# Patient Record
Sex: FEMALE | Race: WHITE | NOT HISPANIC OR LATINO
[De-identification: names, ages, dates, MRNs, and addresses within clinical notes are randomized per-mention and may not be internally consistent; named-entity substitution may affect disease eponyms.]

---

## 2018-12-05 ENCOUNTER — APPOINTMENT (OUTPATIENT)
Dept: ORTHOPEDIC SURGERY | Facility: CLINIC | Age: 73
End: 2018-12-05
Payer: MEDICARE

## 2018-12-05 VITALS
HEIGHT: 64 IN | WEIGHT: 140 LBS | BODY MASS INDEX: 23.9 KG/M2 | HEART RATE: 61 BPM | SYSTOLIC BLOOD PRESSURE: 162 MMHG | DIASTOLIC BLOOD PRESSURE: 79 MMHG

## 2018-12-05 PROCEDURE — 99204 OFFICE O/P NEW MOD 45 MIN: CPT

## 2018-12-05 PROCEDURE — 73502 X-RAY EXAM HIP UNI 2-3 VIEWS: CPT | Mod: RT

## 2018-12-19 ENCOUNTER — APPOINTMENT (OUTPATIENT)
Dept: ORTHOPEDIC SURGERY | Facility: CLINIC | Age: 73
End: 2018-12-19
Payer: MEDICARE

## 2018-12-19 PROCEDURE — 99213 OFFICE O/P EST LOW 20 MIN: CPT

## 2018-12-19 PROCEDURE — 73502 X-RAY EXAM HIP UNI 2-3 VIEWS: CPT | Mod: RT

## 2019-01-09 ENCOUNTER — APPOINTMENT (OUTPATIENT)
Dept: ORTHOPEDIC SURGERY | Facility: CLINIC | Age: 74
End: 2019-01-09
Payer: MEDICARE

## 2019-01-09 PROCEDURE — 99213 OFFICE O/P EST LOW 20 MIN: CPT

## 2019-01-09 PROCEDURE — 73502 X-RAY EXAM HIP UNI 2-3 VIEWS: CPT | Mod: RT

## 2019-01-11 NOTE — HISTORY OF PRESENT ILLNESS
[All Other ROS Normal] : All other review of systems are negative except as noted [Joint Pain] : joint pain [Joint Stiffness] : joint stiffness [Muscle Aches] : muscle aches [All Hx] : past medical, family, and social [All] : medication and allergy [FreeTextEntry1] : Right MRI positive femoral neck fracture  [FreeTextEntry2] : 74 yo F presents today after undergoing MRI RT hip for progressive hip pain. MRI came back positive for fracture. She has elected for conservative management and observation of her fracture despite recommendation for surgery. She states that she has been compliant with TTWB.

## 2019-01-11 NOTE — DISCUSSION/SUMMARY
[de-identified] : Radiographic findings are discussed with the patient. She will continue TTWB and will continue to observe this fracture. we will se eher back in 4 weeks with Xrays at that time

## 2019-01-11 NOTE — PHYSICAL EXAM
[FreeTextEntry2] : Physical exam of the right hip \par no swelling no ecchymosis \par TTP right hip/ groin \par + Pain with log roll \par + percussion \par NIVD Distally  [de-identified] : 2 views right hip taken today and compared to prior films and MRI show  impaction of femoral neck fracture compared with prior radiographs however overall alignment is acceptable

## 2019-02-06 ENCOUNTER — APPOINTMENT (OUTPATIENT)
Dept: ORTHOPEDIC SURGERY | Facility: CLINIC | Age: 74
End: 2019-02-06
Payer: MEDICARE

## 2019-02-06 VITALS
BODY MASS INDEX: 24.24 KG/M2 | SYSTOLIC BLOOD PRESSURE: 114 MMHG | HEART RATE: 81 BPM | WEIGHT: 142 LBS | DIASTOLIC BLOOD PRESSURE: 66 MMHG | HEIGHT: 64 IN

## 2019-02-06 PROCEDURE — 72190 X-RAY EXAM OF PELVIS: CPT

## 2019-02-06 PROCEDURE — 99213 OFFICE O/P EST LOW 20 MIN: CPT

## 2019-03-12 ENCOUNTER — APPOINTMENT (OUTPATIENT)
Dept: ORTHOPEDIC SURGERY | Facility: CLINIC | Age: 74
End: 2019-03-12
Payer: MEDICARE

## 2019-03-12 PROCEDURE — 99213 OFFICE O/P EST LOW 20 MIN: CPT

## 2019-03-12 PROCEDURE — 73502 X-RAY EXAM HIP UNI 2-3 VIEWS: CPT | Mod: RT

## 2019-03-12 NOTE — DISCUSSION/SUMMARY
[de-identified] : Radiographic findings are discussed with the patient. continue WB as tolerated. She was given a script for therapy and will wean from assistive devices. She will followup in 8 weeks with x-ray at that time

## 2019-03-12 NOTE — HISTORY OF PRESENT ILLNESS
[All Other ROS Normal] : All other review of systems are negative except as noted [Joint Pain] : joint pain [Joint Stiffness] : joint stiffness [Muscle Aches] : muscle aches [All Hx] : past medical, family, and social [All] : medication and allergy [FreeTextEntry1] : Right MRI positive femoral neck fracture - F/U approximately 15 weeks post injury  [FreeTextEntry2] : 74 yo F presents today for followup. Patient refused surgery so she has been WBAT for a right MRI positive femoral neck fracture for the past 4 weeks. She in now approximately 15 weeks post injury

## 2019-03-12 NOTE — PHYSICAL EXAM
[Poor Appearance] : well-appearing [Acute Distress] : not in acute distress [UE/LE] : Sensory: Intact in bilateral upper & lower extremities [ALL] : dorsalis pedis, posterior tibial, femoral, popliteal, and radial 2+ and symmetric bilaterally [FreeTextEntry2] : Physical exam of the right hip \par no swelling no ecchymosis \par NTTP right hip/ groin \par no Pain with log roll \par negative percussion \par NIVD Distally \par She is able to walk comfortably with a walker.\par 4/5 strength on straight leg raise. 3/5 strength on abduction and hip flexion [de-identified] : 2 views right hip taken today and compared to prior films and MRI show  impaction of femoral neck fracture. This fracture appears fully healed. There is been no change from serial radiographs. There is some continued shortening of the femoral neck compared to the contralateral side

## 2019-03-28 ENCOUNTER — CLINICAL ADVICE (OUTPATIENT)
Age: 74
End: 2019-03-28

## 2019-03-28 DIAGNOSIS — M25.552 PAIN IN LEFT HIP: ICD-10-CM

## 2019-04-02 DIAGNOSIS — S32.9XXA FRACTURE OF UNSPECIFIED PARTS OF LUMBOSACRAL SPINE AND PELVIS, INITIAL ENCOUNTER FOR CLOSED FRACTURE: ICD-10-CM

## 2019-04-02 DIAGNOSIS — S32.592D OTHER SPECIFIED FRACTURE OF LEFT PUBIS, SUBSEQUENT ENCOUNTER FOR FRACTURE WITH ROUTINE HEALING: ICD-10-CM

## 2019-04-02 DIAGNOSIS — S32.592S OTHER SPECIFIED FRACTURE OF LEFT PUBIS, SEQUELA: ICD-10-CM

## 2019-04-03 ENCOUNTER — APPOINTMENT (OUTPATIENT)
Dept: ORTHOPEDIC SURGERY | Facility: CLINIC | Age: 74
End: 2019-04-03

## 2019-06-12 ENCOUNTER — APPOINTMENT (OUTPATIENT)
Dept: ORTHOPEDIC SURGERY | Facility: CLINIC | Age: 74
End: 2019-06-12
Payer: MEDICARE

## 2019-06-12 PROCEDURE — 99213 OFFICE O/P EST LOW 20 MIN: CPT

## 2019-06-12 PROCEDURE — 73502 X-RAY EXAM HIP UNI 2-3 VIEWS: CPT | Mod: RT

## 2019-06-17 NOTE — HISTORY OF PRESENT ILLNESS
[All Other ROS Normal] : All other review of systems are negative except as noted [Joint Pain] : joint pain [Muscle Aches] : muscle aches [Joint Stiffness] : joint stiffness [All] : medication and allergy [All Hx] : past medical, family, and social [FreeTextEntry1] : Right MRI positive femoral neck fracture - F/U approximately 15 weeks post injury  [FreeTextEntry2] : 72 yo F presents today for followup. Patient refused surgery so she has been WBAT for a right MRI positive femoral neck fracture 12/18. She has recently been dx w MRI+ pelvus fx 10 weeks ago.  She is overall feeling better.  Continues to ambulate w a walker

## 2019-06-17 NOTE — DISCUSSION/SUMMARY
[de-identified] : Radiographic findings are discussed with the patient. continue WB as tolerated. She was given a script for therapy and will wean from assistive devices. She will followup in 8 weeks with x-ray at that time

## 2019-06-17 NOTE — PHYSICAL EXAM
[Poor Appearance] : well-appearing [Acute Distress] : not in acute distress [UE/LE] : Sensory: Intact in bilateral upper & lower extremities [ALL] : dorsalis pedis, posterior tibial, femoral, popliteal, and radial 2+ and symmetric bilaterally [FreeTextEntry2] : Physical exam of the b/l hips and pelvis\par no swelling no ecchymosis \par NTTP right hip/ groin \par no Pain with log roll \par negative percussion \par NIVD Distally \par She is able to walk comfortably with a walker.\par 4/5 strength on straight leg raise. 4/5 strength on abduction and hip flexion [de-identified] : 2 views right hip taken today and compared to prior films and MRI show  impaction of femoral neck fracture. This fracture appears fully healed. There is been no change from serial radiographs. There is some continued shortening of the femoral neck compared to the contralateral side. Pelvis fracture appears to be healing appropriately w callous

## 2019-06-29 ENCOUNTER — APPOINTMENT (OUTPATIENT)
Dept: INTERNAL MEDICINE | Facility: CLINIC | Age: 74
End: 2019-06-29
Payer: MEDICARE

## 2019-06-29 VITALS
WEIGHT: 143 LBS | SYSTOLIC BLOOD PRESSURE: 120 MMHG | DIASTOLIC BLOOD PRESSURE: 80 MMHG | BODY MASS INDEX: 24.41 KG/M2 | HEIGHT: 64 IN

## 2019-06-29 DIAGNOSIS — M62.838 OTHER MUSCLE SPASM: ICD-10-CM

## 2019-06-29 DIAGNOSIS — S72.001A FRACTURE OF UNSPECIFIED PART OF NECK OF RIGHT FEMUR, INITIAL ENCOUNTER FOR CLOSED FRACTURE: ICD-10-CM

## 2019-06-29 PROCEDURE — 99213 OFFICE O/P EST LOW 20 MIN: CPT

## 2019-06-29 NOTE — PHYSICAL EXAM
[No Acute Distress] : no acute distress [No Respiratory Distress] : no respiratory distress  [Regular Rhythm] : with a regular rhythm [Soft] : abdomen soft [Clear to Auscultation] : lungs were clear to auscultation bilaterally [Non Tender] : non-tender [de-identified] : local parathoracic back pain--muscualr--no spine pain

## 2019-06-29 NOTE — HISTORY OF PRESENT ILLNESS
[FreeTextEntry8] : developed  bilateral para- thoracic back pain 1 week ago--no injury--improving--no radiation of pain--movement related--stopped smoking 2 wks ago--prior on biphosphonate but self d/c--will consider prolia--reviewed ortho notes

## 2019-10-25 ENCOUNTER — RX CHANGE (OUTPATIENT)
Age: 74
End: 2019-10-25

## 2019-11-04 ENCOUNTER — MEDICATION RENEWAL (OUTPATIENT)
Age: 74
End: 2019-11-04

## 2019-11-25 ENCOUNTER — MEDICATION RENEWAL (OUTPATIENT)
Age: 74
End: 2019-11-25

## 2019-12-17 ENCOUNTER — APPOINTMENT (OUTPATIENT)
Dept: INTERNAL MEDICINE | Facility: CLINIC | Age: 74
End: 2019-12-17
Payer: MEDICARE

## 2019-12-17 ENCOUNTER — NON-APPOINTMENT (OUTPATIENT)
Age: 74
End: 2019-12-17

## 2019-12-17 VITALS
BODY MASS INDEX: 22.36 KG/M2 | HEIGHT: 64 IN | TEMPERATURE: 97.8 F | SYSTOLIC BLOOD PRESSURE: 128 MMHG | WEIGHT: 131 LBS | DIASTOLIC BLOOD PRESSURE: 60 MMHG

## 2019-12-17 PROCEDURE — G0439: CPT

## 2019-12-17 PROCEDURE — 90732 PPSV23 VACC 2 YRS+ SUBQ/IM: CPT

## 2019-12-17 PROCEDURE — 36415 COLL VENOUS BLD VENIPUNCTURE: CPT

## 2019-12-17 PROCEDURE — 93000 ELECTROCARDIOGRAM COMPLETE: CPT

## 2019-12-17 PROCEDURE — G0009: CPT

## 2019-12-17 PROCEDURE — G0444 DEPRESSION SCREEN ANNUAL: CPT | Mod: 59

## 2019-12-17 NOTE — HEALTH RISK ASSESSMENT
[30 or more] : 30 or more [No falls in past year] : Patient reported no falls in the past year [No] : No [Patient reported colonoscopy was normal] : Patient reported colonoscopy was normal [Alone] : lives alone [Fully functional (bathing, dressing, toileting, transferring, walking, feeding)] : Fully functional (bathing, dressing, toileting, transferring, walking, feeding) [Fully functional (using the telephone, shopping, preparing meals, housekeeping, doing laundry, using] : Fully functional and needs no help or supervision to perform IADLs (using the telephone, shopping, preparing meals, housekeeping, doing laundry, using transportation, managing medications and managing finances) [Reports normal functional visual acuity (ie: able to read med bottle)] : Reports normal functional visual acuity [Carbon Monoxide Detector] : carbon monoxide detector [Smoke Detector] : smoke detector [Seat Belt] :  uses seat belt [Sunscreen] : uses sunscreen [With Patient/Caregiver] : With Patient/Caregiver [YearQuit] : 2019 [LowDoseCTScan] : 08/19 [EyeExamDate] : 10/19 [Change in mental status noted] : No change in mental status noted [Reports changes in hearing] : Reports no changes in hearing [Reports changes in vision] : Reports no changes in vision [Reports changes in dental health] : Reports no changes in dental health [Guns at Home] : no guns at home [Travel to Developing Areas] : does not  travel to developing areas [TB Exposure] : is not being exposed to tuberculosis [Caregiver Concerns] : does not have caregiver concerns [ColonoscopyDate] : 8/18 [AdvancecareDate] : 12/19 [ColonoscopyComments] : dilma pena

## 2019-12-17 NOTE — ASSESSMENT
[FreeTextEntry1] : thyroid u/s fall 2020\par renew chantrix starter\par restart boniva--pt agrees if cleared by her dentist\par labs\par ecg\par 6 months\par pneumovax

## 2019-12-17 NOTE — PHYSICAL EXAM
[Well Nourished] : well nourished [No Acute Distress] : no acute distress [Well Developed] : well developed [Well-Appearing] : well-appearing [Normal Sclera/Conjunctiva] : normal sclera/conjunctiva [PERRL] : pupils equal round and reactive to light [EOMI] : extraocular movements intact [Normal Outer Ear/Nose] : the outer ears and nose were normal in appearance [Normal Oropharynx] : the oropharynx was normal [No JVD] : no jugular venous distention [No Lymphadenopathy] : no lymphadenopathy [Thyroid Normal, No Nodules] : the thyroid was normal and there were no nodules present [Supple] : supple [No Respiratory Distress] : no respiratory distress  [Clear to Auscultation] : lungs were clear to auscultation bilaterally [No Accessory Muscle Use] : no accessory muscle use [Normal Rate] : normal rate  [Normal S1, S2] : normal S1 and S2 [Regular Rhythm] : with a regular rhythm [No Murmur] : no murmur heard [No Carotid Bruits] : no carotid bruits [No Abdominal Bruit] : a ~M bruit was not heard ~T in the abdomen [No Varicosities] : no varicosities [No Edema] : there was no peripheral edema [Pedal Pulses Present] : the pedal pulses are present [No Palpable Aorta] : no palpable aorta [Soft] : abdomen soft [No Extremity Clubbing/Cyanosis] : no extremity clubbing/cyanosis [Non Tender] : non-tender [Non-distended] : non-distended [No HSM] : no HSM [No Masses] : no abdominal mass palpated [Normal Bowel Sounds] : normal bowel sounds [Normal Posterior Cervical Nodes] : no posterior cervical lymphadenopathy [No CVA Tenderness] : no CVA  tenderness [Normal Anterior Cervical Nodes] : no anterior cervical lymphadenopathy [No Spinal Tenderness] : no spinal tenderness [No Joint Swelling] : no joint swelling [Grossly Normal Strength/Tone] : grossly normal strength/tone [No Rash] : no rash [Coordination Grossly Intact] : coordination grossly intact [No Focal Deficits] : no focal deficits [Normal Gait] : normal gait [Deep Tendon Reflexes (DTR)] : deep tendon reflexes were 2+ and symmetric [Normal Affect] : the affect was normal [Normal Insight/Judgement] : insight and judgment were intact

## 2019-12-17 NOTE — HISTORY OF PRESENT ILLNESS
[FreeTextEntry1] : f/u --cpx [de-identified] : not smoking for 7 weeks--on chantrix continuation--saw cardio (shreyas) with neg prior cardiac w/u--was on biphosphonate for less than 2 yrs and self d/sarita about 3 yrs ago--agrees to restart--reviewed DEXA and high risk of fracture

## 2019-12-19 ENCOUNTER — NON-APPOINTMENT (OUTPATIENT)
Age: 74
End: 2019-12-19

## 2019-12-19 LAB
ALBUMIN SERPL ELPH-MCNC: 4.5 G/DL
ALP BLD-CCNC: 78 U/L
ALT SERPL-CCNC: 8 U/L
ANION GAP SERPL CALC-SCNC: 12 MMOL/L
APPEARANCE: ABNORMAL
AST SERPL-CCNC: 15 U/L
BACTERIA: NEGATIVE
BASOPHILS # BLD AUTO: 0.05 K/UL
BASOPHILS NFR BLD AUTO: 0.5 %
BILIRUB SERPL-MCNC: 0.7 MG/DL
BILIRUBIN URINE: NEGATIVE
BLOOD URINE: ABNORMAL
BUN SERPL-MCNC: 24 MG/DL
CALCIUM SERPL-MCNC: 9.6 MG/DL
CHLORIDE SERPL-SCNC: 103 MMOL/L
CHOLEST SERPL-MCNC: 218 MG/DL
CHOLEST/HDLC SERPL: 4.1 RATIO
CO2 SERPL-SCNC: 23 MMOL/L
COLOR: YELLOW
CREAT SERPL-MCNC: 0.89 MG/DL
EOSINOPHIL # BLD AUTO: 0.26 K/UL
EOSINOPHIL NFR BLD AUTO: 2.7 %
GLUCOSE QUALITATIVE U: NEGATIVE
GLUCOSE SERPL-MCNC: 93 MG/DL
HCT VFR BLD CALC: 38.8 %
HDLC SERPL-MCNC: 53 MG/DL
HGB BLD-MCNC: 12.4 G/DL
HYALINE CASTS: 0 /LPF
IMM GRANULOCYTES NFR BLD AUTO: 0.1 %
KETONES URINE: NEGATIVE
LDLC SERPL CALC-MCNC: 142 MG/DL
LEUKOCYTE ESTERASE URINE: ABNORMAL
LYMPHOCYTES # BLD AUTO: 3.05 K/UL
LYMPHOCYTES NFR BLD AUTO: 31.2 %
MAN DIFF?: NORMAL
MCHC RBC-ENTMCNC: 30.2 PG
MCHC RBC-ENTMCNC: 32 GM/DL
MCV RBC AUTO: 94.4 FL
MICROSCOPIC-UA: NORMAL
MONOCYTES # BLD AUTO: 0.68 K/UL
MONOCYTES NFR BLD AUTO: 7 %
NEUTROPHILS # BLD AUTO: 5.73 K/UL
NEUTROPHILS NFR BLD AUTO: 58.5 %
NITRITE URINE: NEGATIVE
PH URINE: 5
PLATELET # BLD AUTO: 225 K/UL
POTASSIUM SERPL-SCNC: 4.9 MMOL/L
PROT SERPL-MCNC: 7.9 G/DL
PROTEIN URINE: NORMAL
RBC # BLD: 4.11 M/UL
RBC # FLD: 13.6 %
RED BLOOD CELLS URINE: 2 /HPF
SODIUM SERPL-SCNC: 138 MMOL/L
SPECIFIC GRAVITY URINE: 1.02
SQUAMOUS EPITHELIAL CELLS: 18 /HPF
T4 FREE SERPL-MCNC: 1.3 NG/DL
TRIGL SERPL-MCNC: 115 MG/DL
TSH SERPL-ACNC: 5.21 UIU/ML
UROBILINOGEN URINE: NORMAL
WBC # FLD AUTO: 9.78 K/UL
WHITE BLOOD CELLS URINE: 6 /HPF

## 2019-12-20 LAB
THYROGLOB AB SERPL-ACNC: ABNORMAL IU/ML
THYROPEROXIDASE AB SERPL IA-ACNC: 9696 IU/ML

## 2020-01-08 ENCOUNTER — MEDICATION RENEWAL (OUTPATIENT)
Age: 75
End: 2020-01-08

## 2020-06-13 DIAGNOSIS — Z92.89 PERSONAL HISTORY OF OTHER MEDICAL TREATMENT: ICD-10-CM

## 2020-06-13 DIAGNOSIS — Z82.49 FAMILY HISTORY OF ISCHEMIC HEART DISEASE AND OTHER DISEASES OF THE CIRCULATORY SYSTEM: ICD-10-CM

## 2020-06-29 ENCOUNTER — APPOINTMENT (OUTPATIENT)
Dept: INTERNAL MEDICINE | Facility: CLINIC | Age: 75
End: 2020-06-29
Payer: MEDICARE

## 2020-06-29 ENCOUNTER — APPOINTMENT (OUTPATIENT)
Dept: CARDIOLOGY | Facility: CLINIC | Age: 75
End: 2020-06-29
Payer: MEDICARE

## 2020-06-29 ENCOUNTER — NON-APPOINTMENT (OUTPATIENT)
Age: 75
End: 2020-06-29

## 2020-06-29 VITALS
SYSTOLIC BLOOD PRESSURE: 145 MMHG | RESPIRATION RATE: 18 BRPM | OXYGEN SATURATION: 96 % | DIASTOLIC BLOOD PRESSURE: 75 MMHG | TEMPERATURE: 98.9 F | BODY MASS INDEX: 24.41 KG/M2 | HEIGHT: 64 IN | HEART RATE: 90 BPM | WEIGHT: 143 LBS

## 2020-06-29 PROCEDURE — 99214 OFFICE O/P EST MOD 30 MIN: CPT | Mod: 25

## 2020-06-29 PROCEDURE — 36415 COLL VENOUS BLD VENIPUNCTURE: CPT

## 2020-06-29 PROCEDURE — 93971 EXTREMITY STUDY: CPT

## 2020-06-29 PROCEDURE — 93000 ELECTROCARDIOGRAM COMPLETE: CPT

## 2020-06-29 PROCEDURE — 93306 TTE W/DOPPLER COMPLETE: CPT

## 2020-06-29 PROCEDURE — 99406 BEHAV CHNG SMOKING 3-10 MIN: CPT

## 2020-06-29 RX ORDER — VARENICLINE TARTRATE 1 MG/1
1 TABLET, FILM COATED ORAL
Qty: 1 | Refills: 1 | Status: DISCONTINUED | COMMUNITY
Start: 2019-11-25 | End: 2020-06-29

## 2020-06-29 RX ORDER — ENALAPRIL MALEATE 10 MG/1
10 TABLET ORAL
Refills: 0 | Status: DISCONTINUED | COMMUNITY
End: 2020-06-29

## 2020-06-29 RX ORDER — TRAMADOL HYDROCHLORIDE 50 MG/1
50 TABLET, COATED ORAL 4 TIMES DAILY
Qty: 60 | Refills: 0 | Status: DISCONTINUED | COMMUNITY
Start: 2019-04-02 | End: 2020-06-29

## 2020-06-29 RX ORDER — METOPROLOL SUCCINATE 50 MG/1
50 TABLET, EXTENDED RELEASE ORAL
Refills: 0 | Status: DISCONTINUED | COMMUNITY
End: 2020-06-29

## 2020-06-29 NOTE — REVIEW OF SYSTEMS
[Lower Ext Edema] : lower extremity edema [Dyspnea on Exertion] : dyspnea on exertion [Negative] : Constitutional [Chest Pain] : no chest pain [Palpitations] : no palpitations [Leg Claudication] : no leg claudication [Orthopnea] : no orthopnea [Paroxysmal Nocturnal Dyspnea] : no paroxysmal nocturnal dyspnea [Shortness Of Breath] : no shortness of breath [Wheezing] : no wheezing [Cough] : no cough

## 2020-06-29 NOTE — HISTORY OF PRESENT ILLNESS
[FreeTextEntry8] : CC: LE edema\par \par Complaining of LE edema, L >R over the past few weeks. Improves w/ elevation of legs and worsening throughout the day. Denies calf pain, recent immobilization, or falls. 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

## 2020-06-29 NOTE — PHYSICAL EXAM
[No JVD] : no jugular venous distention [Normal] : normal rate, regular rhythm, normal S1 and S2 and no murmur heard [de-identified] : + systolic murmur [de-identified] : + bilateral edema, L > R

## 2020-06-29 NOTE — COUNSELING
[Risk of tobacco use and health benefits of smoking cessation discussed] : Risk of tobacco use and health benefits of smoking cessation discussed [Willing to Quit Smoking] : Willing to quit smoking [Tobacco Use Cessation Intermediate Greater Than 3 Minutes Up to 10 Minutes] : Tobacco Use Cessation Intermediate Greater Than 3 Minutes Up to 10 Minutes [FreeTextEntry1] : 3 Complex Repair And V-Y Plasty Text: The defect edges were debeveled with a #15 scalpel blade.  The primary defect was closed partially with a complex linear closure.  Given the location of the remaining defect, shape of the defect and the proximity to free margins a V-Y plasty was deemed most appropriate for complete closure of the defect.  Using a sterile surgical marker, an appropriate advancement flap was drawn incorporating the defect and placing the expected incisions within the relaxed skin tension lines where possible.    The area thus outlined was incised deep to adipose tissue with a #15 scalpel blade.  The skin margins were undermined to an appropriate distance in all directions utilizing iris scissors.

## 2020-06-30 LAB
ALBUMIN SERPL ELPH-MCNC: 4.3 G/DL
ALP BLD-CCNC: 80 U/L
ALT SERPL-CCNC: 12 U/L
ANION GAP SERPL CALC-SCNC: 13 MMOL/L
AST SERPL-CCNC: 19 U/L
BASOPHILS # BLD AUTO: 0.05 K/UL
BASOPHILS NFR BLD AUTO: 0.5 %
BILIRUB SERPL-MCNC: 0.4 MG/DL
BUN SERPL-MCNC: 20 MG/DL
CALCIUM SERPL-MCNC: 9.2 MG/DL
CHLORIDE SERPL-SCNC: 104 MMOL/L
CO2 SERPL-SCNC: 21 MMOL/L
CREAT SERPL-MCNC: 0.97 MG/DL
EOSINOPHIL # BLD AUTO: 0.17 K/UL
EOSINOPHIL NFR BLD AUTO: 1.6 %
GLUCOSE SERPL-MCNC: 87 MG/DL
HCT VFR BLD CALC: 38 %
HGB BLD-MCNC: 11.7 G/DL
IMM GRANULOCYTES NFR BLD AUTO: 0.5 %
LYMPHOCYTES # BLD AUTO: 2.28 K/UL
LYMPHOCYTES NFR BLD AUTO: 21.9 %
MAN DIFF?: NORMAL
MCHC RBC-ENTMCNC: 30.8 GM/DL
MCHC RBC-ENTMCNC: 31 PG
MCV RBC AUTO: 100.5 FL
MONOCYTES # BLD AUTO: 0.72 K/UL
MONOCYTES NFR BLD AUTO: 6.9 %
NEUTROPHILS # BLD AUTO: 7.15 K/UL
NEUTROPHILS NFR BLD AUTO: 68.6 %
PLATELET # BLD AUTO: 230 K/UL
POTASSIUM SERPL-SCNC: 4.8 MMOL/L
PROT SERPL-MCNC: 7.8 G/DL
RBC # BLD: 3.78 M/UL
RBC # FLD: 14.4 %
SODIUM SERPL-SCNC: 138 MMOL/L
T4 FREE SERPL-MCNC: 1.1 NG/DL
TSH SERPL-ACNC: 6.66 UIU/ML
WBC # FLD AUTO: 10.42 K/UL

## 2020-07-01 ENCOUNTER — APPOINTMENT (OUTPATIENT)
Dept: CARDIOLOGY | Facility: CLINIC | Age: 75
End: 2020-07-01
Payer: MEDICARE

## 2020-07-01 VITALS
HEART RATE: 103 BPM | SYSTOLIC BLOOD PRESSURE: 126 MMHG | TEMPERATURE: 98.8 F | RESPIRATION RATE: 17 BRPM | HEIGHT: 64 IN | DIASTOLIC BLOOD PRESSURE: 72 MMHG | OXYGEN SATURATION: 97 % | WEIGHT: 143 LBS | BODY MASS INDEX: 24.41 KG/M2

## 2020-07-01 DIAGNOSIS — R76.8 OTHER SPECIFIED ABNORMAL IMMUNOLOGICAL FINDINGS IN SERUM: ICD-10-CM

## 2020-07-01 LAB
THYROGLOB AB SERPL-ACNC: ABNORMAL IU/ML
THYROPEROXIDASE AB SERPL IA-ACNC: ABNORMAL IU/ML

## 2020-07-01 PROCEDURE — 99204 OFFICE O/P NEW MOD 45 MIN: CPT

## 2020-07-01 NOTE — DISCUSSION/SUMMARY
[FreeTextEntry1] : Mitral stenosis - mod-to-severe with mean gradient 11mmHg at HR 77, PASP 30. calcified valve. 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 discussed possible side effects of all medications.\par -check COSME to better characterize valve and assess gradients as pt may require surgical intervention\par -of note, inducible LV intracavitary gradient however pt without dizziness, LOC. suspect may be 2/2 MS\par -continued smoking cessation\par \par LE edema - component of venous insufficienct given hx and JVP wnl, though can be a component of diastolic dysfunction. L>R may be 2/2 hx of trauma. lower suspicion of thromboembolism\par -low salt diet, leg elevation, leg exercises\par -compression socks recommended if pt amenable\par -would not start diuretics at this time\par \par [phone]\par Nicol. dtr. primary contact

## 2020-07-01 NOTE — PHYSICAL EXAM
[Normal Appearance] : normal appearance [General Appearance - In No Acute Distress] : no acute distress [Eyelids - No Xanthelasma] : the eyelids demonstrated no xanthelasmas [Normal Jugular Venous A Waves Present] : normal jugular venous A waves present [No Jugular Venous Zuniga A Waves] : no jugular venous zuniga A waves [Normal Jugular Venous V Waves Present] : normal jugular venous V waves present [Heart Rate And Rhythm] : heart rate and rhythm were normal [Heart Sounds] : normal S1 and S2 [Respiration, Rhythm And Depth] : normal respiratory rhythm and effort [Nail Clubbing] : no clubbing of the fingernails [Abnormal Walk] : normal gait [Cyanosis, Localized] : no localized cyanosis [Skin Turgor] : normal skin turgor [No Skin Ulcers] : no skin ulcer [] : no rash [Oriented To Time, Place, And Person] : oriented to person, place, and time [Impaired Insight] : insight and judgment were intact [Affect] : the affect was normal [Mood] : the mood was normal [FreeTextEntry1] : m

## 2020-07-01 NOTE — REVIEW OF SYSTEMS
[Shortness Of Breath] : shortness of breath [Dyspnea on exertion] : dyspnea during exertion [Lower Ext Edema] : lower extremity edema [Negative] : Heme/Lymph [Chest  Pressure] : no chest pressure [Chest Pain] : no chest pain [Muscle Cramps] : no muscle cramps [Joint Swelling] : no joint swelling [Palpitations] : no palpitations [Limb Weakness (Paresis)] : no limb weakness [Dizziness] : no dizziness [Tremor] : no tremor was seen [Convulsions] : no convulsions [Tingling (Paresthesia)] : no tingling
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
## 2020-07-18 DIAGNOSIS — Z01.818 ENCOUNTER FOR OTHER PREPROCEDURAL EXAMINATION: ICD-10-CM

## 2020-07-19 ENCOUNTER — APPOINTMENT (OUTPATIENT)
Dept: DISASTER EMERGENCY | Facility: CLINIC | Age: 75
End: 2020-07-19

## 2020-07-20 LAB — SARS-COV-2 N GENE NPH QL NAA+PROBE: NOT DETECTED

## 2020-07-22 ENCOUNTER — APPOINTMENT (OUTPATIENT)
Dept: CV DIAGNOSITCS | Facility: HOSPITAL | Age: 75
End: 2020-07-22

## 2020-07-22 ENCOUNTER — OUTPATIENT (OUTPATIENT)
Dept: OUTPATIENT SERVICES | Facility: HOSPITAL | Age: 75
LOS: 1 days | End: 2020-07-22
Payer: MEDICARE

## 2020-07-22 DIAGNOSIS — I34.2 NONRHEUMATIC MITRAL (VALVE) STENOSIS: ICD-10-CM

## 2020-07-22 PROCEDURE — 93306 TTE W/DOPPLER COMPLETE: CPT | Mod: 26

## 2020-07-22 PROCEDURE — 93306 TTE W/DOPPLER COMPLETE: CPT

## 2020-07-22 PROCEDURE — 76377 3D RENDER W/INTRP POSTPROCES: CPT

## 2020-07-28 ENCOUNTER — APPOINTMENT (OUTPATIENT)
Dept: CT IMAGING | Facility: IMAGING CENTER | Age: 75
End: 2020-07-28

## 2020-07-28 ENCOUNTER — OUTPATIENT (OUTPATIENT)
Dept: OUTPATIENT SERVICES | Facility: HOSPITAL | Age: 75
LOS: 1 days | End: 2020-07-28
Payer: MEDICARE

## 2020-07-28 DIAGNOSIS — I70.0 ATHEROSCLEROSIS OF AORTA: ICD-10-CM

## 2020-07-28 PROCEDURE — 71275 CT ANGIOGRAPHY CHEST: CPT | Mod: 26

## 2020-07-28 PROCEDURE — 71275 CT ANGIOGRAPHY CHEST: CPT

## 2020-07-28 PROCEDURE — 74174 CTA ABD&PLVS W/CONTRAST: CPT

## 2020-07-28 PROCEDURE — 74174 CTA ABD&PLVS W/CONTRAST: CPT | Mod: 26

## 2020-08-05 ENCOUNTER — APPOINTMENT (OUTPATIENT)
Dept: ENDOCRINOLOGY | Facility: CLINIC | Age: 75
End: 2020-08-05

## 2020-08-12 ENCOUNTER — APPOINTMENT (OUTPATIENT)
Dept: CARDIOTHORACIC SURGERY | Facility: CLINIC | Age: 75
End: 2020-08-12
Payer: MEDICARE

## 2020-08-12 VITALS
WEIGHT: 143 LBS | HEIGHT: 64 IN | TEMPERATURE: 97.5 F | DIASTOLIC BLOOD PRESSURE: 71 MMHG | OXYGEN SATURATION: 99 % | BODY MASS INDEX: 24.41 KG/M2 | SYSTOLIC BLOOD PRESSURE: 137 MMHG | RESPIRATION RATE: 16 BRPM | HEART RATE: 67 BPM

## 2020-08-12 PROCEDURE — 99205 OFFICE O/P NEW HI 60 MIN: CPT
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
## 2020-08-14 NOTE — CONSULT LETTER
[Dear  ___] : Dear  [unfilled], [Please see my note below.] : Please see my note below. [Sincerely,] : Sincerely, [FreeTextEntry1] : I had the pleasure of seeing your patient, LAUREN TORRES, in my office today. We take a multidisciplinary team approach to patient care and consider you, the referring physician, an extension of our team. We will maintain an open line of communication with you throughout your patient's treatment course.  \par \par She is being evaluated for an aortic atheroma. I have reviewed all of the medical records and diagnostic images at the time of her office visit. I have enclosed a copy for your records. \par \par I have reviewed the indications for surgery,and used our webpage [email or web address] <[email or web address]> to illustrate the aorta and anatomy of the heart. The patient does not meet size criteria for surgical intervention at the time. Therefore, I have recommended that the patient will require a follow up appointment in 1 year with CTA C/A/P to monitor aortic pathology. My office will assist the patient with her upcoming appointment and I will update you on her progress at that time.\par \par I have discussed with the patient that we will continue to monitor her aortic pathology closely at the [facility] that encompasses the entire health care system and is one of the largest in the nation at this point.\par \par It is our commitment to provide your patient with the highest quality of advanced therapeutic options. On behalf of the Cardiothoracic Surgery Team, thank you for sending your patient to the [facility] based at [facility].  If there are any questions or concerns, please call me directly at [phone].  \par \par   [FreeTextEntry2] : Dr. Neel Cruz [FreeTextEntry3] : \par Tirso Garcia M.D.\par Professor of Cardiovascular and Thoracic Surgery\par Minimally Invasive Valve Surgeon\par Director of Aortic Surgery, [facility]\par Cell: [phone]\par Email: [email or web address] \par \par [facility]:\par [address], [address] Floor, [place] [address]\par Office: [phone]\par Fax: [phone]\par \par [facility]:\par Department of Cardiovascular and Thoracic Surgery\par [address] [place] [address], [address]\par Office: [phone]\par Fax: [phone]\par \par Practice Manager: Ms. Ayana Mejia\par Email: [email or web address]\par Phone: [phone]\par \par

## 2020-08-14 NOTE — REVIEW OF SYSTEMS
[Feeling Tired] : feeling tired [Lower Ext Edema] : lower extremity edema [SOB on Exertion] : shortness of breath during exertion [Shortness Of Breath] : shortness of breath [Negative] : Heme/Lymph [Nasal Discharge] : no nasal discharge [Chest Pain] : no chest pain [Dizziness] : no dizziness [Palpitations] : no palpitations [Fainting] : no fainting [Easy Bleeding] : no tendency for easy bleeding [Easy Bruising] : no tendency for easy bruising
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
## 2020-08-14 NOTE — PROCEDURE
[FreeTextEntry1] : \par Dr. Garcia discussed activity restrictions with the patient, and would advise exercise at a moderate amount with no heavy lifting over one third of body weight, and avoiding heart rates that exceed 140 beats per minute. Every patient must abstain from tobacco and illicit drug use, especially stimulants such as cocaine or methamphetamine. The patient was also counseled on maintaining a healthy heart diet, and losing any excessive weight as this also put undue stress on both the aorta and entire cardiovascular system. Patient was counseled on the importance of medication adherence for blood pressure control, as hypertension is a significant risk factor associated with aortic dissections. First degree family members should be screened for bicuspid valve disease, and ascending aortic aneurysms.\par \par Patient also was advised to view our educational video prior to this visit regarding aortic pathology, lifestyle modifications, risk factors and procedures, retrieved at [email or web address].\par \par Dr. Garcia reviewed the indications for surgery, and used our webpage [email or web address] to illustrate the aorta and anatomy of the heart. Those indications are the following: size greater than 5.0 cm, symptomatic aneurysms, family history of aortic dissection or aneurysm death with a size greater than 4.5 cm, other necessary cardiac procedures such as coronary artery bypass grafting or severe valvular disorders with an aneurysm greater than 4.5 cm, or connective tissue disorders with an aneurysm size greater than 4.5 cm. The patient does not meet size criteria for surgical intervention at the time.

## 2020-08-14 NOTE — END OF VISIT
[FreeTextEntry3] : Scribe Attestation:\par I personally scribed for JULIO CORDOBA on Aug 12 2020  8:00AM . \par \par I personally performed the services described in the documentation, reviewed the documentation recorded by the scribe in my presence and it accurately and completely records my words and actions.\par \par \par \par \par Physician Attestation:\par Documented by RYLEY YAP acting as a scribe for JULIO CORDOBA 08/12/2020 . \par                 All medical record entries made by the Scribe were at my, JULIO CORDOBA , direction and personally dictated by me on 08/12/2020 . I have reviewed the chart and agree that the record accurately reflects my personal performance of the history, physical exam, assessment and plan\par
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
## 2020-12-18 ENCOUNTER — APPOINTMENT (OUTPATIENT)
Dept: INTERNAL MEDICINE | Facility: CLINIC | Age: 75
End: 2020-12-18

## 2021-02-23 NOTE — PHYSICAL EXAM
[General Appearance - In No Acute Distress] : in no acute distress [General Appearance - Alert] : alert [General Appearance - Well Nourished] : well nourished [Sclera] : the sclera and conjunctiva were normal [General Appearance - Well Developed] : well developed [Outer Ear] : the ears and nose were normal in appearance [Hearing Threshold Finger Rub Not [place]] : hearing was normal [PERRL With Normal Accommodation] : pupils were equal in size, round, and reactive to light Patient with expressive aphasia, decreased RUE ROM/M/S, LUE/BLE M/S, core strength, activity tolerance, coordination, motor planning impacting independence with functional activities. At baseline patient unable to walk. [Neck Appearance] : the appearance of the neck was normal [Both Tympanic Membranes Were Examined] : both tympanic membranes were normal [] : no respiratory distress [Respiration, Rhythm And Depth] : normal respiratory rhythm and effort [Apical Impulse] : the apical impulse was normal [Heart Rate And Rhythm] : heart rate was normal and rhythm regular [Auscultation Breath Sounds / Voice Sounds] : lungs were clear to auscultation bilaterally [Murmurs] : no murmurs [Heart Sounds] : normal S1 and S2 [Examination Of The Chest] : the chest was normal in appearance [2+] : right 2+ [Breast Appearance] : normal in appearance [Bowel Sounds] : normal bowel sounds [Abdomen Soft] : soft [No CVA Tenderness] : no ~M costovertebral angle tenderness [Abnormal Walk] : normal gait [Involuntary Movements] : no involuntary movements were seen [No Focal Deficits] : no focal deficits [Skin Color & Pigmentation] : normal skin color and pigmentation [Skin Turgor] : normal skin turgor [Oriented To Time, Place, And Person] : oriented to person, place, and time [Impaired Insight] : insight and judgment were intact [Mood] : the mood was normal [Affect] : the affect was normal [Memory Recent] : recent memory was not impaired [Memory Remote] : remote memory was not impaired [FreeTextEntry1] : Trace pedal edema

## 2021-03-29 ENCOUNTER — APPOINTMENT (OUTPATIENT)
Dept: CARDIOLOGY | Facility: CLINIC | Age: 76
End: 2021-03-29
Payer: MEDICARE

## 2021-03-29 ENCOUNTER — NON-APPOINTMENT (OUTPATIENT)
Age: 76
End: 2021-03-29

## 2021-03-29 VITALS
WEIGHT: 139 LBS | TEMPERATURE: 97.5 F | HEART RATE: 62 BPM | BODY MASS INDEX: 23.73 KG/M2 | OXYGEN SATURATION: 95 % | SYSTOLIC BLOOD PRESSURE: 181 MMHG | DIASTOLIC BLOOD PRESSURE: 77 MMHG | HEIGHT: 64 IN

## 2021-03-29 VITALS — DIASTOLIC BLOOD PRESSURE: 76 MMHG | SYSTOLIC BLOOD PRESSURE: 170 MMHG

## 2021-03-29 PROCEDURE — 93971 EXTREMITY STUDY: CPT

## 2021-03-29 PROCEDURE — 93926 LOWER EXTREMITY STUDY: CPT

## 2021-03-29 PROCEDURE — 93000 ELECTROCARDIOGRAM COMPLETE: CPT

## 2021-03-29 PROCEDURE — 99215 OFFICE O/P EST HI 40 MIN: CPT

## 2021-03-29 RX ORDER — METOPROLOL SUCCINATE 25 MG/1
25 TABLET, EXTENDED RELEASE ORAL
Qty: 90 | Refills: 1 | Status: DISCONTINUED | COMMUNITY
Start: 2020-07-01 | End: 2021-03-29

## 2021-03-29 NOTE — PHYSICAL EXAM
[Normal Appearance] : normal appearance [General Appearance - In No Acute Distress] : no acute distress [Eyelids - No Xanthelasma] : the eyelids demonstrated no xanthelasmas [Normal Jugular Venous A Waves Present] : normal jugular venous A waves present [Normal Jugular Venous V Waves Present] : normal jugular venous V waves present [No Jugular Venous Zuniga A Waves] : no jugular venous zuniga A waves [Respiration, Rhythm And Depth] : normal respiratory rhythm and effort [Heart Rate And Rhythm] : heart rate and rhythm were normal [Heart Sounds] : normal S1 and S2 [Abnormal Walk] : normal gait [Nail Clubbing] : no clubbing of the fingernails [Cyanosis, Localized] : no localized cyanosis [Skin Turgor] : normal skin turgor [] : no rash [No Skin Ulcers] : no skin ulcer [Oriented To Time, Place, And Person] : oriented to person, place, and time [Impaired Insight] : insight and judgment were intact [Affect] : the affect was normal [Mood] : the mood was normal [FreeTextEntry1] : systolic murmur RUSB. varoicose veins of the LE

## 2021-03-29 NOTE — DISCUSSION/SUMMARY
[FreeTextEntry1] : mod mitral stenosis\par aortic atheroma\par unilateral LE edema/pain, worse from before\par \par 3/29/2021\par LLE duplex no DVT\par LLE arterial duplex with 50-74% occlusion of the L distal superficial femoral artery with diffuse atherosclerotic plaque. No occlusion.\par \par Given palpable LE pulse, 50-74% occlusion by duplex, leg pain nonexertional, and elevated BP, will attempt medical optimization as below. if symptoms do not improve there is a low threshold to refer to vascular\par \par -cont asa 81mg daily\par -cont atorvastatin 40mg daily\par -f/u lipid panel today\par -check BNP \par -given likely reactive airway disease with acute symptom worsening with metoprolol and elevated BP, will change metoprolol to carvedilol 6.25mg BID and uptitrate as needed\par -pulm eval as she has SOB at rest with emphysema on cross sectional imaging\par -continued smoking cessation\par -will eventually need ischemic eval for her symptoms\par \par [phone]\par Nicol. dtr. primary contact
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
## 2021-03-29 NOTE — REVIEW OF SYSTEMS
[Shortness Of Breath] : shortness of breath [Dyspnea on exertion] : dyspnea during exertion [Lower Ext Edema] : lower extremity edema [Negative] : Heme/Lymph [Chest  Pressure] : no chest pressure [Chest Pain] : no chest pain [Palpitations] : no palpitations [Joint Swelling] : no joint swelling [Muscle Cramps] : no muscle cramps [Limb Weakness (Paresis)] : no limb weakness [Dizziness] : no dizziness [Tremor] : no tremor was seen [Convulsions] : no convulsions [Tingling (Paresthesia)] : no tingling

## 2021-04-06 ENCOUNTER — TRANSCRIPTION ENCOUNTER (OUTPATIENT)
Age: 76
End: 2021-04-06

## 2021-04-07 ENCOUNTER — MESSAGE (OUTPATIENT)
Age: 76
End: 2021-04-07

## 2021-04-07 LAB
CHOLEST SERPL-MCNC: 155 MG/DL
HDLC SERPL-MCNC: 50 MG/DL
LDLC SERPL CALC-MCNC: 81 MG/DL
NONHDLC SERPL-MCNC: 105 MG/DL
NT-PROBNP SERPL-MCNC: 767 PG/ML
TRIGL SERPL-MCNC: 120 MG/DL

## 2021-04-24 ENCOUNTER — TRANSCRIPTION ENCOUNTER (OUTPATIENT)
Age: 76
End: 2021-04-24

## 2021-05-25 ENCOUNTER — NON-APPOINTMENT (OUTPATIENT)
Age: 76
End: 2021-05-25

## 2021-05-25 ENCOUNTER — APPOINTMENT (OUTPATIENT)
Dept: CARDIOLOGY | Facility: CLINIC | Age: 76
End: 2021-05-25
Payer: MEDICARE

## 2021-05-25 VITALS
OXYGEN SATURATION: 99 % | BODY MASS INDEX: 23.56 KG/M2 | HEIGHT: 64 IN | SYSTOLIC BLOOD PRESSURE: 160 MMHG | HEART RATE: 52 BPM | RESPIRATION RATE: 16 BRPM | TEMPERATURE: 97.5 F | DIASTOLIC BLOOD PRESSURE: 82 MMHG | WEIGHT: 138 LBS

## 2021-05-25 VITALS — DIASTOLIC BLOOD PRESSURE: 78 MMHG | SYSTOLIC BLOOD PRESSURE: 150 MMHG

## 2021-05-25 PROCEDURE — 99214 OFFICE O/P EST MOD 30 MIN: CPT

## 2021-05-25 PROCEDURE — 93000 ELECTROCARDIOGRAM COMPLETE: CPT

## 2021-05-25 RX ORDER — CARVEDILOL 6.25 MG/1
6.25 TABLET, FILM COATED ORAL TWICE DAILY
Qty: 60 | Refills: 1 | Status: DISCONTINUED | COMMUNITY
Start: 2021-03-29 | End: 2021-05-25

## 2021-05-25 NOTE — DISCUSSION/SUMMARY
[FreeTextEntry1] : mod mitral stenosis\par aortic atheroma\par unilateral LE edema/pain, worse from before\par fatigue\par moderate LLE PAD\par \par -cont asa 81mg daily\par -cont atorvastatin 40mg daily\par -she initially had possible worsening of her respiratory symptoms with metoprolol, since then switched to carvedilol but appears to be intolerant to this more than with the metoprolol. will trial metoprolol 25mg daily again for rate control, add losartan 25mg once daily for HTN.\par -if continues to be intolerant will transition to diltiazem \par -pulm eval as she has SOB at rest with emphysema on cross sectional imaging\par -continued smoking cessation\par -will eventually need ischemic eval for her symptoms. based upon the chronology of her symptoms I suspect she is experiencing side effects to carvedilol as opposed to new ischemia. if a therapeutic trial of medication changes does not help will then expedite ischemia eval.\par \par [phone]\par Nicol. dtr. primary contact.

## 2021-05-25 NOTE — PHYSICAL EXAM
[Normal S1, S2] : normal S1, S2 [No Rub] : no rub [No Gallop] : no gallop [Murmur] : murmur [Edema ___] : edema [unfilled] [Normal] : alert and oriented, normal memory [de-identified] : 2/6 systolic RUSB
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
## 2021-05-25 NOTE — CARDIOLOGY SUMMARY
[de-identified] : TTE 6/29/2020\par with at least moderate mitral stenosis with calcified mitral valve (mean gradient 11mmHg, PHT 182ms, PASP 33mmHg), severely dilated LA, nl pulmonary pressures. HR 77\par \par COSME 7/22/2020:\par moderate MS (mean gradient 6mmHg, MVA by PHT 1.5cm^2, 1.7cm^2 by planimetry\par small PFE vs. Lambl's on NCC if aortic valve\par high grade diffuse complex mobile aortic atheroma in aortic arch/descending aorta

## 2021-05-25 NOTE — REVIEW OF SYSTEMS
[Dyspnea on exertion] : dyspnea during exertion [Lower Ext Edema] : lower extremity edema [Palpitations] : no palpitations [Orthopnea] : no orthopnea [Syncope] : no syncope [Abdominal Pain] : abdominal pain [Negative] : Heme/Lymph

## 2021-08-02 ENCOUNTER — RESULT REVIEW (OUTPATIENT)
Age: 76
End: 2021-08-02

## 2021-08-05 ENCOUNTER — APPOINTMENT (OUTPATIENT)
Dept: ENDOCRINOLOGY | Facility: CLINIC | Age: 76
End: 2021-08-05
Payer: MEDICARE

## 2021-08-05 VITALS
BODY MASS INDEX: 23.05 KG/M2 | OXYGEN SATURATION: 97 % | HEART RATE: 66 BPM | SYSTOLIC BLOOD PRESSURE: 175 MMHG | WEIGHT: 135 LBS | DIASTOLIC BLOOD PRESSURE: 77 MMHG | HEIGHT: 64 IN | TEMPERATURE: 97.7 F

## 2021-08-05 DIAGNOSIS — Z63.4 DISAPPEARANCE AND DEATH OF FAMILY MEMBER: ICD-10-CM

## 2021-08-05 DIAGNOSIS — Z82.5 FAMILY HISTORY OF ASTHMA AND OTHER CHRONIC LOWER RESPIRATORY DISEASES: ICD-10-CM

## 2021-08-05 DIAGNOSIS — Z71.6 TOBACCO ABUSE COUNSELING: ICD-10-CM

## 2021-08-05 PROCEDURE — 99203 OFFICE O/P NEW LOW 30 MIN: CPT

## 2021-08-05 SDOH — SOCIAL STABILITY - SOCIAL INSECURITY: DISSAPEARANCE AND DEATH OF FAMILY MEMBER: Z63.4

## 2021-08-06 ENCOUNTER — OUTPATIENT (OUTPATIENT)
Dept: OUTPATIENT SERVICES | Facility: HOSPITAL | Age: 76
LOS: 1 days | End: 2021-08-06
Payer: MEDICARE

## 2021-08-06 ENCOUNTER — APPOINTMENT (OUTPATIENT)
Dept: CT IMAGING | Facility: CLINIC | Age: 76
End: 2021-08-06
Payer: MEDICARE

## 2021-08-06 DIAGNOSIS — I70.0 ATHEROSCLEROSIS OF AORTA: ICD-10-CM

## 2021-08-06 PROBLEM — F41.9 ANXIETY: Status: ACTIVE | Noted: 2020-06-13

## 2021-08-06 PROCEDURE — 82565 ASSAY OF CREATININE: CPT

## 2021-08-06 PROCEDURE — 71275 CT ANGIOGRAPHY CHEST: CPT

## 2021-08-06 PROCEDURE — 74174 CTA ABD&PLVS W/CONTRAST: CPT | Mod: 26,MH

## 2021-08-06 PROCEDURE — 71275 CT ANGIOGRAPHY CHEST: CPT | Mod: 26,MH

## 2021-08-06 PROCEDURE — 74174 CTA ABD&PLVS W/CONTRAST: CPT

## 2021-08-10 PROBLEM — Z63.4 WIDOW: Status: ACTIVE | Noted: 2020-06-13

## 2021-08-10 PROBLEM — Z71.6 ENCOUNTER FOR SMOKING CESSATION COUNSELING: Status: ACTIVE | Noted: 2021-08-10

## 2021-08-10 PROBLEM — Z82.5 FAMILY HISTORY OF CHRONIC OBSTRUCTIVE PULMONARY DISEASE: Status: ACTIVE | Noted: 2020-06-13

## 2021-08-10 NOTE — ASSESSMENT
[FreeTextEntry1] : 76 yr old F with multiple thyroid nodules\par 1) Multinodular goiter:\par Will repeat Thyroid US\par Most nodules are subcm and stable in size\par May refer for FNA for L lower pole 1 cm nodule if noted to have further increase in size\par \par 2) Subclinical hypothyroidism:\par Patient is asymptomatic so treatment is required\par \par 3) Smoking cessation\par Patient was encouraged to quit smoking to decrease malignancy risk

## 2021-08-10 NOTE — REVIEW OF SYSTEMS
[All other systems negative] : All other systems negative [Fatigue] : no fatigue [Decreased Appetite] : appetite not decreased [Visual Field Defect] : no visual field defect [Dysphagia] : no dysphagia [Dysphonia] : no dysphonia [Chest Pain] : no chest pain [Palpitations] : no palpitations [Polyuria] : no polyuria [Joint Pain] : no joint pain [Acanthosis] : no acanthosis  [Headaches] : no headaches [Tremors] : no tremors [Depression] : no depression [Cold Intolerance] : no cold intolerance [Heat Intolerance] : no heat intolerance [Easy Bleeding] : no ~M tendency for easy bleeding [Easy Bruising] : no tendency for easy bruising

## 2021-08-10 NOTE — HISTORY OF PRESENT ILLNESS
[FreeTextEntry1] : 76 yr old F here for initial visit for multiple thyroid nodules\par No FH of thyroid cancer\par She is a current smoker\par Has history of subclinical hypothyroidism\par No cold intolerance, constipation or fatigue\par Has never been on thyroid medication\par No difficulty speaking or swallowing\par No radiation or surgery to neck area\par Aug 2019 Thyroid US showed:\par R side: 8mm hypoechoic and 8mm hyperechoic nodule\par L side: 1.0 cm, 5mm, 7mm nodules and 1.0 cm hypoechoic lower pole nodule (only nodule that was slightly increased in size, others were unchanged)\par No Hx of FNA in the past\par \par

## 2021-08-10 NOTE — PHYSICAL EXAM
[Alert] : alert [No Acute Distress] : no acute distress [Normal Sclera/Conjunctiva] : normal sclera/conjunctiva [No Proptosis] : no proptosis [Normal Outer Ear/Nose] : the ears and nose were normal in appearance [No Neck Mass] : no neck mass was observed [No Respiratory Distress] : no respiratory distress [Clear to Auscultation] : lungs were clear to auscultation bilaterally [Normal S1, S2] : normal S1 and S2 [Normal Rate] : heart rate was normal [Not Tender] : non-tender [Soft] : abdomen soft [Normal Gait] : normal gait [No Rash] : no rash [No Tremors] : no tremors [Oriented x3] : oriented to person, place, and time [Normal Affect] : the affect was normal [Normal Mood] : the mood was normal [de-identified] : bilateral thyroid nodules

## 2021-08-11 ENCOUNTER — APPOINTMENT (OUTPATIENT)
Dept: CARDIOTHORACIC SURGERY | Facility: CLINIC | Age: 76
End: 2021-08-11
Payer: MEDICARE

## 2021-08-11 VITALS
SYSTOLIC BLOOD PRESSURE: 157 MMHG | OXYGEN SATURATION: 94 % | HEART RATE: 74 BPM | TEMPERATURE: 98.6 F | RESPIRATION RATE: 14 BRPM | DIASTOLIC BLOOD PRESSURE: 76 MMHG | WEIGHT: 137 LBS | HEIGHT: 64 IN | BODY MASS INDEX: 23.39 KG/M2

## 2021-08-11 DIAGNOSIS — F41.9 ANXIETY DISORDER, UNSPECIFIED: ICD-10-CM

## 2021-08-11 DIAGNOSIS — Z78.9 OTHER SPECIFIED HEALTH STATUS: ICD-10-CM

## 2021-08-11 PROCEDURE — 99214 OFFICE O/P EST MOD 30 MIN: CPT

## 2021-08-11 RX ORDER — VARENICLINE TARTRATE 0.5 (11)-1
0.5 MG X 11 & KIT ORAL
Qty: 1 | Refills: 0 | Status: COMPLETED | COMMUNITY
Start: 2019-10-25 | End: 2021-08-11

## 2021-08-13 NOTE — DATA REVIEWED
[FreeTextEntry1] : 8/6/21 CTA C/A/P revealed Diffuse calcified plaque seen involving the descending aorta with mild irregularity without penetrating ulcer or aneurysm. The ascending aorta is within normal limits measuring 3 cm in size. \par \par COSME 7/22/2020\par -MAC with mean mitral gradient 6mmHg at HR 65, MVA by PHT is 1.5 cm^2, MVA by planimetry is 1.7 cm^2, c/w moderate MS\par -small PFE vs. Lambl's on non coronary cusp of aortic valve\par -High grade diffuse complex mobile atheroma with plaque protruding >=4mm into the lumen in the aortic arch/descending aorta.\par \par CTA CAP 7/28/20: calcified aortic plaque without aneurysm, dissection, or penetrating ulcer; patent celiac axis and branches, superior mesenteric artery , bilateral renal arteries, inferior mesenteric artery, and bilateral common, external, and internal iliac arteries. \par

## 2021-08-13 NOTE — CONSULT LETTER
[Dear  ___] : Dear  [unfilled], [Please see my note below.] : Please see my note below. [Sincerely,] : Sincerely, [FreeTextEntry2] : Dr. Neel Cruz\[facility] [address]\[place] [id number] [FreeTextEntry1] : I had the pleasure of seeing your patient, LAUREN TORRES, in my office today. We take a multidisciplinary team approach to patient care and consider you, the referring physician, an extension of our team. We will maintain an open line of communication with you throughout your patient's treatment course.  \par \par She is being evaluated for an aortic atheroma. I have reviewed all of the medical records and diagnostic images at the time of her office visit. I have enclosed a copy for your records. \par \par I have reviewed the indications for surgery,and used our webpage [email or web address] <[email or web address]> to illustrate the aorta and anatomy of the heart. The patient does not meet size criteria for surgical intervention at the time. Therefore, I have recommended that the patient will require a follow up appointment in 1 year with CTA C/A/P to monitor aortic pathology. My office will assist the patient with her upcoming appointment and I will update you on her progress at that time.\par \par I have discussed with the patient that we will continue to monitor her aortic pathology closely at the [facility] that encompasses the entire health care system and is one of the largest in the nation at this point.\par \par It is our commitment to provide your patient with the highest quality of advanced therapeutic options. On behalf of the Cardiothoracic Surgery Team, thank you for sending your patient to the [facility] based at [facility].  If there are any questions or concerns, please call me directly at [phone].  \par \par   [FreeTextEntry3] : \par Tirso Garcia M.D.\par Professor of Cardiovascular and Thoracic Surgery\par Minimally Invasive Valve Surgeon\par Director of Aortic Surgery, [facility]\par Cell: [phone]\par Email: [email or web address] \par \par [facility]:\par [address], [address] Floor, [place] [address]\par Office: [phone]\par Fax: [phone]\par \par [facility]:\par Department of Cardiovascular and Thoracic Surgery\par [address] [place] [address], [address]\par Office: [phone]\par Fax: [phone]\par \par Practice Manager: Ms. Ayana Mejia\par Email: [email or web address]\par Phone: [phone]\par \par

## 2021-08-13 NOTE — PROCEDURE
[FreeTextEntry1] : \par Dr. Garcia discussed activity restrictions with the patient, and would advise exercise at a moderate amount with no heavy lifting over one third of body weight, and avoiding heart rates that exceed 140 beats per minute. Every patient must abstain from tobacco and illicit drug use, especially stimulants such as cocaine or methamphetamine. The patient was also counseled on maintaining a healthy heart diet, and losing any excessive weight as this also put undue stress on both the aorta and entire cardiovascular system. Patient was counseled on the importance of medication adherence for blood pressure control, as hypertension is a significant risk factor associated with aortic dissections. First degree family members should be screened for bicuspid valve disease, and ascending aortic aneurysms.\par \par Patient also was advised to view our educational video prior to this visit regarding aortic pathology, lifestyle modifications, risk factors and procedures, retrieved at [email or web address].\par \par Dr. Garcia reviewed the indications for surgery, and used our webpage [email or web address] to illustrate the aorta and anatomy of the heart. Those indications are the following: size greater than 5.0 cm, symptomatic aneurysms, family history of aortic dissection or aneurysm death with a size greater than 4.5 cm, other necessary cardiac procedures such as coronary artery bypass grafting or severe valvular disorders with an aneurysm greater than 4.5 cm, or connective tissue disorders with an aneurysm size greater than 4.5 cm. The patient does not meet size criteria for surgical intervention at the time.

## 2021-08-13 NOTE — PHYSICAL EXAM
[Sclera] : the sclera and conjunctiva were normal [PERRL With Normal Accommodation] : pupils were equal in size, round, and reactive to light [Neck Appearance] : the appearance of the neck was normal [] : no respiratory distress [Respiration, Rhythm And Depth] : normal respiratory rhythm and effort [Auscultation Breath Sounds / Voice Sounds] : lungs were clear to auscultation bilaterally [Apical Impulse] : the apical impulse was normal [Heart Rate And Rhythm] : heart rate was normal and rhythm regular [Heart Sounds] : normal S1 and S2 [Systolic grade ___/6] : A grade [unfilled]/6 systolic murmur was heard. [Examination Of The Chest] : the chest was normal in appearance [2+] : left 2+ [Breast Appearance] : normal in appearance [Bowel Sounds] : normal bowel sounds [Abdomen Soft] : soft [No CVA Tenderness] : no ~M costovertebral angle tenderness [Abnormal Walk] : normal gait [Involuntary Movements] : no involuntary movements were seen [Skin Color & Pigmentation] : normal skin color and pigmentation [Skin Turgor] : normal skin turgor [No Focal Deficits] : no focal deficits [Oriented To Time, Place, And Person] : oriented to person, place, and time [Impaired Insight] : insight and judgment were intact [Affect] : the affect was normal [Mood] : the mood was normal [Memory Recent] : recent memory was not impaired [Memory Remote] : remote memory was not impaired [FreeTextEntry1] : Deferred
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
## 2021-08-13 NOTE — END OF VISIT
[FreeTextEntry3] : \par I personally scribed for JULIO CORDOBA on Aug 11 2021  8:00AM . \par \par I personally performed the services described in the documentation, reviewed the documentation recorded by the scribe in my presence and it accurately and completely records my words and actions.\par \par \par \par \par \par Physician Attestation:\par Documented by RYLEY YAP acting as a scribe for JULIO CORDOBA 08/11/2021 . \par                 All medical record entries made by the Scribe were at my, JULIO CORDOBA , direction and personally dictated by me on 08/11/2021 . I have reviewed the chart and agree that the record accurately reflects my personal performance of the history, physical exam, assessment and plan\par \par

## 2021-08-19 LAB
T4 FREE SERPL-MCNC: 1.4 NG/DL
TSH SERPL-ACNC: 4.46 UIU/ML

## 2021-11-11 ENCOUNTER — APPOINTMENT (OUTPATIENT)
Dept: ENDOCRINOLOGY | Facility: CLINIC | Age: 76
End: 2021-11-11
Payer: MEDICARE

## 2021-11-11 VITALS
RESPIRATION RATE: 18 BRPM | OXYGEN SATURATION: 94 % | HEART RATE: 88 BPM | WEIGHT: 138 LBS | TEMPERATURE: 98.5 F | BODY MASS INDEX: 23.56 KG/M2 | HEIGHT: 64 IN

## 2021-11-11 DIAGNOSIS — E03.8 OTHER SPECIFIED HYPOTHYROIDISM: ICD-10-CM

## 2021-11-11 PROCEDURE — 99214 OFFICE O/P EST MOD 30 MIN: CPT

## 2021-11-16 PROBLEM — E03.8 SUBCLINICAL HYPOTHYROIDISM: Status: ACTIVE | Noted: 2021-08-10

## 2021-11-16 NOTE — REVIEW OF SYSTEMS
[All other systems negative] : All other systems negative [Fatigue] : no fatigue [Decreased Appetite] : appetite not decreased [Visual Field Defect] : no visual field defect [Dysphagia] : no dysphagia [Dysphonia] : no dysphonia [Chest Pain] : no chest pain [Palpitations] : no palpitations [Shortness Of Breath] : no shortness of breath [Nausea] : no nausea [Vomiting] : no vomiting [Polyuria] : no polyuria [Joint Pain] : no joint pain [Acanthosis] : no acanthosis  [Headaches] : no headaches [Tremors] : no tremors [Depression] : no depression [Cold Intolerance] : no cold intolerance [Heat Intolerance] : no heat intolerance [Easy Bleeding] : no ~M tendency for easy bleeding [Easy Bruising] : no tendency for easy bruising

## 2021-11-16 NOTE — PHYSICAL EXAM
[Alert] : alert [No Acute Distress] : no acute distress [Normal Sclera/Conjunctiva] : normal sclera/conjunctiva [No Proptosis] : no proptosis [Normal Outer Ear/Nose] : the ears and nose were normal in appearance [No Neck Mass] : no neck mass was observed [No Respiratory Distress] : no respiratory distress [Clear to Auscultation] : lungs were clear to auscultation bilaterally [Normal S1, S2] : normal S1 and S2 [Normal Rate] : heart rate was normal [Not Tender] : non-tender [Soft] : abdomen soft [Normal Gait] : normal gait [No Rash] : no rash [No Tremors] : no tremors [Oriented x3] : oriented to person, place, and time [Normal Affect] : the affect was normal [Normal Mood] : the mood was normal [de-identified] : bilateral thyroid nodules

## 2021-11-16 NOTE — ASSESSMENT
[FreeTextEntry1] : 76 yr old F with multiple thyroid nodules\par 1) Multinodular goiter:\par Will repeat Thyroid US now to reassess L sided 1.3 cm nodule\par Most nodules are subcm and stable in size\par May refer for FNA for L lower pole 1 cm nodule if noted to have further increase in size\par \par 2) Subclinical hypothyroidism:\par Patient is asymptomatic so no treatment is required\par \par 3) Osteoporosis\par Will repeat BMD now

## 2021-11-16 NOTE — HISTORY OF PRESENT ILLNESS
[FreeTextEntry1] : 76 yr old F here for f/u visit for multiple thyroid nodules\par No FH of thyroid cancer\par She is a current smoker\par Has history of subclinical hypothyroidism\par No cold intolerance, constipation or fatigue\par Has never been on thyroid medication\par No difficulty speaking or swallowing\par No radiation or surgery to neck area\par Aug 2019 Thyroid US showed:\par R side: 8mm hypoechoic and 8mm hyperechoic nodule\par L side: 1.0 cm, 5mm, 7mm nodules and 1.0 cm hypoechoic lower pole nodule (only nodule that was slightly increased in size, others were unchanged)\par Thyroid US Aug 2021: R lobe: subcm nodules L lobe: 1.3 cm nodule (upper pole) 1.1cm cyst and sub cm nodules\par No Hx of FNA in the past\par \par Patient had a BMD in Aug 2018:\par L spine -1.0\par L fem neck -3.0\par R fem neck -2.9\par Total Left -3.0\par Total R -2.9\par Has sister with osteoporosis\par She was on Boniva many years ago but stopped for unclear reason\par Had hip fracture 2 years ago from standing height\par \par

## 2021-12-08 ENCOUNTER — RX RENEWAL (OUTPATIENT)
Age: 76
End: 2021-12-08

## 2022-02-22 ENCOUNTER — RX RENEWAL (OUTPATIENT)
Age: 77
End: 2022-02-22

## 2022-03-17 ENCOUNTER — APPOINTMENT (OUTPATIENT)
Dept: ENDOCRINOLOGY | Facility: CLINIC | Age: 77
End: 2022-03-17
Payer: MEDICARE

## 2022-03-17 VITALS
HEART RATE: 77 BPM | SYSTOLIC BLOOD PRESSURE: 157 MMHG | BODY MASS INDEX: 22.88 KG/M2 | HEIGHT: 64 IN | WEIGHT: 134 LBS | DIASTOLIC BLOOD PRESSURE: 85 MMHG | TEMPERATURE: 97.3 F | OXYGEN SATURATION: 94 %

## 2022-03-17 PROCEDURE — 99214 OFFICE O/P EST MOD 30 MIN: CPT

## 2022-03-24 NOTE — PHYSICAL EXAM
[Alert] : alert [No Acute Distress] : no acute distress [Normal Sclera/Conjunctiva] : normal sclera/conjunctiva [No Proptosis] : no proptosis [Normal Outer Ear/Nose] : the ears and nose were normal in appearance [No Neck Mass] : no neck mass was observed [No Respiratory Distress] : no respiratory distress [Clear to Auscultation] : lungs were clear to auscultation bilaterally [Normal S1, S2] : normal S1 and S2 [Normal Rate] : heart rate was normal [Not Tender] : non-tender [Soft] : abdomen soft [Normal Gait] : normal gait [No Rash] : no rash [No Tremors] : no tremors [Oriented x3] : oriented to person, place, and time [Normal Affect] : the affect was normal [Normal Mood] : the mood was normal [de-identified] : bilateral thyroid nodules

## 2022-03-24 NOTE — HISTORY OF PRESENT ILLNESS
[FreeTextEntry1] : 77 yr old F here for f/u visit for multiple thyroid nodules\par No FH of thyroid cancer\par She is a current smoker\par Has history of subclinical hypothyroidism\par No cold intolerance, constipation or fatigue\par Has never been on thyroid medication\par No difficulty speaking or swallowing\par No radiation or surgery to neck area\par Aug 2019 Thyroid US showed:\par R side: 8mm hypoechoic and 8mm hyperechoic nodule\par L side: 1.0 cm, 5mm, 7mm nodules and 1.0 cm hypoechoic lower pole nodule (only nodule that was slightly increased in size, others were unchanged)\par Thyroid US Aug 2021: R lobe: subcm nodules L lobe: 1.3 cm nodule (upper pole) 1.1cm cyst and sub cm nodules\par No Hx of FNA in the past\par Patient was referred for FNA but elected for repeat Thyroid US\par March 2022 R lobe subcm nodules L lobe: 1.4 cm upper pole nodule, 1.4 cm lower pole nodule, 1.0 cm lower pole nodule\par \par Patient had a BMD in Aug 2018:\par L spine -1.0\par L fem neck -3.0\par R fem neck -2.9\par Total Left -3.0\par Total R -2.9\par Has sister with osteoporosis\par She was on Boniva many years ago but stopped for unclear reason\par Had hip fracture 2 years ago from standing height\par Takes Vit D 2000IU daily\par \par Nov 2021\par L spine -2.1\par L fem neck -3.4\par Radius -3.6\par \par \par \par \par \par

## 2022-03-24 NOTE — ASSESSMENT
[FreeTextEntry1] : 77 yr old F with multiple thyroid nodules\par 1) Multinodular goiter:\par Will refer to Dr Hairston for FNA of L sided nodules (1.4 cm upper pole nodule, 1.4 cm lower pole nodule, 1.0 cm lower pole nodule)\par \par 2) Subclinical hypothyroidism:\par Patient is asymptomatic so no treatment is required\par \par 3) Osteoporosis\par Discussed option of prolia and reclast\par Patient would prefer to initiate reclast\par Will set up infusion at [address]\par Discussed side effects of ONJ and atypical femur fracture

## 2022-04-26 ENCOUNTER — APPOINTMENT (OUTPATIENT)
Dept: INTERNAL MEDICINE | Facility: CLINIC | Age: 77
End: 2022-04-26
Payer: MEDICARE

## 2022-04-26 VITALS
HEIGHT: 64 IN | WEIGHT: 137 LBS | BODY MASS INDEX: 23.39 KG/M2 | HEART RATE: 65 BPM | OXYGEN SATURATION: 93 % | TEMPERATURE: 97.4 F

## 2022-04-26 VITALS — SYSTOLIC BLOOD PRESSURE: 128 MMHG | DIASTOLIC BLOOD PRESSURE: 76 MMHG

## 2022-04-26 PROCEDURE — 99214 OFFICE O/P EST MOD 30 MIN: CPT

## 2022-04-26 NOTE — PHYSICAL EXAM
[Normal] : soft, non-tender, non-distended, no masses palpated, no HSM and normal bowel sounds [de-identified] : decreased bs but good airmovement [de-identified] : 1+ edema lower legs

## 2022-04-26 NOTE — HISTORY OF PRESENT ILLNESS
[FreeTextEntry1] : f/u issues [de-identified] : reviewed interim cardio and ctsx and endo notes\par anticipating fna thyroid nodules\par recent recurrent swelling in legs\par on atorvastin, hctz, metoprolol\par still intermittently smoking\par has some worsening exertional tolerance

## 2022-04-26 NOTE — ASSESSMENT
[FreeTextEntry1] : pulm eval\par change hctz to lasix 20mg tiw\par no interest in tobacco cessation\par spec f/u\par labs and f/u in 6-8wks

## 2022-05-12 ENCOUNTER — APPOINTMENT (OUTPATIENT)
Dept: RHEUMATOLOGY | Facility: CLINIC | Age: 77
End: 2022-05-12

## 2022-05-17 ENCOUNTER — APPOINTMENT (OUTPATIENT)
Dept: PULMONOLOGY | Facility: CLINIC | Age: 77
End: 2022-05-17
Payer: MEDICARE

## 2022-05-17 VITALS
BODY MASS INDEX: 22.53 KG/M2 | DIASTOLIC BLOOD PRESSURE: 74 MMHG | OXYGEN SATURATION: 92 % | TEMPERATURE: 98 F | WEIGHT: 132 LBS | SYSTOLIC BLOOD PRESSURE: 159 MMHG | RESPIRATION RATE: 16 BRPM | HEART RATE: 73 BPM | HEIGHT: 64 IN

## 2022-05-17 PROCEDURE — 99205 OFFICE O/P NEW HI 60 MIN: CPT | Mod: 25

## 2022-05-17 PROCEDURE — 94618 PULMONARY STRESS TESTING: CPT

## 2022-05-17 PROCEDURE — G0296 VISIT TO DETERM LDCT ELIG: CPT

## 2022-05-17 PROCEDURE — 99407 BEHAV CHNG SMOKING > 10 MIN: CPT

## 2022-05-17 RX ORDER — HYDROCHLOROTHIAZIDE 25 MG/1
25 TABLET ORAL
Qty: 90 | Refills: 3 | Status: DISCONTINUED | COMMUNITY
Start: 2021-05-25 | End: 2022-05-17

## 2022-05-17 NOTE — COUNSELING
[Cessation strategies including cessation program discussed] : Cessation strategies including cessation program discussed [Use of nicotine replacement therapies and other medications discussed] : Use of nicotine replacement therapies and other medications discussed [Yes] : Willing to quit smoking [ - Annual Lung Cancer Screening/Share Decision Making Discussion] : Annual Lung Cancer Screening/Share Decision Making Discussion. (I have advised this patient to have a Low Dose CT (LDCT) scan of the lungs and have discussed the following with the patient in a shared decision making discussion:   Benefits of Detection and Early Treatment: There is adequate evidence that annual screening for lung cancer with LDCT in a population of high-risk persons can prevent a substantial number of lung cancer–related deaths. The magnitude of benefit depends on the individual patient's risk for lung cancer, as those who are at highest risk are most likely to benefit. Screening cannot prevent most lung cancer–related deaths, and does not replace smoking cessation. Harms of Detection and Early Intervention and Treatment: The harms associated with LDCT screening include false-negative and false-positive results, incidental findings, over diagnosis, and radiation exposure. False-positive LDCT results occur in a substantial proportion of screened persons; 95% of all positive results do not lead to a diagnosis of cancer. In a high-quality screening program, further imaging can resolve most false-positive results; however, some patients may require invasive procedures. Radiation harms, including cancer resulting from cumulative exposure to radiation, vary depending on the age at the start of screening; the number of scans received; and the person's exposure to other sources of radiation, particularly other medical imaging.) [FreeTextEntry3] : 12

## 2022-05-17 NOTE — ASSESSMENT
[FreeTextEntry1] : Ms. LAUREN TORRES is a 77 year old woman long term smoker (quit 2 weeks ago) with HTN, HLD, aortic atheroma here for evaluation of BARBOSA. \par \par #BARBOSA - likely 2/2 underlying copd/emphysema\par -- obtain PFTs\par -- trial of Incruse + Albuterol prn (proper use discussed)\par -- exercise oximetry on RA: at rest 97%; P 79; with exertion 97% P 99\par \par #Smoker - >50 pack year smoking hx, patient quit 2 weeks ago. Had success with Chantix in the past - will provide generic. I believe that Wellbutrin may be a good choice given depression but patient prefers Varenicline\par -- last CT Aug 2021, repeat Aug 2022 (will f/u with CT surgery, need annual CTA for atheroma)\par \par #HTN - elevated BP, usually up to 160s systolic when patient checks at [facility]. \par -- f/u with Dr Cruz\par \par All questions answered. Patient in agreement with plan. \par Follow up in 4w or sooner if needed.\par

## 2022-05-17 NOTE — CONSULT LETTER
[Dear  ___] : Dear  [unfilled], [Consult Letter:] : I had the pleasure of evaluating your patient, [unfilled]. [Please see my note below.] : Please see my note below. [Consult Closing:] : Thank you very much for allowing me to participate in the care of this patient.  If you have any questions, please do not hesitate to contact me. [FreeTextEntry3] : Sincerely,\par \par Fay Muir MD\par [facility] Physician [facility]\par Pulmonary Medicine\par tel: [phone]\par fax: [phone]\par

## 2022-05-17 NOTE — HISTORY OF PRESENT ILLNESS
[Former] : former [>= 20 pack years] : >= 20 pack years [Never] : never [TextBox_4] : Ms. LAUREN TORRES is a 77 year old woman long term smoker (quit 2 weeks ago) with HTN, HLD, aortic atheroma here for evaluation of BARBOSA. \par \par Patient reports that notices she is breathing heavy/"hyperventilating". ET is limited, 1 flight of stairs, about 1/2 block prior to stopping. No cough, occ wheezing at night, no chest pain, no palpitations. She has never been on any inhalers in the past, no pulmonary related hospitalizations, no steroid use. \par \par She follows with Dr Cruz and Dr Garcia for aortic atheroma. \par \par CTA Chest 2021 - no concerning nodules, emphysema\par \par PMH: HLD, HTN, aortic atheroma\par Meds: per chart\par All: NKDA\par SH: smoking on and off, now off for the past 2 weeks. No known exposures. Worked as .\par FH: father  at 93, had lung issues; mother  at 51 of heart disease\par PMD: BENSON LOBO\par Immunizations: Pneumovax 2019; COVID vaccinated, due for second booster.\par  [TextBox_11] : 1 [TextBox_13] : 50 [YearQuit] : 2022

## 2022-06-02 ENCOUNTER — APPOINTMENT (OUTPATIENT)
Dept: SURGERY | Facility: CLINIC | Age: 77
End: 2022-06-02
Payer: MEDICARE

## 2022-06-02 VITALS
WEIGHT: 137 LBS | HEART RATE: 67 BPM | BODY MASS INDEX: 23.39 KG/M2 | SYSTOLIC BLOOD PRESSURE: 159 MMHG | DIASTOLIC BLOOD PRESSURE: 67 MMHG | HEIGHT: 64 IN

## 2022-06-02 DIAGNOSIS — D49.7 NEOPLASM OF UNSPECIFIED BEHAVIOR OF ENDOCRINE GLANDS AND OTHER PARTS OF NERVOUS SYSTEM: ICD-10-CM

## 2022-06-02 PROCEDURE — 99204 OFFICE O/P NEW MOD 45 MIN: CPT | Mod: 25

## 2022-06-02 PROCEDURE — 36415 COLL VENOUS BLD VENIPUNCTURE: CPT

## 2022-06-03 NOTE — REASON FOR VISIT
[Initial Consultation] : an initial consultation for [FreeTextEntry2] : thyroid nodule [Family Member] : family member

## 2022-06-03 NOTE — PHYSICAL EXAM
[de-identified] : subcentimeter right and 1.2 cm left thyroid nodules, well circumscribed and mobile [Laryngoscopy Performed] : laryngoscopy was performed, see procedure section for findings [Midline] : located in midline position [Normal] : orientation to person, place, and time: normal [de-identified] : breathing through pursed lips [de-identified] : indirect  laryngoscopy shows normal vocal cord mobility bilaterally with no lesions noted

## 2022-06-03 NOTE — HISTORY OF PRESENT ILLNESS
[de-identified] : 2 year history of thyroid nodule.  denies dysphagia, hoarseness, SOB or RT exposure.  recent sonogram shows enlargement of one nodule. FNA left upper nodule (acupath) follicular neoplasm, KRAS positive.  sonogram shows subcentimeter right and 1.4 cm left upper and lower thyroid nodules.  TSH 4.46, T4  1.4, antibody positive.  bone density shows osteoporosis.  I have reviewed all old and new data and available images.  Additional information was obtained from others present at the time of visit to ensure the completeness of the history\par

## 2022-06-03 NOTE — ASSESSMENT
[FreeTextEntry1] : lengthy discussion regarding options for management including active surveillance  vs thyroid lobectomy with possible paratracheal node dissection, with or without frozen section vs total thyroidectomy with possible paratracheal node dissection. risks, benefits and alternatives discussed at length.  I have discussed with the patient the anatomy of the area, the pathophysiology of the disease process and the rationale for surgery.  The attendant risks, possible complications and expected postoperative course have been discussed in detail.  I have given the patient the opportunity to ask questions, and all questions have been answered to the patient's satisfaction.  bloods drawn. to call next week for results. would perform lobectomy, possible total thyroidectomy, inpatient at [facility].  needs cardiac and pulmonary clearance to assess if surgical candidate.  discussed option for active surveillance pending medical evaluation.

## 2022-06-03 NOTE — CONSULT LETTER
[Dear  ___] : Dear  [unfilled], [Consult Letter:] : I had the pleasure of evaluating your patient, [unfilled]. [Please see my note below.] : Please see my note below. [Consult Closing:] : Thank you very much for allowing me to participate in the care of this patient.  If you have any questions, please do not hesitate to contact me. [Sincerely,] : Sincerely, [FreeTextEntry2] : Dr. Nancy Pang, Dr. David Glasgow, Dr. Neel Cruz, Dr. Fay Muir [FreeTextEntry3] : Higinio Betts MD, FACS\par System Director, Endocrine Surgery\par [facility]\par Associate  Professor of Surgery\par [employer] [facility] [employer] [facility]  [DrLibia  ___] : Dr. SAWYER [DrLibia ___] : Dr. SAWYER

## 2022-06-06 LAB
24R-OH-CALCIDIOL SERPL-MCNC: 45.5 PG/ML
CALCIUM SERPL-MCNC: 9.7 MG/DL
CALCIUM SERPL-MCNC: 9.7 MG/DL
PARATHYROID HORMONE INTACT: 73 PG/ML
T3 SERPL-MCNC: 75 NG/DL
T4 FREE SERPL-MCNC: 1.4 NG/DL
THYROGLOB AB SERPL-ACNC: ABNORMAL IU/ML
THYROPEROXIDASE AB SERPL IA-ACNC: 7612 IU/ML
TSH SERPL-ACNC: 6.49 UIU/ML

## 2022-06-07 ENCOUNTER — APPOINTMENT (OUTPATIENT)
Dept: PULMONOLOGY | Facility: CLINIC | Age: 77
End: 2022-06-07
Payer: MEDICARE

## 2022-06-07 PROCEDURE — 94060 EVALUATION OF WHEEZING: CPT

## 2022-06-07 PROCEDURE — 94727 GAS DIL/WSHOT DETER LNG VOL: CPT

## 2022-06-07 PROCEDURE — 94729 DIFFUSING CAPACITY: CPT

## 2022-06-10 RX ORDER — ALBUTEROL SULFATE 90 UG/1
108 (90 BASE) INHALANT RESPIRATORY (INHALATION)
Qty: 1 | Refills: 1 | Status: DISCONTINUED | COMMUNITY
Start: 2022-05-17 | End: 2022-06-10

## 2022-06-13 ENCOUNTER — NON-APPOINTMENT (OUTPATIENT)
Age: 77
End: 2022-06-13

## 2022-06-14 ENCOUNTER — APPOINTMENT (OUTPATIENT)
Dept: PULMONOLOGY | Facility: CLINIC | Age: 77
End: 2022-06-14
Payer: MEDICARE

## 2022-06-14 VITALS
HEART RATE: 69 BPM | DIASTOLIC BLOOD PRESSURE: 85 MMHG | HEIGHT: 64 IN | BODY MASS INDEX: 23.22 KG/M2 | TEMPERATURE: 97.5 F | WEIGHT: 136 LBS | OXYGEN SATURATION: 96 % | SYSTOLIC BLOOD PRESSURE: 165 MMHG

## 2022-06-14 PROCEDURE — 99215 OFFICE O/P EST HI 40 MIN: CPT

## 2022-06-14 RX ORDER — UMECLIDINIUM 62.5 UG/1
62.5 AEROSOL, POWDER ORAL DAILY
Qty: 1 | Refills: 3 | Status: DISCONTINUED | COMMUNITY
Start: 2022-05-17 | End: 2022-06-14

## 2022-06-14 NOTE — HISTORY OF PRESENT ILLNESS
[Former] : former [>= 20 pack years] : >= 20 pack years [Never] : never [TextBox_4] : Ms. 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 Planned for surgery. \par \par In terms of her respiratory status, she did not start Incruse. Had some difficulty using Albuterol. \par Still feels out of breath when going up the stairs. Denies wheezing/coughing. \par She has not been smoking\par \par She follows with Dr Cruz and Dr Garcia for aortic atheroma. \par \par PFTs - mod obstruction, mod restriction, reduced DLCO, + BD response\par CTA Chest 2021 - no concerning nodules, emphysema\par \par PMH: HLD, HTN, aortic atheroma\par Meds: per chart\par All: NKDA\par SH: quit smoking 2022. No known exposures. Worked as .\par FH: father  at 93, had lung issues; mother  at 51 of heart disease\par PMD: BENSON LOBO\par Immunizations: Pneumovax 2019; COVID vaccinated, due for second booster.\par  [TextBox_11] : 1 [TextBox_13] : 50 [YearQuit] : 2022

## 2022-06-14 NOTE — CONSULT LETTER
[Dear  ___] : Dear  [unfilled], [Consult Letter:] : I had the pleasure of evaluating your patient, [unfilled]. [Please see my note below.] : Please see my note below. [Consult Closing:] : Thank you very much for allowing me to participate in the care of this patient.  If you have any questions, please do not hesitate to contact me. [FreeTextEntry3] : Sincerely,\par \par Fay Muir MD\par [facility] Physician [facility]\par Pulmonary Medicine\par tel: [phone]\par fax: [phone]\par

## 2022-06-14 NOTE — ASSESSMENT
[FreeTextEntry1] : Ms. LAUREN TORRES is a 77 year old woman long term smoker (quit 5/2022) with HTN, HLD, aortic atheroma and asthma here for f/u\par \par #Asthma/COPD/emphysema - mod obstruction, mod restriction, low DLCO on PFTs\par -- start Advair, proper use discussed\par -- can use Albuterol prn, spacer script provided to patient \par \par #Smoker - >50 pack year smoking hx, patient quit 5/2022. Continued abstinence encouraged\par -- last CT Aug 2021, repeat Aug 2022 (will f/u with CT surgery, need annual CTA for atheroma)\par \par #Pre-op evaluation - patient planned for thyroid surgery for thyroid ca. \par She does have e/o moderate obstruction on PFTs today. However, there is no e/o acute asthma exacerbation. She is moving air well, saturating 96% on RA at rest and 93% with exertion. Her risk of perioperative pulmonary complications based on ARISCAT calculator is estimated to be at 1.6%. She should start her maintenance inhaler as soon as possible and use Albuterol on as needed basis in perioperative period. \par \par \par All questions answered. Patient in agreement with plan. \par Follow up in 4w or sooner if needed.\par

## 2022-06-14 NOTE — PHYSICAL EXAM
[No Acute Distress] : no acute distress [Normal Oropharynx] : normal oropharynx [Normal Appearance] : normal appearance [No Neck Mass] : no neck mass [Normal Rate/Rhythm] : normal rate/rhythm [Normal S1, S2] : normal s1, s2 [No Murmurs] : no murmurs [No Resp Distress] : no resp distress [No Abnormalities] : no abnormalities [Benign] : benign [Normal Gait] : normal gait [No Clubbing] : no clubbing [No Cyanosis] : no cyanosis [No Edema] : no edema [FROM] : FROM [Normal Color/ Pigmentation] : normal color/ pigmentation [No Focal Deficits] : no focal deficits [Oriented x3] : oriented x3 [Normal Affect] : normal affect [TextBox_68] : good air movement, occ rhonchi

## 2022-06-16 ENCOUNTER — APPOINTMENT (OUTPATIENT)
Dept: CARDIOLOGY | Facility: CLINIC | Age: 77
End: 2022-06-16
Payer: MEDICARE

## 2022-06-16 ENCOUNTER — NON-APPOINTMENT (OUTPATIENT)
Age: 77
End: 2022-06-16

## 2022-06-16 VITALS
TEMPERATURE: 98.3 F | BODY MASS INDEX: 22.36 KG/M2 | HEIGHT: 64 IN | OXYGEN SATURATION: 93 % | DIASTOLIC BLOOD PRESSURE: 81 MMHG | SYSTOLIC BLOOD PRESSURE: 180 MMHG | WEIGHT: 131 LBS | HEART RATE: 75 BPM

## 2022-06-16 VITALS — DIASTOLIC BLOOD PRESSURE: 89 MMHG | SYSTOLIC BLOOD PRESSURE: 148 MMHG

## 2022-06-16 PROCEDURE — 99214 OFFICE O/P EST MOD 30 MIN: CPT

## 2022-06-16 PROCEDURE — 93000 ELECTROCARDIOGRAM COMPLETE: CPT

## 2022-06-17 ENCOUNTER — MESSAGE (OUTPATIENT)
Age: 77
End: 2022-06-17

## 2022-06-17 LAB
ANION GAP SERPL CALC-SCNC: 12 MMOL/L
BUN SERPL-MCNC: 20 MG/DL
CALCIUM SERPL-MCNC: 9.5 MG/DL
CHLORIDE SERPL-SCNC: 104 MMOL/L
CO2 SERPL-SCNC: 22 MMOL/L
CREAT SERPL-MCNC: 0.94 MG/DL
EGFR: 62 ML/MIN/1.73M2
GLUCOSE SERPL-MCNC: 90 MG/DL
POTASSIUM SERPL-SCNC: 5 MMOL/L
SODIUM SERPL-SCNC: 138 MMOL/L

## 2022-06-21 ENCOUNTER — APPOINTMENT (OUTPATIENT)
Dept: CARDIOLOGY | Facility: CLINIC | Age: 77
End: 2022-06-21
Payer: MEDICARE

## 2022-06-21 PROCEDURE — 93306 TTE W/DOPPLER COMPLETE: CPT

## 2022-07-12 ENCOUNTER — APPOINTMENT (OUTPATIENT)
Dept: CT IMAGING | Facility: CLINIC | Age: 77
End: 2022-07-12

## 2022-07-12 ENCOUNTER — MESSAGE (OUTPATIENT)
Age: 77
End: 2022-07-12

## 2022-07-12 PROCEDURE — 75574 CT ANGIO HRT W/3D IMAGE: CPT

## 2022-07-12 NOTE — REVIEW OF SYSTEMS
[Dyspnea on exertion] : dyspnea during exertion [Lower Ext Edema] : lower extremity edema [Negative] : Gastrointestinal [Palpitations] : no palpitations [Orthopnea] : no orthopnea [Syncope] : no syncope

## 2022-07-12 NOTE — ADDENDUM
[FreeTextEntry1] : TTE 6/21/22:\par EF 65%\par Grade I diastolic dysfunction\par Severe left atrial enlargement\par Moderate mitral stenosis at a heart rate of 67 bpm\par mild aortic sclerosis with lambl's vs. PFE on NCC\par Normal pulmonary pressure\par \par No significant change from 6/29/2020.\par \par CTA Heart 7/12/2022:\par no significant coronary artery disease\par small bilateral pleural effusions.\par \par LE is improved.\par increase furosemide to 20mg daily.\par \par surgery at [facility]\par \par No cardiac contraindication to the planned procedure.\par continue metoprolol and aspirin in the perioperative period.\par

## 2022-07-12 NOTE — DISCUSSION/SUMMARY
[FreeTextEntry1] : mod mitral stenosis\par aortic atheroma\par fatigue\par moderate LLE PAD\par LE edema\par \par -cont asa 81mg daily\par -cont atorvastatin 40mg daily\par -cont metoprolol succinate 25mg once daily for rate control. add losartan 25mg once daily for HTN.\par -check TTE given MS and no recent TTE\par -given sx she will need additional ischemic evaluation - we discussed options and she is an appropriate candidate for coronary CTA\par \par perioperative recommendations will be addended to the note after the above diagnostic imaging is performed.\par \par [phone]\par Nicol. dtr. primary contact.

## 2022-07-12 NOTE — PHYSICAL EXAM
[Normal S1, S2] : normal S1, S2 [No Rub] : no rub [No Gallop] : no gallop [Murmur] : murmur [Edema ___] : edema [unfilled] [Normal] : alert and oriented, normal memory [de-identified] : 2/6 systolic RUSB

## 2022-07-12 NOTE — CARDIOLOGY SUMMARY
[de-identified] : 6/16/22: SR, LAE, low voltage with rightward p axis [de-identified] : TTE 6/29/2020\par with at least moderate mitral stenosis with calcified mitral valve (mean gradient 11mmHg, PHT 182ms, PASP 33mmHg), severely dilated LA, nl pulmonary pressures. HR 77\par \par COSME 7/22/2020:\par moderate MS (mean gradient 6mmHg, MVA by PHT 1.5cm^2, 1.7cm^2 by planimetry\par small PFE vs. Lambl's on NCC if aortic valve\par high grade diffuse complex mobile aortic atheroma in aortic arch/descending aorta
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
## 2022-07-27 ENCOUNTER — NON-APPOINTMENT (OUTPATIENT)
Age: 77
End: 2022-07-27

## 2022-08-02 ENCOUNTER — NON-APPOINTMENT (OUTPATIENT)
Age: 77
End: 2022-08-02

## 2022-08-05 ENCOUNTER — NON-APPOINTMENT (OUTPATIENT)
Age: 77
End: 2022-08-05

## 2022-08-10 ENCOUNTER — APPOINTMENT (OUTPATIENT)
Dept: CARDIOTHORACIC SURGERY | Facility: CLINIC | Age: 77
End: 2022-08-10

## 2022-08-16 ENCOUNTER — APPOINTMENT (OUTPATIENT)
Dept: PULMONOLOGY | Facility: CLINIC | Age: 77
End: 2022-08-16

## 2022-08-16 VITALS
WEIGHT: 132 LBS | BODY MASS INDEX: 22.66 KG/M2 | OXYGEN SATURATION: 97 % | SYSTOLIC BLOOD PRESSURE: 102 MMHG | HEART RATE: 82 BPM | TEMPERATURE: 99.6 F | DIASTOLIC BLOOD PRESSURE: 72 MMHG

## 2022-08-16 VITALS — SYSTOLIC BLOOD PRESSURE: 160 MMHG | DIASTOLIC BLOOD PRESSURE: 70 MMHG

## 2022-08-16 VITALS
BODY MASS INDEX: 22.66 KG/M2 | WEIGHT: 132 LBS | OXYGEN SATURATION: 96 % | HEART RATE: 75 BPM | TEMPERATURE: 98 F | SYSTOLIC BLOOD PRESSURE: 178 MMHG | DIASTOLIC BLOOD PRESSURE: 71 MMHG

## 2022-08-16 VITALS — DIASTOLIC BLOOD PRESSURE: 70 MMHG | SYSTOLIC BLOOD PRESSURE: 160 MMHG

## 2022-08-16 PROCEDURE — G0296 VISIT TO DETERM LDCT ELIG: CPT

## 2022-08-16 PROCEDURE — 99214 OFFICE O/P EST MOD 30 MIN: CPT | Mod: 25

## 2022-08-16 NOTE — CONSULT LETTER
[Dear  ___] : Dear  [unfilled], [Consult Letter:] : I had the pleasure of evaluating your patient, [unfilled]. [Please see my note below.] : Please see my note below. [Consult Closing:] : Thank you very much for allowing me to participate in the care of this patient.  If you have any questions, please do not hesitate to contact me. [FreeTextEntry3] : Sincerely,\par \par Fay Muir MD\par [facility] Physician [facility]\par Pulmonary Medicine\par tel: [phone]\par fax: [phone]\par

## 2022-08-16 NOTE — ASSESSMENT
[FreeTextEntry1] : Ms. LAUREN TORRES is a 77 year old woman long term smoker (quit 5/2022) with asthma/COPD/emphysema,  HTN, HLD, aortic atheroma and asthma here for f/u\par \par #Asthma/COPD/emphysema - mod obstruction, mod restriction, low DLCO on PFTs\par -- advised to c/w Incruse and Advair\par -- can use Albuterol prn, spacer script provided to patient \par \par #Smoker - >50 pack year smoking hx, patient quit 5/2022. Continued abstinence encouraged\par -- last CT Aug 2021, repeat Aug 2022 (ordered)\par \par #Pre-op evaluation - patient planned for thyroid surgery for thyroid ca. \par She does have e/o moderate obstruction on PFTs today. However, there is no e/o of exacerbation. She is moving air well, saturating 96% on RA. Her risk of perioperative pulmonary complications based on ARISCAT calculator is estimated to be at 1.6%. She should continue her maintenance inhalers as soon as possible and use Albuterol on as needed basis in perioperative period. \par \par \par All questions answered. Patient in agreement with plan. \par Follow up in 3mo or sooner if needed.\par

## 2022-08-16 NOTE — PHYSICAL EXAM
[No Acute Distress] : no acute distress [Normal Oropharynx] : normal oropharynx [Normal Appearance] : normal appearance [No Neck Mass] : no neck mass [Normal Rate/Rhythm] : normal rate/rhythm [Normal S1, S2] : normal s1, s2 [No Murmurs] : no murmurs [No Resp Distress] : no resp distress [No Abnormalities] : no abnormalities [Benign] : benign [Normal Gait] : normal gait [No Clubbing] : no clubbing [No Cyanosis] : no cyanosis [No Edema] : no edema [FROM] : FROM [Normal Color/ Pigmentation] : normal color/ pigmentation [No Focal Deficits] : no focal deficits [Oriented x3] : oriented x3 [Normal Affect] : normal affect [Clear to Auscultation Bilaterally] : clear to auscultation bilaterally [TextBox_68] : good air movement,

## 2022-08-16 NOTE — HISTORY OF PRESENT ILLNESS
[Former] : former [>= 20 pack years] : >= 20 pack years [Never] : never [TextBox_4] : Ms. 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 Planned for surgery in Sept.\par \par In terms of her respiratory status, she just started Advair a few days ago. Persistent BARBOSA. No wheezing, no cough.\par \par She follows with Dr Cruz and Dr Garcia for aortic atheroma. \par \par PFTs - mod obstruction, mod restriction, reduced DLCO, + BD response\par CTA Chest 2021 - no concerning nodules, emphysema\par \par PMH: HLD, HTN, aortic atheroma\par Meds: per chart\par All: NKDA\par SH: quit smoking 2022. No known exposures. Worked as .\par FH: father  at 93, had lung issues; mother  at 51 of heart disease\par PMD: BENSON LOBO\par Immunizations: Pneumovax 2019; COVID vaccinated, due for second booster.\par  [TextBox_11] : 1 [TextBox_13] : 50 [YearQuit] : 2022

## 2022-08-16 NOTE — COUNSELING
[Yes] : Willing to quit smoking [ - Annual Lung Cancer Screening/Share Decision Making Discussion] : Annual Lung Cancer Screening/Share Decision Making Discussion. (I have advised this patient to have a Low Dose CT (LDCT) scan of the lungs and have discussed the following with the patient in a shared decision making discussion:   Benefits of Detection and Early Treatment: There is adequate evidence that annual screening for lung cancer with LDCT in a population of high-risk persons can prevent a substantial number of lung cancer–related deaths. The magnitude of benefit depends on the individual patient's risk for lung cancer, as those who are at highest risk are most likely to benefit. Screening cannot prevent most lung cancer–related deaths, and does not replace smoking cessation. Harms of Detection and Early Intervention and Treatment: The harms associated with LDCT screening include false-negative and false-positive results, incidental findings, over diagnosis, and radiation exposure. False-positive LDCT results occur in a substantial proportion of screened persons; 95% of all positive results do not lead to a diagnosis of cancer. In a high-quality screening program, further imaging can resolve most false-positive results; however, some patients may require invasive procedures. Radiation harms, including cancer resulting from cumulative exposure to radiation, vary depending on the age at the start of screening; the number of scans received; and the person's exposure to other sources of radiation, particularly other medical imaging.)

## 2022-08-24 ENCOUNTER — RESULT REVIEW (OUTPATIENT)
Age: 77
End: 2022-08-24

## 2022-08-29 ENCOUNTER — OUTPATIENT (OUTPATIENT)
Dept: OUTPATIENT SERVICES | Facility: HOSPITAL | Age: 77
LOS: 1 days | End: 2022-08-29

## 2022-08-29 VITALS
WEIGHT: 130.95 LBS | OXYGEN SATURATION: 99 % | SYSTOLIC BLOOD PRESSURE: 147 MMHG | HEIGHT: 61 IN | RESPIRATION RATE: 15 BRPM | TEMPERATURE: 97 F | HEART RATE: 79 BPM | DIASTOLIC BLOOD PRESSURE: 70 MMHG

## 2022-08-29 DIAGNOSIS — E04.1 NONTOXIC SINGLE THYROID NODULE: ICD-10-CM

## 2022-08-29 DIAGNOSIS — Z87.09 PERSONAL HISTORY OF OTHER DISEASES OF THE RESPIRATORY SYSTEM: ICD-10-CM

## 2022-08-29 DIAGNOSIS — I10 ESSENTIAL (PRIMARY) HYPERTENSION: ICD-10-CM

## 2022-08-29 DIAGNOSIS — Z98.890 OTHER SPECIFIED POSTPROCEDURAL STATES: Chronic | ICD-10-CM

## 2022-08-29 DIAGNOSIS — Z90.49 ACQUIRED ABSENCE OF OTHER SPECIFIED PARTS OF DIGESTIVE TRACT: Chronic | ICD-10-CM

## 2022-08-29 LAB
ALBUMIN SERPL ELPH-MCNC: 4.6 G/DL — SIGNIFICANT CHANGE UP (ref 3.3–5)
ALP SERPL-CCNC: 95 U/L — SIGNIFICANT CHANGE UP (ref 40–120)
ALT FLD-CCNC: 12 U/L — SIGNIFICANT CHANGE UP (ref 4–33)
ANION GAP SERPL CALC-SCNC: 13 MMOL/L — SIGNIFICANT CHANGE UP (ref 7–14)
AST SERPL-CCNC: 21 U/L — SIGNIFICANT CHANGE UP (ref 4–32)
BILIRUB SERPL-MCNC: 0.6 MG/DL — SIGNIFICANT CHANGE UP (ref 0.2–1.2)
BUN SERPL-MCNC: 22 MG/DL — SIGNIFICANT CHANGE UP (ref 7–23)
CALCIUM SERPL-MCNC: 9.8 MG/DL — SIGNIFICANT CHANGE UP (ref 8.4–10.5)
CHLORIDE SERPL-SCNC: 94 MMOL/L — LOW (ref 98–107)
CO2 SERPL-SCNC: 26 MMOL/L — SIGNIFICANT CHANGE UP (ref 22–31)
CREAT SERPL-MCNC: 0.97 MG/DL — SIGNIFICANT CHANGE UP (ref 0.5–1.3)
EGFR: 60 ML/MIN/1.73M2 — SIGNIFICANT CHANGE UP
GLUCOSE SERPL-MCNC: 108 MG/DL — HIGH (ref 70–99)
HCT VFR BLD CALC: 36.2 % — SIGNIFICANT CHANGE UP (ref 34.5–45)
HGB BLD-MCNC: 11.9 G/DL — SIGNIFICANT CHANGE UP (ref 11.5–15.5)
MCHC RBC-ENTMCNC: 31 PG — SIGNIFICANT CHANGE UP (ref 27–34)
MCHC RBC-ENTMCNC: 32.9 GM/DL — SIGNIFICANT CHANGE UP (ref 32–36)
MCV RBC AUTO: 94.3 FL — SIGNIFICANT CHANGE UP (ref 80–100)
NRBC # BLD: 0 /100 WBCS — SIGNIFICANT CHANGE UP (ref 0–0)
NRBC # FLD: 0 K/UL — SIGNIFICANT CHANGE UP (ref 0–0)
PLATELET # BLD AUTO: 286 K/UL — SIGNIFICANT CHANGE UP (ref 150–400)
POTASSIUM SERPL-MCNC: 3.9 MMOL/L — SIGNIFICANT CHANGE UP (ref 3.5–5.3)
POTASSIUM SERPL-SCNC: 3.9 MMOL/L — SIGNIFICANT CHANGE UP (ref 3.5–5.3)
PROT SERPL-MCNC: 9.1 G/DL — HIGH (ref 6–8.3)
RBC # BLD: 3.84 M/UL — SIGNIFICANT CHANGE UP (ref 3.8–5.2)
RBC # FLD: 13.3 % — SIGNIFICANT CHANGE UP (ref 10.3–14.5)
SODIUM SERPL-SCNC: 133 MMOL/L — LOW (ref 135–145)
WBC # BLD: 7.94 K/UL — SIGNIFICANT CHANGE UP (ref 3.8–10.5)
WBC # FLD AUTO: 7.94 K/UL — SIGNIFICANT CHANGE UP (ref 3.8–10.5)

## 2022-08-29 PROCEDURE — 93010 ELECTROCARDIOGRAM REPORT: CPT

## 2022-08-29 RX ORDER — SODIUM CHLORIDE 9 MG/ML
1000 INJECTION, SOLUTION INTRAVENOUS
Refills: 0 | Status: DISCONTINUED | OUTPATIENT
Start: 2022-09-14 | End: 2022-09-15

## 2022-08-29 NOTE — H&P PST ADULT - NSICDXPASTMEDICALHX_GEN_ALL_CORE_FT
PAST MEDICAL HISTORY:  Asthma     History of COPD No home O2 use     PAST MEDICAL HISTORY:  Asthma     History of COPD No home O2 use    Hyperlipidemia     Hypertension     Hypothyroidism     Thyroid nodule

## 2022-08-29 NOTE — H&P PST ADULT - PULMONARY EMBOLUS
Pt transferred via bed to room 322 with attendant; vital signs stable; report off to [employer].     Primary Nurse Jon Antoine and Fabian Moralez RN performed a dual skin assessment on this patient No impairment noted no

## 2022-08-29 NOTE — H&P PST ADULT - ASSESSMENT
77 y.o female with hx of thyroid nodules that have been followed x several years. Pt reports nodule had enlarged, malignant on  Biopsy. Scheduled for Left thyroid lobectomy, possible paratracheal node dissection

## 2022-08-29 NOTE — H&P PST ADULT - NSICDXPASTSURGICALHX_GEN_ALL_CORE_FT
PAST SURGICAL HISTORY:  History of cholecystectomy 1989    History of repair of hip fracture ORIF 1982.  Hardware was eventually removed

## 2022-08-29 NOTE — H&P PST ADULT - PROBLEM SELECTOR PLAN 3
Pt advised to take her  Losartan, Metoprolol morning of surgery with sip of water.  Cardiology cleacance, echo on chart Pt advised to take her  Losartan, Metoprolol morning of surgery with sip of water.  Cardiology clearance, echo on chart.  CT angiogram dated 6/18/2020 on chart

## 2022-08-29 NOTE — H&P PST ADULT - PROBLEM SELECTOR PLAN 1
Left thyroid lobectomy, possible paratracheal node dissection    CBC BMP EKG    Preop instructions and antibacterial soap given and explained (verbal and written), with teach back.     Pre-op Covid testing protocol, web site info given and explained

## 2022-08-29 NOTE — H&P PST ADULT - FALL HARM RISK - PT AGE POPULATION HIDDEN
Adult ems states, " pt with low poc in field , 20g TO left hand , d10 infusion started , poc went up to 114 , hx of etoh abuse, psych and hyperthyroidism " in traige poc 23, md echevarria  made aware , pt talking, d50 Iv  given as ordered '

## 2022-09-06 ENCOUNTER — NON-APPOINTMENT (OUTPATIENT)
Age: 77
End: 2022-09-06

## 2022-09-08 ENCOUNTER — APPOINTMENT (OUTPATIENT)
Dept: CARDIOLOGY | Facility: CLINIC | Age: 77
End: 2022-09-08

## 2022-09-08 ENCOUNTER — NON-APPOINTMENT (OUTPATIENT)
Age: 77
End: 2022-09-08

## 2022-09-08 VITALS
HEART RATE: 75 BPM | DIASTOLIC BLOOD PRESSURE: 73 MMHG | SYSTOLIC BLOOD PRESSURE: 158 MMHG | OXYGEN SATURATION: 94 % | BODY MASS INDEX: 24.35 KG/M2 | HEIGHT: 61 IN | TEMPERATURE: 97.7 F | WEIGHT: 129 LBS

## 2022-09-08 DIAGNOSIS — Z01.810 ENCOUNTER FOR PREPROCEDURAL CARDIOVASCULAR EXAMINATION: ICD-10-CM

## 2022-09-08 PROCEDURE — 99214 OFFICE O/P EST MOD 30 MIN: CPT

## 2022-09-08 PROCEDURE — 93000 ELECTROCARDIOGRAM COMPLETE: CPT

## 2022-09-08 NOTE — DISCUSSION/SUMMARY
[FreeTextEntry1] : mod mitral stenosis\par aortic atheroma\par fatigue\par moderate LLE PAD\par \par -cont asa 81mg daily\par -cont atorvastatin 40mg daily\par -cont metoprolol succinate 25mg once daily for rate control. cont losartan 25mg once daily for HTN.\par \par [phone]\par Nicol. dtr. primary contact. \par \par \par \par Patient is without active cardiac ischemia, uncontrolled arrhythmia, or decompensated heart failure. \par She has no evidence of obstructive CAD. Her mitral stenosis remains moderate.\par \par No cardiac contraindication to proceeding with the planned procedure. Pt to continue metoprolol in the perioperative period. [EKG obtained to assist in diagnosis and management of assessed problem(s)] : EKG obtained to assist in diagnosis and management of assessed problem(s)

## 2022-09-08 NOTE — ADDENDUM
[FreeTextEntry1] : TTE 6/21/22:\par EF 65%\par Grade I diastolic dysfunction\par Severe left atrial enlargement\par Moderate mitral stenosis at a heart rate of 67 bpm\par mild aortic sclerosis with lambl's vs. PFE on NCC\par Normal pulmonary pressure\par \par No significant change from 6/29/2020.\par \par CTA Heart 7/12/2022:\par no significant coronary artery disease\par small bilateral pleural effusions.\par \par LE is improved.\par increase furosemide to 20mg daily.\par \par surgery at [facility]\par \par No cardiac contraindication to the planned procedure.\par continue metoprolol and aspirin in the perioperative period.\par
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
## 2022-09-08 NOTE — REVIEW OF SYSTEMS
[Dyspnea on exertion] : dyspnea during exertion [Negative] : Heme/Lymph [Lower Ext Edema] : no extremity edema [Palpitations] : no palpitations [Orthopnea] : no orthopnea [Syncope] : no syncope

## 2022-09-08 NOTE — CARDIOLOGY SUMMARY
[de-identified] : 9/8/22: SR, LAE, low voltage with rightward p axis [de-identified] : TTE 6/29/2020\par with at least moderate mitral stenosis with calcified mitral valve (mean gradient 11mmHg, PHT 182ms, PASP 33mmHg), severely dilated LA, nl pulmonary pressures. HR 77\par \par COSME 7/22/2020:\par moderate MS (mean gradient 6mmHg, MVA by PHT 1.5cm^2, 1.7cm^2 by planimetry\par small PFE vs. Lambl's on NCC if aortic valve\par high grade diffuse complex mobile aortic atheroma in aortic arch/descending aorta [de-identified] : 7/12/22: \par CT coronaries - no significant CAD

## 2022-09-08 NOTE — PHYSICAL EXAM
[Normal S1, S2] : normal S1, S2 [No Rub] : no rub [No Gallop] : no gallop [Murmur] : murmur [Normal] : alert and oriented, normal memory [No Edema] : no edema [de-identified] : 2/6 systolic RUSB

## 2022-09-09 ENCOUNTER — RX RENEWAL (OUTPATIENT)
Age: 77
End: 2022-09-09

## 2022-09-12 LAB — SARS-COV-2 RNA SPEC QL NAA+PROBE: SIGNIFICANT CHANGE UP

## 2022-09-13 ENCOUNTER — TRANSCRIPTION ENCOUNTER (OUTPATIENT)
Age: 77
End: 2022-09-13

## 2022-09-13 PROBLEM — E04.1 NONTOXIC SINGLE THYROID NODULE: Chronic | Status: ACTIVE | Noted: 2022-08-29

## 2022-09-13 PROBLEM — J45.909 UNSPECIFIED ASTHMA, UNCOMPLICATED: Chronic | Status: ACTIVE | Noted: 2022-08-29

## 2022-09-13 PROBLEM — Z87.09 PERSONAL HISTORY OF OTHER DISEASES OF THE RESPIRATORY SYSTEM: Chronic | Status: ACTIVE | Noted: 2022-08-29

## 2022-09-13 PROBLEM — E78.5 HYPERLIPIDEMIA, UNSPECIFIED: Chronic | Status: ACTIVE | Noted: 2022-08-29

## 2022-09-13 PROBLEM — E03.9 HYPOTHYROIDISM, UNSPECIFIED: Chronic | Status: ACTIVE | Noted: 2022-08-29

## 2022-09-13 PROBLEM — I10 ESSENTIAL (PRIMARY) HYPERTENSION: Chronic | Status: ACTIVE | Noted: 2022-08-29

## 2022-09-13 NOTE — ASU PATIENT PROFILE, ADULT - NSICDXPASTMEDICALHX_GEN_ALL_CORE_FT
PAST MEDICAL HISTORY:  Asthma     History of COPD No home O2 use    Hyperlipidemia     Hypertension     Hypothyroidism     Thyroid nodule

## 2022-09-14 ENCOUNTER — RESULT REVIEW (OUTPATIENT)
Age: 77
End: 2022-09-14

## 2022-09-14 ENCOUNTER — APPOINTMENT (OUTPATIENT)
Dept: SURGERY | Facility: HOSPITAL | Age: 77
End: 2022-09-14

## 2022-09-14 ENCOUNTER — TRANSCRIPTION ENCOUNTER (OUTPATIENT)
Age: 77
End: 2022-09-14

## 2022-09-14 ENCOUNTER — INPATIENT (INPATIENT)
Facility: HOSPITAL | Age: 77
LOS: 0 days | Discharge: HOME | End: 2022-09-15
Attending: SPECIALIST | Admitting: SPECIALIST

## 2022-09-14 VITALS
HEART RATE: 66 BPM | OXYGEN SATURATION: 96 % | SYSTOLIC BLOOD PRESSURE: 127 MMHG | TEMPERATURE: 98 F | HEIGHT: 61 IN | WEIGHT: 130.95 LBS | RESPIRATION RATE: 16 BRPM | DIASTOLIC BLOOD PRESSURE: 48 MMHG

## 2022-09-14 DIAGNOSIS — Z90.49 ACQUIRED ABSENCE OF OTHER SPECIFIED PARTS OF DIGESTIVE TRACT: Chronic | ICD-10-CM

## 2022-09-14 DIAGNOSIS — Z98.890 OTHER SPECIFIED POSTPROCEDURAL STATES: Chronic | ICD-10-CM

## 2022-09-14 DIAGNOSIS — E04.1 NONTOXIC SINGLE THYROID NODULE: ICD-10-CM

## 2022-09-14 PROCEDURE — 88307 TISSUE EXAM BY PATHOLOGIST: CPT | Mod: 26

## 2022-09-14 PROCEDURE — 88305 TISSUE EXAM BY PATHOLOGIST: CPT | Mod: 26

## 2022-09-14 DEVICE — LIGATING CLIPS WECK HORIZON SMALL-WIDE (RED) 24: Type: IMPLANTABLE DEVICE | Status: FUNCTIONAL

## 2022-09-14 DEVICE — LIGATING CLIPS WECK HORIZON MEDIUM (BLUE) 24: Type: IMPLANTABLE DEVICE | Status: FUNCTIONAL

## 2022-09-14 RX ORDER — LOSARTAN POTASSIUM 100 MG/1
25 TABLET, FILM COATED ORAL DAILY
Refills: 0 | Status: DISCONTINUED | OUTPATIENT
Start: 2022-09-14 | End: 2022-09-15

## 2022-09-14 RX ORDER — ACETAMINOPHEN 500 MG
650 TABLET ORAL EVERY 6 HOURS
Refills: 0 | Status: DISCONTINUED | OUTPATIENT
Start: 2022-09-14 | End: 2022-09-15

## 2022-09-14 RX ORDER — BENZOCAINE AND MENTHOL 5; 1 G/100ML; G/100ML
1 LIQUID ORAL
Refills: 0 | Status: DISCONTINUED | OUTPATIENT
Start: 2022-09-14 | End: 2022-09-15

## 2022-09-14 RX ORDER — METOPROLOL TARTRATE 50 MG
25 TABLET ORAL DAILY
Refills: 0 | Status: DISCONTINUED | OUTPATIENT
Start: 2022-09-14 | End: 2022-09-15

## 2022-09-14 RX ORDER — ATORVASTATIN CALCIUM 80 MG/1
40 TABLET, FILM COATED ORAL AT BEDTIME
Refills: 0 | Status: DISCONTINUED | OUTPATIENT
Start: 2022-09-14 | End: 2022-09-15

## 2022-09-14 RX ORDER — ACETAMINOPHEN 500 MG
2 TABLET ORAL
Qty: 0 | Refills: 0 | DISCHARGE
Start: 2022-09-14

## 2022-09-14 RX ORDER — TIOTROPIUM BROMIDE 18 UG/1
1 CAPSULE ORAL; RESPIRATORY (INHALATION) DAILY
Refills: 0 | Status: DISCONTINUED | OUTPATIENT
Start: 2022-09-14 | End: 2022-09-15

## 2022-09-14 RX ORDER — FUROSEMIDE 40 MG
20 TABLET ORAL DAILY
Refills: 0 | Status: DISCONTINUED | OUTPATIENT
Start: 2022-09-14 | End: 2022-09-15

## 2022-09-14 RX ORDER — OXYCODONE AND ACETAMINOPHEN 5; 325 MG/1; MG/1
1 TABLET ORAL EVERY 6 HOURS
Refills: 0 | Status: DISCONTINUED | OUTPATIENT
Start: 2022-09-14 | End: 2022-09-15

## 2022-09-14 RX ORDER — INFLUENZA VIRUS VACCINE 15; 15; 15; 15 UG/.5ML; UG/.5ML; UG/.5ML; UG/.5ML
0.7 SUSPENSION INTRAMUSCULAR ONCE
Refills: 0 | Status: DISCONTINUED | OUTPATIENT
Start: 2022-09-14 | End: 2022-09-15

## 2022-09-14 RX ORDER — BUDESONIDE AND FORMOTEROL FUMARATE DIHYDRATE 160; 4.5 UG/1; UG/1
2 AEROSOL RESPIRATORY (INHALATION)
Refills: 0 | Status: DISCONTINUED | OUTPATIENT
Start: 2022-09-14 | End: 2022-09-15

## 2022-09-14 RX ORDER — LEVOTHYROXINE SODIUM 125 MCG
25 TABLET ORAL DAILY
Refills: 0 | Status: DISCONTINUED | OUTPATIENT
Start: 2022-09-14 | End: 2022-09-15
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
## 2022-09-14 NOTE — DISCHARGE NOTE PROVIDER - CARE PROVIDER_API CALL
Higinio Betts)  Plastic Surgery  [address] [place] [address] [address]  [place] [address]  Phone: [phone]  Fax: [phone]  Follow Up Time:

## 2022-09-14 NOTE — DISCHARGE NOTE PROVIDER - NSDCFUSCHEDAPPT_GEN_ALL_CORE_FT
Nancy Pang  [facility] Physician [facility]  ENDOCRIN [address]  Scheduled Appointment: 09/22/2022    [facility]  GENSURG [address]  Scheduled Appointment: 09/27/2022    Tirso Garcia  [facility]  CT Surg [address]  Scheduled Appointment: 10/12/2022

## 2022-09-14 NOTE — DISCHARGE NOTE PROVIDER - NSDCMRMEDTOKEN_GEN_ALL_CORE_FT
acetaminophen 325 mg oral tablet: 2 tab(s) orally every 6 hours, As needed, Temp greater or equal to 38.5C (101.3F), Moderate Pain (4 - 6)  Advair Diskus 250 mcg-50 mcg inhalation powder: 1 puff(s) inhaled 2 times a day  atorvastatin 40 mg oral tablet: 1 tab(s) orally once a day AM  furosemide 20 mg oral tablet: 1 tab(s) orally once a day AM  Incruse Ellipta 62.5 mcg/inh inhalation powder: 1 puff(s) inhaled every 24 hours noon  levothyroxine 25 mcg (0.025 mg) oral tablet: 1 tab(s) orally once a day AM  losartan 25 mg oral tablet: 1 tab(s) orally once a day AM  Metoprolol Succinate ER 25 mg oral tablet, extended release: 1 tab(s) orally once a day AM

## 2022-09-14 NOTE — PATIENT PROFILE ADULT - FALL HARM RISK - HARM RISK INTERVENTIONS

## 2022-09-14 NOTE — ASU PREOP CHECKLIST - TEMPERATURE IN CELSIUS (DEGREES C)
Oculoplastic Surgeon Procedure Text (A): After obtaining clear surgical margins the patient was sent to oculoplastics for surgical repair.  The patient understands they will receive post-surgical care and follow-up from the referring physician's office. 36.4

## 2022-09-15 ENCOUNTER — TRANSCRIPTION ENCOUNTER (OUTPATIENT)
Age: 77
End: 2022-09-15

## 2022-09-15 VITALS
RESPIRATION RATE: 16 BRPM | DIASTOLIC BLOOD PRESSURE: 64 MMHG | HEART RATE: 79 BPM | TEMPERATURE: 98 F | OXYGEN SATURATION: 99 % | SYSTOLIC BLOOD PRESSURE: 131 MMHG

## 2022-09-15 RX ADMIN — Medication 25 MICROGRAM(S): at 05:15

## 2022-09-15 RX ADMIN — LOSARTAN POTASSIUM 25 MILLIGRAM(S): 100 TABLET, FILM COATED ORAL at 05:16

## 2022-09-15 RX ADMIN — Medication 20 MILLIGRAM(S): at 05:16

## 2022-09-15 RX ADMIN — Medication 650 MILLIGRAM(S): at 09:02

## 2022-09-15 RX ADMIN — Medication 25 MILLIGRAM(S): at 05:16

## 2022-09-15 RX ADMIN — Medication 650 MILLIGRAM(S): at 09:32

## 2022-09-15 NOTE — PROGRESS NOTE ADULT - SUBJECTIVE AND OBJECTIVE BOX
C Team Surgery Progress Note     S: Patient resting comfortably on morning rounds. Post-operative pain well-controlled.  Denies headache, fever / chills, paresthesias, nausea or vomiting.       MEDICATIONS  (STANDING):  atorvastatin 40 milliGRAM(s) Oral at bedtime  budesonide 160 MICROgram(s)/formoterol 4.5 MICROgram(s) Inhaler 2 Puff(s) Inhalation two times a day  furosemide    Tablet 20 milliGRAM(s) Oral daily  influenza  Vaccine (HIGH DOSE) 0.7 milliLiter(s) IntraMuscular once  levothyroxine 25 MICROGram(s) Oral daily  losartan 25 milliGRAM(s) Oral daily  metoprolol succinate ER 25 milliGRAM(s) Oral daily  tiotropium 18 MICROgram(s) Capsule 1 Capsule(s) Inhalation daily    MEDICATIONS  (PRN):  acetaminophen     Tablet .. 650 milliGRAM(s) Oral every 6 hours PRN Temp greater or equal to 38.5C (101.3F), Moderate Pain (4 - 6)  benzocaine 15 mG/menthol 3.6 mG Lozenge 1 Lozenge Oral every 2 hours PRN Sore Throat  oxycodone    5 mG/acetaminophen 325 mG 1 Tablet(s) Oral every 6 hours PRN Severe Pain (7 - 10)      Physical Exam:    T(C): 36.8 (09-15-22 @ 05:15), Max: 36.8 (09-14-22 @ 18:53)  HR: 79 (09-15-22 @ 05:15) (66 - 89)  BP: 131/64 (09-15-22 @ 05:15) (111/50 - 136/52)  RR: 16 (09-15-22 @ 05:15) (15 - 22)  SpO2: 99% (09-15-22 @ 05:15) (92% - 99%)      09-14-22 @ 07:01  -  09-15-22 @ 07:00  --------------------------------------------------------  IN: 1430 mL / OUT: 670 mL / NET: 760 mL      General: NAD  Neuro: AO x3, No focal deficits  Neck: Trachea midline, Incision C/D/I with steris, No hematoma  MAHIN in place, serosanguinous        LABS:

## 2022-09-15 NOTE — DISCHARGE NOTE NURSING/CASE MANAGEMENT/SOCIAL WORK - NSDCPNINST_GEN_ALL_CORE
Call your doctor for a follow-up appointment.  Call your doctor for fever/chills; persistent nausea, vomiting, diarrhea; uncontrolled bleeding; pus discharge; severe pain.

## 2022-09-15 NOTE — PROGRESS NOTE ADULT - ASSESSMENT
Assessment: 77F s/p left thyroid lobectomy and isthmusectomy, paratracheal node dissection - doing well.    Plan:   - pain control PRN with Tylenol   - MAHIN as per surgical attending  - discharge planning for today.           C Team Surgery   [id number]

## 2022-09-15 NOTE — DISCHARGE NOTE NURSING/CASE MANAGEMENT/SOCIAL WORK - PATIENT PORTAL LINK FT
You can access the FollowMyHealth Patient Portal offered by [facility] by registering at the following website: [email or web address]. By joining [facility] [employer]’s FollowMyHealth portal, you will also be able to view your health information using other applications (apps) compatible with our system.

## 2022-09-16 ENCOUNTER — NON-APPOINTMENT (OUTPATIENT)
Age: 77
End: 2022-09-16

## 2022-09-16 LAB — SURGICAL PATHOLOGY STUDY: SIGNIFICANT CHANGE UP

## 2022-09-22 ENCOUNTER — APPOINTMENT (OUTPATIENT)
Dept: ENDOCRINOLOGY | Facility: CLINIC | Age: 77
End: 2022-09-22

## 2022-09-22 VITALS
TEMPERATURE: 97.5 F | WEIGHT: 126 LBS | RESPIRATION RATE: 16 BRPM | DIASTOLIC BLOOD PRESSURE: 70 MMHG | BODY MASS INDEX: 24.74 KG/M2 | OXYGEN SATURATION: 92 % | HEIGHT: 60 IN | HEART RATE: 71 BPM | SYSTOLIC BLOOD PRESSURE: 150 MMHG

## 2022-09-22 DIAGNOSIS — M81.0 AGE-RELATED OSTEOPOROSIS W/OUT CURRENT PATHOLOGICAL FRACTURE: ICD-10-CM

## 2022-09-22 DIAGNOSIS — E04.2 NONTOXIC MULTINODULAR GOITER: ICD-10-CM

## 2022-09-22 PROCEDURE — 99215 OFFICE O/P EST HI 40 MIN: CPT

## 2022-09-27 ENCOUNTER — APPOINTMENT (OUTPATIENT)
Dept: SURGERY | Facility: CLINIC | Age: 77
End: 2022-09-27

## 2022-09-27 PROCEDURE — 99024 POSTOP FOLLOW-UP VISIT: CPT

## 2022-09-27 NOTE — ASSESSMENT
[FreeTextEntry1] : s/p Left thyroid lobectomy\par pathology discussed\par Advised daughter to call Dr Pang's office to adjust dose of medication\par daily care\par F/U 6 weeks

## 2022-09-27 NOTE — PHYSICAL EXAM
[de-identified] : well healed scar [Midline] : located in midline position [Normal] : orientation to person, place, and time: normal

## 2022-09-27 NOTE — HISTORY OF PRESENT ILLNESS
[de-identified] : Pt 2 weeks s/p left thyroid lobectomy doing well c/o swelling Pt saw Dr Pang and had blood work reports reviewed

## 2022-09-28 ENCOUNTER — NON-APPOINTMENT (OUTPATIENT)
Age: 77
End: 2022-09-28

## 2022-09-29 ENCOUNTER — NON-APPOINTMENT (OUTPATIENT)
Age: 77
End: 2022-09-29

## 2022-09-29 LAB
T4 FREE SERPL-MCNC: 1.7 NG/DL
TSH SERPL-ACNC: 5.55 UIU/ML

## 2022-10-05 PROBLEM — E04.2 MULTINODULAR GOITER: Status: ACTIVE | Noted: 2021-08-10

## 2022-10-05 PROBLEM — M81.0 OSTEOPOROSIS, POST-MENOPAUSAL: Status: ACTIVE | Noted: 2019-06-29

## 2022-10-05 NOTE — HISTORY OF PRESENT ILLNESS
[FreeTextEntry1] : 77 yr old F here for f/u visit for multiple thyroid nodules\par Now s/p L thyroid lobectomy\par No FH of thyroid cancer\par She is a current smoker\par Has history of subclinical hypothyroidism\par No cold intolerance, constipation or fatigue\par Has never been on thyroid medication\par No difficulty speaking or swallowing\par No radiation or surgery to neck area\par Aug 2019 Thyroid US showed:\par R side: 8mm hypoechoic and 8mm hyperechoic nodule\par L side: 1.0 cm, 5mm, 7mm nodules and 1.0 cm hypoechoic lower pole nodule (only nodule that was slightly increased in size, others were unchanged)\par Thyroid US Aug 2021: R lobe: subcm nodules L lobe: 1.3 cm nodule (upper pole) 1.1cm cyst and sub cm nodules\par March 2022 R lobe subcm nodules L lobe: 1.4 cm upper pole nodule, 1.4 cm lower pole nodule, 1.0 cm lower pole nodule\par FNA of L upper and lower pole nodules was done in May 2022\par L upper nodule was found to be [place] IV and +KRAS mutation\par L lower pole nodule indeterminate, but no mutation detected\par Patient was started on LT4 25mcg daily for subclinical hypothyroidism\par She had L lobectomy and isthmusectomy on 9/14/22 \par \par Patient had a BMD in Aug 2018:\par L spine -1.0\par L fem neck -3.0\par R fem neck -2.9\par Total Left -3.0\par Total R -2.9\par Has sister with osteoporosis\par She was on Boniva many years ago but stopped for unclear reason\par Had hip fracture 2 years ago from standing height\par Takes Vit D 2000IU daily\par \par Nov 2021\par BMD:\par L spine -2.1\par L fem neck -3.4\par Radius -3.6\par \par \par \par \par \par

## 2022-10-05 NOTE — ASSESSMENT
[FreeTextEntry1] : 77 yr old F with multiple thyroid nodules with finding of [place] IV (L upper lobe nodule) now s/p L lobectomy and isthmusectomy. 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 She wishes to defer this for now\par

## 2022-10-05 NOTE — PHYSICAL EXAM
[Alert] : alert [No Acute Distress] : no acute distress [Normal Sclera/Conjunctiva] : normal sclera/conjunctiva [No Proptosis] : no proptosis [Normal Outer Ear/Nose] : the ears and nose were normal in appearance [No Respiratory Distress] : no respiratory distress [Clear to Auscultation] : lungs were clear to auscultation bilaterally [Normal S1, S2] : normal S1 and S2 [Normal Rate] : heart rate was normal [Not Tender] : non-tender [Soft] : abdomen soft [Normal Gait] : normal gait [No Rash] : no rash [No Tremors] : no tremors [Oriented x3] : oriented to person, place, and time [Normal Affect] : the affect was normal [Normal Mood] : the mood was normal [Well Healed Scar] : well healed scar

## 2022-10-06 ENCOUNTER — NON-APPOINTMENT (OUTPATIENT)
Age: 77
End: 2022-10-06

## 2022-11-10 ENCOUNTER — APPOINTMENT (OUTPATIENT)
Dept: SURGERY | Facility: CLINIC | Age: 77
End: 2022-11-10

## 2022-11-10 DIAGNOSIS — E04.1 NONTOXIC SINGLE THYROID NODULE: ICD-10-CM

## 2022-11-10 PROCEDURE — 99024 POSTOP FOLLOW-UP VISIT: CPT

## 2022-11-10 NOTE — ASSESSMENT
[FreeTextEntry1] : s/p left thyroid lobectomy\par daily care\par labs and medication adjustment per Dr Pang\par f/u 4 months

## 2022-11-10 NOTE — HISTORY OF PRESENT ILLNESS
[de-identified] : Pt 2 months s/p left thyroid lobectomy doing well on Synthroid 50 mcg daily aside from hair loss

## 2022-11-10 NOTE — PHYSICAL EXAM
[de-identified] : well healed scar [Midline] : located in midline position [Normal] : orientation to person, place, and time: normal

## 2022-11-11 ENCOUNTER — APPOINTMENT (OUTPATIENT)
Dept: PULMONOLOGY | Facility: CLINIC | Age: 77
End: 2022-11-11

## 2022-11-11 VITALS
DIASTOLIC BLOOD PRESSURE: 84 MMHG | HEART RATE: 90 BPM | HEIGHT: 60 IN | OXYGEN SATURATION: 97 % | WEIGHT: 126 LBS | BODY MASS INDEX: 24.74 KG/M2 | TEMPERATURE: 98 F | SYSTOLIC BLOOD PRESSURE: 144 MMHG | RESPIRATION RATE: 16 BRPM

## 2022-11-11 PROCEDURE — 99215 OFFICE O/P EST HI 40 MIN: CPT | Mod: 25

## 2022-11-11 PROCEDURE — 36415 COLL VENOUS BLD VENIPUNCTURE: CPT

## 2022-11-11 RX ORDER — VARENICLINE TARTRATE 25 MG
0.5 MG X 11 & KIT ORAL
Qty: 1 | Refills: 0 | Status: DISCONTINUED | COMMUNITY
Start: 2022-05-17 | End: 2022-11-11

## 2022-11-11 NOTE — CONSULT LETTER
[Dear  ___] : Dear  [unfilled], [Consult Letter:] : I had the pleasure of evaluating your patient, [unfilled]. [Please see my note below.] : Please see my note below. [Consult Closing:] : Thank you very much for allowing me to participate in the care of this patient.  If you have any questions, please do not hesitate to contact me. [FreeTextEntry3] : Sincerely,\par \par Fay Muir MD\par [facility] Physician [facility]\par Pulmonary Medicine\par tel: [phone]\par fax: [phone]\par

## 2022-11-11 NOTE — ASSESSMENT
[FreeTextEntry1] : Ms. LAUREN TORRES is a 77 year old woman long term smoker (quit 5/2022) with asthma/COPD/emphysema,  HTN, HLD, aortic atheroma and asthma here for f/u\par \par #Asthma/COPD/emphysema - mod obstruction, mod restriction, low DLCO on PFTs\par -- increase advair dose, c/w incruse\par -- repeat PFTs prior to next visit\par -- can use Albuterol prn, spacer script provided to patient \par \par #BARBOSA -likely multifactorial related to underlying airway disease, pleural effusions and cardiac disease.  Echocardiogram from June 2022 with normal left ventricular systolic function, grade 1 diastolic dysfunction normal RV size and function; severely enlarged LA, moderate mitral stenosis.  Pulmonary pressures were not mentioned.  Her most recent CT chest is notable for large pulmonary artery which may indicate underlying pulmonary hypertension.\par -- Patient is already on Lasix.  She appears euvolemic at this time.  I will repeat blood work and consider increasing dose of Lasix\par -- Advised patient to follow-up with her cardiothoracic surgeon and cardiologist\par \par \par #Smoker - >50 pack year smoking hx, smoking again a few cigs\par -- Chantix\par -- CT chest Nov 2022 -  no concerning nodules\par \par Blood work repeat pulmonary function testing follow-up with cardiology\par \par Her prior TTE was reviewed and discussed with patient.  Notable for moderate mitral stenosis, mild aortic sclerosis.\par \par CT chest from November 2022 discussed with patient.  PA appears large there is evidence of emphysema.  There are bilateral pleural effusions, right greater than left.  No concerning pulmonary nodules.\par \par \par \par All questions answered. Patient in agreement with plan. \par Follow up in 3mo or sooner if needed.\par

## 2022-11-11 NOTE — PHYSICAL EXAM
[No Acute Distress] : no acute distress [Normal Oropharynx] : normal oropharynx [Normal Appearance] : normal appearance [No Neck Mass] : no neck mass [Normal Rate/Rhythm] : normal rate/rhythm [Normal S1, S2] : normal s1, s2 [No Murmurs] : no murmurs [No Resp Distress] : no resp distress [Clear to Auscultation Bilaterally] : clear to auscultation bilaterally [No Abnormalities] : no abnormalities [Benign] : benign [Normal Gait] : normal gait [No Clubbing] : no clubbing [No Cyanosis] : no cyanosis [No Edema] : no edema [FROM] : FROM [Normal Color/ Pigmentation] : normal color/ pigmentation [No Focal Deficits] : no focal deficits [Oriented x3] : oriented x3 [Normal Affect] : normal affect [TextBox_68] : good air movement, few rhonchi

## 2022-11-11 NOTE — HISTORY OF PRESENT ILLNESS
[>= 20 pack years] : >= 20 pack years [Never] : never [Current] : current [TextBox_4] : Ms. LAUREN TORRES is a 77 year old woman long term smoker (quit 2022) with asthma/COPD, HTN, HLD, aortic atheroma  her for f/u.\par \par Had left thyroid lobectomy.  Reports was negative for malignancy.\par \par In terms of her respiratory status,  she reports compliance with her Advair and Incruse.  She can walk about 15 feet.  She denies wheezing or coughing.  Has not been using her albuterol.  She still has pursed lip breathing.\par She is unfortunately still smoking occasionally\par She is concerned that she is losing a lot of weight.  Has not seen her primary in almost a year.\par \par She follows with Dr Cruz and Dr Garcia for aortic atheroma. -Repeat CTA was done 2022.\par ____________________\par \par PFTs - mod obstruction, mod restriction, reduced DLCO, + BD response\par CTA Chest 2021 - no concerning nodules, emphysema\par \par PMH: HLD, HTN, aortic atheroma\par Meds: per chart\par All: NKDA\par SH: quit smoking 2022. No known exposures. Worked as .\par FH: father  at 93, had lung issues; mother  at 51 of heart disease\par PMD: BENSON LOBO\par Immunizations: Pneumovax 2019; COVID vaccinated, due for second booster.\par  [TextBox_11] : 1 [TextBox_13] : 50 [YearQuit] : 2022

## 2022-11-15 ENCOUNTER — NON-APPOINTMENT (OUTPATIENT)
Age: 77
End: 2022-11-15

## 2022-11-15 LAB
ALBUMIN SERPL ELPH-MCNC: 4.2 G/DL
ALP BLD-CCNC: 106 U/L
ALT SERPL-CCNC: 14 U/L
ANION GAP SERPL CALC-SCNC: 14 MMOL/L
AST SERPL-CCNC: 20 U/L
BILIRUB SERPL-MCNC: 0.5 MG/DL
BUN SERPL-MCNC: 21 MG/DL
CALCIUM SERPL-MCNC: 9.5 MG/DL
CHLORIDE SERPL-SCNC: 101 MMOL/L
CO2 SERPL-SCNC: 24 MMOL/L
CREAT SERPL-MCNC: 1.02 MG/DL
EGFR: 56 ML/MIN/1.73M2
GLUCOSE SERPL-MCNC: 104 MG/DL
MAGNESIUM SERPL-MCNC: 1.9 MG/DL
NT-PROBNP SERPL-MCNC: 1707 PG/ML
POTASSIUM SERPL-SCNC: 4.9 MMOL/L
PROT SERPL-MCNC: 7.6 G/DL
SODIUM SERPL-SCNC: 138 MMOL/L

## 2022-12-14 ENCOUNTER — APPOINTMENT (OUTPATIENT)
Dept: CARDIOTHORACIC SURGERY | Facility: HOSPITAL | Age: 77
End: 2022-12-14

## 2022-12-15 ENCOUNTER — APPOINTMENT (OUTPATIENT)
Dept: CARDIOTHORACIC SURGERY | Facility: CLINIC | Age: 77
End: 2022-12-15

## 2022-12-16 ENCOUNTER — APPOINTMENT (OUTPATIENT)
Dept: ENDOCRINOLOGY | Facility: CLINIC | Age: 77
End: 2022-12-16

## 2022-12-21 ENCOUNTER — APPOINTMENT (OUTPATIENT)
Dept: CARDIOTHORACIC SURGERY | Facility: CLINIC | Age: 77
End: 2022-12-21

## 2022-12-21 PROCEDURE — 99442: CPT | Mod: 95

## 2023-01-09 ENCOUNTER — APPOINTMENT (OUTPATIENT)
Dept: CARDIOLOGY | Facility: CLINIC | Age: 78
End: 2023-01-09
Payer: MEDICARE

## 2023-01-09 ENCOUNTER — RX RENEWAL (OUTPATIENT)
Age: 78
End: 2023-01-09

## 2023-01-09 VITALS
SYSTOLIC BLOOD PRESSURE: 171 MMHG | TEMPERATURE: 97.7 F | DIASTOLIC BLOOD PRESSURE: 74 MMHG | HEIGHT: 60 IN | WEIGHT: 125 LBS | OXYGEN SATURATION: 94 % | BODY MASS INDEX: 24.54 KG/M2 | HEART RATE: 73 BPM

## 2023-01-09 VITALS — SYSTOLIC BLOOD PRESSURE: 136 MMHG | DIASTOLIC BLOOD PRESSURE: 66 MMHG

## 2023-01-09 PROCEDURE — 99214 OFFICE O/P EST MOD 30 MIN: CPT

## 2023-01-09 NOTE — CARDIOLOGY SUMMARY
[de-identified] : 9/8/22: SR, LAE, low voltage with rightward p axis [de-identified] : TTE 6/29/2020\par with at least moderate mitral stenosis with calcified mitral valve (mean gradient 11mmHg, PHT 182ms, PASP 33mmHg), severely dilated LA, nl pulmonary pressures. HR 77\par \par COSME 7/22/2020:\par moderate MS (mean gradient 6mmHg, MVA by PHT 1.5cm^2, 1.7cm^2 by planimetry\par small PFE vs. Lambl's on NCC if aortic valve\par high grade diffuse complex mobile aortic atheroma in aortic arch/descending aorta\par \par TTE 6/21/22:\par EF 65%\par Grade I diastolic dysfunction\par Severe left atrial enlargement\par Moderate mitral stenosis at a heart rate of 67 bpm\par mild aortic sclerosis with lambl's vs. PFE on NCC\par Normal pulmonary pressure\par \par No significant change from 6/29/2020. [de-identified] : CTA Heart 7/12/2022:\par no significant coronary artery disease\par small bilateral pleural effusions.
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
## 2023-01-09 NOTE — DISCUSSION/SUMMARY
[FreeTextEntry1] : mod mitral stenosis\par aortic atheroma\par moderate LLE PAD\par hx of HFpEF - now euvolemic\par \par -cont asa 81mg daily\par -cont atorvastatin 40mg daily\par -cont metoprolol succinate 25mg once daily for rate control. cont losartan 25mg once daily for HTN.\par -cont furosemide 20mg daily\par \par [phone]\par Nicol. dtr. primary contact.

## 2023-01-09 NOTE — REVIEW OF SYSTEMS
[Dyspnea on exertion] : dyspnea during exertion [Negative] : Heme/Lymph [Chest Discomfort] : no chest discomfort [Lower Ext Edema] : no extremity edema [Palpitations] : no palpitations [Orthopnea] : no orthopnea [Syncope] : no syncope

## 2023-01-09 NOTE — PHYSICAL EXAM
[Normal S1, S2] : normal S1, S2 [No Rub] : no rub [No Gallop] : no gallop [Murmur] : murmur [No Edema] : no edema [Normal] : alert and oriented, normal memory [Soft] : abdomen soft [Non Tender] : non-tender [de-identified] : 2/6 systolic RUSB, LLSB

## 2023-01-19 ENCOUNTER — RX RENEWAL (OUTPATIENT)
Age: 78
End: 2023-01-19

## 2023-01-26 ENCOUNTER — RX RENEWAL (OUTPATIENT)
Age: 78
End: 2023-01-26

## 2023-02-07 ENCOUNTER — APPOINTMENT (OUTPATIENT)
Dept: PULMONOLOGY | Facility: CLINIC | Age: 78
End: 2023-02-07
Payer: MEDICARE

## 2023-02-07 VITALS
RESPIRATION RATE: 14 BRPM | OXYGEN SATURATION: 92 % | TEMPERATURE: 98 F | HEART RATE: 65 BPM | WEIGHT: 125 LBS | DIASTOLIC BLOOD PRESSURE: 69 MMHG | SYSTOLIC BLOOD PRESSURE: 163 MMHG | HEIGHT: 60 IN | BODY MASS INDEX: 24.54 KG/M2

## 2023-02-07 PROCEDURE — 99407 BEHAV CHNG SMOKING > 10 MIN: CPT

## 2023-02-07 PROCEDURE — 94618 PULMONARY STRESS TESTING: CPT

## 2023-02-07 PROCEDURE — 99215 OFFICE O/P EST HI 40 MIN: CPT | Mod: 25

## 2023-02-07 NOTE — PHYSICAL EXAM
[No Acute Distress] : no acute distress [Normal Oropharynx] : normal oropharynx [Normal Appearance] : normal appearance [No Neck Mass] : no neck mass [Normal Rate/Rhythm] : normal rate/rhythm [Normal S1, S2] : normal s1, s2 [No Murmurs] : no murmurs [No Resp Distress] : no resp distress [Clear to Auscultation Bilaterally] : clear to auscultation bilaterally [No Abnormalities] : no abnormalities [Benign] : benign [Normal Gait] : normal gait [No Clubbing] : no clubbing [No Cyanosis] : no cyanosis [No Edema] : no edema [FROM] : FROM [Normal Color/ Pigmentation] : normal color/ pigmentation [No Focal Deficits] : no focal deficits [Oriented x3] : oriented x3 [Normal Affect] : normal affect [TextBox_68] : good air movement,

## 2023-02-07 NOTE — CONSULT LETTER
[Dear  ___] : Dear  [unfilled], [Consult Letter:] : I had the pleasure of evaluating your patient, [unfilled]. [Please see my note below.] : Please see my note below. [Consult Closing:] : Thank you very much for allowing me to participate in the care of this patient.  If you have any questions, please do not hesitate to contact me. [FreeTextEntry3] : Sincerely,\par \par Fay Muir MD\par [facility] Physician [facility]\par Pulmonary Medicine\par tel: [phone]\par fax: [phone]\par

## 2023-02-07 NOTE — HISTORY OF PRESENT ILLNESS
[Current] : current [>= 20 pack years] : >= 20 pack years [Never] : never [TextBox_4] : Ms. LAUREN TORRES is a 77 year old woman long term smoker (quit 2022) with asthma/COPD, HTN, HLD, aortic atheroma  her for f/u.\par \par Had left thyroid lobectomy.  Reports was negative for malignancy.\par She follows with Dr Cruz and Dr Garcia for aortic atheroma. \par ____________________\par \par Still smoking, about 10 cigs/day. Got Chantix but has not started yet. \par Still using Advair. Not using Incruse as it didn't do anuthing. \par BARBOSA improved. She is functioning ok. Some SOB when going up the stairs. \par Can walk "awhile" if walking on flat survace\par \par \par PFTs - mod obstruction, mod restriction, reduced DLCO, + BD response\par CTA Chest 2021 - no concerning nodules, emphysema\par \par PMH: HLD, HTN, aortic atheroma\par Meds: per chart\par All: NKDA\par SH: smoking No known exposures. Worked as .\par FH: father  at 93, had lung issues; mother  at 51 of heart disease\par PMD: BENSON LOBO\par Immunizations: Pneumovax 2019; COVID vaccinated, due for second booster.\par  [TextBox_11] : 1 [TextBox_13] : 50

## 2023-02-17 ENCOUNTER — APPOINTMENT (OUTPATIENT)
Dept: INTERNAL MEDICINE | Facility: CLINIC | Age: 78
End: 2023-02-17
Payer: MEDICARE

## 2023-02-17 VITALS — SYSTOLIC BLOOD PRESSURE: 128 MMHG | DIASTOLIC BLOOD PRESSURE: 62 MMHG

## 2023-02-17 VITALS
TEMPERATURE: 97.6 F | HEART RATE: 79 BPM | HEIGHT: 60 IN | OXYGEN SATURATION: 94 % | WEIGHT: 13 LBS | BODY MASS INDEX: 2.55 KG/M2

## 2023-02-17 PROCEDURE — 36415 COLL VENOUS BLD VENIPUNCTURE: CPT

## 2023-02-17 PROCEDURE — 99213 OFFICE O/P EST LOW 20 MIN: CPT | Mod: 25

## 2023-02-17 NOTE — HISTORY OF PRESENT ILLNESS
[FreeTextEntry1] : f/u thyroid [de-identified] : reviewed cardio and pulm notes\par had synthroid increased in sept 22\par other meds reviewed--has endo f/u 5/23\par planning to start chantrix

## 2023-02-20 LAB
ANION GAP SERPL CALC-SCNC: 13 MMOL/L
BASOPHILS # BLD AUTO: 0.07 K/UL
BASOPHILS NFR BLD AUTO: 0.9 %
BUN SERPL-MCNC: 24 MG/DL
CALCIUM SERPL-MCNC: 9.6 MG/DL
CHLORIDE SERPL-SCNC: 99 MMOL/L
CHOLEST SERPL-MCNC: 141 MG/DL
CO2 SERPL-SCNC: 23 MMOL/L
CREAT SERPL-MCNC: 1.11 MG/DL
EGFR: 51 ML/MIN/1.73M2
EOSINOPHIL # BLD AUTO: 0.16 K/UL
EOSINOPHIL NFR BLD AUTO: 2 %
GLUCOSE SERPL-MCNC: 88 MG/DL
HCT VFR BLD CALC: 35.9 %
HDLC SERPL-MCNC: 51 MG/DL
HGB BLD-MCNC: 12.1 G/DL
IMM GRANULOCYTES NFR BLD AUTO: 0.3 %
LDLC SERPL CALC-MCNC: 71 MG/DL
LYMPHOCYTES # BLD AUTO: 2.47 K/UL
LYMPHOCYTES NFR BLD AUTO: 31.3 %
MAN DIFF?: NORMAL
MCHC RBC-ENTMCNC: 31.9 PG
MCHC RBC-ENTMCNC: 33.7 GM/DL
MCV RBC AUTO: 94.7 FL
MONOCYTES # BLD AUTO: 0.58 K/UL
MONOCYTES NFR BLD AUTO: 7.4 %
NEUTROPHILS # BLD AUTO: 4.58 K/UL
NEUTROPHILS NFR BLD AUTO: 58.1 %
NONHDLC SERPL-MCNC: 89 MG/DL
PLATELET # BLD AUTO: 240 K/UL
POTASSIUM SERPL-SCNC: 5.6 MMOL/L
RBC # BLD: 3.79 M/UL
RBC # FLD: 14.3 %
SODIUM SERPL-SCNC: 135 MMOL/L
T4 FREE SERPL-MCNC: 1.7 NG/DL
TRIGL SERPL-MCNC: 92 MG/DL
TSH SERPL-ACNC: 5.73 UIU/ML
WBC # FLD AUTO: 7.88 K/UL

## 2023-03-16 ENCOUNTER — APPOINTMENT (OUTPATIENT)
Dept: SURGERY | Facility: CLINIC | Age: 78
End: 2023-03-16

## 2023-03-27 NOTE — DISCHARGE NOTE NURSING/CASE MANAGEMENT/SOCIAL WORK - NSFLUVACAGEDISCH_IMM_ALL_CORE
Received shift report and assumed care of patient.  Patient awake, calm and stable, currently positioned in bed for comfort and safety; call light within reach.  Denies pain or discomfort at this time.  Will continue to monitor.    Adult

## 2023-04-10 ENCOUNTER — RX RENEWAL (OUTPATIENT)
Age: 78
End: 2023-04-10

## 2023-04-19 ENCOUNTER — APPOINTMENT (OUTPATIENT)
Dept: PULMONOLOGY | Facility: CLINIC | Age: 78
End: 2023-04-19
Payer: MEDICARE

## 2023-04-19 VITALS
WEIGHT: 122 LBS | BODY MASS INDEX: 22.74 KG/M2 | DIASTOLIC BLOOD PRESSURE: 54 MMHG | RESPIRATION RATE: 16 BRPM | OXYGEN SATURATION: 96 % | HEIGHT: 61.5 IN | TEMPERATURE: 98 F | SYSTOLIC BLOOD PRESSURE: 144 MMHG | HEART RATE: 58 BPM

## 2023-04-19 VITALS — OXYGEN SATURATION: 92 % | HEIGHT: 61.5 IN | WEIGHT: 125 LBS | HEART RATE: 71 BPM | BODY MASS INDEX: 23.3 KG/M2

## 2023-04-19 PROCEDURE — 94727 GAS DIL/WSHOT DETER LNG VOL: CPT

## 2023-04-19 PROCEDURE — 94010 BREATHING CAPACITY TEST: CPT

## 2023-04-19 PROCEDURE — 99215 OFFICE O/P EST HI 40 MIN: CPT | Mod: 25

## 2023-04-19 PROCEDURE — 94618 PULMONARY STRESS TESTING: CPT

## 2023-04-19 PROCEDURE — 94729 DIFFUSING CAPACITY: CPT

## 2023-04-19 RX ORDER — UMECLIDINIUM 62.5 UG/1
62.5 AEROSOL, POWDER ORAL DAILY
Qty: 3 | Refills: 0 | Status: DISCONTINUED | COMMUNITY
Start: 2022-08-16 | End: 2023-04-19

## 2023-04-19 RX ORDER — FLUTICASONE PROPIONATE AND SALMETEROL 500; 50 UG/1; UG/1
500-50 POWDER RESPIRATORY (INHALATION) TWICE DAILY
Qty: 1 | Refills: 3 | Status: DISCONTINUED | COMMUNITY
Start: 2022-06-14 | End: 2023-04-19

## 2023-04-19 NOTE — HISTORY OF PRESENT ILLNESS
[>= 20 pack years] : >= 20 pack years [Never] : never [Former] : former [TextBox_4] : Ms. LAUREN TORRES is a 78 year old woman long term smoker (quit 3/2023) with asthma/COPD, HTN, HLD, aortic atheroma and valvular heart diease here for f/u.\par \par Using Chantix, stopped smoking. Still has some cravings.\par Has been using Breztri. Compliant with Lasix 20 mg. \par Has been feeling better. Some BARBOSA walking up the stairs. \par Has not been using any albuterol.\par \par PMH: HLD, HTN, aortic atheroma\par Meds: per chart\par All: NKDA\par SH: smoking No known exposures. Worked as .\par FH: father  at 93, had lung issues; mother  at 51 of heart disease\par PMD: BENSON LOBO\par Immunizations: Pneumovax 2019; COVID vaccinated, due for second booster.\par  [TextBox_11] : 1 [TextBox_13] : 50 [YearQuit] : 3/2023

## 2023-04-19 NOTE — ASSESSMENT
[FreeTextEntry1] : Ms. LAUREN TORRES is a 77 year old woman long term smoker (still smoking) with asthma/COPD/emphysema,  HTN, HLD, aortic atheroma and asthma here for f/u\par \par #Asthma/COPD/emphysema - PFTs today with mild obstruction (FEV 1 = 1.42L), mild restriction, reduced DLCO (may be effort related). 6MWD with O2 jose 91%\par -- c/w Trelegy\par -- check overnight oxymetry\par \par #HFpEF, mod mitral stenosis - Echocardiogram from June 2022 with normal left ventricular systolic function, grade 1 diastolic dysfunction normal RV size and function; severely enlarged LA, moderate mitral stenosis.  Pulmonary pressures were not mentioned.  \par --c/w Lasix, appears euvolemic on exam\par -- Continue to follow-up with her cardiothoracic surgeon and cardiologist\par \par #Smoker - >50 pack year smoking hx, quit 3/2023, continued abstinence encouraged\par -- c/w Chantix\par -- CT chest Nov 2022 -  no concerning nodules, emphysema. Repeat CT chest Nov 2023\par \par \par All questions answered. Patient in agreement with plan. \par Follow up in 4mo or sooner if needed.\par

## 2023-04-19 NOTE — CONSULT LETTER
[Dear  ___] : Dear  [unfilled], [Consult Letter:] : I had the pleasure of evaluating your patient, [unfilled]. [Please see my note below.] : Please see my note below. [Consult Closing:] : Thank you very much for allowing me to participate in the care of this patient.  If you have any questions, please do not hesitate to contact me. [FreeTextEntry3] : Sincerely,\par \par Fay Muir MD\par [facility] Physician [facility]\par Pulmonary Medicine\par tel: [phone]\par fax: [phone]\par

## 2023-05-08 ENCOUNTER — APPOINTMENT (OUTPATIENT)
Dept: PULMONOLOGY | Facility: CLINIC | Age: 78
End: 2023-05-08
Payer: MEDICARE

## 2023-05-08 PROCEDURE — 99442: CPT | Mod: 95

## 2023-05-18 ENCOUNTER — EMERGENCY (EMERGENCY)
Facility: HOSPITAL | Age: 78
LOS: 1 days | Discharge: HOME | End: 2023-05-18
Attending: EMERGENCY MEDICINE | Admitting: EMERGENCY MEDICINE
Payer: MEDICARE

## 2023-05-18 ENCOUNTER — APPOINTMENT (OUTPATIENT)
Dept: ENDOCRINOLOGY | Facility: CLINIC | Age: 78
End: 2023-05-18

## 2023-05-18 VITALS
HEART RATE: 62 BPM | WEIGHT: 126.99 LBS | RESPIRATION RATE: 18 BRPM | OXYGEN SATURATION: 96 % | DIASTOLIC BLOOD PRESSURE: 77 MMHG | TEMPERATURE: 99 F | SYSTOLIC BLOOD PRESSURE: 168 MMHG

## 2023-05-18 VITALS
SYSTOLIC BLOOD PRESSURE: 158 MMHG | DIASTOLIC BLOOD PRESSURE: 78 MMHG | RESPIRATION RATE: 16 BRPM | HEART RATE: 66 BPM | TEMPERATURE: 98 F | OXYGEN SATURATION: 97 %

## 2023-05-18 DIAGNOSIS — Z98.890 OTHER SPECIFIED POSTPROCEDURAL STATES: Chronic | ICD-10-CM

## 2023-05-18 DIAGNOSIS — Z90.49 ACQUIRED ABSENCE OF OTHER SPECIFIED PARTS OF DIGESTIVE TRACT: Chronic | ICD-10-CM

## 2023-05-18 PROCEDURE — 73630 X-RAY EXAM OF FOOT: CPT

## 2023-05-18 PROCEDURE — 99283 EMERGENCY DEPT VISIT LOW MDM: CPT | Mod: 25

## 2023-05-18 PROCEDURE — 99284 EMERGENCY DEPT VISIT MOD MDM: CPT | Mod: FS

## 2023-05-18 PROCEDURE — 73630 X-RAY EXAM OF FOOT: CPT | Mod: 26,RT

## 2023-05-18 NOTE — ED PROVIDER NOTE - PATIENT PORTAL LINK FT
You can access the FollowMyHealth Patient Portal offered by [facility] by registering at the following website: [email or web address]. By joining [employer]’s FollowMyHealth portal, you will also be able to view your health information using other applications (apps) compatible with our system.

## 2023-05-18 NOTE — ED PROVIDER NOTE - OBJECTIVE STATEMENT
77 y/o female with past medical history of hypertension, hyperlipidemia, hypothyroidism, COPD presents today due to a right foot bruise x4 days.  Patient reports she has no pain but there is some swelling.  Patient reports that she has been wearing some tight shoes which could be exacerbating the symptoms.  Patient denies trauma, numbness, weakness, lower extremity edema, chest pain, shortness of breath, or any other complaints.

## 2023-05-18 NOTE — ED PROVIDER NOTE - PHYSICAL EXAMINATION
Constitutional: Awake, Alert, non-toxic. NAD. Well appearing, well nourished.   HEAD: Normocephalic, atraumatic.   EYES: EOM intact, conjunctiva and sclera are clear bilaterally.   ENT: No rhinorrhea, patent, mucous membranes pink/moist, no drooling or stridor.   NECK: Supple, non-tender  RESPIRATORY: Normal respiratory effort  EXTREMITIES: Full passive and active ROM in all extremities; (+) right foot 2/3/4 distal MTP ecchymosis and swelling, foot and ankle non-tender to palpation; Negative Pretty sign, no LE edema.  distal pulses palpable and symmetric  SKIN: Warm, dry; good skin turgor, no apparent lesions or rashes, no ecchymosis, brisk capillary refill.  NEURO: A&O x3. Sensory and motor functions are grossly intact. Speech is normal. Appearance and judgement seem appropriate for gender and age.

## 2023-05-18 NOTE — ED ADULT NURSE NOTE - NSFALLUNIVINTERV_ED_ALL_ED
Bed/Stretcher in lowest position, wheels locked, appropriate side rails in place/Call bell, personal items and telephone in reach/Instruct patient to call for assistance before getting out of bed/chair/stretcher/Non-slip footwear applied when patient is off stretcher/[place] to call system/Physically safe environment - no spills, clutter or unnecessary equipment/Purposeful proactive rounding/Room/bathroom lighting operational, light cord in reach

## 2023-05-18 NOTE — ED PROVIDER NOTE - CARE PROVIDER_API CALL
Hill Thapa (DPM)  Foot Surgery; Podiatric Medicine; Recon RearfootAnkle Surgery  [address] [place] [address]  Phone: [phone]  Fax: [phone]  Follow Up Time: 1-3 Days

## 2023-05-18 NOTE — ED PROVIDER NOTE - PROGRESS NOTE DETAILS
New Patient Visit   6/4/2020     Subjective:     Chief Complaint   Patient presents with   • Hand Pain     RT first Metacarpal fx DOI 5/27/2020       Andi Leavitt is a 67 year old male here for evaluation of right thumb fracture. He fell biking last week and sustained abrasions of the hand and both knees. Fracture of the right 1st metacarpal was discovered on x-rays at the walk-in clinic.  He was given a splint but has been removing it outside of work because it was more painful with the splint on.  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  He has some tingling in the thumb and index finger.  He is right-hand dominant.    He asks for a water proof option because he would like to go in the pool.  He normally swims for exercise twice a week.    Review of Systems:   · No new onset fever or chills. No new onset sore throat, cough, or shortness of breath. No new onset nausea, vomiting, diarrhea. No new onset loss of taste or smell.        Past Medical and Surgical History:  Reviewed in electronic medical record, updated as necessary.  Pertinent findings included below.   · None  Past Medical History:   Diagnosis Date   • Allergic rhinitis    • Colon polyps 6/25/2018   • Diverticulitis    • Diverticulosis of colon 6/25/2018   • GERD (gastroesophageal reflux disease) 9/14/2015       Past Surgical History:   Procedure Laterality Date   • Back surgery  1966    lumber fusion   • Colonoscopy  06/25/2018    repeat in 5 years, shan   • Finger surgery Left 04/24/2014    LEFT RING FINGER DIP JOINT WASH OUT-Angely   • Flexible sigmoidoscopy bx  09/28/2012   • Nasal sinus surgery  1980's       Medications:  Section reviewed in electronic medical record.   Current Outpatient Medications   Medication Sig Dispense Refill   • TURMERIC PO Take by mouth daily.     • Ascorbic Acid (VITAMIN C PO) Patient unsure of dosage     • Unable to Find Medication 1 time. Beet root once or twice a day     • mometasone  (NASONEX) 50 MCG/ACT nasal spray Spray 2 sprays in each nostril daily. 17 g 12   • Fexofenadine-Pseudoephedrine (ALLEGRA-D 24 HOUR PO) Take one tablet daily     • CIMETIDINE 200 PO Take 200 mg by mouth daily.     • St Johns Wort 300 MG CAPS Take 1 tablet by mouth daily.     • aspirin 325 MG tablet Take 325 mg by mouth daily.     • EPINEPHrine 0.3 MG/0.3ML auto-injector Inject 0.3 mLs into the muscle 1 time as needed for Anaphylaxis. 1 each 0     No current facility-administered medications for this visit.        Social History:  · Handedness:  right handed  · Marital status:    · Tobacco:  No  · Occupation:      ALLERGIES:   Allergen Reactions   • Amoxicillin Angioedema   • Sulfa Antibiotics           Objective:     General:  Appears alert, oriented x3, and stated age.  Speech and behavior are appropriate.     HEENT:  Normocephalic.  Eyes show normal tracking.     Respiratory:  No labored breathing.  Symmetric chest expansion.     Skin:  Edema left hand, especially thenar.   No rashes.   Large >4 inch length abrasions bilateral knees. 0.5cm max width abrasion dorsal right thumb. Linear abrasion over distal phalanx.     Neurovascular:  Capillary refill <2 seconds. Light touch intact to fingertips. Subjective thumb and IF tingling, right.   +Tinels right volar wrist.     Musculoskeletal:  Left CMC joint swelling. Right thumb with full thumb IP hyperextension with intact but very limited thumb flexion and opposition consistent with diminished 1st MC length.  AROM of the wrist are WNL on the right.  He is tender to palpation at the 1st metacarpal and CMC joint.   Full ROM adjacent fingers.     REDUCTION PROCEDURE  Antibiotic ointment and Band-Aid applied to the 0.5 cm abrasion dorsal right thumb.    Patient was agreeable to closed reduction without anesthesia. I applied stockinet and cast padding prior to fiberglass. Then, using fluoroscopic guidance, reduced the apex dorsally angulated 1st  Au   [facility] [place] [facility]   [facility] [facility] [address] [place] [address]  [phone]  [phone]  6/4/2020 4:05 PM   All questions were answered. Discussed the importance of prompt, close medical follow-up. Patient will return with any changes, concerns or persistent/worsening symptoms.  Patient verbalized understanding.

## 2023-05-18 NOTE — ED PROVIDER NOTE - NSFOLLOWUPINSTRUCTIONS_ED_ALL_ED_FT
1) Follow-up with Orthopedics, See referred doctor. Call today/next business day for close, prompt follow-up.  2) Return to Emergency room for any worsening or persistent pain, weakness, numbness, fever, color change to extremity, or any other concerning symptoms.  3) You can consider discussing with your doctor if physical therapy or further imaging as an MRI may be beneficial. 1) Follow-up with Orthopedics/DPM, See referred doctor. Call today/next business day for close, prompt follow-up.  2) Return to Emergency room for any worsening or persistent pain, weakness, numbness, fever, color change to extremity, or any other concerning symptoms.  3) You can consider discussing with your doctor if physical therapy or further imaging as an MRI may be beneficial.      Contusion  A contusion is a deep bruise. This is a result of an injury that causes bleeding under the skin. Symptoms of bruising include pain, swelling, and discolored skin. The skin may turn blue, purple, or yellow.    Follow these instructions at home:  Managing pain, stiffness, and swelling    Bag of ice on a towel on the skin.  You may use RICE. This stands for:  Resting.  Icing.  Compression, or putting pressure.  Elevating, or raising the injured area.  To follow this method, do these actions:  Rest the injured area.  If told, put ice on the injured area.  Put ice in a plastic bag.  Place a towel between your skin and the bag.  Leave the ice on for 20 minutes, 2–3 times per day.  If told, put light pressure (compression) on the injured area using an elastic bandage. Make sure the bandage is not too tight. If the area tingles or becomes numb, remove it and put it back on as told by your doctor.  If possible, raise (elevate) the injured area above the level of your heart while you are sitting or lying down.  General instructions    Take over-the-counter and prescription medicines only as told by your doctor.  Keep all follow-up visits as told by your doctor. This is important.  Contact a doctor if:  Your symptoms do not get better after several days of treatment.  Your symptoms get worse.  You have trouble moving the injured area.  Get help right away if:  You have very bad pain.  You have a loss of feeling (numbness) in a hand or foot.  Your hand or foot turns pale or cold.  Summary  A contusion is a deep bruise. This is a result of an injury that causes bleeding under the skin.  Symptoms of bruising include pain, swelling, and discolored skin. The skin may turn blue, purple, or yellow.  This condition is treated with rest, ice, compression, and elevation. This is also called RICE. You may be given over-the-counter medicines for pain.  Contact a doctor if you do not feel better, or you feel worse. Get help right away if you have very bad pain, have lost feeling in a hand or foot, or the area turns pale or cold.  This information is not intended to replace advice given to you by your health care provider. Make sure you discuss any questions you have with your health care provider.

## 2023-05-18 NOTE — ED PROVIDER NOTE - NPI NUMBER (FOR SYSADMIN USE ONLY) :
Per Dr La patient needs to take cipro 500 mg 1 bid 1 day prior to procedure,  RX called to [facility] in [place],pt has been notified.irena storm LPN     [[id number]]

## 2023-05-18 NOTE — ED ADULT NURSE NOTE - OBJECTIVE STATEMENT
Comes to ED for right foot bruising.  Pt did not fall and denies and injury.  +ROM, good pedal pulses.

## 2023-05-26 ENCOUNTER — RX RENEWAL (OUTPATIENT)
Age: 78
End: 2023-05-26

## 2023-05-30 ENCOUNTER — RX RENEWAL (OUTPATIENT)
Age: 78
End: 2023-05-30

## 2023-06-06 ENCOUNTER — APPOINTMENT (OUTPATIENT)
Dept: INTERNAL MEDICINE | Facility: CLINIC | Age: 78
End: 2023-06-06
Payer: MEDICARE

## 2023-06-06 VITALS
WEIGHT: 127 LBS | BODY MASS INDEX: 23.98 KG/M2 | HEIGHT: 61 IN | TEMPERATURE: 97.4 F | OXYGEN SATURATION: 95 % | HEART RATE: 83 BPM

## 2023-06-06 VITALS — DIASTOLIC BLOOD PRESSURE: 72 MMHG | SYSTOLIC BLOOD PRESSURE: 124 MMHG

## 2023-06-06 DIAGNOSIS — T14.8XXA OTHER INJURY OF UNSPECIFIED BODY REGION, INITIAL ENCOUNTER: ICD-10-CM

## 2023-06-06 PROCEDURE — 99213 OFFICE O/P EST LOW 20 MIN: CPT

## 2023-06-06 NOTE — ASSESSMENT
[FreeTextEntry1] : check tsh\par endo f/u\par continue to follow foot bruising improvement and advise provider if not resolving

## 2023-06-06 NOTE — PHYSICAL EXAM
[Normal] : normal rate, regular rhythm, normal S1 and S2 and no murmur heard [de-identified] : residual, resolving bruising right foot--intact pulses,no pain or edema

## 2023-06-07 LAB — TSH SERPL-ACNC: 14.4 UIU/ML

## 2023-06-14 ENCOUNTER — NON-APPOINTMENT (OUTPATIENT)
Age: 78
End: 2023-06-14

## 2023-07-17 ENCOUNTER — APPOINTMENT (OUTPATIENT)
Dept: CARDIOLOGY | Facility: CLINIC | Age: 78
End: 2023-07-17
Payer: MEDICARE

## 2023-07-17 VITALS
BODY MASS INDEX: 22.66 KG/M2 | WEIGHT: 120 LBS | OXYGEN SATURATION: 95 % | HEART RATE: 74 BPM | HEIGHT: 61 IN | SYSTOLIC BLOOD PRESSURE: 150 MMHG | DIASTOLIC BLOOD PRESSURE: 71 MMHG | TEMPERATURE: 98.3 F

## 2023-07-17 VITALS — SYSTOLIC BLOOD PRESSURE: 130 MMHG | DIASTOLIC BLOOD PRESSURE: 72 MMHG

## 2023-07-17 DIAGNOSIS — I87.2 VENOUS INSUFFICIENCY (CHRONIC) (PERIPHERAL): ICD-10-CM

## 2023-07-17 DIAGNOSIS — R94.31 ABNORMAL ELECTROCARDIOGRAM [ECG] [EKG]: ICD-10-CM

## 2023-07-17 PROCEDURE — 93000 ELECTROCARDIOGRAM COMPLETE: CPT

## 2023-07-17 PROCEDURE — 99214 OFFICE O/P EST MOD 30 MIN: CPT

## 2023-07-17 NOTE — DISCUSSION/SUMMARY
[FreeTextEntry1] : mod mitral stenosis\par aortic atheroma\par moderate LLE PAD\par hx of HFpEF - now euvolemic\par \par no new changes since last visit and has chronic BARBOSA\par cont heart healthy lifestyle changes\par \par -cont asa 81mg daily\par -cont atorvastatin 40mg daily\par -cont metoprolol succinate 25mg once daily for rate control. cont losartan 25mg once daily for HTN.\par -cont furosemide 20mg daily - which has been changed to intermittent as needed\par \par [phone]\par Nicol. dtr. primary contact.  [EKG obtained to assist in diagnosis and management of assessed problem(s)] : EKG obtained to assist in diagnosis and management of assessed problem(s)

## 2023-07-17 NOTE — REVIEW OF SYSTEMS
[Dyspnea on exertion] : dyspnea during exertion [Negative] : Heme/Lymph [Chest Discomfort] : no chest discomfort [Lower Ext Edema] : no extremity edema [Palpitations] : no palpitations [Orthopnea] : no orthopnea [Syncope] : no syncope [Rash] : rash

## 2023-07-17 NOTE — PHYSICAL EXAM
[Normal S1, S2] : normal S1, S2 [No Rub] : no rub [No Gallop] : no gallop [Murmur] : murmur [Soft] : abdomen soft [Non Tender] : non-tender [No Edema] : no edema [Normal] : alert and oriented, normal memory [de-identified] : 2/6 systolic RUSB, LLSB
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
## 2023-07-18 ENCOUNTER — RX RENEWAL (OUTPATIENT)
Age: 78
End: 2023-07-18

## 2023-08-16 ENCOUNTER — APPOINTMENT (OUTPATIENT)
Dept: PULMONOLOGY | Facility: CLINIC | Age: 78
End: 2023-08-16
Payer: MEDICARE

## 2023-08-16 VITALS
BODY MASS INDEX: 22.3 KG/M2 | WEIGHT: 118 LBS | OXYGEN SATURATION: 96 % | SYSTOLIC BLOOD PRESSURE: 165 MMHG | DIASTOLIC BLOOD PRESSURE: 68 MMHG | HEART RATE: 59 BPM | TEMPERATURE: 98 F

## 2023-08-16 PROCEDURE — 99215 OFFICE O/P EST HI 40 MIN: CPT | Mod: 25

## 2023-08-16 PROCEDURE — 99406 BEHAV CHNG SMOKING 3-10 MIN: CPT

## 2023-08-16 NOTE — HISTORY OF PRESENT ILLNESS
[Former] : former [>= 20 pack years] : >= 20 pack years [Never] : never [TextBox_4] : Ms. LAUREN TORRES is a 78 year old woman long term smoker (quit 3/2023) with asthma/COPD, HTN, HLD, aortic atheroma and valvular heart diease here for f/u.  Still smoking a few cigarettes.  Has been using Breztri. Compliant with Lasix 20 mg.  Has been feeling better. Some BARBOSA walking up the stairs.  Has not been using any albuterol.  PMH: HLD, HTN, aortic atheroma Meds: per chart All: NKDA SH: smoking No known exposures. Worked as . FH: father  at 93, had lung issues; mother  at 51 of heart disease PMD: BENSON LOBO Immunizations: Pneumovax 2019; COVID vaccinated, due for second booster.  [TextBox_11] : 1 [TextBox_13] : 50 [YearQuit] : 3/2023

## 2023-08-16 NOTE — CONSULT LETTER
[Dear  ___] : Dear  [unfilled], [Consult Letter:] : I had the pleasure of evaluating your patient, [unfilled]. [Please see my note below.] : Please see my note below. [Consult Closing:] : Thank you very much for allowing me to participate in the care of this patient.  If you have any questions, please do not hesitate to contact me. [FreeTextEntry3] : Sincerely,\par  \par  Fay Muir MD\par  [facility] Physician [facility]\par  Pulmonary Medicine\par  tel: [phone]\par  fax: [phone]\par

## 2023-08-16 NOTE — COUNSELING
[Cessation strategies including cessation program discussed] : Cessation strategies including cessation program discussed [Use of nicotine replacement therapies and other medications discussed] : Use of nicotine replacement therapies and other medications discussed [Yes] : Willing to quit smoking [ - Annual Lung Cancer Screening/Share Decision Making Discussion] : Annual Lung Cancer Screening/Share Decision Making Discussion. (I have advised this patient to have a Low Dose CT (LDCT) scan of the lungs and have discussed the following with the patient in a shared decision making discussion:   Benefits of Detection and Early Treatment: There is adequate evidence that annual screening for lung cancer with LDCT in a population of high-risk persons can prevent a substantial number of lung cancer–related deaths. The magnitude of benefit depends on the individual patient's risk for lung cancer, as those who are at highest risk are most likely to benefit. Screening cannot prevent most lung cancer–related deaths, and does not replace smoking cessation. Harms of Detection and Early Intervention and Treatment: The harms associated with LDCT screening include false-negative and false-positive results, incidental findings, over diagnosis, and radiation exposure. False-positive LDCT results occur in a substantial proportion of screened persons; 95% of all positive results do not lead to a diagnosis of cancer. In a high-quality screening program, further imaging can resolve most false-positive results; however, some patients may require invasive procedures. Radiation harms, including cancer resulting from cumulative exposure to radiation, vary depending on the age at the start of screening; the number of scans received; and the person's exposure to other sources of radiation, particularly other medical imaging.) [FreeTextEntry1] : 5

## 2023-08-16 NOTE — PHYSICAL EXAM
[No Acute Distress] : no acute distress [Normal Oropharynx] : normal oropharynx [Normal Appearance] : normal appearance [No Neck Mass] : no neck mass [Normal Rate/Rhythm] : normal rate/rhythm [Normal S1, S2] : normal s1, s2 [No Murmurs] : no murmurs [No Resp Distress] : no resp distress [Clear to Auscultation Bilaterally] : clear to auscultation bilaterally [No Abnormalities] : no abnormalities [Benign] : benign [Normal Gait] : normal gait [No Clubbing] : no clubbing [No Cyanosis] : no cyanosis [No Edema] : no edema [FROM] : FROM [Normal Color/ Pigmentation] : normal color/ pigmentation [No Focal Deficits] : no focal deficits [Oriented x3] : oriented x3 [Normal Affect] : normal affect [TextBox_68] : clear

## 2023-08-16 NOTE — ASSESSMENT
[FreeTextEntry1] : Ms. LAUREN TORRES is a 77 year old woman long term smoker (still smoking) with asthma/COPD/emphysema,  HTN, HLD, aortic atheroma and asthma here for f/u  #Asthma/COPD/emphysema - PFTs today with mild obstruction (FEV 1 = 1.42L), mild restriction, reduced DLCO (may be effort related). 6MWD with O2 jose 91% -- c/w Breztri  #HFpEF, mod mitral stenosis - Echocardiogram from June 2022 with normal left ventricular systolic function, grade 1 diastolic dysfunction normal RV size and function; severely enlarged LA, moderate mitral stenosis.  Pulmonary pressures were not mentioned.   --c/w Lasix, appears euvolemic on exam -- Continue to follow-up with her cardiothoracic surgeon and cardiologist  #Smoker - >50 pack year smoking hx, quit 3/2023, sessation encouraged -- c/w Chantix  -- CT chest Nov 2022 -  no concerning nodules, emphysema. Repeat CT chest Nov 2023 (ordered)   All questions answered. Patient in agreement with plan.  Follow up in 4mo or sooner if needed.

## 2023-11-09 ENCOUNTER — OUTPATIENT (OUTPATIENT)
Dept: OUTPATIENT SERVICES | Facility: HOSPITAL | Age: 78
LOS: 1 days | End: 2023-11-09
Payer: COMMERCIAL

## 2023-11-09 ENCOUNTER — INPATIENT (INPATIENT)
Facility: HOSPITAL | Age: 78
LOS: 6 days | Discharge: HOME | DRG: 871 | End: 2023-11-16
Attending: STUDENT IN AN ORGANIZED HEALTH CARE EDUCATION/TRAINING PROGRAM | Admitting: FAMILY MEDICINE
Payer: MEDICARE

## 2023-11-09 VITALS
OXYGEN SATURATION: 100 % | RESPIRATION RATE: 18 BRPM | TEMPERATURE: 97 F | HEART RATE: 136 BPM | SYSTOLIC BLOOD PRESSURE: 112 MMHG | HEIGHT: 63 IN | DIASTOLIC BLOOD PRESSURE: 75 MMHG | WEIGHT: 121.92 LBS

## 2023-11-09 DIAGNOSIS — Z29.9 ENCOUNTER FOR PROPHYLACTIC MEASURES, UNSPECIFIED: ICD-10-CM

## 2023-11-09 DIAGNOSIS — Z90.49 ACQUIRED ABSENCE OF OTHER SPECIFIED PARTS OF DIGESTIVE TRACT: Chronic | ICD-10-CM

## 2023-11-09 DIAGNOSIS — E03.9 HYPOTHYROIDISM, UNSPECIFIED: ICD-10-CM

## 2023-11-09 DIAGNOSIS — Z00.00 ENCOUNTER FOR GENERAL ADULT MEDICAL EXAMINATION WITHOUT ABNORMAL FINDINGS: ICD-10-CM

## 2023-11-09 DIAGNOSIS — E78.5 HYPERLIPIDEMIA, UNSPECIFIED: ICD-10-CM

## 2023-11-09 DIAGNOSIS — Z98.890 OTHER SPECIFIED POSTPROCEDURAL STATES: Chronic | ICD-10-CM

## 2023-11-09 DIAGNOSIS — J44.1 CHRONIC OBSTRUCTIVE PULMONARY DISEASE WITH (ACUTE) EXACERBATION: ICD-10-CM

## 2023-11-09 DIAGNOSIS — R06.02 SHORTNESS OF BREATH: ICD-10-CM

## 2023-11-09 DIAGNOSIS — I10 ESSENTIAL (PRIMARY) HYPERTENSION: ICD-10-CM

## 2023-11-09 LAB
ALBUMIN SERPL ELPH-MCNC: 3.3 G/DL — SIGNIFICANT CHANGE UP (ref 3.3–5)
ALBUMIN SERPL ELPH-MCNC: 3.3 G/DL — SIGNIFICANT CHANGE UP (ref 3.3–5)
ALP SERPL-CCNC: 81 U/L — SIGNIFICANT CHANGE UP (ref 40–120)
ALP SERPL-CCNC: 81 U/L — SIGNIFICANT CHANGE UP (ref 40–120)
ALT FLD-CCNC: 19 U/L — SIGNIFICANT CHANGE UP (ref 12–78)
ALT FLD-CCNC: 19 U/L — SIGNIFICANT CHANGE UP (ref 12–78)
ANION GAP SERPL CALC-SCNC: 12 MMOL/L — SIGNIFICANT CHANGE UP (ref 5–17)
ANION GAP SERPL CALC-SCNC: 12 MMOL/L — SIGNIFICANT CHANGE UP (ref 5–17)
APPEARANCE UR: ABNORMAL
APPEARANCE UR: ABNORMAL
APTT BLD: 30.8 SEC — SIGNIFICANT CHANGE UP (ref 24.5–35.6)
APTT BLD: 30.8 SEC — SIGNIFICANT CHANGE UP (ref 24.5–35.6)
AST SERPL-CCNC: 25 U/L — SIGNIFICANT CHANGE UP (ref 15–37)
AST SERPL-CCNC: 25 U/L — SIGNIFICANT CHANGE UP (ref 15–37)
BASOPHILS # BLD AUTO: 0.02 K/UL — SIGNIFICANT CHANGE UP (ref 0–0.2)
BASOPHILS # BLD AUTO: 0.02 K/UL — SIGNIFICANT CHANGE UP (ref 0–0.2)
BASOPHILS NFR BLD AUTO: 0.1 % — SIGNIFICANT CHANGE UP (ref 0–2)
BASOPHILS NFR BLD AUTO: 0.1 % — SIGNIFICANT CHANGE UP (ref 0–2)
BILIRUB SERPL-MCNC: 0.4 MG/DL — SIGNIFICANT CHANGE UP (ref 0.2–1.2)
BILIRUB SERPL-MCNC: 0.4 MG/DL — SIGNIFICANT CHANGE UP (ref 0.2–1.2)
BILIRUB UR-MCNC: ABNORMAL
BILIRUB UR-MCNC: ABNORMAL
BUN SERPL-MCNC: 34 MG/DL — HIGH (ref 7–23)
BUN SERPL-MCNC: 34 MG/DL — HIGH (ref 7–23)
CALCIUM SERPL-MCNC: 9 MG/DL — SIGNIFICANT CHANGE UP (ref 8.5–10.1)
CALCIUM SERPL-MCNC: 9 MG/DL — SIGNIFICANT CHANGE UP (ref 8.5–10.1)
CHLORIDE SERPL-SCNC: 98 MMOL/L — SIGNIFICANT CHANGE UP (ref 96–108)
CHLORIDE SERPL-SCNC: 98 MMOL/L — SIGNIFICANT CHANGE UP (ref 96–108)
CO2 SERPL-SCNC: 24 MMOL/L — SIGNIFICANT CHANGE UP (ref 22–31)
CO2 SERPL-SCNC: 24 MMOL/L — SIGNIFICANT CHANGE UP (ref 22–31)
COLOR SPEC: SIGNIFICANT CHANGE UP
COLOR SPEC: SIGNIFICANT CHANGE UP
CREAT SERPL-MCNC: 2 MG/DL — HIGH (ref 0.5–1.3)
CREAT SERPL-MCNC: 2 MG/DL — HIGH (ref 0.5–1.3)
D DIMER BLD IA.RAPID-MCNC: 1105 NG/ML DDU — HIGH
D DIMER BLD IA.RAPID-MCNC: 1105 NG/ML DDU — HIGH
DIFF PNL FLD: ABNORMAL
DIFF PNL FLD: ABNORMAL
EGFR: 25 ML/MIN/1.73M2 — LOW
EGFR: 25 ML/MIN/1.73M2 — LOW
EOSINOPHIL # BLD AUTO: 0.24 K/UL — SIGNIFICANT CHANGE UP (ref 0–0.5)
EOSINOPHIL # BLD AUTO: 0.24 K/UL — SIGNIFICANT CHANGE UP (ref 0–0.5)
EOSINOPHIL NFR BLD AUTO: 1.8 % — SIGNIFICANT CHANGE UP (ref 0–6)
EOSINOPHIL NFR BLD AUTO: 1.8 % — SIGNIFICANT CHANGE UP (ref 0–6)
GLUCOSE SERPL-MCNC: 133 MG/DL — HIGH (ref 70–99)
GLUCOSE SERPL-MCNC: 133 MG/DL — HIGH (ref 70–99)
GLUCOSE UR QL: NEGATIVE MG/DL — SIGNIFICANT CHANGE UP
GLUCOSE UR QL: NEGATIVE MG/DL — SIGNIFICANT CHANGE UP
HCT VFR BLD CALC: 37.4 % — SIGNIFICANT CHANGE UP (ref 34.5–45)
HCT VFR BLD CALC: 37.4 % — SIGNIFICANT CHANGE UP (ref 34.5–45)
HGB BLD-MCNC: 12.8 G/DL — SIGNIFICANT CHANGE UP (ref 11.5–15.5)
HGB BLD-MCNC: 12.8 G/DL — SIGNIFICANT CHANGE UP (ref 11.5–15.5)
IMM GRANULOCYTES NFR BLD AUTO: 0.7 % — SIGNIFICANT CHANGE UP (ref 0–0.9)
IMM GRANULOCYTES NFR BLD AUTO: 0.7 % — SIGNIFICANT CHANGE UP (ref 0–0.9)
INR BLD: 0.93 RATIO — SIGNIFICANT CHANGE UP (ref 0.85–1.18)
INR BLD: 0.93 RATIO — SIGNIFICANT CHANGE UP (ref 0.85–1.18)
KETONES UR-MCNC: ABNORMAL MG/DL
KETONES UR-MCNC: ABNORMAL MG/DL
LACTATE SERPL-SCNC: 1 MMOL/L — SIGNIFICANT CHANGE UP (ref 0.7–2)
LACTATE SERPL-SCNC: 1 MMOL/L — SIGNIFICANT CHANGE UP (ref 0.7–2)
LACTATE SERPL-SCNC: 2.3 MMOL/L — HIGH (ref 0.7–2)
LACTATE SERPL-SCNC: 2.3 MMOL/L — HIGH (ref 0.7–2)
LEUKOCYTE ESTERASE UR-ACNC: ABNORMAL
LEUKOCYTE ESTERASE UR-ACNC: ABNORMAL
LYMPHOCYTES # BLD AUTO: 0.57 K/UL — LOW (ref 1–3.3)
LYMPHOCYTES # BLD AUTO: 0.57 K/UL — LOW (ref 1–3.3)
LYMPHOCYTES # BLD AUTO: 4.2 % — LOW (ref 13–44)
LYMPHOCYTES # BLD AUTO: 4.2 % — LOW (ref 13–44)
MCHC RBC-ENTMCNC: 32.2 PG — SIGNIFICANT CHANGE UP (ref 27–34)
MCHC RBC-ENTMCNC: 32.2 PG — SIGNIFICANT CHANGE UP (ref 27–34)
MCHC RBC-ENTMCNC: 34.2 GM/DL — SIGNIFICANT CHANGE UP (ref 32–36)
MCHC RBC-ENTMCNC: 34.2 GM/DL — SIGNIFICANT CHANGE UP (ref 32–36)
MCV RBC AUTO: 94.2 FL — SIGNIFICANT CHANGE UP (ref 80–100)
MCV RBC AUTO: 94.2 FL — SIGNIFICANT CHANGE UP (ref 80–100)
MONOCYTES # BLD AUTO: 0.76 K/UL — SIGNIFICANT CHANGE UP (ref 0–0.9)
MONOCYTES # BLD AUTO: 0.76 K/UL — SIGNIFICANT CHANGE UP (ref 0–0.9)
MONOCYTES NFR BLD AUTO: 5.6 % — SIGNIFICANT CHANGE UP (ref 2–14)
MONOCYTES NFR BLD AUTO: 5.6 % — SIGNIFICANT CHANGE UP (ref 2–14)
NEUTROPHILS # BLD AUTO: 11.83 K/UL — HIGH (ref 1.8–7.4)
NEUTROPHILS # BLD AUTO: 11.83 K/UL — HIGH (ref 1.8–7.4)
NEUTROPHILS NFR BLD AUTO: 87.6 % — HIGH (ref 43–77)
NEUTROPHILS NFR BLD AUTO: 87.6 % — HIGH (ref 43–77)
NITRITE UR-MCNC: NEGATIVE — SIGNIFICANT CHANGE UP
NITRITE UR-MCNC: NEGATIVE — SIGNIFICANT CHANGE UP
NRBC # BLD: 0 /100 WBCS — SIGNIFICANT CHANGE UP (ref 0–0)
NRBC # BLD: 0 /100 WBCS — SIGNIFICANT CHANGE UP (ref 0–0)
NT-PROBNP SERPL-SCNC: HIGH PG/ML (ref 0–450)
NT-PROBNP SERPL-SCNC: HIGH PG/ML (ref 0–450)
PH UR: 5 — SIGNIFICANT CHANGE UP (ref 5–8)
PH UR: 5 — SIGNIFICANT CHANGE UP (ref 5–8)
PLATELET # BLD AUTO: 258 K/UL — SIGNIFICANT CHANGE UP (ref 150–400)
PLATELET # BLD AUTO: 258 K/UL — SIGNIFICANT CHANGE UP (ref 150–400)
POTASSIUM SERPL-MCNC: 4.1 MMOL/L — SIGNIFICANT CHANGE UP (ref 3.5–5.3)
POTASSIUM SERPL-MCNC: 4.1 MMOL/L — SIGNIFICANT CHANGE UP (ref 3.5–5.3)
POTASSIUM SERPL-SCNC: 4.1 MMOL/L — SIGNIFICANT CHANGE UP (ref 3.5–5.3)
POTASSIUM SERPL-SCNC: 4.1 MMOL/L — SIGNIFICANT CHANGE UP (ref 3.5–5.3)
PROT SERPL-MCNC: 7.8 G/DL — SIGNIFICANT CHANGE UP (ref 6–8.3)
PROT SERPL-MCNC: 7.8 G/DL — SIGNIFICANT CHANGE UP (ref 6–8.3)
PROT UR-MCNC: 100 MG/DL
PROT UR-MCNC: 100 MG/DL
PROTHROM AB SERPL-ACNC: 10.9 SEC — SIGNIFICANT CHANGE UP (ref 9.5–13)
PROTHROM AB SERPL-ACNC: 10.9 SEC — SIGNIFICANT CHANGE UP (ref 9.5–13)
RBC # BLD: 3.97 M/UL — SIGNIFICANT CHANGE UP (ref 3.8–5.2)
RBC # BLD: 3.97 M/UL — SIGNIFICANT CHANGE UP (ref 3.8–5.2)
RBC # FLD: 13.6 % — SIGNIFICANT CHANGE UP (ref 10.3–14.5)
RBC # FLD: 13.6 % — SIGNIFICANT CHANGE UP (ref 10.3–14.5)
SODIUM SERPL-SCNC: 134 MMOL/L — LOW (ref 135–145)
SODIUM SERPL-SCNC: 134 MMOL/L — LOW (ref 135–145)
SP GR SPEC: 1.02 — SIGNIFICANT CHANGE UP (ref 1–1.03)
SP GR SPEC: 1.02 — SIGNIFICANT CHANGE UP (ref 1–1.03)
TROPONIN I, HIGH SENSITIVITY RESULT: 34.2 NG/L — SIGNIFICANT CHANGE UP
TROPONIN I, HIGH SENSITIVITY RESULT: 34.2 NG/L — SIGNIFICANT CHANGE UP
UROBILINOGEN FLD QL: 1 MG/DL — SIGNIFICANT CHANGE UP (ref 0.2–1)
UROBILINOGEN FLD QL: 1 MG/DL — SIGNIFICANT CHANGE UP (ref 0.2–1)
WBC # BLD: 13.52 K/UL — HIGH (ref 3.8–10.5)
WBC # BLD: 13.52 K/UL — HIGH (ref 3.8–10.5)
WBC # FLD AUTO: 13.52 K/UL — HIGH (ref 3.8–10.5)
WBC # FLD AUTO: 13.52 K/UL — HIGH (ref 3.8–10.5)

## 2023-11-09 PROCEDURE — 99285 EMERGENCY DEPT VISIT HI MDM: CPT | Mod: FS

## 2023-11-09 PROCEDURE — 71045 X-RAY EXAM CHEST 1 VIEW: CPT | Mod: 26

## 2023-11-09 PROCEDURE — 74176 CT ABD & PELVIS W/O CONTRAST: CPT | Mod: 26,MH

## 2023-11-09 PROCEDURE — 78582 LUNG VENTILAT&PERFUS IMAGING: CPT | Mod: 26,MA

## 2023-11-09 PROCEDURE — 99223 1ST HOSP IP/OBS HIGH 75: CPT | Mod: GC

## 2023-11-09 PROCEDURE — 74176 CT ABD & PELVIS W/O CONTRAST: CPT

## 2023-11-09 RX ORDER — AZITHROMYCIN 500 MG/1
500 TABLET, FILM COATED ORAL ONCE
Refills: 0 | Status: COMPLETED | OUTPATIENT
Start: 2023-11-09 | End: 2023-11-09

## 2023-11-09 RX ORDER — IPRATROPIUM/ALBUTEROL SULFATE 18-103MCG
3 AEROSOL WITH ADAPTER (GRAM) INHALATION ONCE
Refills: 0 | Status: COMPLETED | OUTPATIENT
Start: 2023-11-09 | End: 2023-11-09

## 2023-11-09 RX ORDER — SODIUM CHLORIDE 9 MG/ML
1000 INJECTION INTRAMUSCULAR; INTRAVENOUS; SUBCUTANEOUS
Refills: 0 | Status: DISCONTINUED | OUTPATIENT
Start: 2023-11-09 | End: 2023-11-10

## 2023-11-09 RX ORDER — CEFTRIAXONE 500 MG/1
1000 INJECTION, POWDER, FOR SOLUTION INTRAMUSCULAR; INTRAVENOUS ONCE
Refills: 0 | Status: COMPLETED | OUTPATIENT
Start: 2023-11-09 | End: 2023-11-09

## 2023-11-09 RX ORDER — SODIUM CHLORIDE 9 MG/ML
1000 INJECTION INTRAMUSCULAR; INTRAVENOUS; SUBCUTANEOUS ONCE
Refills: 0 | Status: COMPLETED | OUTPATIENT
Start: 2023-11-09 | End: 2023-11-09

## 2023-11-09 RX ADMIN — Medication 125 MILLIGRAM(S): at 18:49

## 2023-11-09 RX ADMIN — Medication 3 MILLILITER(S): at 18:49

## 2023-11-09 RX ADMIN — SODIUM CHLORIDE 1000 MILLILITER(S): 9 INJECTION INTRAMUSCULAR; INTRAVENOUS; SUBCUTANEOUS at 13:22

## 2023-11-09 RX ADMIN — SODIUM CHLORIDE 1000 MILLILITER(S): 9 INJECTION INTRAMUSCULAR; INTRAVENOUS; SUBCUTANEOUS at 12:09

## 2023-11-09 RX ADMIN — AZITHROMYCIN 255 MILLIGRAM(S): 500 TABLET, FILM COATED ORAL at 20:12

## 2023-11-09 RX ADMIN — CEFTRIAXONE 100 MILLIGRAM(S): 500 INJECTION, POWDER, FOR SOLUTION INTRAMUSCULAR; INTRAVENOUS at 18:49

## 2023-11-09 NOTE — H&P ADULT - NSHPSOCIALHISTORY_GEN_ALL_CORE
Lives at home, alone  Retired  Endorses current cigarette use, 30 pack year history  Denies illicit drug use  Endorses one bottle of wine a week, last drink one month ago, no history of withdrawal

## 2023-11-09 NOTE — H&P ADULT - NSHPREVIEWOFSYSTEMS_GEN_ALL_CORE
CONSTITUTIONAL: denies fever, chills, fatigue, weakness  HEENT: denies blurred vision, sore throat  SKIN: denies new lesions, rash  CARDIOVASCULAR: denies chest pain, chest pressure, palpitations  RESPIRATORY: endorses shortness of breath, denies sputum production  GASTROINTESTINAL: denies nausea, vomiting, diarrhea, abdominal pain  GENITOURINARY: denies dysuria, discharge  NEUROLOGICAL: denies numbness, headache, focal weakness  MUSCULOSKELETAL: endorses back pain  HEMATOLOGIC: denies gross bleeding, bruising  LYMPHATICS: denies enlarged lymph nodes, extremity swelling  PSYCHIATRIC: denies recent changes in anxiety, depression  ENDOCRINOLOGIC: denies sweating, cold or heat intolerance

## 2023-11-09 NOTE — ED PROVIDER NOTE - RESPIRATORY, MLM
Breath sounds clear and equal bilaterally. mild tachypnea. speaks in full word sentences. few occ exp wheezes

## 2023-11-09 NOTE — ED PROVIDER NOTE - PROGRESS NOTE DETAILS
Patient stable.  Resting comfortably.  Denies any chest pain.  Unable to perform CTA due to low GFR of 25.  D-dimer was ordered which is elevated 1100.  Will order VQ scan. CT results pending. VQ scan pending. currently at [facility] for imaging (no CT available at [facility] at this time). spoke with Dr. Ngo, will consult CAT scan results still pending.  Delay since was performed at [facility] [place].  Chest x-ray reviewed.  Concerns for multifocal pneumonia versus pleural effusion.  Patient reports some shortness of breath.  When returned from VQ scan, had increased wheezing and pulse ox was 88.  Improved to 96 with 2 L of nasal cannula oxygen.  Patient was given Solu-Medrol and DuoNeb with overall improvement.  Due to concerns for multifocal pneumonia, was given IV Rocephin and Zithromax.  CT of chest was ordered but will be delayed since CAT scan is currently unavailable at [facility]. spoke with Dr. Machuca, kindly accepts patient for admission. abx infused. will order BNP. CT of chest non-emergent. CT likely will be available in am at [facility]. will wait until am for CT of chest.

## 2023-11-09 NOTE — ED PROVIDER NOTE - OBJECTIVE STATEMENT
78-year-old female with history of COPD, hypertension, hyperlipidemia, and hypothyroidism presents with low back pain the past 2 weeks and generalized malaise.  Patient reports generalized malaise and low back pain 2 weeks ago.  Was seen at urgent care last week.  Was told she had a urinary tract infection and prescribed Macrobid and Pyridium.  Denies any urinary symptoms.  Does report history of urinary tract infections in the past without urinary symptoms.  Denies any fever or chills.  No chest pain.  No abdominal pain.  No nausea or vomiting.  Patient does report some labored breathing the past 3 to 4 weeks.  Increase shortness of breath last night between 2 and 4 AM.  States she is able to take a deep breath but just seems a little more labored than usual.  Does report history of COPD, unsure if related.  No calf pain or swelling.  No history of DVT or PE.  No recent travels  PCP [facility]  Pulmonology Fay Olivas

## 2023-11-09 NOTE — H&P ADULT - ASSESSMENT
78-year-old female with history of COPD (not on home oxygen), hypertension, hyperlipidemia, and hypothyroidism presents to the ED for back pain, admitted for further workup. 78-year-old female with history of COPD (not on home oxygen), hypertension, hyperlipidemia, and hypothyroidism presents to the ED for back pain, admitted for sepsis sec to suspected CAP and acute COPD exacerbation

## 2023-11-09 NOTE — H&P ADULT - PROBLEM SELECTOR PLAN 2
Chronic, stable  - on home losartan, hold in the setting of YADI  - on home metoprolol, c/w - no history of CKD  - hold home losartan  - unclear etiology, possibly pre-renal  - monitor off fluids given elevated BNP  - trend in AM - no history of CKD  - hold home losartan  - unclear etiology, possibly pre-renal  - monitor off fluids given elevated BNP  - c/w home lasix  - trend in AM - no history of CKD  - hold home losartan  - unclear etiology, possibly pre-renal  - monitor off fluids given elevated BNP  - hold home lasix  - f/u CT renal stone hunt to r/o obstructive uropathy  - trend in AM - no history of CKD  - hold home losartan  - unclear etiology, possibly pre-renal  - monitor off fluids given elevated BNP  - hold home lasix  - f/u CT renal stone hunt to r/o obstructive uropathy  - trend in AM  - Nephrology consult - history of COPD, does not use O2 at home  - statting well on NC, maintain SpO2 88-92%, titrate as needed  - 1 of 3 cardinal symptoms subjective  - leukocytosis on admision, trend levels  - elevated lactate resolved  -s/p ceftriaxone, azithromycin, duonebs, solu-medrol in the ED  - c/w ceftriaxone + azithromycin for pneumonia coverage  - CXR performed, f/u results  - ABG ordered, f/u results  - c/w duoneb inhaler  - start solu-medrol 40 mg BID  - f/u blood culture  - sputum culture ordered, f/u results  - TTE ordered, elevated BNP, no chart record, f/u results  - procalc ordered, f/u results  - CT Chest in AM as per day team  - cardiology consulted, recs appreciated  - Pulmonology consulted, recs appreciated

## 2023-11-09 NOTE — H&P ADULT - PROBLEM SELECTOR PLAN 4
Chronic, stable  - on home statin, c/w Chronic, stable  - C/w home levothyroxine dosing  - f/u TSH AM Chronic, stable  - on home losartan, hold in the setting of YADI  - on home metoprolol, c/w

## 2023-11-09 NOTE — H&P ADULT - HISTORY OF PRESENT ILLNESS
78-year-old female with history of COPD (not on home oxygen), hypertension, hyperlipidemia, and hypothyroidism presents to the ED for back pain.  Patient reports that for the past 2-3 weeks, she has been having pain located in the midline of her back.  At the time, patient went to an urgent care and was diagnosed with a urinary tract infection.  Patient was treated with antibiotics but the pain did not go away.  Patient wanted to go back to the urgent care today for repeat evaluation, but decided she wanted further workup in the ED.  Patient endorses worsened dyspnea on exertion. Patient denies fever, N/V/D/C, increased sputum, increased purulence, past history of CHF, past history of COPD exacerbation.       In the ED pts VS were T(C): 36.7  T(F): 98.1  HR: 94  BP: 112/60  RR: 20  SpO2: 94%, currently on NC  Labs show: H.8  WBC: 13.52  Plt: 258  Creatinine: 2.00  VQ scan shows no evidence of PE    Patient received fluids, ceftriaxone, azithromycin, duonebs, solu-medrol in the ED.    Pt is admitted for further workup and management   78-year-old female with history of COPD (not on home oxygen), hypertension, hyperlipidemia, and hypothyroidism presents to the ED for back pain.  Patient reports that for the past 2-3 weeks, she has been having pain located in the midline of her back.  At the time, patient went to an urgent care and was diagnosed with a urinary tract infection.  Patient was treated with antibiotics but the pain did not go away.  Patient wanted to go back to the urgent care today for repeat evaluation, but decided she wanted further workup in the ED.  Patient endorses worsened dyspnea on exertion. Patient denies fever, N/V/D/C, increased sputum, increased purulence, past history of CHF, past history of COPD exacerbation.       In the ED pts VS were T(C): 36.7  T(F): 98.1  HR: 94  BP: 112/60  RR: 20  SpO2: 94%, currently on NC  Labs show: H.8  WBC: 13.52  Plt: 258  Creatinine: 2.00  VQ scan shows no evidence of PE  EKG shows sinus bradycardia    Patient received fluids, ceftriaxone, azithromycin, duonebs, solu-medrol in the ED.    Pt is admitted for further workup and management   78-year-old female with history of COPD (not on home oxygen), nicotine dependence, hypertension, hyperlipidemia, and hypothyroidism presents to the ED for back pain.  Patient reports that for the past 2-3 weeks, she has been having pain located in the midline of her back.  At the time, patient went to an urgent care and was diagnosed with a urinary tract infection.  Patient was treated with antibiotics but the pain did not go away.  Patient wanted to go back to the urgent care today for repeat evaluation, but decided she wanted further workup in the ED.  Patient endorses worsened dyspnea on exertion. Patient denies fever, N/V/D/C, increased sputum, increased purulence, past history of CHF, past history of COPD exacerbation.       In the ED pts VS were T(C): 36.7  T(F): 98.1  HR: 94  BP: 112/60  RR: 20  SpO2: 94%, currently on NC  Labs show: H.8  WBC: 13.52  Plt: 258  Creatinine: 2.00  VQ scan shows no evidence of PE  EKG shows sinus bradycardia    Patient received fluids, ceftriaxone, azithromycin, duonebs, solu-medrol in the ED.    Pt is admitted for further workup and management

## 2023-11-09 NOTE — ED PROVIDER NOTE - CLINICAL SUMMARY MEDICAL DECISION MAKING FREE TEXT BOX
Patient is a 78-year-old female history of COPD hypertension hyperlipidemia hypothyroidism who presents now to the emergency department at [facility] complaining of 2 weeks of bilateral low back pain nonradiating slightly increased with movement and with bending or twisting.  Patient was seen at a local urgent care last week was told she had a urinary tract infection even though she had no urinary symptoms and was given a prescription for Macrobid and Pyridium no fever no chills no nausea no vomiting no chest pain she does admit to slight shortness of breath.  This case will require extensive evaluation as well as labs imaging and medications.

## 2023-11-09 NOTE — H&P ADULT - NSHPPHYSICALEXAM_GEN_ALL_CORE
T(C): 36.7 (11-09-23 @ 15:32), Max: 36.7 (11-09-23 @ 15:32)  HR: 94 (11-09-23 @ 15:32) (94 - 136)  BP: 112/60 (11-09-23 @ 15:32) (112/60 - 112/75)  RR: 20 (11-09-23 @ 15:32) (18 - 20)  SpO2: 94% (11-09-23 @ 15:32) (94% - 99%)    GENERAL: patient appears in moderate acute distress, appropriate, pleasant  EYES: sclera clear, no exudates  ENMT: oropharynx clear without erythema, no exudates, dry mucous membranes  NECK: supple, soft, no thyromegaly noted  LUNGS: good air entry bilaterally, clear to auscultation, symmetric breath sounds, no wheezing or rhonchi appreciated  HEART: soft S1/S2, regular rate and rhythm, no murmurs noted, no lower extremity edema  GASTROINTESTINAL: abdomen is soft, nontender, nondistended, normoactive bowel sounds, no palpable masses  INTEGUMENT: good skin turgor, no lesions noted  MUSCULOSKELETAL: no clubbing or cyanosis, no obvious deformity  NEUROLOGIC: awake, alert, oriented x3, good muscle tone in 4 extremities, no obvious sensory deficits  PSYCHIATRIC: mood is good, affect is congruent, linear and logical thought process  HEME/LYMPH: no palpable supraclavicular nodules, no obvious ecchymosis or petechiae T(C): 36.7 (11-09-23 @ 15:32), Max: 36.7 (11-09-23 @ 15:32)  HR: 94 (11-09-23 @ 15:32) (94 - 136)  BP: 112/60 (11-09-23 @ 15:32) (112/60 - 112/75)  RR: 20 (11-09-23 @ 15:32) (18 - 20)  SpO2: 94% (11-09-23 @ 15:32) (94% - 99%)    GENERAL: patient appears in moderate acute distress, appropriate, pleasant  EYES: sclera clear, no exudates  ENMT: oropharynx clear without erythema, no exudates, dry mucous membranes  NECK: supple, soft, no thyromegaly noted  LUNGS: good air entry bilaterally, clear to auscultation, symmetric breath sounds, expiratory wheezing  appreciated  HEART: soft S1/S2, regular rate and rhythm, no murmurs noted, no lower extremity edema  GASTROINTESTINAL: abdomen is soft, nontender, nondistended, normoactive bowel sounds, no palpable masses  INTEGUMENT: good skin turgor, no lesions noted  MUSCULOSKELETAL: no clubbing or cyanosis, no obvious deformity  NEUROLOGIC: awake, alert, oriented x3, good muscle tone in 4 extremities, no obvious sensory deficits  PSYCHIATRIC: mood is good, affect is congruent, linear and logical thought process  HEME/LYMPH: no palpable supraclavicular nodules, no obvious ecchymosis or petechiae

## 2023-11-09 NOTE — H&P ADULT - PROBLEM SELECTOR PLAN 6
- current smoker, 30 pack year history  - trying to quit, currently on Varenicline  - c/w Varenicline while admitted - current smoker, 30 pack year history  - trying to quit, currently on Varenicline ( not available inpatient)  - start nicotine patch Chronic, stable  - on home statin, c/w

## 2023-11-09 NOTE — ED ADULT NURSE NOTE - CHIEF COMPLAINT QUOTE
patient ambulatory to triage a&ox4 with complains of back pain x 1 week. last week went to urgent care, prescribed PO abx for UTI without relief. tachy in 130s

## 2023-11-09 NOTE — H&P ADULT - PROBLEM SELECTOR PLAN 8
DVT PPX: Heparin Subq    DASH Diet ordered    DISPO: Full code    Aspiration/fall precautions in place

## 2023-11-09 NOTE — H&P ADULT - PROBLEM SELECTOR PLAN 1
- history of COPD, does not use O2 at home  - statting well on NC, maintain SpO2 88-92%, titrate as needed  - 1 of 3 cardinal symptoms subjective  - leukocytosis on admision, trend levels  - elevated lactate resolved  -s/p ceftriaxone, azithromycin, duonebs, solu-medrol in the ED  - c/w ceftriaxone + azithromycin for pneumonia coverage  - CXR performed, f/u results  - c/w albuterol inhaler  - start solu-medrol 40 mg BID  - f/u blood culture  - sputum culture ordered, f/u results  - TTE ordered, elevated BNP, no chart record, f/u results  - procalc ordered, f/u results  - Pulmonology consulted, recs appreciated - history of COPD, does not use O2 at home  - statting well on NC, maintain SpO2 88-92%, titrate as needed  - 1 of 3 cardinal symptoms subjective  - leukocytosis on admision, trend levels  - elevated lactate resolved  -s/p ceftriaxone, azithromycin, duonebs, solu-medrol in the ED  - c/w ceftriaxone + azithromycin for pneumonia coverage  - CXR performed, f/u results  - c/w albuterol inhaler  - start solu-medrol 40 mg BID  - f/u blood culture  - sputum culture ordered, f/u results  - TTE ordered, elevated BNP, no chart record, f/u results  - procalc ordered, f/u results  - plan for CT chest in AM  - Pulmonology consulted, recs appreciated - history of COPD, does not use O2 at home  - statting well on NC, maintain SpO2 88-92%, titrate as needed  - 1 of 3 cardinal symptoms subjective  - leukocytosis on admision, trend levels  - elevated lactate resolved  -s/p ceftriaxone, azithromycin, duonebs, solu-medrol in the ED  - c/w ceftriaxone + azithromycin for pneumonia coverage  - CXR performed, f/u results  - c/w albuterol inhaler  - start solu-medrol 40 mg BID  - f/u blood culture  - sputum culture ordered, f/u results  - TTE ordered, elevated BNP, no chart record, f/u results  - procalc ordered, f/u results  - CT Chest in AM as per day team  - Pulmonology consulted, recs appreciated - history of COPD, does not use O2 at home  - statting well on NC, maintain SpO2 88-92%, titrate as needed  - 1 of 3 cardinal symptoms subjective  - leukocytosis on admision, trend levels  - elevated lactate resolved  -s/p ceftriaxone, azithromycin, duonebs, solu-medrol in the ED  - c/w ceftriaxone + azithromycin for pneumonia coverage  - CXR performed, f/u results  - c/w duoneb inhaler  - start solu-medrol 40 mg BID  - f/u blood culture  - sputum culture ordered, f/u results  - TTE ordered, elevated BNP, no chart record, f/u results  - procalc ordered, f/u results  - CT Chest in AM as per day team  - Pulmonology consulted, recs appreciated - history of COPD, does not use O2 at home  - statting well on NC, maintain SpO2 88-92%, titrate as needed  - 1 of 3 cardinal symptoms subjective  - leukocytosis on admision, trend levels  - elevated lactate resolved  -s/p ceftriaxone, azithromycin, duonebs, solu-medrol in the ED  - c/w ceftriaxone + azithromycin for pneumonia coverage  - CXR performed, f/u results  - ABG ordered, f/u results  - c/w duoneb inhaler  - start solu-medrol 40 mg BID  - f/u blood culture  - sputum culture ordered, f/u results  - TTE ordered, elevated BNP, no chart record, f/u results  - procalc ordered, f/u results  - CT Chest in AM as per day team  - Pulmonology consulted, recs appreciated - history of COPD, does not use O2 at home  - statting well on NC, maintain SpO2 88-92%, titrate as needed  - 1 of 3 cardinal symptoms subjective  - leukocytosis on admision, trend levels  - elevated lactate resolved  -s/p ceftriaxone, azithromycin, duonebs, solu-medrol in the ED  - c/w ceftriaxone + azithromycin for pneumonia coverage  - CXR performed, f/u results  - ABG ordered, f/u results  - c/w duoneb inhaler  - start solu-medrol 40 mg BID  - f/u blood culture  - sputum culture ordered, f/u results  - TTE ordered, elevated BNP, no chart record, f/u results  - procalc ordered, f/u results  - CT Chest in AM as per day team  - cardiology consulted, recs appreciated  - Pulmonology consulted, recs appreciated -sec to suspected CAP  -criteria met on admission leukocytosis, elevated LA and tachycardia  -cont antibx  -apprec pulm recs  -f/u cultures  -ct chest in AM once scanning functional or transfer to [place]  -f/u sputum cx  -conisder ID consult if not imrpvong

## 2023-11-09 NOTE — H&P ADULT - ATTENDING COMMENTS
78-year-old female with history of COPD (not on home oxygen), hypertension, hyperlipidemia, and hypothyroidism presents to the ED for back pain, admitted for sepsis sec to suspected CAP and acute COPD exacerbation PLan: cont nebs, antibx, monitor clinical course, consider steroids, f/u cultures, apprec pulm recs, consider ct chest, asp precautions, amoking cessation

## 2023-11-09 NOTE — H&P ADULT - PROBLEM SELECTOR PLAN 5
Chronic, stable  - on home statin, c/w Chronic, stable  - C/w home levothyroxine dosing  - f/u TSH AM

## 2023-11-09 NOTE — H&P ADULT - PROBLEM SELECTOR PLAN 7
DVT PPX: Heparin Subq    DASH Diet ordered    DISPO: Full code    Aspiration/fall precautions in place - current smoker, 30 pack year history  - trying to quit, currently on Varenicline ( not available inpatient)  - start nicotine patch if pt agreeable  -smoking cessation counseling discussed, pt amendable approx 8 min spent

## 2023-11-09 NOTE — CONSULT NOTE ADULT - SUBJECTIVE AND OBJECTIVE BOX
Date/Time Patient Seen:  		  Referring MD:   Data Reviewed	       Patient is a 78y old  Female who presents with a chief complaint of     Subjective/HPI   78-year-old female with history of COPD, hypertension, hyperlipidemia, and hypothyroidism presents with low back pain the past 2 weeks and generalized malaise.  Patient reports generalized malaise and low back pain 2 weeks ago  PAST MEDICAL & SURGICAL HISTORY:  History of COPD  No home O2 use    Asthma    Thyroid nodule    Hyperlipidemia    Hypertension    Hypothyroidism    History of cholecystectomy  1989    History of repair of hip fracture  ORIF 1982.  Hardware was eventually removed          Medication list         MEDICATIONS  (STANDING):    MEDICATIONS  (PRN):         Vitals log        ICU Vital Signs Last 24 Hrs  T(C): 36.7 (09 Nov 2023 15:32), Max: 36.7 (09 Nov 2023 15:32)  T(F): 98.1 (09 Nov 2023 15:32), Max: 98.1 (09 Nov 2023 15:32)  HR: 94 (09 Nov 2023 15:32) (94 - 136)  BP: 112/60 (09 Nov 2023 15:32) (112/60 - 112/75)  BP(mean): --  ABP: --  ABP(mean): --  RR: 20 (09 Nov 2023 15:32) (18 - 20)  SpO2: 94% (09 Nov 2023 15:32) (94% - 99%)    O2 Parameters below as of 09 Nov 2023 15:32  Patient On (Oxygen Delivery Method): nasal cannula  O2 Flow (L/min): 2       PAST SURGICAL HISTORY:  History of cholecystectomy 1989    History of repair of hip fracture ORIF 1982.  Hardware was eventually removed.     Tobacco Usage:  · Tobacco Usage	Current some day smoker    ALLERGIES AND HOME MEDICATIONS:   Allergies:        Allergies:  	No Known Allergies:     Home Medications:   * Patient Currently Takes Medications as of 14-Sep-2022 11:13 documented in Structured Notes  · 	acetaminophen 325 mg oral tablet: 2 tab(s) orally every 6 hours, As needed, Temp greater or equal to 38.5C (101.3F), Moderate Pain (4 - 6)  · 	furosemide 20 mg oral tablet: 1 tab(s) orally once a day AM  · 	Metoprolol Succinate ER 25 mg oral tablet, extended release: 1 tab(s) orally once a day AM  · 	levothyroxine 25 mcg (0.025 mg) oral tablet: 1 tab(s) orally once a day AM  · 	losartan 25 mg oral tablet: 1 tab(s) orally once a day AM  · 	atorvastatin 40 mg oral tablet: 1 tab(s) orally once a day AM  · 	Advair Diskus 250 mcg-50 mcg inhalation powder: 1 puff(s) inhaled 2 times a day  · 	Incruse Ellipta 62.5 mcg/inh inhalation powder: 1 puff(s) inhaled every 24 hours noon    REVIEW OF SYSTEMS:    Review of Systems:  · CONSTITUTIONAL: no fever  · CARDIOVASCULAR: no chest pain  · RESPIRATORY: mild shortness of breath.  · GASTROINTESTINAL: no abdominal pain, no diarrhea, no nausea and no vomiting.  · GENITOURINARY: no dysuria, no frequency, and no hematuria.  · MUSCULOSKELETAL: diffuse low back pain          Input and Output:  I&O's Detail      Lab Data                        12.8   13.52 )-----------( 258      ( 09 Nov 2023 12:00 )             37.4     11-09    134<L>  |  98  |  34<H>  ----------------------------<  133<H>  4.1   |  24  |  2.00<H>    Ca    9.0      09 Nov 2023 12:00    TPro  7.8  /  Alb  3.3  /  TBili  0.4  /  DBili  x   /  AST  25  /  ALT  19  /  AlkPhos  81  11-09            Review of Systems	      Objective     Physical Examination        Pertinent Lab findings & Imaging      Monet:  NO   Adequate UO     I&O's Detail           Discussed with:     Cultures:	        Radiology        ACC: [id number] EXAM:  NM PULM VENTILATION PERFUS IMG   ORDERED BY: RANDOLPH VALENTINO     PROCEDURE DATE:  11/09/2023          INTERPRETATION:  CLINICAL INFORMATION: Shortness of breath Tachycardia   Evaluate for pulmonary embolus.    RADIOPHARMACEUTICAL: 1 mCi Tc-99m-DTPA via aerosol;  6.5 mCi Tc-99m-MAA,   I.V.    TECHNIQUE:  Ventilation and perfusion images of the lungs were obtained   following administration of Tc-99m-DTPA and Tc-99m-MAA. Images were   obtained in the anterior, posterior, both lateral, and all 4 oblique   views. The study was interpreted in conjunction with chest radiograph of   11/9/2023.    COMPARISON: None    OTHER STUDIES USED FOR CORRELATION: None    FINDINGS: There is heterogeneous distribution of radiopharmaceuticals in   both lungs on the ventilation and perfusion images worse on the   ventilation. There is no segmental mismatched perfusion defect.    IMPRESSION:    Low probability of pulmonary embolus.        --- End of Report ---             MARSHALL WORTHINGTON MD; Attending Nuclear Medicine  This document has been electronically signed. Nov 9 2023  3:14PM                       Date/Time Patient Seen:  		  Referring MD:   Data Reviewed	       Patient is a 78y old  Female who presents with a chief complaint of     Subjective/HPI   78-year-old female with history of COPD, hypertension, hyperlipidemia, and hypothyroidism presents with low back pain the past 2 weeks and generalized malaise.  Patient reports generalized malaise and low back pain 2 weeks ago  PAST MEDICAL & SURGICAL HISTORY:  History of COPD  No home O2 use    Asthma    Thyroid nodule    Hyperlipidemia    Hypertension    Hypothyroidism    History of cholecystectomy  1989    History of repair of hip fracture  ORIF 1982.  Hardware was eventually removed          Medication list         MEDICATIONS  (STANDING):    MEDICATIONS  (PRN):         Vitals log        ICU Vital Signs Last 24 Hrs  T(C): 36.7 (09 Nov 2023 15:32), Max: 36.7 (09 Nov 2023 15:32)  T(F): 98.1 (09 Nov 2023 15:32), Max: 98.1 (09 Nov 2023 15:32)  HR: 94 (09 Nov 2023 15:32) (94 - 136)  BP: 112/60 (09 Nov 2023 15:32) (112/60 - 112/75)  BP(mean): --  ABP: --  ABP(mean): --  RR: 20 (09 Nov 2023 15:32) (18 - 20)  SpO2: 94% (09 Nov 2023 15:32) (94% - 99%)    O2 Parameters below as of 09 Nov 2023 15:32  Patient On (Oxygen Delivery Method): nasal cannula  O2 Flow (L/min): 2       PAST SURGICAL HISTORY:  History of cholecystectomy 1989    History of repair of hip fracture ORIF 1982.  Hardware was eventually removed.     Tobacco Usage:  · Tobacco Usage	Current some day smoker    ALLERGIES AND HOME MEDICATIONS:   Allergies:        Allergies:  	No Known Allergies:     Home Medications:   * Patient Currently Takes Medications as of 14-Sep-2022 11:13 documented in Structured Notes  · 	acetaminophen 325 mg oral tablet: 2 tab(s) orally every 6 hours, As needed, Temp greater or equal to 38.5C (101.3F), Moderate Pain (4 - 6)  · 	furosemide 20 mg oral tablet: 1 tab(s) orally once a day AM  · 	Metoprolol Succinate ER 25 mg oral tablet, extended release: 1 tab(s) orally once a day AM  · 	levothyroxine 25 mcg (0.025 mg) oral tablet: 1 tab(s) orally once a day AM  · 	losartan 25 mg oral tablet: 1 tab(s) orally once a day AM  · 	atorvastatin 40 mg oral tablet: 1 tab(s) orally once a day AM  · 	Advair Diskus 250 mcg-50 mcg inhalation powder: 1 puff(s) inhaled 2 times a day  · 	Incruse Ellipta 62.5 mcg/inh inhalation powder: 1 puff(s) inhaled every 24 hours noon    REVIEW OF SYSTEMS:    Review of Systems:  · CONSTITUTIONAL: no fever  · CARDIOVASCULAR: no chest pain  · RESPIRATORY: mild shortness of breath.  · GASTROINTESTINAL: no abdominal pain, no diarrhea, no nausea and no vomiting.  · GENITOURINARY: no dysuria, no frequency, and no hematuria.  · MUSCULOSKELETAL: diffuse low back pain          Input and Output:  I&O's Detail      Lab Data                        12.8   13.52 )-----------( 258      ( 09 Nov 2023 12:00 )             37.4     11-09    134<L>  |  98  |  34<H>  ----------------------------<  133<H>  4.1   |  24  |  2.00<H>    Ca    9.0      09 Nov 2023 12:00    TPro  7.8  /  Alb  3.3  /  TBili  0.4  /  DBili  x   /  AST  25  /  ALT  19  /  AlkPhos  81  11-09            Review of Systems	  back pain    Objective     Physical Examination    heart s1s2  lung dc bS  head nc  verbal  alert  cn grossly int      Pertinent Lab findings & Imaging      Monet:  NO   Adequate UO     I&O's Detail           Discussed with:     Cultures:	        Radiology        ACC: [id number] EXAM:  NM PULM VENTILATION PERFUS IMG   ORDERED BY: RANDOLPH VALENTINO     PROCEDURE DATE:  11/09/2023          INTERPRETATION:  CLINICAL INFORMATION: Shortness of breath Tachycardia   Evaluate for pulmonary embolus.    RADIOPHARMACEUTICAL: 1 mCi Tc-99m-DTPA via aerosol;  6.5 mCi Tc-99m-MAA,   I.V.    TECHNIQUE:  Ventilation and perfusion images of the lungs were obtained   following administration of Tc-99m-DTPA and Tc-99m-MAA. Images were   obtained in the anterior, posterior, both lateral, and all 4 oblique   views. The study was interpreted in conjunction with chest radiograph of   11/9/2023.    COMPARISON: None    OTHER STUDIES USED FOR CORRELATION: None    FINDINGS: There is heterogeneous distribution of radiopharmaceuticals in   both lungs on the ventilation and perfusion images worse on the   ventilation. There is no segmental mismatched perfusion defect.    IMPRESSION:    Low probability of pulmonary embolus.        --- End of Report ---             MARSHALL WORTHINGTON MD; Attending Nuclear Medicine  This document has been electronically signed. Nov 9 2023  3:14PM

## 2023-11-09 NOTE — CONSULT NOTE ADULT - ASSESSMENT
78-year-old female with history of COPD, hypertension, hyperlipidemia, and hypothyroidism presents with low back pain the past 2 weeks and generalized malaise.  Patient reports generalized malaise and low back pain 2 weeks ago 78-year-old female with history of COPD, hypertension, hyperlipidemia, and hypothyroidism presents with low back pain the past 2 weeks and generalized malaise.  Patient reports generalized malaise and low back pain 2 weeks ago    pain  weakness  copd  HTN  HLD  thyroid disease  eval for PNA    ct chest  abx  bronchodilators  systemic steroids  ABG noted  fio2 titration - goal sat > 88 pct  I curt  monitor VS and HD and Sat  VQ scan NEG for PE  BP control  nutrition  outpatient records reviewed - follows with Dr Fay Muir - [facility] pul practice

## 2023-11-09 NOTE — ED PROVIDER NOTE - ATTENDING APP SHARED VISIT CONTRIBUTION OF CARE
Examination reveals a well-developed well-nourished elderly white female in mild respiratory distress with diminished breath sounds bilaterally heart is tachycardic with a grade 2/6 late systolic and early diastolic murmur flat nontender abdomen and extremities are without clubbing cyanosis or edema.  Back exam revealed mild tenderness to palpation lower paralumbar region I agree with plan and management outlined by PA.

## 2023-11-09 NOTE — ED PROVIDER NOTE - DIFFERENTIAL DIAGNOSIS
Differential Diagnosis Differentials include but not limited to COPD, pulmonary embolism, pyelonephritis, urinary tract infection, kidney stone, pneumonia, dehydration, electrolyte abnormality

## 2023-11-09 NOTE — ED PROVIDER NOTE - CARE PLAN
1 Principal Discharge DX:	Shortness of breath  Secondary Diagnosis:	Pneumonia  Secondary Diagnosis:	COPD exacerbation  Secondary Diagnosis:	YADI (acute kidney injury)

## 2023-11-09 NOTE — H&P ADULT - PROBLEM SELECTOR PLAN 3
Chronic, stable  - C/w home levothyroxine dosing  - f/u TSH AM Chronic, stable  - on home losartan, hold in the setting of YADI  - on home metoprolol, c/w - no history of CKD  - hold home losartan  - unclear etiology, possibly pre-renal  - monitor off fluids given elevated BNP  - hold home lasix  - f/u CT renal stone hunt to r/o obstructive uropathy  - trend in AM  - Nephrology consult

## 2023-11-09 NOTE — ED ADULT TRIAGE NOTE - CHIEF COMPLAINT QUOTE
patient ambulatory to triage a&ox4 with complains of back pain x 1 week. last week went to urgent care, prescribed PO abx for UTI without relief patient ambulatory to triage a&ox4 with complains of back pain x 1 week. last week went to urgent care, prescribed PO abx for UTI without relief. tachy in 130s

## 2023-11-09 NOTE — ED PROVIDER NOTE - NS ED ATTENDING STATEMENT MOD
This was a shared visit with the VANGIE. I reviewed and verified the documentation and independently performed the documented:

## 2023-11-10 DIAGNOSIS — Z72.0 TOBACCO USE: ICD-10-CM

## 2023-11-10 DIAGNOSIS — N17.9 ACUTE KIDNEY FAILURE, UNSPECIFIED: ICD-10-CM

## 2023-11-10 DIAGNOSIS — A41.9 SEPSIS, UNSPECIFIED ORGANISM: ICD-10-CM

## 2023-11-10 LAB
ALBUMIN SERPL ELPH-MCNC: 2.9 G/DL — LOW (ref 3.3–5)
ALBUMIN SERPL ELPH-MCNC: 2.9 G/DL — LOW (ref 3.3–5)
ALP SERPL-CCNC: 68 U/L — SIGNIFICANT CHANGE UP (ref 40–120)
ALP SERPL-CCNC: 68 U/L — SIGNIFICANT CHANGE UP (ref 40–120)
ALT FLD-CCNC: 16 U/L — SIGNIFICANT CHANGE UP (ref 12–78)
ALT FLD-CCNC: 16 U/L — SIGNIFICANT CHANGE UP (ref 12–78)
ANION GAP SERPL CALC-SCNC: 10 MMOL/L — SIGNIFICANT CHANGE UP (ref 5–17)
ANION GAP SERPL CALC-SCNC: 10 MMOL/L — SIGNIFICANT CHANGE UP (ref 5–17)
AST SERPL-CCNC: 17 U/L — SIGNIFICANT CHANGE UP (ref 15–37)
AST SERPL-CCNC: 17 U/L — SIGNIFICANT CHANGE UP (ref 15–37)
BASE EXCESS BLDA CALC-SCNC: -9.1 MMOL/L — LOW (ref -2–3)
BASE EXCESS BLDA CALC-SCNC: -9.1 MMOL/L — LOW (ref -2–3)
BASOPHILS # BLD AUTO: 0.01 K/UL — SIGNIFICANT CHANGE UP (ref 0–0.2)
BASOPHILS # BLD AUTO: 0.01 K/UL — SIGNIFICANT CHANGE UP (ref 0–0.2)
BASOPHILS NFR BLD AUTO: 0.1 % — SIGNIFICANT CHANGE UP (ref 0–2)
BASOPHILS NFR BLD AUTO: 0.1 % — SIGNIFICANT CHANGE UP (ref 0–2)
BILIRUB SERPL-MCNC: 0.2 MG/DL — SIGNIFICANT CHANGE UP (ref 0.2–1.2)
BILIRUB SERPL-MCNC: 0.2 MG/DL — SIGNIFICANT CHANGE UP (ref 0.2–1.2)
BLOOD GAS COMMENTS ARTERIAL: SIGNIFICANT CHANGE UP
BLOOD GAS COMMENTS ARTERIAL: SIGNIFICANT CHANGE UP
BUN SERPL-MCNC: 36 MG/DL — HIGH (ref 7–23)
BUN SERPL-MCNC: 36 MG/DL — HIGH (ref 7–23)
CALCIUM SERPL-MCNC: 8.2 MG/DL — LOW (ref 8.5–10.1)
CALCIUM SERPL-MCNC: 8.2 MG/DL — LOW (ref 8.5–10.1)
CHLORIDE SERPL-SCNC: 103 MMOL/L — SIGNIFICANT CHANGE UP (ref 96–108)
CHLORIDE SERPL-SCNC: 103 MMOL/L — SIGNIFICANT CHANGE UP (ref 96–108)
CO2 SERPL-SCNC: 24 MMOL/L — SIGNIFICANT CHANGE UP (ref 22–31)
CO2 SERPL-SCNC: 24 MMOL/L — SIGNIFICANT CHANGE UP (ref 22–31)
CREAT SERPL-MCNC: 1.5 MG/DL — HIGH (ref 0.5–1.3)
CREAT SERPL-MCNC: 1.5 MG/DL — HIGH (ref 0.5–1.3)
EGFR: 35 ML/MIN/1.73M2 — LOW
EGFR: 35 ML/MIN/1.73M2 — LOW
EOSINOPHIL # BLD AUTO: 0.04 K/UL — SIGNIFICANT CHANGE UP (ref 0–0.5)
EOSINOPHIL # BLD AUTO: 0.04 K/UL — SIGNIFICANT CHANGE UP (ref 0–0.5)
EOSINOPHIL NFR BLD AUTO: 0.4 % — SIGNIFICANT CHANGE UP (ref 0–6)
EOSINOPHIL NFR BLD AUTO: 0.4 % — SIGNIFICANT CHANGE UP (ref 0–6)
GAS PNL BLDA: SIGNIFICANT CHANGE UP
GAS PNL BLDA: SIGNIFICANT CHANGE UP
GLUCOSE SERPL-MCNC: 175 MG/DL — HIGH (ref 70–99)
GLUCOSE SERPL-MCNC: 175 MG/DL — HIGH (ref 70–99)
HCO3 BLDA-SCNC: 17 MMOL/L — LOW (ref 21–28)
HCO3 BLDA-SCNC: 17 MMOL/L — LOW (ref 21–28)
HCT VFR BLD CALC: 32.2 % — LOW (ref 34.5–45)
HCT VFR BLD CALC: 32.2 % — LOW (ref 34.5–45)
HGB BLD-MCNC: 10.8 G/DL — LOW (ref 11.5–15.5)
HGB BLD-MCNC: 10.8 G/DL — LOW (ref 11.5–15.5)
HOROWITZ INDEX BLDA+IHG-RTO: 24 — SIGNIFICANT CHANGE UP
HOROWITZ INDEX BLDA+IHG-RTO: 24 — SIGNIFICANT CHANGE UP
IMM GRANULOCYTES NFR BLD AUTO: 0.4 % — SIGNIFICANT CHANGE UP (ref 0–0.9)
IMM GRANULOCYTES NFR BLD AUTO: 0.4 % — SIGNIFICANT CHANGE UP (ref 0–0.9)
LYMPHOCYTES # BLD AUTO: 0.52 K/UL — LOW (ref 1–3.3)
LYMPHOCYTES # BLD AUTO: 0.52 K/UL — LOW (ref 1–3.3)
LYMPHOCYTES # BLD AUTO: 4.6 % — LOW (ref 13–44)
LYMPHOCYTES # BLD AUTO: 4.6 % — LOW (ref 13–44)
MCHC RBC-ENTMCNC: 31.8 PG — SIGNIFICANT CHANGE UP (ref 27–34)
MCHC RBC-ENTMCNC: 31.8 PG — SIGNIFICANT CHANGE UP (ref 27–34)
MCHC RBC-ENTMCNC: 33.5 GM/DL — SIGNIFICANT CHANGE UP (ref 32–36)
MCHC RBC-ENTMCNC: 33.5 GM/DL — SIGNIFICANT CHANGE UP (ref 32–36)
MCV RBC AUTO: 94.7 FL — SIGNIFICANT CHANGE UP (ref 80–100)
MCV RBC AUTO: 94.7 FL — SIGNIFICANT CHANGE UP (ref 80–100)
MONOCYTES # BLD AUTO: 0.19 K/UL — SIGNIFICANT CHANGE UP (ref 0–0.9)
MONOCYTES # BLD AUTO: 0.19 K/UL — SIGNIFICANT CHANGE UP (ref 0–0.9)
MONOCYTES NFR BLD AUTO: 1.7 % — LOW (ref 2–14)
MONOCYTES NFR BLD AUTO: 1.7 % — LOW (ref 2–14)
NEUTROPHILS # BLD AUTO: 10.59 K/UL — HIGH (ref 1.8–7.4)
NEUTROPHILS # BLD AUTO: 10.59 K/UL — HIGH (ref 1.8–7.4)
NEUTROPHILS NFR BLD AUTO: 92.8 % — HIGH (ref 43–77)
NEUTROPHILS NFR BLD AUTO: 92.8 % — HIGH (ref 43–77)
NRBC # BLD: 0 /100 WBCS — SIGNIFICANT CHANGE UP (ref 0–0)
NRBC # BLD: 0 /100 WBCS — SIGNIFICANT CHANGE UP (ref 0–0)
PCO2 BLDA: 33 MMHG — SIGNIFICANT CHANGE UP (ref 32–35)
PCO2 BLDA: 33 MMHG — SIGNIFICANT CHANGE UP (ref 32–35)
PH BLDA: 7.32 — LOW (ref 7.35–7.45)
PH BLDA: 7.32 — LOW (ref 7.35–7.45)
PLATELET # BLD AUTO: 246 K/UL — SIGNIFICANT CHANGE UP (ref 150–400)
PLATELET # BLD AUTO: 246 K/UL — SIGNIFICANT CHANGE UP (ref 150–400)
PO2 BLDA: 145 MMHG — HIGH (ref 83–108)
PO2 BLDA: 145 MMHG — HIGH (ref 83–108)
POTASSIUM SERPL-MCNC: 4 MMOL/L — SIGNIFICANT CHANGE UP (ref 3.5–5.3)
POTASSIUM SERPL-MCNC: 4 MMOL/L — SIGNIFICANT CHANGE UP (ref 3.5–5.3)
POTASSIUM SERPL-SCNC: 4 MMOL/L — SIGNIFICANT CHANGE UP (ref 3.5–5.3)
POTASSIUM SERPL-SCNC: 4 MMOL/L — SIGNIFICANT CHANGE UP (ref 3.5–5.3)
PROT SERPL-MCNC: 7 G/DL — SIGNIFICANT CHANGE UP (ref 6–8.3)
PROT SERPL-MCNC: 7 G/DL — SIGNIFICANT CHANGE UP (ref 6–8.3)
RBC # BLD: 3.4 M/UL — LOW (ref 3.8–5.2)
RBC # BLD: 3.4 M/UL — LOW (ref 3.8–5.2)
RBC # FLD: 13.7 % — SIGNIFICANT CHANGE UP (ref 10.3–14.5)
RBC # FLD: 13.7 % — SIGNIFICANT CHANGE UP (ref 10.3–14.5)
SAO2 % BLDA: 100 % — HIGH (ref 94–98)
SAO2 % BLDA: 100 % — HIGH (ref 94–98)
SODIUM SERPL-SCNC: 137 MMOL/L — SIGNIFICANT CHANGE UP (ref 135–145)
SODIUM SERPL-SCNC: 137 MMOL/L — SIGNIFICANT CHANGE UP (ref 135–145)
WBC # BLD: 11.39 K/UL — HIGH (ref 3.8–10.5)
WBC # BLD: 11.39 K/UL — HIGH (ref 3.8–10.5)
WBC # FLD AUTO: 11.39 K/UL — HIGH (ref 3.8–10.5)
WBC # FLD AUTO: 11.39 K/UL — HIGH (ref 3.8–10.5)

## 2023-11-10 PROCEDURE — 93306 TTE W/DOPPLER COMPLETE: CPT | Mod: 26

## 2023-11-10 PROCEDURE — 99232 SBSQ HOSP IP/OBS MODERATE 35: CPT

## 2023-11-10 PROCEDURE — 71250 CT THORAX DX C-: CPT | Mod: 26

## 2023-11-10 RX ORDER — FUROSEMIDE 40 MG
20 TABLET ORAL DAILY
Refills: 0 | Status: DISCONTINUED | OUTPATIENT
Start: 2023-11-10 | End: 2023-11-10

## 2023-11-10 RX ORDER — FLUTICASONE PROPIONATE AND SALMETEROL 50; 250 UG/1; UG/1
1 POWDER ORAL; RESPIRATORY (INHALATION)
Qty: 0 | Refills: 0 | DISCHARGE

## 2023-11-10 RX ORDER — METOPROLOL TARTRATE 50 MG
25 TABLET ORAL DAILY
Refills: 0 | Status: DISCONTINUED | OUTPATIENT
Start: 2023-11-10 | End: 2023-11-11

## 2023-11-10 RX ORDER — ACETAMINOPHEN 500 MG
650 TABLET ORAL EVERY 6 HOURS
Refills: 0 | Status: DISCONTINUED | OUTPATIENT
Start: 2023-11-10 | End: 2023-11-16

## 2023-11-10 RX ORDER — UMECLIDINIUM 62.5 UG/1
1 AEROSOL, POWDER ORAL
Qty: 0 | Refills: 0 | DISCHARGE

## 2023-11-10 RX ORDER — METOPROLOL TARTRATE 50 MG
25 TABLET ORAL DAILY
Refills: 0 | Status: DISCONTINUED | OUTPATIENT
Start: 2023-11-10 | End: 2023-11-10

## 2023-11-10 RX ORDER — ONDANSETRON 8 MG/1
4 TABLET, FILM COATED ORAL EVERY 8 HOURS
Refills: 0 | Status: DISCONTINUED | OUTPATIENT
Start: 2023-11-10 | End: 2023-11-16

## 2023-11-10 RX ORDER — AZITHROMYCIN 500 MG/1
500 TABLET, FILM COATED ORAL EVERY 24 HOURS
Refills: 0 | Status: DISCONTINUED | OUTPATIENT
Start: 2023-11-10 | End: 2023-11-13

## 2023-11-10 RX ORDER — CEFTRIAXONE 500 MG/1
1000 INJECTION, POWDER, FOR SOLUTION INTRAMUSCULAR; INTRAVENOUS EVERY 24 HOURS
Refills: 0 | Status: COMPLETED | OUTPATIENT
Start: 2023-11-10 | End: 2023-11-13

## 2023-11-10 RX ORDER — LEVOTHYROXINE SODIUM 125 MCG
88 TABLET ORAL DAILY
Refills: 0 | Status: DISCONTINUED | OUTPATIENT
Start: 2023-11-10 | End: 2023-11-16

## 2023-11-10 RX ORDER — ATORVASTATIN CALCIUM 80 MG/1
40 TABLET, FILM COATED ORAL AT BEDTIME
Refills: 0 | Status: DISCONTINUED | OUTPATIENT
Start: 2023-11-10 | End: 2023-11-16

## 2023-11-10 RX ORDER — IPRATROPIUM/ALBUTEROL SULFATE 18-103MCG
3 AEROSOL WITH ADAPTER (GRAM) INHALATION EVERY 6 HOURS
Refills: 0 | Status: DISCONTINUED | OUTPATIENT
Start: 2023-11-10 | End: 2023-11-16

## 2023-11-10 RX ORDER — LEVOTHYROXINE SODIUM 125 MCG
1 TABLET ORAL
Qty: 0 | Refills: 0 | DISCHARGE

## 2023-11-10 RX ORDER — HEPARIN SODIUM 5000 [USP'U]/ML
5000 INJECTION INTRAVENOUS; SUBCUTANEOUS EVERY 12 HOURS
Refills: 0 | Status: DISCONTINUED | OUTPATIENT
Start: 2023-11-10 | End: 2023-11-16

## 2023-11-10 RX ORDER — NICOTINE POLACRILEX 2 MG
1 GUM BUCCAL DAILY
Refills: 0 | Status: DISCONTINUED | OUTPATIENT
Start: 2023-11-10 | End: 2023-11-16

## 2023-11-10 RX ORDER — LANOLIN ALCOHOL/MO/W.PET/CERES
3 CREAM (GRAM) TOPICAL AT BEDTIME
Refills: 0 | Status: DISCONTINUED | OUTPATIENT
Start: 2023-11-10 | End: 2023-11-16

## 2023-11-10 RX ADMIN — Medication 40 MILLIGRAM(S): at 17:21

## 2023-11-10 RX ADMIN — ATORVASTATIN CALCIUM 40 MILLIGRAM(S): 80 TABLET, FILM COATED ORAL at 22:35

## 2023-11-10 RX ADMIN — HEPARIN SODIUM 5000 UNIT(S): 5000 INJECTION INTRAVENOUS; SUBCUTANEOUS at 17:22

## 2023-11-10 RX ADMIN — AZITHROMYCIN 255 MILLIGRAM(S): 500 TABLET, FILM COATED ORAL at 17:22

## 2023-11-10 RX ADMIN — Medication 3 MILLILITER(S): at 07:40

## 2023-11-10 RX ADMIN — Medication 3 MILLILITER(S): at 22:15

## 2023-11-10 RX ADMIN — Medication 25 MILLIGRAM(S): at 20:19

## 2023-11-10 RX ADMIN — CEFTRIAXONE 100 MILLIGRAM(S): 500 INJECTION, POWDER, FOR SOLUTION INTRAMUSCULAR; INTRAVENOUS at 20:23

## 2023-11-10 RX ADMIN — Medication 3 MILLILITER(S): at 01:59

## 2023-11-10 RX ADMIN — Medication 40 MILLIGRAM(S): at 05:18

## 2023-11-10 RX ADMIN — HEPARIN SODIUM 5000 UNIT(S): 5000 INJECTION INTRAVENOUS; SUBCUTANEOUS at 05:18

## 2023-11-10 RX ADMIN — Medication 88 MICROGRAM(S): at 05:21

## 2023-11-10 NOTE — PROGRESS NOTE ADULT - SUBJECTIVE AND OBJECTIVE BOX
Patient seen and examined  daughter at bedside  on 2 liters of 02  noted dyspneic at rest  on abx steroids  Lasix on hold due to tomer  echo pending  Cardio / renal and pulm consulted    Review of Systems:  General:denies fever chills, headache, weakness  HEENT: denies blurry vision,diffculty swallowing, difficulty hearing, tinnitus  Cardiovascular: denies chest pain  ,palpitations  Pulmonary:denies shortness of breath, cough, wheezing, hemoptysis  Gastrointestinal: denies abdominal pain, constipation, diarrhea,nausea , vomiting, hematochezia  : denies hematuria, dysuria, or incontinence  Neurological: denies weakness, numbness , tingling, dizziness, tremors  MSK: denies muscle pain, difficulty ambulating, swelling, back pain  skin: denies skin rash, itching, burning, or  skin lesions  Psychiatrical: denies mood disturbances, anxierty, feeling depressed, depression , or difficulty sleeping    Objective:  Vitals  T(C): 36.4 (11-10-23 @ 11:46), Max: 36.7 (11-09-23 @ 15:32)  HR: 80 (11-10-23 @ 11:46) (68 - 94)  BP: 114/66 (11-10-23 @ 11:46) (101/60 - 114/66)  RR: 18 (11-10-23 @ 11:46) (18 - 20)  SpO2: 91% (11-10-23 @ 11:46) (90% - 96%)    Physical Exam:  General: cachetic malnourished  HEENT: Atraumatic, no LAD, trachea midline, PERRLA  Cardiovascular: normal s1s2, no murmurs, gallops or fricition rubs  Pulmonary: decreased, no wheezing , rhonchi  Gastrointestinal: soft non tender non distended, no masses felt, no organomegally  Muscloskeletal: no lower extremity edema, intact bilateral lower extremity pulses  Neurological: CN II-12 intact. No focal weakness  Psychiatrical: normal mood, cooperative  SKIN: no rash, lesions or ulcers    Labs:                          10.8   11.39 )-----------( 246      ( 10 Nov 2023 08:35 )             32.2     11-10    137  |  103  |  36<H>  ----------------------------<  175<H>  4.0   |  24  |  1.50<H>    Ca    8.2<L>      10 Nov 2023 08:35    TPro  7.0  /  Alb  2.9<L>  /  TBili  0.2  /  DBili  x   /  AST  17  /  ALT  16  /  AlkPhos  68  11-10    LIVER FUNCTIONS - ( 10 Nov 2023 08:35 )  Alb: 2.9 g/dL / Pro: 7.0 g/dL / ALK PHOS: 68 U/L / ALT: 16 U/L / AST: 17 U/L / GGT: x           PT/INR - ( 09 Nov 2023 12:00 )   PT: 10.9 sec;   INR: 0.93 ratio         PTT - ( 09 Nov 2023 12:00 )  PTT:30.8 sec      Active Medications  MEDICATIONS  (STANDING):  albuterol/ipratropium for Nebulization 3 milliLiter(s) Nebulizer every 6 hours  atorvastatin 40 milliGRAM(s) Oral at bedtime  azithromycin  IVPB 500 milliGRAM(s) IV Intermittent every 24 hours  cefTRIAXone   IVPB 1000 milliGRAM(s) IV Intermittent every 24 hours  heparin   Injectable 5000 Unit(s) SubCutaneous every 12 hours  levothyroxine 88 MICROGram(s) Oral daily  methylPREDNISolone sodium succinate Injectable 40 milliGRAM(s) IV Push two times a day  metoprolol succinate ER 25 milliGRAM(s) Oral daily  nicotine -   7 mG/24Hr(s) Patch 1 Patch Transdermal daily    MEDICATIONS  (PRN):  acetaminophen     Tablet .. 650 milliGRAM(s) Oral every 6 hours PRN Temp greater or equal to 38C (100.4F), Mild Pain (1 - 3)  aluminum hydroxide/magnesium hydroxide/simethicone Suspension 30 milliLiter(s) Oral every 4 hours PRN Dyspepsia  melatonin 3 milliGRAM(s) Oral at bedtime PRN Insomnia  ondansetron Injectable 4 milliGRAM(s) IV Push every 8 hours PRN Nausea and/or Vomiting

## 2023-11-10 NOTE — PROGRESS NOTE ADULT - ASSESSMENT
78-year-old female with history of COPD (not on home oxygen), hypertension, hyperlipidemia, and hypothyroidism presents to the ED for back pain.  Patient reports that for the past 2-3 weeks, she has been having pain located in the midline of her back.     pain  weakness  copd  HTN  HLD  thyroid disease  eval for PNA    ct chest  abx  bronchodilators  systemic steroids  ABG noted  fio2 titration - goal sat > 88 pct  I curt  monitor VS and HD and Sat  VQ scan NEG for PE  BP control  nutrition  outpatient records reviewed - follows with Dr Fay Muir - [facility] pul practice   78-year-old female with history of COPD (not on home oxygen), hypertension, hyperlipidemia, and hypothyroidism presents to the ED for back pain.  Patient reports that for the past 2-3 weeks, she has been having pain located in the midline of her back.     pain  weakness  copd  HTN  HLD  thyroid disease  eval for PNA  HFpEF  Ex Smoker  Valv heart disease    ct chest  abx  bronchodilators  systemic steroids  ABG noted  fio2 titration - goal sat > 88 pct  I curt  monitor VS and HD and Sat  VQ scan NEG for PE  BP control  nutrition  outpatient records reviewed - follows with Dr Fay Muir - [facility] pul practice

## 2023-11-10 NOTE — CONSULT NOTE ADULT - SUBJECTIVE AND OBJECTIVE BOX
SANJUANA TORRES  78y  Female      Patient is a 78y old  Female who presents with a chief complaint of back pain (10 Nov 2023 15:23)      INTERVAL HPI/OVERNIGHT EVENTS: Patient is a 79 y/o F w. PMHx of COPD (Not on home O2), Nicotine dependance, HTN, HLD, and hypothyroidism who presents with complaint of back pain for ~2-3 week. Patient describes the pain as a band across her lower back. She went to [facility] initially for this back pain and finished a course of Macrobid/Pyridium. The back pain persisted and she subsequently came into \[facility]\"" ED. She did not have any specific urinary sx. and denies hematuria, dysuria, etc at the time of the initial back pain and still does not endorse any urinary sx. Her back pain has resolved. Patient also endorsing inc. work of breathing with dyspnea on exertion. Patient admitted for COPD exacerbation and suspected community-acquired PNA, now on Abx. No acute events occurred overnight.       REVIEW OF SYSTEMS:  CONSTITUTIONAL: No fever, no chills  EYES: No eye pain, visual disturbances, or discharge  ENMT:  No difficulty hearing, tinnitus, vertigo; No sinus or throat pain  RESPIRATORY: +Cough, +wheezing, +dyspnea. No hemoptysis.  CARDIOVASCULAR: No chest pain, palpitations, dizziness, or leg swelling  GASTROINTESTINAL: No abdominal or epigastric pain. No nausea, vomiting, or hematemesis; No diarrhea or constipation.   GENITOURINARY: No dysuria, frequency, hematuria, or incontinence  NEUROLOGICAL: No headaches.  MUSCULOSKELETAL: No joint pain or swelling; No muscle, back, or extremity pain    T(C): 36.4 (11-10-23 @ 11:46), Max: 36.6 (11-10-23 @ 02:55)  HR: 80 (11-10-23 @ 11:46) (68 - 91)  BP: 114/66 (11-10-23 @ 11:46) (101/60 - 114/66)  RR: 18 (11-10-23 @ 11:46) (18 - 20)  SpO2: 91% (11-10-23 @ 11:46) (90% - 96%)  Wt(kg): --Vital Signs Last 24 Hrs  T(C): 36.4 (10 Nov 2023 11:46), Max: 36.6 (10 Nov 2023 02:55)  T(F): 97.5 (10 Nov 2023 11:46), Max: 97.8 (10 Nov 2023 02:55)  HR: 80 (10 Nov 2023 11:46) (68 - 91)  BP: 114/66 (10 Nov 2023 11:46) (101/60 - 114/66)  BP(mean): --  RR: 18 (10 Nov 2023 11:46) (18 - 20)  SpO2: 91% (10 Nov 2023 11:46) (90% - 96%)    Parameters below as of 10 Nov 2023 11:46  Patient On (Oxygen Delivery Method): nasal cannula        PHYSICAL EXAM:  GENERAL: appears stated age, thin habitus  HEAD:  Atraumatic, Normocephalic  EYES: EOMI, conjunctiva and sclera clear  ENMT: No tonsillar erythema, exudates, or enlargement; Moist mucous membranes  NERVOUS SYSTEM:  Alert & Oriented X3, Good concentration.  CHEST/LUNG: +decreased BS BL, +wheezing.  HEART: Regular rate and rhythm; No murmurs, rubs, or gallops  ABDOMEN: Soft, Nontender, Nondistended; Bowel sounds present  EXTREMITIES:  2+ Peripheral Pulses, No clubbing, cyanosis, or edema  SKIN: No rashes or lesions    Consultant(s) Notes Reviewed:  [x ] YES  [ ] NO  Care Discussed with Consultants/Other Providers [ x] YES  [ ] NO    LABS:                        10.8   11.39 )-----------( 246      ( 10 Nov 2023 08:35 )             32.2     11-10    137  |  103  |  36<H>  ----------------------------<  175<H>  4.0   |  24  |  1.50<H>    Ca    8.2<L>      10 Nov 2023 08:35    TPro  7.0  /  Alb  2.9<L>  /  TBili  0.2  /  DBili  x   /  AST  17  /  ALT  16  /  AlkPhos  68  11-10    PT/INR - ( 09 Nov 2023 12:00 )   PT: 10.9 sec;   INR: 0.93 ratio         PTT - ( 09 Nov 2023 12:00 )  PTT:30.8 sec  Urinalysis Basic - ( 10 Nov 2023 08:35 )    Color: x / Appearance: x / SG: x / pH: x  Gluc: 175 mg/dL / Ketone: x  / Bili: x / Urobili: x   Blood: x / Protein: x / Nitrite: x   Leuk Esterase: x / RBC: x / WBC x   Sq Epi: x / Non Sq Epi: x / Bacteria: x      CAPILLARY BLOOD GLUCOSE          ABG - ( 10 Nov 2023 01:32 )  pH, Arterial: 7.32  pH, Blood: x     /  pCO2: 33    /  pO2: 145   / HCO3: 17    / Base Excess: -9.1  /  SaO2: 100.0             Urinalysis Basic - ( 10 Nov 2023 08:35 )    Color: x / Appearance: x / SG: x / pH: x  Gluc: 175 mg/dL / Ketone: x  / Bili: x / Urobili: x   Blood: x / Protein: x / Nitrite: x   Leuk Esterase: x / RBC: x / WBC x   Sq Epi: x / Non Sq Epi: x / Bacteria: x        RADIOLOGY & ADDITIONAL TESTS:    Imaging Personally Reviewed:  [ ] YES  [ ] NO SANJUANA TORRES  78y  Female      Patient is a 78y old  Female who presents with a chief complaint of back pain (10 Nov 2023 15:23)      INTERVAL HPI/OVERNIGHT EVENTS: Patient is a 77 y/o F w. PMHx of COPD (Not on home O2), Nicotine dependance, HTN, HLD, and hypothyroidism who presents with complaint of back pain for ~2-3 week. Patient describes the pain as a band across her lower back. She [facility] for this back pain and finished a course of Macrobid/Pyridium. The back pain persisted and she subsequently came into [facility] ED. She did not have any specific urinary sx. and denies hematuria, dysuria, etc at the time of the initial back pain and still does not endorse any urinary sx. Her back pain has resolved. Patient also endorsing inc. work of breathing with dyspnea on exertion. Patient admitted for COPD exacerbation and suspected community-acquired PNA, now on Abx. No acute events occurred overnight.   Of note, pt admits to naproxen use 2-3 per week x2 months.    REVIEW OF SYSTEMS:  CONSTITUTIONAL: No fever, no chills  EYES: No eye pain, visual disturbances, or discharge  ENMT:  No difficulty hearing, tinnitus, vertigo; No sinus or throat pain  RESPIRATORY: +Cough, +wheezing, +dyspnea. No hemoptysis.  CARDIOVASCULAR: No chest pain, palpitations, dizziness, or leg swelling  GASTROINTESTINAL: No abdominal or epigastric pain. No nausea, vomiting, or hematemesis; No diarrhea or constipation.   GENITOURINARY: No dysuria, frequency, hematuria, or incontinence  NEUROLOGICAL: No headaches.  MUSCULOSKELETAL: No joint pain or swelling; No muscle, back, or extremity pain    T(C): 36.4 (11-10-23 @ 11:46), Max: 36.6 (11-10-23 @ 02:55)  HR: 80 (11-10-23 @ 11:46) (68 - 91)  BP: 114/66 (11-10-23 @ 11:46) (101/60 - 114/66)  RR: 18 (11-10-23 @ 11:46) (18 - 20)  SpO2: 91% (11-10-23 @ 11:46) (90% - 96%)  Wt(kg): --Vital Signs Last 24 Hrs  T(C): 36.4 (10 Nov 2023 11:46), Max: 36.6 (10 Nov 2023 02:55)  T(F): 97.5 (10 Nov 2023 11:46), Max: 97.8 (10 Nov 2023 02:55)  HR: 80 (10 Nov 2023 11:46) (68 - 91)  BP: 114/66 (10 Nov 2023 11:46) (101/60 - 114/66)  BP(mean): --  RR: 18 (10 Nov 2023 11:46) (18 - 20)  SpO2: 91% (10 Nov 2023 11:46) (90% - 96%)    Parameters below as of 10 Nov 2023 11:46  Patient On (Oxygen Delivery Method): nasal cannula        PHYSICAL EXAM:  GENERAL: appears stated age, thin habitus  HEAD:  Atraumatic, Normocephalic  EYES: EOMI, conjunctiva and sclera clear  ENMT: No tonsillar erythema, exudates, or enlargement; Moist mucous membranes  NERVOUS SYSTEM:  Alert & Oriented X3, Good concentration.  CHEST/LUNG: +decreased BS BL, +wheezing.  HEART: Regular rate and rhythm; No murmurs, rubs, or gallops  ABDOMEN: Soft, Nontender, Nondistended; Bowel sounds present  EXTREMITIES:  2+ Peripheral Pulses, No clubbing, cyanosis, or edema  SKIN: No rashes or lesions    Consultant(s) Notes Reviewed:  [x ] YES  [ ] NO  Care Discussed with Consultants/Other Providers [ x] YES  [ ] NO    LABS:                        10.8   11.39 )-----------( 246      ( 10 Nov 2023 08:35 )             32.2     11-10    137  |  103  |  36<H>  ----------------------------<  175<H>  4.0   |  24  |  1.50<H>    Ca    8.2<L>      10 Nov 2023 08:35    TPro  7.0  /  Alb  2.9<L>  /  TBili  0.2  /  DBili  x   /  AST  17  /  ALT  16  /  AlkPhos  68  11-10    PT/INR - ( 09 Nov 2023 12:00 )   PT: 10.9 sec;   INR: 0.93 ratio         PTT - ( 09 Nov 2023 12:00 )  PTT:30.8 sec  Urinalysis Basic - ( 10 Nov 2023 08:35 )    Color: x / Appearance: x / SG: x / pH: x  Gluc: 175 mg/dL / Ketone: x  / Bili: x / Urobili: x   Blood: x / Protein: x / Nitrite: x   Leuk Esterase: x / RBC: x / WBC x   Sq Epi: x / Non Sq Epi: x / Bacteria: x      CAPILLARY BLOOD GLUCOSE          ABG - ( 10 Nov 2023 01:32 )  pH, Arterial: 7.32  pH, Blood: x     /  pCO2: 33    /  pO2: 145   / HCO3: 17    / Base Excess: -9.1  /  SaO2: 100.0             Urinalysis Basic - ( 10 Nov 2023 08:35 )    Color: x / Appearance: x / SG: x / pH: x  Gluc: 175 mg/dL / Ketone: x  / Bili: x / Urobili: x   Blood: x / Protein: x / Nitrite: x   Leuk Esterase: x / RBC: x / WBC x   Sq Epi: x / Non Sq Epi: x / Bacteria: x        RADIOLOGY & ADDITIONAL TESTS:    Imaging Personally Reviewed:  [ ] YES  [ ] NO

## 2023-11-10 NOTE — ED ADULT NURSE REASSESSMENT NOTE - NSFALLRISKINTERV_ED_ALL_ED

## 2023-11-10 NOTE — PATIENT PROFILE ADULT - NSPRESCRALCAMT_GEN_A_NUR
Patient had a negative COVID test prior to PFT.  Performed PFT. See scanned results for additional information.    
1 or 2

## 2023-11-10 NOTE — PROGRESS NOTE ADULT - PROBLEM SELECTOR PLAN 3
- no history of CKD  - hold home losartan  - monitor off fluids given elevated BNP  -CT renal hunt noted  - hold home lasix  - Nephrology consulted

## 2023-11-10 NOTE — PROGRESS NOTE ADULT - NSPROGADDITIONALINFOA_GEN_ALL_CORE
at this time  c/w steroids abx  likely resumption of lasix in am  followup ct chest  taper down 02 to sat 88  daughter asking for home 02. would like to give patient at least 24 to 48 hours at this time  VQ negative  c/w steroids abx  likely resumption of lasix in am  followup ct chest  followup echo    taper down 02 to sat 88  daughter asking for home 02. would like to give patient at least 24 to 48 hours

## 2023-11-10 NOTE — CARE COORDINATION ASSESSMENT. - NSCAREPROVIDERS_GEN_ALL_CORE_FT
CARE PROVIDERS:  Accepting Physician: Stu Tolentino  Administration: Cruzito Cedillo  Administration: Wilton Richards  Admitting: Stu Tolentino  Attending: Stu Tolentino  Cardiology Technician: Ita Dwyer  Case Management: Venecia Lott  Consultant: Kemar Ngo  Consultant: Valdemar Villaseñor  Covering Team: Manny Freire  Covering Team: Dileep Ambrosio  Covering Team: Hernando Saravia  ED ACP: Hamida Jones  ED Attending: Prashanth Bowen  ED Attending2: Rocio Shabazz  ED Nurse: Enriqueta Haas  Nurse: Nevaeh Cordon  Nurse: Ambreen Valdovinos  Nurse: Chuckie Pat  Nurse: Melissa Alvarez  Nurse: Chantal Scott  Ordered: ADM, User  Ordered: ServiceAccount, SCMMLM  Override: Wiafe, Nikki  Override: Chantal Scott  Override: Melissa Alvarez  PCA/Nursing Assistant: Betty Cabral  PCA/Nursing Assistant: Juan Carlos Crisostomo  Physical Therapy: Melissa Gutierrez  Physical Therapy: Rosanna Pugh  Primary Team: Hamida Jones  Primary Team: Angelo Batista  Registered Dietitian: Leila Palma  Respiratory Therapy: Tej Tierney  Respiratory Therapy: Evie Meyer  : [place]  Team: [facility] [employer] [facility], Team  Team: [facility], Team  UR// Supp. Assoc.: Sancho Muse  UR// Supp. Assoc.: Yudy Lara

## 2023-11-10 NOTE — PHYSICAL THERAPY INITIAL EVALUATION ADULT - BED MOBILITY TRAINING, PT EVAL
Patient will improve supine <-> sit with independently  3-5 sessions in order to safely get in and out of bed.

## 2023-11-10 NOTE — PROGRESS NOTE ADULT - ASSESSMENT
78-year-old female with history of COPD (not on home oxygen), hypertension, hyperlipidemia, and hypothyroidism presents to the ED for back pain, admitted for sepsis sec to suspected CAP and acute COPD exacerbation

## 2023-11-10 NOTE — PROGRESS NOTE ADULT - SUBJECTIVE AND OBJECTIVE BOX
Date/Time Patient Seen:  		  Referring MD:   Data Reviewed	       Patient is a 78y old  Female who presents with a chief complaint of back pain (09 Nov 2023 23:21)      Subjective/HPI     PAST MEDICAL & SURGICAL HISTORY:  History of COPD  No home O2 use    Asthma    Thyroid nodule    Hyperlipidemia    Hypertension    Hypothyroidism    History of cholecystectomy  1989    History of repair of hip fracture  ORIF 1982.  Hardware was eventually removed          Medication list         MEDICATIONS  (STANDING):  albuterol/ipratropium for Nebulization 3 milliLiter(s) Nebulizer every 6 hours  atorvastatin 40 milliGRAM(s) Oral at bedtime  azithromycin  IVPB 500 milliGRAM(s) IV Intermittent every 24 hours  cefTRIAXone   IVPB 1000 milliGRAM(s) IV Intermittent every 24 hours  heparin   Injectable 5000 Unit(s) SubCutaneous every 12 hours  levothyroxine 88 MICROGram(s) Oral daily  methylPREDNISolone sodium succinate Injectable 40 milliGRAM(s) IV Push two times a day  metoprolol succinate ER 25 milliGRAM(s) Oral daily  nicotine -   7 mG/24Hr(s) Patch 1 Patch Transdermal daily    MEDICATIONS  (PRN):  acetaminophen     Tablet .. 650 milliGRAM(s) Oral every 6 hours PRN Temp greater or equal to 38C (100.4F), Mild Pain (1 - 3)  aluminum hydroxide/magnesium hydroxide/simethicone Suspension 30 milliLiter(s) Oral every 4 hours PRN Dyspepsia  melatonin 3 milliGRAM(s) Oral at bedtime PRN Insomnia  ondansetron Injectable 4 milliGRAM(s) IV Push every 8 hours PRN Nausea and/or Vomiting         Vitals log        ICU Vital Signs Last 24 Hrs  T(C): 36.4 (10 Nov 2023 05:05), Max: 36.7 (09 Nov 2023 15:32)  T(F): 97.5 (10 Nov 2023 05:05), Max: 98.1 (09 Nov 2023 15:32)  HR: 85 (10 Nov 2023 05:05) (80 - 136)  BP: 101/60 (10 Nov 2023 05:05) (101/60 - 114/60)  BP(mean): --  ABP: --  ABP(mean): --  RR: 18 (10 Nov 2023 05:05) (18 - 20)  SpO2: 92% (10 Nov 2023 05:05) (90% - 99%)    O2 Parameters below as of 10 Nov 2023 05:05  Patient On (Oxygen Delivery Method): nasal cannula  O2 Flow (L/min): 2               Input and Output:  I&O's Detail      Lab Data                        12.8   13.52 )-----------( 258      ( 09 Nov 2023 12:00 )             37.4     11-09    134<L>  |  98  |  34<H>  ----------------------------<  133<H>  4.1   |  24  |  2.00<H>    Ca    9.0      09 Nov 2023 12:00    TPro  7.8  /  Alb  3.3  /  TBili  0.4  /  DBili  x   /  AST  25  /  ALT  19  /  AlkPhos  81  11-09    ABG - ( 10 Nov 2023 01:32 )  pH, Arterial: 7.32  pH, Blood: x     /  pCO2: 33    /  pO2: 145   / HCO3: 17    / Base Excess: -9.1  /  SaO2: 100.0                   Review of Systems	      Objective     Physical Examination    heart s1s2  lung dc BS  head nc      Pertinent Lab findings & Imaging      Chiki:  NO   Adequate UO     I&O's Detail           Discussed with:     Cultures:	        Radiology

## 2023-11-10 NOTE — PHYSICAL THERAPY INITIAL EVALUATION ADULT - GAIT TRAINING, PT EVAL
Patient will ambulate >200 feet within 3-5 sessions to allow patient to ambulate household distances.

## 2023-11-10 NOTE — CARE COORDINATION ASSESSMENT. - NSPASTMEDSURGHISTORY_GEN_ALL_CORE_FT
PAST MEDICAL & SURGICAL HISTORY:  Hypothyroidism      Hypertension      Hyperlipidemia      Thyroid nodule      Asthma      History of COPD  No home O2 use      History of repair of hip fracture  ORIF 1982.  Hardware was eventually removed      History of cholecystectomy  1989

## 2023-11-10 NOTE — PATIENT PROFILE ADULT - FALL HARM RISK - HARM RISK INTERVENTIONS
Assistance with ambulation/Assistance OOB with selected safe patient handling equipment/Communicate Risk of Fall with Harm to all staff/Discuss with provider need for PT consult/Monitor gait and stability/Reinforce activity limits and safety measures with patient and family/Tailored Fall Risk Interventions/Visual Cue: Yellow wristband and red socks/Bed in lowest position, wheels locked, appropriate side rails in place/Call bell, personal items and telephone in reach/Instruct patient to call for assistance before getting out of bed or chair/Non-slip footwear when patient is out of bed/Phyllis to call system/Physically safe environment - no spills, clutter or unnecessary equipment/Purposeful Proactive Rounding/Room/bathroom lighting operational, light cord in reach

## 2023-11-10 NOTE — PHYSICAL THERAPY INITIAL EVALUATION ADULT - TRANSFER TRAINING, PT EVAL
Patient will improve sit <-> stand independently 3-5 sessions in order for patient to safely get in and out of chair

## 2023-11-10 NOTE — PHYSICAL THERAPY INITIAL EVALUATION ADULT - PERTINENT HX OF CURRENT PROBLEM, REHAB EVAL
78-year-old female with history of COPD (not on home oxygen), nicotine dependence, hypertension, hyperlipidemia, and hypothyroidism presents to the ED for back pain.  Patient reports that for the past 2-3 weeks, she has been having pain located in the midline of her back.  At the time, patient went to an urgent care and was diagnosed with a urinary tract infection.  Patient was treated with antibiotics but the pain did not go away.  Patient wanted to go back to the urgent care today for repeat evaluation, but decided she wanted further workup in the ED.  Patient endorses worsened dyspnea on exertion. Patient denies fever, N/V/D/C, increased sputum, increased purulence, past history of CHF, past history of COPD exacerbation.

## 2023-11-10 NOTE — CONSULT NOTE ADULT - ASSESSMENT
YADI  77 y/o F w. PMHx of COPD (Not on home O2), Nicotine dependance, HTN, HLD, and hypothyroidism who presents with complaint of back pain for ~2-3 week and increased shortnes of breathe, admitted for COPD exac. and PNA.    Assessment:  YADI ON CKD    #YADI:  -Likely prerenal as pt admits to decreased PO intake ~10 days  -Cr. 2.0 on admission, downtrending to 1.5 today.  -Continue to hold Losartan & Lasix until Cr. stabilizes.  -Avoid nephrotoxic meds as able  -Urine studies ordered    77 y/o F w. PMHx of COPD (Not on home O2), Nicotine dependance, HTN, HLD, and hypothyroidism who presents with complaint of back pain for ~2-3 week and increased shortness of breathe. Patient admitted for COPD exacerbation and PNA.    Assessment:  YADI ON CKD    #YADI:  -YADI 2/2 decreased PO intake ~10 days, likely prerenal  -Cr. 2.0 on admission, downtrending to 1.5 today.  -Continue to hold Losartan & Lasix until Cr. stabilizes.  -Avoid nephrotoxic meds as able  -Urine studies ordered  -Monitor chemistries.     Thank you

## 2023-11-10 NOTE — CARE COORDINATION ASSESSMENT. - NSDCPLANSERVICES_GEN_ALL_CORE
This CM met at the bedside with the pt. Introduced myself as the CM explained my role and left contact information. The pt verbalized understanding. The pt lives alone in a private home and is independent with ADL's and ambulating without any devices. The pt shops for herself and drives as well. The pt has no services or HHA in the home. The PCP is Dr. David Glasgow @ [phone] and the pulmonologist is Dr. Fay Olivas in [place]. Pt had a PTE and they are recommending out-patient PT. The pt is a [employer] pt as well. This was explained to the pt. The pt does not have home oxygen, however the pt is on O2 2L via [place]. The pt is declining home care services and refusing the need for out-patient PT as well. CM team to continue to follow for acute needs. This CM even spoke to the pt about the possibility of needing home oxygen, and the pt refused this as well. Will continue to monitor closely. The daughter Lili will transport the pt home once she is medically cleared./Anticipated Needs Unclear at Present

## 2023-11-10 NOTE — PHYSICAL THERAPY INITIAL EVALUATION ADULT - ADDITIONAL COMMENTS
Patient reports living at home alone. Patient states there are 4 steps to enter home but uses garage entrance. Patient states having 13 stairs inside and negotiates independently. Patient owns walker and cane but reports she doesn't use them.

## 2023-11-10 NOTE — PROGRESS NOTE ADULT - PROBLEM SELECTOR PLAN 2
Acute hypoxemic respiratory failure due to CAP and COPD, cannot rule out underlying CHF  - history of COPD, does not use O2 at home  - statting well on NC, maintain SpO2 88-92%, titrate as needed  - c/w ceftriaxone + azithromycin for pneumonia coverage  - c/w duoneb inhaler  -  solu-medrol 40 mg BID  - sputum culture ordered, f/u results  - TTE ordered, elevated BNP, no chart record,  - CT Chest   - cardiology consulted, recs appreciated  - Pulmonology consulted, recs appreciated

## 2023-11-11 LAB
APPEARANCE UR: CLEAR — SIGNIFICANT CHANGE UP
APPEARANCE UR: CLEAR — SIGNIFICANT CHANGE UP
BILIRUB UR-MCNC: NEGATIVE — SIGNIFICANT CHANGE UP
BILIRUB UR-MCNC: NEGATIVE — SIGNIFICANT CHANGE UP
COLOR SPEC: YELLOW — SIGNIFICANT CHANGE UP
COLOR SPEC: YELLOW — SIGNIFICANT CHANGE UP
CREAT ?TM UR-MCNC: 73 MG/DL — SIGNIFICANT CHANGE UP
CREAT ?TM UR-MCNC: 73 MG/DL — SIGNIFICANT CHANGE UP
DIFF PNL FLD: NEGATIVE — SIGNIFICANT CHANGE UP
DIFF PNL FLD: NEGATIVE — SIGNIFICANT CHANGE UP
GLUCOSE FLD-MCNC: 166 MG/DL — SIGNIFICANT CHANGE UP
GLUCOSE FLD-MCNC: 166 MG/DL — SIGNIFICANT CHANGE UP
GLUCOSE UR QL: NEGATIVE MG/DL — SIGNIFICANT CHANGE UP
GLUCOSE UR QL: NEGATIVE MG/DL — SIGNIFICANT CHANGE UP
GRAM STN FLD: SIGNIFICANT CHANGE UP
GRAM STN FLD: SIGNIFICANT CHANGE UP
KETONES UR-MCNC: NEGATIVE MG/DL — SIGNIFICANT CHANGE UP
KETONES UR-MCNC: NEGATIVE MG/DL — SIGNIFICANT CHANGE UP
LDH SERPL L TO P-CCNC: 95 U/L — SIGNIFICANT CHANGE UP
LDH SERPL L TO P-CCNC: 95 U/L — SIGNIFICANT CHANGE UP
LEGIONELLA AG UR QL: NEGATIVE — SIGNIFICANT CHANGE UP
LEGIONELLA AG UR QL: NEGATIVE — SIGNIFICANT CHANGE UP
LEUKOCYTE ESTERASE UR-ACNC: NEGATIVE — SIGNIFICANT CHANGE UP
LEUKOCYTE ESTERASE UR-ACNC: NEGATIVE — SIGNIFICANT CHANGE UP
NITRITE UR-MCNC: NEGATIVE — SIGNIFICANT CHANGE UP
NITRITE UR-MCNC: NEGATIVE — SIGNIFICANT CHANGE UP
OSMOLALITY UR: 441 MOSM/KG — SIGNIFICANT CHANGE UP (ref 50–1200)
OSMOLALITY UR: 441 MOSM/KG — SIGNIFICANT CHANGE UP (ref 50–1200)
PH UR: 5.5 — SIGNIFICANT CHANGE UP (ref 5–8)
PH UR: 5.5 — SIGNIFICANT CHANGE UP (ref 5–8)
POTASSIUM UR-SCNC: 24.1 MMOL/L — SIGNIFICANT CHANGE UP
POTASSIUM UR-SCNC: 24.1 MMOL/L — SIGNIFICANT CHANGE UP
PROCALCITONIN SERPL-MCNC: 1.21 NG/ML — HIGH
PROCALCITONIN SERPL-MCNC: 1.21 NG/ML — HIGH
PROT ?TM UR-MCNC: 30 MG/DL — HIGH (ref 0–12)
PROT ?TM UR-MCNC: 30 MG/DL — HIGH (ref 0–12)
PROT FLD-MCNC: 3.3 G/DL — SIGNIFICANT CHANGE UP
PROT FLD-MCNC: 3.3 G/DL — SIGNIFICANT CHANGE UP
PROT UR-MCNC: 30 MG/DL
PROT UR-MCNC: 30 MG/DL
PROT/CREAT UR-RTO: 0.4 RATIO — HIGH (ref 0–0.2)
PROT/CREAT UR-RTO: 0.4 RATIO — HIGH (ref 0–0.2)
SODIUM UR-SCNC: <20 MMOL/L — SIGNIFICANT CHANGE UP
SODIUM UR-SCNC: <20 MMOL/L — SIGNIFICANT CHANGE UP
SP GR SPEC: 1.01 — SIGNIFICANT CHANGE UP (ref 1–1.03)
SP GR SPEC: 1.01 — SIGNIFICANT CHANGE UP (ref 1–1.03)
SPECIMEN SOURCE: SIGNIFICANT CHANGE UP
SPECIMEN SOURCE: SIGNIFICANT CHANGE UP
TSH SERPL-MCNC: 0.99 UIU/ML — SIGNIFICANT CHANGE UP (ref 0.36–3.74)
TSH SERPL-MCNC: 0.99 UIU/ML — SIGNIFICANT CHANGE UP (ref 0.36–3.74)
UROBILINOGEN FLD QL: 0.2 MG/DL — SIGNIFICANT CHANGE UP (ref 0.2–1)
UROBILINOGEN FLD QL: 0.2 MG/DL — SIGNIFICANT CHANGE UP (ref 0.2–1)
UUN UR-MCNC: 863 MG/DL — SIGNIFICANT CHANGE UP
UUN UR-MCNC: 863 MG/DL — SIGNIFICANT CHANGE UP

## 2023-11-11 PROCEDURE — 71045 X-RAY EXAM CHEST 1 VIEW: CPT | Mod: 26

## 2023-11-11 PROCEDURE — 32555 ASPIRATE PLEURA W/ IMAGING: CPT | Mod: RT

## 2023-11-11 PROCEDURE — 88108 CYTOPATH CONCENTRATE TECH: CPT | Mod: 26

## 2023-11-11 PROCEDURE — 88305 TISSUE EXAM BY PATHOLOGIST: CPT | Mod: 26

## 2023-11-11 PROCEDURE — 99223 1ST HOSP IP/OBS HIGH 75: CPT

## 2023-11-11 PROCEDURE — 99232 SBSQ HOSP IP/OBS MODERATE 35: CPT

## 2023-11-11 RX ORDER — FUROSEMIDE 40 MG
40 TABLET ORAL DAILY
Refills: 0 | Status: DISCONTINUED | OUTPATIENT
Start: 2023-11-11 | End: 2023-11-14

## 2023-11-11 RX ORDER — METOPROLOL TARTRATE 50 MG
25 TABLET ORAL EVERY 8 HOURS
Refills: 0 | Status: DISCONTINUED | OUTPATIENT
Start: 2023-11-11 | End: 2023-11-12

## 2023-11-11 RX ORDER — BUDESONIDE, MICRONIZED 100 %
0.5 POWDER (GRAM) MISCELLANEOUS ONCE
Refills: 0 | Status: COMPLETED | OUTPATIENT
Start: 2023-11-11 | End: 2023-11-11

## 2023-11-11 RX ADMIN — Medication 40 MILLIGRAM(S): at 12:12

## 2023-11-11 RX ADMIN — Medication 40 MILLIGRAM(S): at 05:32

## 2023-11-11 RX ADMIN — Medication 25 MILLIGRAM(S): at 22:49

## 2023-11-11 RX ADMIN — Medication 3 MILLILITER(S): at 01:00

## 2023-11-11 RX ADMIN — Medication 25 MILLIGRAM(S): at 15:40

## 2023-11-11 RX ADMIN — Medication 3 MILLILITER(S): at 14:44

## 2023-11-11 RX ADMIN — ATORVASTATIN CALCIUM 40 MILLIGRAM(S): 80 TABLET, FILM COATED ORAL at 22:49

## 2023-11-11 RX ADMIN — Medication 0.5 MILLIGRAM(S): at 03:54

## 2023-11-11 RX ADMIN — HEPARIN SODIUM 5000 UNIT(S): 5000 INJECTION INTRAVENOUS; SUBCUTANEOUS at 17:26

## 2023-11-11 RX ADMIN — AZITHROMYCIN 255 MILLIGRAM(S): 500 TABLET, FILM COATED ORAL at 17:25

## 2023-11-11 RX ADMIN — Medication 3 MILLILITER(S): at 07:33

## 2023-11-11 RX ADMIN — CEFTRIAXONE 100 MILLIGRAM(S): 500 INJECTION, POWDER, FOR SOLUTION INTRAMUSCULAR; INTRAVENOUS at 20:05

## 2023-11-11 RX ADMIN — Medication 3 MILLILITER(S): at 20:08

## 2023-11-11 RX ADMIN — Medication 88 MICROGRAM(S): at 05:32

## 2023-11-11 RX ADMIN — Medication 25 MILLIGRAM(S): at 05:32

## 2023-11-11 RX ADMIN — Medication 40 MILLIGRAM(S): at 17:25

## 2023-11-11 NOTE — PROGRESS NOTE ADULT - ASSESSMENT
77 y/o F w. PMHx of COPD (Not on home O2), Nicotine dependance, HTN, HLD, and hypothyroidism who presents with complaint of back pain for ~2-3 week and increased shortness of breathe. Patient admitted for COPD exacerbation and PNA.    Assessment:  YADI ON CKD    #YADI:  -YADI 2/2 decreased PO intake ~10 days, likely prerenal  -Cr. 2.0 on admission, downtrending to 1.5 today.  -Continue to hold Losartan & Lasix until Cr. stabilizes.  -Avoid nephrotoxic meds as able  -Urine studies ordered  -Monitor chemistries.     Thank you        79 y/o F w. PMHx of COPD (Not on home O2), Nicotine dependance, HTN, HLD, and hypothyroidism who presents with complaint of back pain for ~2-3 week and increased shortness of breathe. Patient admitted for COPD exacerbation and PNA.    Assessment:  YADI ON CKD      -YADI 2/2 decreased PO intake ~10 days, likely prerenal, resolving   -Cr. 2.0 on admission, downtrending to 1.5 today.  -Continue to hold Losartan & Lasix until Cr. stabilizes  -Avoid nephrotoxic meds as able  -Urine studies ordered  -Monitor chemistries  -Awaiting today's lab result     Thank you

## 2023-11-11 NOTE — CONSULT NOTE ADULT - ASSESSMENT
78-year-old female with history of COPD (not on home oxygen), nicotine dependence, hypertension, hyperlipidemia, and hypothyroidism here with worsening shortness of breath.  CT with bilateral pleural effusions, mod to large on the right.    - appears short of breath, sitting upright, on 3 L O2  - volume overloaded, and favor starting lasix 40 iv daily  - given her breathing status, she will need a thoracentesis on the right, which hopefully can happen today  - cont 02 supplementation  - pulmonary follow up    - history of moderate mitral stenosis in the past (mean gradient of 9 mmhg in 2022)  - prelim echo from yesterday with severe MS at HR in the 80 range. await official read  - she is now more tachycardic (sinus), in the setting of dyspnea  - change toprol to metoprolol 25 q8hr  - cont to monitor on telemetry    - will follow closely with you

## 2023-11-11 NOTE — PROGRESS NOTE ADULT - SUBJECTIVE AND OBJECTIVE BOX
Patient is a 78y old  Female who presents with a chief complaint of back pain (2023 10:11)      INTERVAL HPI/OVERNIGHT EVENTS: Patient seen and examined at bedside. Endorses SOB. Denies fever, chills, chest pain, abdominal pain, nausea/vomiting, headache.    MEDICATIONS  (STANDING):  albuterol/ipratropium for Nebulization 3 milliLiter(s) Nebulizer every 6 hours  atorvastatin 40 milliGRAM(s) Oral at bedtime  azithromycin  IVPB 500 milliGRAM(s) IV Intermittent every 24 hours  cefTRIAXone   IVPB 1000 milliGRAM(s) IV Intermittent every 24 hours  furosemide   Injectable 40 milliGRAM(s) IV Push daily  heparin   Injectable 5000 Unit(s) SubCutaneous every 12 hours  levothyroxine 88 MICROGram(s) Oral daily  methylPREDNISolone sodium succinate Injectable 40 milliGRAM(s) IV Push two times a day  metoprolol tartrate 25 milliGRAM(s) Oral every 8 hours  nicotine -   7 mG/24Hr(s) Patch 1 Patch Transdermal daily    MEDICATIONS  (PRN):  acetaminophen     Tablet .. 650 milliGRAM(s) Oral every 6 hours PRN Temp greater or equal to 38C (100.4F), Mild Pain (1 - 3)  aluminum hydroxide/magnesium hydroxide/simethicone Suspension 30 milliLiter(s) Oral every 4 hours PRN Dyspepsia  melatonin 3 milliGRAM(s) Oral at bedtime PRN Insomnia  ondansetron Injectable 4 milliGRAM(s) IV Push every 8 hours PRN Nausea and/or Vomiting      Allergies    No Known Allergies    Intolerances        REVIEW OF SYSTEMS:  CONSTITUTIONAL: No fever or chills  HEENT:  No headache, no sore throat  RESPIRATORY: No cough, wheezing, or shortness of breath  CARDIOVASCULAR: No chest pain, palpitations  GASTROINTESTINAL: No abd pain, nausea, vomiting, or diarrhea  GENITOURINARY: No dysuria, frequency, or hematuria  NEUROLOGICAL: no focal weakness or dizziness  MUSCULOSKELETAL: no myalgias     Vital Signs Last 24 Hrs  T(C): 36.6 (2023 11:49), Max: 36.7 (10 Nov 2023 19:54)  T(F): 97.9 (2023 11:49), Max: 98.1 (10 Nov 2023 19:54)  HR: 88 (2023 14:47) (84 - 136)  BP: 127/85 (2023 11:49) (120/76 - 152/79)  BP(mean): --  RR: 20 (2023 11:49) (18 - 20)  SpO2: 95% (2023 14:47) (93% - 97%)    Parameters below as of 2023 14:47  Patient On (Oxygen Delivery Method): nasal cannula, 3 LPM        PHYSICAL EXAM:  GENERAL: NAD  HEENT:  anicteric, moist mucous membranes  CHEST/LUNG:  CTA b/l, no rales, wheezes, or rhonchi  HEART:  RRR, S1, S2  ABDOMEN:  BS+, soft, nontender, nondistended  EXTREMITIES: no edema, cyanosis, or calf tenderness  NERVOUS SYSTEM: answers questions and follows commands appropriately    LABS:    CBC Full  -  ( 10 Nov 2023 08:35 )  WBC Count : 11.39 K/uL  Hemoglobin : 10.8 g/dL  Hematocrit : 32.2 %  Platelet Count - Automated : 246 K/uL  Mean Cell Volume : 94.7 fl  Mean Cell Hemoglobin : 31.8 pg  Mean Cell Hemoglobin Concentration : 33.5 gm/dL  Auto Neutrophil # : 10.59 K/uL  Auto Lymphocyte # : 0.52 K/uL  Auto Monocyte # : 0.19 K/uL  Auto Eosinophil # : 0.04 K/uL  Auto Basophil # : 0.01 K/uL  Auto Neutrophil % : 92.8 %  Auto Lymphocyte % : 4.6 %  Auto Monocyte % : 1.7 %  Auto Eosinophil % : 0.4 %  Auto Basophil % : 0.1 %      Ca    8.2        10 Nov 2023 08:35        Urinalysis Basic - ( 2023 08:15 )    Color: Yellow / Appearance: Clear / S.015 / pH: x  Gluc: x / Ketone: Negative mg/dL  / Bili: Negative / Urobili: 0.2 mg/dL   Blood: x / Protein: 30 mg/dL / Nitrite: Negative   Leuk Esterase: Negative / RBC: 0 /HPF / WBC 0 /HPF   Sq Epi: x / Non Sq Epi: x / Bacteria: x      CAPILLARY BLOOD GLUCOSE            Culture - Urine (collected 23 @ 13:40)  Source: Clean Catch Clean Catch (Midstream)  Preliminary Report (23 @ 17:34):    >100,000 CFU/ml Klebsiella pneumoniae    Culture - Blood (collected 23 @ 12:00)  Source: .Blood Blood-Peripheral  Preliminary Report (11-10-23 @ 19:02):    No growth at 24 hours    Culture - Blood (collected 23 @ 11:40)  Source: .Blood Blood-Peripheral  Preliminary Report (11-10-23 @ 19:02):    No growth at 24 hours        RADIOLOGY & ADDITIONAL TESTS:    Personally reviewed.     Consultant(s) Notes Reviewed:  [x] YES  [ ] NO

## 2023-11-11 NOTE — CONSULT NOTE ADULT - SUBJECTIVE AND OBJECTIVE BOX
[facility] - Mateo Bobby Pannella, Patel, Savella  Office Number: [phone]    Initial Consult Note    CHIEF COMPLAINT: Patient is a 78y old  Female who presents with a chief complaint of back pain (2023 07:29)      HPI:  78-year-old female with history of COPD (not on home oxygen), nicotine dependence, hypertension, hyperlipidemia, and hypothyroidism presents to the ED for back pain.  Patient reports that for the past 2-3 weeks, she has been having pain located in the midline of her back.  At the time, patient went to an urgent care and was diagnosed with a urinary tract infection.  Patient was treated with antibiotics but the pain did not go away.  Patient wanted to go back to the urgent care today for repeat evaluation, but decided she wanted further workup in the ED.  Patient endorses worsened dyspnea on exertion. Patient denies fever, N/V/D/C, increased sputum, increased purulence, past history of CHF, past history of COPD exacerbation.     CT with mod to large right sided pleural effusion  remains quite short of breath, on 3 L 02      In the ED pts VS were T(C): 36.7  T(F): 98.1  HR: 94  BP: 112/60  RR: 20  SpO2: 94%, currently on NC  Labs show: H.8  WBC: 13.52  Plt: 258  Creatinine: 2.00  VQ scan shows no evidence of PE  EKG shows sinus bradycardia    Patient received fluids, ceftriaxone, azithromycin, duonebs, solu-medrol in the ED.    Pt is admitted for further workup and management   (2023 23:21)      PAST MEDICAL & SURGICAL HISTORY:  History of COPD  No home O2 use      Asthma      Thyroid nodule      Hyperlipidemia      Hypertension      Hypothyroidism      History of cholecystectomy        History of repair of hip fracture  ORIF .  Hardware was eventually removed          SOCIAL HISTORY:  current tobacco, no ethanol, or drug abuse.    FAMILY HISTORY:    No family history of acute MI or sudden cardiac death.    MEDICATIONS  (STANDING):  albuterol/ipratropium for Nebulization 3 milliLiter(s) Nebulizer every 6 hours  atorvastatin 40 milliGRAM(s) Oral at bedtime  azithromycin  IVPB 500 milliGRAM(s) IV Intermittent every 24 hours  cefTRIAXone   IVPB 1000 milliGRAM(s) IV Intermittent every 24 hours  heparin   Injectable 5000 Unit(s) SubCutaneous every 12 hours  levothyroxine 88 MICROGram(s) Oral daily  methylPREDNISolone sodium succinate Injectable 40 milliGRAM(s) IV Push two times a day  metoprolol succinate ER 25 milliGRAM(s) Oral daily  nicotine -   7 mG/24Hr(s) Patch 1 Patch Transdermal daily    MEDICATIONS  (PRN):  acetaminophen     Tablet .. 650 milliGRAM(s) Oral every 6 hours PRN Temp greater or equal to 38C (100.4F), Mild Pain (1 - 3)  aluminum hydroxide/magnesium hydroxide/simethicone Suspension 30 milliLiter(s) Oral every 4 hours PRN Dyspepsia  melatonin 3 milliGRAM(s) Oral at bedtime PRN Insomnia  ondansetron Injectable 4 milliGRAM(s) IV Push every 8 hours PRN Nausea and/or Vomiting      Allergies    No Known Allergies    Intolerances        REVIEW OF SYSTEMS:    CONSTITUTIONAL: No weakness, fevers or chills  EYES/ENT: No visual changes;  No vertigo or throat pain   NECK: No pain or stiffness  RESPIRATORY: No cough, wheezing, hemoptysis; reports shortness of breath  CARDIOVASCULAR: No chest pain or palpitations  GASTROINTESTINAL: No abdominal pain. No nausea, vomiting, or hematemesis; No diarrhea or constipation. No melena or hematochezia.  GENITOURINARY: No dysuria, frequency or hematuria  NEUROLOGICAL: No numbness or weakness  SKIN: No itching or rash  All other review of systems is negative unless indicated above    VITAL SIGNS:   Vital Signs Last 24 Hrs  T(C): 36.4 (2023 05:15), Max: 36.9 (10 Nov 2023 15:59)  T(F): 97.6 (2023 05:15), Max: 98.5 (10 Nov 2023 15:59)  HR: 84 (2023 07:53) (80 - 136)  BP: 120/76 (2023 05:15) (109/62 - 152/79)  BP(mean): --  RR: 18 (2023 05:15) (18 - 18)  SpO2: 95% (2023 07:53) (91% - 97%)    Parameters below as of 2023 07:53  Patient On (Oxygen Delivery Method): nasal cannula, 2 LPM        I&O's Summary      On Exam:    Constitutional: short of breath, in chair, on 3 L 02  Lungs:  mildly labored, decreased bs bilaterally, less on right  Cardiovascular: tachy, S1 and S2 positive.  +sm/dm, no rubs, gallops or clicks  Gastrointestinal: Bowel Sounds present, soft, nontender.   Lymph: No peripheral edema. No cervical lymphadenopathy.  Neurological: Alert, no focal deficits  Skin: No rashes or ulcers   Psych:  Mood & affect appropriate.    LABS: All Labs Reviewed:                        10.8   11.39 )-----------( 246      ( 10 Nov 2023 08:35 )             32.2                         12.8   13.52 )-----------( 258      ( 2023 12:00 )             37.4     10 Nov 2023 08:35    137    |  103    |  36     ----------------------------<  175    4.0     |  24     |  1.50   2023 12:00    134    |  98     |  34     ----------------------------<  133    4.1     |  24     |  2.00     Ca    8.2        10 Nov 2023 08:35  Ca    9.0        2023 12:00    TPro  7.0    /  Alb  2.9    /  TBili  0.2    /  DBili  x      /  AST  17     /  ALT  16     /  AlkPhos  68     10 Nov 2023 08:35  TPro  7.8    /  Alb  3.3    /  TBili  0.4    /  DBili  x      /  AST  25     /  ALT  19     /  AlkPhos  81     2023 12:00    PT/INR - ( 2023 12:00 )   PT: 10.9 sec;   INR: 0.93 ratio         PTT - ( 2023 12:00 )  PTT:30.8 sec      Blood Culture: Organism --  Gram Stain Blood -- Gram Stain --  Specimen Source Clean Catch Clean Catch (Midstream)  Culture-Blood --    Organism --  Gram Stain Blood -- Gram Stain --  Specimen Source .Blood Blood-Peripheral  Culture-Blood --    Organism --  Gram Stain Blood -- Gram Stain --  Specimen Source .Blood Blood-Peripheral  Culture-Blood --         @ 08:05  TSH: 0.99      RADIOLOGY:    EKG: st

## 2023-11-11 NOTE — PROGRESS NOTE ADULT - ASSESSMENT
78-year-old female with history of COPD (not on home oxygen), hypertension, hyperlipidemia, and hypothyroidism presents to the ED for back pain.  Patient reports that for the past 2-3 weeks, she has been having pain located in the midline of her back.     pain  weakness  copd  HTN  HLD  thyroid disease  eval for PNA  HFpEF  Ex Smoker  Valv heart disease    eval for CHF - effusions - proBNP very high -   ct chest reviewed - effusions - plan for right side thoracentesis with IR  on steroids -nebs for COPD    ct chest  abx  bronchodilators  systemic steroids  ABG noted  fio2 titration - goal sat > 88 pct  I curt  monitor VS and HD and Sat  VQ scan NEG for PE  BP control  nutrition  outpatient records reviewed - follows with Dr Fay Muir - [facility] pul practice

## 2023-11-11 NOTE — PROGRESS NOTE ADULT - SUBJECTIVE AND OBJECTIVE BOX
Date/Time Patient Seen:  		  Referring MD:   Data Reviewed	       Patient is a 78y old  Female who presents with a chief complaint of back pain (10 Nov 2023 15:23)      Subjective/HPI     PAST MEDICAL & SURGICAL HISTORY:  History of COPD  No home O2 use    Asthma    Thyroid nodule    Hyperlipidemia    Hypertension    Hypothyroidism    History of cholecystectomy  1989    History of repair of hip fracture  ORIF 1982.  Hardware was eventually removed          Medication list         MEDICATIONS  (STANDING):  albuterol/ipratropium for Nebulization 3 milliLiter(s) Nebulizer every 6 hours  atorvastatin 40 milliGRAM(s) Oral at bedtime  azithromycin  IVPB 500 milliGRAM(s) IV Intermittent every 24 hours  cefTRIAXone   IVPB 1000 milliGRAM(s) IV Intermittent every 24 hours  heparin   Injectable 5000 Unit(s) SubCutaneous every 12 hours  levothyroxine 88 MICROGram(s) Oral daily  methylPREDNISolone sodium succinate Injectable 40 milliGRAM(s) IV Push two times a day  metoprolol succinate ER 25 milliGRAM(s) Oral daily  nicotine -   7 mG/24Hr(s) Patch 1 Patch Transdermal daily    MEDICATIONS  (PRN):  acetaminophen     Tablet .. 650 milliGRAM(s) Oral every 6 hours PRN Temp greater or equal to 38C (100.4F), Mild Pain (1 - 3)  aluminum hydroxide/magnesium hydroxide/simethicone Suspension 30 milliLiter(s) Oral every 4 hours PRN Dyspepsia  melatonin 3 milliGRAM(s) Oral at bedtime PRN Insomnia  ondansetron Injectable 4 milliGRAM(s) IV Push every 8 hours PRN Nausea and/or Vomiting         Vitals log        ICU Vital Signs Last 24 Hrs  T(C): 36.7 (10 Nov 2023 19:54), Max: 36.9 (10 Nov 2023 15:59)  T(F): 98.1 (10 Nov 2023 19:54), Max: 98.5 (10 Nov 2023 15:59)  HR: 108 (11 Nov 2023 03:54) (80 - 136)  BP: 152/79 (10 Nov 2023 19:54) (109/62 - 152/79)  BP(mean): --  ABP: --  ABP(mean): --  RR: 18 (10 Nov 2023 19:54) (18 - 18)  SpO2: 94% (11 Nov 2023 03:54) (91% - 97%)    O2 Parameters below as of 11 Nov 2023 03:54  Patient On (Oxygen Delivery Method): nasal cannula, 2L                 Input and Output:  I&O's Detail      Lab Data                        10.8   11.39 )-----------( 246      ( 10 Nov 2023 08:35 )             32.2     11-10    137  |  103  |  36<H>  ----------------------------<  175<H>  4.0   |  24  |  1.50<H>    Ca    8.2<L>      10 Nov 2023 08:35    TPro  7.0  /  Alb  2.9<L>  /  TBili  0.2  /  DBili  x   /  AST  17  /  ALT  16  /  AlkPhos  68  11-10    ABG - ( 10 Nov 2023 01:32 )  pH, Arterial: 7.32  pH, Blood: x     /  pCO2: 33    /  pO2: 145   / HCO3: 17    / Base Excess: -9.1  /  SaO2: 100.0                   Review of Systems	      Objective     Physical Examination    heart s1s2  lung dc BS  head nc      Pertinent Lab findings & Imaging      Chiki:  NO   Adequate UO     I&O's Detail           Discussed with:     Cultures:	        Radiology

## 2023-11-11 NOTE — PROGRESS NOTE ADULT - SUBJECTIVE AND OBJECTIVE BOX
SANJUANA TORRES  78y  Female      Patient is a 78y old  Female who presents with a chief complaint of back pain (10 Nov 2023 15:23)      INTERVAL HPI/OVERNIGHT EVENTS: Patient is a 77 y/o F w. PMHx of COPD (Not on home O2), Nicotine dependance, HTN, HLD, and hypothyroidism who presents with complaint of back pain for ~2-3 week. Patient describes the pain as a band across her lower back. She went to Urgent Car initially for this back pain and finished a course of Macrobid/Pyridium. The back pain persisted and she subsequently came into [facility] ED. She did not have any specific urinary sx. and denies hematuria, dysuria, etc at the time of the initial back pain and still does not endorse any urinary sx. Her back pain has resolved. Patient also endorsing inc. work of breathing with dyspnea on exertion. Patient admitted for COPD exacerbation and suspected community-acquired PNA, now on Abx. No acute events occurred overnight.   Of note, pt admits to naproxen use 2-3 per week x2 months.    REVIEW OF SYSTEMS:  CONSTITUTIONAL: No fever, no chills  EYES: No eye pain, visual disturbances, or discharge  ENMT:  No difficulty hearing, tinnitus, vertigo; No sinus or throat pain  RESPIRATORY: +Cough, +wheezing, +dyspnea. No hemoptysis.  CARDIOVASCULAR: No chest pain, palpitations, dizziness, or leg swelling  GASTROINTESTINAL: No abdominal or epigastric pain. No nausea, vomiting, or hematemesis; No diarrhea or constipation.   GENITOURINARY: No dysuria, frequency, hematuria, or incontinence  NEUROLOGICAL: No headaches.  MUSCULOSKELETAL: No joint pain or swelling; No muscle, back, or extremity pain    Vital Signs Last 24 Hrs  T(C): 36.4 (11 Nov 2023 05:15), Max: 36.9 (10 Nov 2023 15:59)  T(F): 97.6 (11 Nov 2023 05:15), Max: 98.5 (10 Nov 2023 15:59)  HR: 133 (11 Nov 2023 05:15) (80 - 136)  BP: 120/76 (11 Nov 2023 05:15) (109/62 - 152/79)  BP(mean): --  RR: 18 (11 Nov 2023 05:15) (18 - 18)  SpO2: 93% (11 Nov 2023 05:15) (91% - 97%)    Parameters below as of 11 Nov 2023 05:15  Patient On (Oxygen Delivery Method): nasal cannula  O2 Flow (L/min): 2        PHYSICAL EXAM:  GENERAL: appears stated age, thin habitus  HEAD:  Atraumatic, Normocephalic  EYES: EOMI, conjunctiva and sclera clear  ENMT: No tonsillar erythema, exudates, or enlargement; Moist mucous membranes  NERVOUS SYSTEM:  Alert & Oriented X3, Good concentration.  CHEST/LUNG: +decreased BS BL, +wheezing.  HEART: Regular rate and rhythm; No murmurs, rubs, or gallops  ABDOMEN: Soft, Nontender, Nondistended; Bowel sounds present  EXTREMITIES:  2+ Peripheral Pulses, No clubbing, cyanosis, or edema  SKIN: No rashes or lesions    Consultant(s) Notes Reviewed:  [x ] YES  [ ] NO  Care Discussed with Consultants/Other Providers [ x] YES  [ ] NO    LABS:                        10.8   11.39 )-----------( 246      ( 10 Nov 2023 08:35 )             32.2     11-10    137  |  103  |  36<H>  ----------------------------<  175<H>  4.0   |  24  |  1.50<H>    Ca    8.2<L>      10 Nov 2023 08:35    TPro  7.0  /  Alb  2.9<L>  /  TBili  0.2  /  DBili  x   /  AST  17  /  ALT  16  /  AlkPhos  68  11-10    PT/INR - ( 09 Nov 2023 12:00 )   PT: 10.9 sec;   INR: 0.93 ratio         PTT - ( 09 Nov 2023 12:00 )  PTT:30.8 sec  Urinalysis Basic - ( 10 Nov 2023 08:35 )    Color: x / Appearance: x / SG: x / pH: x  Gluc: 175 mg/dL / Ketone: x  / Bili: x / Urobili: x   Blood: x / Protein: x / Nitrite: x   Leuk Esterase: x / RBC: x / WBC x   Sq Epi: x / Non Sq Epi: x / Bacteria: x      CAPILLARY BLOOD GLUCOSE          ABG - ( 10 Nov 2023 01:32 )  pH, Arterial: 7.32  pH, Blood: x     /  pCO2: 33    /  pO2: 145   / HCO3: 17    / Base Excess: -9.1  /  SaO2: 100.0             Urinalysis Basic - ( 10 Nov 2023 08:35 )    Color: x / Appearance: x / SG: x / pH: x  Gluc: 175 mg/dL / Ketone: x  / Bili: x / Urobili: x   Blood: x / Protein: x / Nitrite: x   Leuk Esterase: x / RBC: x / WBC x   Sq Epi: x / Non Sq Epi: x / Bacteria: x        RADIOLOGY & ADDITIONAL TESTS:    Imaging Personally Reviewed:  [ ] YES  [ ] NO

## 2023-11-12 LAB
-  AMOXICILLIN/CLAVULANIC ACID: SIGNIFICANT CHANGE UP
-  AMOXICILLIN/CLAVULANIC ACID: SIGNIFICANT CHANGE UP
-  AMPICILLIN/SULBACTAM: SIGNIFICANT CHANGE UP
-  AMPICILLIN/SULBACTAM: SIGNIFICANT CHANGE UP
-  AMPICILLIN: SIGNIFICANT CHANGE UP
-  AMPICILLIN: SIGNIFICANT CHANGE UP
-  AZTREONAM: SIGNIFICANT CHANGE UP
-  AZTREONAM: SIGNIFICANT CHANGE UP
-  CEFAZOLIN: SIGNIFICANT CHANGE UP
-  CEFAZOLIN: SIGNIFICANT CHANGE UP
-  CEFEPIME: SIGNIFICANT CHANGE UP
-  CEFEPIME: SIGNIFICANT CHANGE UP
-  CEFOXITIN: SIGNIFICANT CHANGE UP
-  CEFOXITIN: SIGNIFICANT CHANGE UP
-  CEFTRIAXONE: SIGNIFICANT CHANGE UP
-  CEFTRIAXONE: SIGNIFICANT CHANGE UP
-  CEFUROXIME: SIGNIFICANT CHANGE UP
-  CEFUROXIME: SIGNIFICANT CHANGE UP
-  CIPROFLOXACIN: SIGNIFICANT CHANGE UP
-  CIPROFLOXACIN: SIGNIFICANT CHANGE UP
-  ERTAPENEM: SIGNIFICANT CHANGE UP
-  ERTAPENEM: SIGNIFICANT CHANGE UP
-  GENTAMICIN: SIGNIFICANT CHANGE UP
-  GENTAMICIN: SIGNIFICANT CHANGE UP
-  IMIPENEM: SIGNIFICANT CHANGE UP
-  IMIPENEM: SIGNIFICANT CHANGE UP
-  LEVOFLOXACIN: SIGNIFICANT CHANGE UP
-  LEVOFLOXACIN: SIGNIFICANT CHANGE UP
-  MEROPENEM: SIGNIFICANT CHANGE UP
-  MEROPENEM: SIGNIFICANT CHANGE UP
-  NITROFURANTOIN: SIGNIFICANT CHANGE UP
-  NITROFURANTOIN: SIGNIFICANT CHANGE UP
-  PIPERACILLIN/TAZOBACTAM: SIGNIFICANT CHANGE UP
-  PIPERACILLIN/TAZOBACTAM: SIGNIFICANT CHANGE UP
-  TOBRAMYCIN: SIGNIFICANT CHANGE UP
-  TOBRAMYCIN: SIGNIFICANT CHANGE UP
-  TRIMETHOPRIM/SULFAMETHOXAZOLE: SIGNIFICANT CHANGE UP
-  TRIMETHOPRIM/SULFAMETHOXAZOLE: SIGNIFICANT CHANGE UP
ALBUMIN SERPL ELPH-MCNC: 2.8 G/DL — LOW (ref 3.3–5)
ALBUMIN SERPL ELPH-MCNC: 2.8 G/DL — LOW (ref 3.3–5)
ALP SERPL-CCNC: 70 U/L — SIGNIFICANT CHANGE UP (ref 40–120)
ALP SERPL-CCNC: 70 U/L — SIGNIFICANT CHANGE UP (ref 40–120)
ALT FLD-CCNC: 36 U/L — SIGNIFICANT CHANGE UP (ref 12–78)
ALT FLD-CCNC: 36 U/L — SIGNIFICANT CHANGE UP (ref 12–78)
ANION GAP SERPL CALC-SCNC: 9 MMOL/L — SIGNIFICANT CHANGE UP (ref 5–17)
ANION GAP SERPL CALC-SCNC: 9 MMOL/L — SIGNIFICANT CHANGE UP (ref 5–17)
AST SERPL-CCNC: 29 U/L — SIGNIFICANT CHANGE UP (ref 15–37)
AST SERPL-CCNC: 29 U/L — SIGNIFICANT CHANGE UP (ref 15–37)
B PERT IGG+IGM PNL SER: ABNORMAL
B PERT IGG+IGM PNL SER: ABNORMAL
BASOPHILS # BLD AUTO: 0 K/UL — SIGNIFICANT CHANGE UP (ref 0–0.2)
BASOPHILS # BLD AUTO: 0 K/UL — SIGNIFICANT CHANGE UP (ref 0–0.2)
BASOPHILS NFR BLD AUTO: 0 % — SIGNIFICANT CHANGE UP (ref 0–2)
BASOPHILS NFR BLD AUTO: 0 % — SIGNIFICANT CHANGE UP (ref 0–2)
BILIRUB SERPL-MCNC: 0.2 MG/DL — SIGNIFICANT CHANGE UP (ref 0.2–1.2)
BILIRUB SERPL-MCNC: 0.2 MG/DL — SIGNIFICANT CHANGE UP (ref 0.2–1.2)
BUN SERPL-MCNC: 45 MG/DL — HIGH (ref 7–23)
BUN SERPL-MCNC: 45 MG/DL — HIGH (ref 7–23)
CALCIUM SERPL-MCNC: 8.3 MG/DL — LOW (ref 8.5–10.1)
CALCIUM SERPL-MCNC: 8.3 MG/DL — LOW (ref 8.5–10.1)
CHLORIDE SERPL-SCNC: 105 MMOL/L — SIGNIFICANT CHANGE UP (ref 96–108)
CHLORIDE SERPL-SCNC: 105 MMOL/L — SIGNIFICANT CHANGE UP (ref 96–108)
CO2 SERPL-SCNC: 24 MMOL/L — SIGNIFICANT CHANGE UP (ref 22–31)
CO2 SERPL-SCNC: 24 MMOL/L — SIGNIFICANT CHANGE UP (ref 22–31)
COLOR FLD: SIGNIFICANT CHANGE UP
COLOR FLD: SIGNIFICANT CHANGE UP
CREAT SERPL-MCNC: 1.1 MG/DL — SIGNIFICANT CHANGE UP (ref 0.5–1.3)
CREAT SERPL-MCNC: 1.1 MG/DL — SIGNIFICANT CHANGE UP (ref 0.5–1.3)
CULTURE RESULTS: ABNORMAL
CULTURE RESULTS: ABNORMAL
EGFR: 51 ML/MIN/1.73M2 — LOW
EGFR: 51 ML/MIN/1.73M2 — LOW
EOSINOPHIL # BLD AUTO: 0 K/UL — SIGNIFICANT CHANGE UP (ref 0–0.5)
EOSINOPHIL # BLD AUTO: 0 K/UL — SIGNIFICANT CHANGE UP (ref 0–0.5)
EOSINOPHIL # FLD: 2 % — SIGNIFICANT CHANGE UP
EOSINOPHIL # FLD: 2 % — SIGNIFICANT CHANGE UP
EOSINOPHIL NFR BLD AUTO: 0 % — SIGNIFICANT CHANGE UP (ref 0–6)
EOSINOPHIL NFR BLD AUTO: 0 % — SIGNIFICANT CHANGE UP (ref 0–6)
FLUID INTAKE SUBSTANCE CLASS: SIGNIFICANT CHANGE UP
FLUID INTAKE SUBSTANCE CLASS: SIGNIFICANT CHANGE UP
FOLATE+VIT B12 SERBLD-IMP: 0 % — SIGNIFICANT CHANGE UP
FOLATE+VIT B12 SERBLD-IMP: 0 % — SIGNIFICANT CHANGE UP
GLUCOSE SERPL-MCNC: 125 MG/DL — HIGH (ref 70–99)
GLUCOSE SERPL-MCNC: 125 MG/DL — HIGH (ref 70–99)
HCT VFR BLD CALC: 28.6 % — LOW (ref 34.5–45)
HCT VFR BLD CALC: 28.6 % — LOW (ref 34.5–45)
HGB BLD-MCNC: 9.6 G/DL — LOW (ref 11.5–15.5)
HGB BLD-MCNC: 9.6 G/DL — LOW (ref 11.5–15.5)
IMM GRANULOCYTES NFR BLD AUTO: 0.6 % — SIGNIFICANT CHANGE UP (ref 0–0.9)
IMM GRANULOCYTES NFR BLD AUTO: 0.6 % — SIGNIFICANT CHANGE UP (ref 0–0.9)
LDH SERPL L TO P-CCNC: 157 U/L — SIGNIFICANT CHANGE UP (ref 50–242)
LDH SERPL L TO P-CCNC: 157 U/L — SIGNIFICANT CHANGE UP (ref 50–242)
LYMPHOCYTES # BLD AUTO: 0.52 K/UL — LOW (ref 1–3.3)
LYMPHOCYTES # BLD AUTO: 0.52 K/UL — LOW (ref 1–3.3)
LYMPHOCYTES # BLD AUTO: 6 % — LOW (ref 13–44)
LYMPHOCYTES # BLD AUTO: 6 % — LOW (ref 13–44)
LYMPHOCYTES # FLD: 18 % — SIGNIFICANT CHANGE UP
LYMPHOCYTES # FLD: 18 % — SIGNIFICANT CHANGE UP
MAGNESIUM SERPL-MCNC: 1.9 MG/DL — SIGNIFICANT CHANGE UP (ref 1.6–2.6)
MAGNESIUM SERPL-MCNC: 1.9 MG/DL — SIGNIFICANT CHANGE UP (ref 1.6–2.6)
MCHC RBC-ENTMCNC: 31.5 PG — SIGNIFICANT CHANGE UP (ref 27–34)
MCHC RBC-ENTMCNC: 31.5 PG — SIGNIFICANT CHANGE UP (ref 27–34)
MCHC RBC-ENTMCNC: 33.6 GM/DL — SIGNIFICANT CHANGE UP (ref 32–36)
MCHC RBC-ENTMCNC: 33.6 GM/DL — SIGNIFICANT CHANGE UP (ref 32–36)
MCV RBC AUTO: 93.8 FL — SIGNIFICANT CHANGE UP (ref 80–100)
MCV RBC AUTO: 93.8 FL — SIGNIFICANT CHANGE UP (ref 80–100)
MESOTHL CELL # FLD: 2 % — SIGNIFICANT CHANGE UP
MESOTHL CELL # FLD: 2 % — SIGNIFICANT CHANGE UP
METHOD TYPE: SIGNIFICANT CHANGE UP
METHOD TYPE: SIGNIFICANT CHANGE UP
MONOCYTES # BLD AUTO: 0.4 K/UL — SIGNIFICANT CHANGE UP (ref 0–0.9)
MONOCYTES # BLD AUTO: 0.4 K/UL — SIGNIFICANT CHANGE UP (ref 0–0.9)
MONOCYTES NFR BLD AUTO: 4.6 % — SIGNIFICANT CHANGE UP (ref 2–14)
MONOCYTES NFR BLD AUTO: 4.6 % — SIGNIFICANT CHANGE UP (ref 2–14)
MONOS+MACROS # FLD: 54 % — SIGNIFICANT CHANGE UP
MONOS+MACROS # FLD: 54 % — SIGNIFICANT CHANGE UP
NEUTROPHILS # BLD AUTO: 7.75 K/UL — HIGH (ref 1.8–7.4)
NEUTROPHILS # BLD AUTO: 7.75 K/UL — HIGH (ref 1.8–7.4)
NEUTROPHILS NFR BLD AUTO: 88.8 % — HIGH (ref 43–77)
NEUTROPHILS NFR BLD AUTO: 88.8 % — HIGH (ref 43–77)
NEUTROPHILS-BODY FLUID: 24 % — SIGNIFICANT CHANGE UP
NEUTROPHILS-BODY FLUID: 24 % — SIGNIFICANT CHANGE UP
NRBC # BLD: 0 /100 WBCS — SIGNIFICANT CHANGE UP (ref 0–0)
NRBC # BLD: 0 /100 WBCS — SIGNIFICANT CHANGE UP (ref 0–0)
NRBC # FLD: 0 % — SIGNIFICANT CHANGE UP
NRBC # FLD: 0 % — SIGNIFICANT CHANGE UP
ORGANISM # SPEC MICROSCOPIC CNT: ABNORMAL
ORGANISM # SPEC MICROSCOPIC CNT: ABNORMAL
ORGANISM # SPEC MICROSCOPIC CNT: SIGNIFICANT CHANGE UP
ORGANISM # SPEC MICROSCOPIC CNT: SIGNIFICANT CHANGE UP
OTHER CELLS FLD MANUAL: 0 % — SIGNIFICANT CHANGE UP
OTHER CELLS FLD MANUAL: 0 % — SIGNIFICANT CHANGE UP
PHOSPHATE SERPL-MCNC: 3.7 MG/DL — SIGNIFICANT CHANGE UP (ref 2.5–4.5)
PHOSPHATE SERPL-MCNC: 3.7 MG/DL — SIGNIFICANT CHANGE UP (ref 2.5–4.5)
PLATELET # BLD AUTO: 285 K/UL — SIGNIFICANT CHANGE UP (ref 150–400)
PLATELET # BLD AUTO: 285 K/UL — SIGNIFICANT CHANGE UP (ref 150–400)
POTASSIUM SERPL-MCNC: 4.3 MMOL/L — SIGNIFICANT CHANGE UP (ref 3.5–5.3)
POTASSIUM SERPL-MCNC: 4.3 MMOL/L — SIGNIFICANT CHANGE UP (ref 3.5–5.3)
POTASSIUM SERPL-SCNC: 4.3 MMOL/L — SIGNIFICANT CHANGE UP (ref 3.5–5.3)
POTASSIUM SERPL-SCNC: 4.3 MMOL/L — SIGNIFICANT CHANGE UP (ref 3.5–5.3)
PROT SERPL-MCNC: 6.8 G/DL — SIGNIFICANT CHANGE UP (ref 6–8.3)
PROT SERPL-MCNC: 6.8 G/DL — SIGNIFICANT CHANGE UP (ref 6–8.3)
RBC # BLD: 3.05 M/UL — LOW (ref 3.8–5.2)
RBC # BLD: 3.05 M/UL — LOW (ref 3.8–5.2)
RBC # FLD: 13.6 % — SIGNIFICANT CHANGE UP (ref 10.3–14.5)
RBC # FLD: 13.6 % — SIGNIFICANT CHANGE UP (ref 10.3–14.5)
RCV VOL RI: HIGH /UL (ref 0–0)
RCV VOL RI: HIGH /UL (ref 0–0)
SODIUM SERPL-SCNC: 138 MMOL/L — SIGNIFICANT CHANGE UP (ref 135–145)
SODIUM SERPL-SCNC: 138 MMOL/L — SIGNIFICANT CHANGE UP (ref 135–145)
SPECIMEN SOURCE: SIGNIFICANT CHANGE UP
SPECIMEN SOURCE: SIGNIFICANT CHANGE UP
TOTAL NUCLEATED CELL COUNT, BODY FLUID: 2323 /UL — SIGNIFICANT CHANGE UP
TOTAL NUCLEATED CELL COUNT, BODY FLUID: 2323 /UL — SIGNIFICANT CHANGE UP
TUBE TYPE: SIGNIFICANT CHANGE UP
TUBE TYPE: SIGNIFICANT CHANGE UP
WBC # BLD: 8.72 K/UL — SIGNIFICANT CHANGE UP (ref 3.8–10.5)
WBC # BLD: 8.72 K/UL — SIGNIFICANT CHANGE UP (ref 3.8–10.5)
WBC # FLD AUTO: 8.72 K/UL — SIGNIFICANT CHANGE UP (ref 3.8–10.5)
WBC # FLD AUTO: 8.72 K/UL — SIGNIFICANT CHANGE UP (ref 3.8–10.5)

## 2023-11-12 PROCEDURE — 99233 SBSQ HOSP IP/OBS HIGH 50: CPT

## 2023-11-12 PROCEDURE — 99232 SBSQ HOSP IP/OBS MODERATE 35: CPT

## 2023-11-12 RX ORDER — METOPROLOL TARTRATE 50 MG
50 TABLET ORAL EVERY 8 HOURS
Refills: 0 | Status: DISCONTINUED | OUTPATIENT
Start: 2023-11-12 | End: 2023-11-16

## 2023-11-12 RX ADMIN — Medication 3 MILLILITER(S): at 00:47

## 2023-11-12 RX ADMIN — Medication 3 MILLILITER(S): at 13:22

## 2023-11-12 RX ADMIN — ATORVASTATIN CALCIUM 40 MILLIGRAM(S): 80 TABLET, FILM COATED ORAL at 21:49

## 2023-11-12 RX ADMIN — Medication 88 MICROGRAM(S): at 05:38

## 2023-11-12 RX ADMIN — Medication 3 MILLILITER(S): at 07:35

## 2023-11-12 RX ADMIN — Medication 25 MILLIGRAM(S): at 14:43

## 2023-11-12 RX ADMIN — Medication 3 MILLILITER(S): at 20:28

## 2023-11-12 RX ADMIN — HEPARIN SODIUM 5000 UNIT(S): 5000 INJECTION INTRAVENOUS; SUBCUTANEOUS at 05:19

## 2023-11-12 RX ADMIN — HEPARIN SODIUM 5000 UNIT(S): 5000 INJECTION INTRAVENOUS; SUBCUTANEOUS at 17:41

## 2023-11-12 RX ADMIN — AZITHROMYCIN 255 MILLIGRAM(S): 500 TABLET, FILM COATED ORAL at 17:41

## 2023-11-12 RX ADMIN — Medication 40 MILLIGRAM(S): at 17:41

## 2023-11-12 RX ADMIN — CEFTRIAXONE 100 MILLIGRAM(S): 500 INJECTION, POWDER, FOR SOLUTION INTRAMUSCULAR; INTRAVENOUS at 20:06

## 2023-11-12 RX ADMIN — Medication 40 MILLIGRAM(S): at 05:22

## 2023-11-12 RX ADMIN — Medication 25 MILLIGRAM(S): at 05:38

## 2023-11-12 RX ADMIN — Medication 40 MILLIGRAM(S): at 05:32

## 2023-11-12 RX ADMIN — Medication 50 MILLIGRAM(S): at 21:49

## 2023-11-12 NOTE — PROGRESS NOTE ADULT - SUBJECTIVE AND OBJECTIVE BOX
SANJUANA TORRES  78y  Female      Patient is a 78y old  Female who presents with a chief complaint of back pain (10 Nov 2023 15:23)      INTERVAL HPI/OVERNIGHT EVENTS: Patient is a 79 y/o F w. PMHx of COPD (Not on home O2), Nicotine dependance, HTN, HLD, and hypothyroidism who presents with complaint of back pain for ~2-3 week. Patient describes the pain as a band across her lower back. She went to [facility] initially for this back pain and finished a course of Macrobid/Pyridium. The back pain persisted and she subsequently came into [facility] ED. She did not have any specific urinary sx. and denies hematuria, dysuria, etc at the time of the initial back pain and still does not endorse any urinary sx. Her back pain has resolved. Patient also endorsing inc. work of breathing with dyspnea on exertion. Patient admitted for COPD exacerbation and suspected community-acquired PNA, now on Abx. No acute events occurred overnight.   Of note, pt admits to naproxen use 2-3 per week x2 months.    REVIEW OF SYSTEMS:  CONSTITUTIONAL: No fever, no chills  EYES: No eye pain, visual disturbances, or discharge  ENMT:  No difficulty hearing, tinnitus, vertigo; No sinus or throat pain  RESPIRATORY: +Cough, +wheezing, +dyspnea. No hemoptysis.  CARDIOVASCULAR: No chest pain, palpitations, dizziness, or leg swelling  GASTROINTESTINAL: No abdominal or epigastric pain. No nausea, vomiting, or hematemesis; No diarrhea or constipation.   GENITOURINARY: No dysuria, frequency, hematuria, or incontinence  NEUROLOGICAL: No headaches.  MUSCULOSKELETAL: No joint pain or swelling; No muscle, back, or extremity pain    Vital Signs Last 24 Hrs  T(C): 36.8 (2023 04:42), Max: 36.8 (2023 04:42)  T(F): 98.2 (2023 04:42), Max: 98.2 (2023 04:42)  HR: 75 (2023 04:42) (65 - 129)  BP: 121/71 (2023 04:42) (106/68 - 127/85)  BP(mean): --  RR: 18 (2023 04:42) (17 - 20)  SpO2: 96% (2023 04:42) (90% - 98%)    Parameters below as of 2023 04:42  Patient On (Oxygen Delivery Method): nasal cannula          PHYSICAL EXAM:  GENERAL: appears stated age, thin habitus  HEAD:  Atraumatic, Normocephalic  EYES: EOMI, conjunctiva and sclera clear  ENMT: No tonsillar erythema, exudates, or enlargement; Moist mucous membranes  NERVOUS SYSTEM:  Alert & Oriented X3, Good concentration.  CHEST/LUNG: +decreased BS BL, +wheezing.  HEART: Regular rate and rhythm; No murmurs, rubs, or gallops  ABDOMEN: Soft, Nontender, Nondistended; Bowel sounds present  EXTREMITIES:  2+ Peripheral Pulses, No clubbing, cyanosis, or edema  SKIN: No rashes or lesions    Consultant(s) Notes Reviewed:  [x ] YES  [ ] NO  Care Discussed with Consultants/Other Providers [ x] YES  [ ] NO    LABS:                        10.8   11.39 )-----------( 246      ( 10 Nov 2023 08:35 )             32.2     11-10    137  |  103  |  36<H>  ----------------------------<  175<H>  4.0   |  24  |  1.50<H>    Ca    8.2<L>      10 Nov 2023 08:35    TPro  7.0  /  Alb  2.9<L>  /  TBili  0.2  /  DBili  x   /  AST  17  /  ALT  16  /  AlkPhos  68  11-10      Urinalysis Basic - ( 2023 08:15 )    Color: Yellow / Appearance: Clear / S.015 / pH: x  Gluc: x / Ketone: Negative mg/dL  / Bili: Negative / Urobili: 0.2 mg/dL   Blood: x / Protein: 30 mg/dL / Nitrite: Negative   Leuk Esterase: Negative / RBC: 0 /HPF / WBC 0 /HPF   Sq Epi: x / Non Sq Epi: x / Bacteria: x

## 2023-11-12 NOTE — PROGRESS NOTE ADULT - ASSESSMENT
79 y/o F w. PMHx of COPD (Not on home O2), Nicotine dependance, HTN, HLD, and hypothyroidism who presents with complaint of back pain for ~2-3 week and increased shortness of breathe. Patient admitted for COPD exacerbation and PNA.    Assessment:  YADI ON CKD  PNA/COPD      -YADI 2/2 decreased PO intake ~10 days, likely prerenal, resolving   -Cr. 2.0 on admission, downtrending to 1.5 today.  -Avoid nephrotoxic meds as able  -Urine studies ordered  -Monitor chemistries  -Lasix IV started primary team. We will monitor closely   -Awaiting today's lab result     Thank you        77 y/o F w. PMHx of COPD (Not on home O2), Nicotine dependance, HTN, HLD, and hypothyroidism who presents with complaint of back pain for ~2-3 week and increased shortness of breathe. Patient admitted for COPD exacerbation and PNA.    Assessment:  YADI ON CKD  PNA/COPD      -YADI 2/2 decreased PO intake ~10 days, likely prerenal, resolving   -Cr. 2.0 on admission, downtrending to 1.5 today.  -Avoid nephrotoxic meds as able  -Urine studies ordered  -Monitor chemistries  -Became acute SOB yesterday. S/p thoracentesis. Lasix IV started. We will monitor closely   -Awaiting today's lab result     D/w cardiology   Thank you

## 2023-11-12 NOTE — PROGRESS NOTE ADULT - ASSESSMENT
78-year-old female with history of COPD (not on home oxygen), hypertension, hyperlipidemia, and hypothyroidism presents to the ED for back pain.  Patient reports that for the past 2-3 weeks, she has been having pain located in the midline of her back.     pain  weakness  copd  HTN  HLD  thyroid disease  eval for PNA  HFpEF  Ex Smoker  Valv heart disease    s/p thoracentesis - 500 cc drained  vs noted  on lasix  on nebs  role for ABX unclear at this point  CHF eval - mitral valve disease    ct chest  abx  bronchodilators  systemic steroids  ABG noted  fio2 titration - goal sat > 88 pct  I curt  monitor VS and HD and Sat  VQ scan NEG for PE  BP control  nutrition  outpatient records reviewed - follows with Dr Fay Muir [facility]

## 2023-11-12 NOTE — PROGRESS NOTE ADULT - SUBJECTIVE AND OBJECTIVE BOX
Date/Time Patient Seen:  		  Referring MD:   Data Reviewed	       Patient is a 78y old  Female who presents with a chief complaint of back pain (11 Nov 2023 18:34)      Subjective/HPI     PAST MEDICAL & SURGICAL HISTORY:  History of COPD  No home O2 use    Asthma    Thyroid nodule    Hyperlipidemia    Hypertension    Hypothyroidism    History of cholecystectomy  1989    History of repair of hip fracture  ORIF 1982.  Hardware was eventually removed          Medication list         MEDICATIONS  (STANDING):  albuterol/ipratropium for Nebulization 3 milliLiter(s) Nebulizer every 6 hours  atorvastatin 40 milliGRAM(s) Oral at bedtime  azithromycin  IVPB 500 milliGRAM(s) IV Intermittent every 24 hours  cefTRIAXone   IVPB 1000 milliGRAM(s) IV Intermittent every 24 hours  furosemide   Injectable 40 milliGRAM(s) IV Push daily  heparin   Injectable 5000 Unit(s) SubCutaneous every 12 hours  levothyroxine 88 MICROGram(s) Oral daily  methylPREDNISolone sodium succinate Injectable 40 milliGRAM(s) IV Push two times a day  metoprolol tartrate 25 milliGRAM(s) Oral every 8 hours  nicotine -   7 mG/24Hr(s) Patch 1 Patch Transdermal daily    MEDICATIONS  (PRN):  acetaminophen     Tablet .. 650 milliGRAM(s) Oral every 6 hours PRN Temp greater or equal to 38C (100.4F), Mild Pain (1 - 3)  aluminum hydroxide/magnesium hydroxide/simethicone Suspension 30 milliLiter(s) Oral every 4 hours PRN Dyspepsia  melatonin 3 milliGRAM(s) Oral at bedtime PRN Insomnia  ondansetron Injectable 4 milliGRAM(s) IV Push every 8 hours PRN Nausea and/or Vomiting         Vitals log        ICU Vital Signs Last 24 Hrs  T(C): 36.8 (12 Nov 2023 04:42), Max: 36.8 (12 Nov 2023 04:42)  T(F): 98.2 (12 Nov 2023 04:42), Max: 98.2 (12 Nov 2023 04:42)  HR: 75 (12 Nov 2023 04:42) (65 - 133)  BP: 121/71 (12 Nov 2023 04:42) (106/68 - 127/85)  BP(mean): --  ABP: --  ABP(mean): --  RR: 18 (12 Nov 2023 04:42) (17 - 20)  SpO2: 96% (12 Nov 2023 04:42) (90% - 98%)    O2 Parameters below as of 12 Nov 2023 04:42  Patient On (Oxygen Delivery Method): nasal cannula                 Input and Output:  I&O's Detail      Lab Data                        10.8   11.39 )-----------( 246      ( 10 Nov 2023 08:35 )             32.2     11-10    137  |  103  |  36<H>  ----------------------------<  175<H>  4.0   |  24  |  1.50<H>    Ca    8.2<L>      10 Nov 2023 08:35    TPro  7.0  /  Alb  2.9<L>  /  TBili  0.2  /  DBili  x   /  AST  17  /  ALT  16  /  AlkPhos  68  11-10            Review of Systems	      Objective     Physical Examination  heart s1s2  lung dc BS  head nc        Pertinent Lab findings & Imaging      Chiki:  NO   Adequate UO     I&O's Detail           Discussed with:     Cultures:	        Radiology

## 2023-11-12 NOTE — PROGRESS NOTE ADULT - SUBJECTIVE AND OBJECTIVE BOX
[facility] -- Kanu Hansen,  Mateo, Mele Woodson Savella, Goodger, Cphen  Office # [phone]    Follow Up:  SOB    Subjective/Observations: Pt seen and examined.  Reports breathing has improved, s/p thoracentesis. Pt is still with wheezing, on 2L NC. No complaints of chest pain, or palpitations reported.       REVIEW OF SYSTEMS: All other review of systems is negative unless indicated above  PAST MEDICAL & SURGICAL HISTORY:  History of COPD  No home O2 use      Asthma      Thyroid nodule      Hyperlipidemia      Hypertension      Hypothyroidism      History of cholecystectomy  1989      History of repair of hip fracture  ORIF 1982.  Hardware was eventually removed        MEDICATIONS  (STANDING):  albuterol/ipratropium for Nebulization 3 milliLiter(s) Nebulizer every 6 hours  atorvastatin 40 milliGRAM(s) Oral at bedtime  azithromycin  IVPB 500 milliGRAM(s) IV Intermittent every 24 hours  cefTRIAXone   IVPB 1000 milliGRAM(s) IV Intermittent every 24 hours  furosemide   Injectable 40 milliGRAM(s) IV Push daily  heparin   Injectable 5000 Unit(s) SubCutaneous every 12 hours  levothyroxine 88 MICROGram(s) Oral daily  methylPREDNISolone sodium succinate Injectable 40 milliGRAM(s) IV Push two times a day  metoprolol tartrate 25 milliGRAM(s) Oral every 8 hours  nicotine -   7 mG/24Hr(s) Patch 1 Patch Transdermal daily    MEDICATIONS  (PRN):  acetaminophen     Tablet .. 650 milliGRAM(s) Oral every 6 hours PRN Temp greater or equal to 38C (100.4F), Mild Pain (1 - 3)  aluminum hydroxide/magnesium hydroxide/simethicone Suspension 30 milliLiter(s) Oral every 4 hours PRN Dyspepsia  melatonin 3 milliGRAM(s) Oral at bedtime PRN Insomnia  ondansetron Injectable 4 milliGRAM(s) IV Push every 8 hours PRN Nausea and/or Vomiting    Allergies    No Known Allergies    Intolerances      Vital Signs Last 24 Hrs  T(C): 36.8 (12 Nov 2023 04:42), Max: 36.8 (12 Nov 2023 04:42)  T(F): 98.2 (12 Nov 2023 04:42), Max: 98.2 (12 Nov 2023 04:42)  HR: 71 (12 Nov 2023 07:35) (65 - 129)  BP: 121/71 (12 Nov 2023 04:42) (106/68 - 127/85)  BP(mean): --  RR: 18 (12 Nov 2023 04:42) (17 - 20)  SpO2: 95% (12 Nov 2023 07:35) (90% - 98%)    Parameters below as of 12 Nov 2023 07:35  Patient On (Oxygen Delivery Method): nasal cannula      I&O's Summary      TELE:   PHYSICAL EXAM:  Constitutional:  awake and alert, mildly dyspneic,    HEENT: Moist Mucous Membranes, Anicteric  Pulmonary: + wheezing, decreased breath sounds bilaterally  Cardiovascular: tachycardic,, S1 and S2, +sys murmur, rubs, gallops or clicks  Gastrointestinal: Bowel Sounds present, soft, nontender.   Lymph: No peripheral edema. No lymphadenopathy.  Skin: No visible rashes or ulcers.  Psych:  Mood & affect appropriate    LABS: All Labs Reviewed:                        10.8   11.39 )-----------( 246      ( 10 Nov 2023 08:35 )             32.2                         12.8   13.52 )-----------( 258      ( 09 Nov 2023 12:00 )             37.4     10 Nov 2023 08:35    137    |  103    |  36     ----------------------------<  175    4.0     |  24     |  1.50   09 Nov 2023 12:00    134    |  98     |  34     ----------------------------<  133    4.1     |  24     |  2.00     Ca    8.2        10 Nov 2023 08:35  Ca    9.0        09 Nov 2023 12:00    TPro  7.0    /  Alb  2.9    /  TBili  0.2    /  DBili  x      /  AST  17     /  ALT  16     /  AlkPhos  68     10 Nov 2023 08:35  TPro  7.8    /  Alb  3.3    /  TBili  0.4    /  DBili  x      /  AST  25     /  ALT  19     /  AlkPhos  81     09 Nov 2023 12:00          12 Lead ECG:   Ventricular Rate 122 BPM    Atrial Rate 122 BPM    P-R Interval 160 ms    QRS Duration 90 ms    Q-T Interval 316 ms    QTC Calculation(Bazett) 450 ms    P Axis 56 degrees    R Axis 24 degrees    T Axis 61 degrees    Diagnosis Line Sinus tachycardia  Possible Left atrial enlargement  Low voltage QRS  Borderline ECG  No previous ECGs available  Confirmed by LEI CASAREZ ([id number]) on 11/9/2023 8:41:03 PM (11-09-23 @ 11:53)        [facility] -- Kanu Hansen,  Mateo, Mele Woodson Savella, Goodger, Cphen  Office # [phone]    Follow Up:  SOB    Subjective/Observations: Pt seen and examined.  Reports breathing has improved, s/p R thoracentesis. Pt is still with wheezing, on 2L NC. No complaints of chest pain, or palpitations reported.       REVIEW OF SYSTEMS: All other review of systems is negative unless indicated above  PAST MEDICAL & SURGICAL HISTORY:  History of COPD  No home O2 use      Asthma      Thyroid nodule      Hyperlipidemia      Hypertension      Hypothyroidism      History of cholecystectomy  1989      History of repair of hip fracture  ORIF 1982.  Hardware was eventually removed        MEDICATIONS  (STANDING):  albuterol/ipratropium for Nebulization 3 milliLiter(s) Nebulizer every 6 hours  atorvastatin 40 milliGRAM(s) Oral at bedtime  azithromycin  IVPB 500 milliGRAM(s) IV Intermittent every 24 hours  cefTRIAXone   IVPB 1000 milliGRAM(s) IV Intermittent every 24 hours  furosemide   Injectable 40 milliGRAM(s) IV Push daily  heparin   Injectable 5000 Unit(s) SubCutaneous every 12 hours  levothyroxine 88 MICROGram(s) Oral daily  methylPREDNISolone sodium succinate Injectable 40 milliGRAM(s) IV Push two times a day  metoprolol tartrate 25 milliGRAM(s) Oral every 8 hours  nicotine -   7 mG/24Hr(s) Patch 1 Patch Transdermal daily    MEDICATIONS  (PRN):  acetaminophen     Tablet .. 650 milliGRAM(s) Oral every 6 hours PRN Temp greater or equal to 38C (100.4F), Mild Pain (1 - 3)  aluminum hydroxide/magnesium hydroxide/simethicone Suspension 30 milliLiter(s) Oral every 4 hours PRN Dyspepsia  melatonin 3 milliGRAM(s) Oral at bedtime PRN Insomnia  ondansetron Injectable 4 milliGRAM(s) IV Push every 8 hours PRN Nausea and/or Vomiting    Allergies    No Known Allergies    Intolerances      Vital Signs Last 24 Hrs  T(C): 36.8 (12 Nov 2023 04:42), Max: 36.8 (12 Nov 2023 04:42)  T(F): 98.2 (12 Nov 2023 04:42), Max: 98.2 (12 Nov 2023 04:42)  HR: 71 (12 Nov 2023 07:35) (65 - 129)  BP: 121/71 (12 Nov 2023 04:42) (106/68 - 127/85)  BP(mean): --  RR: 18 (12 Nov 2023 04:42) (17 - 20)  SpO2: 95% (12 Nov 2023 07:35) (90% - 98%)    Parameters below as of 12 Nov 2023 07:35  Patient On (Oxygen Delivery Method): nasal cannula      I&O's Summary      TELE:   PHYSICAL EXAM:  Constitutional:  awake and alert, mildly dyspneic,    HEENT: Moist Mucous Membranes, Anicteric  Pulmonary: + wheezing, decreased breath sounds bilaterally  Cardiovascular: tachycardic,, S1 and S2, +sys murmur, rubs, gallops or clicks  Gastrointestinal: Bowel Sounds present, soft, nontender.   Lymph: No peripheral edema. No lymphadenopathy.  Skin: No visible rashes or ulcers.  Psych:  Mood & affect appropriate    LABS: All Labs Reviewed:                        10.8   11.39 )-----------( 246      ( 10 Nov 2023 08:35 )             32.2                         12.8   13.52 )-----------( 258      ( 09 Nov 2023 12:00 )             37.4     10 Nov 2023 08:35    137    |  103    |  36     ----------------------------<  175    4.0     |  24     |  1.50   09 Nov 2023 12:00    134    |  98     |  34     ----------------------------<  133    4.1     |  24     |  2.00     Ca    8.2        10 Nov 2023 08:35  Ca    9.0        09 Nov 2023 12:00    TPro  7.0    /  Alb  2.9    /  TBili  0.2    /  DBili  x      /  AST  17     /  ALT  16     /  AlkPhos  68     10 Nov 2023 08:35  TPro  7.8    /  Alb  3.3    /  TBili  0.4    /  DBili  x      /  AST  25     /  ALT  19     /  AlkPhos  81     09 Nov 2023 12:00          12 Lead ECG:   Ventricular Rate 122 BPM    Atrial Rate 122 BPM    P-R Interval 160 ms    QRS Duration 90 ms    Q-T Interval 316 ms    QTC Calculation(Bazett) 450 ms    P Axis 56 degrees    R Axis 24 degrees    T Axis 61 degrees    Diagnosis Line Sinus tachycardia  Possible Left atrial enlargement  Low voltage QRS  Borderline ECG  No previous ECGs available  Confirmed by LEI CASAREZ ([id number]) on 11/9/2023 8:41:03 PM (11-09-23 @ 11:53)        [facility] -- Kanu Hansen,  Mateo, Mele Woodson Savella, Goodger, Cphen  Office # [phone]    Follow Up:  SOB    Subjective/Observations: Pt seen and examined.  Reports breathing has improved, s/p R thoracentesis. Pt is still with wheezing, on 2L NC. No complaints of chest pain, or palpitations reported.       REVIEW OF SYSTEMS: All other review of systems is negative unless indicated above  PAST MEDICAL & SURGICAL HISTORY:  History of COPD  No home O2 use      Asthma      Thyroid nodule      Hyperlipidemia      Hypertension      Hypothyroidism      History of cholecystectomy  1989      History of repair of hip fracture  ORIF 1982.  Hardware was eventually removed        MEDICATIONS  (STANDING):  albuterol/ipratropium for Nebulization 3 milliLiter(s) Nebulizer every 6 hours  atorvastatin 40 milliGRAM(s) Oral at bedtime  azithromycin  IVPB 500 milliGRAM(s) IV Intermittent every 24 hours  cefTRIAXone   IVPB 1000 milliGRAM(s) IV Intermittent every 24 hours  furosemide   Injectable 40 milliGRAM(s) IV Push daily  heparin   Injectable 5000 Unit(s) SubCutaneous every 12 hours  levothyroxine 88 MICROGram(s) Oral daily  methylPREDNISolone sodium succinate Injectable 40 milliGRAM(s) IV Push two times a day  metoprolol tartrate 25 milliGRAM(s) Oral every 8 hours  nicotine -   7 mG/24Hr(s) Patch 1 Patch Transdermal daily    MEDICATIONS  (PRN):  acetaminophen     Tablet .. 650 milliGRAM(s) Oral every 6 hours PRN Temp greater or equal to 38C (100.4F), Mild Pain (1 - 3)  aluminum hydroxide/magnesium hydroxide/simethicone Suspension 30 milliLiter(s) Oral every 4 hours PRN Dyspepsia  melatonin 3 milliGRAM(s) Oral at bedtime PRN Insomnia  ondansetron Injectable 4 milliGRAM(s) IV Push every 8 hours PRN Nausea and/or Vomiting    Allergies    No Known Allergies    Intolerances      Vital Signs Last 24 Hrs  T(C): 36.8 (12 Nov 2023 04:42), Max: 36.8 (12 Nov 2023 04:42)  T(F): 98.2 (12 Nov 2023 04:42), Max: 98.2 (12 Nov 2023 04:42)  HR: 71 (12 Nov 2023 07:35) (65 - 129)  BP: 121/71 (12 Nov 2023 04:42) (106/68 - 127/85)  BP(mean): --  RR: 18 (12 Nov 2023 04:42) (17 - 20)  SpO2: 95% (12 Nov 2023 07:35) (90% - 98%)    Parameters below as of 12 Nov 2023 07:35  Patient On (Oxygen Delivery Method): nasal cannula      I&O's Summary      TELE: SR  PHYSICAL EXAM:  Constitutional:  awake and alert, mildly dyspneic,    HEENT: Moist Mucous Membranes, Anicteric  Pulmonary: + wheezing, decreased breath sounds bilaterally  Cardiovascular: RRR, S1 and S2, +sys murmur, rubs, gallops or clicks  Gastrointestinal: Bowel Sounds present, soft, nontender.   Lymph: No peripheral edema. No lymphadenopathy.  Skin: No visible rashes or ulcers.  Psych:  Mood & affect appropriate    LABS: All Labs Reviewed:                        10.8   11.39 )-----------( 246      ( 10 Nov 2023 08:35 )             32.2                         12.8   13.52 )-----------( 258      ( 09 Nov 2023 12:00 )             37.4     10 Nov 2023 08:35    137    |  103    |  36     ----------------------------<  175    4.0     |  24     |  1.50   09 Nov 2023 12:00    134    |  98     |  34     ----------------------------<  133    4.1     |  24     |  2.00     Ca    8.2        10 Nov 2023 08:35  Ca    9.0        09 Nov 2023 12:00    TPro  7.0    /  Alb  2.9    /  TBili  0.2    /  DBili  x      /  AST  17     /  ALT  16     /  AlkPhos  68     10 Nov 2023 08:35  TPro  7.8    /  Alb  3.3    /  TBili  0.4    /  DBili  x      /  AST  25     /  ALT  19     /  AlkPhos  81     09 Nov 2023 12:00          12 Lead ECG:   Ventricular Rate 122 BPM    Atrial Rate 122 BPM    P-R Interval 160 ms    QRS Duration 90 ms    Q-T Interval 316 ms    QTC Calculation(Bazett) 450 ms    P Axis 56 degrees    R Axis 24 degrees    T Axis 61 degrees    Diagnosis Line Sinus tachycardia  Possible Left atrial enlargement  Low voltage QRS  Borderline ECG  No previous ECGs available  Confirmed by LEI CASAREZ ([id number]) on 11/9/2023 8:41:03 PM (11-09-23 @ 11:53)        [facility] -- Kanu Hansen,  Mateo, Mele Woodson Savella, Goodger, Cohen  Office # [phone]    Follow Up:  SOB    Subjective/Observations: Pt seen and examined.  Reports breathing has improved, s/p R thoracentesis. Pt is still with wheezing, on 2L NC. No complaints of chest pain, or palpitations reported.       REVIEW OF SYSTEMS: All other review of systems is negative unless indicated above  PAST MEDICAL & SURGICAL HISTORY:  History of COPD  No home O2 use      Asthma      Thyroid nodule      Hyperlipidemia      Hypertension      Hypothyroidism      History of cholecystectomy  1989      History of repair of hip fracture  ORIF 1982.  Hardware was eventually removed        MEDICATIONS  (STANDING):  albuterol/ipratropium for Nebulization 3 milliLiter(s) Nebulizer every 6 hours  atorvastatin 40 milliGRAM(s) Oral at bedtime  azithromycin  IVPB 500 milliGRAM(s) IV Intermittent every 24 hours  cefTRIAXone   IVPB 1000 milliGRAM(s) IV Intermittent every 24 hours  furosemide   Injectable 40 milliGRAM(s) IV Push daily  heparin   Injectable 5000 Unit(s) SubCutaneous every 12 hours  levothyroxine 88 MICROGram(s) Oral daily  methylPREDNISolone sodium succinate Injectable 40 milliGRAM(s) IV Push two times a day  metoprolol tartrate 25 milliGRAM(s) Oral every 8 hours  nicotine -   7 mG/24Hr(s) Patch 1 Patch Transdermal daily    MEDICATIONS  (PRN):  acetaminophen     Tablet .. 650 milliGRAM(s) Oral every 6 hours PRN Temp greater or equal to 38C (100.4F), Mild Pain (1 - 3)  aluminum hydroxide/magnesium hydroxide/simethicone Suspension 30 milliLiter(s) Oral every 4 hours PRN Dyspepsia  melatonin 3 milliGRAM(s) Oral at bedtime PRN Insomnia  ondansetron Injectable 4 milliGRAM(s) IV Push every 8 hours PRN Nausea and/or Vomiting    Allergies    No Known Allergies    Intolerances      Vital Signs Last 24 Hrs  T(C): 36.8 (12 Nov 2023 04:42), Max: 36.8 (12 Nov 2023 04:42)  T(F): 98.2 (12 Nov 2023 04:42), Max: 98.2 (12 Nov 2023 04:42)  HR: 71 (12 Nov 2023 07:35) (65 - 129)  BP: 121/71 (12 Nov 2023 04:42) (106/68 - 127/85)  BP(mean): --  RR: 18 (12 Nov 2023 04:42) (17 - 20)  SpO2: 95% (12 Nov 2023 07:35) (90% - 98%)    Parameters below as of 12 Nov 2023 07:35  Patient On (Oxygen Delivery Method): nasal cannula      I&O's Summary      TELE: SR  PHYSICAL EXAM:  Constitutional:  awake and alert, mildly dyspneic,    HEENT: Moist Mucous Membranes, Anicteric  Pulmonary: + wheezing, decreased breath sounds bilaterally  Cardiovascular: RRR, S1 and S2, +sys murmur, rubs, gallops or clicks  Gastrointestinal: Bowel Sounds present, soft, nontender.   Lymph: No peripheral edema. No lymphadenopathy.  Skin: No visible rashes or ulcers.  Psych:  Mood & affect appropriate    LABS: All Labs Reviewed:                        10.8   11.39 )-----------( 246      ( 10 Nov 2023 08:35 )             32.2                         12.8   13.52 )-----------( 258      ( 09 Nov 2023 12:00 )             37.4     10 Nov 2023 08:35    137    |  103    |  36     ----------------------------<  175    4.0     |  24     |  1.50   09 Nov 2023 12:00    134    |  98     |  34     ----------------------------<  133    4.1     |  24     |  2.00     Ca    8.2        10 Nov 2023 08:35  Ca    9.0        09 Nov 2023 12:00    TPro  7.0    /  Alb  2.9    /  TBili  0.2    /  DBili  x      /  AST  17     /  ALT  16     /  AlkPhos  68     10 Nov 2023 08:35  TPro  7.8    /  Alb  3.3    /  TBili  0.4    /  DBili  x      /  AST  25     /  ALT  19     /  AlkPhos  81     09 Nov 2023 12:00          12 Lead ECG:   Ventricular Rate 122 BPM    Atrial Rate 122 BPM    P-R Interval 160 ms    QRS Duration 90 ms    Q-T Interval 316 ms    QTC Calculation(Bazett) 450 ms    P Axis 56 degrees    R Axis 24 degrees    T Axis 61 degrees    Diagnosis Line Sinus tachycardia  Possible Left atrial enlargement  Low voltage QRS  Borderline ECG  No previous ECGs available  Confirmed by LEI CASAREZ ([id number]) on 11/9/2023 8:41:03 PM (11-09-23 @ 11:53)

## 2023-11-12 NOTE — PROGRESS NOTE ADULT - SUBJECTIVE AND OBJECTIVE BOX
Patient is a 78y old  Female who presents with a chief complaint of back pain (12 Nov 2023 08:15)      INTERVAL HPI/OVERNIGHT EVENTS: Patient seen and examined at bedside. No overnight events. Tolerating diet. Denies fever, chills, chest pain, shortness of breath, abdominal pain, nausea/vomiting, headache.    MEDICATIONS  (STANDING):  albuterol/ipratropium for Nebulization 3 milliLiter(s) Nebulizer every 6 hours  atorvastatin 40 milliGRAM(s) Oral at bedtime  azithromycin  IVPB 500 milliGRAM(s) IV Intermittent every 24 hours  cefTRIAXone   IVPB 1000 milliGRAM(s) IV Intermittent every 24 hours  furosemide   Injectable 40 milliGRAM(s) IV Push daily  heparin   Injectable 5000 Unit(s) SubCutaneous every 12 hours  levothyroxine 88 MICROGram(s) Oral daily  methylPREDNISolone sodium succinate Injectable 40 milliGRAM(s) IV Push two times a day  metoprolol tartrate 25 milliGRAM(s) Oral every 8 hours  nicotine -   7 mG/24Hr(s) Patch 1 Patch Transdermal daily    MEDICATIONS  (PRN):  acetaminophen     Tablet .. 650 milliGRAM(s) Oral every 6 hours PRN Temp greater or equal to 38C (100.4F), Mild Pain (1 - 3)  aluminum hydroxide/magnesium hydroxide/simethicone Suspension 30 milliLiter(s) Oral every 4 hours PRN Dyspepsia  melatonin 3 milliGRAM(s) Oral at bedtime PRN Insomnia  ondansetron Injectable 4 milliGRAM(s) IV Push every 8 hours PRN Nausea and/or Vomiting      Allergies    No Known Allergies    Intolerances        REVIEW OF SYSTEMS:  CONSTITUTIONAL: No fever or chills  HEENT:  No headache, no sore throat  RESPIRATORY: No cough, wheezing, or shortness of breath  CARDIOVASCULAR: No chest pain, palpitations  GASTROINTESTINAL: No abd pain, nausea, vomiting, or diarrhea  GENITOURINARY: No dysuria, frequency, or hematuria  NEUROLOGICAL: no focal weakness or dizziness  MUSCULOSKELETAL: no myalgias     Vital Signs Last 24 Hrs  T(C): 36.4 (12 Nov 2023 20:33), Max: 36.8 (12 Nov 2023 04:42)  T(F): 97.5 (12 Nov 2023 20:33), Max: 98.2 (12 Nov 2023 04:42)  HR: 76 (12 Nov 2023 20:33) (71 - 117)  BP: 127/75 (12 Nov 2023 20:33) (106/68 - 127/75)  BP(mean): --  RR: 18 (12 Nov 2023 20:33) (18 - 18)  SpO2: 96% (12 Nov 2023 20:33) (92% - 98%)    Parameters below as of 12 Nov 2023 20:33  Patient On (Oxygen Delivery Method): nasal cannula  O2 Flow (L/min): 2      PHYSICAL EXAM:  GENERAL: NAD  HEENT:  anicteric, moist mucous membranes  CHEST/LUNG:  CTA b/l, no rales, wheezes, or rhonchi  HEART:  RRR, S1, S2  ABDOMEN:  BS+, soft, nontender, nondistended  EXTREMITIES: no edema, cyanosis, or calf tenderness  NERVOUS SYSTEM: answers questions and follows commands appropriately    LABS:                        9.6    8.72  )-----------( 285      ( 12 Nov 2023 07:35 )             28.6     CBC Full  -  ( 12 Nov 2023 07:35 )  WBC Count : 8.72 K/uL  Hemoglobin : 9.6 g/dL  Hematocrit : 28.6 %  Platelet Count - Automated : 285 K/uL  Mean Cell Volume : 93.8 fl  Mean Cell Hemoglobin : 31.5 pg  Mean Cell Hemoglobin Concentration : 33.6 gm/dL  Auto Neutrophil # : 7.75 K/uL  Auto Lymphocyte # : 0.52 K/uL  Auto Monocyte # : 0.40 K/uL  Auto Eosinophil # : 0.00 K/uL  Auto Basophil # : 0.00 K/uL  Auto Neutrophil % : 88.8 %  Auto Lymphocyte % : 6.0 %  Auto Monocyte % : 4.6 %  Auto Eosinophil % : 0.0 %  Auto Basophil % : 0.0 %    12 Nov 2023 07:35    138    |  105    |  45     ----------------------------<  125    4.3     |  24     |  1.10     Ca    8.3        12 Nov 2023 07:35  Phos  3.7       12 Nov 2023 07:35  Mg     1.9       12 Nov 2023 07:35    TPro  6.8    /  Alb  2.8    /  TBili  0.2    /  DBili  x      /  AST  29     /  ALT  36     /  AlkPhos  70     12 Nov 2023 07:35      Urinalysis Basic - ( 12 Nov 2023 07:35 )    Color: x / Appearance: x / SG: x / pH: x  Gluc: 125 mg/dL / Ketone: x  / Bili: x / Urobili: x   Blood: x / Protein: x / Nitrite: x   Leuk Esterase: x / RBC: x / WBC x   Sq Epi: x / Non Sq Epi: x / Bacteria: x      CAPILLARY BLOOD GLUCOSE            Culture - Body Fluid with Gram Stain (collected 11-11-23 @ 14:30)  Source: Pleural Fl Pleural Fluid  Gram Stain (11-11-23 @ 22:19):    polymorphonuclear leukocytes seen    No organisms seen    by cytocentrifuge  Preliminary Report (11-12-23 @ 19:04):    No growth    Culture - Urine (collected 11-09-23 @ 13:40)  Source: Clean Catch Clean Catch (Midstream)  Preliminary Report (11-11-23 @ 17:34):    >100,000 CFU/ml Klebsiella pneumoniae    Culture - Blood (collected 11-09-23 @ 12:00)  Source: .Blood Blood-Peripheral  Preliminary Report (11-12-23 @ 19:01):    No growth at 72 Hours    Culture - Blood (collected 11-09-23 @ 11:40)  Source: .Blood Blood-Peripheral  Preliminary Report (11-12-23 @ 19:01):    No growth at 72 Hours        RADIOLOGY & ADDITIONAL TESTS:    Personally reviewed.     Consultant(s) Notes Reviewed:  [x] YES  [ ] NO

## 2023-11-12 NOTE — PROGRESS NOTE ADULT - ASSESSMENT
78-year-old female with history of COPD (not on home oxygen), nicotine dependence, hypertension, hyperlipidemia, and hypothyroidism here with worsening shortness of breath.  CT with bilateral pleural effusions, mod to large on the right.    SOB  - CT with Moderate to large right pleural effusion and small left pleural effusion with associated atelectasis.  -  s/p R thoracentesis,  500ml removed. reports breathing improved, on 2L  -  continue lasix 40mg IVP, strict i/o   - cont 02 supplementation, wean off as tolerated  - pulmonary following    - TTe with severe mitral stenosis (previous TTE in 2022 showed moderate MS), EF 69%    - HR 60-90s, continu metoprolol 25 q8hr  - cont to monitor on telemetry    - Monitor and replete lytes, keep K>4, Mg>2.  - Will continue to follow.    Nila Marc NP  Nurse Practitioner- Cardiology   Call TEAMS 78-year-old female with history of COPD (not on home oxygen), nicotine dependence, hypertension, hyperlipidemia, and hypothyroidism here with worsening shortness of breath.  CT with bilateral pleural effusions, mod to large on the right.    SOB  - CT with Moderate to large right pleural effusion and small left pleural effusion with associated atelectasis.  -  s/p R thoracentesis,  500ml removed. reports breathing improved, on 2L  -  continue lasix 40mg IVP, strict i/o   - cont 02 supplementation, wean off as tolerated  - pulmonary following    - TTe with severe mitral stenosis (previous TTE in 2022 showed moderate MS), EF 69%    - HR 60-90s, continue metoprolol 25 q8hr  - cont to monitor on telemetry    - Monitor and replete lytes, keep K>4, Mg>2.  - Will continue to follow.    Nila Marc NP  Nurse Practitioner- Cardiology   Call TEAMS 78-year-old female with history of COPD (not on home oxygen), nicotine dependence, hypertension, hyperlipidemia, and hypothyroidism here with worsening shortness of breath.  CT with bilateral pleural effusions, mod to large on the right.    SOB  - CT with Moderate to large right pleural effusion and small left pleural effusion with associated atelectasis.  -  s/p R thoracentesis,  500ml removed. reports breathing improved, on 2L  -  continue lasix 40mg IVP, strict i/o   - cont 02 supplementation, wean off as tolerated  - pulmonary following    - TTe with severe mitral stenosis (previous TTE in 2022 showed moderate MS), EF 69%    - HR 60-90, continue metoprolol 25 q8hr  - cont to monitor on telemetry    - Monitor and replete lytes, keep K>4, Mg>2.  - Will continue to follow.    Nila Marc NP  Nurse Practitioner- Cardiology   Call TEAMS

## 2023-11-13 ENCOUNTER — RX RENEWAL (OUTPATIENT)
Age: 78
End: 2023-11-13

## 2023-11-13 LAB
ALBUMIN SERPL ELPH-MCNC: 2.8 G/DL — LOW (ref 3.3–5)
ALBUMIN SERPL ELPH-MCNC: 2.8 G/DL — LOW (ref 3.3–5)
ALP SERPL-CCNC: 67 U/L — SIGNIFICANT CHANGE UP (ref 40–120)
ALP SERPL-CCNC: 67 U/L — SIGNIFICANT CHANGE UP (ref 40–120)
ALT FLD-CCNC: 39 U/L — SIGNIFICANT CHANGE UP (ref 12–78)
ALT FLD-CCNC: 39 U/L — SIGNIFICANT CHANGE UP (ref 12–78)
ANION GAP SERPL CALC-SCNC: 3 MMOL/L — LOW (ref 5–17)
ANION GAP SERPL CALC-SCNC: 3 MMOL/L — LOW (ref 5–17)
AST SERPL-CCNC: 29 U/L — SIGNIFICANT CHANGE UP (ref 15–37)
AST SERPL-CCNC: 29 U/L — SIGNIFICANT CHANGE UP (ref 15–37)
BASOPHILS # BLD AUTO: 0 K/UL — SIGNIFICANT CHANGE UP (ref 0–0.2)
BASOPHILS # BLD AUTO: 0 K/UL — SIGNIFICANT CHANGE UP (ref 0–0.2)
BASOPHILS NFR BLD AUTO: 0 % — SIGNIFICANT CHANGE UP (ref 0–2)
BASOPHILS NFR BLD AUTO: 0 % — SIGNIFICANT CHANGE UP (ref 0–2)
BILIRUB SERPL-MCNC: 0.3 MG/DL — SIGNIFICANT CHANGE UP (ref 0.2–1.2)
BILIRUB SERPL-MCNC: 0.3 MG/DL — SIGNIFICANT CHANGE UP (ref 0.2–1.2)
BUN SERPL-MCNC: 45 MG/DL — HIGH (ref 7–23)
BUN SERPL-MCNC: 45 MG/DL — HIGH (ref 7–23)
CALCIUM SERPL-MCNC: 8.3 MG/DL — LOW (ref 8.5–10.1)
CALCIUM SERPL-MCNC: 8.3 MG/DL — LOW (ref 8.5–10.1)
CHLORIDE SERPL-SCNC: 102 MMOL/L — SIGNIFICANT CHANGE UP (ref 96–108)
CHLORIDE SERPL-SCNC: 102 MMOL/L — SIGNIFICANT CHANGE UP (ref 96–108)
CO2 SERPL-SCNC: 31 MMOL/L — SIGNIFICANT CHANGE UP (ref 22–31)
CO2 SERPL-SCNC: 31 MMOL/L — SIGNIFICANT CHANGE UP (ref 22–31)
CREAT SERPL-MCNC: 1.1 MG/DL — SIGNIFICANT CHANGE UP (ref 0.5–1.3)
CREAT SERPL-MCNC: 1.1 MG/DL — SIGNIFICANT CHANGE UP (ref 0.5–1.3)
EGFR: 51 ML/MIN/1.73M2 — LOW
EGFR: 51 ML/MIN/1.73M2 — LOW
EOSINOPHIL # BLD AUTO: 0 K/UL — SIGNIFICANT CHANGE UP (ref 0–0.5)
EOSINOPHIL # BLD AUTO: 0 K/UL — SIGNIFICANT CHANGE UP (ref 0–0.5)
EOSINOPHIL NFR BLD AUTO: 0 % — SIGNIFICANT CHANGE UP (ref 0–6)
EOSINOPHIL NFR BLD AUTO: 0 % — SIGNIFICANT CHANGE UP (ref 0–6)
GLUCOSE SERPL-MCNC: 117 MG/DL — HIGH (ref 70–99)
GLUCOSE SERPL-MCNC: 117 MG/DL — HIGH (ref 70–99)
HCT VFR BLD CALC: 28.6 % — LOW (ref 34.5–45)
HCT VFR BLD CALC: 28.6 % — LOW (ref 34.5–45)
HGB BLD-MCNC: 9.7 G/DL — LOW (ref 11.5–15.5)
HGB BLD-MCNC: 9.7 G/DL — LOW (ref 11.5–15.5)
IMM GRANULOCYTES NFR BLD AUTO: 0.5 % — SIGNIFICANT CHANGE UP (ref 0–0.9)
IMM GRANULOCYTES NFR BLD AUTO: 0.5 % — SIGNIFICANT CHANGE UP (ref 0–0.9)
LYMPHOCYTES # BLD AUTO: 0.76 K/UL — LOW (ref 1–3.3)
LYMPHOCYTES # BLD AUTO: 0.76 K/UL — LOW (ref 1–3.3)
LYMPHOCYTES # BLD AUTO: 12.2 % — LOW (ref 13–44)
LYMPHOCYTES # BLD AUTO: 12.2 % — LOW (ref 13–44)
MAGNESIUM SERPL-MCNC: 1.9 MG/DL — SIGNIFICANT CHANGE UP (ref 1.6–2.6)
MAGNESIUM SERPL-MCNC: 1.9 MG/DL — SIGNIFICANT CHANGE UP (ref 1.6–2.6)
MCHC RBC-ENTMCNC: 31.5 PG — SIGNIFICANT CHANGE UP (ref 27–34)
MCHC RBC-ENTMCNC: 31.5 PG — SIGNIFICANT CHANGE UP (ref 27–34)
MCHC RBC-ENTMCNC: 33.9 GM/DL — SIGNIFICANT CHANGE UP (ref 32–36)
MCHC RBC-ENTMCNC: 33.9 GM/DL — SIGNIFICANT CHANGE UP (ref 32–36)
MCV RBC AUTO: 92.9 FL — SIGNIFICANT CHANGE UP (ref 80–100)
MCV RBC AUTO: 92.9 FL — SIGNIFICANT CHANGE UP (ref 80–100)
MONOCYTES # BLD AUTO: 0.56 K/UL — SIGNIFICANT CHANGE UP (ref 0–0.9)
MONOCYTES # BLD AUTO: 0.56 K/UL — SIGNIFICANT CHANGE UP (ref 0–0.9)
MONOCYTES NFR BLD AUTO: 9 % — SIGNIFICANT CHANGE UP (ref 2–14)
MONOCYTES NFR BLD AUTO: 9 % — SIGNIFICANT CHANGE UP (ref 2–14)
NEUTROPHILS # BLD AUTO: 4.87 K/UL — SIGNIFICANT CHANGE UP (ref 1.8–7.4)
NEUTROPHILS # BLD AUTO: 4.87 K/UL — SIGNIFICANT CHANGE UP (ref 1.8–7.4)
NEUTROPHILS NFR BLD AUTO: 78.3 % — HIGH (ref 43–77)
NEUTROPHILS NFR BLD AUTO: 78.3 % — HIGH (ref 43–77)
NRBC # BLD: 0 /100 WBCS — SIGNIFICANT CHANGE UP (ref 0–0)
NRBC # BLD: 0 /100 WBCS — SIGNIFICANT CHANGE UP (ref 0–0)
PHOSPHATE SERPL-MCNC: 3.1 MG/DL — SIGNIFICANT CHANGE UP (ref 2.5–4.5)
PHOSPHATE SERPL-MCNC: 3.1 MG/DL — SIGNIFICANT CHANGE UP (ref 2.5–4.5)
PLATELET # BLD AUTO: 269 K/UL — SIGNIFICANT CHANGE UP (ref 150–400)
PLATELET # BLD AUTO: 269 K/UL — SIGNIFICANT CHANGE UP (ref 150–400)
POTASSIUM SERPL-MCNC: 4.7 MMOL/L — SIGNIFICANT CHANGE UP (ref 3.5–5.3)
POTASSIUM SERPL-MCNC: 4.7 MMOL/L — SIGNIFICANT CHANGE UP (ref 3.5–5.3)
POTASSIUM SERPL-SCNC: 4.7 MMOL/L — SIGNIFICANT CHANGE UP (ref 3.5–5.3)
POTASSIUM SERPL-SCNC: 4.7 MMOL/L — SIGNIFICANT CHANGE UP (ref 3.5–5.3)
PROT SERPL-MCNC: 6.6 G/DL — SIGNIFICANT CHANGE UP (ref 6–8.3)
PROT SERPL-MCNC: 6.6 G/DL — SIGNIFICANT CHANGE UP (ref 6–8.3)
RBC # BLD: 3.08 M/UL — LOW (ref 3.8–5.2)
RBC # BLD: 3.08 M/UL — LOW (ref 3.8–5.2)
RBC # FLD: 13.6 % — SIGNIFICANT CHANGE UP (ref 10.3–14.5)
RBC # FLD: 13.6 % — SIGNIFICANT CHANGE UP (ref 10.3–14.5)
SODIUM SERPL-SCNC: 136 MMOL/L — SIGNIFICANT CHANGE UP (ref 135–145)
SODIUM SERPL-SCNC: 136 MMOL/L — SIGNIFICANT CHANGE UP (ref 135–145)
WBC # BLD: 6.22 K/UL — SIGNIFICANT CHANGE UP (ref 3.8–10.5)
WBC # BLD: 6.22 K/UL — SIGNIFICANT CHANGE UP (ref 3.8–10.5)
WBC # FLD AUTO: 6.22 K/UL — SIGNIFICANT CHANGE UP (ref 3.8–10.5)
WBC # FLD AUTO: 6.22 K/UL — SIGNIFICANT CHANGE UP (ref 3.8–10.5)

## 2023-11-13 PROCEDURE — 99232 SBSQ HOSP IP/OBS MODERATE 35: CPT

## 2023-11-13 PROCEDURE — 99233 SBSQ HOSP IP/OBS HIGH 50: CPT

## 2023-11-13 RX ADMIN — CEFTRIAXONE 100 MILLIGRAM(S): 500 INJECTION, POWDER, FOR SOLUTION INTRAMUSCULAR; INTRAVENOUS at 21:35

## 2023-11-13 RX ADMIN — Medication 50 MILLIGRAM(S): at 05:22

## 2023-11-13 RX ADMIN — ATORVASTATIN CALCIUM 40 MILLIGRAM(S): 80 TABLET, FILM COATED ORAL at 21:31

## 2023-11-13 RX ADMIN — HEPARIN SODIUM 5000 UNIT(S): 5000 INJECTION INTRAVENOUS; SUBCUTANEOUS at 05:22

## 2023-11-13 RX ADMIN — Medication 3 MILLILITER(S): at 20:17

## 2023-11-13 RX ADMIN — HEPARIN SODIUM 5000 UNIT(S): 5000 INJECTION INTRAVENOUS; SUBCUTANEOUS at 17:40

## 2023-11-13 RX ADMIN — Medication 3 MILLILITER(S): at 13:41

## 2023-11-13 RX ADMIN — Medication 40 MILLIGRAM(S): at 05:23

## 2023-11-13 RX ADMIN — Medication 20 MILLIGRAM(S): at 05:22

## 2023-11-13 RX ADMIN — Medication 3 MILLILITER(S): at 07:50

## 2023-11-13 RX ADMIN — Medication 88 MICROGRAM(S): at 05:22

## 2023-11-13 RX ADMIN — Medication 50 MILLIGRAM(S): at 14:40

## 2023-11-13 RX ADMIN — Medication 50 MILLIGRAM(S): at 21:31

## 2023-11-13 NOTE — PROGRESS NOTE ADULT - ASSESSMENT
77 y/o F w. PMHx of COPD (Not on home O2), Nicotine dependance, HTN, HLD, and hypothyroidism who presents with complaint of back pain for ~2-3 week and increased shortness of breathe. Patient admitted for COPD exacerbation and PNA.    Assessment:  YADI ON CKD  PNA/COPD      -YADI 2/2 decreased PO intake ~10 days, likely prerenal, resolving   -Cr. 2.0 on admission, downtrending to 1.5 today.  -Avoid nephrotoxic meds as able  -Urine studies ordered  -Monitor chemistries  -Became acute SOB yesterday. S/p thoracentesis. Lasix IV started. We will monitor closely   -Creatinine stable      Thank you

## 2023-11-13 NOTE — PROGRESS NOTE ADULT - ASSESSMENT
78-year-old female with history of COPD (not on home oxygen), nicotine dependence, hypertension, hyperlipidemia, and hypothyroidism here with worsening shortness of breath.    SOB  - CT with Moderate to large right pleural effusion and small left pleural effusion with associated atelectasis.  - s/p R thoracentesis, 500ml removed, remains on O2 supplementation, wean as tolerated  - + COPD exacerbation wheezing, on nebs and steroid, pulmonary following   - TTE(11/11/2023) severe mitral stenosis (previous TTE in 2022 showed moderate MS), EF 69%    - continue Lasix 40mg IVP  - continue strict I/O  - pulmonary following    - EKG ST, no sign of acute ischemia   - no anginal complaints   - continue statin    - Telemetry 58 up to 110's non sustained, continue to monitor tele for now   - continue metoprolol 25 q8hr  - continue to monitor routine hemodynamics  - Monitor and replete Lytes. Keep K > 4 and Mg > 2    - Will continue to follow.    Rita Holm [facility]  Nurse Practitioner - Cardiology   call TEAMS 78-year-old female with history of COPD (not on home oxygen), nicotine dependence, hypertension, hyperlipidemia, and hypothyroidism here with worsening shortness of breath.    SOB  - CT with Moderate to large right pleural effusion and small left pleural effusion with associated atelectasis.  - s/p R thoracentesis, 500ml removed, remains on O2 supplementation, wean as tolerated  - + COPD exacerbation wheezing, on nebs and steroid, pulmonary following   - TTE(11/11/2023) severe mitral stenosis (previous TTE in 2022 showed moderate MS), EF 69%    - continue Lasix 40mg IVP  - continue strict I/O  - pulmonary following    - EKG ST, no sign of acute ischemia   - no anginal complaints   - continue statin    - Telemetry 58 up to 110's non sustained, continue to monitor tele for now   - continue metoprolol 50 q8hr  - continue to monitor routine hemodynamics  - Monitor and replete Lytes. Keep K > 4 and Mg > 2    - Will continue to follow.    Rita Holm [facility]  Nurse Practitioner - Cardiology   call TEAMS

## 2023-11-13 NOTE — PROGRESS NOTE ADULT - SUBJECTIVE AND OBJECTIVE BOX
Date/Time Patient Seen:  		  Referring MD:   Data Reviewed	       Patient is a 78y old  Female who presents with a chief complaint of back pain (12 Nov 2023 21:13)      Subjective/HPI     PAST MEDICAL & SURGICAL HISTORY:  History of COPD  No home O2 use    Asthma    Thyroid nodule    Hyperlipidemia    Hypertension    Hypothyroidism    History of cholecystectomy  1989    History of repair of hip fracture  ORIF 1982.  Hardware was eventually removed          Medication list         MEDICATIONS  (STANDING):  albuterol/ipratropium for Nebulization 3 milliLiter(s) Nebulizer every 6 hours  atorvastatin 40 milliGRAM(s) Oral at bedtime  azithromycin  IVPB 500 milliGRAM(s) IV Intermittent every 24 hours  cefTRIAXone   IVPB 1000 milliGRAM(s) IV Intermittent every 24 hours  furosemide   Injectable 40 milliGRAM(s) IV Push daily  heparin   Injectable 5000 Unit(s) SubCutaneous every 12 hours  levothyroxine 88 MICROGram(s) Oral daily  methylPREDNISolone sodium succinate Injectable 40 milliGRAM(s) IV Push two times a day  metoprolol tartrate 50 milliGRAM(s) Oral every 8 hours  nicotine -   7 mG/24Hr(s) Patch 1 Patch Transdermal daily    MEDICATIONS  (PRN):  acetaminophen     Tablet .. 650 milliGRAM(s) Oral every 6 hours PRN Temp greater or equal to 38C (100.4F), Mild Pain (1 - 3)  aluminum hydroxide/magnesium hydroxide/simethicone Suspension 30 milliLiter(s) Oral every 4 hours PRN Dyspepsia  melatonin 3 milliGRAM(s) Oral at bedtime PRN Insomnia  ondansetron Injectable 4 milliGRAM(s) IV Push every 8 hours PRN Nausea and/or Vomiting         Vitals log        ICU Vital Signs Last 24 Hrs  T(C): 36.8 (13 Nov 2023 04:45), Max: 36.8 (13 Nov 2023 04:45)  T(F): 98.2 (13 Nov 2023 04:45), Max: 98.2 (13 Nov 2023 04:45)  HR: 65 (13 Nov 2023 04:45) (65 - 86)  BP: 138/74 (13 Nov 2023 04:45) (116/65 - 138/74)  BP(mean): --  ABP: --  ABP(mean): --  RR: 18 (13 Nov 2023 04:45) (18 - 18)  SpO2: 96% (13 Nov 2023 04:45) (92% - 96%)    O2 Parameters below as of 13 Nov 2023 04:45  Patient On (Oxygen Delivery Method): nasal cannula  O2 Flow (L/min): 2               Input and Output:  I&O's Detail      Lab Data                        9.6    8.72  )-----------( 285      ( 12 Nov 2023 07:35 )             28.6     11-12    138  |  105  |  45<H>  ----------------------------<  125<H>  4.3   |  24  |  1.10    Ca    8.3<L>      12 Nov 2023 07:35  Phos  3.7     11-12  Mg     1.9     11-12    TPro  6.8  /  Alb  2.8<L>  /  TBili  0.2  /  DBili  x   /  AST  29  /  ALT  36  /  AlkPhos  70  11-12            Review of Systems	      Objective     Physical Examination    heart s1s2  lung dc BS  head nc      Pertinent Lab findings & Imaging      Chiki:  NO   Adequate UO     I&O's Detail           Discussed with:     Cultures:	        Radiology

## 2023-11-13 NOTE — CONSULT NOTE ADULT - SUBJECTIVE AND OBJECTIVE BOX
[facility], Division of Infectious Diseases  ANU Rodriguez S. Shah, Y. Patel, G. [facility]  [phone]    SANJUANA TORRES  78y, Female  [id number]    HPI--  HPI:  78-year-old female with history of COPD (not on home oxygen), nicotine dependence, hypertension, hyperlipidemia, and hypothyroidism presents to the ED for back pain.  Patient reports that for the past 2-3 weeks, she has been having pain located in the midline of her back.  At the time, patient went to an urgent care and was diagnosed with a urinary tract infection.  Patient was treated with antibiotics but the pain did not go away.  Patient wanted to go back to the urgent care today for repeat evaluation, but decided she wanted further workup in the ED.  Patient endorses worsened dyspnea on exertion. Patient denies fever, N/V/D/C, increased sputum, increased purulence, past history of CHF, past history of COPD exacerbation.       In the ED pts VS were T(C): 36.7  T(F): 98.1  HR: 94  BP: 112/60  RR: 20  SpO2: 94%, currently on NC  Labs show: H.8  WBC: 13.52  Plt: 258  Creatinine: 2.00  VQ scan shows no evidence of PE  EKG shows sinus bradycardia    Patient received fluids, ceftriaxone, azithromycin, duonebs, solu-medrol in the ED.    Pt is admitted for further workup and management   (2023 23:21)    ID c/s for further evaluation and Abx guidance  Hospital course reviewed  On cef/azithro for PNA  Imaging w/ pleural effusions  S/p thoracentesis     Pt seen at beside    Active Medications--  acetaminophen     Tablet .. 650 milliGRAM(s) Oral every 6 hours PRN  albuterol/ipratropium for Nebulization 3 milliLiter(s) Nebulizer every 6 hours  aluminum hydroxide/magnesium hydroxide/simethicone Suspension 30 milliLiter(s) Oral every 4 hours PRN  atorvastatin 40 milliGRAM(s) Oral at bedtime  azithromycin  IVPB 500 milliGRAM(s) IV Intermittent every 24 hours  cefTRIAXone   IVPB 1000 milliGRAM(s) IV Intermittent every 24 hours  furosemide   Injectable 40 milliGRAM(s) IV Push daily  heparin   Injectable 5000 Unit(s) SubCutaneous every 12 hours  levothyroxine 88 MICROGram(s) Oral daily  melatonin 3 milliGRAM(s) Oral at bedtime PRN  metoprolol tartrate 50 milliGRAM(s) Oral every 8 hours  nicotine -   7 mG/24Hr(s) Patch 1 Patch Transdermal daily  ondansetron Injectable 4 milliGRAM(s) IV Push every 8 hours PRN  predniSONE   Tablet 20 milliGRAM(s) Oral daily    Antimicrobials:   azithromycin  IVPB 500 milliGRAM(s) IV Intermittent every 24 hours  cefTRIAXone   IVPB 1000 milliGRAM(s) IV Intermittent every 24 hours    Immunologic:     ROS:  CONSTITUTIONAL: No fevers or chills. No weakness or headache. No weight changes.  EYES/ENT: No visual or hearing changes. No sore throat or throat pain .  NECK: No pain or stiffness  RESPIRATORY: No cough, wheezing, or hemoptysis. No shortness of breath  CARDIOVASCULAR: No chest pain or palpitations  GASTROINTESTINAL: No abdominal pain. No nausea or vomiting. No diarrhea or constipation.  GENITOURINARY: No dysuria, frequency or hematuria  NEUROLOGICAL: No numbness or weakness  SKIN: No itching or rashes  PSYCHIATRIC: Pleasant. Appropriate affect    Allergies: No Known Allergies    PMH -- History of COPD    Asthma    Thyroid nodule    Hyperlipidemia    Hypertension    Hypothyroidism      PSH -- History of cholecystectomy    History of repair of hip fracture      FH --   Social History --  EtOH: denies   Tobacco: denies   Drug Use: denies     Travel/Environmental/Occupational History:    Physical Exam--  Vital Signs Last 24 Hrs  T(F): 98 (2023 11:59), Max: 98.2 (2023 04:45)  HR: 65 (2023 11:59) (61 - 83)  BP: 119/66 (2023 11:59) (119/66 - 138/74)  RR: 18 (2023 11:59) (18 - 19)  SpO2: 93% (2023 11:59) (88% - 96%)  General: nontoxic-appearing, no acute distress  HEENT: NC/AT, EOMI, anicteric, conjunctiva pink and moist, oropharynx clear, dentition fair  Neck: Not rigid. No sense of mass. No LAD  Lungs: Clear bilaterally without rales, wheezing or rhonchi  Heart: Regular rate and rhythm. No murmur, rub or gallop.  Abdomen: Soft. Nondistended. Nontender. Bowel sounds present. No organomegaly.  Back: No spinal tenderness. No costovertebral angle tenderness.  Extremities: No cyanosis or clubbing. No edema.   Skin: Warm. Dry. Good turgor. No rash. No vasculitic stigmata.  Psychiatric: Appropriate affect and mood for situation.   Lines:    Laboratory & Imaging Data:  CBC:                       9.7    6.22  )-----------( 269      ( 2023 06:50 )             28.6     CMP:     136  |  102  |  45<H>  ----------------------------<  117<H>  4.7   |  31  |  1.10    Ca    8.3<L>      2023 06:50  Phos  3.1       Mg     1.9         TPro  6.6  /  Alb  2.8<L>  /  TBili  0.3  /  DBili  x   /  AST  29  /  ALT  39  /  AlkPhos  67      LIVER FUNCTIONS - ( 2023 06:50 )  Alb: 2.8 g/dL / Pro: 6.6 g/dL / ALK PHOS: 67 U/L / ALT: 39 U/L / AST: 29 U/L / GGT: x           Urinalysis Basic - ( 2023 06:50 )    Color: x / Appearance: x / SG: x / pH: x  Gluc: 117 mg/dL / Ketone: x  / Bili: x / Urobili: x   Blood: x / Protein: x / Nitrite: x   Leuk Esterase: x / RBC: x / WBC x   Sq Epi: x / Non Sq Epi: x / Bacteria: x        Microbiology: reviewed    Culture - Body Fluid with Gram Stain (collected 23 @ 14:30)  Source: Pleural Fl Pleural Fluid  Gram Stain (23 @ 22:19):    polymorphonuclear leukocytes seen    No organisms seen    by cytocentrifuge  Preliminary Report (23 @ 19:04):    No growth    Culture - Urine (collected 23 @ 13:40)  Source: Clean Catch Clean Catch (Midstream)  Final Report (23 @ 23:24):    >100,000 CFU/ml Klebsiella pneumoniae  Organism: Klebsiella pneumoniae (23 @ 23:24)  Organism: Klebsiella pneumoniae (23 @ 23:24)      Method Type: RADHA      -  Amoxicillin/Clavulanic Acid: S <=8/4      -  Ampicillin: R >16 These ampicillin results predict results for amoxicillin      -  Ampicillin/Sulbactam: S 8/4      -  Aztreonam: S <=4      -  Cefazolin: S <=2 For uncomplicated UTI with K. pneumoniae, E. coli, or P. mirablis: RADHA <=16 is sensitive and RADHA >=32 is resistant. This also predicts results for oral agents cefaclor, cefdinir, cefpodoxime, cefprozil, cefuroxime axetil, cephalexin and locarbef for uncomplicated UTI. Note that some isolates may be susceptible to these agents while testing resistant to cefazolin.      -  Cefepime: S <=2      -  Cefoxitin: S <=8      -  Ceftriaxone: S <=1      -  Cefuroxime: S 8      -  Ciprofloxacin: S <=0.25      -  Ertapenem: S <=0.5      -  Gentamicin: S <=2      -  Imipenem: S <=1      -  Levofloxacin: S <=0.5      -  Meropenem: S <=1      -  Nitrofurantoin: I 64 Should not be used to treat pyelonephritis      -  Piperacillin/Tazobactam: S <=8      -  Tobramycin: S <=2      -  Trimethoprim/Sulfamethoxazole: S     Culture - Blood (collected 23 @ 12:00)  Source: .Blood Blood-Peripheral  Preliminary Report (23 @ 19:01):    No growth at 72 Hours    Culture - Blood (collected 23 @ 11:40)  Source: .Blood Blood-Peripheral  Preliminary Report (23 @ 19:01):    No growth at 72 Hours          Radiology: reviewed    < from: Xray Chest 1 View- PORTABLE-Routine (Xray Chest 1 View- PORTABLE-Routine .) (23 @ 18:41) >    ACC: [id number] EXAM:  XR CHEST PORTABLE ROUTINE 1V   ORDERED BY: LULU PAN     ACC: [id number] EXAM:  XR CHEST PORTABLE URGENT 1V   ORDERED BY: GARRY LIANG     PROCEDURE DATE:  2023          INTERPRETATION:  AP semierect chest on 2023 2:43 PM. Patient   had right thoracentesis. CAT scan of November 10 showed a large right   effusion now moderate to large left effusion.    Heart magnified by technique. There are persistent bibasilar effusions   roughly similar tothe CAT scan. Congestion in the lungs is also again   seen.    No pneumothorax.    IMPRESSION: Persistent congestive findings. No pneumothorax.    --- End of Report ---            WILMAR EDWARDS MD; Attending Radiologist  This document has been electronically signed. 2023  4:28PM    < end of copied text >  < from: CT Chest No Cont (11.10.23 @ 21:50) >    ACC: [id number] EXAM:  CT CHEST   ORDERED BY: RANDOLPH VALENTINO     PROCEDURE DATE:  11/10/2023          INTERPRETATION:  CLINICAL INFORMATION: Shortness of breath    COMPARISON: CT abdomen pelvis 2023. CT chest 2022..    CONTRAST/COMPLICATIONS:  IV Contrast: NONE  Oral Contrast: NONE  Complications: None reported at time of study completion    PROCEDURE:  CT of the Chest was performed.  Sagittal and coronal reformats were performed.    FINDINGS:    LUNGS AND AIRWAYS: Patent central airways.  Bibasilar atelectasis. Mild biapical centrilobular emphysema.  PLEURA: Moderate to large right pleural effusion. Small left pleural   effusion.  MEDIASTINUM AND MATTHEW: Enlarged mediastinal lymph nodes the largest is a   right paratracheal lymph node measuring 2.1 x 1.2 cm, unchanged  VESSELS: Aortic and coronary artery calcifications.  HEART: Cardiomegaly. Mitral valve annulus calcification. No pericardial   effusion.  CHEST WALL AND LOWER NECK: Within normal limits.  VISUALIZED UPPER ABDOMEN:Refer to CT abdomen and pelvis from earlier the   same day.  BONES: Degenerative changes. Multilevel compression deformities of the   thoracic spine most most severe at T7 which are unchanged. T9 vertebral   body height loss has increased since the previous exam of 2022.   Correlate for pain at this site.    IMPRESSION: Motion limited evaluation.    Moderate to large right pleural effusion and small left pleural effusion   with associated atelectasis.    Mild biapical centrilobular emphysema.    Multilevel compression deformities of the thoracic spine most most severe   at T7 which are unchanged. T9 vertebral body height loss has increased   since the previous exam of 2022. Correlate for pain at this site.      --- End of Report ---            ORLANDO MCKEON MD; Attending Radiologist  This document has been electronically signed. Nov 10 2023 10:31PM    < end of copied text >  < from: CT Renal Stone Hunt (23 @ 16:11) >    ACC: [id number] EXAM:  CT RENAL STONE HUNT   ORDERED BY: RANDOLPH VALENTINO     PROCEDURE DATE:  2023          INTERPRETATION:  CLINICAL INFORMATION: Flank pain    COMPARISON: None.    CONTRAST/COMPLICATIONS:  IV Contrast: NONE  Oral Contrast: NONE  Complications: None reported at time of study completion    PROCEDURE:  CT of the Abdomen and Pelvis was performed.  Sagittal and coronal reformats were performed.    FINDINGS:    Evaluation of the abdominal and pelvic viscera is limited without   intravenous contrast.    LOWER CHEST: Right greater than left bilateral small pleural effusions   with bibasilar atelectasis. Aortic root, mitral annular and coronary   artery calcifications.    LIVER: Within normal limits.  BILE DUCTS: Mild dilatation.  GALLBLADDER: Cholecystectomy.  SPLEEN: Within normal limits.  PANCREAS: Within normal limits.  ADRENALS: Within normal limits.  KIDNEYS/URETERS: There are punctate nonobstructing renal calculi versus   vascular calcifications. No hydronephrosis.    BLADDER: Within normal limits.  REPRODUCTIVE ORGANS: Calcified uterine fibroids.    BOWEL: Duodenal diverticulum. Colonic diverticulosis. No bowel   obstruction. Appendix is normal.  PERITONEUM: Trace pelvic fluid.  VESSELS: Atherosclerotic changes.  RETROPERITONEUM/LYMPH NODES: No lymphadenopathy.  ABDOMINAL WALL: Within normal limits.  BONES: Degenerative changes. Lucency within the left femoral neck related   to prior hardware.    IMPRESSION:  No acute pathology in the abdomen or pelvis.  Bilateral pleural effusions.    --- End of Report ---           PRINCE LEIVA MD; Resident Radiology  This document has been electronically signed.  MIGUEL NOBLES MD; Attending Radiologist  This document has been electronically signed. 2023  7:26PM    < end of copied text >

## 2023-11-13 NOTE — GOALS OF CARE CONVERSATION - ADVANCED CARE PLANNING - CONVERSATION DETAILS
[facility]- Palliative care SW met with patient and her son at bedside. Reviewed patient's medical and social history as well as events leading to patient's hospitalization. Writer discussed patient's current diagnosis (COPD exacerbation, back pain, sepsis, YADI; HX smoking, HTN, HLD, hypothyroidism), medical condition and management. Inquired about advanced directives. Patient reports that she has a HCP and Living Will at home with her son Jae as health care agent.  Discussed patient's wishes as described in Living Will. Inquired about patient's wishes regarding extent of medical care to be provided including cardiopulmonary resuscitation/mechanical ventilation.  Patient showed insight into medical condition. She states that at this time she would want to remain a full code and has discussed her wishes with her son. All questions answered. Psychosocial support provided.

## 2023-11-13 NOTE — CASE MANAGEMENT PROGRESS NOTE - NSCMPROGRESSNOTE_GEN_ALL_CORE
Patient is identified as a CMS STAR patient. Transition care management program explained and yellow contact card has been provided.

## 2023-11-13 NOTE — PROGRESS NOTE ADULT - SUBJECTIVE AND OBJECTIVE BOX
Patient is a 78y old  Female who presents with a chief complaint of back pain (13 Nov 2023 14:59)      INTERVAL HPI/OVERNIGHT EVENTS: Patient seen and examined at bedside. No overnight events. Tolerating diet. Denies fever, chills, chest pain, shortness of breath, abdominal pain, nausea/vomiting, headache.    MEDICATIONS  (STANDING):  albuterol/ipratropium for Nebulization 3 milliLiter(s) Nebulizer every 6 hours  atorvastatin 40 milliGRAM(s) Oral at bedtime  cefTRIAXone   IVPB 1000 milliGRAM(s) IV Intermittent every 24 hours  furosemide   Injectable 40 milliGRAM(s) IV Push daily  heparin   Injectable 5000 Unit(s) SubCutaneous every 12 hours  levothyroxine 88 MICROGram(s) Oral daily  metoprolol tartrate 50 milliGRAM(s) Oral every 8 hours  nicotine -   7 mG/24Hr(s) Patch 1 Patch Transdermal daily  predniSONE   Tablet 20 milliGRAM(s) Oral daily    MEDICATIONS  (PRN):  acetaminophen     Tablet .. 650 milliGRAM(s) Oral every 6 hours PRN Temp greater or equal to 38C (100.4F), Mild Pain (1 - 3)  aluminum hydroxide/magnesium hydroxide/simethicone Suspension 30 milliLiter(s) Oral every 4 hours PRN Dyspepsia  melatonin 3 milliGRAM(s) Oral at bedtime PRN Insomnia  ondansetron Injectable 4 milliGRAM(s) IV Push every 8 hours PRN Nausea and/or Vomiting      Allergies    No Known Allergies    Intolerances        REVIEW OF SYSTEMS:  CONSTITUTIONAL: No fever or chills  HEENT:  No headache, no sore throat  RESPIRATORY: No cough, wheezing, or shortness of breath  CARDIOVASCULAR: No chest pain, palpitations  GASTROINTESTINAL: No abd pain, nausea, vomiting, or diarrhea  GENITOURINARY: No dysuria, frequency, or hematuria  NEUROLOGICAL: no focal weakness or dizziness  MUSCULOSKELETAL: no myalgias     Vital Signs Last 24 Hrs  T(C): 36.7 (13 Nov 2023 11:59), Max: 36.8 (13 Nov 2023 04:45)  T(F): 98 (13 Nov 2023 11:59), Max: 98.2 (13 Nov 2023 04:45)  HR: 76 (13 Nov 2023 14:19) (61 - 83)  BP: 119/66 (13 Nov 2023 11:59) (119/66 - 138/74)  BP(mean): --  RR: 18 (13 Nov 2023 11:59) (18 - 19)  SpO2: 94% (13 Nov 2023 14:19) (88% - 96%)    Parameters below as of 13 Nov 2023 14:19  Patient On (Oxygen Delivery Method): nasal cannula, 2 lpm        PHYSICAL EXAM:  GENERAL: NAD  HEENT:  anicteric, moist mucous membranes  CHEST/LUNG:  CTA b/l, no rales, wheezes, or rhonchi  HEART:  RRR, S1, S2  ABDOMEN:  BS+, soft, nontender, nondistended  EXTREMITIES: no edema, cyanosis, or calf tenderness  NERVOUS SYSTEM: answers questions and follows commands appropriately    LABS:                        9.7    6.22  )-----------( 269      ( 13 Nov 2023 06:50 )             28.6     CBC Full  -  ( 13 Nov 2023 06:50 )  WBC Count : 6.22 K/uL  Hemoglobin : 9.7 g/dL  Hematocrit : 28.6 %  Platelet Count - Automated : 269 K/uL  Mean Cell Volume : 92.9 fl  Mean Cell Hemoglobin : 31.5 pg  Mean Cell Hemoglobin Concentration : 33.9 gm/dL  Auto Neutrophil # : 4.87 K/uL  Auto Lymphocyte # : 0.76 K/uL  Auto Monocyte # : 0.56 K/uL  Auto Eosinophil # : 0.00 K/uL  Auto Basophil # : 0.00 K/uL  Auto Neutrophil % : 78.3 %  Auto Lymphocyte % : 12.2 %  Auto Monocyte % : 9.0 %  Auto Eosinophil % : 0.0 %  Auto Basophil % : 0.0 %    13 Nov 2023 06:50    136    |  102    |  45     ----------------------------<  117    4.7     |  31     |  1.10     Ca    8.3        13 Nov 2023 06:50  Phos  3.1       13 Nov 2023 06:50  Mg     1.9       13 Nov 2023 06:50    TPro  6.6    /  Alb  2.8    /  TBili  0.3    /  DBili  x      /  AST  29     /  ALT  39     /  AlkPhos  67     13 Nov 2023 06:50      Urinalysis Basic - ( 13 Nov 2023 06:50 )    Color: x / Appearance: x / SG: x / pH: x  Gluc: 117 mg/dL / Ketone: x  / Bili: x / Urobili: x   Blood: x / Protein: x / Nitrite: x   Leuk Esterase: x / RBC: x / WBC x   Sq Epi: x / Non Sq Epi: x / Bacteria: x      CAPILLARY BLOOD GLUCOSE            Culture - Body Fluid with Gram Stain (collected 11-11-23 @ 14:30)  Source: Pleural Fl Pleural Fluid  Gram Stain (11-11-23 @ 22:19):    polymorphonuclear leukocytes seen    No organisms seen    by cytocentrifuge  Preliminary Report (11-12-23 @ 19:04):    No growth    Culture - Urine (collected 11-09-23 @ 13:40)  Source: Clean Catch Clean Catch (Midstream)  Final Report (11-12-23 @ 23:24):    >100,000 CFU/ml Klebsiella pneumoniae  Organism: Klebsiella pneumoniae (11-12-23 @ 23:24)  Organism: Klebsiella pneumoniae (11-12-23 @ 23:24)      Method Type: RADHA      -  Amoxicillin/Clavulanic Acid: S <=8/4      -  Ampicillin: R >16 These ampicillin results predict results for amoxicillin      -  Ampicillin/Sulbactam: S 8/4      -  Aztreonam: S <=4      -  Cefazolin: S <=2 For uncomplicated UTI with K. pneumoniae, E. coli, or P. mirablis: RADHA <=16 is sensitive and RADHA >=32 is resistant. This also predicts results for oral agents cefaclor, cefdinir, cefpodoxime, cefprozil, cefuroxime axetil, cephalexin and locarbef for uncomplicated UTI. Note that some isolates may be susceptible to these agents while testing resistant to cefazolin.      -  Cefepime: S <=2      -  Cefoxitin: S <=8      -  Ceftriaxone: S <=1      -  Cefuroxime: S 8      -  Ciprofloxacin: S <=0.25      -  Ertapenem: S <=0.5      -  Gentamicin: S <=2      -  Imipenem: S <=1      -  Levofloxacin: S <=0.5      -  Meropenem: S <=1      -  Nitrofurantoin: I 64 Should not be used to treat pyelonephritis      -  Piperacillin/Tazobactam: S <=8      -  Tobramycin: S <=2      -  Trimethoprim/Sulfamethoxazole: S 1/19    Culture - Blood (collected 11-09-23 @ 12:00)  Source: .Blood Blood-Peripheral  Preliminary Report (11-13-23 @ 19:01):    No growth at 4 days    Culture - Blood (collected 11-09-23 @ 11:40)  Source: .Blood Blood-Peripheral  Preliminary Report (11-13-23 @ 19:01):    No growth at 4 days        RADIOLOGY & ADDITIONAL TESTS:    Personally reviewed.     Consultant(s) Notes Reviewed:  [x] YES  [ ] NO

## 2023-11-13 NOTE — CONSULT NOTE ADULT - ASSESSMENT
78-year-old female with history of COPD (not on home oxygen), hypertension, hyperlipidemia, and hypothyroidism presents to the ED for back pain, admitted for sepsis sec to suspected CAP and acute COPD exacerbation  ID c/s for Abx guidance on HD#5    Sepsis 2/2 PNA  Pleural effusions parapneumonic vs. empyema  COPD exacerbation  AHRF on NC  YADI on CKD  - started on ceftriaxone/azithromycin  - BCx NGTD, UCx noted as below  - s/p R sided thoracentesis 11/11 w/ removal 500 cc   -- pleural fluid cx NGTD    Recent Acute Cystitis  - pt s/p UTI tx w/ macrobid as outpatient  - UCx here + K. pneumo I macrobid, S to ceftriaxone    Recommendations:   C/w ceftriaxone x5 day course, last day today 11/13  D/c azithromycin, urine legionella negative  Trend temps/WBC  O2 as needed  Appreciate pulm recs  Additional care per primary team    D/w Dr. Herron  Infectious Diseases will continue to follow. Please call with any questions.   Swati Norwood M.D.  [facility] Division of Infectious Diseases [phone]  For after 5 P.M. and weekends, please call [phone]

## 2023-11-13 NOTE — PROGRESS NOTE ADULT - ASSESSMENT
78-year-old female with history of COPD (not on home oxygen), hypertension, hyperlipidemia, and hypothyroidism presents to the ED for back pain.  Patient reports that for the past 2-3 weeks, she has been having pain located in the midline of her back.     pain  weakness  copd  HTN  HLD  thyroid disease  eval for PNA  HFpEF  Ex Smoker  Valv heart disease    s/p thoracentesis - 500 cc drained  vs noted  on lasix  on nebs  on steroids  CHF eval - mitral valve disease    ct chest  abx  bronchodilators  systemic steroids  ABG noted  fio2 titration - goal sat > 88 pct  I curt  monitor VS and HD and Sat  VQ scan NEG for PE  BP control  nutrition  outpatient records reviewed [facility]

## 2023-11-13 NOTE — PROGRESS NOTE ADULT - SUBJECTIVE AND OBJECTIVE BOX
SANJUANA TORRES  78y  Female      Patient is a 78y old  Female who presents with a chief complaint of back pain (10 Nov 2023 15:23)      INTERVAL HPI/OVERNIGHT EVENTS: Patient is a 77 y/o F w. PMHx of COPD (Not on home O2), Nicotine dependance, HTN, HLD, and hypothyroidism who presents with complaint of back pain for ~2-3 week. Patient describes the pain as a band across her lower back. She went [facility] for this back pain and finished a course of Macrobid/Pyridium. The back pain persisted and she subsequently came into [facility] ED. She did not have any specific urinary sx. and denies hematuria, dysuria, etc at the time of the initial back pain and still does not endorse any urinary sx. Her back pain has resolved. Patient also endorsing inc. work of breathing with dyspnea on exertion. Patient admitted for COPD exacerbation and suspected community-acquired PNA, now on Abx. No acute events occurred overnight.   Of note, pt admits to naproxen use 2-3 per week x2 months.    No N/V/SOB    REVIEW OF SYSTEMS:  as above      ICU Vital Signs Last 24 Hrs  T(C): 36.7 (13 Nov 2023 11:59), Max: 36.8 (13 Nov 2023 04:45)  T(F): 98 (13 Nov 2023 11:59), Max: 98.2 (13 Nov 2023 04:45)  HR: 76 (13 Nov 2023 14:19) (61 - 83)  BP: 119/66 (13 Nov 2023 11:59) (119/66 - 138/74)  BP(mean): --  ABP: --  ABP(mean): --  RR: 18 (13 Nov 2023 11:59) (18 - 19)  SpO2: 94% (13 Nov 2023 14:19) (88% - 96%)    O2 Parameters below as of 13 Nov 2023 14:19  Patient On (Oxygen Delivery Method): nasal cannula, 2 lpm        PHYSICAL EXAM:  GENERAL: appears stated age, thin habitus  HEAD:  Atraumatic, Normocephalic  EYES: EOMI, conjunctiva and sclera clear  ENMT: No tonsillar erythema, exudates, or enlargement; Moist mucous membranes  NERVOUS SYSTEM:  Alert & Oriented X3, Good concentration.  HEART: Regular rate and rhythm; No murmurs, rubs, or gallops  EXTREMITIES:  No edema  SKIN: No rashes or lesions    Consultant(s) Notes Reviewed:  [x ] YES  [ ] NO  Care Discussed with Consultants/Other Providers [ x] YES  [ ] NO    LABS:                                   9.7    6.22  )-----------( 269      ( 13 Nov 2023 06:50 )             28.6     11-13    136  |  102  |  45<H>  ----------------------------<  117<H>  4.7   |  31  |  1.10    Ca    8.3<L>      13 Nov 2023 06:50  Phos  3.1     11-13  Mg     1.9     11-13    TPro  6.6  /  Alb  2.8<L>  /  TBili  0.3  /  DBili  x   /  AST  29  /  ALT  39  /  AlkPhos  67  11-13      Urinalysis Basic - ( 13 Nov 2023 06:50 )    Color: x / Appearance: x / SG: x / pH: x  Gluc: 117 mg/dL / Ketone: x  / Bili: x / Urobili: x   Blood: x / Protein: x / Nitrite: x   Leuk Esterase: x / RBC: x / WBC x   Sq Epi: x / Non Sq Epi: x / Bacteria: x

## 2023-11-13 NOTE — PROGRESS NOTE ADULT - SUBJECTIVE AND OBJECTIVE BOX
[facility] -- Mateo Bobby Pannella, Patel, Savella, Goodger, Cohen  Office # [phone]    Follow Up:  SOB     Subjective/Observations: Patient seen and examined, awake, alert, resting comfortably in bed, denies chest pain, palpitations or dizziness, orthopnea and PND.on O2 via NC, + wheezing this AM, no events overnight     REVIEW OF SYSTEMS: All other review of systems is negative unless indicated above  PAST MEDICAL & SURGICAL HISTORY:  History of COPD  No home O2 use      Asthma      Thyroid nodule      Hyperlipidemia      Hypertension      Hypothyroidism      History of cholecystectomy  1989      History of repair of hip fracture  ORIF 1982.  Hardware was eventually removed        MEDICATIONS  (STANDING):  albuterol/ipratropium for Nebulization 3 milliLiter(s) Nebulizer every 6 hours  atorvastatin 40 milliGRAM(s) Oral at bedtime  azithromycin  IVPB 500 milliGRAM(s) IV Intermittent every 24 hours  cefTRIAXone   IVPB 1000 milliGRAM(s) IV Intermittent every 24 hours  furosemide   Injectable 40 milliGRAM(s) IV Push daily  heparin   Injectable 5000 Unit(s) SubCutaneous every 12 hours  levothyroxine 88 MICROGram(s) Oral daily  metoprolol tartrate 50 milliGRAM(s) Oral every 8 hours  nicotine -   7 mG/24Hr(s) Patch 1 Patch Transdermal daily  predniSONE   Tablet 20 milliGRAM(s) Oral daily    MEDICATIONS  (PRN):  acetaminophen     Tablet .. 650 milliGRAM(s) Oral every 6 hours PRN Temp greater or equal to 38C (100.4F), Mild Pain (1 - 3)  aluminum hydroxide/magnesium hydroxide/simethicone Suspension 30 milliLiter(s) Oral every 4 hours PRN Dyspepsia  melatonin 3 milliGRAM(s) Oral at bedtime PRN Insomnia  ondansetron Injectable 4 milliGRAM(s) IV Push every 8 hours PRN Nausea and/or Vomiting    Allergies    No Known Allergies    Intolerances      Vital Signs Last 24 Hrs  T(C): 36.8 (13 Nov 2023 04:45), Max: 36.8 (13 Nov 2023 04:45)  T(F): 98.2 (13 Nov 2023 04:45), Max: 98.2 (13 Nov 2023 04:45)  HR: 61 (13 Nov 2023 08:06) (61 - 86)  BP: 138/74 (13 Nov 2023 04:45) (116/65 - 138/74)  BP(mean): --  RR: 18 (13 Nov 2023 04:45) (18 - 18)  SpO2: 95% (13 Nov 2023 08:06) (92% - 96%)    Parameters below as of 13 Nov 2023 08:06  Patient On (Oxygen Delivery Method): nasal cannula, 2 lpm      I&O's Summary        TELE: SB 50's   PHYSICAL EXAM:  Constitutional: NAD, awake and alert  HEENT: Moist Mucous Membranes, Anicteric  Pulmonary: Non-labored, breath sounds are clear bilaterally, +wheezing, rales or rhonchi  Cardiovascular: Regular, S1 and S2, + sys murmurs, rubs, gallops or clicks  Gastrointestinal: Bowel Sounds present, soft, nontender.   Lymph: No peripheral edema. No lymphadenopathy.  Skin: No visible rashes or ulcers.  Psych:  Mood & affect appropriate  LABS: All Labs Reviewed:                        9.7    6.22  )-----------( 269      ( 13 Nov 2023 06:50 )             28.6                         9.6    8.72  )-----------( 285      ( 12 Nov 2023 07:35 )             28.6     13 Nov 2023 06:50    136    |  102    |  45     ----------------------------<  117    4.7     |  31     |  1.10   12 Nov 2023 07:35    138    |  105    |  45     ----------------------------<  125    4.3     |  24     |  1.10     Ca    8.3        13 Nov 2023 06:50  Ca    8.3        12 Nov 2023 07:35  Phos  3.1       13 Nov 2023 06:50  Phos  3.7       12 Nov 2023 07:35  Mg     1.9       13 Nov 2023 06:50  Mg     1.9       12 Nov 2023 07:35    TPro  6.6    /  Alb  2.8    /  TBili  0.3    /  DBili  x      /  AST  29     /  ALT  39     /  AlkPhos  67     13 Nov 2023 06:50  TPro  6.8    /  Alb  2.8    /  TBili  0.2    /  DBili  x      /  AST  29     /  ALT  36     /  AlkPhos  70     12 Nov 2023 07:35          12 Lead ECG:   Ventricular Rate 122 BPM    Atrial Rate 122 BPM    P-R Interval 160 ms    QRS Duration 90 ms    Q-T Interval 316 ms    QTC Calculation(Bazett) 450 ms    P Axis 56 degrees    R Axis 24 degrees    T Axis 61 degrees    Diagnosis Line Sinus tachycardia  Possible Left atrial enlargement  Low voltage QRS  Borderline ECG  No previous ECGs available  Confirmed by LEI CASAREZ ([id number]) on 11/9/2023 8:41:03 PM (11-09-23 @ 11:53)      TRANSTHORACIC ECHOCARDIOGRAM REPORT  ________________________________________________________________________________                                      _______       Pt. Name:       SANJUANA TORRES Study Date:    11/10/2023  MRN:            [id number]     YOB: 1945  Accession #:    [id number]    Age:           78 years  Account#:       [id number]   Gender:        F  Heart Rate:                  Height:        62.99 in (160.00 cm)  Rhythm:                      Weight:        121.25 lb (55.00 kg)  Blood Pressure: 114/66 mmHg  BSA/BMI:       1.56 m² / 21.48 kg/m²  ________________________________________________________________________________________  Referring Physician:    [id number] Stu Tolentino  Interpreting Physician: Chantel Salguero MD  Primary Sonographer:    Iat Dwyer [facility]    CPT:               ECHO TTE WO CON COMP W DOPP - 97610.m  Indication(s):     Dyspnea, unspecified - R06.00  Procedure:         Transthoracic echocardiogram with 2-D, M-mode and complete                     spectral and color flow Doppler.  Ordering Location: [facility]    _______________________________________________________________________________________     CONCLUSIONS:      1. Left ventricular systolic function is hyperdynamic withan ejection fraction of 69 % by Shepard's method of disks.   2. Mild to moderate left ventricular hypertrophy.   3. Normal right ventricular cavity size, wall thickness, and systolic function.   4. The left atrium is severely dilated in size.   5. The right atrium is normal in size.   6. The mitral valve leaflets are calcified. Severe mitral valve stenosis is present with a mean transmitral valve gradient = 20 mmHg at a HR of 89bpm.   7. Mild mitral regurgitation.   8. The aortic valve appears calcified with grossly normal opening.   9. Trace tricuspid regurgitation.  10. Trace pulmonic regurgitation.  11. The inferior vena cava is normal in size measuring 1.80 cm in diameter, (normal <2.1cm) with normal inspiratory collapse (normal >50%) consistent with normal right atrial pressure (~3, range 0-5mmHg).  12. Trace pericardial effusion.    ________________________________________________________________________________________  FINDINGS:     Left Ventricle:  Left ventricular systolic function is hyperdynamic with a calculated ejection fraction of 69 % by the Shepard's biplane method of disks. Mild to moderate left ventricular hypertrophy.     Right Ventricle:  The right ventricular cavity is normal in size, normal wall thickness and normal systolic function.     Left Atrium:  The left atrium is severely dilated in size with an indexed volume of 83.16 ml/m².     Right Atrium:  The right atrium is normal in size with an indexed volume of 16.63 ml/m².     Aortic Valve:  There is mild calcification of the aortic valve leaflets. The aortic valve appears calcified with grossly normal opening.     Mitral Valve:  There is diffuse mitral valve thickening. There is reduced leaflet mobility of the mitral valve. The mitral valve leaflets are calcified. Severe mitral valve stenosis is present with a mean transmitral valve gradient = 20 mmHg at a HR of 89bpm. There is severe leaflet calcification. There is severe mitral valve stenosis. The transmitral peak gradient is 36.7 mmHg and mean gradient is 23.00 mmHg. There is mild mitral regurgitation.     Tricuspid Valve:  There is trace tricuspid regurgitation. Estimated pulmonary artery systolic pressure is 45 mmHg.     Pulmonic Valve:  Structurally normal pulmonic valve with normal leaflet excursion. There is trace pulmonic regurgitation.     Aorta:  The aortic root at the sinuses of Valsalva is normal in size, measuring 3.00 cm (indexed 1.92 cm/m²).     Pericardium:  There is a trace pericardial effusion.     Pleura:  Moderate bilateral pleural effusion noted.     Systemic Veins:  The inferior vena cava is normal in size measuring 1.80 cm in diameter, (normal <2.1cm) with normal inspiratory collapse (normal >50%) consistent with normal right atrial pressure (~3, range 0-5mmHg).  ____________________________________________________________________  Quantitative Data:  Left Ventricle Measurements: (Indexed to BSA)     IVSd (2D):   0.7 cm  LVPWd (2D):  1.2 cm  LVIDd (2D):  3.7 cm  LVIDs (2D):  2.6 cm  LV Mass:     101 g  64.4 g/m²  LV Vol d, MOD A2C: 63.9 ml 40.88 ml/m²  LV Vol d, MOD A4C: 53.5 ml 34.23 ml/m²  LV Vol d, MOD BP:  59.5 ml 38.07 ml/m²  LV Vol s, MOD A2C: 20.0 ml 12.79 ml/m²  LV Vol s, MOD A4C: 16.4 ml 10.49 ml/m²  LV Vol s, MOD BP:  18.6 ml 11.89 ml/m²  LVOT SV MOD BP:    40.9 ml  LV EF% MOD BP:     69 %     MV E Vmax:    2.64 m/s  MV A Vmax:    2.88 m/s  MV E/A:       0.92  e' lateral:   9.14 cm/s  e' medial:    6.85 cm/s  E/e' lateral: 28.88  E/e' medial:  38.54  E/e' Average: 33.02  MV DT:        197 msec    Aorta Measurements: (normal range) (Indexed to BSA)     Sinuses of Valsalva: 3.00 cm (2.7 - 3.3 cm)       Left Atrium Measurements: (Indexed to BSA)  LA Diam 2D: 4.20 cm    Right Atrial Measurements:     RA Vol:       26.00 ml  RA Vol Index: 16.63 ml/m²       LVOT / RVOT/ Qp/Qs Data: (Indexed to BSA)  LVOT Vmax: 1.57 m/s  LVOT VTI:  24.40 cm    Aortic Valve Measurements:  AV Vmax:          1.9 m/s  AV Peak Gradient: 14.1 mmHg  AV Mean Gradient: 8.0 mmHg  AV VTI:           29.7 cm  AV VTI Ratio:     0.82    Mitral Valve Measurements:     MV Vmax:      3.03 m/s     MR Vmax:          3.85 m/s  MV Mean Grad: 23.00 mmHg   MR Peak Gradient: 59.3 mmHg  MV Peak Grad: 36.7 mmHg  MV E Vmax:    2.6 m/s  MV A Vmax:    2.9 m/s  MV E/A:       0.9      Tricuspid Valve Measurements:     TR Vmax:          3.2 m/s  TR Peak Gradient: 42.0 mmHg  RA Pressure:      3 mmHg  PASP:             45 mmHg    ________________________________________________________________________________________  Electronically signed on 11/11/2023 at 10:34:50 AM by Chantel Salguero MD         *** Final ***

## 2023-11-14 DIAGNOSIS — I05.0 RHEUMATIC MITRAL STENOSIS: ICD-10-CM

## 2023-11-14 LAB
ALBUMIN SERPL ELPH-MCNC: 3.1 G/DL — LOW (ref 3.3–5)
ALBUMIN SERPL ELPH-MCNC: 3.1 G/DL — LOW (ref 3.3–5)
ALP SERPL-CCNC: 76 U/L — SIGNIFICANT CHANGE UP (ref 40–120)
ALP SERPL-CCNC: 76 U/L — SIGNIFICANT CHANGE UP (ref 40–120)
ALT FLD-CCNC: 41 U/L — SIGNIFICANT CHANGE UP (ref 12–78)
ALT FLD-CCNC: 41 U/L — SIGNIFICANT CHANGE UP (ref 12–78)
ANION GAP SERPL CALC-SCNC: 5 MMOL/L — SIGNIFICANT CHANGE UP (ref 5–17)
ANION GAP SERPL CALC-SCNC: 5 MMOL/L — SIGNIFICANT CHANGE UP (ref 5–17)
AST SERPL-CCNC: 27 U/L — SIGNIFICANT CHANGE UP (ref 15–37)
AST SERPL-CCNC: 27 U/L — SIGNIFICANT CHANGE UP (ref 15–37)
BASOPHILS # BLD AUTO: 0 K/UL — SIGNIFICANT CHANGE UP (ref 0–0.2)
BASOPHILS # BLD AUTO: 0 K/UL — SIGNIFICANT CHANGE UP (ref 0–0.2)
BASOPHILS NFR BLD AUTO: 0 % — SIGNIFICANT CHANGE UP (ref 0–2)
BASOPHILS NFR BLD AUTO: 0 % — SIGNIFICANT CHANGE UP (ref 0–2)
BILIRUB SERPL-MCNC: 0.4 MG/DL — SIGNIFICANT CHANGE UP (ref 0.2–1.2)
BILIRUB SERPL-MCNC: 0.4 MG/DL — SIGNIFICANT CHANGE UP (ref 0.2–1.2)
BUN SERPL-MCNC: 41 MG/DL — HIGH (ref 7–23)
BUN SERPL-MCNC: 41 MG/DL — HIGH (ref 7–23)
CALCIUM SERPL-MCNC: 8.8 MG/DL — SIGNIFICANT CHANGE UP (ref 8.5–10.1)
CALCIUM SERPL-MCNC: 8.8 MG/DL — SIGNIFICANT CHANGE UP (ref 8.5–10.1)
CHLORIDE SERPL-SCNC: 102 MMOL/L — SIGNIFICANT CHANGE UP (ref 96–108)
CHLORIDE SERPL-SCNC: 102 MMOL/L — SIGNIFICANT CHANGE UP (ref 96–108)
CO2 SERPL-SCNC: 32 MMOL/L — HIGH (ref 22–31)
CO2 SERPL-SCNC: 32 MMOL/L — HIGH (ref 22–31)
CREAT SERPL-MCNC: 1.1 MG/DL — SIGNIFICANT CHANGE UP (ref 0.5–1.3)
CREAT SERPL-MCNC: 1.1 MG/DL — SIGNIFICANT CHANGE UP (ref 0.5–1.3)
CULTURE RESULTS: SIGNIFICANT CHANGE UP
EGFR: 51 ML/MIN/1.73M2 — LOW
EGFR: 51 ML/MIN/1.73M2 — LOW
EOSINOPHIL # BLD AUTO: 0.43 K/UL — SIGNIFICANT CHANGE UP (ref 0–0.5)
EOSINOPHIL # BLD AUTO: 0.43 K/UL — SIGNIFICANT CHANGE UP (ref 0–0.5)
EOSINOPHIL NFR BLD AUTO: 4.3 % — SIGNIFICANT CHANGE UP (ref 0–6)
EOSINOPHIL NFR BLD AUTO: 4.3 % — SIGNIFICANT CHANGE UP (ref 0–6)
GLUCOSE SERPL-MCNC: 96 MG/DL — SIGNIFICANT CHANGE UP (ref 70–99)
GLUCOSE SERPL-MCNC: 96 MG/DL — SIGNIFICANT CHANGE UP (ref 70–99)
HCT VFR BLD CALC: 32.5 % — LOW (ref 34.5–45)
HCT VFR BLD CALC: 32.5 % — LOW (ref 34.5–45)
HGB BLD-MCNC: 11.1 G/DL — LOW (ref 11.5–15.5)
HGB BLD-MCNC: 11.1 G/DL — LOW (ref 11.5–15.5)
IMM GRANULOCYTES NFR BLD AUTO: 0.5 % — SIGNIFICANT CHANGE UP (ref 0–0.9)
IMM GRANULOCYTES NFR BLD AUTO: 0.5 % — SIGNIFICANT CHANGE UP (ref 0–0.9)
LYMPHOCYTES # BLD AUTO: 1.12 K/UL — SIGNIFICANT CHANGE UP (ref 1–3.3)
LYMPHOCYTES # BLD AUTO: 1.12 K/UL — SIGNIFICANT CHANGE UP (ref 1–3.3)
LYMPHOCYTES # BLD AUTO: 11.3 % — LOW (ref 13–44)
LYMPHOCYTES # BLD AUTO: 11.3 % — LOW (ref 13–44)
MAGNESIUM SERPL-MCNC: 2 MG/DL — SIGNIFICANT CHANGE UP (ref 1.6–2.6)
MAGNESIUM SERPL-MCNC: 2 MG/DL — SIGNIFICANT CHANGE UP (ref 1.6–2.6)
MCHC RBC-ENTMCNC: 31.9 PG — SIGNIFICANT CHANGE UP (ref 27–34)
MCHC RBC-ENTMCNC: 31.9 PG — SIGNIFICANT CHANGE UP (ref 27–34)
MCHC RBC-ENTMCNC: 34.2 GM/DL — SIGNIFICANT CHANGE UP (ref 32–36)
MCHC RBC-ENTMCNC: 34.2 GM/DL — SIGNIFICANT CHANGE UP (ref 32–36)
MCV RBC AUTO: 93.4 FL — SIGNIFICANT CHANGE UP (ref 80–100)
MCV RBC AUTO: 93.4 FL — SIGNIFICANT CHANGE UP (ref 80–100)
MONOCYTES # BLD AUTO: 0.66 K/UL — SIGNIFICANT CHANGE UP (ref 0–0.9)
MONOCYTES # BLD AUTO: 0.66 K/UL — SIGNIFICANT CHANGE UP (ref 0–0.9)
MONOCYTES NFR BLD AUTO: 6.6 % — SIGNIFICANT CHANGE UP (ref 2–14)
MONOCYTES NFR BLD AUTO: 6.6 % — SIGNIFICANT CHANGE UP (ref 2–14)
NEUTROPHILS # BLD AUTO: 7.67 K/UL — HIGH (ref 1.8–7.4)
NEUTROPHILS # BLD AUTO: 7.67 K/UL — HIGH (ref 1.8–7.4)
NEUTROPHILS NFR BLD AUTO: 77.3 % — HIGH (ref 43–77)
NEUTROPHILS NFR BLD AUTO: 77.3 % — HIGH (ref 43–77)
NRBC # BLD: 0 /100 WBCS — SIGNIFICANT CHANGE UP (ref 0–0)
NRBC # BLD: 0 /100 WBCS — SIGNIFICANT CHANGE UP (ref 0–0)
PHOSPHATE SERPL-MCNC: 2.7 MG/DL — SIGNIFICANT CHANGE UP (ref 2.5–4.5)
PHOSPHATE SERPL-MCNC: 2.7 MG/DL — SIGNIFICANT CHANGE UP (ref 2.5–4.5)
PLATELET # BLD AUTO: 308 K/UL — SIGNIFICANT CHANGE UP (ref 150–400)
PLATELET # BLD AUTO: 308 K/UL — SIGNIFICANT CHANGE UP (ref 150–400)
POTASSIUM SERPL-MCNC: 4.6 MMOL/L — SIGNIFICANT CHANGE UP (ref 3.5–5.3)
POTASSIUM SERPL-MCNC: 4.6 MMOL/L — SIGNIFICANT CHANGE UP (ref 3.5–5.3)
POTASSIUM SERPL-SCNC: 4.6 MMOL/L — SIGNIFICANT CHANGE UP (ref 3.5–5.3)
POTASSIUM SERPL-SCNC: 4.6 MMOL/L — SIGNIFICANT CHANGE UP (ref 3.5–5.3)
PROT SERPL-MCNC: 7 G/DL — SIGNIFICANT CHANGE UP (ref 6–8.3)
PROT SERPL-MCNC: 7 G/DL — SIGNIFICANT CHANGE UP (ref 6–8.3)
RBC # BLD: 3.48 M/UL — LOW (ref 3.8–5.2)
RBC # BLD: 3.48 M/UL — LOW (ref 3.8–5.2)
RBC # FLD: 13.5 % — SIGNIFICANT CHANGE UP (ref 10.3–14.5)
RBC # FLD: 13.5 % — SIGNIFICANT CHANGE UP (ref 10.3–14.5)
SODIUM SERPL-SCNC: 139 MMOL/L — SIGNIFICANT CHANGE UP (ref 135–145)
SODIUM SERPL-SCNC: 139 MMOL/L — SIGNIFICANT CHANGE UP (ref 135–145)
SPECIMEN SOURCE: SIGNIFICANT CHANGE UP
WBC # BLD: 9.93 K/UL — SIGNIFICANT CHANGE UP (ref 3.8–10.5)
WBC # BLD: 9.93 K/UL — SIGNIFICANT CHANGE UP (ref 3.8–10.5)
WBC # FLD AUTO: 9.93 K/UL — SIGNIFICANT CHANGE UP (ref 3.8–10.5)
WBC # FLD AUTO: 9.93 K/UL — SIGNIFICANT CHANGE UP (ref 3.8–10.5)

## 2023-11-14 PROCEDURE — 71045 X-RAY EXAM CHEST 1 VIEW: CPT | Mod: 26

## 2023-11-14 PROCEDURE — 99233 SBSQ HOSP IP/OBS HIGH 50: CPT

## 2023-11-14 RX ORDER — FUROSEMIDE 40 MG
40 TABLET ORAL
Refills: 0 | Status: DISCONTINUED | OUTPATIENT
Start: 2023-11-14 | End: 2023-11-16

## 2023-11-14 RX ORDER — FUROSEMIDE 40 MG
1 TABLET ORAL
Refills: 0 | DISCHARGE

## 2023-11-14 RX ORDER — FUROSEMIDE 40 MG
1 TABLET ORAL
Qty: 0 | Refills: 0 | DISCHARGE

## 2023-11-14 RX ADMIN — HEPARIN SODIUM 5000 UNIT(S): 5000 INJECTION INTRAVENOUS; SUBCUTANEOUS at 17:12

## 2023-11-14 RX ADMIN — HEPARIN SODIUM 5000 UNIT(S): 5000 INJECTION INTRAVENOUS; SUBCUTANEOUS at 04:50

## 2023-11-14 RX ADMIN — Medication 3 MILLILITER(S): at 01:03

## 2023-11-14 RX ADMIN — Medication 50 MILLIGRAM(S): at 21:40

## 2023-11-14 RX ADMIN — Medication 3 MILLILITER(S): at 20:18

## 2023-11-14 RX ADMIN — Medication 20 MILLIGRAM(S): at 04:55

## 2023-11-14 RX ADMIN — Medication 40 MILLIGRAM(S): at 04:51

## 2023-11-14 RX ADMIN — Medication 3 MILLILITER(S): at 13:28

## 2023-11-14 RX ADMIN — Medication 88 MICROGRAM(S): at 04:57

## 2023-11-14 RX ADMIN — Medication 50 MILLIGRAM(S): at 04:55

## 2023-11-14 RX ADMIN — Medication 3 MILLILITER(S): at 07:29

## 2023-11-14 RX ADMIN — Medication 50 MILLIGRAM(S): at 13:24

## 2023-11-14 RX ADMIN — ATORVASTATIN CALCIUM 40 MILLIGRAM(S): 80 TABLET, FILM COATED ORAL at 21:40

## 2023-11-14 NOTE — PROGRESS NOTE ADULT - PROBLEM SELECTOR PLAN 2
- history of COPD, does not use O2 at home  - CT chest with Emphysema  - Finished treatment for PNA  - Standing DuoNebs  - Pulm following  - Completing short course of prednisone  - Wean off O2 as tolerated

## 2023-11-14 NOTE — PROGRESS NOTE ADULT - ASSESSMENT
78-year-old female with history of COPD (not on home oxygen), hypertension, hyperlipidemia, and hypothyroidism presents to the ED for back pain.  Patient reports that for the past 2-3 weeks, she has been having pain located in the midline of her back.     pain  weakness  copd  HTN  HLD  thyroid disease  eval for PNA  HFpEF  Ex Smoker  Valv heart disease    s/p thoracentesis - 500 cc drained  vs noted  on lasix  on nebs  on steroids  CHF eval - mitral valve disease    ct chest  bronchodilators  systemic steroids  ABG noted  fio2 titration - goal sat > 88 pct  I curt  monitor VS and HD and Sat  VQ scan NEG for PE  BP control  nutrition  outpatient records reviewed - follows with Dr Fay Muir [facility]

## 2023-11-14 NOTE — PROGRESS NOTE ADULT - PROBLEM SELECTOR PLAN 3
- Complicated by hypervolemia  - Currently on Lasix for IV diuresis  - Cardiology following  - Possibly will replete TTE to look at gradients

## 2023-11-14 NOTE — PROGRESS NOTE ADULT - ASSESSMENT
78-year-old female with history of COPD (not on home oxygen), hypertension, hyperlipidemia, and hypothyroidism presents to the ED for back pain, admitted for sepsis sec to suspected CAP and acute COPD exacerbation  ID c/s for Abx guidance on HD#5    Sepsis 2/2 PNA  Pleural effusions parapneumonic vs. empyema  COPD exacerbation  AHRF on NC  YADI on CKD  - started on ceftriaxone/azithromycin  - BCx NGTD, UCx noted as below  - s/p R sided thoracentesis 11/11 w/ removal 500 cc   -- pleural fluid cx NGTD    Recent Acute Cystitis  - pt s/p UTI tx w/ macrobid as outpatient  - UCx here + K. pneumo I macrobid, S to ceftriaxone    Recommendations:   S/p ceftriaxone x5 day course  S/p azithromycin  Trend temps/WBC  O2 as needed  Appreciate pulm recs  Additional care per primary team    Infectious Diseases will continue to follow. Please call with any questions.   Swati Norwood M.D.  [facility] Division of Infectious Diseases [phone]  For after 5 P.M. and weekends, please call [phone]

## 2023-11-14 NOTE — PROGRESS NOTE ADULT - ASSESSMENT
78-year-old female with history of COPD (not on home oxygen), nicotine dependence, hypertension, hyperlipidemia, and hypothyroidism here with worsening shortness of breath.    SOB  - CT with Moderate to large right pleural effusion and small left pleural effusion with associated atelectasis.  - s/p R thoracentesis, 500ml removed, remains on O2 supplementation, with episodes of desaturation post ambulation, continue to wean as tolerated, might need O2 upon discharge    - + COPD exacerbation wheezing, on nebs and steroid, pulmonary following   - TTE(11/11/2023) severe mitral stenosis (previous TTE in 2022 showed moderate MS), EF 69%, will need repeat TTE once euvolemic to re-assess MV gradients   - continue Lasix 40mg IVP today, possibly transition to PO in AM  - continue strict I/O  - pulmonary following    - EKG ST, no sign of acute ischemia   - no anginal complaints   - continue statin    - BP, HR stable and controlled per flow sheet, no events on tele overnight   - continue metoprolol 50 q8hr  - continue to monitor routine hemodynamics  - Monitor and replete Lytes. Keep K > 4 and Mg > 2    - Will continue to follow.    Rita Holm [facility]  Nurse Practitioner - Cardiology   call TEAMS

## 2023-11-14 NOTE — PHARMACOTHERAPY INTERVENTION NOTE - COMMENTS
79 yo female with hx of COPD on empiric therapy with ceftriaxone 1g daily and azithromycin 500 mg daily 2/2 leukocytosis. Recommended adding legionella antigen to r/o need for atypical coverage. Order entered per discussion with ROSELYN Jones. 
Med rec completed- confirmed w/ pharmacy and patient's daughter. No discrepancies noted. Patient states patient takes furosemide 20mg daily and takes 40mg on MWF (recently increased by outpatient cardiologist). Patient currently on lasix 40mg IVP inpatient.

## 2023-11-14 NOTE — PROGRESS NOTE ADULT - SUBJECTIVE AND OBJECTIVE BOX
SANJUANA TORRES  78y  Female      Patient is a 78y old  Female who presents with a chief complaint of back pain (10 Nov 2023 15:23)      INTERVAL HPI/OVERNIGHT EVENTS: Patient is a 79 y/o F w. PMHx of COPD (Not on home O2), Nicotine dependance, HTN, HLD, and hypothyroidism who presents with complaint of back pain for ~2-3 week. Patient describes the pain as a band across her lower back. She went [facility] for this back pain and finished a course of Macrobid/Pyridium. The back pain persisted and she subsequently came into [facility] ED. She did not have any specific urinary sx. and denies hematuria, dysuria, etc at the time of the initial back pain and still does not endorse any urinary sx. Her back pain has resolved. Patient also endorsing inc. work of breathing with dyspnea on exertion. Patient admitted for COPD exacerbation and suspected community-acquired PNA, now on Abx. No acute events occurred overnight.   Of note, pt admits to naproxen use 2-3 per week x2 months.    No N/V/SOB    REVIEW OF SYSTEMS:  as above      Vital Signs Last 24 Hrs  T(C): 36.3 (14 Nov 2023 05:22), Max: 36.7 (13 Nov 2023 11:59)  T(F): 97.4 (14 Nov 2023 05:22), Max: 98 (13 Nov 2023 11:59)  HR: 60 (14 Nov 2023 07:39) (58 - 76)  BP: 137/65 (14 Nov 2023 05:22) (119/66 - 137/65)  BP(mean): --  RR: 18 (14 Nov 2023 05:22) (17 - 19)  SpO2: 94% (14 Nov 2023 07:39) (88% - 97%)    Parameters below as of 14 Nov 2023 07:39  Patient On (Oxygen Delivery Method): nasal cannula, 3l        PHYSICAL EXAM:  GENERAL: appears stated age, thin habitus  HEAD:  Atraumatic, Normocephalic  EYES: EOMI, conjunctiva and sclera clear  ENMT: No tonsillar erythema, exudates, or enlargement; Moist mucous membranes  NERVOUS SYSTEM:  Alert & Oriented X3, Good concentration.  HEART: Regular rate and rhythm; No murmurs, rubs, or gallops  EXTREMITIES:  No edema  SKIN: No rashes or lesions    Consultant(s) Notes Reviewed:  [x ] YES  [ ] NO  Care Discussed with Consultants/Other Providers [ x] YES  [ ] NO    LABS:                        11.1   9.93  )-----------( 308      ( 14 Nov 2023 08:00 )             32.5     11-14    139  |  102  |  41<H>  ----------------------------<  96  4.6   |  32<H>  |  1.10    Ca    8.8      14 Nov 2023 08:00  Phos  2.7     11-14  Mg     2.0     11-14    TPro  7.0  /  Alb  3.1<L>  /  TBili  0.4  /  DBili  x   /  AST  27  /  ALT  41  /  AlkPhos  76  11-14      Urinalysis Basic - ( 14 Nov 2023 08:00 )    Color: x / Appearance: x / SG: x / pH: x  Gluc: 96 mg/dL / Ketone: x  / Bili: x / Urobili: x   Blood: x / Protein: x / Nitrite: x   Leuk Esterase: x / RBC: x / WBC x   Sq Epi: x / Non Sq Epi: x / Bacteria: x

## 2023-11-14 NOTE — PROGRESS NOTE ADULT - SUBJECTIVE AND OBJECTIVE BOX
Patient is a 78y old  Female who presents with a chief complaint of back pain (14 Nov 2023 13:57)      SUBJECTIVE / OVERNIGHT EVENTS: Patient seen and examined at bedside. No acute events overnight. Breathing improved, wants to go home soon.    MEDICATIONS  (STANDING):  albuterol/ipratropium for Nebulization 3 milliLiter(s) Nebulizer every 6 hours  atorvastatin 40 milliGRAM(s) Oral at bedtime  furosemide   Injectable 40 milliGRAM(s) IV Push <User Schedule>  heparin   Injectable 5000 Unit(s) SubCutaneous every 12 hours  levothyroxine 88 MICROGram(s) Oral daily  metoprolol tartrate 50 milliGRAM(s) Oral every 8 hours  nicotine -   7 mG/24Hr(s) Patch 1 Patch Transdermal daily  predniSONE   Tablet 20 milliGRAM(s) Oral daily    MEDICATIONS  (PRN):  acetaminophen     Tablet .. 650 milliGRAM(s) Oral every 6 hours PRN Temp greater or equal to 38C (100.4F), Mild Pain (1 - 3)  melatonin 3 milliGRAM(s) Oral at bedtime PRN Insomnia  ondansetron Injectable 4 milliGRAM(s) IV Push every 8 hours PRN Nausea and/or Vomiting      CAPILLARY BLOOD GLUCOSE        I&O's Summary    13 Nov 2023 07:01  -  14 Nov 2023 07:00  --------------------------------------------------------  IN: 237 mL / OUT: 450 mL / NET: -213 mL        PHYSICAL EXAM:  Vital Signs Last 24 Hrs  T(C): 36.3 (14 Nov 2023 05:22), Max: 36.6 (13 Nov 2023 20:34)  T(F): 97.4 (14 Nov 2023 05:22), Max: 97.8 (13 Nov 2023 20:34)  HR: 70 (14 Nov 2023 13:31) (58 - 76)  BP: 137/65 (14 Nov 2023 05:22) (128/65 - 137/65)  BP(mean): --  RR: 18 (14 Nov 2023 05:22) (17 - 18)  SpO2: 92% (14 Nov 2023 13:31) (92% - 97%)    Parameters below as of 14 Nov 2023 13:31  Patient On (Oxygen Delivery Method): nasal cannula, 3 lpm        GEN: female in NAD, appears comfortable, no diaphoresis  EYES: No scleral injection, EOMI  ENTM: neck supple & symmetric without tracheal deviation, moist membranes, no gross hearing impairment, thyroid gland not enlarged  CV: +S1/S2, no m/r/g, no abdominal bruit, no LE edema  RESP: breathing comfortably, no respiratory accessory muscle use, +crackles at bases  GI: normoactive BS, soft, NTND, no rebounding/guarding, no palpable masses    LABS:                        11.1   9.93  )-----------( 308      ( 14 Nov 2023 08:00 )             32.5     11-14    139  |  102  |  41<H>  ----------------------------<  96  4.6   |  32<H>  |  1.10    Ca    8.8      14 Nov 2023 08:00  Phos  2.7     11-14  Mg     2.0     11-14    TPro  7.0  /  Alb  3.1<L>  /  TBili  0.4  /  DBili  x   /  AST  27  /  ALT  41  /  AlkPhos  76  11-14          Urinalysis Basic - ( 14 Nov 2023 08:00 )    Color: x / Appearance: x / SG: x / pH: x  Gluc: 96 mg/dL / Ketone: x  / Bili: x / Urobili: x   Blood: x / Protein: x / Nitrite: x   Leuk Esterase: x / RBC: x / WBC x   Sq Epi: x / Non Sq Epi: x / Bacteria: x        Culture - Body Fluid with Gram Stain (collected 11 Nov 2023 14:30)  Source: Pleural Fl Pleural Fluid  Gram Stain (11 Nov 2023 22:19):    polymorphonuclear leukocytes seen    No organisms seen    by cytocentrifuge  Preliminary Report (12 Nov 2023 19:04):    No growth        RADIOLOGY & ADDITIONAL TESTS:  Results Reviewed:   Imaging Personally Reviewed:  Electrocardiogram Personally Reviewed:    COORDINATION OF CARE:  Care Discussed with Consultants/Other Providers [Y/N]:  Prior or Outpatient Records Reviewed [Y/N]:

## 2023-11-14 NOTE — PROGRESS NOTE ADULT - PROBLEM SELECTOR PLAN 8
- current smoker, 30 pack year history  - trying to quit, currently on Varenicline (not available inpatient)  - start nicotine patch if pt wants

## 2023-11-14 NOTE — PROGRESS NOTE ADULT - SUBJECTIVE AND OBJECTIVE BOX
Date/Time Patient Seen:  		  Referring MD:   Data Reviewed	       Patient is a 78y old  Female who presents with a chief complaint of back pain (13 Nov 2023 20:01)      Subjective/HPI     PAST MEDICAL & SURGICAL HISTORY:  History of COPD  No home O2 use    Asthma    Thyroid nodule    Hyperlipidemia    Hypertension    Hypothyroidism    History of cholecystectomy  1989    History of repair of hip fracture  ORIF 1982.  Hardware was eventually removed          Medication list         MEDICATIONS  (STANDING):  albuterol/ipratropium for Nebulization 3 milliLiter(s) Nebulizer every 6 hours  atorvastatin 40 milliGRAM(s) Oral at bedtime  furosemide   Injectable 40 milliGRAM(s) IV Push daily  heparin   Injectable 5000 Unit(s) SubCutaneous every 12 hours  levothyroxine 88 MICROGram(s) Oral daily  metoprolol tartrate 50 milliGRAM(s) Oral every 8 hours  nicotine -   7 mG/24Hr(s) Patch 1 Patch Transdermal daily  predniSONE   Tablet 20 milliGRAM(s) Oral daily    MEDICATIONS  (PRN):  acetaminophen     Tablet .. 650 milliGRAM(s) Oral every 6 hours PRN Temp greater or equal to 38C (100.4F), Mild Pain (1 - 3)  aluminum hydroxide/magnesium hydroxide/simethicone Suspension 30 milliLiter(s) Oral every 4 hours PRN Dyspepsia  melatonin 3 milliGRAM(s) Oral at bedtime PRN Insomnia  ondansetron Injectable 4 milliGRAM(s) IV Push every 8 hours PRN Nausea and/or Vomiting         Vitals log        ICU Vital Signs Last 24 Hrs  T(C): 36.3 (14 Nov 2023 05:22), Max: 36.7 (13 Nov 2023 11:59)  T(F): 97.4 (14 Nov 2023 05:22), Max: 98 (13 Nov 2023 11:59)  HR: 58 (14 Nov 2023 05:22) (58 - 76)  BP: 137/65 (14 Nov 2023 05:22) (119/66 - 137/65)  BP(mean): --  ABP: --  ABP(mean): --  RR: 18 (14 Nov 2023 05:22) (17 - 19)  SpO2: 97% (14 Nov 2023 05:22) (88% - 97%)    O2 Parameters below as of 14 Nov 2023 05:22  Patient On (Oxygen Delivery Method): nasal cannula  O2 Flow (L/min): 2               Input and Output:  I&O's Detail    13 Nov 2023 07:01  -  14 Nov 2023 06:36  --------------------------------------------------------  IN:    Oral Fluid: 237 mL  Total IN: 237 mL    OUT:    Voided (mL): 450 mL  Total OUT: 450 mL    Total NET: -213 mL          Lab Data                        9.7    6.22  )-----------( 269      ( 13 Nov 2023 06:50 )             28.6     11-13    136  |  102  |  45<H>  ----------------------------<  117<H>  4.7   |  31  |  1.10    Ca    8.3<L>      13 Nov 2023 06:50  Phos  3.1     11-13  Mg     1.9     11-13    TPro  6.6  /  Alb  2.8<L>  /  TBili  0.3  /  DBili  x   /  AST  29  /  ALT  39  /  AlkPhos  67  11-13            Review of Systems	      Objective     Physical Examination    heart s1s2  lung dc BS  head nc      Pertinent Lab findings & Imaging      Chiki:  NO   Adequate UO     I&O's Detail    13 Nov 2023 07:01  -  14 Nov 2023 06:36  --------------------------------------------------------  IN:    Oral Fluid: 237 mL  Total IN: 237 mL    OUT:    Voided (mL): 450 mL  Total OUT: 450 mL    Total NET: -213 mL               Discussed with:     Cultures:	        Radiology

## 2023-11-14 NOTE — PROGRESS NOTE ADULT - SUBJECTIVE AND OBJECTIVE BOX
[facility] -- Mateo Bobby Pannella, Patel, Savella, Goodger, Cohen  Office # [phone]    Follow Up:  SOB    Subjective/Observations: seen and examined, awake, alert, resting comfortably in bed, denies chest pain, dyspnea, palpitations or dizziness, on o2 via NC, no events overnight     REVIEW OF SYSTEMS: All other review of systems is negative unless indicated above  PAST MEDICAL & SURGICAL HISTORY:  History of COPD  No home O2 use      Asthma      Thyroid nodule      Hyperlipidemia      Hypertension      Hypothyroidism      History of cholecystectomy  1989      History of repair of hip fracture  ORIF 1982.  Hardware was eventually removed        MEDICATIONS  (STANDING):  albuterol/ipratropium for Nebulization 3 milliLiter(s) Nebulizer every 6 hours  atorvastatin 40 milliGRAM(s) Oral at bedtime  furosemide   Injectable 40 milliGRAM(s) IV Push daily  heparin   Injectable 5000 Unit(s) SubCutaneous every 12 hours  levothyroxine 88 MICROGram(s) Oral daily  metoprolol tartrate 50 milliGRAM(s) Oral every 8 hours  nicotine -   7 mG/24Hr(s) Patch 1 Patch Transdermal daily  predniSONE   Tablet 20 milliGRAM(s) Oral daily    MEDICATIONS  (PRN):  acetaminophen     Tablet .. 650 milliGRAM(s) Oral every 6 hours PRN Temp greater or equal to 38C (100.4F), Mild Pain (1 - 3)  aluminum hydroxide/magnesium hydroxide/simethicone Suspension 30 milliLiter(s) Oral every 4 hours PRN Dyspepsia  melatonin 3 milliGRAM(s) Oral at bedtime PRN Insomnia  ondansetron Injectable 4 milliGRAM(s) IV Push every 8 hours PRN Nausea and/or Vomiting    Allergies    No Known Allergies    Intolerances      Vital Signs Last 24 Hrs  T(C): 36.3 (14 Nov 2023 05:22), Max: 36.7 (13 Nov 2023 11:59)  T(F): 97.4 (14 Nov 2023 05:22), Max: 98 (13 Nov 2023 11:59)  HR: 60 (14 Nov 2023 07:39) (58 - 76)  BP: 137/65 (14 Nov 2023 05:22) (119/66 - 137/65)  BP(mean): --  RR: 18 (14 Nov 2023 05:22) (17 - 19)  SpO2: 94% (14 Nov 2023 07:39) (88% - 97%)    Parameters below as of 14 Nov 2023 07:39  Patient On (Oxygen Delivery Method): nasal cannula, 3l      I&O's Summary    13 Nov 2023 07:01  -  14 Nov 2023 07:00  --------------------------------------------------------  IN: 237 mL / OUT: 450 mL / NET: -213 mL          TELE: SR 70's   PHYSICAL EXAM:  Constitutional: NAD, awake and alert  HEENT: Moist Mucous Membranes, Anicteric  Pulmonary: Non-labored, breath sounds are clear bilaterally, +wheezing, rales or rhonchi  Cardiovascular: Regular, S1 and S2, + sys murmurs, rubs, gallops or clicks  Gastrointestinal: Bowel Sounds present, soft, nontender.   Lymph: No peripheral edema. No lymphadenopathy.  Skin: No visible rashes or ulcers.  Psych:  Mood & affect appropriate  LABS: All Labs Reviewed:                        9.7    6.22  )-----------( 269      ( 13 Nov 2023 06:50 )             28.6                         9.6    8.72  )-----------( 285      ( 12 Nov 2023 07:35 )             28.6     13 Nov 2023 06:50    136    |  102    |  45     ----------------------------<  117    4.7     |  31     |  1.10   12 Nov 2023 07:35    138    |  105    |  45     ----------------------------<  125    4.3     |  24     |  1.10     Ca    8.3        13 Nov 2023 06:50  Ca    8.3        12 Nov 2023 07:35  Phos  3.1       13 Nov 2023 06:50  Phos  3.7       12 Nov 2023 07:35  Mg     1.9       13 Nov 2023 06:50  Mg     1.9       12 Nov 2023 07:35    TPro  6.6    /  Alb  2.8    /  TBili  0.3    /  DBili  x      /  AST  29     /  ALT  39     /  AlkPhos  67     13 Nov 2023 06:50  TPro  6.8    /  Alb  2.8    /  TBili  0.2    /  DBili  x      /  AST  29     /  ALT  36     /  AlkPhos  70     12 Nov 2023 07:35          12 Lead ECG:   Ventricular Rate 122 BPM    Atrial Rate 122 BPM    P-R Interval 160 ms    QRS Duration 90 ms    Q-T Interval 316 ms    QTC Calculation(Bazett) 450 ms    P Axis 56 degrees    R Axis 24 degrees    T Axis 61 degrees    Diagnosis Line Sinus tachycardia  Possible Left atrial enlargement  Low voltage QRS  Borderline ECG  No previous ECGs available  Confirmed by LEI CASAREZ ([id number]) on 11/9/2023 8:41:03 PM (11-09-23 @ 11:53)      TRANSTHORACIC ECHOCARDIOGRAM REPORT  ________________________________________________________________________________                                      _______       Pt. Name:       SANJUANA TORRES Study Date:    11/10/2023  MRN:            [id number]     YOB: 1945  Accession #:    [id number]    Age:           78 years  Account#:       [id number]   Gender:        F  Heart Rate:                  Height:        62.99 in (160.00 cm)  Rhythm:                      Weight:        121.25 lb (55.00 kg)  Blood Pressure: 114/66 mmHg  BSA/BMI:       1.56 m² / 21.48 kg/m²  ________________________________________________________________________________________  Referring Physician:    [id number] Stu Tolentino  Interpreting Physician: Chantel Salguero MD  Primary Sonographer:    Ita Dwyer RDCS    CPT:               ECHO TTE WO CON COMP W DOPP - 65051.m  Indication(s):     Dyspnea, unspecified - R06.00  Procedure:         Transthoracic echocardiogram with 2-D, M-mode and complete                     spectral and color flow Doppler.  Ordering Location: [facility]    _______________________________________________________________________________________     CONCLUSIONS:      1. Left ventricular systolic function is hyperdynamic withan ejection fraction of 69 % by Shepard's method of disks.   2. Mild to moderate left ventricular hypertrophy.   3. Normal right ventricular cavity size, wall thickness, and systolic function.   4. The left atrium is severely dilated in size.   5. The right atrium is normal in size.   6. The mitral valve leaflets are calcified. Severe mitral valve stenosis is present with a mean transmitral valve gradient = 20 mmHg at a HR of 89bpm.   7. Mild mitral regurgitation.   8. The aortic valve appears calcified with grossly normal opening.   9. Trace tricuspid regurgitation.  10. Trace pulmonic regurgitation.  11. The inferior vena cava is normal in size measuring 1.80 cm in diameter, (normal <2.1cm) with normal inspiratory collapse (normal >50%) consistent with normal right atrial pressure (~3, range 0-5mmHg).  12. Trace pericardial effusion.    ________________________________________________________________________________________  FINDINGS:     Left Ventricle:  Left ventricular systolic function is hyperdynamic with a calculated ejection fraction of 69 % by the Shepard's biplane method of disks. Mild to moderate left ventricular hypertrophy.     Right Ventricle:  The right ventricular cavity is normal in size, normal wall thickness and normal systolic function.     Left Atrium:  The left atrium is severely dilated in size with an indexed volume of 83.16 ml/m².     Right Atrium:  The right atrium is normal in size with an indexed volume of 16.63 ml/m².     Aortic Valve:  There is mild calcification of the aortic valve leaflets. The aortic valve appears calcified with grossly normal opening.     Mitral Valve:  There is diffuse mitral valve thickening. There is reduced leaflet mobility of the mitral valve. The mitral valve leaflets are calcified. Severe mitral valve stenosis is present with a mean transmitral valve gradient = 20 mmHg at a HR of 89bpm. There is severe leaflet calcification. There is severe mitral valve stenosis. The transmitral peak gradient is 36.7 mmHg and mean gradient is 23.00 mmHg. There is mild mitral regurgitation.     Tricuspid Valve:  There is trace tricuspid regurgitation. Estimated pulmonary artery systolic pressure is 45 mmHg.     Pulmonic Valve:  Structurally normal pulmonic valve with normal leaflet excursion. There is trace pulmonic regurgitation.     Aorta:  The aortic root at the sinuses of Valsalva is normal in size, measuring 3.00 cm (indexed 1.92 cm/m²).     Pericardium:  There is a trace pericardial effusion.     Pleura:  Moderate bilateral pleural effusion noted.     Systemic Veins:  The inferior vena cava is normal in size measuring 1.80 cm in diameter, (normal <2.1cm) with normal inspiratory collapse (normal >50%) consistent with normal right atrial pressure (~3, range 0-5mmHg).  ____________________________________________________________________  Quantitative Data:  Left Ventricle Measurements: (Indexed to BSA)     IVSd (2D):   0.7 cm  LVPWd (2D):  1.2 cm  LVIDd (2D):  3.7 cm  LVIDs (2D):  2.6 cm  LV Mass:     101 g  64.4 g/m²  LV Vol d, MOD A2C: 63.9 ml 40.88 ml/m²  LV Vol d, MOD A4C: 53.5 ml 34.23 ml/m²  LV Vol d, MOD BP:  59.5 ml 38.07 ml/m²  LV Vol s, MOD A2C: 20.0 ml 12.79 ml/m²  LV Vol s, MOD A4C: 16.4 ml 10.49 ml/m²  LV Vol s, MOD BP:  18.6 ml 11.89 ml/m²  LVOT SV MOD BP:    40.9 ml  LV EF% MOD BP:     69 %     MV E Vmax:    2.64 m/s  MV A Vmax:    2.88 m/s  MV E/A:       0.92  e' lateral:   9.14 cm/s  e' medial:    6.85 cm/s  E/e' lateral: 28.88  E/e' medial:  38.54  E/e' Average: 33.02  MV DT:        197 msec    Aorta Measurements: (normal range) (Indexed to BSA)     Sinuses of Valsalva: 3.00 cm (2.7 - 3.3 cm)       Left Atrium Measurements: (Indexed to BSA)  LA Diam 2D: 4.20 cm    Right Atrial Measurements:     RA Vol:       26.00 ml  RA Vol Index: 16.63 ml/m²       LVOT / RVOT/ Qp/Qs Data: (Indexed to BSA)  LVOT Vmax: 1.57 m/s  LVOT VTI:  24.40 cm    Aortic Valve Measurements:  AV Vmax:          1.9 m/s  AV Peak Gradient: 14.1 mmHg  AV Mean Gradient: 8.0 mmHg  AV VTI:           29.7 cm  AV VTI Ratio:     0.82    Mitral Valve Measurements:     MV Vmax:      3.03 m/s     MR Vmax:          3.85 m/s  MV Mean Grad: 23.00 mmHg   MR Peak Gradient: 59.3 mmHg  MV Peak Grad: 36.7 mmHg  MV E Vmax:    2.6 m/s  MV A Vmax:    2.9 m/s  MV E/A:       0.9      Tricuspid Valve Measurements:     TR Vmax:          3.2 m/s  TR Peak Gradient: 42.0 mmHg  RA Pressure:      3 mmHg  PASP:             45 mmHg    ________________________________________________________________________________________  Electronically signed on 11/11/2023 at 10:34:50 AM by Chantel Salguero MD         *** Final ***

## 2023-11-14 NOTE — PROGRESS NOTE ADULT - SUBJECTIVE AND OBJECTIVE BOX
[facility], Division of Infectious Diseases  ANU Rodriguez Y. Patel, S. Shah, [facility]  [phone]    Name: SANJUANA TORRES  Age: 78y  Gender: Female  MRN: [id number]    Interval History:  Patient seen and examined at bedside  No acute overnight events. Afebrile  No complaints  Notes reviewed    Antibiotics:      Medications:  acetaminophen     Tablet .. 650 milliGRAM(s) Oral every 6 hours PRN  albuterol/ipratropium for Nebulization 3 milliLiter(s) Nebulizer every 6 hours  aluminum hydroxide/magnesium hydroxide/simethicone Suspension 30 milliLiter(s) Oral every 4 hours PRN  atorvastatin 40 milliGRAM(s) Oral at bedtime  furosemide   Injectable 40 milliGRAM(s) IV Push daily  heparin   Injectable 5000 Unit(s) SubCutaneous every 12 hours  levothyroxine 88 MICROGram(s) Oral daily  melatonin 3 milliGRAM(s) Oral at bedtime PRN  metoprolol tartrate 50 milliGRAM(s) Oral every 8 hours  nicotine -   7 mG/24Hr(s) Patch 1 Patch Transdermal daily  ondansetron Injectable 4 milliGRAM(s) IV Push every 8 hours PRN  predniSONE   Tablet 20 milliGRAM(s) Oral daily      Review of Systems:  A 10-point review of systems was obtained.   Review of systems otherwise negative except as previously noted.    Allergies: No Known Allergies    For details regarding the patient's past medical history, social history, family history, and other miscellaneous elements, please refer the initial infectious diseases consultation and/or the admitting history and physical examination for this admission.    Objective:  Vitals:   T(C): 36.3 (11-14-23 @ 05:22), Max: 36.6 (11-13-23 @ 20:34)  HR: 70 (11-14-23 @ 13:31) (58 - 76)  BP: 137/65 (11-14-23 @ 05:22) (128/65 - 137/65)  RR: 18 (11-14-23 @ 05:22) (17 - 18)  SpO2: 92% (11-14-23 @ 13:31) (92% - 97%)    Physical Examination:  General: no acute distress  HEENT: NC/AT, EOMI,  Cardio: S1, S2 heard, RRR, no murmurs  Resp: decreased breath sounds  Abd: soft, NT, ND  Ext: no edema or cyanosis  Skin: warm, dry, no visible rash      Laboratory Studies:  CBC:                       11.1   9.93  )-----------( 308      ( 14 Nov 2023 08:00 )             32.5     CMP: 11-14    139  |  102  |  41<H>  ----------------------------<  96  4.6   |  32<H>  |  1.10    Ca    8.8      14 Nov 2023 08:00  Phos  2.7     11-14  Mg     2.0     11-14    TPro  7.0  /  Alb  3.1<L>  /  TBili  0.4  /  DBili  x   /  AST  27  /  ALT  41  /  AlkPhos  76  11-14    LIVER FUNCTIONS - ( 14 Nov 2023 08:00 )  Alb: 3.1 g/dL / Pro: 7.0 g/dL / ALK PHOS: 76 U/L / ALT: 41 U/L / AST: 27 U/L / GGT: x           Urinalysis Basic - ( 14 Nov 2023 08:00 )    Color: x / Appearance: x / SG: x / pH: x  Gluc: 96 mg/dL / Ketone: x  / Bili: x / Urobili: x   Blood: x / Protein: x / Nitrite: x   Leuk Esterase: x / RBC: x / WBC x   Sq Epi: x / Non Sq Epi: x / Bacteria: x        Microbiology: reviewed    Culture - Body Fluid with Gram Stain (collected 11-11-23 @ 14:30)  Source: Pleural Fl Pleural Fluid  Gram Stain (11-11-23 @ 22:19):    polymorphonuclear leukocytes seen    No organisms seen    by cytocentrifuge  Preliminary Report (11-12-23 @ 19:04):    No growth    Culture - Urine (collected 11-09-23 @ 13:40)  Source: Clean Catch Clean Catch (Midstream)  Final Report (11-12-23 @ 23:24):    >100,000 CFU/ml Klebsiella pneumoniae  Organism: Klebsiella pneumoniae (11-12-23 @ 23:24)  Organism: Klebsiella pneumoniae (11-12-23 @ 23:24)      Method Type: RADHA      -  Amoxicillin/Clavulanic Acid: S <=8/4      -  Ampicillin: R >16 These ampicillin results predict results for amoxicillin      -  Ampicillin/Sulbactam: S 8/4      -  Aztreonam: S <=4      -  Cefazolin: S <=2 For uncomplicated UTI with K. pneumoniae, E. coli, or P. mirablis: RADHA <=16 is sensitive and RADHA >=32 is resistant. This also predicts results for oral agents cefaclor, cefdinir, cefpodoxime, cefprozil, cefuroxime axetil, cephalexin and locarbef for uncomplicated UTI. Note that some isolates may be susceptible to these agents while testing resistant to cefazolin.      -  Cefepime: S <=2      -  Cefoxitin: S <=8      -  Ceftriaxone: S <=1      -  Cefuroxime: S 8      -  Ciprofloxacin: S <=0.25      -  Ertapenem: S <=0.5      -  Gentamicin: S <=2      -  Imipenem: S <=1      -  Levofloxacin: S <=0.5      -  Meropenem: S <=1      -  Nitrofurantoin: I 64 Should not be used to treat pyelonephritis      -  Piperacillin/Tazobactam: S <=8      -  Tobramycin: S <=2      -  Trimethoprim/Sulfamethoxazole: S 1/19    Culture - Blood (collected 11-09-23 @ 12:00)  Source: .Blood Blood-Peripheral  Preliminary Report (11-13-23 @ 19:01):    No growth at 4 days    Culture - Blood (collected 11-09-23 @ 11:40)  Source: .Blood Blood-Peripheral  Preliminary Report (11-13-23 @ 19:01):    No growth at 4 days          Radiology: reviewed

## 2023-11-14 NOTE — PROGRESS NOTE ADULT - ASSESSMENT
78-year-old female with history of COPD (not on home oxygen), hypertension, hyperlipidemia, and hypothyroidism presents to the ED for back pain, admitted for sepsis sec to suspected CAP and acute COPD exacerbation. Likely had severe sepsis on admission secondary to pneumonia. Hospital course complicated by hypervolemia.

## 2023-11-14 NOTE — PROGRESS NOTE ADULT - ASSESSMENT
77 y/o F w. PMHx of COPD (Not on home O2), Nicotine dependance, HTN, HLD, and hypothyroidism who presents with complaint of back pain for ~2-3 week and increased shortness of breathe. Patient admitted for COPD exacerbation and PNA.    Assessment:  YADI ON CKD  PNA/COPD      -YADI 2/2 decreased PO intake ~10 days, likely prerenal, resolving   -Cr. 2.0 on admission, downtrending to 1.5 today.  -Avoid nephrotoxic meds as able  -Urine studies ordered  -Monitor chemistries  -S/p thoracentesis. Lasix IV started. We will monitor closely   -Creatinine stable tolerating diuresis       Thank you

## 2023-11-15 ENCOUNTER — TRANSCRIPTION ENCOUNTER (OUTPATIENT)
Age: 78
End: 2023-11-15

## 2023-11-15 LAB
ANION GAP SERPL CALC-SCNC: 4 MMOL/L — LOW (ref 5–17)
ANION GAP SERPL CALC-SCNC: 4 MMOL/L — LOW (ref 5–17)
BUN SERPL-MCNC: 35 MG/DL — HIGH (ref 7–23)
BUN SERPL-MCNC: 35 MG/DL — HIGH (ref 7–23)
CALCIUM SERPL-MCNC: 8.7 MG/DL — SIGNIFICANT CHANGE UP (ref 8.5–10.1)
CALCIUM SERPL-MCNC: 8.7 MG/DL — SIGNIFICANT CHANGE UP (ref 8.5–10.1)
CHLORIDE SERPL-SCNC: 99 MMOL/L — SIGNIFICANT CHANGE UP (ref 96–108)
CHLORIDE SERPL-SCNC: 99 MMOL/L — SIGNIFICANT CHANGE UP (ref 96–108)
CO2 SERPL-SCNC: 36 MMOL/L — HIGH (ref 22–31)
CO2 SERPL-SCNC: 36 MMOL/L — HIGH (ref 22–31)
CREAT SERPL-MCNC: 1 MG/DL — SIGNIFICANT CHANGE UP (ref 0.5–1.3)
CREAT SERPL-MCNC: 1 MG/DL — SIGNIFICANT CHANGE UP (ref 0.5–1.3)
EGFR: 57 ML/MIN/1.73M2 — LOW
EGFR: 57 ML/MIN/1.73M2 — LOW
GLUCOSE SERPL-MCNC: 93 MG/DL — SIGNIFICANT CHANGE UP (ref 70–99)
GLUCOSE SERPL-MCNC: 93 MG/DL — SIGNIFICANT CHANGE UP (ref 70–99)
HCT VFR BLD CALC: 33.2 % — LOW (ref 34.5–45)
HCT VFR BLD CALC: 33.2 % — LOW (ref 34.5–45)
HGB BLD-MCNC: 11.2 G/DL — LOW (ref 11.5–15.5)
HGB BLD-MCNC: 11.2 G/DL — LOW (ref 11.5–15.5)
MAGNESIUM SERPL-MCNC: 1.8 MG/DL — SIGNIFICANT CHANGE UP (ref 1.6–2.6)
MAGNESIUM SERPL-MCNC: 1.8 MG/DL — SIGNIFICANT CHANGE UP (ref 1.6–2.6)
MCHC RBC-ENTMCNC: 31.7 PG — SIGNIFICANT CHANGE UP (ref 27–34)
MCHC RBC-ENTMCNC: 31.7 PG — SIGNIFICANT CHANGE UP (ref 27–34)
MCHC RBC-ENTMCNC: 33.7 GM/DL — SIGNIFICANT CHANGE UP (ref 32–36)
MCHC RBC-ENTMCNC: 33.7 GM/DL — SIGNIFICANT CHANGE UP (ref 32–36)
MCV RBC AUTO: 94.1 FL — SIGNIFICANT CHANGE UP (ref 80–100)
MCV RBC AUTO: 94.1 FL — SIGNIFICANT CHANGE UP (ref 80–100)
NRBC # BLD: 0 /100 WBCS — SIGNIFICANT CHANGE UP (ref 0–0)
NRBC # BLD: 0 /100 WBCS — SIGNIFICANT CHANGE UP (ref 0–0)
PLATELET # BLD AUTO: 325 K/UL — SIGNIFICANT CHANGE UP (ref 150–400)
PLATELET # BLD AUTO: 325 K/UL — SIGNIFICANT CHANGE UP (ref 150–400)
POTASSIUM SERPL-MCNC: 4.7 MMOL/L — SIGNIFICANT CHANGE UP (ref 3.5–5.3)
POTASSIUM SERPL-MCNC: 4.7 MMOL/L — SIGNIFICANT CHANGE UP (ref 3.5–5.3)
POTASSIUM SERPL-SCNC: 4.7 MMOL/L — SIGNIFICANT CHANGE UP (ref 3.5–5.3)
POTASSIUM SERPL-SCNC: 4.7 MMOL/L — SIGNIFICANT CHANGE UP (ref 3.5–5.3)
RBC # BLD: 3.53 M/UL — LOW (ref 3.8–5.2)
RBC # BLD: 3.53 M/UL — LOW (ref 3.8–5.2)
RBC # FLD: 13.4 % — SIGNIFICANT CHANGE UP (ref 10.3–14.5)
RBC # FLD: 13.4 % — SIGNIFICANT CHANGE UP (ref 10.3–14.5)
SODIUM SERPL-SCNC: 139 MMOL/L — SIGNIFICANT CHANGE UP (ref 135–145)
SODIUM SERPL-SCNC: 139 MMOL/L — SIGNIFICANT CHANGE UP (ref 135–145)
WBC # BLD: 11.26 K/UL — HIGH (ref 3.8–10.5)
WBC # BLD: 11.26 K/UL — HIGH (ref 3.8–10.5)
WBC # FLD AUTO: 11.26 K/UL — HIGH (ref 3.8–10.5)
WBC # FLD AUTO: 11.26 K/UL — HIGH (ref 3.8–10.5)

## 2023-11-15 PROCEDURE — 99233 SBSQ HOSP IP/OBS HIGH 50: CPT

## 2023-11-15 PROCEDURE — 99232 SBSQ HOSP IP/OBS MODERATE 35: CPT

## 2023-11-15 RX ORDER — FUROSEMIDE 40 MG
40 TABLET ORAL ONCE
Refills: 0 | Status: COMPLETED | OUTPATIENT
Start: 2023-11-15 | End: 2023-11-15

## 2023-11-15 RX ADMIN — ATORVASTATIN CALCIUM 40 MILLIGRAM(S): 80 TABLET, FILM COATED ORAL at 20:53

## 2023-11-15 RX ADMIN — Medication 40 MILLIGRAM(S): at 20:53

## 2023-11-15 RX ADMIN — Medication 50 MILLIGRAM(S): at 20:53

## 2023-11-15 RX ADMIN — Medication 3 MILLILITER(S): at 13:11

## 2023-11-15 RX ADMIN — HEPARIN SODIUM 5000 UNIT(S): 5000 INJECTION INTRAVENOUS; SUBCUTANEOUS at 18:40

## 2023-11-15 RX ADMIN — HEPARIN SODIUM 5000 UNIT(S): 5000 INJECTION INTRAVENOUS; SUBCUTANEOUS at 05:01

## 2023-11-15 RX ADMIN — Medication 20 MILLIGRAM(S): at 05:02

## 2023-11-15 RX ADMIN — Medication 3 MILLILITER(S): at 19:46

## 2023-11-15 RX ADMIN — Medication 88 MICROGRAM(S): at 05:06

## 2023-11-15 RX ADMIN — Medication 50 MILLIGRAM(S): at 14:40

## 2023-11-15 RX ADMIN — Medication 3 MILLILITER(S): at 07:44

## 2023-11-15 RX ADMIN — Medication 40 MILLIGRAM(S): at 05:03

## 2023-11-15 RX ADMIN — Medication 50 MILLIGRAM(S): at 05:02

## 2023-11-15 RX ADMIN — Medication 3 MILLILITER(S): at 01:19

## 2023-11-15 NOTE — DISCHARGE NOTE NURSING/CASE MANAGEMENT/SOCIAL WORK - NSDPLANG ASIS_GEN_ALL_CORE
[FreeTextEntry8] : Patient presents complaining of nasal congestion/postnasal drip/cough for 6 days. Patient denies fever. Patient states Friday was the worst day and today he feels improved. Patient is currently on Nasacort. Patient has had no recent travel No

## 2023-11-15 NOTE — PROGRESS NOTE ADULT - SUBJECTIVE AND OBJECTIVE BOX
SANJUANA TORRES  78y  Female      Patient is a 78y old  Female who presents with a chief complaint of back pain (10 Nov 2023 15:23)      INTERVAL HPI/OVERNIGHT EVENTS: Patient is a 77 y/o F w. PMHx of COPD (Not on home O2), Nicotine dependance, HTN, HLD, and hypothyroidism who presents with complaint of back pain for ~2-3 week. Patient describes the pain as a band across her lower back. [facility] this back pain and finished a course of Macrobid/Pyridium. The back pain persisted and she subsequently came into [facility] ED. She did not have any specific urinary sx. and denies hematuria, dysuria, etc at the time of the initial back pain and still does not endorse any urinary sx. Her back pain has resolved. Patient also endorsing inc. work of breathing with dyspnea on exertion. Patient admitted for COPD exacerbation and suspected community-acquired PNA, now on Abx. No acute events occurred overnight.   Of note, pt admits to naproxen use 2-3 per week x2 months.    No N/V/SOB      MEDICATIONS  (STANDING):  albuterol/ipratropium for Nebulization 3 milliLiter(s) Nebulizer every 6 hours  atorvastatin 40 milliGRAM(s) Oral at bedtime  furosemide   Injectable 40 milliGRAM(s) IV Push <User Schedule>  heparin   Injectable 5000 Unit(s) SubCutaneous every 12 hours  levothyroxine 88 MICROGram(s) Oral daily  metoprolol tartrate 50 milliGRAM(s) Oral every 8 hours  nicotine -   7 mG/24Hr(s) Patch 1 Patch Transdermal daily  predniSONE   Tablet 20 milliGRAM(s) Oral daily    MEDICATIONS  (PRN):  acetaminophen     Tablet .. 650 milliGRAM(s) Oral every 6 hours PRN Temp greater or equal to 38C (100.4F), Mild Pain (1 - 3)  melatonin 3 milliGRAM(s) Oral at bedtime PRN Insomnia  ondansetron Injectable 4 milliGRAM(s) IV Push every 8 hours PRN Nausea and/or Vomiting      REVIEW OF SYSTEMS:  as above      Vital Signs Last 24 Hrs  T(C): 36.3 (15 Nov 2023 05:28), Max: 36.7 (14 Nov 2023 20:29)  T(F): 97.4 (15 Nov 2023 05:28), Max: 98 (14 Nov 2023 20:29)  HR: 61 (15 Nov 2023 08:26) (58 - 72)  BP: 125/62 (15 Nov 2023 05:28) (117/58 - 125/62)  BP(mean): --  RR: 19 (15 Nov 2023 05:28) (19 - 22)  SpO2: 95% (15 Nov 2023 08:26) (92% - 99%)    Parameters below as of 15 Nov 2023 08:26  Patient On (Oxygen Delivery Method): nasal cannula          PHYSICAL EXAM:  GENERAL: appears stated age, thin habitus  HEAD:  Atraumatic, Normocephalic  EYES: EOMI, conjunctiva and sclera clear  ENMT: No tonsillar erythema, exudates, or enlargement; Moist mucous membranes  NERVOUS SYSTEM:  Alert & Oriented X3, Good concentration.  HEART: Regular rate and rhythm; No murmurs, rubs, or gallops  EXTREMITIES:  No edema  SKIN: No rashes or lesions    Consultant(s) Notes Reviewed:  [x ] YES  [ ] NO  Care Discussed with Consultants/Other Providers [ x] YES  [ ] NO    LABS:                               11.2   11.26 )-----------( 325      ( 15 Nov 2023 07:40 )             33.2     11-15    139  |  99  |  35<H>  ----------------------------<  93  4.7   |  36<H>  |  1.00    Ca    8.7      15 Nov 2023 07:40  Phos  2.7     11-14  Mg     1.8     11-15    TPro  7.0  /  Alb  3.1<L>  /  TBili  0.4  /  DBili  x   /  AST  27  /  ALT  41  /  AlkPhos  76  11-14      Urinalysis Basic - ( 15 Nov 2023 07:40 )    Color: x / Appearance: x / SG: x / pH: x  Gluc: 93 mg/dL / Ketone: x  / Bili: x / Urobili: x   Blood: x / Protein: x / Nitrite: x   Leuk Esterase: x / RBC: x / WBC x   Sq Epi: x / Non Sq Epi: x / Bacteria: x

## 2023-11-15 NOTE — PROGRESS NOTE ADULT - PROBLEM SELECTOR PLAN 2
History of COPD, does not use O2 at home  - CT chest with emphysema  - Finished treatment for PNA  - Standing DuoNebs  - Completing short course of prednisone  - Wean off O2 as tolerated  - Pulm following History of COPD, does not use O2 at home  - CT chest with emphysema  - Finished treatment for PNA  - Standing DuoNebs  - Completing short course of prednisone  - Wean off O2 as tolerated, arranged for home O2  - Pulm following

## 2023-11-15 NOTE — PROGRESS NOTE ADULT - ASSESSMENT
78-year-old female with history of COPD (not on home oxygen), nicotine dependence, hypertension, hyperlipidemia, and hypothyroidism here with worsening shortness of breath.    SOB  - CT with Moderate to large right pleural effusion and small left pleural effusion with associated atelectasis.  - s/p R thoracentesis, 500ml removed, remains on O2 supplementation, with episodes of desaturation post ambulation, continue to wean as tolerated, might need O2 upon discharge    - TTE(11/11/2023) severe mitral stenosis (previous TTE in 2022 showed moderate MS), EF 69%  -, will need repeat TTE once euvolemic to re-assess MV gradients   - chest xray yesterday with persistent congestion , continue IV lasix would give additional dose this pm if okay with renal  monitor renal profile closely     - EKG ST, no sign of acute ischemia   - continue statin    - BP soft at times, with bradycardia 50's can decrease BB to BID   - Monitor and replete Lytes. Keep K > 4 and Mg > 2    - Will continue to follow.

## 2023-11-15 NOTE — PROGRESS NOTE ADULT - ASSESSMENT
78-year-old female with history of COPD (not on home oxygen), hypertension, hyperlipidemia, and hypothyroidism presents to the ED for back pain, admitted for sepsis sec to suspected CAP and acute COPD exacerbation  ID c/s for Abx guidance on HD#5    Sepsis 2/2 PNA  Pleural effusions parapneumonic vs. empyema  COPD exacerbation  AHRF on NC  YADI on CKD  - started on ceftriaxone/azithromycin  - BCx NGTD, UCx noted as below  - s/p R sided thoracentesis 11/11 w/ removal 500 cc   -- pleural fluid cx NGTD  - completed ceftriaxone/azithromycin x5 day course    Recent Acute Cystitis  - pt s/p UTI tx w/ macrobid as outpatient  - UCx here + K. pneumo I macrobid, S to ceftriaxone    Recommendations:   Monitor off Abx  Trend temps/WBC  O2 as needed  Appreciate pulm recs  Additional care per primary team    Infectious Diseases will continue to follow. Please call with any questions.   Swati Norwood M.D.  [facility] Division of Infectious Diseases [phone]  For after 5 P.M. and weekends, please call [phone]

## 2023-11-15 NOTE — PROGRESS NOTE ADULT - PROBLEM SELECTOR PLAN 9
DVT PPX: Heparin Subq    DASH Diet ordered    DISPO: Full code    Aspiration/fall precautions in place DVT PPX: Heparin Subq    GOC: Full code    Aspiration/fall precautions in place

## 2023-11-15 NOTE — DISCHARGE NOTE NURSING/CASE MANAGEMENT/SOCIAL WORK - NSDCPEFALRISK_GEN_ALL_CORE
For information on Fall & Injury Prevention, visit: [email or web address] OR  [email or web address] OR  https://www.cdc.gov/steadi/patient.html

## 2023-11-15 NOTE — PROGRESS NOTE ADULT - ASSESSMENT
78-year-old female with history of COPD (not on home oxygen), hypertension, hyperlipidemia, and hypothyroidism presents to the ED for back pain, admitted for sepsis secondary to suspected CAP and acute COPD exacerbation. Likely had severe sepsis on admission secondary to pneumonia. Hospital course complicated by hypervolemia.

## 2023-11-15 NOTE — PROGRESS NOTE ADULT - PROBLEM SELECTOR PLAN 8
- current smoker, 30 pack year history  - trying to quit, currently on Varenicline (not available inpatient)  - continue nicotine patch Current smoker, 30 pack year history  - trying to quit, currently on Varenicline (not available inpatient)  - continue nicotine patch

## 2023-11-15 NOTE — PROGRESS NOTE ADULT - ASSESSMENT
78-year-old female with history of COPD (not on home oxygen), hypertension, hyperlipidemia, and hypothyroidism presents to the ED for back pain.  Patient reports that for the past 2-3 weeks, she has been having pain located in the midline of her back.     pain  weakness  copd  HTN  HLD  thyroid disease  eval for PNA  HFpEF  Ex Smoker  Valv heart disease    s/p thoracentesis - 500 cc drained - PATH NEG  vs noted  on lasix  on nebs  on steroids  CHF eval - mitral valve disease    ct chest  bronchodilators  systemic steroids  ABG noted  fio2 titration - goal sat > 88 pct  I curt  monitor VS and HD and Sat  VQ scan NEG for PE  BP control  nutrition  outpatient records reviewed - follows with Dr Fay Muir - [facility] pul practice

## 2023-11-15 NOTE — PROGRESS NOTE ADULT - SUBJECTIVE AND OBJECTIVE BOX
[facility] -- Mateo Bobby Pannella, Patel, Savella Goodger, Cohen  Office # [phone]      Follow Up:    Shortness of breath   Subjective/Observations:     No events overnight resting comfortably in bed.  No complaints of chest pain, dyspnea, or palpitations reported. No signs of orthopnea or PND.  remains on nasal cannula     REVIEW OF SYSTEMS: All other review of systems is negative unless indicated above    PAST MEDICAL & SURGICAL HISTORY:  History of COPD  No home O2 use      Asthma      Thyroid nodule      Hyperlipidemia      Hypertension      Hypothyroidism      History of cholecystectomy        History of repair of hip fracture  ORIF .  Hardware was eventually removed          MEDICATIONS  (STANDING):  albuterol/ipratropium for Nebulization 3 milliLiter(s) Nebulizer every 6 hours  atorvastatin 40 milliGRAM(s) Oral at bedtime  furosemide   Injectable 40 milliGRAM(s) IV Push <User Schedule>  heparin   Injectable 5000 Unit(s) SubCutaneous every 12 hours  levothyroxine 88 MICROGram(s) Oral daily  metoprolol tartrate 50 milliGRAM(s) Oral every 8 hours  nicotine -   7 mG/24Hr(s) Patch 1 Patch Transdermal daily  predniSONE   Tablet 20 milliGRAM(s) Oral daily    MEDICATIONS  (PRN):  acetaminophen     Tablet .. 650 milliGRAM(s) Oral every 6 hours PRN Temp greater or equal to 38C (100.4F), Mild Pain (1 - 3)  melatonin 3 milliGRAM(s) Oral at bedtime PRN Insomnia  ondansetron Injectable 4 milliGRAM(s) IV Push every 8 hours PRN Nausea and/or Vomiting      Allergies    No Known Allergies    Intolerances        Vital Signs Last 24 Hrs  T(C): 36.3 (15 Nov 2023 05:28), Max: 36.7 (2023 20:29)  T(F): 97.4 (15 Nov 2023 05:28), Max: 98 (2023 20:29)  HR: 61 (15 Nov 2023 08:26) (58 - 72)  BP: 125/62 (15 Nov 2023 05:28) (117/58 - 125/62)  BP(mean): --  RR: 19 (15 Nov 2023 05:28) (19 - 22)  SpO2: 95% (15 Nov 2023 08:26) (92% - 99%)    Parameters below as of 15 Nov 2023 08:26  Patient On (Oxygen Delivery Method): nasal cannula        I&O's Summary        PHYSICAL EXAM:  TELE: SB 50-70   Constitutional: NAD, awake and alert, well-developed  HEENT: Moist Mucous Membranes, Anicteric  Pulmonary: Non-labored, breath sounds are diminished   Cardiovascular: Regular, S1 and S2 nl, + murmur   Gastrointestinal: Bowel Sounds present, soft, nontender.   Lymph: No lymphadenopathy. No peripheral edema.  Skin: No visible rashes or ulcers.  Psych:  Mood & affect appropriate    LABS: All Labs Reviewed:                        11.2   11 )-----------( 325      ( 15 Nov 2023 07:40 )             33.2                         11.1   9.93  )-----------( 308      ( 2023 08:00 )             32.5                         9.7    6.22  )-----------( 269      ( 2023 06:50 )             28.6     15 Nov 2023 07:40    139    |  99     |  35     ----------------------------<  93     4.7     |  36     |  1.00   2023 08:00    139    |  102    |  41     ----------------------------<  96     4.6     |  32     |  1.10   2023 06:50    136    |  102    |  45     ----------------------------<  117    4.7     |  31     |  1.10     Ca    8.7        15 Nov 2023 07:40  Ca    8.8        2023 08:00  Ca    8.3        2023 06:50  Phos  2.7       2023 08:00  Phos  3.1       2023 06:50  Mg     1.8       15 Nov 2023 07:40  Mg     2.0       2023 08:00  Mg     1.9       2023 06:50    TPro  7.0    /  Alb  3.1    /  TBili  0.4    /  DBili  x      /  AST  27     /  ALT  41     /  AlkPhos  76     2023 08:00  TPro  6.6    /  Alb  2.8    /  TBili  0.3    /  DBili  x      /  AST  29     /  ALT  39     /  AlkPhos  67     2023 06:50             EC Lead ECG:   Ventricular Rate 122 BPM    Atrial Rate 122 BPM    P-R Interval 160 ms    QRS Duration 90 ms    Q-T Interval 316 ms    QTC Calculation(Bazett) 450 ms    P Axis 56 degrees    R Axis 24 degrees    T Axis 61 degrees    Diagnosis Line Sinus tachycardia  Possible Left atrial enlargement  Low voltage QRS  Borderline ECG  No previous ECGs available  Confirmed by LEI CASAREZ ([id number]) on 2023 8:41:03 PM (23 @ 11:53)      TRANSTHORACIC ECHOCARDIOGRAM REPORT  ________________________________________________________________________________                                      _______       Pt. Name:       SANJUANA TORRES Study Date:    11/10/2023  MRN:            [id number]     YOB: 1945  Accession #:    [id number]    Age:           78 years  Account#:       [id number]   Gender:        F  Heart Rate:                  Height:        62.99 in (160.00 cm)  Rhythm:                      Weight:        121.25 lb (55.00 kg)  Blood Pressure: 114/66 mmHg  BSA/BMI:       1.56 m² / 21.48 kg/m²  ________________________________________________________________________________________  Referring Physician:    [id number] Stu Tolentino  Interpreting Physician: Chantel Salguero MD  Primary Sonographer:    Ita Dwyer RDCS    CPT:               ECHO TTE WO CON COMP W DOPP - 82142.m  Indication(s):     Dyspnea, unspecified - R06.00  Procedure:         Transthoracic echocardiogram with 2-D, M-mode and complete                     spectral and color flow Doppler.  Ordering Location: [facility]    _______________________________________________________________________________________     CONCLUSIONS:      1. Left ventricular systolic function is hyperdynamic withan ejection fraction of 69 % by Shepard's method of disks.   2. Mild to moderate left ventricular hypertrophy.   3. Normal right ventricular cavity size, wall thickness, and systolic function.   4. The left atrium is severely dilated in size.   5. The right atrium is normal in size.   6. The mitral valve leaflets are calcified. Severe mitral valve stenosis is present with a mean transmitral valve gradient = 20 mmHg at a HR of 89bpm.   7. Mild mitral regurgitation.   8. The aortic valve appears calcified with grossly normal opening.   9. Trace tricuspid regurgitation.  10. Trace pulmonic regurgitation.  11. The inferior vena cava is normal in size measuring 1.80 cm in diameter, (normal <2.1cm) with normal inspiratory collapse (normal >50%) consistent with normal right atrial pressure (~3, range 0-5mmHg).  12. Trace pericardial effusion.    ________________________________________________________________________________________  FINDINGS:     Left Ventricle:  Left ventricular systolic function is hyperdynamic with a calculated ejection fraction of 69 % by the Shepard's biplane method of disks. Mild to moderate left ventricular hypertrophy.     Right Ventricle:  The right ventricular cavity is normal in size, normal wall thickness and normal systolic function.     Left Atrium:  The left atrium is severely dilated in size with an indexed volume of 83.16 ml/m².     Right Atrium:  The right atrium is normal in size with an indexed volume of 16.63 ml/m².     Aortic Valve:  There is mild calcification of the aortic valve leaflets. The aortic valve appears calcified with grossly normal opening.     Mitral Valve:  There is diffuse mitral valve thickening. There is reduced leaflet mobility of the mitral valve. The mitral valve leaflets are calcified. Severe mitral valve stenosis is present with a mean transmitral valve gradient = 20 mmHg at a HR of 89bpm. There is severe leaflet calcification. There is severe mitral valve stenosis. The transmitral peak gradient is 36.7 mmHg and mean gradient is 23.00 mmHg. There is mild mitral regurgitation.     Tricuspid Valve:  There is trace tricuspid regurgitation. Estimated pulmonary artery systolic pressure is 45 mmHg.     Pulmonic Valve:  Structurally normal pulmonic valve with normal leaflet excursion. There is trace pulmonic regurgitation.     Aorta:  The aortic root at the sinuses of Valsalva is normal in size, measuring 3.00 cm (indexed 1.92 cm/m²).     Pericardium:  There is a trace pericardial effusion.     Pleura:  Moderate bilateral pleural effusion noted.     Systemic Veins:  The inferior vena cava is normal in size measuring 1.80 cm in diameter, (normal <2.1cm) with normal inspiratory collapse (normal >50%) consistent with normal right atrial pressure (~3, range 0-5mmHg).  ____________________________________________________________________  Quantitative Data:  Left Ventricle Measurements: (Indexed to BSA)     IVSd (2D):   0.7 cm  LVPWd (2D):  1.2 cm  LVIDd (2D):  3.7 cm  LVIDs (2D):  2.6 cm  LV Mass:     101 g  64.4 g/m²  LV Vol d, MOD A2C: 63.9 ml 40.88 ml/m²  LV Vol d, MOD A4C: 53.5 ml 34.23 ml/m²  LV Vol d, MOD BP:  59.5 ml 38.07 ml/m²  LV Vol s, MOD A2C: 20.0 ml 12.79 ml/m²  LV Vol s, MOD A4C: 16.4 ml 10.49 ml/m²  LV Vol s, MOD BP:  18.6 ml 11.89 ml/m²  LVOT SV MOD BP:    40.9 ml  LV EF% MOD BP:     69 %     MV E Vmax:    2.64 m/s  MV A Vmax:    2.88 m/s  MV E/A:       0.92  e' lateral:   9.14 cm/s  e' medial:    6.85 cm/s  E/e' lateral: 28.88  E/e' medial:  38.54  E/e' Average: 33.02  MV DT:        197 msec    Aorta Measurements: (normal range) (Indexed to BSA)     Sinuses of Valsalva: 3.00 cm (2.7 - 3.3 cm)       Left Atrium Measurements: (Indexed to BSA)  LA Diam 2D: 4.20 cm    Right Atrial Measurements:     RA Vol:       26.00 ml  RA Vol Index: 16.63 ml/m²       LVOT / RVOT/ Qp/Qs Data: (Indexed to BSA)  LVOT Vmax: 1.57 m/s  LVOT VTI:  24.40 cm    Aortic Valve Measurements:  AV Vmax:          1.9 m/s  AV Peak Gradient: 14.1 mmHg  AV Mean Gradient: 8.0 mmHg  AV VTI:           29.7 cm  AV VTI Ratio:     0.82    Mitral Valve Measurements:     MV Vmax:      3.03 m/s     MR Vmax:          3.85 m/s  MV Mean Grad: 23.00 mmHg   MR Peak Gradient: 59.3 mmHg  MV Peak Grad: 36.7 mmHg  MV E Vmax:    2.6 m/s  MV A Vmax:    2.9 m/s  MV E/A:       0.9      Tricuspid Valve Measurements:     TR Vmax:          3.2 m/s  TR Peak Gradient: 42.0 mmHg  RA Pressure:      3 mmHg  PASP:             45 mmHg    ________________________________________________________________________________________  Electronically signed on 2023 at 10:34:50 AM by Chantel Salguero MD         *** Final ***      Radiology:

## 2023-11-15 NOTE — PROGRESS NOTE ADULT - PROBLEM SELECTOR PLAN 3
Complicated by hypervolemia  - Lasix diuresis per cardio  - TTE 11/11/23 with severe mitral stenosis (previous TTE in 2022 showed moderate MS), EF 69%  - Possibly will repeat TTE when euvolemic to look at gradients  - Cardiology following Complicated by hypervolemia  - Lasix diuresis per cardio  - TTE 11/11/23 with severe mitral stenosis (previous TTE in 2022 showed moderate MS), EF 69%  - Possibly will repeat TTE when euvolemic to look at gradients - discussed with patient and daughter to follow up with cardio Dr. Cruz after discharge  - Cardiology following Complicated by hypervolemia  - IV Lasix diuresis, will give extra dose tonight per cardio - discussed with nephro  - TTE 11/11/23 with severe mitral stenosis (previous TTE in 2022 showed moderate MS), EF 69%  - Possibly will repeat TTE when euvolemic to look at gradients - discussed with patient and daughter to follow up with cardio Dr. Cruz after discharge  - Cardiology following

## 2023-11-15 NOTE — PROGRESS NOTE ADULT - SUBJECTIVE AND OBJECTIVE BOX
[facility], Division of Infectious Diseases  ANU Rodriguez Y. Patel, S. Shah, [facility]  [phone]    Name: SANJUANA TORRES  Age: 78y  Gender: Female  MRN: [id number]    Interval History:  Patient seen and examined at bedside this morning  No acute overnight events. Afebrile  No complaints  Still on O2  Notes reviewed    Antibiotics:      Medications:  acetaminophen     Tablet .. 650 milliGRAM(s) Oral every 6 hours PRN  albuterol/ipratropium for Nebulization 3 milliLiter(s) Nebulizer every 6 hours  atorvastatin 40 milliGRAM(s) Oral at bedtime  furosemide   Injectable 40 milliGRAM(s) IV Push <User Schedule>  heparin   Injectable 5000 Unit(s) SubCutaneous every 12 hours  levothyroxine 88 MICROGram(s) Oral daily  melatonin 3 milliGRAM(s) Oral at bedtime PRN  metoprolol tartrate 50 milliGRAM(s) Oral every 8 hours  nicotine -   7 mG/24Hr(s) Patch 1 Patch Transdermal daily  ondansetron Injectable 4 milliGRAM(s) IV Push every 8 hours PRN  predniSONE   Tablet 20 milliGRAM(s) Oral daily      Review of Systems:  A 10-point review of systems was obtained.   Review of systems otherwise negative except as previously noted.    Allergies: No Known Allergies    For details regarding the patient's past medical history, social history, family history, and other miscellaneous elements, please refer the initial infectious diseases consultation and/or the admitting history and physical examination for this admission.    Objective:  Vitals:   T(C): 36.3 (11-15-23 @ 05:28), Max: 36.7 (11-14-23 @ 20:29)  HR: 61 (11-15-23 @ 08:26) (58 - 72)  BP: 125/62 (11-15-23 @ 05:28) (117/58 - 125/62)  RR: 19 (11-15-23 @ 05:28) (19 - 22)  SpO2: 95% (11-15-23 @ 08:26) (92% - 99%)    Physical Examination:  General: no acute distress  HEENT: NC/AT, EOMI,   Cardio: S1, S2 heard, RRR, no murmurs  Resp: decreased b/l breath sounds  Abd: soft, NT, ND,   Ext: no edema or cyanosis  Skin: warm, dry, no visible rash      Laboratory Studies:  CBC:                       11.2   11.26 )-----------( 325      ( 15 Nov 2023 07:40 )             33.2     CMP: 11-15    139  |  99  |  35<H>  ----------------------------<  93  4.7   |  36<H>  |  1.00    Ca    8.7      15 Nov 2023 07:40  Phos  2.7     11-14  Mg     1.8     11-15    TPro  7.0  /  Alb  3.1<L>  /  TBili  0.4  /  DBili  x   /  AST  27  /  ALT  41  /  AlkPhos  76  11-14    LIVER FUNCTIONS - ( 14 Nov 2023 08:00 )  Alb: 3.1 g/dL / Pro: 7.0 g/dL / ALK PHOS: 76 U/L / ALT: 41 U/L / AST: 27 U/L / GGT: x           Urinalysis Basic - ( 15 Nov 2023 07:40 )    Color: x / Appearance: x / SG: x / pH: x  Gluc: 93 mg/dL / Ketone: x  / Bili: x / Urobili: x   Blood: x / Protein: x / Nitrite: x   Leuk Esterase: x / RBC: x / WBC x   Sq Epi: x / Non Sq Epi: x / Bacteria: x        Microbiology: reviewed    Culture - Body Fluid with Gram Stain (collected 11-11-23 @ 14:30)  Source: Pleural Fl Pleural Fluid  Gram Stain (11-11-23 @ 22:19):    polymorphonuclear leukocytes seen    No organisms seen    by cytocentrifuge  Preliminary Report (11-12-23 @ 19:04):    No growth    Culture - Urine (collected 11-09-23 @ 13:40)  Source: Clean Catch Clean Catch (Midstream)  Final Report (11-12-23 @ 23:24):    >100,000 CFU/ml Klebsiella pneumoniae  Organism: Klebsiella pneumoniae (11-12-23 @ 23:24)  Organism: Klebsiella pneumoniae (11-12-23 @ 23:24)      Method Type: RADHA      -  Amoxicillin/Clavulanic Acid: S <=8/4      -  Ampicillin: R >16 These ampicillin results predict results for amoxicillin      -  Ampicillin/Sulbactam: S 8/4      -  Aztreonam: S <=4      -  Cefazolin: S <=2 For uncomplicated UTI with K. pneumoniae, E. coli, or P. mirablis: RADHA <=16 is sensitive and RADHA >=32 is resistant. This also predicts results for oral agents cefaclor, cefdinir, cefpodoxime, cefprozil, cefuroxime axetil, cephalexin and locarbef for uncomplicated UTI. Note that some isolates may be susceptible to these agents while testing resistant to cefazolin.      -  Cefepime: S <=2      -  Cefoxitin: S <=8      -  Ceftriaxone: S <=1      -  Cefuroxime: S 8      -  Ciprofloxacin: S <=0.25      -  Ertapenem: S <=0.5      -  Gentamicin: S <=2      -  Imipenem: S <=1      -  Levofloxacin: S <=0.5      -  Meropenem: S <=1      -  Nitrofurantoin: I 64 Should not be used to treat pyelonephritis      -  Piperacillin/Tazobactam: S <=8      -  Tobramycin: S <=2      -  Trimethoprim/Sulfamethoxazole: S 1/19    Culture - Blood (collected 11-09-23 @ 12:00)  Source: .Blood Blood-Peripheral  Final Report (11-14-23 @ 19:00):    No growth at 5 days    Culture - Blood (collected 11-09-23 @ 11:40)  Source: .Blood Blood-Peripheral  Final Report (11-14-23 @ 19:00):    No growth at 5 days          Radiology: reviewed

## 2023-11-15 NOTE — PROGRESS NOTE ADULT - ASSESSMENT
79 y/o F w. PMHx of COPD (Not on home O2), Nicotine dependance, HTN, HLD, and hypothyroidism who presents with complaint of back pain for ~2-3 week and increased shortness of breathe. Patient admitted for COPD exacerbation and PNA.    Assessment:  YADI ON CKD  PNA/COPD      -YADI 2/2 decreased PO intake ~10 days, likely prerenal, resolving   -Cr. 2.0 on admission, downtrending to 1.5 today.  -Avoid nephrotoxic meds as able  -Urine studies ordered  -Monitor chemistries  -S/p thoracentesis. Lasix IV to be continued. We will monitor closely   -Creatinine stable tolerating diuresis; stable lytes      Thank you

## 2023-11-15 NOTE — DISCHARGE NOTE NURSING/CASE MANAGEMENT/SOCIAL WORK - PATIENT PORTAL LINK FT
You can access the FollowMyHealth Patient Portal offered by [facility] by registering at the following website: [email or web address]. By joining [facility]’s FollowMyHealth portal, you will also be able to view your health information using other applications (apps) compatible with our system.

## 2023-11-15 NOTE — CASE MANAGEMENT PROGRESS NOTE - NSCMPROGRESSNOTE_GEN_ALL_CORE
Patient needs oxygen and oxygen ordered and awaiting delivery. Patient is a new portable oxygen patient. CHHA sent to [facility] and accepted as a new oxygen patient.

## 2023-11-15 NOTE — PROGRESS NOTE ADULT - SUBJECTIVE AND OBJECTIVE BOX
Patient is a 78y old  Female who presents with a chief complaint of back pain (15 Nov 2023 08:27)    Patient seen and examined at bedside. No acute overnight events.     ALLERGIES:  No Known Allergies    MEDICATIONS  (STANDING):  albuterol/ipratropium for Nebulization 3 milliLiter(s) Nebulizer every 6 hours  atorvastatin 40 milliGRAM(s) Oral at bedtime  furosemide   Injectable 40 milliGRAM(s) IV Push <User Schedule>  heparin   Injectable 5000 Unit(s) SubCutaneous every 12 hours  levothyroxine 88 MICROGram(s) Oral daily  metoprolol tartrate 50 milliGRAM(s) Oral every 8 hours  nicotine -   7 mG/24Hr(s) Patch 1 Patch Transdermal daily  predniSONE   Tablet 20 milliGRAM(s) Oral daily    MEDICATIONS  (PRN):  acetaminophen     Tablet .. 650 milliGRAM(s) Oral every 6 hours PRN Temp greater or equal to 38C (100.4F), Mild Pain (1 - 3)  melatonin 3 milliGRAM(s) Oral at bedtime PRN Insomnia  ondansetron Injectable 4 milliGRAM(s) IV Push every 8 hours PRN Nausea and/or Vomiting    Vital Signs Last 24 Hrs  T(F): 97.4 (15 Nov 2023 05:28), Max: 98 (14 Nov 2023 20:29)  HR: 61 (15 Nov 2023 08:26) (58 - 72)  BP: 125/62 (15 Nov 2023 05:28) (117/58 - 125/62)  RR: 19 (15 Nov 2023 05:28) (19 - 22)  SpO2: 95% (15 Nov 2023 08:26) (92% - 99%)    I&O's Summary        PHYSICAL EXAM:  General: NAD, lying in bed  Eyes: Anicteric  ENT: Moist mucous membranes  Neck: Supple, No JVD  Lungs: Clear to auscultation bilaterally, normal respiratory effort  Cardio: RRR, S1/S2, No murmurs  Abdomen: Soft, Nontender, Nondistended; Bowel sounds present  Extremities: No calf tenderness, No pitting edema, no digital cyanosis  Skin: Warm, dry  Psych: A&O x 3, follows commands    LABS:                        11.1   9.93  )-----------( 308      ( 14 Nov 2023 08:00 )             32.5       11-14    139  |  102  |  41  ----------------------------<  96  4.6   |  32  |  1.10    Ca    8.8      14 Nov 2023 08:00  Phos  2.7     11-14  Mg     2.0     11-14    TPro  7.0  /  Alb  3.1  /  TBili  0.4  /  DBili  x   /  AST  27  /  ALT  41  /  AlkPhos  76  11-14                                  Urinalysis Basic - ( 14 Nov 2023 08:00 )    Color: x / Appearance: x / SG: x / pH: x  Gluc: 96 mg/dL / Ketone: x  / Bili: x / Urobili: x   Blood: x / Protein: x / Nitrite: x   Leuk Esterase: x / RBC: x / WBC x   Sq Epi: x / Non Sq Epi: x / Bacteria: x        Culture - Body Fluid with Gram Stain (collected 11 Nov 2023 14:30)  Source: Pleural Fl Pleural Fluid  Gram Stain (11 Nov 2023 22:19):    polymorphonuclear leukocytes seen    No organisms seen    by cytocentrifuge  Preliminary Report (12 Nov 2023 19:04):    No growth    Culture - Urine (collected 09 Nov 2023 13:40)  Source: Clean Catch Clean Catch (Midstream)  Final Report (12 Nov 2023 23:24):    >100,000 CFU/ml Klebsiella pneumoniae  Organism: Klebsiella pneumoniae (12 Nov 2023 23:24)  Organism: Klebsiella pneumoniae (12 Nov 2023 23:24)      Method Type: RADHA      -  Amoxicillin/Clavulanic Acid: S <=8/4      -  Ampicillin: R >16 These ampicillin results predict results for amoxicillin      -  Ampicillin/Sulbactam: S 8/4      -  Aztreonam: S <=4      -  Cefazolin: S <=2 For uncomplicated UTI with K. pneumoniae, E. coli, or P. mirablis: RADHA <=16 is sensitive and RADHA >=32 is resistant. This also predicts results for oral agents cefaclor, cefdinir, cefpodoxime, cefprozil, cefuroxime axetil, cephalexin and locarbef for uncomplicated UTI. Note that some isolates may be susceptible to these agents while testing resistant to cefazolin.      -  Cefepime: S <=2      -  Cefoxitin: S <=8      -  Ceftriaxone: S <=1      -  Cefuroxime: S 8      -  Ciprofloxacin: S <=0.25      -  Ertapenem: S <=0.5      -  Gentamicin: S <=2      -  Imipenem: S <=1      -  Levofloxacin: S <=0.5      -  Meropenem: S <=1      -  Nitrofurantoin: I 64 Should not be used to treat pyelonephritis      -  Piperacillin/Tazobactam: S <=8      -  Tobramycin: S <=2      -  Trimethoprim/Sulfamethoxazole: S 1/19    Culture - Blood (collected 09 Nov 2023 12:00)  Source: .Blood Blood-Peripheral  Final Report (14 Nov 2023 19:00):    No growth at 5 days    Culture - Blood (collected 09 Nov 2023 11:40)  Source: .Blood Blood-Peripheral  Final Report (14 Nov 2023 19:00):    No growth at 5 days          RADIOLOGY & ADDITIONAL TESTS:   Personally reviewed.     Consultant(s) Notes Reviewed:  [x] YES  [ ] NO    Care Discussed with Consultants/Other Providers:    Patient is a 78y old  Female who presents with a chief complaint of back pain (15 Nov 2023 08:27)    Patient seen and examined at bedside. No acute overnight events.     ALLERGIES:  No Known Allergies    MEDICATIONS  (STANDING):  albuterol/ipratropium for Nebulization 3 milliLiter(s) Nebulizer every 6 hours  atorvastatin 40 milliGRAM(s) Oral at bedtime  furosemide   Injectable 40 milliGRAM(s) IV Push <User Schedule>  heparin   Injectable 5000 Unit(s) SubCutaneous every 12 hours  levothyroxine 88 MICROGram(s) Oral daily  metoprolol tartrate 50 milliGRAM(s) Oral every 8 hours  nicotine -   7 mG/24Hr(s) Patch 1 Patch Transdermal daily  predniSONE   Tablet 20 milliGRAM(s) Oral daily    MEDICATIONS  (PRN):  acetaminophen     Tablet .. 650 milliGRAM(s) Oral every 6 hours PRN Temp greater or equal to 38C (100.4F), Mild Pain (1 - 3)  melatonin 3 milliGRAM(s) Oral at bedtime PRN Insomnia  ondansetron Injectable 4 milliGRAM(s) IV Push every 8 hours PRN Nausea and/or Vomiting    Vital Signs Last 24 Hrs  T(F): 97.4 (15 Nov 2023 05:28), Max: 98 (14 Nov 2023 20:29)  HR: 61 (15 Nov 2023 08:26) (58 - 72)  BP: 125/62 (15 Nov 2023 05:28) (117/58 - 125/62)  RR: 19 (15 Nov 2023 05:28) (19 - 22)  SpO2: 95% (15 Nov 2023 08:26) (92% - 99%)      PHYSICAL EXAM:  General: NAD, lying in bed  Eyes: Anicteric  ENT: Moist mucous membranes  Neck: Supple, No JVD  Lungs: Clear to auscultation bilaterally, normal respiratory effort  Cardio: RRR, S1/S2, No murmurs  Abdomen: Soft, Nontender, Nondistended; Bowel sounds present  Extremities: No calf tenderness, No pitting edema, no digital cyanosis  Skin: Warm, dry  Psych: A&O x 3, follows commands    LABS:                        11.1   9.93  )-----------( 308      ( 14 Nov 2023 08:00 )             32.5       11-14    139  |  102  |  41  ----------------------------<  96  4.6   |  32  |  1.10    Ca    8.8      14 Nov 2023 08:00  Phos  2.7     11-14  Mg     2.0     11-14    TPro  7.0  /  Alb  3.1  /  TBili  0.4  /  DBili  x   /  AST  27  /  ALT  41  /  AlkPhos  76  11-14                                  Urinalysis Basic - ( 14 Nov 2023 08:00 )    Color: x / Appearance: x / SG: x / pH: x  Gluc: 96 mg/dL / Ketone: x  / Bili: x / Urobili: x   Blood: x / Protein: x / Nitrite: x   Leuk Esterase: x / RBC: x / WBC x   Sq Epi: x / Non Sq Epi: x / Bacteria: x        Culture - Body Fluid with Gram Stain (collected 11 Nov 2023 14:30)  Source: Pleural Fl Pleural Fluid  Gram Stain (11 Nov 2023 22:19):    polymorphonuclear leukocytes seen    No organisms seen    by cytocentrifuge  Preliminary Report (12 Nov 2023 19:04):    No growth    Culture - Urine (collected 09 Nov 2023 13:40)  Source: Clean Catch Clean Catch (Midstream)  Final Report (12 Nov 2023 23:24):    >100,000 CFU/ml Klebsiella pneumoniae  Organism: Klebsiella pneumoniae (12 Nov 2023 23:24)  Organism: Klebsiella pneumoniae (12 Nov 2023 23:24)      Method Type: RADHA      -  Amoxicillin/Clavulanic Acid: S <=8/4      -  Ampicillin: R >16 These ampicillin results predict results for amoxicillin      -  Ampicillin/Sulbactam: S 8/4      -  Aztreonam: S <=4      -  Cefazolin: S <=2 For uncomplicated UTI with K. pneumoniae, E. coli, or P. mirablis: RADHA <=16 is sensitive and RADHA >=32 is resistant. This also predicts results for oral agents cefaclor, cefdinir, cefpodoxime, cefprozil, cefuroxime axetil, cephalexin and locarbef for uncomplicated UTI. Note that some isolates may be susceptible to these agents while testing resistant to cefazolin.      -  Cefepime: S <=2      -  Cefoxitin: S <=8      -  Ceftriaxone: S <=1      -  Cefuroxime: S 8      -  Ciprofloxacin: S <=0.25      -  Ertapenem: S <=0.5      -  Gentamicin: S <=2      -  Imipenem: S <=1      -  Levofloxacin: S <=0.5      -  Meropenem: S <=1      -  Nitrofurantoin: I 64 Should not be used to treat pyelonephritis      -  Piperacillin/Tazobactam: S <=8      -  Tobramycin: S <=2      -  Trimethoprim/Sulfamethoxazole: S 1/19    Culture - Blood (collected 09 Nov 2023 12:00)  Source: .Blood Blood-Peripheral  Final Report (14 Nov 2023 19:00):    No growth at 5 days    Culture - Blood (collected 09 Nov 2023 11:40)  Source: .Blood Blood-Peripheral  Final Report (14 Nov 2023 19:00):    No growth at 5 days          RADIOLOGY & ADDITIONAL TESTS:   Personally reviewed.     Consultant(s) Notes Reviewed:  [x] YES  [ ] NO    Care Discussed with Consultants/Other Providers:

## 2023-11-15 NOTE — PROGRESS NOTE ADULT - SUBJECTIVE AND OBJECTIVE BOX
Date/Time Patient Seen:  		  Referring MD:   Data Reviewed	       Patient is a 78y old  Female who presents with a chief complaint of back pain (14 Nov 2023 13:57)      Subjective/HPI     PAST MEDICAL & SURGICAL HISTORY:  History of COPD  No home O2 use    Asthma    Thyroid nodule    Hyperlipidemia    Hypertension    Hypothyroidism    History of cholecystectomy  1989    History of repair of hip fracture  ORIF 1982.  Hardware was eventually removed          Medication list         MEDICATIONS  (STANDING):  albuterol/ipratropium for Nebulization 3 milliLiter(s) Nebulizer every 6 hours  atorvastatin 40 milliGRAM(s) Oral at bedtime  furosemide   Injectable 40 milliGRAM(s) IV Push <User Schedule>  heparin   Injectable 5000 Unit(s) SubCutaneous every 12 hours  levothyroxine 88 MICROGram(s) Oral daily  metoprolol tartrate 50 milliGRAM(s) Oral every 8 hours  nicotine -   7 mG/24Hr(s) Patch 1 Patch Transdermal daily  predniSONE   Tablet 20 milliGRAM(s) Oral daily    MEDICATIONS  (PRN):  acetaminophen     Tablet .. 650 milliGRAM(s) Oral every 6 hours PRN Temp greater or equal to 38C (100.4F), Mild Pain (1 - 3)  melatonin 3 milliGRAM(s) Oral at bedtime PRN Insomnia  ondansetron Injectable 4 milliGRAM(s) IV Push every 8 hours PRN Nausea and/or Vomiting         Vitals log        ICU Vital Signs Last 24 Hrs  T(C): 36.7 (14 Nov 2023 20:29), Max: 36.7 (14 Nov 2023 20:29)  T(F): 98 (14 Nov 2023 20:29), Max: 98 (14 Nov 2023 20:29)  HR: 58 (15 Nov 2023 01:24) (58 - 72)  BP: 119/63 (14 Nov 2023 20:29) (117/58 - 137/65)  BP(mean): --  ABP: --  ABP(mean): --  RR: 19 (14 Nov 2023 20:29) (18 - 22)  SpO2: 99% (15 Nov 2023 01:24) (92% - 99%)    O2 Parameters below as of 15 Nov 2023 01:24  Patient On (Oxygen Delivery Method): nasal cannula                 Input and Output:  I&O's Detail    13 Nov 2023 07:01  -  14 Nov 2023 07:00  --------------------------------------------------------  IN:    Oral Fluid: 237 mL  Total IN: 237 mL    OUT:    Voided (mL): 450 mL  Total OUT: 450 mL    Total NET: -213 mL          Lab Data                        11.1   9.93  )-----------( 308      ( 14 Nov 2023 08:00 )             32.5     11-14    139  |  102  |  41<H>  ----------------------------<  96  4.6   |  32<H>  |  1.10    Ca    8.8      14 Nov 2023 08:00  Phos  2.7     11-14  Mg     2.0     11-14    TPro  7.0  /  Alb  3.1<L>  /  TBili  0.4  /  DBili  x   /  AST  27  /  ALT  41  /  AlkPhos  76  11-14            Review of Systems	      Objective     Physical Examination    heart s1s2  lung dc BS  head nc      Pertinent Lab findings & Imaging      Chiki:  NO   Adequate UO     I&O's Detail    13 Nov 2023 07:01  -  14 Nov 2023 07:00  --------------------------------------------------------  IN:    Oral Fluid: 237 mL  Total IN: 237 mL    OUT:    Voided (mL): 450 mL  Total OUT: 450 mL    Total NET: -213 mL               Discussed with:     Cultures:	        Radiology

## 2023-11-16 ENCOUNTER — TRANSCRIPTION ENCOUNTER (OUTPATIENT)
Age: 78
End: 2023-11-16

## 2023-11-16 VITALS
HEART RATE: 77 BPM | OXYGEN SATURATION: 93 % | RESPIRATION RATE: 19 BRPM | DIASTOLIC BLOOD PRESSURE: 69 MMHG | TEMPERATURE: 98 F | SYSTOLIC BLOOD PRESSURE: 121 MMHG

## 2023-11-16 LAB
ANION GAP SERPL CALC-SCNC: 4 MMOL/L — LOW (ref 5–17)
ANION GAP SERPL CALC-SCNC: 4 MMOL/L — LOW (ref 5–17)
BUN SERPL-MCNC: 37 MG/DL — HIGH (ref 7–23)
BUN SERPL-MCNC: 37 MG/DL — HIGH (ref 7–23)
CALCIUM SERPL-MCNC: 8.6 MG/DL — SIGNIFICANT CHANGE UP (ref 8.5–10.1)
CALCIUM SERPL-MCNC: 8.6 MG/DL — SIGNIFICANT CHANGE UP (ref 8.5–10.1)
CHLORIDE SERPL-SCNC: 96 MMOL/L — SIGNIFICANT CHANGE UP (ref 96–108)
CHLORIDE SERPL-SCNC: 96 MMOL/L — SIGNIFICANT CHANGE UP (ref 96–108)
CO2 SERPL-SCNC: 35 MMOL/L — HIGH (ref 22–31)
CO2 SERPL-SCNC: 35 MMOL/L — HIGH (ref 22–31)
CREAT SERPL-MCNC: 0.98 MG/DL — SIGNIFICANT CHANGE UP (ref 0.5–1.3)
CREAT SERPL-MCNC: 0.98 MG/DL — SIGNIFICANT CHANGE UP (ref 0.5–1.3)
CULTURE RESULTS: SIGNIFICANT CHANGE UP
CULTURE RESULTS: SIGNIFICANT CHANGE UP
EGFR: 59 ML/MIN/1.73M2 — LOW
EGFR: 59 ML/MIN/1.73M2 — LOW
GLUCOSE SERPL-MCNC: 96 MG/DL — SIGNIFICANT CHANGE UP (ref 70–99)
GLUCOSE SERPL-MCNC: 96 MG/DL — SIGNIFICANT CHANGE UP (ref 70–99)
HCT VFR BLD CALC: 34.8 % — SIGNIFICANT CHANGE UP (ref 34.5–45)
HCT VFR BLD CALC: 34.8 % — SIGNIFICANT CHANGE UP (ref 34.5–45)
HGB BLD-MCNC: 11.7 G/DL — SIGNIFICANT CHANGE UP (ref 11.5–15.5)
HGB BLD-MCNC: 11.7 G/DL — SIGNIFICANT CHANGE UP (ref 11.5–15.5)
MCHC RBC-ENTMCNC: 31.2 PG — SIGNIFICANT CHANGE UP (ref 27–34)
MCHC RBC-ENTMCNC: 31.2 PG — SIGNIFICANT CHANGE UP (ref 27–34)
MCHC RBC-ENTMCNC: 33.6 GM/DL — SIGNIFICANT CHANGE UP (ref 32–36)
MCHC RBC-ENTMCNC: 33.6 GM/DL — SIGNIFICANT CHANGE UP (ref 32–36)
MCV RBC AUTO: 92.8 FL — SIGNIFICANT CHANGE UP (ref 80–100)
MCV RBC AUTO: 92.8 FL — SIGNIFICANT CHANGE UP (ref 80–100)
NRBC # BLD: 0 /100 WBCS — SIGNIFICANT CHANGE UP (ref 0–0)
NRBC # BLD: 0 /100 WBCS — SIGNIFICANT CHANGE UP (ref 0–0)
PLATELET # BLD AUTO: 309 K/UL — SIGNIFICANT CHANGE UP (ref 150–400)
PLATELET # BLD AUTO: 309 K/UL — SIGNIFICANT CHANGE UP (ref 150–400)
POTASSIUM SERPL-MCNC: 4.3 MMOL/L — SIGNIFICANT CHANGE UP (ref 3.5–5.3)
POTASSIUM SERPL-MCNC: 4.3 MMOL/L — SIGNIFICANT CHANGE UP (ref 3.5–5.3)
POTASSIUM SERPL-SCNC: 4.3 MMOL/L — SIGNIFICANT CHANGE UP (ref 3.5–5.3)
POTASSIUM SERPL-SCNC: 4.3 MMOL/L — SIGNIFICANT CHANGE UP (ref 3.5–5.3)
RBC # BLD: 3.75 M/UL — LOW (ref 3.8–5.2)
RBC # BLD: 3.75 M/UL — LOW (ref 3.8–5.2)
RBC # FLD: 13.2 % — SIGNIFICANT CHANGE UP (ref 10.3–14.5)
RBC # FLD: 13.2 % — SIGNIFICANT CHANGE UP (ref 10.3–14.5)
SODIUM SERPL-SCNC: 135 MMOL/L — SIGNIFICANT CHANGE UP (ref 135–145)
SODIUM SERPL-SCNC: 135 MMOL/L — SIGNIFICANT CHANGE UP (ref 135–145)
SPECIMEN SOURCE: SIGNIFICANT CHANGE UP
SPECIMEN SOURCE: SIGNIFICANT CHANGE UP
WBC # BLD: 10.48 K/UL — SIGNIFICANT CHANGE UP (ref 3.8–10.5)
WBC # BLD: 10.48 K/UL — SIGNIFICANT CHANGE UP (ref 3.8–10.5)
WBC # FLD AUTO: 10.48 K/UL — SIGNIFICANT CHANGE UP (ref 3.8–10.5)
WBC # FLD AUTO: 10.48 K/UL — SIGNIFICANT CHANGE UP (ref 3.8–10.5)

## 2023-11-16 PROCEDURE — 94640 AIRWAY INHALATION TREATMENT: CPT

## 2023-11-16 PROCEDURE — 84443 ASSAY THYROID STIM HORMONE: CPT

## 2023-11-16 PROCEDURE — 88108 CYTOPATH CONCENTRATE TECH: CPT

## 2023-11-16 PROCEDURE — 84540 ASSAY OF URINE/UREA-N: CPT

## 2023-11-16 PROCEDURE — 83605 ASSAY OF LACTIC ACID: CPT

## 2023-11-16 PROCEDURE — 83735 ASSAY OF MAGNESIUM: CPT

## 2023-11-16 PROCEDURE — 84300 ASSAY OF URINE SODIUM: CPT

## 2023-11-16 PROCEDURE — 71250 CT THORAX DX C-: CPT | Mod: MA

## 2023-11-16 PROCEDURE — 88305 TISSUE EXAM BY PATHOLOGIST: CPT

## 2023-11-16 PROCEDURE — 99239 HOSP IP/OBS DSCHRG MGMT >30: CPT

## 2023-11-16 PROCEDURE — 83880 ASSAY OF NATRIURETIC PEPTIDE: CPT

## 2023-11-16 PROCEDURE — 85027 COMPLETE CBC AUTOMATED: CPT

## 2023-11-16 PROCEDURE — 89051 BODY FLUID CELL COUNT: CPT

## 2023-11-16 PROCEDURE — 84133 ASSAY OF URINE POTASSIUM: CPT

## 2023-11-16 PROCEDURE — 80048 BASIC METABOLIC PNL TOTAL CA: CPT

## 2023-11-16 PROCEDURE — 87075 CULTR BACTERIA EXCEPT BLOOD: CPT

## 2023-11-16 PROCEDURE — 87070 CULTURE OTHR SPECIMN AEROBIC: CPT

## 2023-11-16 PROCEDURE — 36415 COLL VENOUS BLD VENIPUNCTURE: CPT

## 2023-11-16 PROCEDURE — 87086 URINE CULTURE/COLONY COUNT: CPT

## 2023-11-16 PROCEDURE — 71045 X-RAY EXAM CHEST 1 VIEW: CPT

## 2023-11-16 PROCEDURE — 81001 URINALYSIS AUTO W/SCOPE: CPT

## 2023-11-16 PROCEDURE — 82945 GLUCOSE OTHER FLUID: CPT

## 2023-11-16 PROCEDURE — 83935 ASSAY OF URINE OSMOLALITY: CPT

## 2023-11-16 PROCEDURE — 82570 ASSAY OF URINE CREATININE: CPT

## 2023-11-16 PROCEDURE — 87077 CULTURE AEROBIC IDENTIFY: CPT

## 2023-11-16 PROCEDURE — 85025 COMPLETE CBC W/AUTO DIFF WBC: CPT

## 2023-11-16 PROCEDURE — 99232 SBSQ HOSP IP/OBS MODERATE 35: CPT

## 2023-11-16 PROCEDURE — 97116 GAIT TRAINING THERAPY: CPT

## 2023-11-16 PROCEDURE — 85379 FIBRIN DEGRADATION QUANT: CPT

## 2023-11-16 PROCEDURE — A9567: CPT

## 2023-11-16 PROCEDURE — 94760 N-INVAS EAR/PLS OXIMETRY 1: CPT

## 2023-11-16 PROCEDURE — 96375 TX/PRO/DX INJ NEW DRUG ADDON: CPT

## 2023-11-16 PROCEDURE — 85610 PROTHROMBIN TIME: CPT

## 2023-11-16 PROCEDURE — 84145 PROCALCITONIN (PCT): CPT

## 2023-11-16 PROCEDURE — 87040 BLOOD CULTURE FOR BACTERIA: CPT

## 2023-11-16 PROCEDURE — 84484 ASSAY OF TROPONIN QUANT: CPT

## 2023-11-16 PROCEDURE — 97162 PT EVAL MOD COMPLEX 30 MIN: CPT

## 2023-11-16 PROCEDURE — 84156 ASSAY OF PROTEIN URINE: CPT

## 2023-11-16 PROCEDURE — 93306 TTE W/DOPPLER COMPLETE: CPT

## 2023-11-16 PROCEDURE — 87449 NOS EACH ORGANISM AG IA: CPT

## 2023-11-16 PROCEDURE — 96374 THER/PROPH/DIAG INJ IV PUSH: CPT

## 2023-11-16 PROCEDURE — 78582 LUNG VENTILAT&PERFUS IMAGING: CPT | Mod: MA

## 2023-11-16 PROCEDURE — A9540: CPT

## 2023-11-16 PROCEDURE — 93005 ELECTROCARDIOGRAM TRACING: CPT

## 2023-11-16 PROCEDURE — 84100 ASSAY OF PHOSPHORUS: CPT

## 2023-11-16 PROCEDURE — 85730 THROMBOPLASTIN TIME PARTIAL: CPT

## 2023-11-16 PROCEDURE — 80053 COMPREHEN METABOLIC PANEL: CPT

## 2023-11-16 PROCEDURE — 87205 SMEAR GRAM STAIN: CPT

## 2023-11-16 PROCEDURE — 99285 EMERGENCY DEPT VISIT HI MDM: CPT | Mod: 25

## 2023-11-16 PROCEDURE — 82803 BLOOD GASES ANY COMBINATION: CPT

## 2023-11-16 PROCEDURE — 97530 THERAPEUTIC ACTIVITIES: CPT

## 2023-11-16 PROCEDURE — 83615 LACTATE (LD) (LDH) ENZYME: CPT

## 2023-11-16 PROCEDURE — 87186 SC STD MICRODIL/AGAR DIL: CPT

## 2023-11-16 PROCEDURE — 84157 ASSAY OF PROTEIN OTHER: CPT

## 2023-11-16 RX ORDER — FUROSEMIDE 40 MG
1 TABLET ORAL
Qty: 14 | Refills: 0
Start: 2023-11-16 | End: 2023-11-29

## 2023-11-16 RX ORDER — LOSARTAN POTASSIUM 100 MG/1
25 TABLET, FILM COATED ORAL DAILY
Refills: 0 | Status: DISCONTINUED | OUTPATIENT
Start: 2023-11-16 | End: 2023-11-16

## 2023-11-16 RX ORDER — METOPROLOL TARTRATE 50 MG
50 TABLET ORAL
Refills: 0 | Status: DISCONTINUED | OUTPATIENT
Start: 2023-11-16 | End: 2023-11-16

## 2023-11-16 RX ORDER — ATORVASTATIN CALCIUM 80 MG/1
1 TABLET, FILM COATED ORAL
Qty: 0 | Refills: 0 | DISCHARGE

## 2023-11-16 RX ORDER — LEVOTHYROXINE SODIUM 125 MCG
1 TABLET ORAL
Refills: 0 | DISCHARGE

## 2023-11-16 RX ORDER — METOPROLOL TARTRATE 50 MG
1 TABLET ORAL
Qty: 28 | Refills: 0
Start: 2023-11-16 | End: 2023-11-29

## 2023-11-16 RX ORDER — ATORVASTATIN CALCIUM 80 MG/1
1 TABLET, FILM COATED ORAL
Qty: 0 | Refills: 0 | DISCHARGE
Start: 2023-11-16

## 2023-11-16 RX ORDER — NICOTINE POLACRILEX 2 MG
1 GUM BUCCAL
Qty: 2 | Refills: 0
Start: 2023-11-16 | End: 2023-12-15

## 2023-11-16 RX ORDER — LEVOTHYROXINE SODIUM 125 MCG
1 TABLET ORAL
Qty: 0 | Refills: 0 | DISCHARGE
Start: 2023-11-16

## 2023-11-16 RX ORDER — METOPROLOL TARTRATE 50 MG
1 TABLET ORAL
Qty: 0 | Refills: 0 | DISCHARGE

## 2023-11-16 RX ADMIN — Medication 88 MICROGRAM(S): at 05:18

## 2023-11-16 RX ADMIN — HEPARIN SODIUM 5000 UNIT(S): 5000 INJECTION INTRAVENOUS; SUBCUTANEOUS at 05:18

## 2023-11-16 RX ADMIN — Medication 50 MILLIGRAM(S): at 05:18

## 2023-11-16 RX ADMIN — Medication 20 MILLIGRAM(S): at 05:18

## 2023-11-16 RX ADMIN — Medication 3 MILLILITER(S): at 08:07

## 2023-11-16 RX ADMIN — Medication 40 MILLIGRAM(S): at 05:18

## 2023-11-16 RX ADMIN — Medication 3 MILLILITER(S): at 01:19

## 2023-11-16 NOTE — PROGRESS NOTE ADULT - ASSESSMENT
79 y/o F w. PMHx of COPD (Not on home O2), Nicotine dependance, HTN, HLD, and hypothyroidism who presents with complaint of back pain for ~2-3 week and increased shortness of breathe. Patient admitted for COPD exacerbation and PNA.    Assessment:  YADI ON CKD 3  PNA/COPD      -YADI 2/2 decreased PO intake ~10 days, likely prerenal, resolving   -Cr. 2.0 on admission, improved, baseline CKD  -Avoid nephrotoxic meds as able  -Urine studies ordered  -Monitor chemistries  -S/p thoracentesis. Lasix IV to be continued. We will monitor closely   -Creatinine stable tolerating diuresis; stable lytes      Thank you

## 2023-11-16 NOTE — PROGRESS NOTE ADULT - PROBLEM SELECTOR PLAN 6
Chronic, stable  - on home statin, c/w
Chronic, stable  - Continue home levothyroxine  - TSH WNL
Chronic, stable  - Continue home levothyroxine  - TSH WNL
Chronic, stable  - on home statin, c/w
Chronic, stable  - C/w home levothyroxine dosing  - TSH WNL

## 2023-11-16 NOTE — CASE MANAGEMENT PROGRESS NOTE - NSCMPROGRESSNOTE_GEN_ALL_CORE
Patient ready for transitional care. The patient DC home with [facility] for new oxygen. The patient went home with daughter. Portable oxygen delivered to bedside . Has a safe DC plan home.

## 2023-11-16 NOTE — PROGRESS NOTE ADULT - PROBLEM SELECTOR PROBLEM 6
Hypothyroidism
HLD (hyperlipidemia)
Hypothyroidism
HLD (hyperlipidemia)
Hypothyroidism

## 2023-11-16 NOTE — PROGRESS NOTE ADULT - PROBLEM SELECTOR PLAN 2
History of COPD, does not use O2 at home  - CT chest with emphysema  - Finished treatment for PNA  - Standing DuoNebs  - Completing short course of prednisone  - Wean off O2 as tolerated, arranged for home O2  - Pulm following

## 2023-11-16 NOTE — PROGRESS NOTE ADULT - ASSESSMENT
78-year-old female with history of COPD (not on home oxygen), nicotine dependence, hypertension, hyperlipidemia, and hypothyroidism here with worsening shortness of breath.    in progress    SOB  - CT with Moderate to large right pleural effusion and small left pleural effusion with associated atelectasis.  - s/p R thoracentesis, 500ml removed, remains on O2 supplementation, with episodes of desaturation post ambulation, continue to wean as tolerated, might need O2 upon discharge    - TTE(11/11/2023) severe mitral stenosis (previous TTE in 2022 showed moderate MS), EF 69%  -, repeat x-ray to re-assess MV gradients   - chest xray 11/14 with persistent congestion , sp additional dose iv lasix, now on room air   - EKG ST, no sign of acute ischemia   - continue statin    - BP stable   -tele bradycardia 50's can decrease BB to BID   - Monitor and replete Lytes. Keep K > 4 and Mg > 2    - Will continue to follow. 78-year-old female with history of COPD (not on home oxygen), nicotine dependence, hypertension, hyperlipidemia, and hypothyroidism here with worsening shortness of breath.    SOB  - CT with Moderate to large right pleural effusion and small left pleural effusion with associated atelectasis.  - s/p R thoracentesis, 500ml removed, remains on O2 supplementation, with episodes of desaturation post ambulation, continue to wean as tolerated, might need O2 upon discharge    - TTE(11/11/2023) severe mitral stenosis (previous TTE in 2022 showed moderate MS), EF 69%  -, repeat x-ray to re-assess MV gradients   - chest xray 11/14 with persistent congestion , sp additional dose iv lasix, now on room air   -can change to po lasix BID   - EKG ST, no sign of acute ischemia   - continue statin    - BP stable   -tele bradycardia 50's can decrease BB to BID   - Monitor and replete Lytes. Keep K > 4 and Mg > 2    - Will continue to follow.  to dawn Cruz on dc  78-year-old female with history of COPD (not on home oxygen), nicotine dependence, hypertension, hyperlipidemia, and hypothyroidism here with worsening shortness of breath.    SOB  - CT with Moderate to large right pleural effusion and small left pleural effusion with associated atelectasis.  - s/p R thoracentesis, 500ml removed, remains on O2 supplementation, with episodes of desaturation post ambulation, continue to wean as tolerated, might need O2 upon discharge    - TTE(11/11/2023) severe mitral stenosis (previous TTE in 2022 showed moderate MS), EF 69%  - repeat x-ray to re-assess MV gradients   - chest xray 11/14 with persistent congestion , sp additional dose iv lasix, now on room air   - can change to po lasix BID   - EKG ST, no sign of acute ischemia   - continue statin    - BP stable   -tele bradycardia 50's can decrease BB to BID   - Monitor and replete Lytes. Keep K > 4 and Mg > 2    - Will continue to follow.  to dawn Cruz on dc

## 2023-11-16 NOTE — PROGRESS NOTE ADULT - PROBLEM SELECTOR PLAN 1
POA due to  suspected CAP  -criteria met on admission leukocytosis, elevated LA and tachycardia  -pulm consulted  plan:  c/w abx  f/u cultures  start steroids  followup CT chest
POA due to  suspected CAP  -criteria met on admission leukocytosis, elevated LA and tachycardia  -pulm consulted  plan:  c/w abx  f/u cultures  start steroids  followup CT chest
Severe sepsis secondary to PNA  - Treated for CAP with CTX  - Had right sided PLEFF which was drained on 11/11 with 500 cc drained of transudative, cx NGTD (likely not parapneumonic)  - ID and pulm following
POA due to  suspected CAP  -criteria met on admission leukocytosis, elevated LA and tachycardia  -pulm consulted  plan:  c/w abx  f/u cultures  start steroids  followup CT chest
Severe sepsis secondary to PNA  - Treated for CAP with CTX  - Had right sided PLEFF which was drained on 11/11 with 500 cc drained of transudative, cx NGTD (likely not parapneumonic)  - ID and pulm following
POA due to  suspected CAP  -criteria met on admission leukocytosis, elevated LA and tachycardia  -pulm consulted  plan:  c/w abx  f/u cultures  start steroids  followup CT chest
- Severe sepsis secondary to PNA  - ID & pulm following  - Treated for CAP with CTX  - Had right sided PLEFF which was drained on 11/11 with 500 cc drained of transudative, cx NGTD (likely not parapneumonic)

## 2023-11-16 NOTE — DISCHARGE NOTE PROVIDER - NSDCFUSCHEDAPPT_GEN_ALL_CORE_FT
Ongoing  10/29/2021 1450 by Fredrick Conway RN  Outcome: Ongoing     Problem: Mood - Altered:  Goal: Mood stable  Description: Mood stable  10/30/2021 0105 by Ntahen Fernando RN  Outcome: Ongoing  10/29/2021 1450 by Fredrick Conway RN  Outcome: Ongoing  10/29/2021 1450 by Fredrick Conway RN  Outcome: Ongoing     Problem: Self-Esteem - Low:  Goal: Demonstrates positive self-esteem  Description: Demonstrates positive self-esteem  10/30/2021 0105 by Nathen Fernando RN  Outcome: Ongoing  10/29/2021 1450 by Fredrick Conway RN  Outcome: Ongoing  10/29/2021 1450 by Fredrick Conway RN  Outcome: Ongoing     Problem: Violence - Risk of, Self/Other-Directed:  Goal: Knowledge of developmental care interventions  Description: Absence of violence  10/30/2021 0105 by Nathen Fernando RN  Outcome: Ongoing  10/29/2021 1450 by Fredrick Conway RN  Outcome: Ongoing  10/29/2021 1450 by Fredrick Conway RN  Outcome: Ongoing     Problem: Anxiety:  Goal: Level of anxiety will decrease  Description: Level of anxiety will decrease  10/30/2021 0105 by Nathen Fernando RN  Outcome: Ongoing  10/29/2021 1450 by Fredrick Conway RN  Outcome: Ongoing  10/29/2021 1450 by Fredrick Conway RN  Outcome: Ongoing     Problem: Depressive Behavior With or Without Suicide Precautions:  Goal: Able to verbalize acceptance of life and situations over which he or she has no control  Description: Able to verbalize acceptance of life and situations over which he or she has no control  10/30/2021 0105 by Nathen Fernando RN  Outcome: Ongoing  10/29/2021 1450 by Fredrick Conway RN  Outcome: Ongoing  10/29/2021 1450 by Fredrick Conway RN  Outcome: Ongoing  10/29/2021 1450 by Elizabeth Zhang  Outcome: Ongoing  Note:                                                                     Group Therapy Note    Date: 10/29/2021  Start Time: 1330  End Time:  1400  Number of Participants: 3    Type of Group: Cognitive Skills    Wellness Binder Information  Module Name: Stress  Session Number:  4    Group Goal for Pt: To raise awareness of the effectiveness of relaxation techniques    Notes:  Pt demonstrated improved awareness of the effectiveness of relaxation techniques by actively participating in group activity. Status After Intervention:  Unchanged    Participation Level: Active Listener and Interactive    Participation Quality: Appropriate      Speech:  normal      Thought Process/Content: Logical      Affective Functioning: Congruent      Mood: anxious and depressed      Level of consciousness:  Alert and Oriented x4      Response to Learning: Able to verbalize current knowledge/experience, Able to verbalize/acknowledge new learning, and Progressing to goal      Endings: None Reported    Modes of Intervention: Education      Discipline Responsible: Psychoeducational Specialist      Signature:  Chito Cadet    10/29/2021 1434 by Chito Cadet  Outcome: Ongoing  Note:                                                                     Group Therapy Note    Date: 10/29/2021  Start Time: 1100  End Time:  5454  Number of Participants: 3    Type of Group: Psychoeducation    Wellness Binder Information  Module Name:  Stress  Session Number:  5    Group Goal for Pt: To raise awareness of the effectiveness of diversionary coping skills    Notes:  Pt demonstrated improved awareness of the effectiveness of diversionary coping skills by actively participating in group activity. Status After Intervention:  Unchanged    Participation Level:  Active Listener and Interactive    Participation Quality: Appropriate      Speech:  normal      Thought Process/Content: Logical      Affective Functioning: Flat      Mood: anxious and depressed      Level of consciousness:  Alert and Oriented x4      Response to Learning: Able to verbalize current knowledge/experience, Able to verbalize/acknowledge new learning, and Progressing to goal      Endings: None Reported    Modes of Intervention: Education      Discipline Responsible: Psychoeducational Specialist      Signature:  Dolly Yuan       Problem: Falls - Risk of:  Goal: Will remain free from falls  Description: Will remain free from falls  10/30/2021 0105 by Marilyn Mckenzie RN  Outcome: Met This Shift  10/29/2021 1450 by Duy Zarate RN  Outcome: Ongoing  10/29/2021 1450 by Duy Zarate RN  Outcome: Ongoing  Goal: Absence of physical injury  Description: Absence of physical injury  10/30/2021 0105 by Marilyn Mckenzie RN  Outcome: Met This Shift  10/29/2021 1450 by Duy Zarate RN  Outcome: Ongoing  10/29/2021 1450 by Duy Zarate RN  Outcome: Ongoing Norma Villar  [facility]  ENDOCRIN [address]  Scheduled Appointment: 11/30/2023    Neel Cruz  [facility]  CARDIOLOGY [address]  Scheduled Appointment: 12/08/2023

## 2023-11-16 NOTE — PROGRESS NOTE ADULT - PROBLEM/PLAN-6
within functional limits
DISPLAY PLAN FREE TEXT
within functional limits
DISPLAY PLAN FREE TEXT
DISPLAY PLAN FREE TEXT

## 2023-11-16 NOTE — PROGRESS NOTE ADULT - PROBLEM SELECTOR PROBLEM 3
YADI (acute kidney injury)
YADI (acute kidney injury)
Mitral stenosis
YADI (acute kidney injury)
YADI (acute kidney injury)
Mitral stenosis
Mitral stenosis

## 2023-11-16 NOTE — PROGRESS NOTE ADULT - SUBJECTIVE AND OBJECTIVE BOX
[facility], Division of Infectious Diseases  ANU Rodriguez Y. Patel, S. Shah, G. [facility]  [phone]    Name: SANJUANA TORRES  Age: 78y  Gender: Female  MRN: [id number]    Interval History:  Patient seen and examined at bedside   No acute overnight events. Afebrile  No complaints  Notes reviewed    Antibiotics:      Medications:  acetaminophen     Tablet .. 650 milliGRAM(s) Oral every 6 hours PRN  albuterol/ipratropium for Nebulization 3 milliLiter(s) Nebulizer every 6 hours  atorvastatin 40 milliGRAM(s) Oral at bedtime  furosemide   Injectable 40 milliGRAM(s) IV Push <User Schedule>  heparin   Injectable 5000 Unit(s) SubCutaneous every 12 hours  levothyroxine 88 MICROGram(s) Oral daily  melatonin 3 milliGRAM(s) Oral at bedtime PRN  metoprolol tartrate 50 milliGRAM(s) Oral every 8 hours  nicotine -   7 mG/24Hr(s) Patch 1 Patch Transdermal daily  ondansetron Injectable 4 milliGRAM(s) IV Push every 8 hours PRN  predniSONE   Tablet 20 milliGRAM(s) Oral daily      Review of Systems:  Review of systems otherwise negative except as previously noted.    Allergies: No Known Allergies    For details regarding the patient's past medical history, social history, family history, and other miscellaneous elements, please refer the initial infectious diseases consultation and/or the admitting history and physical examination for this admission.    Objective:  Vitals:   T(C): 36.4 (11-16-23 @ 05:05), Max: 36.7 (11-15-23 @ 20:54)  HR: 58 (11-16-23 @ 08:05) (57 - 79)  BP: 132/67 (11-16-23 @ 05:05) (125/64 - 134/68)  RR: 18 (11-16-23 @ 05:05) (18 - 19)  SpO2: 93% (11-16-23 @ 08:05) (92% - 99%)    Physical Examination:  General: no acute distress  HEENT: NC/AT, EOMI,   Cardio: RRR  Resp: decreased b/l breath sounds  Abd: soft, NT, ND  Ext: no edema or cyanosis  Skin: warm, dry, no visible rash      Laboratory Studies:  CBC:                       11.7   10.48 )-----------( 309      ( 16 Nov 2023 06:58 )             34.8     CMP: 11-16    135  |  96  |  37<H>  ----------------------------<  96  4.3   |  35<H>  |  0.98    Ca    8.6      16 Nov 2023 06:58  Mg     1.8     11-15        Urinalysis Basic - ( 16 Nov 2023 06:58 )    Color: x / Appearance: x / SG: x / pH: x  Gluc: 96 mg/dL / Ketone: x  / Bili: x / Urobili: x   Blood: x / Protein: x / Nitrite: x   Leuk Esterase: x / RBC: x / WBC x   Sq Epi: x / Non Sq Epi: x / Bacteria: x        Microbiology: reviewed    Culture - Body Fluid with Gram Stain (collected 11-11-23 @ 14:30)  Source: Pleural Fl Pleural Fluid  Gram Stain (11-11-23 @ 22:19):    polymorphonuclear leukocytes seen    No organisms seen    by cytocentrifuge  Preliminary Report (11-12-23 @ 19:04):    No growth    Culture - Urine (collected 11-09-23 @ 13:40)  Source: Clean Catch Clean Catch (Midstream)  Final Report (11-12-23 @ 23:24):    >100,000 CFU/ml Klebsiella pneumoniae  Organism: Klebsiella pneumoniae (11-12-23 @ 23:24)  Organism: Klebsiella pneumoniae (11-12-23 @ 23:24)      -  Levofloxacin: S <=0.5      -  Tobramycin: S <=2      -  Nitrofurantoin: I 64 Should not be used to treat pyelonephritis      -  Aztreonam: S <=4      -  Gentamicin: S <=2      -  Cefazolin: S <=2 For uncomplicated UTI with K. pneumoniae, E. coli, or P. mirablis: RADHA <=16 is sensitive and RADHA >=32 is resistant. This also predicts results for oral agents cefaclor, cefdinir, cefpodoxime, cefprozil, cefuroxime axetil, cephalexin and locarbef for uncomplicated UTI. Note that some isolates may be susceptible to these agents while testing resistant to cefazolin.      -  Cefepime: S <=2      -  Piperacillin/Tazobactam: S <=8      -  Ciprofloxacin: S <=0.25      -  Imipenem: S <=1      -  Ceftriaxone: S <=1      -  Ampicillin: R >16 These ampicillin results predict results for amoxicillin      Method Type: RADHA      -  Meropenem: S <=1      -  Ampicillin/Sulbactam: S 8/4      -  Cefoxitin: S <=8      -  Cefuroxime: S 8      -  Amoxicillin/Clavulanic Acid: S <=8/4      -  Trimethoprim/Sulfamethoxazole: S 1/19      -  Ertapenem: S <=0.5    Culture - Blood (collected 11-09-23 @ 12:00)  Source: .Blood Blood-Peripheral  Final Report (11-14-23 @ 19:00):    No growth at 5 days    Culture - Blood (collected 11-09-23 @ 11:40)  Source: .Blood Blood-Peripheral  Final Report (11-14-23 @ 19:00):    No growth at 5 days          Radiology: reviewed

## 2023-11-16 NOTE — PROGRESS NOTE ADULT - NS ATTEND AMEND GEN_ALL_CORE FT
78-year-old female with history of COPD (not on home oxygen), nicotine dependence, hypertension, hyperlipidemia, and hypothyroidism here with worsening shortness of breath.    - found to have bilateral pleural effusions, now s/p thoracentesis on right  - breathing improved, though remains on 02  - HR with SB in 50's overnight  - echo with normal LV function, with severe MS (known moderate MS in the past)  - Continue Lopressor 50mg TID for now  - cont IV lasix today  -the fact that she is in sr argues against severe MS  - Will repeat TTE once euvolemic and HR remains well controlled to re-assess mitral valve gradients.
Russell is a 78-year-old female with history of COPD (not on home oxygen), nicotine dependence, hypertension, hyperlipidemia, and hypothyroidism here with worsening shortness of breath.    - found to have bilateral pleural effusions, now s/p thoracentesis on right  - breathing notably better today, though remains on 02  - HR with SB in 50's overnight  - echo with normal LV function, with severe MS (known moderate MS in the past)  - Continue Lopressor 50mg TID for now  - cont IV lasix today  - Will repeat TTE once euvolemic and HR remains well controlled to re-assess mitral valve gradients
78-year-old female with history of COPD (not on home oxygen), nicotine dependence, hypertension, hyperlipidemia, and hypothyroidism here with worsening shortness of breath.    - found to have bilateral pleural effusions, now s/p thoracentesis on right  - breathing improved, though remains on 02  - HR with SB in 50's overnight  - echo with normal LV function, with severe MS (known moderate MS in the past)  - Continue Lopressor 50mg TID for now  - cont IV lasix today  -the fact that she is in sr argues against severe MS  - Will repeat TTE once euvolemic and HR remains well controlled to re-assess mitral valve gradients.
78-year-old female with history of COPD (not on home oxygen), nicotine dependence, hypertension, hyperlipidemia, and hypothyroidism here with worsening shortness of breath.    - found to have bilateral pleural effusions, now s/p thoracentesis on right  - breathing improved, though remains on 02  - HR with SB in 50's overnight  - echo with normal LV function, with severe MS (known moderate MS in the past)  - Continue Lopressor 50mg TID for now  - change iv lasix to 40 po daily  - Will repeat TTE once euvolemic and HR remains well controlled to re-assess mitral valve gradients.  - outpatient fu with Dr. Cruz
Russell is a 78-year-old female with history of COPD (not on home oxygen), nicotine dependence, hypertension, hyperlipidemia, and hypothyroidism here with worsening shortness of breath.    - found to have bilateral pleural effusions, now s/p thoracentesis on right  - breathing notably better today, though remains on 02  - HR 80's from 130s, all sinus  - echo with normal LV function, with severe MS (known moderate MS in the past)  - titrate up BB as able, would plan to increase to 50 q8hr  - cont iv lasix today  - trend Hb, as drop noted this am

## 2023-11-16 NOTE — CHART NOTE - NSCHARTNOTEFT_GEN_A_CORE
Called by RN, patient tachycardic at 150. Remote tele showing sinus tachycardia at 115, range 90s - 130s, SpO2 97%. Patient seen and examined at bedside. Patient states that she has worsening SOB and wheezing. Denies fever, chills, headache, chest pain, palpitations, dizziness, lightheadedness, n/v.     Physical Exam:  Gen: fatigue appearing, in mild distress  Cardio: tachycardic, +s1s2, no murmurs, rubs, or gallops  Pulm: inspiratory wheezing throughout B/L lung fields, breathing labored, 2LNC in place   Abdomen: soft, nontender, nondistended, no guarding  Extremities: no cyanosis or edema  Neuro: A&Ox3, answers questions and follows commands appropriately  Skin: warm and dry      Assessment/Plan: 78-year-old female with history of COPD (not on home oxygen), hypertension, hyperlipidemia, and hypothyroidism presents to the ED for back pain, admitted for sepsis sec to suspected CAP and acute COPD exacerbation    - Budesonide nebulizer ordered.   - Patient to receive IV solumedrol in AM.     - RN to call with changes.
Nutrition note: D/c order noted. Provided pt with ways to help increase calories for when she goes home. Pt requested, as she feels she most likely has been losing wt. Provided verbal and written information. Name and number of RD provided to pt and pt's daughter.
Patient needs home oxygen due to hypoxia related to COPD. Patient requires portable oxygen concentrator machine as she is mobile within the home.

## 2023-11-16 NOTE — PROGRESS NOTE ADULT - PROBLEM SELECTOR PLAN 4
Chronic, stable  - on home losartan, hold in the setting of YADI  - on home metoprolol, c/w
- Resolved  - Holding home losartan  - Nephrology following
- hold home losartan  - Nephrology following  - Resolved
- Resolved  - Holding home losartan  - Nephrology following

## 2023-11-16 NOTE — DISCHARGE NOTE PROVIDER - HOSPITAL COURSE
ADMISSION H+P:    HPI:  78-year-old female with history of COPD (not on home oxygen), nicotine dependence, hypertension, hyperlipidemia, and hypothyroidism presents to the ED for back pain.  Patient reports that for the past 2-3 weeks, she has been having pain located in the midline of her back.  At the time, patient went to an urgent care and was diagnosed with a urinary tract infection.  Patient was treated with antibiotics but the pain did not go away.  Patient wanted to go back to the urgent care today for repeat evaluation, but decided she wanted further workup in the ED.  Patient endorses worsened dyspnea on exertion. Patient denies fever, N/V/D/C, increased sputum, increased purulence, past history of CHF, past history of COPD exacerbation.       In the ED pts VS were T(C): 36.7  T(F): 98.1  HR: 94  BP: 112/60  RR: 20  SpO2: 94%, currently on NC  Labs show: H.8  WBC: 13.52  Plt: 258  Creatinine: 2.00  VQ scan shows no evidence of PE  EKG shows sinus bradycardia    Patient received fluids, ceftriaxone, azithromycin, duonebs, solu-medrol in the ED.    Pt is admitted for further workup and management   (2023 23:21)      ---  HOSPITAL COURSE: Patient admitted for sepsis secondary to suspected CAP and acute COPD exacerbation. Hospital course complicated by hypervolemia. Patient had R sided pleural effusion, which was drained on  with 500 cc drained of transudate. Patient treated with IV Lasix and transitioned to PO upon discharge. Patient was treated with shourt course of Prednisone. Home oxygen was arranged prior to discharge. TTE 23 with severe mitral stenosis (previous TTE in  showed moderate MS), EF 69%. Discussed with patient and daughter to follow up with cardio Dr. Cruz after discharge.    Patient was medically optimized and improved clinically throughout hospital course. Patient seen and examined on day of discharge.    Vital Signs  T(C): 36.4 (2023 05:05), Max: 36.7 (15 Nov 2023 20:54)  T(F): 97.5 (2023 05:05), Max: 98 (15 Nov 2023 20:54)  HR: 58 (2023 08:05) (57 - 79)  BP: 132/67 (2023 05:05) (125/64 - 134/68)  RR: 18 (2023 05:05) (18 - 19)  SpO2: 93% (2023 08:05) (92% - 99%)    Physical Exam:  General: well-developed, well-nourished, NAD  HEENT: normocephalic, atraumatic, EOMI, moist mucous membranes   Neck: supple, non-tender, no masses  Neurology: AAOx3, sensation intact  Respiratory: clear to auscultation bilaterally; no wheezes, rhonchi, or rales  CV: regular rate and rhythm, soft S1/S2, no murmurs, rubs, or gallops  Abdominal: soft, non-tender, non-distended, bowel sounds present  Extremities: no clubbing, cyanosis, or edema; palpable peripheral pulses  Musculoskeletal: no joint erythema or warmth, no joint swelling   Skin: warm, dry, normal color    Patient is medically stable for discharge to home with outpatient follow up.  ---  CONSULTANTS:   Cardio: [facility]    ---  TIME SPENT:   I, the attending physician, was physically present for the key portions of the evaluation and management (E/M) service provided. The total amount of time spent reviewing the hospital course, laboratory values, imaging findings, assessing/counseling the patient, discussing with consultant physicians, social work, nursing staff was 35 minutes.     ---  FINAL DISCHARGE DIAGNOSIS LIST:  Please see last daily progress note for final discharge diagnoses

## 2023-11-16 NOTE — PROGRESS NOTE ADULT - PROBLEM SELECTOR PROBLEM 7
Smoking trying to quit
HLD (hyperlipidemia)
Smoking trying to quit
Smoking trying to quit
HLD (hyperlipidemia)
HLD (hyperlipidemia)
Smoking trying to quit

## 2023-11-16 NOTE — PROGRESS NOTE ADULT - PROBLEM SELECTOR PLAN 3
Complicated by hypervolemia  - IV Lasix diuresis, will give extra dose tonight per cardio - discussed with nephro  - TTE 11/11/23 with severe mitral stenosis (previous TTE in 2022 showed moderate MS), EF 69%  - Possibly will repeat TTE when euvolemic to look at gradients - discussed with patient and daughter to follow up with cardio Dr. Cruz after discharge  - Cardiology following

## 2023-11-16 NOTE — PROGRESS NOTE ADULT - PROBLEM SELECTOR PLAN 7
- current smoker, 30 pack year history  - trying to quit, currently on Varenicline ( not available inpatient)  - start nicotine patch if pt agreeable  -smoking cessation counseling discussed, pt amendable approx 8 min spent
Chronic, stable  - Continue home statin
- current smoker, 30 pack year history  - trying to quit, currently on Varenicline ( not available inpatient)  - start nicotine patch if pt agreeable  -smoking cessation counseling discussed, pt amendable approx 8 min spent
Chronic, stable  - on home statin, c/w
- current smoker, 30 pack year history  - trying to quit, currently on Varenicline ( not available inpatient)  - start nicotine patch if pt agreeable  -smoking cessation counseling discussed, pt amendable approx 8 min spent
- current smoker, 30 pack year history  - trying to quit, currently on Varenicline ( not available inpatient)  - start nicotine patch if pt agreeable  -smoking cessation counseling discussed, pt amendable approx 8 min spent
Chronic, stable  - Continue home statin

## 2023-11-16 NOTE — PROGRESS NOTE ADULT - PROBLEM SELECTOR PLAN 8
Current smoker, 30 pack year history  - trying to quit, currently on Varenicline (not available inpatient)  - continue nicotine patch

## 2023-11-16 NOTE — PROGRESS NOTE ADULT - PROBLEM SELECTOR PLAN 5
Chronic, stable  - C/w home levothyroxine dosing  - f/u TSH AM
Chronic, stable  - C/w home levothyroxine dosing  - f/u TSH AM
Chronic, stable  - on home losartan, held in the setting of YADI  - continue home metoprolol
Chronic, stable  - C/w home levothyroxine dosing  - f/u TSH AM
Chronic, stable  - on home losartan, held in the setting of YADI  - continue home metoprolol
Chronic, stable  - on home losartan, hold in the setting of YADI  - on home metoprolol, c/w
Chronic, stable  - C/w home levothyroxine dosing  - f/u TSH AM

## 2023-11-16 NOTE — PROGRESS NOTE ADULT - PROBLEM SELECTOR PROBLEM 4
YADI (acute kidney injury)
HTN (hypertension)
YADI (acute kidney injury)
HTN (hypertension)
YADI (acute kidney injury)

## 2023-11-16 NOTE — PROGRESS NOTE ADULT - REASON FOR ADMISSION
back pain

## 2023-11-16 NOTE — PROGRESS NOTE ADULT - PROBLEM SELECTOR PROBLEM 8
Smoking trying to quit
Smoking trying to quit
Need for prophylactic measure
Smoking trying to quit

## 2023-11-16 NOTE — DISCHARGE NOTE PROVIDER - NSDCCPCAREPLAN_GEN_ALL_CORE_FT
PRINCIPAL DISCHARGE DIAGNOSIS  Diagnosis: Shortness of breath  Assessment and Plan of Treatment: Start Furosemide 40 mg daily. Please follow up with your cardiologist within 1 week of discharge.   SEEK IMMEDIATE MEDICAL CARE IF YOU HAVE ANY OF THE FOLLOWING SYMPTOMS: shortness of breath, change in mental status, chest pain, lightheadedness/dizziness/fainting, or worsening of symptoms including not being able to conduct normal physical activity.        SECONDARY DISCHARGE DIAGNOSES  Diagnosis: COPD exacerbation  Assessment and Plan of Treatment: Continue to take Prednisone 20 mg daily, last dose 11/18. Please return to the ED should you have symptoms such as, but not limited to, severe shortness of breath, wheezing, cough, coughing up blood, vomiting associated with cough, chest pain. Follow up with your PCP and/or pulmonologist.      Diagnosis: HTN (hypertension)  Assessment and Plan of Treatment: START Metoprolol Tartrate 50 mg twice daily. CONTINUE Losartan 25 mg daily.

## 2023-11-16 NOTE — PROGRESS NOTE ADULT - ASSESSMENT
78-year-old female with history of COPD (not on home oxygen), hypertension, hyperlipidemia, and hypothyroidism presents to the ED for back pain.  Patient reports that for the past 2-3 weeks, she has been having pain located in the midline of her back.     pain  weakness  copd  HTN  HLD  thyroid disease  eval for PNA  HFpEF  Ex Smoker  Valv heart disease    s/p thoracentesis - 500 cc drained - PATH NEG  vs noted  on lasix  on nebs  on steroids  CHF eval - mitral valve disease  Home o2 support - CM follow up    ct chest  bronchodilators  systemic steroids  ABG noted  fio2 titration - goal sat > 88 pct  I curt  monitor VS and HD and Sat  VQ scan NEG for PE  BP control  nutrition  outpatient records reviewed - follows with Dr Fay Muir - [facility] pul practice

## 2023-11-16 NOTE — PROGRESS NOTE ADULT - SUBJECTIVE AND OBJECTIVE BOX
[facility] -- Mateo Bobby Pannella, Patel, Savella Goodger, Cohen  Office # [phone]      Follow Up:    Shortness of breath     Subjective/Observations:   No events overnight resting comfortably in bed.  No complaints of chest pain, dyspnea, or palpitations reported. No signs of orthopnea or PND.  now on room air     REVIEW OF SYSTEMS: All other review of systems is negative unless indicated above    PAST MEDICAL & SURGICAL HISTORY:  History of COPD  No home O2 use      Asthma      Thyroid nodule      Hyperlipidemia      Hypertension      Hypothyroidism      History of cholecystectomy        History of repair of hip fracture  ORIF .  Hardware was eventually removed          MEDICATIONS  (STANDING):  albuterol/ipratropium for Nebulization 3 milliLiter(s) Nebulizer every 6 hours  atorvastatin 40 milliGRAM(s) Oral at bedtime  furosemide   Injectable 40 milliGRAM(s) IV Push <User Schedule>  heparin   Injectable 5000 Unit(s) SubCutaneous every 12 hours  levothyroxine 88 MICROGram(s) Oral daily  metoprolol tartrate 50 milliGRAM(s) Oral every 8 hours  nicotine -   7 mG/24Hr(s) Patch 1 Patch Transdermal daily  predniSONE   Tablet 20 milliGRAM(s) Oral daily    MEDICATIONS  (PRN):  acetaminophen     Tablet .. 650 milliGRAM(s) Oral every 6 hours PRN Temp greater or equal to 38C (100.4F), Mild Pain (1 - 3)  melatonin 3 milliGRAM(s) Oral at bedtime PRN Insomnia  ondansetron Injectable 4 milliGRAM(s) IV Push every 8 hours PRN Nausea and/or Vomiting      Allergies    No Known Allergies    Intolerances        Vital Signs Last 24 Hrs  T(C): 36.4 (2023 05:05), Max: 36.7 (15 Nov 2023 20:54)  T(F): 97.5 (2023 05:05), Max: 98 (15 Nov 2023 20:54)  HR: 58 (2023 08:05) (57 - 79)  BP: 132/67 (2023 05:05) (125/64 - 134/68)  BP(mean): --  RR: 18 (2023 05:05) (18 - 19)  SpO2: 93% (2023 08:05) (92% - 99%)    Parameters below as of 2023 08:05  Patient On (Oxygen Delivery Method): nasal cannula        I&O's Summary        PHYSICAL EXAM:  TELE: Sb 58-Sr   Constitutional: NAD, awake and alert, well-developed  HEENT: Moist Mucous Membranes, Anicteric  Pulmonary: Non-labored, breath sounds are diminished   Cardiovascular: Regular, S1 and S2 nl, murmur   Gastrointestinal: Bowel Sounds present, soft, nontender.   Lymph: No lymphadenopathy. No peripheral edema.  Skin: No visible rashes or ulcers.  Psych:  Mood & affect appropriate    LABS: All Labs Reviewed:                        11.7   1048 )-----------( 309      ( 2023 06:58 )             34.8                         11.2   11.26 )-----------( 325      ( 15 Nov 2023 07:40 )             33.2                         11.1   9.93  )-----------( 308      ( 2023 08:00 )             32.5     2023 06:58    135    |  96     |  37     ----------------------------<  96     4.3     |  35     |  0.98   15 Nov 2023 07:40    139    |  99     |  35     ----------------------------<  93     4.7     |  36     |  1.00   2023 08:00    139    |  102    |  41     ----------------------------<  96     4.6     |  32     |  1.10     Ca    8.6        2023 06:58  Ca    8.7        15 Nov 2023 07:40  Ca    8.8        2023 08:00  Phos  2.7       2023 08:00  Mg     1.8       15 Nov 2023 07:40  Mg     2.0       2023 08:00    TPro  7.0    /  Alb  3.1    /  TBili  0.4    /  DBili  x      /  AST  27     /  ALT  41     /  AlkPhos  76     2023 08:00             EC Lead ECG:   Ventricular Rate 122 BPM    Atrial Rate 122 BPM    P-R Interval 160 ms    QRS Duration 90 ms    Q-T Interval 316 ms    QTC Calculation(Bazett) 450 ms    P Axis 56 degrees    R Axis 24 degrees    T Axis 61 degrees    Diagnosis Line Sinus tachycardia  Possible Left atrial enlargement  Low voltage QRS  Borderline ECG  No previous ECGs available  Confirmed by LEI CASRAEZ ([id number]) on 2023 8:41:03 PM (23 @ 11:53)      TRANSTHORACIC ECHOCARDIOGRAM REPORT  ________________________________________________________________________________                                      _______       Pt. Name:       SANJUANA TORRES Study Date:    11/10/2023  MRN:            [id number]     YOB: 1945  Accession #:    [id number]    Age:           78 years  Account#:       [id number]   Gender:        F  Heart Rate:                  Height:        62.99 in (160.00 cm)  Rhythm:                      Weight:        121.25 lb (55.00 kg)  Blood Pressure: 114/66 mmHg  BSA/BMI:       1.56 m² / 21.48 kg/m²  ________________________________________________________________________________________  Referring Physician:    [id number] Stu Tolentino  Interpreting Physician: Chantel Salguero MD  Primary Sonographer:    Ita Dwyer [facility]    CPT:               ECHO TTE WO CON COMP W DOPP - 03672.m  Indication(s):     Dyspnea, unspecified - R06.00  Procedure:         Transthoracic echocardiogram with 2-D, M-mode and complete                     spectral and color flow Doppler.  Ordering Location: [facility]    _______________________________________________________________________________________     CONCLUSIONS:      1. Left ventricular systolic function is hyperdynamic withan ejection fraction of 69 % by Shepard's method of disks.   2. Mild to moderate left ventricular hypertrophy.   3. Normal right ventricular cavity size, wall thickness, and systolic function.   4. The left atrium is severely dilated in size.   5. The right atrium is normal in size.   6. The mitral valve leaflets are calcified. Severe mitral valve stenosis is present with a mean transmitral valve gradient = 20 mmHg at a HR of 89bpm.   7. Mild mitral regurgitation.   8. The aortic valve appears calcified with grossly normal opening.   9. Trace tricuspid regurgitation.  10. Trace pulmonic regurgitation.  11. The inferior vena cava is normal in size measuring 1.80 cm in diameter, (normal <2.1cm) with normal inspiratory collapse (normal >50%) consistent with normal right atrial pressure (~3, range 0-5mmHg).  12. Trace pericardial effusion.    ________________________________________________________________________________________  FINDINGS:     Left Ventricle:  Left ventricular systolic function is hyperdynamic with a calculated ejection fraction of 69 % by the Shepard's biplane method of disks. Mild to moderate left ventricular hypertrophy.     Right Ventricle:  The right ventricular cavity is normal in size, normal wall thickness and normal systolic function.     Left Atrium:  The left atrium is severely dilated in size with an indexed volume of 83.16 ml/m².     Right Atrium:  The right atrium is normal in size with an indexed volume of 16.63 ml/m².     Aortic Valve:  There is mild calcification of the aortic valve leaflets. The aortic valve appears calcified with grossly normal opening.     Mitral Valve:  There is diffuse mitral valve thickening. There is reduced leaflet mobility of the mitral valve. The mitral valve leaflets are calcified. Severe mitral valve stenosis is present with a mean transmitral valve gradient = 20 mmHg at a HR of 89bpm. There is severe leaflet calcification. There is severe mitral valve stenosis. The transmitral peak gradient is 36.7 mmHg and mean gradient is 23.00 mmHg. There is mild mitral regurgitation.     Tricuspid Valve:  There is trace tricuspid regurgitation. Estimated pulmonary artery systolic pressure is 45 mmHg.     Pulmonic Valve:  Structurally normal pulmonic valve with normal leaflet excursion. There is trace pulmonic regurgitation.     Aorta:  The aortic root at the sinuses of Valsalva is normal in size, measuring 3.00 cm (indexed 1.92 cm/m²).     Pericardium:  There is a trace pericardial effusion.     Pleura:  Moderate bilateral pleural effusion noted.     Systemic Veins:  The inferior vena cava is normal in size measuring 1.80 cm in diameter, (normal <2.1cm) with normal inspiratory collapse (normal >50%) consistent with normal right atrial pressure (~3, range 0-5mmHg).  ____________________________________________________________________  Quantitative Data:  Left Ventricle Measurements: (Indexed to BSA)     IVSd (2D):   0.7 cm  LVPWd (2D):  1.2 cm  LVIDd (2D):  3.7 cm  LVIDs (2D):  2.6 cm  LV Mass:     101 g  64.4 g/m²  LV Vol d, MOD A2C: 63.9 ml 40.88 ml/m²  LV Vol d, MOD A4C: 53.5 ml 34.23 ml/m²  LV Vol d, MOD BP:  59.5 ml 38.07 ml/m²  LV Vol s, MOD A2C: 20.0 ml 12.79 ml/m²  LV Vol s, MOD A4C: 16.4 ml 10.49 ml/m²  LV Vol s, MOD BP:  18.6 ml 11.89 ml/m²  LVOT SV MOD BP:    40.9 ml  LV EF% MOD BP:     69 %     MV E Vmax:    2.64 m/s  MV A Vmax:    2.88 m/s  MV E/A:       0.92  e' lateral:   9.14 cm/s  e' medial:    6.85 cm/s  E/e' lateral: 28.88  E/e' medial:  38.54  E/e' Average: 33.02  MV DT:        197 msec    Aorta Measurements: (normal range) (Indexed to BSA)     Sinuses of Valsalva: 3.00 cm (2.7 - 3.3 cm)       Left Atrium Measurements: (Indexed to BSA)  LA Diam 2D: 4.20 cm    Right Atrial Measurements:     RA Vol:       26.00 ml  RA Vol Index: 16.63 ml/m²       LVOT / RVOT/ Qp/Qs Data: (Indexed to BSA)  LVOT Vmax: 1.57 m/s  LVOT VTI:  24.40 cm    Aortic Valve Measurements:  AV Vmax:          1.9 m/s  AV Peak Gradient: 14.1 mmHg  AV Mean Gradient: 8.0 mmHg  AV VTI:           29.7 cm  AV VTI Ratio:     0.82    Mitral Valve Measurements:     MV Vmax:      3.03 m/s     MR Vmax:          3.85 m/s  MV Mean Grad: 23.00 mmHg   MR Peak Gradient: 59.3 mmHg  MV Peak Grad: 36.7 mmHg  MV E Vmax:    2.6 m/s  MV A Vmax:    2.9 m/s  MV E/A:       0.9      Tricuspid Valve Measurements:     TR Vmax:          3.2 m/s  TR Peak Gradient: 42.0 mmHg  RA Pressure:      3 mmHg  PASP:             45 mmHg    ________________________________________________________________________________________  Electronically signed on 2023 at 10:34:50 AM by Chantel Salguero MD         *** Final ***      Radiology:

## 2023-11-16 NOTE — DISCHARGE NOTE PROVIDER - CARE PROVIDER_API CALL
Neel Cruz  Cardiovascular Disease  [address] [place] [address]  Phone: [phone]  Fax: [phone]  Follow Up Time:

## 2023-11-16 NOTE — PROGRESS NOTE ADULT - PROVIDER SPECIALTY LIST ADULT
Cardiology
Cardiology
Pulmonology
Cardiology
Infectious Disease
Internal Medicine
Nephrology
Nephrology
Pulmonology
Cardiology
Infectious Disease
Infectious Disease
Nephrology
Pulmonology
Cardiology
Nephrology
Pulmonology
Pulmonology
Hospitalist
Internal Medicine
Hospitalist

## 2023-11-16 NOTE — PROGRESS NOTE ADULT - SUBJECTIVE AND OBJECTIVE BOX
Date/Time Patient Seen:  		  Referring MD:   Data Reviewed	       Patient is a 78y old  Female who presents with a chief complaint of back pain (15 Nov 2023 10:53)      Subjective/HPI     PAST MEDICAL & SURGICAL HISTORY:  History of COPD  No home O2 use    Asthma    Thyroid nodule    Hyperlipidemia    Hypertension    Hypothyroidism    History of cholecystectomy  1989    History of repair of hip fracture  ORIF 1982.  Hardware was eventually removed          Medication list         MEDICATIONS  (STANDING):  albuterol/ipratropium for Nebulization 3 milliLiter(s) Nebulizer every 6 hours  atorvastatin 40 milliGRAM(s) Oral at bedtime  furosemide   Injectable 40 milliGRAM(s) IV Push <User Schedule>  heparin   Injectable 5000 Unit(s) SubCutaneous every 12 hours  levothyroxine 88 MICROGram(s) Oral daily  metoprolol tartrate 50 milliGRAM(s) Oral every 8 hours  nicotine -   7 mG/24Hr(s) Patch 1 Patch Transdermal daily  predniSONE   Tablet 20 milliGRAM(s) Oral daily    MEDICATIONS  (PRN):  acetaminophen     Tablet .. 650 milliGRAM(s) Oral every 6 hours PRN Temp greater or equal to 38C (100.4F), Mild Pain (1 - 3)  melatonin 3 milliGRAM(s) Oral at bedtime PRN Insomnia  ondansetron Injectable 4 milliGRAM(s) IV Push every 8 hours PRN Nausea and/or Vomiting         Vitals log        ICU Vital Signs Last 24 Hrs  T(C): 36.7 (15 Nov 2023 20:54), Max: 36.7 (15 Nov 2023 20:54)  T(F): 98 (15 Nov 2023 20:54), Max: 98 (15 Nov 2023 20:54)  HR: 57 (16 Nov 2023 01:25) (57 - 79)  BP: 125/64 (15 Nov 2023 20:54) (125/62 - 134/68)  BP(mean): --  ABP: --  ABP(mean): --  RR: 18 (15 Nov 2023 20:54) (18 - 19)  SpO2: 92% (16 Nov 2023 01:25) (92% - 99%)    O2 Parameters below as of 16 Nov 2023 01:25  Patient On (Oxygen Delivery Method): nasal cannula, 3L                 Input and Output:  I&O's Detail      Lab Data                        11.2   11.26 )-----------( 325      ( 15 Nov 2023 07:40 )             33.2     11-15    139  |  99  |  35<H>  ----------------------------<  93  4.7   |  36<H>  |  1.00    Ca    8.7      15 Nov 2023 07:40  Phos  2.7     11-14  Mg     1.8     11-15    TPro  7.0  /  Alb  3.1<L>  /  TBili  0.4  /  DBili  x   /  AST  27  /  ALT  41  /  AlkPhos  76  11-14            Review of Systems	      Objective     Physical Examination    heart s1s2  lung dc bS  head nc      Pertinent Lab findings & Imaging      Chiki:  NO   Adequate UO     I&O's Detail           Discussed with:     Cultures:	        Radiology

## 2023-11-16 NOTE — DISCHARGE NOTE PROVIDER - NSDCMRMEDTOKEN_GEN_ALL_CORE_FT
Albuterol (Eqv-ProAir HFA) 90 mcg/inh inhalation aerosol: 2 puff(s) inhaled 3 times a day as needed for  bronchospasm  atorvastatin 40 mg oral tablet: 1 tab(s) orally once a day AM  Breztri Aerosphere 160 mcg-9 mcg-4.8 mcg/inh inhalation aerosol: 2 puff(s) inhaled once a day  furosemide 20 mg oral tablet: 1 tab(s) orally once a day *Per patient takes 20mg Tues, Thurs, Sat, Sun. Takes 40mg MWF  furosemide 40 mg oral tablet: 1 tab(s) orally 3 times a week *Per patient takes 20mg Tues, Thurs, Sat, Sun. 40mg MWF  levothyroxine 88 mcg (0.088 mg) oral tablet: 1 tab(s) orally once a day  losartan 25 mg oral tablet: 1 tab(s) orally once a day AM  Metoprolol Succinate ER 25 mg oral tablet, extended release: 1 tab(s) orally once a day AM   Albuterol (Eqv-ProAir HFA) 90 mcg/inh inhalation aerosol: 2 puff(s) inhaled 3 times a day as needed for  bronchospasm  atorvastatin 40 mg oral tablet: 1 tab(s) orally once a day (at bedtime)  Breztri Aerosphere 160 mcg-9 mcg-4.8 mcg/inh inhalation aerosol: 2 puff(s) inhaled once a day  Lasix 40 mg oral tablet: 1 tab(s) orally once a day  levothyroxine 88 mcg (0.088 mg) oral tablet: 1 tab(s) orally once a day  losartan 25 mg oral tablet: 1 tab(s) orally once a day AM  metoprolol tartrate 50 mg oral tablet: 1 tab(s) orally 2 times a day  nicotine 7 mg/24 hr transdermal film, extended release: 1 patch transdermal once a day  predniSONE 20 mg oral tablet: 1 tab(s) orally once a day

## 2023-11-16 NOTE — PROGRESS NOTE ADULT - SUBJECTIVE AND OBJECTIVE BOX
SANJUANA TORRES  78y  Female      Patient is a 78y old  Female who presents with a chief complaint of back pain (10 Nov 2023 15:23)      INTERVAL HPI/OVERNIGHT EVENTS: Patient is a 79 y/o F w. PMHx of COPD (Not on home O2), Nicotine dependance, HTN, HLD, and hypothyroidism who presents with complaint of back pain for ~2-3 week. Patient describes the pain as a band across her lower back. She went to [facility] initially for this back pain and finished a course of Macrobid/Pyridium. The back pain persisted and she subsequently came into [facility] ED. She did not have any specific urinary sx. and denies hematuria, dysuria, etc at the time of the initial back pain and still does not endorse any urinary sx. Her back pain has resolved. Patient also endorsing inc. work of breathing with dyspnea on exertion. Patient admitted for COPD exacerbation and suspected community-acquired PNA, now on Abx. No acute events occurred overnight.   Of note, pt admits to naproxen use 2-3 per week x2 months.    No N/V/SOB      MEDICATIONS  (STANDING):  albuterol/ipratropium for Nebulization 3 milliLiter(s) Nebulizer every 6 hours  atorvastatin 40 milliGRAM(s) Oral at bedtime  furosemide   Injectable 40 milliGRAM(s) IV Push <User Schedule>  heparin   Injectable 5000 Unit(s) SubCutaneous every 12 hours  levothyroxine 88 MICROGram(s) Oral daily  metoprolol tartrate 50 milliGRAM(s) Oral every 8 hours  nicotine -   7 mG/24Hr(s) Patch 1 Patch Transdermal daily  predniSONE   Tablet 20 milliGRAM(s) Oral daily    MEDICATIONS  (PRN):  acetaminophen     Tablet .. 650 milliGRAM(s) Oral every 6 hours PRN Temp greater or equal to 38C (100.4F), Mild Pain (1 - 3)  melatonin 3 milliGRAM(s) Oral at bedtime PRN Insomnia  ondansetron Injectable 4 milliGRAM(s) IV Push every 8 hours PRN Nausea and/or Vomiting      REVIEW OF SYSTEMS:  as above      ICU Vital Signs Last 24 Hrs  T(C): 36.4 (16 Nov 2023 05:05), Max: 36.7 (15 Nov 2023 20:54)  T(F): 97.5 (16 Nov 2023 05:05), Max: 98 (15 Nov 2023 20:54)  HR: 59 (16 Nov 2023 05:05) (57 - 79)  BP: 132/67 (16 Nov 2023 05:05) (125/64 - 134/68)  BP(mean): --  ABP: --  ABP(mean): --  RR: 18 (16 Nov 2023 05:05) (18 - 19)  SpO2: 97% (16 Nov 2023 05:05) (92% - 99%)    O2 Parameters below as of 16 Nov 2023 05:05  Patient On (Oxygen Delivery Method): room air  O2 Flow (L/min): 2            PHYSICAL EXAM:  GENERAL: appears stated age, thin habitus  HEAD:  Atraumatic, Normocephalic  EYES: EOMI, conjunctiva and sclera clear  ENMT: No tonsillar erythema, exudates, or enlargement; Moist mucous membranes  NERVOUS SYSTEM:  Alert & Oriented X3, Good concentration.  HEART: Regular rate and rhythm; No murmurs, rubs, or gallops  EXTREMITIES:  No edema  SKIN: No rashes or lesions    Consultant(s) Notes Reviewed:  [x ] YES  [ ] NO  Care Discussed with Consultants/Other Providers [ x] YES  [ ] NO    LABS:                               11.2   11.26 )-----------( 325      ( 15 Nov 2023 07:40 )             33.2     11-15    139  |  99  |  35<H>  ----------------------------<  93  4.7   |  36<H>  |  1.00    Ca    8.7      15 Nov 2023 07:40  Phos  2.7     11-14  Mg     1.8     11-15    TPro  7.0  /  Alb  3.1<L>  /  TBili  0.4  /  DBili  x   /  AST  27  /  ALT  41  /  AlkPhos  76  11-14      Urinalysis Basic - ( 15 Nov 2023 07:40 )    Color: x / Appearance: x / SG: x / pH: x  Gluc: 93 mg/dL / Ketone: x  / Bili: x / Urobili: x   Blood: x / Protein: x / Nitrite: x   Leuk Esterase: x / RBC: x / WBC x   Sq Epi: x / Non Sq Epi: x / Bacteria: x

## 2023-11-16 NOTE — PROGRESS NOTE ADULT - SUBJECTIVE AND OBJECTIVE BOX
Patient is a 78y old  Female who presents with a chief complaint of back pain (16 Nov 2023 07:25)      INTERVAL HPI/OVERNIGHT EVENTS: Patient seen and examined at bedside. No overnight events. Tolerating diet. Denies fever, chills, chest pain, shortness of breath, abdominal pain, nausea/vomiting, headache.    MEDICATIONS  (STANDING):  albuterol/ipratropium for Nebulization 3 milliLiter(s) Nebulizer every 6 hours  atorvastatin 40 milliGRAM(s) Oral at bedtime  furosemide   Injectable 40 milliGRAM(s) IV Push <User Schedule>  heparin   Injectable 5000 Unit(s) SubCutaneous every 12 hours  levothyroxine 88 MICROGram(s) Oral daily  metoprolol tartrate 50 milliGRAM(s) Oral every 8 hours  nicotine -   7 mG/24Hr(s) Patch 1 Patch Transdermal daily  predniSONE   Tablet 20 milliGRAM(s) Oral daily    MEDICATIONS  (PRN):  acetaminophen     Tablet .. 650 milliGRAM(s) Oral every 6 hours PRN Temp greater or equal to 38C (100.4F), Mild Pain (1 - 3)  melatonin 3 milliGRAM(s) Oral at bedtime PRN Insomnia  ondansetron Injectable 4 milliGRAM(s) IV Push every 8 hours PRN Nausea and/or Vomiting      Allergies    No Known Allergies    Intolerances    Vital Signs Last 24 Hrs  T(C): 36.4 (16 Nov 2023 05:05), Max: 36.7 (15 Nov 2023 20:54)  T(F): 97.5 (16 Nov 2023 05:05), Max: 98 (15 Nov 2023 20:54)  HR: 59 (16 Nov 2023 05:05) (57 - 79)  BP: 132/67 (16 Nov 2023 05:05) (125/64 - 134/68)  BP(mean): --  RR: 18 (16 Nov 2023 05:05) (18 - 19)  SpO2: 97% (16 Nov 2023 05:05) (92% - 99%)    Parameters below as of 16 Nov 2023 05:05  Patient On (Oxygen Delivery Method): room air  O2 Flow (L/min): 2      PHYSICAL EXAM:  GENERAL: NAD  HEENT:  anicteric, moist mucous membranes  CHEST/LUNG:  CTA b/l, no rales, wheezes, or rhonchi  HEART:  RRR, S1, S2  ABDOMEN:  BS+, soft, nontender, nondistended  EXTREMITIES: no edema, cyanosis, or calf tenderness  NERVOUS SYSTEM: answers questions and follows commands appropriately    LABS:                        11.7   10.48 )-----------( 309      ( 16 Nov 2023 06:58 )             34.8     CBC Full  -  ( 16 Nov 2023 06:58 )  WBC Count : 10.48 K/uL  Hemoglobin : 11.7 g/dL  Hematocrit : 34.8 %  Platelet Count - Automated : 309 K/uL  Mean Cell Volume : 92.8 fl  Mean Cell Hemoglobin : 31.2 pg  Mean Cell Hemoglobin Concentration : 33.6 gm/dL  Auto Neutrophil # : x  Auto Lymphocyte # : x  Auto Monocyte # : x  Auto Eosinophil # : x  Auto Basophil # : x  Auto Neutrophil % : x  Auto Lymphocyte % : x  Auto Monocyte % : x  Auto Eosinophil % : x  Auto Basophil % : x    16 Nov 2023 06:58    135    |  96     |  37     ----------------------------<  96     4.3     |  35     |  0.98     Ca    8.6        16 Nov 2023 06:58        Urinalysis Basic - ( 16 Nov 2023 06:58 )    Color: x / Appearance: x / SG: x / pH: x  Gluc: 96 mg/dL / Ketone: x  / Bili: x / Urobili: x   Blood: x / Protein: x / Nitrite: x   Leuk Esterase: x / RBC: x / WBC x   Sq Epi: x / Non Sq Epi: x / Bacteria: x      CAPILLARY BLOOD GLUCOSE            Culture - Body Fluid with Gram Stain (collected 11-11-23 @ 14:30)  Source: Pleural Fl Pleural Fluid  Gram Stain (11-11-23 @ 22:19):    polymorphonuclear leukocytes seen    No organisms seen    by cytocentrifuge  Preliminary Report (11-12-23 @ 19:04):    No growth    Culture - Urine (collected 11-09-23 @ 13:40)  Source: Clean Catch Clean Catch (Midstream)  Final Report (11-12-23 @ 23:24):    >100,000 CFU/ml Klebsiella pneumoniae  Organism: Klebsiella pneumoniae (11-12-23 @ 23:24)  Organism: Klebsiella pneumoniae (11-12-23 @ 23:24)      -  Levofloxacin: S <=0.5      -  Tobramycin: S <=2      -  Nitrofurantoin: I 64 Should not be used to treat pyelonephritis      -  Aztreonam: S <=4      -  Gentamicin: S <=2      -  Cefazolin: S <=2 For uncomplicated UTI with K. pneumoniae, E. coli, or P. mirablis: RADHA <=16 is sensitive and RADHA >=32 is resistant. This also predicts results for oral agents cefaclor, cefdinir, cefpodoxime, cefprozil, cefuroxime axetil, cephalexin and locarbef for uncomplicated UTI. Note that some isolates may be susceptible to these agents while testing resistant to cefazolin.      -  Cefepime: S <=2      -  Piperacillin/Tazobactam: S <=8      -  Ciprofloxacin: S <=0.25      -  Imipenem: S <=1      -  Ceftriaxone: S <=1      -  Ampicillin: R >16 These ampicillin results predict results for amoxicillin      Method Type: RADHA      -  Meropenem: S <=1      -  Ampicillin/Sulbactam: S 8/4      -  Cefoxitin: S <=8      -  Cefuroxime: S 8      -  Amoxicillin/Clavulanic Acid: S <=8/4      -  Trimethoprim/Sulfamethoxazole: S 1/19      -  Ertapenem: S <=0.5    Culture - Blood (collected 11-09-23 @ 12:00)  Source: .Blood Blood-Peripheral  Final Report (11-14-23 @ 19:00):    No growth at 5 days    Culture - Blood (collected 11-09-23 @ 11:40)  Source: .Blood Blood-Peripheral  Final Report (11-14-23 @ 19:00):    No growth at 5 days        RADIOLOGY & ADDITIONAL TESTS:    Personally reviewed.     Consultant(s) Notes Reviewed:  [x] YES  [ ] NO

## 2023-11-16 NOTE — PROGRESS NOTE ADULT - ASSESSMENT
78-year-old female with history of COPD (not on home oxygen), hypertension, hyperlipidemia, and hypothyroidism presents to the ED for back pain, admitted for sepsis sec to suspected CAP and acute COPD exacerbation  ID c/s for Abx guidance on HD#5    Sepsis 2/2 PNA  Pleural effusions parapneumonic vs. empyema  COPD exacerbation  AHRF on NC  YADI on CKD  - started on ceftriaxone/azithromycin  - BCx NGTD, UCx noted as below  - s/p R sided thoracentesis 11/11 w/ removal 500 cc   -- pleural fluid cx NGTD  - completed ceftriaxone/azithromycin x5 day course    Recent Acute Cystitis  - pt s/p UTI tx w/ macrobid as outpatient  - UCx here + K. pneumo I macrobid, S to ceftriaxone    Recommendations:   Monitor off Abx  Trend temps/WBC  O2 as needed  Appreciate pulm recs  Additional care per primary team    Infectious Diseases will continue to follow. Please call with any questions.   Swati Norwood M.D.  [facility] Division of Infectious Diseases [phone]  For after 5 P.M. and weekends, please call [phone]

## 2023-11-16 NOTE — PROGRESS NOTE ADULT - PROBLEM SELECTOR PROBLEM 5
HTN (hypertension)
Hypothyroidism
HTN (hypertension)
Hypothyroidism
HTN (hypertension)

## 2023-11-17 ENCOUNTER — TRANSCRIPTION ENCOUNTER (OUTPATIENT)
Age: 78
End: 2023-11-17

## 2023-11-20 ENCOUNTER — TRANSCRIPTION ENCOUNTER (OUTPATIENT)
Age: 78
End: 2023-11-20

## 2023-11-21 ENCOUNTER — APPOINTMENT (OUTPATIENT)
Dept: PULMONOLOGY | Facility: CLINIC | Age: 78
End: 2023-11-21
Payer: MEDICARE

## 2023-11-21 VITALS
HEIGHT: 61 IN | WEIGHT: 106.56 LBS | HEART RATE: 69 BPM | RESPIRATION RATE: 14 BRPM | BODY MASS INDEX: 20.12 KG/M2 | OXYGEN SATURATION: 96 % | DIASTOLIC BLOOD PRESSURE: 67 MMHG | SYSTOLIC BLOOD PRESSURE: 120 MMHG | TEMPERATURE: 97.6 F

## 2023-11-21 PROCEDURE — 99215 OFFICE O/P EST HI 40 MIN: CPT

## 2023-11-22 ENCOUNTER — NON-APPOINTMENT (OUTPATIENT)
Age: 78
End: 2023-11-22

## 2023-11-22 ENCOUNTER — APPOINTMENT (OUTPATIENT)
Dept: CARDIOLOGY | Facility: CLINIC | Age: 78
End: 2023-11-22
Payer: MEDICARE

## 2023-11-22 VITALS
SYSTOLIC BLOOD PRESSURE: 95 MMHG | WEIGHT: 110 LBS | HEART RATE: 81 BPM | TEMPERATURE: 98.1 F | OXYGEN SATURATION: 96 % | DIASTOLIC BLOOD PRESSURE: 59 MMHG | BODY MASS INDEX: 20.78 KG/M2

## 2023-11-22 PROCEDURE — 99215 OFFICE O/P EST HI 40 MIN: CPT

## 2023-11-22 PROCEDURE — 93000 ELECTROCARDIOGRAM COMPLETE: CPT

## 2023-11-27 ENCOUNTER — TRANSCRIPTION ENCOUNTER (OUTPATIENT)
Age: 78
End: 2023-11-27

## 2023-11-27 ENCOUNTER — RX RENEWAL (OUTPATIENT)
Age: 78
End: 2023-11-27

## 2023-11-28 LAB
ALBUMIN SERPL ELPH-MCNC: 4.3 G/DL
ALP BLD-CCNC: 92 U/L
ALT SERPL-CCNC: 26 U/L
ANION GAP SERPL CALC-SCNC: 22 MMOL/L
AST SERPL-CCNC: 20 U/L
BILIRUB SERPL-MCNC: 0.8 MG/DL
BUN SERPL-MCNC: 38 MG/DL
CALCIUM SERPL-MCNC: 9.4 MG/DL
CHLORIDE SERPL-SCNC: 99 MMOL/L
CO2 SERPL-SCNC: 19 MMOL/L
CREAT SERPL-MCNC: 1.01 MG/DL
EGFR: 57 ML/MIN/1.73M2
GLUCOSE SERPL-MCNC: 69 MG/DL
MAGNESIUM SERPL-MCNC: 2 MG/DL
NT-PROBNP SERPL-MCNC: 2116 PG/ML
POTASSIUM SERPL-SCNC: 4.9 MMOL/L
PROT SERPL-MCNC: 7.3 G/DL
SODIUM SERPL-SCNC: 140 MMOL/L

## 2023-11-30 ENCOUNTER — APPOINTMENT (OUTPATIENT)
Dept: ENDOCRINOLOGY | Facility: CLINIC | Age: 78
End: 2023-11-30

## 2023-12-05 ENCOUNTER — APPOINTMENT (OUTPATIENT)
Dept: RADIOLOGY | Facility: CLINIC | Age: 78
End: 2023-12-05
Payer: MEDICARE

## 2023-12-05 PROCEDURE — 71046 X-RAY EXAM CHEST 2 VIEWS: CPT

## 2023-12-07 ENCOUNTER — TRANSCRIPTION ENCOUNTER (OUTPATIENT)
Age: 78
End: 2023-12-07

## 2023-12-08 ENCOUNTER — APPOINTMENT (OUTPATIENT)
Dept: CARDIOLOGY | Facility: CLINIC | Age: 78
End: 2023-12-08

## 2023-12-13 ENCOUNTER — APPOINTMENT (OUTPATIENT)
Dept: CARDIOTHORACIC SURGERY | Facility: CLINIC | Age: 78
End: 2023-12-13
Payer: MEDICARE

## 2023-12-13 VITALS
OXYGEN SATURATION: 99 % | RESPIRATION RATE: 14 BRPM | BODY MASS INDEX: 20.77 KG/M2 | DIASTOLIC BLOOD PRESSURE: 65 MMHG | HEART RATE: 61 BPM | SYSTOLIC BLOOD PRESSURE: 102 MMHG | HEIGHT: 61 IN | TEMPERATURE: 98.1 F | WEIGHT: 110 LBS

## 2023-12-13 PROCEDURE — 99214 OFFICE O/P EST MOD 30 MIN: CPT

## 2023-12-13 RX ORDER — METOPROLOL SUCCINATE 25 MG/1
25 TABLET, EXTENDED RELEASE ORAL
Qty: 90 | Refills: 3 | Status: COMPLETED | COMMUNITY
Start: 2021-05-25 | End: 2023-12-13

## 2023-12-14 ENCOUNTER — RX RENEWAL (OUTPATIENT)
Age: 78
End: 2023-12-14

## 2023-12-14 ENCOUNTER — TRANSCRIPTION ENCOUNTER (OUTPATIENT)
Age: 78
End: 2023-12-14

## 2023-12-15 ENCOUNTER — RX RENEWAL (OUTPATIENT)
Age: 78
End: 2023-12-15

## 2023-12-15 NOTE — REVIEW OF SYSTEMS
[Feeling Poorly] : feeling poorly [Feeling Tired] : feeling tired [Shortness Of Breath] : shortness of breath [SOB on Exertion] : shortness of breath during exertion [As Noted in HPI] : as noted in HPI [Negative] : Heme/Lymph

## 2023-12-15 NOTE — PHYSICAL EXAM
[Sclera] : the sclera and conjunctiva were normal [Neck Appearance] : the appearance of the neck was normal [Jugular Venous Distention Increased] : there was no jugular-venous distention [] : no respiratory distress [Respiration, Rhythm And Depth] : normal respiratory rhythm and effort [Exaggerated Use Of Accessory Muscles For Inspiration] : no accessory muscle use [Auscultation Breath Sounds / Voice Sounds] : lungs were clear to auscultation bilaterally [Apical Impulse] : the apical impulse was normal [Heart Rate And Rhythm] : heart rate was normal and rhythm regular [Heart Sounds] : normal S1 and S2 [Abdomen Soft] : soft [Abdomen Tenderness] : non-tender [Involuntary Movements] : no involuntary movements were seen [No Focal Deficits] : no focal deficits [Oriented To Time, Place, And Person] : oriented to person, place, and time [Impaired Insight] : insight and judgment were intact [Affect] : the affect was normal [Mood] : the mood was normal

## 2023-12-17 NOTE — PROCEDURE
[FreeTextEntry1] : Dr. Garcia reviewed the indications for surgery, and used our webpage [email or web address].org <[email or web address].org> to illustrate the aorta and anatomy of the heart. Those indications are the following: size greater than 5.0 cm, symptomatic aneurysms, family history of aortic dissection or aneurysm death with a size greater than 4.5 cm, other necessary cardiac procedures such as coronary artery bypass grafting or valvular disorders with an aneurysm greater than 4.5 cm, or connective tissue disorders with an aneurysm size greater than 4.5 cm. The patient does not meet size criteria for surgical intervention at the time.\par  \par  Dr. Garcia discussed activity restrictions with the patient, and would advise exercise at a moderate amount with no heavy lifting over one third of body weight, and avoiding heart rates that exceed 140 beats per minute. In addition, every patient should abstain from tobacco abuse and to avoid all illicit drug use, especially stimulants such as cocaine or methamphetamine. Dr. Garcia also counseled regarding maintaining a healthy heart diet, and losing any excessive weight as this also put undue stress on both the aorta and entire cardiovascular system. First degree family members should be screened for bicuspid valve disease, and ascending aortic aneurysms. \par  \par  Patient was advised to view the educational video prior to this visit regarding aortic pathology, risk factors, surgical procedures, and lifestyle modifications. Video can be retrieved at [email or web address].\par

## 2023-12-17 NOTE — CONSULT LETTER
[FreeTextEntry2] : Dr.Brian Cruz,  [FreeTextEntry1] : Please find attached our consultation on your patient, Ms. TORRES  We take a multidisciplinary team approach to patient care and consider you, the referring physician, an extension of our team. We will maintain an open line of communication with you throughout your patient's treatment course.  It is our commitment to provide your patient with the highest quality of advanced therapeutic options. We thank you for allowing us to participate in the care of your patient. Please do not hesitate to contact our team with any questions or concerns at [phone].   [FreeTextEntry3] : Tirso Garcia M.D. Professor of Cardiovascular and Thoracic Surgery Minimally Invasive Valve Surgeon Director of Aortic Surgery, [facility] Cell: [phone] Email: [email or web address]

## 2023-12-17 NOTE — DATA REVIEWED
[FreeTextEntry1] : 11/10/23 TTE revealed . Left ventricular systolic function is hyperdynamic with an ejection fraction of 69 % by Shepard's method of disks.  2. Mild to moderate left ventricular hypertrophy.  3. Normal right ventricular cavity size, wall thickness, and systolic function.  4. The left atrium is severely dilated in size.  5. The right atrium is normal in size.  6. The mitral valve leaflets are calcified. Severe mitral valve stenosis is present with a mean transmitral valve gradient = 20 mmHg at a HR of 89bpm.  7. Mild mitral regurgitation.  8. The aortic valve appears calcified with grossly normal opening.  9. Trace tricuspid regurgitation. 10. Trace pulmonic regurgitation. 11. The inferior vena cava is normal in size measuring 1.80 cm in diameter, (normal <2.1cm) with normal inspiratory collapse (normal >50%) consistent with normal right atrial pressure (~3, range 0-5mmHg). 12. Trace pericardial effusion.  11/10/23 Non cont CT chest Motion limited evaluation. Moderate to large right pleural effusion and small left pleural effusion with associated atelectasis. Mild biapical centrilobular emphysema. Multilevel compression deformities of the thoracic spine most most severe at T7 which are unchanged. T9 vertebral body height loss has increased since the previous exam of 11/2/2022. Correlate for pain at this site.   CTA chest, abdomen and pelvis 11/2/22 -moderate atheromatous disease of the normal caliber thoracoabdominal aorta and visualized iliofemoral vasculature without acute pathology suggestion of a possible dissection flap within the proximal left subclavian artery is favored to be artifactual moderate stenosis proximal celiac artery with mild poststenotic dilatation coronary artery and mitral annular calcifications moderate left atrial dilatation moderate volume right and small volume left pleural effusions mild emphysema     Echocardiogram 6/21/22:  EF 60-65%  Mild Mitral MR, moderate annular calcification, moderate MS  Aortic valve trileaflet, mild slerosis. 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  No significant/severe stenosis. 2.  Mild luminal narrowing of the mid left anterior descending coronary artery. 3.  Mild luminal narrowing at the junction of the proximal to mid right coronary artery. 4.  The study will be sent for analysis by CT fractional flow reserve. Once the results are available, a separate report will be created. 5.  The anterior and posterior leaflets of the mitral valve are thickened. These findings can occur in the clinical setting of mitral valve disease (such as mitral stenosis). Comparison with echocardiography may be helpful if it is not been performed recently. 6.  Small bilateral pleural effusions (right greater than left) are new since 2021.   CTA C/A/P 8/6/21 revealed Diffuse calcified plaque seen involving the descending aorta with mild irregularity without penetrating ulcer or aneurysm. The ascending aorta is within normal limits measuring 3 cm in size.   COSME 7/22/2020 -MAC with mean mitral gradient 6mmHg at HR 65, MVA by PHT is 1.5 cm^2, MVA by planimetry is 1.7 cm^2, c/w moderate MS -small PFE vs. Lambl's on non coronary cusp of aortic valve -High grade diffuse complex mobile atheroma with plaque protruding >=4mm into the lumen in the aortic arch/descending aorta.  CTA CAP 7/28/20: calcified aortic plaque without aneurysm, dissection, or penetrating ulcer; patent celiac axis and branches, superior mesenteric artery , bilateral renal arteries, inferior mesenteric artery, and bilateral common, external, and internal iliac arteries.
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
## 2023-12-17 NOTE — END OF VISIT
[FreeTextEntry3] : I, JULIO Sutton personally performed the evaluation and management (E/M) services for this established patient who presents today with (a) new problem(s)/exacerbation of (an) existing condition(s).  That E/M includes conducting the clinically appropriate interval history &/or exam, assessing all new/exacerbated conditions, and establishing a new plan of care.  Today, my VANGEI, was here to observe &/or participate in the visit & follow plan of care established by me.

## 2023-12-19 ENCOUNTER — APPOINTMENT (OUTPATIENT)
Dept: PULMONOLOGY | Facility: CLINIC | Age: 78
End: 2023-12-19
Payer: MEDICARE

## 2023-12-19 ENCOUNTER — INPATIENT (INPATIENT)
Facility: HOSPITAL | Age: 78
LOS: 2 days | Discharge: HOME | DRG: 307 | End: 2023-12-22
Attending: STUDENT IN AN ORGANIZED HEALTH CARE EDUCATION/TRAINING PROGRAM | Admitting: HOSPITALIST
Payer: MEDICARE

## 2023-12-19 VITALS
DIASTOLIC BLOOD PRESSURE: 75 MMHG | BODY MASS INDEX: 20.77 KG/M2 | OXYGEN SATURATION: 96 % | WEIGHT: 110 LBS | SYSTOLIC BLOOD PRESSURE: 127 MMHG | HEIGHT: 61 IN | HEART RATE: 60 BPM

## 2023-12-19 VITALS
TEMPERATURE: 98 F | SYSTOLIC BLOOD PRESSURE: 118 MMHG | OXYGEN SATURATION: 95 % | RESPIRATION RATE: 17 BRPM | DIASTOLIC BLOOD PRESSURE: 54 MMHG | HEART RATE: 60 BPM | WEIGHT: 111.99 LBS | HEIGHT: 63 IN

## 2023-12-19 DIAGNOSIS — Z90.49 ACQUIRED ABSENCE OF OTHER SPECIFIED PARTS OF DIGESTIVE TRACT: Chronic | ICD-10-CM

## 2023-12-19 DIAGNOSIS — Z98.890 OTHER SPECIFIED POSTPROCEDURAL STATES: Chronic | ICD-10-CM

## 2023-12-19 DIAGNOSIS — R06.09 OTHER FORMS OF DYSPNEA: ICD-10-CM

## 2023-12-19 LAB
ALBUMIN SERPL ELPH-MCNC: 4.3 G/DL — SIGNIFICANT CHANGE UP (ref 3.3–5)
ALBUMIN SERPL ELPH-MCNC: 4.3 G/DL — SIGNIFICANT CHANGE UP (ref 3.3–5)
ALP SERPL-CCNC: 93 U/L — SIGNIFICANT CHANGE UP (ref 40–120)
ALP SERPL-CCNC: 93 U/L — SIGNIFICANT CHANGE UP (ref 40–120)
ALT FLD-CCNC: 10 U/L — SIGNIFICANT CHANGE UP (ref 10–45)
ALT FLD-CCNC: 10 U/L — SIGNIFICANT CHANGE UP (ref 10–45)
ANION GAP SERPL CALC-SCNC: 12 MMOL/L — SIGNIFICANT CHANGE UP (ref 5–17)
ANION GAP SERPL CALC-SCNC: 12 MMOL/L — SIGNIFICANT CHANGE UP (ref 5–17)
AST SERPL-CCNC: 18 U/L — SIGNIFICANT CHANGE UP (ref 10–40)
AST SERPL-CCNC: 18 U/L — SIGNIFICANT CHANGE UP (ref 10–40)
BASOPHILS # BLD AUTO: 0.04 K/UL — SIGNIFICANT CHANGE UP (ref 0–0.2)
BASOPHILS # BLD AUTO: 0.04 K/UL — SIGNIFICANT CHANGE UP (ref 0–0.2)
BASOPHILS NFR BLD AUTO: 0.5 % — SIGNIFICANT CHANGE UP (ref 0–2)
BASOPHILS NFR BLD AUTO: 0.5 % — SIGNIFICANT CHANGE UP (ref 0–2)
BILIRUB SERPL-MCNC: 0.3 MG/DL — SIGNIFICANT CHANGE UP (ref 0.2–1.2)
BILIRUB SERPL-MCNC: 0.3 MG/DL — SIGNIFICANT CHANGE UP (ref 0.2–1.2)
BUN SERPL-MCNC: 31 MG/DL — HIGH (ref 7–23)
BUN SERPL-MCNC: 31 MG/DL — HIGH (ref 7–23)
CALCIUM SERPL-MCNC: 9.6 MG/DL — SIGNIFICANT CHANGE UP (ref 8.4–10.5)
CALCIUM SERPL-MCNC: 9.6 MG/DL — SIGNIFICANT CHANGE UP (ref 8.4–10.5)
CHLORIDE SERPL-SCNC: 102 MMOL/L — SIGNIFICANT CHANGE UP (ref 96–108)
CHLORIDE SERPL-SCNC: 102 MMOL/L — SIGNIFICANT CHANGE UP (ref 96–108)
CO2 SERPL-SCNC: 28 MMOL/L — SIGNIFICANT CHANGE UP (ref 22–31)
CO2 SERPL-SCNC: 28 MMOL/L — SIGNIFICANT CHANGE UP (ref 22–31)
CREAT SERPL-MCNC: 1.39 MG/DL — HIGH (ref 0.5–1.3)
CREAT SERPL-MCNC: 1.39 MG/DL — HIGH (ref 0.5–1.3)
EGFR: 39 ML/MIN/1.73M2 — LOW
EGFR: 39 ML/MIN/1.73M2 — LOW
EOSINOPHIL # BLD AUTO: 0.13 K/UL — SIGNIFICANT CHANGE UP (ref 0–0.5)
EOSINOPHIL # BLD AUTO: 0.13 K/UL — SIGNIFICANT CHANGE UP (ref 0–0.5)
EOSINOPHIL NFR BLD AUTO: 1.6 % — SIGNIFICANT CHANGE UP (ref 0–6)
EOSINOPHIL NFR BLD AUTO: 1.6 % — SIGNIFICANT CHANGE UP (ref 0–6)
GLUCOSE SERPL-MCNC: 116 MG/DL — HIGH (ref 70–99)
GLUCOSE SERPL-MCNC: 116 MG/DL — HIGH (ref 70–99)
HCT VFR BLD CALC: 33.9 % — LOW (ref 34.5–45)
HCT VFR BLD CALC: 33.9 % — LOW (ref 34.5–45)
HGB BLD-MCNC: 11.2 G/DL — LOW (ref 11.5–15.5)
HGB BLD-MCNC: 11.2 G/DL — LOW (ref 11.5–15.5)
IMM GRANULOCYTES NFR BLD AUTO: 0.5 % — SIGNIFICANT CHANGE UP (ref 0–0.9)
IMM GRANULOCYTES NFR BLD AUTO: 0.5 % — SIGNIFICANT CHANGE UP (ref 0–0.9)
LYMPHOCYTES # BLD AUTO: 1.82 K/UL — SIGNIFICANT CHANGE UP (ref 1–3.3)
LYMPHOCYTES # BLD AUTO: 1.82 K/UL — SIGNIFICANT CHANGE UP (ref 1–3.3)
LYMPHOCYTES # BLD AUTO: 22.1 % — SIGNIFICANT CHANGE UP (ref 13–44)
LYMPHOCYTES # BLD AUTO: 22.1 % — SIGNIFICANT CHANGE UP (ref 13–44)
MCHC RBC-ENTMCNC: 31 PG — SIGNIFICANT CHANGE UP (ref 27–34)
MCHC RBC-ENTMCNC: 31 PG — SIGNIFICANT CHANGE UP (ref 27–34)
MCHC RBC-ENTMCNC: 33 GM/DL — SIGNIFICANT CHANGE UP (ref 32–36)
MCHC RBC-ENTMCNC: 33 GM/DL — SIGNIFICANT CHANGE UP (ref 32–36)
MCV RBC AUTO: 93.9 FL — SIGNIFICANT CHANGE UP (ref 80–100)
MCV RBC AUTO: 93.9 FL — SIGNIFICANT CHANGE UP (ref 80–100)
MONOCYTES # BLD AUTO: 0.62 K/UL — SIGNIFICANT CHANGE UP (ref 0–0.9)
MONOCYTES # BLD AUTO: 0.62 K/UL — SIGNIFICANT CHANGE UP (ref 0–0.9)
MONOCYTES NFR BLD AUTO: 7.5 % — SIGNIFICANT CHANGE UP (ref 2–14)
MONOCYTES NFR BLD AUTO: 7.5 % — SIGNIFICANT CHANGE UP (ref 2–14)
NEUTROPHILS # BLD AUTO: 5.6 K/UL — SIGNIFICANT CHANGE UP (ref 1.8–7.4)
NEUTROPHILS # BLD AUTO: 5.6 K/UL — SIGNIFICANT CHANGE UP (ref 1.8–7.4)
NEUTROPHILS NFR BLD AUTO: 67.8 % — SIGNIFICANT CHANGE UP (ref 43–77)
NEUTROPHILS NFR BLD AUTO: 67.8 % — SIGNIFICANT CHANGE UP (ref 43–77)
NRBC # BLD: 0 /100 WBCS — SIGNIFICANT CHANGE UP (ref 0–0)
NRBC # BLD: 0 /100 WBCS — SIGNIFICANT CHANGE UP (ref 0–0)
NT-PROBNP SERPL-SCNC: 2878 PG/ML — HIGH (ref 0–300)
NT-PROBNP SERPL-SCNC: 2878 PG/ML — HIGH (ref 0–300)
PLATELET # BLD AUTO: 307 K/UL — SIGNIFICANT CHANGE UP (ref 150–400)
PLATELET # BLD AUTO: 307 K/UL — SIGNIFICANT CHANGE UP (ref 150–400)
POTASSIUM SERPL-MCNC: 4.5 MMOL/L — SIGNIFICANT CHANGE UP (ref 3.5–5.3)
POTASSIUM SERPL-MCNC: 4.5 MMOL/L — SIGNIFICANT CHANGE UP (ref 3.5–5.3)
POTASSIUM SERPL-SCNC: 4.5 MMOL/L — SIGNIFICANT CHANGE UP (ref 3.5–5.3)
POTASSIUM SERPL-SCNC: 4.5 MMOL/L — SIGNIFICANT CHANGE UP (ref 3.5–5.3)
PROT SERPL-MCNC: 7.7 G/DL — SIGNIFICANT CHANGE UP (ref 6–8.3)
PROT SERPL-MCNC: 7.7 G/DL — SIGNIFICANT CHANGE UP (ref 6–8.3)
RBC # BLD: 3.61 M/UL — LOW (ref 3.8–5.2)
RBC # BLD: 3.61 M/UL — LOW (ref 3.8–5.2)
RBC # FLD: 13.6 % — SIGNIFICANT CHANGE UP (ref 10.3–14.5)
RBC # FLD: 13.6 % — SIGNIFICANT CHANGE UP (ref 10.3–14.5)
SODIUM SERPL-SCNC: 142 MMOL/L — SIGNIFICANT CHANGE UP (ref 135–145)
SODIUM SERPL-SCNC: 142 MMOL/L — SIGNIFICANT CHANGE UP (ref 135–145)
TROPONIN T, HIGH SENSITIVITY RESULT: 32 NG/L — SIGNIFICANT CHANGE UP (ref 0–51)
TROPONIN T, HIGH SENSITIVITY RESULT: 32 NG/L — SIGNIFICANT CHANGE UP (ref 0–51)
TROPONIN T, HIGH SENSITIVITY RESULT: 33 NG/L — SIGNIFICANT CHANGE UP (ref 0–51)
TROPONIN T, HIGH SENSITIVITY RESULT: 33 NG/L — SIGNIFICANT CHANGE UP (ref 0–51)
WBC # BLD: 8.25 K/UL — SIGNIFICANT CHANGE UP (ref 3.8–10.5)
WBC # BLD: 8.25 K/UL — SIGNIFICANT CHANGE UP (ref 3.8–10.5)
WBC # FLD AUTO: 8.25 K/UL — SIGNIFICANT CHANGE UP (ref 3.8–10.5)
WBC # FLD AUTO: 8.25 K/UL — SIGNIFICANT CHANGE UP (ref 3.8–10.5)

## 2023-12-19 PROCEDURE — 94729 DIFFUSING CAPACITY: CPT

## 2023-12-19 PROCEDURE — 94060 EVALUATION OF WHEEZING: CPT

## 2023-12-19 PROCEDURE — 99223 1ST HOSP IP/OBS HIGH 75: CPT

## 2023-12-19 PROCEDURE — 71046 X-RAY EXAM CHEST 2 VIEWS: CPT | Mod: 26

## 2023-12-19 PROCEDURE — 99215 OFFICE O/P EST HI 40 MIN: CPT

## 2023-12-19 PROCEDURE — 99285 EMERGENCY DEPT VISIT HI MDM: CPT | Mod: GC

## 2023-12-19 PROCEDURE — 94727 GAS DIL/WSHOT DETER LNG VOL: CPT

## 2023-12-19 PROCEDURE — 99215 OFFICE O/P EST HI 40 MIN: CPT | Mod: 25

## 2023-12-19 NOTE — ED PROVIDER NOTE - PHYSICAL EXAMINATION
General: NAD  HEENT: NCAT.   Cardiac: RRR, 2+ radial pulses  Chest: Decreased breath sounds bilaterally at the lung bases.   Abdomen: soft, non-distended, no ttp, no rebound or guarding  Extremities: no peripheral edema, calf tenderness, or leg size discrepancies  Skin: no rashes  Neuro: AAOx3, motor and sensory grossly intact. CN II-XII intact. 5/5 strength upper and lower extremities. Normal sensation bilaterally upper and lower extremities.   Psych: mood and affect appropriate

## 2023-12-19 NOTE — PHYSICAL EXAM
[Normal Oropharynx] : normal oropharynx [Normal Appearance] : normal appearance [No Neck Mass] : no neck mass [Normal Rate/Rhythm] : normal rate/rhythm [Normal S1, S2] : normal s1, s2 [No Murmurs] : no murmurs [No Resp Distress] : no resp distress [Clear to Auscultation Bilaterally] : clear to auscultation bilaterally [No Abnormalities] : no abnormalities [Benign] : benign [Normal Gait] : normal gait [No Clubbing] : no clubbing [No Cyanosis] : no cyanosis [No Edema] : no edema [FROM] : FROM [Normal Color/ Pigmentation] : normal color/ pigmentation [No Focal Deficits] : no focal deficits [Oriented x3] : oriented x3 [Normal Affect] : normal affect [TextBox_2] : Pursed lip breathing [TextBox_68] : Pursed lip breathing, decreased breath sounds bilaterally, right greater than left

## 2023-12-19 NOTE — ED PROVIDER NOTE - CLINICAL SUMMARY MEDICAL DECISION MAKING FREE TEXT BOX
Errol Driver Madelaine, PGY2 - This is a 78 year old female with pmh of long term smoker (quit 3/2023) with asthma/COPD, HTN, HLD, aortic atheroma and valvular heart diease presenting today from pulmonology office sent by Dr. Fay Muir for admission for worsening shortness of breath in setting of possible fluid overload, most likely needing diuresis and thoracentesis. Patient reports shortness of breath chronically however worsening in the setting of the last few weeks especially when she ambulates. Denies any chest pain or pressure. No extensive lower extremity swelling or pain. No prior DVT/PE history. on ASA 81mg. No recent travel/surgery in the past 4 weeks.  Vitals wnl. Patient saturating above 95% on room air. On physical exam, patient with pursed breathing, no cyanosis noted. Pulmonary exam notable for bilateral decreased breath sounds on the lower lung fields. No extensive lower extremity swelling, slight ankle L swelling. No pain to palpation. Otherwise unremarkable physical exam. Will obtain labs including troponin and bnp. Will obtain cxr. Plan for admission for further work up and testing as planned by the pulmonologist including possible thoracentesis, and diuresis. Not acutely in respiratory distress at this time. Most likely COPD exacerbation vs fluid overload status causing dyspnea on exertion.     PCP -Dr. David Glasgow  Pulm - Dr. Fay Muir Errol Driver Madelaine, PGY2 - This is a 78 year old female with pmh of long term smoker (quit 3/2023) with asthma/COPD, HTN, HLD, aortic atheroma and valvular heart diease presenting today from pulmonology office sent by Dr. Fay Muir for admission for worsening shortness of breath in setting of possible fluid overload, most likely needing diuresis and thoracentesis. Patient reports shortness of breath chronically however worsening in the setting of the last few weeks especially when she ambulates. Denies any chest pain or pressure. No extensive lower extremity swelling or pain. No prior DVT/PE history. on ASA 81mg. No recent travel/surgery in the past 4 weeks.  Vitals wnl. Patient saturating above 95% on room air. On physical exam, patient with pursed breathing, no cyanosis noted. Pulmonary exam notable for bilateral decreased breath sounds on the lower lung fields. No extensive lower extremity swelling, slight ankle L swelling. No pain to palpation. Otherwise unremarkable physical exam. Will obtain labs including troponin and bnp. Will obtain cxr. Plan for admission for further work up and testing as planned by the pulmonologist including possible thoracentesis, and diuresis. Not acutely in respiratory distress at this time. Most likely COPD exacerbation vs fluid overload status causing dyspnea on exertion.     PCP -Dr. David Vidal - Dr. Fay Moran: 78 year old female with sob. history of valvular heart disease, copd/asthma here with worsening sob. sent from pulm for admission for fluid overlad, likely thoracentesis.  worsening in the past few weeks. PE: att exam: patient awake alert NAD. pursed breathing. LUNGS b/l decreasesed bs.. CARD RRR no m/r/g.  Abdomen soft NT ND no rebound no guarding no CVA tenderness. EXT lle ankle edema. + 2 dp b/ le neuro A&Ox3 gait normal.  skin warm and dry no rash  will get labs, cxr, probnp, admit for further testnig and work up for diuresis and inpatient thoracetnesis.  at this time no acute distress, saturating well. will admit.

## 2023-12-19 NOTE — ED ADULT NURSE NOTE - OBJECTIVE STATEMENT
pt is a 77yo female PMH HTN, thyroid disease, CHF, HLD presenting to the ED complaining of SOB. pt states she was recently hospitalized at [facility] for pneumonia 1 month ago, had outpatient pulmonary function test performed today, sent to ED by pulmonologist for "fluid in the lungs". pt reports worsening SOB for the past few days, worse with exertion. pt daughter at bedside states pt has "severe mitral stenosis", pulmonologist recommended ED for further workup and potential admit prior to any procedures to repair stenosis. pt A&Ox3 gross neuro intact, no difficulty speaking in complete sentences, pulses x 4, schwab x4, abdomen soft nontender nondistended, skin intact. pt denies chest pain, ha, n/v/d, abdominal pain, f/c, urinary symptoms, hematuria, cough pt is a 79yo female PMH HTN, thyroid disease, CHF, HLD presenting to the ED complaining of SOB. pt states she was recently hospitalized at [facility] for pneumonia 1 month ago, had outpatient pulmonary function test performed today, sent to ED by pulmonologist for "fluid in the lungs". pt reports worsening SOB for the past few days, worse with exertion. pt daughter at bedside states pt has "severe mitral stenosis", pulmonologist recommended ED for further workup and potential admit prior to any procedures to repair stenosis. pt A&Ox3 gross neuro intact, no difficulty speaking in complete sentences, pulses x 4, schwab x4, abdomen soft nontender nondistended, skin intact. pt denies chest pain, ha, n/v/d, abdominal pain, f/c, urinary symptoms, hematuria, cough

## 2023-12-19 NOTE — H&P ADULT - PROBLEM SELECTOR PLAN 1
-DDx includes progression of severe MS, dehydration and decreased cardiac output iso diuretic use, less likely to be AE-COPD.  -currently not requiring oxygen. On exam, appears dry.   -will hold off diuretics for now  -consult structural heart in the morning (follows with Dr. Tirso Garcia)

## 2023-12-19 NOTE — ED ADULT NURSE NOTE - NSFALLUNIVINTERV_ED_ALL_ED
Bed/Stretcher in lowest position, wheels locked, appropriate side rails in place/Call bell, personal items and telephone in reach/Instruct patient to call for assistance before getting out of bed/chair/stretcher/Non-slip footwear applied when patient is off stretcher/[place] to call system/Physically safe environment - no spills, clutter or unnecessary equipment/Purposeful proactive rounding/Room/bathroom lighting operational, light cord in reach Bed/Stretcher in lowest position, wheels locked, appropriate side rails in place/Call bell, personal items and telephone in reach/Instruct patient to call for assistance before getting out of bed/chair/stretcher/Non-slip footwear applied when patient is off stretcher/[place] to call system/Physically safe environment - no spills, clutter or unnecessary equipment/Purposeful proactive rounding/Room/bathroom lighting operational, light cord in reach

## 2023-12-19 NOTE — CONSULT LETTER
[Dear  ___] : Dear  [unfilled], [Consult Letter:] : I had the pleasure of evaluating your patient, [unfilled]. [Please see my note below.] : Please see my note below. [Consult Closing:] : Thank you very much for allowing me to participate in the care of this patient.  If you have any questions, please do not hesitate to contact me. [FreeTextEntry3] : Sincerely,\par  \par  Fay Muir MD\par  [facility] Physician [facility]\par  Pulmonary Medicine\par  tel: [phone]\par  fax: [phone]\par

## 2023-12-19 NOTE — H&P ADULT - PROBLEM SELECTOR PLAN 2
-baseline SCr 0.9-1, eGFR in 50s  -hold losartan and lasix for now  -monitor kidney function  -if not improved on morning lab, consider further w/u

## 2023-12-19 NOTE — HISTORY OF PRESENT ILLNESS
[Former] : former [>= 20 pack years] : >= 20 pack years [Never] : never [TextBox_4] : Ms. LAUREN TORRES is a 78 year old woman long term smoker (quit 3/2023) with asthma/COPD, HTN, HLD, aortic atheroma and valvular heart diease here for f/u. Accompanied by her daughter and granddaughter today  _________________________ History: Was hospitalized  - 2023 at [facility] after presenting with back pain.  Was found to have worsening bilateral pleural effusions, volume overload and renal failure.  Was treated with IV Lasix as well as steroids and prednisone for PNA/copd exacerbation. Underwent right-sided thoracentesis with 500 cc of transudateive fluid drained.  Cultures and cytology were negative. Was discharged home on home o2 which she has not been using.   Interval events: Seen for initial evaluation at the end of November.  Was feeling okay at that time.  Over the last 2 weeks, reports worsening shortness of breath, worsening Et.  She is huffing and puffing all the time.  Reports compliance with Lasix   PMH: HLD, HTN, aortic atheroma Meds: per chart All: NKDA SH: smoking No known exposures. Worked as . FH: father  at 93, had lung issues; mother  at 51 of heart disease PMD: BENSON LOBO Immunizations: Pneumovax 2019; COVID vaccinated, due for second booster.  [TextBox_11] : 1 [TextBox_13] : 50 [YearQuit] : 3/2023

## 2023-12-19 NOTE — ASSESSMENT
[FreeTextEntry1] : Ms. LAUREN TORRES is a 77 year old woman long term smoker (still smoking) with asthma/COPD/emphysema, HTN, HLD, and severe mitral stenosis is here for follow-up  # Hypoxemic respiratory failure -recently hospitalized with hypoxemic respiratory failure in the setting of volume overload.  s/p R sided thoracentesis (transudate) was also treated for COPD and PNA -- Now with worsening respiratory status, likely secondary to worsening effusions. absent BS 1/2 way lung field on the right, one third on the left.  Patient was pursed lip breathing. Discussed with patient and daughter.  Recommend hospital admission for imaging, likely thoracentesis, as well as hopefully expedited cardiac workup  #Asthma/COPD/emphysema - PFTs Aug 2023 with mild obstruction (FEV 1 = 1.42L), mild restriction, reduced DLCO (may be effort related). 6MWD Aug 2023 with O2 jose 91% Today, pulmonary function testing with worsening restriction, no obstruction, normal DLCO Exercise oximetry on RA last visit: at rest - P: O2 98%; with exertion - P: O2 89% -  -- c/w Breztri for now -- No need for oxygen at this time, patient reports that she would not want to carry it along regardless  #HFpEF,-TTE November 2020 with mild to moderate LVH, severely enlarged LA, severe mitral valve stenosis, mild MR, trace pericardial effusion --c/w Lasix 40 mg daily, appears euvolemic on exam --Blood work today -- Continue to follow-up with her cardiothoracic surgeon and cardiologist  #Smoker - >50 pack year smoking hx, quit 8/2023, continued abstinence encouraged -- Not interested in Chantix or NRT --repeat Ct chest Jan/Feb 2024   All questions answered. Patient in agreement with plan. Follow up after hospital discharge

## 2023-12-19 NOTE — H&P ADULT - HISTORY OF PRESENT ILLNESS
78F PMHx COPD (not on home o2), HTN/HLD, and hypothyroid.    Was recently admitted to this hospital for sepsis d/t CAP and AE-COPD, also found w/ YADI on CKD and UTI. Received R-sided thoracentesis on 11/11 w/ removal of 500cc fluid. She had TTE done, which showed normal LVEF, but severe MS. Pt was discharged.     Today, pt went to see outpatient pulmonology Dr. Fay Muir, and was referred to ED for worsening SOB, concern for fluid overload. Reports chronic SOB, but recently worse with ambulation.      On arrival, VSS.  CBC w/ stable anemia.   CMP w/ SCr 1.39 (last 0.98 on 11/16).  Trop 33, 32.   ProBNP 2878 (last >10K on 11/9)       On interview, pt reports that her BARBOSA has been getting more frequent. Denies fever, chills, cp, cough, palpitation, abd pain, n/v/d, or significant change in bowel/urinary habits

## 2023-12-19 NOTE — H&P ADULT - PROBLEM SELECTOR PLAN 3
-currently not in AE-COPD  -c/w home inhaler( breztri aerophere converted to budesonide/formetrol while inpatient)

## 2023-12-19 NOTE — H&P ADULT - NSHPPHYSICALEXAM_GEN_ALL_CORE
Vital Signs Last 24 Hrs  T(C): 36.5 (19 Dec 2023 20:15), Max: 36.5 (19 Dec 2023 20:15)  T(F): 97.7 (19 Dec 2023 20:15), Max: 97.7 (19 Dec 2023 20:15)  HR: 60 (19 Dec 2023 20:15) (60 - 71)  BP: 122/72 (19 Dec 2023 20:15) (100/63 - 122/72)  BP(mean): --  RR: 18 (19 Dec 2023 20:15) (17 - 18)  SpO2: 96% (19 Dec 2023 20:15) (95% - 96%)    Parameters below as of 19 Dec 2023 20:15  Patient On (Oxygen Delivery Method): room air        CONSTITUTIONAL: Well-groomed, in no apparent distress  EYES: No conjunctival or scleral injection, non-icteric  ENMT: No external nasal lesions; MMM  RESPIRATORY: Breathing comfortably; scattered wheezing. diminished BS at bases.   CARDIOVASCULAR: +S1S2, RRR; pedal pulses full and symmetric; no lower extremity edema  GASTROINTESTINAL: No tenderness, +BS throughout, no rebound/guarding  SKIN: No rashes or ulcers noted  NEUROLOGIC: No gross focal neurological deficits, AAOX3  PSYCHIATRIC: mood and affect appropriate; appropriate insight and judgment

## 2023-12-19 NOTE — H&P ADULT - NSHPLABSRESULTS_GEN_ALL_CORE
11.2   8.25  )-----------( 307      ( 19 Dec 2023 20:58 )             33.9       12-19    142  |  102  |  31<H>  ----------------------------<  116<H>  4.5   |  28  |  1.39<H>    Ca    9.6      19 Dec 2023 20:58    TPro  7.7  /  Alb  4.3  /  TBili  0.3  /  DBili  x   /  AST  18  /  ALT  10  /  AlkPhos  93  12-19              Urinalysis Basic - ( 19 Dec 2023 20:58 )    Color: x / Appearance: x / SG: x / pH: x  Gluc: 116 mg/dL / Ketone: x  / Bili: x / Urobili: x   Blood: x / Protein: x / Nitrite: x   Leuk Esterase: x / RBC: x / WBC x   Sq Epi: x / Non Sq Epi: x / Bacteria: x                  CAPILLARY BLOOD GLUCOSE

## 2023-12-20 DIAGNOSIS — E03.9 HYPOTHYROIDISM, UNSPECIFIED: ICD-10-CM

## 2023-12-20 DIAGNOSIS — R68.89 OTHER GENERAL SYMPTOMS AND SIGNS: ICD-10-CM

## 2023-12-20 DIAGNOSIS — Z87.09 PERSONAL HISTORY OF OTHER DISEASES OF THE RESPIRATORY SYSTEM: ICD-10-CM

## 2023-12-20 DIAGNOSIS — E78.5 HYPERLIPIDEMIA, UNSPECIFIED: ICD-10-CM

## 2023-12-20 DIAGNOSIS — N17.9 ACUTE KIDNEY FAILURE, UNSPECIFIED: ICD-10-CM

## 2023-12-20 DIAGNOSIS — Z29.9 ENCOUNTER FOR PROPHYLACTIC MEASURES, UNSPECIFIED: ICD-10-CM

## 2023-12-20 DIAGNOSIS — I10 ESSENTIAL (PRIMARY) HYPERTENSION: ICD-10-CM

## 2023-12-20 LAB
ALBUMIN SERPL ELPH-MCNC: 3.4 G/DL — SIGNIFICANT CHANGE UP (ref 3.3–5)
ALBUMIN SERPL ELPH-MCNC: 3.4 G/DL — SIGNIFICANT CHANGE UP (ref 3.3–5)
ALP SERPL-CCNC: 75 U/L — SIGNIFICANT CHANGE UP (ref 40–120)
ALP SERPL-CCNC: 75 U/L — SIGNIFICANT CHANGE UP (ref 40–120)
ALT FLD-CCNC: 8 U/L — LOW (ref 10–45)
ALT FLD-CCNC: 8 U/L — LOW (ref 10–45)
ANION GAP SERPL CALC-SCNC: 10 MMOL/L — SIGNIFICANT CHANGE UP (ref 5–17)
ANION GAP SERPL CALC-SCNC: 10 MMOL/L — SIGNIFICANT CHANGE UP (ref 5–17)
AST SERPL-CCNC: 15 U/L — SIGNIFICANT CHANGE UP (ref 10–40)
AST SERPL-CCNC: 15 U/L — SIGNIFICANT CHANGE UP (ref 10–40)
BILIRUB SERPL-MCNC: 0.2 MG/DL — SIGNIFICANT CHANGE UP (ref 0.2–1.2)
BILIRUB SERPL-MCNC: 0.2 MG/DL — SIGNIFICANT CHANGE UP (ref 0.2–1.2)
BUN SERPL-MCNC: 29 MG/DL — HIGH (ref 7–23)
BUN SERPL-MCNC: 29 MG/DL — HIGH (ref 7–23)
CALCIUM SERPL-MCNC: 8.9 MG/DL — SIGNIFICANT CHANGE UP (ref 8.4–10.5)
CALCIUM SERPL-MCNC: 8.9 MG/DL — SIGNIFICANT CHANGE UP (ref 8.4–10.5)
CHLORIDE SERPL-SCNC: 105 MMOL/L — SIGNIFICANT CHANGE UP (ref 96–108)
CHLORIDE SERPL-SCNC: 105 MMOL/L — SIGNIFICANT CHANGE UP (ref 96–108)
CO2 SERPL-SCNC: 27 MMOL/L — SIGNIFICANT CHANGE UP (ref 22–31)
CO2 SERPL-SCNC: 27 MMOL/L — SIGNIFICANT CHANGE UP (ref 22–31)
CREAT SERPL-MCNC: 1.28 MG/DL — SIGNIFICANT CHANGE UP (ref 0.5–1.3)
CREAT SERPL-MCNC: 1.28 MG/DL — SIGNIFICANT CHANGE UP (ref 0.5–1.3)
EGFR: 43 ML/MIN/1.73M2 — LOW
EGFR: 43 ML/MIN/1.73M2 — LOW
GLUCOSE BLDC GLUCOMTR-MCNC: 139 MG/DL — HIGH (ref 70–99)
GLUCOSE BLDC GLUCOMTR-MCNC: 139 MG/DL — HIGH (ref 70–99)
GLUCOSE SERPL-MCNC: 94 MG/DL — SIGNIFICANT CHANGE UP (ref 70–99)
GLUCOSE SERPL-MCNC: 94 MG/DL — SIGNIFICANT CHANGE UP (ref 70–99)
HCT VFR BLD CALC: 29.1 % — LOW (ref 34.5–45)
HCT VFR BLD CALC: 29.1 % — LOW (ref 34.5–45)
HGB BLD-MCNC: 9.6 G/DL — LOW (ref 11.5–15.5)
HGB BLD-MCNC: 9.6 G/DL — LOW (ref 11.5–15.5)
MAGNESIUM SERPL-MCNC: 1.9 MG/DL — SIGNIFICANT CHANGE UP (ref 1.6–2.6)
MAGNESIUM SERPL-MCNC: 1.9 MG/DL — SIGNIFICANT CHANGE UP (ref 1.6–2.6)
MCHC RBC-ENTMCNC: 31.2 PG — SIGNIFICANT CHANGE UP (ref 27–34)
MCHC RBC-ENTMCNC: 31.2 PG — SIGNIFICANT CHANGE UP (ref 27–34)
MCHC RBC-ENTMCNC: 33 GM/DL — SIGNIFICANT CHANGE UP (ref 32–36)
MCHC RBC-ENTMCNC: 33 GM/DL — SIGNIFICANT CHANGE UP (ref 32–36)
MCV RBC AUTO: 94.5 FL — SIGNIFICANT CHANGE UP (ref 80–100)
MCV RBC AUTO: 94.5 FL — SIGNIFICANT CHANGE UP (ref 80–100)
NRBC # BLD: 0 /100 WBCS — SIGNIFICANT CHANGE UP (ref 0–0)
NRBC # BLD: 0 /100 WBCS — SIGNIFICANT CHANGE UP (ref 0–0)
PHOSPHATE SERPL-MCNC: 3.9 MG/DL — SIGNIFICANT CHANGE UP (ref 2.5–4.5)
PHOSPHATE SERPL-MCNC: 3.9 MG/DL — SIGNIFICANT CHANGE UP (ref 2.5–4.5)
PLATELET # BLD AUTO: 254 K/UL — SIGNIFICANT CHANGE UP (ref 150–400)
PLATELET # BLD AUTO: 254 K/UL — SIGNIFICANT CHANGE UP (ref 150–400)
POTASSIUM SERPL-MCNC: 4.2 MMOL/L — SIGNIFICANT CHANGE UP (ref 3.5–5.3)
POTASSIUM SERPL-MCNC: 4.2 MMOL/L — SIGNIFICANT CHANGE UP (ref 3.5–5.3)
POTASSIUM SERPL-SCNC: 4.2 MMOL/L — SIGNIFICANT CHANGE UP (ref 3.5–5.3)
POTASSIUM SERPL-SCNC: 4.2 MMOL/L — SIGNIFICANT CHANGE UP (ref 3.5–5.3)
PROT SERPL-MCNC: 6.1 G/DL — SIGNIFICANT CHANGE UP (ref 6–8.3)
PROT SERPL-MCNC: 6.1 G/DL — SIGNIFICANT CHANGE UP (ref 6–8.3)
RBC # BLD: 3.08 M/UL — LOW (ref 3.8–5.2)
RBC # BLD: 3.08 M/UL — LOW (ref 3.8–5.2)
RBC # FLD: 13.7 % — SIGNIFICANT CHANGE UP (ref 10.3–14.5)
RBC # FLD: 13.7 % — SIGNIFICANT CHANGE UP (ref 10.3–14.5)
SODIUM SERPL-SCNC: 142 MMOL/L — SIGNIFICANT CHANGE UP (ref 135–145)
SODIUM SERPL-SCNC: 142 MMOL/L — SIGNIFICANT CHANGE UP (ref 135–145)
WBC # BLD: 6.64 K/UL — SIGNIFICANT CHANGE UP (ref 3.8–10.5)
WBC # BLD: 6.64 K/UL — SIGNIFICANT CHANGE UP (ref 3.8–10.5)
WBC # FLD AUTO: 6.64 K/UL — SIGNIFICANT CHANGE UP (ref 3.8–10.5)
WBC # FLD AUTO: 6.64 K/UL — SIGNIFICANT CHANGE UP (ref 3.8–10.5)

## 2023-12-20 PROCEDURE — 99233 SBSQ HOSP IP/OBS HIGH 50: CPT

## 2023-12-20 RX ORDER — BUDESONIDE AND FORMOTEROL FUMARATE DIHYDRATE 160; 4.5 UG/1; UG/1
2 AEROSOL RESPIRATORY (INHALATION)
Refills: 0 | Status: DISCONTINUED | OUTPATIENT
Start: 2023-12-20 | End: 2023-12-22

## 2023-12-20 RX ORDER — ALBUTEROL 90 UG/1
2 AEROSOL, METERED ORAL EVERY 6 HOURS
Refills: 0 | Status: DISCONTINUED | OUTPATIENT
Start: 2023-12-20 | End: 2023-12-22

## 2023-12-20 RX ORDER — LANOLIN ALCOHOL/MO/W.PET/CERES
3 CREAM (GRAM) TOPICAL AT BEDTIME
Refills: 0 | Status: DISCONTINUED | OUTPATIENT
Start: 2023-12-20 | End: 2023-12-22

## 2023-12-20 RX ORDER — ONDANSETRON 8 MG/1
4 TABLET, FILM COATED ORAL EVERY 8 HOURS
Refills: 0 | Status: DISCONTINUED | OUTPATIENT
Start: 2023-12-20 | End: 2023-12-22

## 2023-12-20 RX ORDER — ACETAMINOPHEN 500 MG
650 TABLET ORAL EVERY 6 HOURS
Refills: 0 | Status: DISCONTINUED | OUTPATIENT
Start: 2023-12-20 | End: 2023-12-22

## 2023-12-20 RX ORDER — METOPROLOL TARTRATE 50 MG
50 TABLET ORAL
Refills: 0 | Status: DISCONTINUED | OUTPATIENT
Start: 2023-12-20 | End: 2023-12-22

## 2023-12-20 RX ORDER — ATORVASTATIN CALCIUM 80 MG/1
40 TABLET, FILM COATED ORAL AT BEDTIME
Refills: 0 | Status: DISCONTINUED | OUTPATIENT
Start: 2023-12-20 | End: 2023-12-22

## 2023-12-20 RX ORDER — LEVOTHYROXINE SODIUM 125 MCG
88 TABLET ORAL DAILY
Refills: 0 | Status: DISCONTINUED | OUTPATIENT
Start: 2023-12-20 | End: 2023-12-22

## 2023-12-20 RX ADMIN — Medication 50 MILLIGRAM(S): at 06:49

## 2023-12-20 RX ADMIN — ATORVASTATIN CALCIUM 40 MILLIGRAM(S): 80 TABLET, FILM COATED ORAL at 22:21

## 2023-12-20 RX ADMIN — Medication 50 MILLIGRAM(S): at 18:23

## 2023-12-20 RX ADMIN — Medication 88 MICROGRAM(S): at 06:50

## 2023-12-20 RX ADMIN — BUDESONIDE AND FORMOTEROL FUMARATE DIHYDRATE 2 PUFF(S): 160; 4.5 AEROSOL RESPIRATORY (INHALATION) at 18:23

## 2023-12-20 RX ADMIN — BUDESONIDE AND FORMOTEROL FUMARATE DIHYDRATE 2 PUFF(S): 160; 4.5 AEROSOL RESPIRATORY (INHALATION) at 06:50

## 2023-12-20 NOTE — PROGRESS NOTE ADULT - PROBLEM SELECTOR PLAN 2
-baseline SCr 0.9-1, eGFR in 50s  -hold losartan and lasix for now  -monitor kidney function  -if not improved on morning lab, consider further w/u -baseline SCr 0.9-1, eGFR in 50s -->peaked at 1.39, improving  -hold losartan and lasix for now  -monitor kidney function  -if not improved on morning lab, consider further w/u

## 2023-12-20 NOTE — PROGRESS NOTE ADULT - SUBJECTIVE AND OBJECTIVE BOX
[facility] Division of Hospital Medicine  Elaine White MD  MS Teams PREFERRED        SUBJECTIVE / OVERNIGHT EVENTS:  ADDITIONAL REVIEW OF SYSTEMS:    MEDICATIONS  (STANDING):  atorvastatin 40 milliGRAM(s) Oral at bedtime  budesonide 160 MICROgram(s)/formoterol 4.5 MICROgram(s) Inhaler 2 Puff(s) Inhalation two times a day  levothyroxine 88 MICROGram(s) Oral daily  metoprolol tartrate 50 milliGRAM(s) Oral two times a day    MEDICATIONS  (PRN):  acetaminophen     Tablet .. 650 milliGRAM(s) Oral every 6 hours PRN Temp greater or equal to 38C (100.4F), Mild Pain (1 - 3)  albuterol    90 MICROgram(s) HFA Inhaler 2 Puff(s) Inhalation every 6 hours PRN Shortness of Breath and/or Wheezing  aluminum hydroxide/magnesium hydroxide/simethicone Suspension 30 milliLiter(s) Oral every 4 hours PRN Dyspepsia  melatonin 3 milliGRAM(s) Oral at bedtime PRN Insomnia  ondansetron Injectable 4 milliGRAM(s) IV Push every 8 hours PRN Nausea and/or Vomiting      I&O's Summary      PHYSICAL EXAM:  Vital Signs Last 24 Hrs  T(C): 36.8 (20 Dec 2023 05:41), Max: 36.8 (20 Dec 2023 05:41)  T(F): 98.2 (20 Dec 2023 05:41), Max: 98.2 (20 Dec 2023 05:41)  HR: 69 (20 Dec 2023 05:41) (60 - 71)  BP: 112/65 (20 Dec 2023 05:41) (100/63 - 122/72)  BP(mean): --  RR: 18 (20 Dec 2023 05:41) (16 - 18)  SpO2: 93% (20 Dec 2023 05:41) (93% - 96%)    Parameters below as of 20 Dec 2023 05:41  Patient On (Oxygen Delivery Method): room air      CONSTITUTIONAL: NAD, well-developed, well-groomed  EYES: PERRLA; conjunctiva and sclera clear  ENMT: Moist oral mucosa, no pharyngeal injection or exudates; normal dentition  NECK: Supple, no palpable masses; no thyromegaly  RESPIRATORY: Normal respiratory effort; lungs are clear to auscultation bilaterally  CARDIOVASCULAR: Regular rate and rhythm, normal S1 and S2, no murmur/rub/gallop; No lower extremity edema; Peripheral pulses are 2+ bilaterally  ABDOMEN: Nontender to palpation, normoactive bowel sounds, no rebound/guarding; No hepatosplenomegaly  MUSCULOSKELETAL:  Normal gait; no clubbing or cyanosis of digits; no joint swelling or tenderness to palpation  PSYCH: A+O to person, place, and time; affect appropriate  NEUROLOGY: CN 2-12 are intact and symmetric; no gross sensory deficits   SKIN: No rashes; no palpable lesions    LABS:                        9.6    6.64  )-----------( 254      ( 20 Dec 2023 06:31 )             29.1     12-20    142  |  105  |  29<H>  ----------------------------<  94  4.2   |  27  |  1.28    Ca    8.9      20 Dec 2023 06:31  Phos  3.9     12-20  Mg     1.9     12-20    TPro  6.1  /  Alb  3.4  /  TBili  0.2  /  DBili  x   /  AST  15  /  ALT  8<L>  /  AlkPhos  75  12-20          Urinalysis Basic - ( 20 Dec 2023 06:31 )    Color: x / Appearance: x / SG: x / pH: x  Gluc: 94 mg/dL / Ketone: x  / Bili: x / Urobili: x   Blood: x / Protein: x / Nitrite: x   Leuk Esterase: x / RBC: x / WBC x   Sq Epi: x / Non Sq Epi: x / Bacteria: x          RADIOLOGY & ADDITIONAL TESTS:  Results Reviewed:   Imaging Personally Reviewed:  Electrocardiogram Personally Reviewed:    COORDINATION OF CARE:  Care Discussed with Consultants/Other Providers [Y/N]:  Prior or Outpatient Records Reviewed [Y/N]:   [facility] Division of Hospital Medicine  Elaine White MD  MS Teams PREFERRED        SUBJECTIVE / OVERNIGHT EVENTS:  ADDITIONAL REVIEW OF SYSTEMS:    MEDICATIONS  (STANDING):  atorvastatin 40 milliGRAM(s) Oral at bedtime  budesonide 160 MICROgram(s)/formoterol 4.5 MICROgram(s) Inhaler 2 Puff(s) Inhalation two times a day  levothyroxine 88 MICROGram(s) Oral daily  metoprolol tartrate 50 milliGRAM(s) Oral two times a day    MEDICATIONS  (PRN):  acetaminophen     Tablet .. 650 milliGRAM(s) Oral every 6 hours PRN Temp greater or equal to 38C (100.4F), Mild Pain (1 - 3)  albuterol    90 MICROgram(s) HFA Inhaler 2 Puff(s) Inhalation every 6 hours PRN Shortness of Breath and/or Wheezing  aluminum hydroxide/magnesium hydroxide/simethicone Suspension 30 milliLiter(s) Oral every 4 hours PRN Dyspepsia  melatonin 3 milliGRAM(s) Oral at bedtime PRN Insomnia  ondansetron Injectable 4 milliGRAM(s) IV Push every 8 hours PRN Nausea and/or Vomiting      I&O's Summary      PHYSICAL EXAM:  Vital Signs Last 24 Hrs  T(C): 36.8 (20 Dec 2023 05:41), Max: 36.8 (20 Dec 2023 05:41)  T(F): 98.2 (20 Dec 2023 05:41), Max: 98.2 (20 Dec 2023 05:41)  HR: 69 (20 Dec 2023 05:41) (60 - 71)  BP: 112/65 (20 Dec 2023 05:41) (100/63 - 122/72)  BP(mean): --  RR: 18 (20 Dec 2023 05:41) (16 - 18)  SpO2: 93% (20 Dec 2023 05:41) (93% - 96%)    Parameters below as of 20 Dec 2023 05:41  Patient On (Oxygen Delivery Method): room air      CONSTITUTIONAL: NAD, well-developed, well-groomed  EYES: PERRLA; conjunctiva and sclera clear  ENMT: Moist oral mucosa, no pharyngeal injection or exudates; normal dentition  NECK: Supple, no palpable masses; no thyromegaly  RESPIRATORY: Normal respiratory effort; lungs are clear to auscultation bilaterally  CARDIOVASCULAR: Regular rate and rhythm, normal S1 and S2, no murmur/rub/gallop; No lower extremity edema; Peripheral pulses are 2+ bilaterally  ABDOMEN: Nontender to palpation, normoactive bowel sounds, no rebound/guarding; No hepatosplenomegaly  MUSCULOSKELETAL:  Normal gait; no clubbing or cyanosis of digits; no joint swelling or tenderness to palpation  PSYCH: A+O to person, place, and time; affect appropriate  NEUROLOGY: CN 2-12 are intact and symmetric; no gross sensory deficits   SKIN: No rashes; no palpable lesions    LABS:                        9.6    6.64  )-----------( 254      ( 20 Dec 2023 06:31 )             29.1     12-20    142  |  105  |  29<H>  ----------------------------<  94  4.2   |  27  |  1.28    Ca    8.9      20 Dec 2023 06:31  Phos  3.9     12-20  Mg     1.9     12-20    TPro  6.1  /  Alb  3.4  /  TBili  0.2  /  DBili  x   /  AST  15  /  ALT  8<L>  /  AlkPhos  75  12-20          Urinalysis Basic - ( 20 Dec 2023 06:31 )    Color: x / Appearance: x / SG: x / pH: x  Gluc: 94 mg/dL / Ketone: x  / Bili: x / Urobili: x   Blood: x / Protein: x / Nitrite: x   Leuk Esterase: x / RBC: x / WBC x   Sq Epi: x / Non Sq Epi: x / Bacteria: x          RADIOLOGY & ADDITIONAL TESTS:  Results Reviewed:   Imaging Personally Reviewed:  Electrocardiogram Personally Reviewed:    COORDINATION OF CARE:  Care Discussed with Consultants/Other Providers [Y/N]:  Prior or Outpatient Records Reviewed [Y/N]:   [facility] Division of Hospital Medicine  Elaine White MD  MS Teams PREFERRED        SUBJECTIVE / OVERNIGHT EVENTS: Seen and examined at bedside. NAD.    MEDICATIONS  (STANDING):  atorvastatin 40 milliGRAM(s) Oral at bedtime  budesonide 160 MICROgram(s)/formoterol 4.5 MICROgram(s) Inhaler 2 Puff(s) Inhalation two times a day  levothyroxine 88 MICROGram(s) Oral daily  metoprolol tartrate 50 milliGRAM(s) Oral two times a day    MEDICATIONS  (PRN):  acetaminophen     Tablet .. 650 milliGRAM(s) Oral every 6 hours PRN Temp greater or equal to 38C (100.4F), Mild Pain (1 - 3)  albuterol    90 MICROgram(s) HFA Inhaler 2 Puff(s) Inhalation every 6 hours PRN Shortness of Breath and/or Wheezing  aluminum hydroxide/magnesium hydroxide/simethicone Suspension 30 milliLiter(s) Oral every 4 hours PRN Dyspepsia  melatonin 3 milliGRAM(s) Oral at bedtime PRN Insomnia  ondansetron Injectable 4 milliGRAM(s) IV Push every 8 hours PRN Nausea and/or Vomiting      I&O's Summary      PHYSICAL EXAM:  Vital Signs Last 24 Hrs  T(C): 36.8 (20 Dec 2023 05:41), Max: 36.8 (20 Dec 2023 05:41)  T(F): 98.2 (20 Dec 2023 05:41), Max: 98.2 (20 Dec 2023 05:41)  HR: 69 (20 Dec 2023 05:41) (60 - 71)  BP: 112/65 (20 Dec 2023 05:41) (100/63 - 122/72)  BP(mean): --  RR: 18 (20 Dec 2023 05:41) (16 - 18)  SpO2: 93% (20 Dec 2023 05:41) (93% - 96%)    Parameters below as of 20 Dec 2023 05:41  Patient On (Oxygen Delivery Method): room air      CONSTITUTIONAL: NAD, well-developed, well-groomed  EYES: PERRLA; conjunctiva and sclera clear  ENMT: Moist oral mucosa, no pharyngeal injection or exudates;  NECK: Supple, no palpable masses; no thyromegaly  RESPIRATORY: Normal respiratory effort; lungs are clear to auscultation bilaterally  CARDIOVASCULAR: Regular rate and rhythm, normal S1 and S2, no murmur/rub/gallop; No lower extremity edema;   ABDOMEN: Nontender to palpation, normoactive bowel sounds, no rebound/guarding;   MUSCULOSKELETAL:  Normal gait; no clubbing or cyanosis of digits; no joint swelling or tenderness to palpation  PSYCH: A+O to person, place, and time; affect appropriate  NEUROLOGY: CN 2-12 are intact and symmetric; no gross sensory deficits   SKIN: No rashes; no palpable lesions    LABS:                        9.6    6.64  )-----------( 254      ( 20 Dec 2023 06:31 )             29.1     12-20    142  |  105  |  29<H>  ----------------------------<  94  4.2   |  27  |  1.28    Ca    8.9      20 Dec 2023 06:31  Phos  3.9     12-20  Mg     1.9     12-20    TPro  6.1  /  Alb  3.4  /  TBili  0.2  /  DBili  x   /  AST  15  /  ALT  8<L>  /  AlkPhos  75  12-20          Urinalysis Basic - ( 20 Dec 2023 06:31 )    Color: x / Appearance: x / SG: x / pH: x  Gluc: 94 mg/dL / Ketone: x  / Bili: x / Urobili: x   Blood: x / Protein: x / Nitrite: x   Leuk Esterase: x / RBC: x / WBC x   Sq Epi: x / Non Sq Epi: x / Bacteria: x     [facility] Division of Hospital Medicine  Elaine White MD  MS Teams PREFERRED        SUBJECTIVE / OVERNIGHT EVENTS: Seen and examined at bedside. NAD.    MEDICATIONS  (STANDING):  atorvastatin 40 milliGRAM(s) Oral at bedtime  budesonide 160 MICROgram(s)/formoterol 4.5 MICROgram(s) Inhaler 2 Puff(s) Inhalation two times a day  levothyroxine 88 MICROGram(s) Oral daily  metoprolol tartrate 50 milliGRAM(s) Oral two times a day    MEDICATIONS  (PRN):  acetaminophen     Tablet .. 650 milliGRAM(s) Oral every 6 hours PRN Temp greater or equal to 38C (100.4F), Mild Pain (1 - 3)  albuterol    90 MICROgram(s) HFA Inhaler 2 Puff(s) Inhalation every 6 hours PRN Shortness of Breath and/or Wheezing  aluminum hydroxide/magnesium hydroxide/simethicone Suspension 30 milliLiter(s) Oral every 4 hours PRN Dyspepsia  melatonin 3 milliGRAM(s) Oral at bedtime PRN Insomnia  ondansetron Injectable 4 milliGRAM(s) IV Push every 8 hours PRN Nausea and/or Vomiting      I&O's Summary      PHYSICAL EXAM:  Vital Signs Last 24 Hrs  T(C): 36.8 (20 Dec 2023 05:41), Max: 36.8 (20 Dec 2023 05:41)  T(F): 98.2 (20 Dec 2023 05:41), Max: 98.2 (20 Dec 2023 05:41)  HR: 69 (20 Dec 2023 05:41) (60 - 71)  BP: 112/65 (20 Dec 2023 05:41) (100/63 - 122/72)  BP(mean): --  RR: 18 (20 Dec 2023 05:41) (16 - 18)  SpO2: 93% (20 Dec 2023 05:41) (93% - 96%)    Parameters below as of 20 Dec 2023 05:41  Patient On (Oxygen Delivery Method): room air      CONSTITUTIONAL: NAD, well-developed, well-groomed  EYES: PERRLA; conjunctiva and sclera clear  ENMT: Moist oral mucosa, no pharyngeal injection or exudates;  NECK: Supple, no palpable masses; no thyromegaly  RESPIRATORY: Normal respiratory effort; lungs are clear to auscultation bilaterally  CARDIOVASCULAR: Regular rate and rhythm, normal S1 and S2, no murmur/rub/gallop; No lower extremity edema;   ABDOMEN: Nontender to palpation, normoactive bowel sounds, no rebound/guarding;   MUSCULOSKELETAL:  Normal gait; no clubbing or cyanosis of digits; no joint swelling or tenderness to palpation  PSYCH: A+O to person, place, and time; affect appropriate  NEUROLOGY: CN 2-12 are intact and symmetric; no gross sensory deficits   SKIN: No rashes; no palpable lesions    LABS:                        9.6    6.64  )-----------( 254      ( 20 Dec 2023 06:31 )             29.1     12-20    142  |  105  |  29<H>  ----------------------------<  94  4.2   |  27  |  1.28    Ca    8.9      20 Dec 2023 06:31  Phos  3.9     12-20  Mg     1.9     12-20    TPro  6.1  /  Alb  3.4  /  TBili  0.2  /  DBili  x   /  AST  15  /  ALT  8<L>  /  AlkPhos  75  12-20          Urinalysis Basic - ( 20 Dec 2023 06:31 )    Color: x / Appearance: x / SG: x / pH: x  Gluc: 94 mg/dL / Ketone: x  / Bili: x / Urobili: x   Blood: x / Protein: x / Nitrite: x   Leuk Esterase: x / RBC: x / WBC x   Sq Epi: x / Non Sq Epi: x / Bacteria: x

## 2023-12-20 NOTE — PROGRESS NOTE ADULT - PROBLEM SELECTOR PLAN 1
-DDx includes progression of severe MS, dehydration and decreased cardiac output iso diuretic use, less likely to be AE-COPD.  -currently not requiring oxygen. On exam, appears dry.   -will hold off diuretics for now  -consult structural heart in the morning (follows with Dr. Tirso Garcia) Odomzo Counseling- I discussed with the patient the risks of Odomzo including but not limited to nausea, vomiting, diarrhea, constipation, weight loss, changes in the sense of taste, decreased appetite, muscle spasms, and hair loss.  The patient verbalized understanding of the proper use and possible adverse effects of Odomzo.  All of the patient's questions and concerns were addressed.

## 2023-12-21 DIAGNOSIS — I05.0 RHEUMATIC MITRAL STENOSIS: ICD-10-CM

## 2023-12-21 LAB
ANION GAP SERPL CALC-SCNC: 7 MMOL/L — SIGNIFICANT CHANGE UP (ref 5–17)
ANION GAP SERPL CALC-SCNC: 7 MMOL/L — SIGNIFICANT CHANGE UP (ref 5–17)
BUN SERPL-MCNC: 23 MG/DL — SIGNIFICANT CHANGE UP (ref 7–23)
BUN SERPL-MCNC: 23 MG/DL — SIGNIFICANT CHANGE UP (ref 7–23)
CALCIUM SERPL-MCNC: 9.7 MG/DL — SIGNIFICANT CHANGE UP (ref 8.4–10.5)
CALCIUM SERPL-MCNC: 9.7 MG/DL — SIGNIFICANT CHANGE UP (ref 8.4–10.5)
CHLORIDE SERPL-SCNC: 103 MMOL/L — SIGNIFICANT CHANGE UP (ref 96–108)
CHLORIDE SERPL-SCNC: 103 MMOL/L — SIGNIFICANT CHANGE UP (ref 96–108)
CO2 SERPL-SCNC: 26 MMOL/L — SIGNIFICANT CHANGE UP (ref 22–31)
CO2 SERPL-SCNC: 26 MMOL/L — SIGNIFICANT CHANGE UP (ref 22–31)
CREAT SERPL-MCNC: 1 MG/DL — SIGNIFICANT CHANGE UP (ref 0.5–1.3)
CREAT SERPL-MCNC: 1 MG/DL — SIGNIFICANT CHANGE UP (ref 0.5–1.3)
EGFR: 57 ML/MIN/1.73M2 — LOW
EGFR: 57 ML/MIN/1.73M2 — LOW
GLUCOSE SERPL-MCNC: 91 MG/DL — SIGNIFICANT CHANGE UP (ref 70–99)
GLUCOSE SERPL-MCNC: 91 MG/DL — SIGNIFICANT CHANGE UP (ref 70–99)
HCT VFR BLD CALC: 31.5 % — LOW (ref 34.5–45)
HCT VFR BLD CALC: 31.5 % — LOW (ref 34.5–45)
HGB BLD-MCNC: 10.4 G/DL — LOW (ref 11.5–15.5)
HGB BLD-MCNC: 10.4 G/DL — LOW (ref 11.5–15.5)
MCHC RBC-ENTMCNC: 31.3 PG — SIGNIFICANT CHANGE UP (ref 27–34)
MCHC RBC-ENTMCNC: 31.3 PG — SIGNIFICANT CHANGE UP (ref 27–34)
MCHC RBC-ENTMCNC: 33 GM/DL — SIGNIFICANT CHANGE UP (ref 32–36)
MCHC RBC-ENTMCNC: 33 GM/DL — SIGNIFICANT CHANGE UP (ref 32–36)
MCV RBC AUTO: 94.9 FL — SIGNIFICANT CHANGE UP (ref 80–100)
MCV RBC AUTO: 94.9 FL — SIGNIFICANT CHANGE UP (ref 80–100)
NRBC # BLD: 0 /100 WBCS — SIGNIFICANT CHANGE UP (ref 0–0)
NRBC # BLD: 0 /100 WBCS — SIGNIFICANT CHANGE UP (ref 0–0)
PLATELET # BLD AUTO: 261 K/UL — SIGNIFICANT CHANGE UP (ref 150–400)
PLATELET # BLD AUTO: 261 K/UL — SIGNIFICANT CHANGE UP (ref 150–400)
POTASSIUM SERPL-MCNC: 4.2 MMOL/L — SIGNIFICANT CHANGE UP (ref 3.5–5.3)
POTASSIUM SERPL-MCNC: 4.2 MMOL/L — SIGNIFICANT CHANGE UP (ref 3.5–5.3)
POTASSIUM SERPL-SCNC: 4.2 MMOL/L — SIGNIFICANT CHANGE UP (ref 3.5–5.3)
POTASSIUM SERPL-SCNC: 4.2 MMOL/L — SIGNIFICANT CHANGE UP (ref 3.5–5.3)
RBC # BLD: 3.32 M/UL — LOW (ref 3.8–5.2)
RBC # BLD: 3.32 M/UL — LOW (ref 3.8–5.2)
RBC # FLD: 13.6 % — SIGNIFICANT CHANGE UP (ref 10.3–14.5)
RBC # FLD: 13.6 % — SIGNIFICANT CHANGE UP (ref 10.3–14.5)
SODIUM SERPL-SCNC: 136 MMOL/L — SIGNIFICANT CHANGE UP (ref 135–145)
SODIUM SERPL-SCNC: 136 MMOL/L — SIGNIFICANT CHANGE UP (ref 135–145)
WBC # BLD: 6.6 K/UL — SIGNIFICANT CHANGE UP (ref 3.8–10.5)
WBC # BLD: 6.6 K/UL — SIGNIFICANT CHANGE UP (ref 3.8–10.5)
WBC # FLD AUTO: 6.6 K/UL — SIGNIFICANT CHANGE UP (ref 3.8–10.5)
WBC # FLD AUTO: 6.6 K/UL — SIGNIFICANT CHANGE UP (ref 3.8–10.5)

## 2023-12-21 PROCEDURE — 99232 SBSQ HOSP IP/OBS MODERATE 35: CPT

## 2023-12-21 PROCEDURE — 99497 ADVNCD CARE PLAN 30 MIN: CPT

## 2023-12-21 RX ORDER — FUROSEMIDE 40 MG
40 TABLET ORAL DAILY
Refills: 0 | Status: DISCONTINUED | OUTPATIENT
Start: 2023-12-21 | End: 2023-12-22

## 2023-12-21 RX ORDER — INFLUENZA VIRUS VACCINE 15; 15; 15; 15 UG/.5ML; UG/.5ML; UG/.5ML; UG/.5ML
0.7 SUSPENSION INTRAMUSCULAR ONCE
Refills: 0 | Status: DISCONTINUED | OUTPATIENT
Start: 2023-12-21 | End: 2023-12-22

## 2023-12-21 RX ADMIN — Medication 50 MILLIGRAM(S): at 17:31

## 2023-12-21 RX ADMIN — BUDESONIDE AND FORMOTEROL FUMARATE DIHYDRATE 2 PUFF(S): 160; 4.5 AEROSOL RESPIRATORY (INHALATION) at 17:33

## 2023-12-21 RX ADMIN — BUDESONIDE AND FORMOTEROL FUMARATE DIHYDRATE 2 PUFF(S): 160; 4.5 AEROSOL RESPIRATORY (INHALATION) at 06:40

## 2023-12-21 RX ADMIN — ATORVASTATIN CALCIUM 40 MILLIGRAM(S): 80 TABLET, FILM COATED ORAL at 22:06

## 2023-12-21 RX ADMIN — Medication 40 MILLIGRAM(S): at 17:31

## 2023-12-21 RX ADMIN — Medication 88 MICROGRAM(S): at 06:40

## 2023-12-21 RX ADMIN — Medication 50 MILLIGRAM(S): at 06:40

## 2023-12-21 NOTE — PROGRESS NOTE ADULT - SUBJECTIVE AND OBJECTIVE BOX
[facility] Division of Hospital Medicine  Rocio Menendez MD  Available via MS Teams      SUBJECTIVE / OVERNIGHT EVENTS: No acute events overnight. Pt reports baseline shortness of breath. States that she saw her doctor Dr. Garcia who was working her up outpatient. No cough or orthopnea     ADDITIONAL REVIEW OF SYSTEMS: ROS otherwise negative     MEDICATIONS  (STANDING):  atorvastatin 40 milliGRAM(s) Oral at bedtime  budesonide 160 MICROgram(s)/formoterol 4.5 MICROgram(s) Inhaler 2 Puff(s) Inhalation two times a day  influenza  Vaccine (HIGH DOSE) 0.7 milliLiter(s) IntraMuscular once  levothyroxine 88 MICROGram(s) Oral daily  metoprolol tartrate 50 milliGRAM(s) Oral two times a day    MEDICATIONS  (PRN):  acetaminophen     Tablet .. 650 milliGRAM(s) Oral every 6 hours PRN Temp greater or equal to 38C (100.4F), Mild Pain (1 - 3)  albuterol    90 MICROgram(s) HFA Inhaler 2 Puff(s) Inhalation every 6 hours PRN Shortness of Breath and/or Wheezing  aluminum hydroxide/magnesium hydroxide/simethicone Suspension 30 milliLiter(s) Oral every 4 hours PRN Dyspepsia  melatonin 3 milliGRAM(s) Oral at bedtime PRN Insomnia  ondansetron Injectable 4 milliGRAM(s) IV Push every 8 hours PRN Nausea and/or Vomiting      I&O's Summary    21 Dec 2023 07:01  -  21 Dec 2023 14:01  --------------------------------------------------------  IN: 480 mL / OUT: 0 mL / NET: 480 mL        PHYSICAL EXAM:  Vital Signs Last 24 Hrs  T(C): 36.9 (21 Dec 2023 13:25), Max: 36.9 (21 Dec 2023 13:25)  T(F): 98.5 (21 Dec 2023 13:25), Max: 98.5 (21 Dec 2023 13:25)  HR: 55 (21 Dec 2023 13:25) (55 - 75)  BP: 134/69 (21 Dec 2023 13:25) (120/68 - 158/74)  BP(mean): --  RR: 18 (21 Dec 2023 13:25) (18 - 18)  SpO2: 94% (21 Dec 2023 13:25) (90% - 97%)    Parameters below as of 21 Dec 2023 13:25  Patient On (Oxygen Delivery Method): room air      CONSTITUTIONAL: NAD, sitting up in bed   EYES: Conjunctiva and sclera clear  ENMT: Moist oral mucosa  NECK: Supple  RESPIRATORY: Decreased breath sounds b/l   CARDIOVASCULAR: Regular rate and rhythm, normal S1 and S2  ABDOMEN: Nontender to palpation, normoactive bowel sounds  MUSCULOSKELETAL:  Normal gait  PSYCH: A+O to person, place, and time  SKIN: No rashes    LABS:                        10.4   6.60  )-----------( 261      ( 21 Dec 2023 05:35 )             31.5     12-21    136  |  103  |  23  ----------------------------<  91  4.2   |  26  |  1.00    Ca    9.7      21 Dec 2023 05:35  Phos  3.9     12-20  Mg     1.9     12-20    TPro  6.1  /  Alb  3.4  /  TBili  0.2  /  DBili  x   /  AST  15  /  ALT  8<L>  /  AlkPhos  75  12-20             [facility] Division of Hospital Medicine  Rocio Menendez MD  Available via MS Teams      SUBJECTIVE / OVERNIGHT EVENTS: No acute events overnight. Pt reports baseline shortness of breath. States that she saw her doctor Dr. Garcia who was working her up outpatient. No cough or orthopnea     ADDITIONAL REVIEW OF SYSTEMS: ROS otherwise negative     MEDICATIONS  (STANDING):  atorvastatin 40 milliGRAM(s) Oral at bedtime  budesonide 160 MICROgram(s)/formoterol 4.5 MICROgram(s) Inhaler 2 Puff(s) Inhalation two times a day  influenza  Vaccine (HIGH DOSE) 0.7 milliLiter(s) IntraMuscular once  levothyroxine 88 MICROGram(s) Oral daily  metoprolol tartrate 50 milliGRAM(s) Oral two times a day    MEDICATIONS  (PRN):  acetaminophen     Tablet .. 650 milliGRAM(s) Oral every 6 hours PRN Temp greater or equal to 38C (100.4F), Mild Pain (1 - 3)  albuterol    90 MICROgram(s) HFA Inhaler 2 Puff(s) Inhalation every 6 hours PRN Shortness of Breath and/or Wheezing  aluminum hydroxide/magnesium hydroxide/simethicone Suspension 30 milliLiter(s) Oral every 4 hours PRN Dyspepsia  melatonin 3 milliGRAM(s) Oral at bedtime PRN Insomnia  ondansetron Injectable 4 milliGRAM(s) IV Push every 8 hours PRN Nausea and/or Vomiting      I&O's Summary    21 Dec 2023 07:01  -  21 Dec 2023 14:01  --------------------------------------------------------  IN: 480 mL / OUT: 0 mL / NET: 480 mL        PHYSICAL EXAM:  Vital Signs Last 24 Hrs  T(C): 36.9 (21 Dec 2023 13:25), Max: 36.9 (21 Dec 2023 13:25)  T(F): 98.5 (21 Dec 2023 13:25), Max: 98.5 (21 Dec 2023 13:25)  HR: 55 (21 Dec 2023 13:25) (55 - 75)  BP: 134/69 (21 Dec 2023 13:25) (120/68 - 158/74)  BP(mean): --  RR: 18 (21 Dec 2023 13:25) (18 - 18)  SpO2: 94% (21 Dec 2023 13:25) (90% - 97%)    Parameters below as of 21 Dec 2023 13:25  Patient On (Oxygen Delivery Method): room air      CONSTITUTIONAL: NAD, sitting up in bed   EYES: Conjunctiva and sclera clear  ENMT: Moist oral mucosa  NECK: Supple  RESPIRATORY: Decreased breath sounds b/l   CARDIOVASCULAR: Regular rate and rhythm, normal S1 and S2  ABDOMEN: Nontender to palpation, normoactive bowel sounds  MUSCULOSKELETAL:  Normal gait  PSYCH: A+O to person, place, and time  SKIN: No rashes    LABS:                        10.4   6.60  )-----------( 261      ( 21 Dec 2023 05:35 )             31.5     12-21    136  |  103  |  23  ----------------------------<  91  4.2   |  26  |  1.00    Ca    9.7      21 Dec 2023 05:35  Phos  3.9     12-20  Mg     1.9     12-20    TPro  6.1  /  Alb  3.4  /  TBili  0.2  /  DBili  x   /  AST  15  /  ALT  8<L>  /  AlkPhos  75  12-20

## 2023-12-21 NOTE — CONSULT NOTE ADULT - SUBJECTIVE AND OBJECTIVE BOX
Interventional Radiology    Evaluate for Procedure: Thoracentesis    HPI: 78F PMHx COPD (not on home o2), HTN/HLD, and hypothyroid presents from Pulmonology office for increased SOB noted to have fluid overload admitted and diuresed. Now appears dry on physical exam by primary team. CXR noted with bilateral small to moderate pleural effusions, on room air. Of note, R-sided thoracentesis on 11/11 w/ removal of 500cc fluid. She had TTE done, which showed normal LVEF, but severe MS. IR now consulted by primary team for thoracentesis.     Allergies: No Known Allergies    Medications (Abx/Cardiac/Anticoagulation/Blood Products)  metoprolol tartrate: 50 milliGRAM(s) Oral (12-21 @ 06:40)    Data:  T(C): 36.5  HR: 59  BP: 120/68  RR: 18  SpO2: 93%    -WBC 6.60 / HgB 10.4 / Hct 31.5 / Plt 261  -Na 136 / Cl 103 / BUN 23 / Glucose 91  -K 4.2 / CO2 26 / Cr 1.00  -ALT -- / Alk Phos -- / T.Bili --  -INR 0.93 / PTT 30.8    Radiology: Reviewed with small to moderate bilateral pleural effusions     Assessment/Plan: 78F PMHx COPD (not on home o2), HTN/HLD, and hypothyroid presents from Pulmonology office for increased SOB noted to have fluid overload admitted and diuresed. Now appears dry on physical exam by primary team. CXR noted with bilateral small to moderate pleural effusions, on room air. Of note, R-sided thoracentesis on 11/11 w/ removal of 500cc fluid. She had TTE done, which showed normal LVEF, but severe MS. IR now consulted by primary team for therapeutic thoracentesis.     - case reviewed by Dr. Grimaldo, patient currently tolerating room air, dyspnea improved per primary team  - please contact inpatient ultrasound or pulmonary team for drainage, if indicated   - no intervention from IR at this time  - please reconsult as necessary   - plan discussed with primary team

## 2023-12-21 NOTE — PROGRESS NOTE ADULT - ASSESSMENT
78F admitted for worsening BARBOSA, likely d/t severe MS, iso COPD as well.  78F admitted for worsening BABROSA, likely d/t severe MS, iso COPD as well.  Home

## 2023-12-21 NOTE — PROGRESS NOTE ADULT - PROBLEM SELECTOR PLAN 2
-baseline SCr 0.9-1, eGFR in 50s -->peaked at 1.39, resolved  - hold losartan   - restart PO lasix 40mg qd in setting of pleural effusion   -monitor kidney function

## 2023-12-21 NOTE — CONSULT NOTE ADULT - PROBLEM SELECTOR RECOMMENDATION 9
Lengthly discussion at [facility] with Ms. Luong regarding her diagnosis of MS and the potential treatment options. I explained that at this time the current transcatheter treatment options for mitral stenosis are limited and therapies are directed toward mitral regurgitation and not isolated mitral stenosis. There is a clinical trial that we are participating In (APOLLO transcatheter mitral valve replacement) that will consider patients that have MS as well as at least moderate MR. Her MR was graded as mild on TTE.   I have reviewed her TTE imaging with Dr. Alvarado. We will need a COSME to further evaluate her mitral anatomy and quantify her degree of both MS and MR before deciding on potential treatment options.   I will review her case with our CTS team to determine if she is a candidate for surgical mitral valve replacement.   Final treatment recommendations pending review of COSME    SHADI Lopez NP  [id number]  Available on TEAMS Lengthly discussion at [facility] with Ms. Luong regarding her diagnosis of MS and the potential treatment options. I explained that at this time the current transcatheter treatment options for mitral stenosis are limited and therapies are directed toward mitral regurgitation and not isolated mitral stenosis. There is a clinical trial that we are participating In (APOLLO transcatheter mitral valve replacement) that will consider patients that have MS as well as at least moderate MR. Her MR was graded as mild on TTE.   I have reviewed her TTE imaging with Dr. Alvarado. We will need a COSME to further evaluate her mitral anatomy and quantify her degree of both MS and MR before deciding on potential treatment options.   I will review her case with our CTS team to determine if she is a candidate for surgical mitral valve replacement.   Final treatment recommendations pending review of COSME    SHADI Lopez NP  [id number]  Available on TEAMS

## 2023-12-21 NOTE — PROGRESS NOTE ADULT - CONVERSATION DETAILS
Pt reports that if something were to happen to her, she would want the medical team to try ti resuscitate but she would not want to be on life support which she has discussed with her son.

## 2023-12-21 NOTE — CONSULT NOTE ADULT - SUBJECTIVE AND OBJECTIVE BOX
HPI:  78F PMHx COPD (not on home o2), HTN/HLD, and hypothyroid.    Was recently admitted to this hospital for sepsis d/t CAP and AE-COPD, also found w/ YADI on CKD and UTI. Received R-sided thoracentesis on 11/11 w/ removal of 500cc fluid. She had TTE done, which showed normal LVEF, but severe MS. Pt was discharged.     Today, pt went to see outpatient pulmonology Dr. Fay Muir, and was referred to ED for worsening SOB, concern for fluid overload. Reports chronic SOB, but recently worse with ambulation.      (19 Dec 2023 23:20)    Consulted for evaluation of mitral stenosis noted on TTE in November. Ms. Torres has been following with CTS Dr. Garcia for monitoring of stable aortic atheroma since 2020. She was recently referred back to him for evaluation of mitral stenosis found on TTE following hospitalization in November for UTI, CAP, CHF, and YADI on CKD at which time she required thoracentesis for pleural effusion. She was referred by Dr. Garcia to our outpatient valve clinic for consideration of catheter based mitral intervention however presented to the ED at the request of her pulmonologist for progressive dyspnea.     She has a history of COPD and still smokes on occasion, she does not require supplemental O2. She reports a roughly six month progression of exertional dyspnea and intermittent ankle edema that has improved since her diuretic regimen was increased to Furosemide 40mg BID after her last hospitalization. She remains active in her home but notes she is "more cautious of overexerting" herself and has slowed her pace as a result. She has no angina, PND, orthopnea, dizziness, or syncope.       PAST MEDICAL & SURGICAL HISTORY:  History of COPD  No home O2 use      Asthma      Thyroid nodule      Hyperlipidemia      Hypertension      Hypothyroidism      History of cholecystectomy  1989      History of repair of hip fracture  ORIF 1982.  Hardware was eventually removed          REVIEW OF SYSTEMS:    CONSTITUTIONAL: No weakness, fevers or chills  EYES/ENT: No visual changes;  No vertigo or throat pain   NECK: No pain or stiffness  RESPIRATORY: No cough, wheezing, hemoptysis; No shortness of breath at rest  CARDIOVASCULAR: No chest pain or palpitations, PND, orthopnea, or dizziness, (+) exertional dizziness, (+) intermittent ankle edema  GASTROINTESTINAL: No abdominal or epigastric pain. No nausea, vomiting, or hematemesis; No diarrhea or constipation. No melena or hematochezia.  GENITOURINARY: No dysuria, frequency or hematuria  NEUROLOGICAL: No numbness or weakness  SKIN: No itching, rashes      MEDICATIONS  (STANDING):  atorvastatin 40 milliGRAM(s) Oral at bedtime  budesonide 160 MICROgram(s)/formoterol 4.5 MICROgram(s) Inhaler 2 Puff(s) Inhalation two times a day  furosemide    Tablet 40 milliGRAM(s) Oral daily  influenza  Vaccine (HIGH DOSE) 0.7 milliLiter(s) IntraMuscular once  levothyroxine 88 MICROGram(s) Oral daily  metoprolol tartrate 50 milliGRAM(s) Oral two times a day    MEDICATIONS  (PRN):  acetaminophen     Tablet .. 650 milliGRAM(s) Oral every 6 hours PRN Temp greater or equal to 38C (100.4F), Mild Pain (1 - 3)  albuterol    90 MICROgram(s) HFA Inhaler 2 Puff(s) Inhalation every 6 hours PRN Shortness of Breath and/or Wheezing  aluminum hydroxide/magnesium hydroxide/simethicone Suspension 30 milliLiter(s) Oral every 4 hours PRN Dyspepsia  melatonin 3 milliGRAM(s) Oral at bedtime PRN Insomnia  ondansetron Injectable 4 milliGRAM(s) IV Push every 8 hours PRN Nausea and/or Vomiting      Allergies    No Known Allergies    Intolerances        SOCIAL HISTORY: , current smoker     FAMILY HISTORY: mother - heart disease      Vital Signs Last 24 Hrs  T(C): 36.9 (21 Dec 2023 13:25), Max: 36.9 (21 Dec 2023 13:25)  T(F): 98.5 (21 Dec 2023 13:25), Max: 98.5 (21 Dec 2023 13:25)  HR: 55 (21 Dec 2023 13:25) (55 - 75)  BP: 134/69 (21 Dec 2023 13:25) (120/68 - 158/74)  BP(mean): --  RR: 18 (21 Dec 2023 13:25) (18 - 18)  SpO2: 94% (21 Dec 2023 13:25) (90% - 97%)    Parameters below as of 21 Dec 2023 13:25  Patient On (Oxygen Delivery Method): room air        Physical Exam  General: A/ox 3, No acute Distress  Neck: Supple, NO JVD  Cardiac: S1 S2, No M/R/G  Pulmonary: Breathing unlabored, decreased breath sounds bilaterally No Rhonchi/Rales/Wheezing  Abdomen: Soft, Non -tender, +BS x 4 quads  Extremities: No Rashes, No edema  Neuro: A/o x 3, No focal deficits    LABS:                        10.4   6.60  )-----------( 261      ( 21 Dec 2023 05:35 )             31.5     12-21    136  |  103  |  23  ----------------------------<  91  4.2   |  26  |  1.00    Ca    9.7      21 Dec 2023 05:35  Phos  3.9     12-20  Mg     1.9     12-20    TPro  6.1  /  Alb  3.4  /  TBili  0.2  /  DBili  x   /  AST  15  /  ALT  8<L>  /  AlkPhos  75  12-20      Urinalysis Basic - ( 21 Dec 2023 05:35 )    Color: x / Appearance: x / SG: x / pH: x  Gluc: 91 mg/dL / Ketone: x  / Bili: x / Urobili: x   Blood: x / Protein: x / Nitrite: x   Leuk Esterase: x / RBC: x / WBC x   Sq Epi: x / Non Sq Epi: x / Bacteria: x        RADIOLOGY & ADDITIONAL STUDIES:  < from: TTE W or WO Ultrasound Enhancing Agent (11.10.23 @ 13:54) >  TRANSTHORACIC ECHOCARDIOGRAM REPORT  ________________________________________________________________________________                                      _______       Pt. Name:       SANJUANA TORRES Study Date:    11/10/2023  MRN:            [id number]     YOB: 1945  Accession #:    [id number]    Age:           78 years  Account#:       [id number]   Gender:        F  Heart Rate:                  Height:        62.99 in (160.00 cm)  Rhythm:                      Weight:        121.25 lb (55.00 kg)  Blood Pressure: 114/66 mmHg  BSA/BMI:       1.56 m² / 21.48 kg/m²  ________________________________________________________________________________________  Referring Physician:    [id number] Stu Tolentino  Interpreting Physician: Chantel Salguero MD  Primary Sonographer:    Ita Dwyer ELISEO    CPT:               ECHO TTE WO CON COMP W DOPP - 13758.m  Indication(s):     Dyspnea, unspecified - R06.00  Procedure:         Transthoracic echocardiogram with 2-D, M-mode and complete                     spectral and color flow Doppler.  Ordering Location: [facility]    _______________________________________________________________________________________     CONCLUSIONS:      1. Left ventricular systolic function is hyperdynamic withan ejection fraction of 69 % by Shepard's method of disks.   2. Mild to moderate left ventricular hypertrophy.   3. Normal right ventricular cavity size, wall thickness, and systolic function.   4. The left atrium is severely dilated in size.   5. The right atrium is normal in size.   6. The mitral valve leaflets are calcified. Severe mitral valve stenosis is present with a mean transmitral valve gradient = 20 mmHg at a HR of 89bpm.   7. Mild mitral regurgitation.   8. The aortic valve appears calcified with grossly normal opening.   9. Trace tricuspid regurgitation.  10. Trace pulmonic regurgitation.  11. The inferior vena cava is normal in size measuring 1.80 cm in diameter, (normal <2.1cm) with normal inspiratory collapse (normal >50%) consistent with normal right atrial pressure (~3, range 0-5mmHg).  12. Trace pericardial effusion.    ________________________________________________________________________________________  FINDINGS:     Left Ventricle:  Left ventricular systolic function is hyperdynamic with a calculated ejection fraction of 69 % by the Shepard's biplane method of disks. Mild to moderate left ventricular hypertrophy.     Right Ventricle:  The right ventricular cavity is normal in size, normal wall thickness and normal systolic function.     Left Atrium:  The left atrium is severely dilated in size with an indexed volume of 83.16 ml/m².     Right Atrium:  The right atrium is normal in size with an indexed volume of 16.63 ml/m².     Aortic Valve:  There is mild calcification of the aortic valve leaflets. The aortic valve appears calcified with grossly normal opening.     Mitral Valve:  There is diffuse mitral valve thickening. There is reduced leaflet mobility of the mitral valve. The mitral valve leaflets are calcified. Severe mitral valve stenosis is present with a mean transmitral valve gradient = 20 mmHg at a HR of 89bpm. There is severe leaflet calcification. There is severe mitral valve stenosis. The transmitral peak gradient is 36.7 mmHg and mean gradient is 23.00 mmHg. There is mild mitral regurgitation.     Tricuspid Valve:  There is trace tricuspid regurgitation. Estimated pulmonary artery systolic pressure is 45 mmHg.     Pulmonic Valve:  Structurally normal pulmonic valve with normal leaflet excursion. There is trace pulmonic regurgitation.     Aorta:  The aortic root at the sinuses of Valsalva is normal in size, measuring 3.00 cm (indexed 1.92 cm/m²).     Pericardium:  There is a trace pericardial effusion.     Pleura:  Moderate bilateral pleural effusion noted.     Systemic Veins:  The inferior vena cava is normal in size measuring 1.80 cm in diameter, (normal <2.1cm) with normal inspiratory collapse (normal >50%) consistent with normal right atrial pressure (~3, range 0-5mmHg).    < end of copied text >   HPI:  78F PMHx COPD (not on home o2), HTN/HLD, and hypothyroid.    Was recently admitted to this hospital for sepsis d/t CAP and AE-COPD, also found w/ YADI on CKD and UTI. Received R-sided thoracentesis on 11/11 w/ removal of 500cc fluid. She had TTE done, which showed normal LVEF, but severe MS. Pt was discharged.     Today, pt went to see outpatient pulmonology Dr. Fay Muir, and was referred to ED for worsening SOB, concern for fluid overload. Reports chronic SOB, but recently worse with ambulation.      (19 Dec 2023 23:20)    Consulted for evaluation of mitral stenosis noted on TTE in November. Ms. Torres has been following with CTS Dr. Garcia for monitoring of stable aortic atheroma since 2020. She was recently referred back to him for evaluation of mitral stenosis found on TTE following hospitalization in November for UTI, CAP, CHF, and YADI on CKD at which time she required thoracentesis for pleural effusion. She was referred by Dr. Garcia to our outpatient valve clinic for consideration of catheter based mitral intervention however presented to the ED at the request of her pulmonologist for progressive dyspnea.     She has a history of COPD and still smokes on occasion, she does not require supplemental O2. She reports a roughly six month progression of exertional dyspnea and intermittent ankle edema that has improved since her diuretic regimen was increased to Furosemide 40mg BID after her last hospitalization. She remains active in her home but notes she is "more cautious of overexerting" herself and has slowed her pace as a result. She has no angina, PND, orthopnea, dizziness, or syncope.       PAST MEDICAL & SURGICAL HISTORY:  History of COPD  No home O2 use      Asthma      Thyroid nodule      Hyperlipidemia      Hypertension      Hypothyroidism      History of cholecystectomy  1989      History of repair of hip fracture  ORIF 1982.  Hardware was eventually removed          REVIEW OF SYSTEMS:    CONSTITUTIONAL: No weakness, fevers or chills  EYES/ENT: No visual changes;  No vertigo or throat pain   NECK: No pain or stiffness  RESPIRATORY: No cough, wheezing, hemoptysis; No shortness of breath at rest  CARDIOVASCULAR: No chest pain or palpitations, PND, orthopnea, or dizziness, (+) exertional dizziness, (+) intermittent ankle edema  GASTROINTESTINAL: No abdominal or epigastric pain. No nausea, vomiting, or hematemesis; No diarrhea or constipation. No melena or hematochezia.  GENITOURINARY: No dysuria, frequency or hematuria  NEUROLOGICAL: No numbness or weakness  SKIN: No itching, rashes      MEDICATIONS  (STANDING):  atorvastatin 40 milliGRAM(s) Oral at bedtime  budesonide 160 MICROgram(s)/formoterol 4.5 MICROgram(s) Inhaler 2 Puff(s) Inhalation two times a day  furosemide    Tablet 40 milliGRAM(s) Oral daily  influenza  Vaccine (HIGH DOSE) 0.7 milliLiter(s) IntraMuscular once  levothyroxine 88 MICROGram(s) Oral daily  metoprolol tartrate 50 milliGRAM(s) Oral two times a day    MEDICATIONS  (PRN):  acetaminophen     Tablet .. 650 milliGRAM(s) Oral every 6 hours PRN Temp greater or equal to 38C (100.4F), Mild Pain (1 - 3)  albuterol    90 MICROgram(s) HFA Inhaler 2 Puff(s) Inhalation every 6 hours PRN Shortness of Breath and/or Wheezing  aluminum hydroxide/magnesium hydroxide/simethicone Suspension 30 milliLiter(s) Oral every 4 hours PRN Dyspepsia  melatonin 3 milliGRAM(s) Oral at bedtime PRN Insomnia  ondansetron Injectable 4 milliGRAM(s) IV Push every 8 hours PRN Nausea and/or Vomiting      Allergies    No Known Allergies    Intolerances        SOCIAL HISTORY: , current smoker     FAMILY HISTORY: mother - heart disease      Vital Signs Last 24 Hrs  T(C): 36.9 (21 Dec 2023 13:25), Max: 36.9 (21 Dec 2023 13:25)  T(F): 98.5 (21 Dec 2023 13:25), Max: 98.5 (21 Dec 2023 13:25)  HR: 55 (21 Dec 2023 13:25) (55 - 75)  BP: 134/69 (21 Dec 2023 13:25) (120/68 - 158/74)  BP(mean): --  RR: 18 (21 Dec 2023 13:25) (18 - 18)  SpO2: 94% (21 Dec 2023 13:25) (90% - 97%)    Parameters below as of 21 Dec 2023 13:25  Patient On (Oxygen Delivery Method): room air        Physical Exam  General: A/ox 3, No acute Distress  Neck: Supple, NO JVD  Cardiac: S1 S2, No M/R/G  Pulmonary: Breathing unlabored, decreased breath sounds bilaterally No Rhonchi/Rales/Wheezing  Abdomen: Soft, Non -tender, +BS x 4 quads  Extremities: No Rashes, No edema  Neuro: A/o x 3, No focal deficits    LABS:                        10.4   6.60  )-----------( 261      ( 21 Dec 2023 05:35 )             31.5     12-21    136  |  103  |  23  ----------------------------<  91  4.2   |  26  |  1.00    Ca    9.7      21 Dec 2023 05:35  Phos  3.9     12-20  Mg     1.9     12-20    TPro  6.1  /  Alb  3.4  /  TBili  0.2  /  DBili  x   /  AST  15  /  ALT  8<L>  /  AlkPhos  75  12-20      Urinalysis Basic - ( 21 Dec 2023 05:35 )    Color: x / Appearance: x / SG: x / pH: x  Gluc: 91 mg/dL / Ketone: x  / Bili: x / Urobili: x   Blood: x / Protein: x / Nitrite: x   Leuk Esterase: x / RBC: x / WBC x   Sq Epi: x / Non Sq Epi: x / Bacteria: x        RADIOLOGY & ADDITIONAL STUDIES:  < from: TTE W or WO Ultrasound Enhancing Agent (11.10.23 @ 13:54) >  TRANSTHORACIC ECHOCARDIOGRAM REPORT  ________________________________________________________________________________                                      _______       Pt. Name:       SANJUANA TORRES Study Date:    11/10/2023  MRN:            [id number]     YOB: 1945  Accession #:    [id number]    Age:           78 years  Account#:       [id number]   Gender:        F  Heart Rate:                  Height:        62.99 in (160.00 cm)  Rhythm:                      Weight:        121.25 lb (55.00 kg)  Blood Pressure: 114/66 mmHg  BSA/BMI:       1.56 m² / 21.48 kg/m²  ________________________________________________________________________________________  Referring Physician:    [id number] Stu Tolentino  Interpreting Physician: Chnatel Salguero MD  Primary Sonographer:    Ita Dwyer ELISEO    CPT:               ECHO TTE WO CON COMP W DOPP - 28221.m  Indication(s):     Dyspnea, unspecified - R06.00  Procedure:         Transthoracic echocardiogram with 2-D, M-mode and complete                     spectral and color flow Doppler.  Ordering Location: [facility]    _______________________________________________________________________________________     CONCLUSIONS:      1. Left ventricular systolic function is hyperdynamic withan ejection fraction of 69 % by Shepard's method of disks.   2. Mild to moderate left ventricular hypertrophy.   3. Normal right ventricular cavity size, wall thickness, and systolic function.   4. The left atrium is severely dilated in size.   5. The right atrium is normal in size.   6. The mitral valve leaflets are calcified. Severe mitral valve stenosis is present with a mean transmitral valve gradient = 20 mmHg at a HR of 89bpm.   7. Mild mitral regurgitation.   8. The aortic valve appears calcified with grossly normal opening.   9. Trace tricuspid regurgitation.  10. Trace pulmonic regurgitation.  11. The inferior vena cava is normal in size measuring 1.80 cm in diameter, (normal <2.1cm) with normal inspiratory collapse (normal >50%) consistent with normal right atrial pressure (~3, range 0-5mmHg).  12. Trace pericardial effusion.    ________________________________________________________________________________________  FINDINGS:     Left Ventricle:  Left ventricular systolic function is hyperdynamic with a calculated ejection fraction of 69 % by the Shepard's biplane method of disks. Mild to moderate left ventricular hypertrophy.     Right Ventricle:  The right ventricular cavity is normal in size, normal wall thickness and normal systolic function.     Left Atrium:  The left atrium is severely dilated in size with an indexed volume of 83.16 ml/m².     Right Atrium:  The right atrium is normal in size with an indexed volume of 16.63 ml/m².     Aortic Valve:  There is mild calcification of the aortic valve leaflets. The aortic valve appears calcified with grossly normal opening.     Mitral Valve:  There is diffuse mitral valve thickening. There is reduced leaflet mobility of the mitral valve. The mitral valve leaflets are calcified. Severe mitral valve stenosis is present with a mean transmitral valve gradient = 20 mmHg at a HR of 89bpm. There is severe leaflet calcification. There is severe mitral valve stenosis. The transmitral peak gradient is 36.7 mmHg and mean gradient is 23.00 mmHg. There is mild mitral regurgitation.     Tricuspid Valve:  There is trace tricuspid regurgitation. Estimated pulmonary artery systolic pressure is 45 mmHg.     Pulmonic Valve:  Structurally normal pulmonic valve with normal leaflet excursion. There is trace pulmonic regurgitation.     Aorta:  The aortic root at the sinuses of Valsalva is normal in size, measuring 3.00 cm (indexed 1.92 cm/m²).     Pericardium:  There is a trace pericardial effusion.     Pleura:  Moderate bilateral pleural effusion noted.     Systemic Veins:  The inferior vena cava is normal in size measuring 1.80 cm in diameter, (normal <2.1cm) with normal inspiratory collapse (normal >50%) consistent with normal right atrial pressure (~3, range 0-5mmHg).    < end of copied text >

## 2023-12-21 NOTE — CONSULT NOTE ADULT - ASSESSMENT
78F PMHx COPD (not on home o2), HTN/HLD, and hypothyroid.    Was recently admitted to this hospital for sepsis d/t CAP and AE-COPD, also found w/ YADI on CKD and UTI. Received R-sided thoracentesis on 11/11 w/ removal of 500cc fluid. She had TTE done, which showed normal LVEF, but severe MS. Pt was discharged.     Today, pt went to see outpatient pulmonology Dr. Fay Muir, and was referred to ED for worsening SOB, concern for fluid overload. Reports chronic SOB, but recently worse with ambulation.      (19 Dec 2023 23:20)    Consulted for evaluation of mitral stenosis noted on TTE in November. Ms. Luong has been following with CTS Dr. Garcia for monitoring of stable aortic atheroma since 2020. She was recently referred back to him for evaluation of mitral stenosis found on TTE following hospitalization in November for UTI, CAP, CHF, and YADI on CKD at which time she required thoracentesis for pleural effusion. She was referred by Dr. Garcia to our outpatient valve clinic for consideration of catheter based mitral intervention however presented to the ED at the request of her pulmonologist for progressive dyspnea.     She has a history of COPD and still smokes on occasion, she does not require supplemental O2. She reports a roughly six month progression of exertional dyspnea and intermittent ankle edema that has improved since her diuretic regimen was increased to Furosemide 40mg BID after her last hospitalization. She remains active in her home but notes she is "more cautious of overexerting" herself and has slowed her pace as a result. She has no angina, PND, orthopnea, dizziness, or syncope.

## 2023-12-21 NOTE — PROGRESS NOTE ADULT - PROBLEM SELECTOR PLAN 1
-DDx includes progression of severe MS, dehydration and decreased cardiac output iso diuretic use, less likely to be AE-COPD.  -currently not requiring oxygen. On exam, appears dry.   -will hold off on additional diuretics for now  -Structural heart to see patient today (follows with Dr. Tirso Garcia) - DDx includes progression of severe MS, dehydration and decreased cardiac output iso diuretic use, less likely to be AE-COPD.  - currently not requiring oxygen. On exam, appears dry.   - will hold off on additional diuretics for now  - Structural heart to see patient today (follows with Dr. Tirso Garcia), plan for COSME tomorrow - mitral stenosis

## 2023-12-21 NOTE — PATIENT PROFILE ADULT - DEAF OR HARD OF HEARING?
Susi Sadler is a 5 day old male who was brought in for his  Weight Check visit.     History was provided by caregiver  HPI:   Patient presents for:  Weight Check      Concerns  4.5 ounce gain in 3 days    Birth History:  Birth History:    Birth   Length: 1 Laterality Date   • CIRCUMCISION (BEDSIDE)  03/26/2021       Family History  @[facility]    Social History  Patient Parents    Dina Perkins (Father)    Brandt Rodarte (Mother)    Other Topics            Concern  Second-hand smoke expo* No    Social History Na noted  Back/Spine: no abnormalities noted  Musculoskeletal: full ROM of extremities, no asymmetry of gluteal folds, equal leg length, hips stable bilaterally, negative ortolani and ellington  Extremities: no edema or cyanosis  Neurologic: exam appropriate for no

## 2023-12-21 NOTE — PATIENT PROFILE ADULT - FALL HARM RISK - UNIVERSAL INTERVENTIONS
Bed in lowest position, wheels locked, appropriate side rails in place/Call bell, personal items and telephone in reach/Instruct patient to call for assistance before getting out of bed or chair/Non-slip footwear when patient is out of bed/[place] to call system/Physically safe environment - no spills, clutter or unnecessary equipment/Purposeful Proactive Rounding/Room/bathroom lighting operational, light cord in reach Bed in lowest position, wheels locked, appropriate side rails in place/Call bell, personal items and telephone in reach/Instruct patient to call for assistance before getting out of bed or chair/Non-slip footwear when patient is out of bed/[place] to call system/Physically safe environment - no spills, clutter or unnecessary equipment/Purposeful Proactive Rounding/Room/bathroom lighting operational, light cord in reach

## 2023-12-22 ENCOUNTER — TRANSCRIPTION ENCOUNTER (OUTPATIENT)
Age: 78
End: 2023-12-22

## 2023-12-22 VITALS
OXYGEN SATURATION: 95 % | RESPIRATION RATE: 18 BRPM | SYSTOLIC BLOOD PRESSURE: 112 MMHG | HEART RATE: 94 BPM | TEMPERATURE: 97 F | DIASTOLIC BLOOD PRESSURE: 69 MMHG

## 2023-12-22 LAB
ANION GAP SERPL CALC-SCNC: 11 MMOL/L — SIGNIFICANT CHANGE UP (ref 5–17)
ANION GAP SERPL CALC-SCNC: 11 MMOL/L — SIGNIFICANT CHANGE UP (ref 5–17)
BUN SERPL-MCNC: 25 MG/DL — HIGH (ref 7–23)
BUN SERPL-MCNC: 25 MG/DL — HIGH (ref 7–23)
CALCIUM SERPL-MCNC: 9.2 MG/DL — SIGNIFICANT CHANGE UP (ref 8.4–10.5)
CALCIUM SERPL-MCNC: 9.2 MG/DL — SIGNIFICANT CHANGE UP (ref 8.4–10.5)
CHLORIDE SERPL-SCNC: 104 MMOL/L — SIGNIFICANT CHANGE UP (ref 96–108)
CHLORIDE SERPL-SCNC: 104 MMOL/L — SIGNIFICANT CHANGE UP (ref 96–108)
CO2 SERPL-SCNC: 25 MMOL/L — SIGNIFICANT CHANGE UP (ref 22–31)
CO2 SERPL-SCNC: 25 MMOL/L — SIGNIFICANT CHANGE UP (ref 22–31)
CREAT SERPL-MCNC: 1.09 MG/DL — SIGNIFICANT CHANGE UP (ref 0.5–1.3)
CREAT SERPL-MCNC: 1.09 MG/DL — SIGNIFICANT CHANGE UP (ref 0.5–1.3)
EGFR: 52 ML/MIN/1.73M2 — LOW
EGFR: 52 ML/MIN/1.73M2 — LOW
GLUCOSE SERPL-MCNC: 92 MG/DL — SIGNIFICANT CHANGE UP (ref 70–99)
GLUCOSE SERPL-MCNC: 92 MG/DL — SIGNIFICANT CHANGE UP (ref 70–99)
HCT VFR BLD CALC: 31 % — LOW (ref 34.5–45)
HCT VFR BLD CALC: 31 % — LOW (ref 34.5–45)
HGB BLD-MCNC: 10.3 G/DL — LOW (ref 11.5–15.5)
HGB BLD-MCNC: 10.3 G/DL — LOW (ref 11.5–15.5)
MAGNESIUM SERPL-MCNC: 1.8 MG/DL — SIGNIFICANT CHANGE UP (ref 1.6–2.6)
MAGNESIUM SERPL-MCNC: 1.8 MG/DL — SIGNIFICANT CHANGE UP (ref 1.6–2.6)
MCHC RBC-ENTMCNC: 31.4 PG — SIGNIFICANT CHANGE UP (ref 27–34)
MCHC RBC-ENTMCNC: 31.4 PG — SIGNIFICANT CHANGE UP (ref 27–34)
MCHC RBC-ENTMCNC: 33.2 GM/DL — SIGNIFICANT CHANGE UP (ref 32–36)
MCHC RBC-ENTMCNC: 33.2 GM/DL — SIGNIFICANT CHANGE UP (ref 32–36)
MCV RBC AUTO: 94.5 FL — SIGNIFICANT CHANGE UP (ref 80–100)
MCV RBC AUTO: 94.5 FL — SIGNIFICANT CHANGE UP (ref 80–100)
NRBC # BLD: 0 /100 WBCS — SIGNIFICANT CHANGE UP (ref 0–0)
NRBC # BLD: 0 /100 WBCS — SIGNIFICANT CHANGE UP (ref 0–0)
PHOSPHATE SERPL-MCNC: 3.5 MG/DL — SIGNIFICANT CHANGE UP (ref 2.5–4.5)
PHOSPHATE SERPL-MCNC: 3.5 MG/DL — SIGNIFICANT CHANGE UP (ref 2.5–4.5)
PLATELET # BLD AUTO: 266 K/UL — SIGNIFICANT CHANGE UP (ref 150–400)
PLATELET # BLD AUTO: 266 K/UL — SIGNIFICANT CHANGE UP (ref 150–400)
POTASSIUM SERPL-MCNC: 4.7 MMOL/L — SIGNIFICANT CHANGE UP (ref 3.5–5.3)
POTASSIUM SERPL-MCNC: 4.7 MMOL/L — SIGNIFICANT CHANGE UP (ref 3.5–5.3)
POTASSIUM SERPL-SCNC: 4.7 MMOL/L — SIGNIFICANT CHANGE UP (ref 3.5–5.3)
POTASSIUM SERPL-SCNC: 4.7 MMOL/L — SIGNIFICANT CHANGE UP (ref 3.5–5.3)
RBC # BLD: 3.28 M/UL — LOW (ref 3.8–5.2)
RBC # BLD: 3.28 M/UL — LOW (ref 3.8–5.2)
RBC # FLD: 13.5 % — SIGNIFICANT CHANGE UP (ref 10.3–14.5)
RBC # FLD: 13.5 % — SIGNIFICANT CHANGE UP (ref 10.3–14.5)
SODIUM SERPL-SCNC: 140 MMOL/L — SIGNIFICANT CHANGE UP (ref 135–145)
SODIUM SERPL-SCNC: 140 MMOL/L — SIGNIFICANT CHANGE UP (ref 135–145)
WBC # BLD: 7.15 K/UL — SIGNIFICANT CHANGE UP (ref 3.8–10.5)
WBC # BLD: 7.15 K/UL — SIGNIFICANT CHANGE UP (ref 3.8–10.5)
WBC # FLD AUTO: 7.15 K/UL — SIGNIFICANT CHANGE UP (ref 3.8–10.5)
WBC # FLD AUTO: 7.15 K/UL — SIGNIFICANT CHANGE UP (ref 3.8–10.5)

## 2023-12-22 PROCEDURE — 99239 HOSP IP/OBS DSCHRG MGMT >30: CPT

## 2023-12-22 PROCEDURE — 80053 COMPREHEN METABOLIC PANEL: CPT

## 2023-12-22 PROCEDURE — 85025 COMPLETE CBC W/AUTO DIFF WBC: CPT

## 2023-12-22 PROCEDURE — 93312 ECHO TRANSESOPHAGEAL: CPT

## 2023-12-22 PROCEDURE — 80048 BASIC METABOLIC PNL TOTAL CA: CPT

## 2023-12-22 PROCEDURE — 93320 DOPPLER ECHO COMPLETE: CPT

## 2023-12-22 PROCEDURE — 85027 COMPLETE CBC AUTOMATED: CPT

## 2023-12-22 PROCEDURE — 94640 AIRWAY INHALATION TREATMENT: CPT

## 2023-12-22 PROCEDURE — 99232 SBSQ HOSP IP/OBS MODERATE 35: CPT

## 2023-12-22 PROCEDURE — 71046 X-RAY EXAM CHEST 2 VIEWS: CPT

## 2023-12-22 PROCEDURE — 36415 COLL VENOUS BLD VENIPUNCTURE: CPT

## 2023-12-22 PROCEDURE — 93312 ECHO TRANSESOPHAGEAL: CPT | Mod: 26

## 2023-12-22 PROCEDURE — 93325 DOPPLER ECHO COLOR FLOW MAPG: CPT | Mod: 26

## 2023-12-22 PROCEDURE — 82962 GLUCOSE BLOOD TEST: CPT

## 2023-12-22 PROCEDURE — 84100 ASSAY OF PHOSPHORUS: CPT

## 2023-12-22 PROCEDURE — 99285 EMERGENCY DEPT VISIT HI MDM: CPT

## 2023-12-22 PROCEDURE — 93320 DOPPLER ECHO COMPLETE: CPT | Mod: 26

## 2023-12-22 PROCEDURE — 93325 DOPPLER ECHO COLOR FLOW MAPG: CPT

## 2023-12-22 PROCEDURE — 83735 ASSAY OF MAGNESIUM: CPT

## 2023-12-22 PROCEDURE — 84484 ASSAY OF TROPONIN QUANT: CPT

## 2023-12-22 PROCEDURE — 83880 ASSAY OF NATRIURETIC PEPTIDE: CPT

## 2023-12-22 RX ORDER — FUROSEMIDE 40 MG
40 TABLET ORAL ONCE
Refills: 0 | Status: COMPLETED | OUTPATIENT
Start: 2023-12-22 | End: 2023-12-22

## 2023-12-22 RX ORDER — LEVOTHYROXINE SODIUM 125 MCG
44 TABLET ORAL ONCE
Refills: 0 | Status: COMPLETED | OUTPATIENT
Start: 2023-12-22 | End: 2023-12-22

## 2023-12-22 RX ADMIN — Medication 40 MILLIGRAM(S): at 05:44

## 2023-12-22 RX ADMIN — Medication 44 MICROGRAM(S): at 05:44

## 2023-12-22 RX ADMIN — BUDESONIDE AND FORMOTEROL FUMARATE DIHYDRATE 2 PUFF(S): 160; 4.5 AEROSOL RESPIRATORY (INHALATION) at 05:44

## 2023-12-22 RX ADMIN — Medication 50 MILLIGRAM(S): at 18:05

## 2023-12-22 RX ADMIN — BUDESONIDE AND FORMOTEROL FUMARATE DIHYDRATE 2 PUFF(S): 160; 4.5 AEROSOL RESPIRATORY (INHALATION) at 18:06

## 2023-12-22 NOTE — DISCHARGE NOTE PROVIDER - ATTENDING DISCHARGE PHYSICAL EXAMINATION:
CONSTITUTIONAL: NAD  EYES: Conjunctiva and sclera clear  NECK: Supple  RESPIRATORY: Normal respiratory effort; lungs are clear to auscultation bilaterally  CARDIOVASCULAR: Regular rate and rhythm, normal S1 and S2  ABDOMEN: Nontender to palpation, normoactive bowel sounds  MUSCULOSKELETAL:  Normal gait  PSYCH: A+O to person, place, and time  NEUROLOGY: No gross sensory deficits   SKIN: No rashes

## 2023-12-22 NOTE — PROGRESS NOTE ADULT - PROBLEM SELECTOR PLAN 1
- DDx includes progression of severe MS, dehydration and decreased cardiac output iso diuretic use, less likely to be AE-COPD.  - currently not requiring oxygen. On exam, appears dry.   - will hold off on additional diuretics for now continue with home dose lasix 40 PO qd  - Structural heart to see patient today (follows with Dr. Tirso Garcia), plan for COSME tomorrow - mitral stenosis

## 2023-12-22 NOTE — DISCHARGE NOTE PROVIDER - NSDCMRMEDTOKEN_GEN_ALL_CORE_FT
Albuterol (Eqv-ProAir HFA) 90 mcg/inh inhalation aerosol: 2 puff(s) inhaled 3 times a day as needed for  bronchospasm  atorvastatin 40 mg oral tablet: 1 tab(s) orally once a day (at bedtime)  Breztri Aerosphere 160 mcg-9 mcg-4.8 mcg/inh inhalation aerosol: 2 puff(s) inhaled once a day  Lasix 40 mg oral tablet: 1 tab(s) orally once a day  levothyroxine 88 mcg (0.088 mg) oral tablet: 1 tab(s) orally once a day  losartan 25 mg oral tablet: 1 tab(s) orally once a day AM  metoprolol tartrate 50 mg oral tablet: 1 tab(s) orally 2 times a day  nicotine 7 mg/24 hr transdermal film, extended release: 1 patch transdermal once a day

## 2023-12-22 NOTE — PRE-ANESTHESIA EVALUATION ADULT - NSANTHPMHFT_GEN_ALL_CORE
78F PMHx COPD (not on home o2), HTN/HLD, and hypothyroid.    Was recently admitted to this hospital for sepsis d/t CAP and AE-COPD, also found w/ YADI on CKD and UTI. Received R-sided thoracentesis on 11/11 w/ removal of 500cc fluid. She had TTE done, which showed normal LVEF, but severe MS. Pt was discharged.

## 2023-12-22 NOTE — DISCHARGE NOTE NURSING/CASE MANAGEMENT/SOCIAL WORK - PATIENT PORTAL LINK FT
You can access the FollowMyHealth Patient Portal offered by [facility] by registering at the following website: [email or web address]. By joining [employer] [facility]’s FollowMyHealth portal, you will also be able to view your health information using other applications (apps) compatible with our system. You can access the FollowMyHealth Patient Portal offered by [facility] by registering at the following website: [email or web address]. By joining [employer] [employer]’s FollowMyHealth portal, you will also be able to view your health information using other applications (apps) compatible with our system.

## 2023-12-22 NOTE — PRE-ANESTHESIA EVALUATION ADULT - NSANTHAPLANRD_GEN_ALL_CORE
monitored anesthesia care (MAC)
Pt has expressive aphasia at baseline; patient unable to answer at this time

## 2023-12-22 NOTE — DISCHARGE NOTE PROVIDER - NSDCCPCAREPLAN_GEN_ALL_CORE_FT
PRINCIPAL DISCHARGE DIAGNOSIS  Diagnosis: Dyspnea on exertion  Assessment and Plan of Treatment: You were admitted for dyspnea and improved. Structural heart team was consulted for mitral stenosis seen on recent echocardiogram. You underwent COSME on 12/22/23 re-demonstrating severe mitral valve stenosis. If you have any symptoms of fever/chills/shortness of breath please return to the ED. Follow up with interventional cardiology Dr. Palafox next week on Wednesday 12/27/23.      SECONDARY DISCHARGE DIAGNOSES  Diagnosis: Mitral stenosis  Assessment and Plan of Treatment: Seen on COSME. Follow up with interventional cardiology Dr. Palafox next week on Wednesday 12/27/23.

## 2023-12-22 NOTE — PROGRESS NOTE ADULT - NSPROGADDITIONALINFOA_GEN_ALL_CORE
Plan discussed with pt at bedside including no additional diuresis given that she has no symptoms and is dry. Expresses frustration about wait for COSME. Called COSME department, told that pt is on list for today. Discussed with structural hear- will review COSME, if nothing urgent pt can be followed up outpt.

## 2023-12-22 NOTE — PROGRESS NOTE ADULT - PROBLEM SELECTOR PROBLEM 1
Decreased exercise tolerance
Decreased exercise tolerance
Mitral stenosis
Decreased exercise tolerance

## 2023-12-22 NOTE — DISCHARGE NOTE NURSING/CASE MANAGEMENT/SOCIAL WORK - NSDCPEFALRISK_GEN_ALL_CORE
For information on Fall & Injury Prevention, visit: [email or web address] OR  [email or web address] OR  https://www.cdc.gov/steadi/patient.html For information on Fall & Injury Prevention, visit: [email or web address] OR  [email or web address] OR  https://www.cdc.gov/steadi/patient.html

## 2023-12-22 NOTE — DISCHARGE NOTE PROVIDER - NSDCFUSCHEDAPPT_GEN_ALL_CORE_FT
Ryan Palafox  [facility]  CTSURG [address]  Scheduled Appointment: 12/27/2023    Neel Cruz  [facility]  CARDIOLOGY [address]  Scheduled Appointment: 01/08/2024    Tirso Garcia  [facility]  CTSURG [address]  Scheduled Appointment: 02/14/2024     Ryan Palafox  [facility]  CTSURG [address]  Scheduled Appointment: 12/27/2023    Neel Cruz  [facility]  CARDIOLOGY [address]  Scheduled Appointment: 01/08/2024    Tirso Garcia  [facility]  CTSURG [address]  Scheduled Appointment: 02/14/2024

## 2023-12-22 NOTE — PROGRESS NOTE ADULT - SUBJECTIVE AND OBJECTIVE BOX
HPI/Interval Hx    Sitting up in bed, awaiting COSME this afternoon.     MEDICATIONS  (STANDING):  atorvastatin 40 milliGRAM(s) Oral at bedtime  budesonide 160 MICROgram(s)/formoterol 4.5 MICROgram(s) Inhaler 2 Puff(s) Inhalation two times a day  furosemide    Tablet 40 milliGRAM(s) Oral daily  influenza  Vaccine (HIGH DOSE) 0.7 milliLiter(s) IntraMuscular once  levothyroxine 88 MICROGram(s) Oral daily  metoprolol tartrate 50 milliGRAM(s) Oral two times a day    MEDICATIONS  (PRN):  acetaminophen     Tablet .. 650 milliGRAM(s) Oral every 6 hours PRN Temp greater or equal to 38C (100.4F), Mild Pain (1 - 3)  albuterol    90 MICROgram(s) HFA Inhaler 2 Puff(s) Inhalation every 6 hours PRN Shortness of Breath and/or Wheezing  aluminum hydroxide/magnesium hydroxide/simethicone Suspension 30 milliLiter(s) Oral every 4 hours PRN Dyspepsia  melatonin 3 milliGRAM(s) Oral at bedtime PRN Insomnia  ondansetron Injectable 4 milliGRAM(s) IV Push every 8 hours PRN Nausea and/or Vomiting      PAST MEDICAL & SURGICAL HISTORY:  History of COPD  No home O2 use      Asthma      Thyroid nodule      Hyperlipidemia      Hypertension      Hypothyroidism      History of cholecystectomy  1989      History of repair of hip fracture  ORIF 1982.  Hardware was eventually removed          Review of Systems  CONSTITUTIONAL: No weakness, fevers or chills  EYES/ENT: No visual changes;  No vertigo or throat pain   NECK: No pain or stiffness  RESPIRATORY: No cough, wheezing, hemoptysis; No shortness of breath at rest  CARDIOVASCULAR: No chest pain or palpitations, PND, orthopnea or edema, (+) mild exertional dyspnea  GASTROINTESTINAL: No abdominal or epigastric pain. No nausea, vomiting, or hematemesis; No diarrhea or constipation. No melena or hematochezia.  GENITOURINARY: No dysuria, frequency or hematuria  NEUROLOGICAL: No numbness or weakness  SKIN: No itching, burning, rashes, or lesions   All other review of systems is negative unless indicated above.    Physical Exam  General: A/ox 3, No acute Distress  Neck: Supple, NO JVD  Cardiac: S1 S2, No M/R/G  Pulmonary: Breathing unlabored, decreased breath sounds bilaterally No Rhonchi/Rales/Wheezing  Abdomen: Soft, Non -tender, +BS x 4 quads  Extremities: No Rashes, No edema  Neuro: A/o x 3, No focal deficits    Vital Signs Last 24 Hrs  T(C): 36.4 (22 Dec 2023 10:17), Max: 36.9 (21 Dec 2023 13:25)  T(F): 97.5 (22 Dec 2023 10:17), Max: 98.5 (21 Dec 2023 13:25)  HR: 63 (22 Dec 2023 10:17) (55 - 80)  BP: 119/68 (22 Dec 2023 10:17) (115/64 - 143/74)  BP(mean): --  RR: 18 (22 Dec 2023 10:17) (17 - 18)  SpO2: 96% (22 Dec 2023 10:17) (90% - 96%)    Parameters below as of 22 Dec 2023 10:17  Patient On (Oxygen Delivery Method): room air                            10.3   7.15  )-----------( 266      ( 22 Dec 2023 08:22 )             31.0     12-22    140  |  104  |  25<H>  ----------------------------<  92  4.7   |  25  |  1.09    Ca    9.2      22 Dec 2023 08:22  Phos  3.5     12-22  Mg     1.8     12-22        Telemetry: NSR 67    EKG: < from: 12 Lead ECG (11.09.23 @ 11:53) >    Ventricular Rate 122 BPM    Atrial Rate 122 BPM    P-R Interval 160 ms    QRS Duration 90 ms    Q-T Interval 316 ms    QTC Calculation(Bazett) 450 ms    P Axis 56 degrees    R Axis 24 degrees    T Axis 61 degrees    Diagnosis Line Sinus tachycardia  Possible Left atrial enlargement  Low voltage QRS  Borderline ECG  No previous ECGs available  Confirmed by LEI CASAREZ ([id number]) on 11/9/2023 8:41:03 PM    < end of copied text >      Other

## 2023-12-22 NOTE — DISCHARGE NOTE PROVIDER - HOSPITAL COURSE
HPI:  78F PMHx COPD (not on home o2), HTN/HLD, and hypothyroid.    Was recently admitted to this hospital for sepsis d/t CAP and AE-COPD, also found w/ YADI on CKD and UTI. Received R-sided thoracentesis on 11/11 w/ removal of 500cc fluid. She had TTE done, which showed normal LVEF, but severe MS. Pt was discharged.     Today, pt went to see outpatient pulmonology Dr. Fay Muir, and was referred to ED for worsening SOB, concern for fluid overload. Reports chronic SOB, but recently worse with ambulation.      On arrival, VSS.  CBC w/ stable anemia.   CMP w/ SCr 1.39 (last 0.98 on 11/16).  Trop 33, 32.   ProBNP 2878 (last >10K on 11/9)       On interview, pt reports that her BARBOSA has been getting more frequent. Denies fever, chills, cp, cough, palpitation, abd pain, n/v/d, or significant change in bowel/urinary habits      (19 Dec 2023 23:20)    Hospital Course:  Admitted for dyspnea, remained on RA. DDx includes progression of severe MS, dehydration and decreased cardiac output iso diuretic use, less likely to be AE-COPD. Continued lasix po. Stuctural heart consulted (follows w/ Dr. Garcia) for mitral stenosis seen on recent TTE. S/p COSME 12/22 with severe mitral valve stenosis, and echogenicity on aortic valve ddx vegetation, however this mass was noted on previous COSME and unlikely vegetation. Pt to follow up with interventional cardiology Dr. Palafox next week on Wednesday. Medically cleared to be dc'ed per attending Dr. Menendez.       Important Medication Changes and Reason:  N/A  Active or Pending Issues Requiring Follow-up:  F/u Dr. Palafox (interventional cards) next week on wednesday 12/27  Advanced Directives:   [ ] Full code  [ ] DNR  [ ] Hospice    Discharge Diagnoses:  dyspnea on exertion  mitral stenosis          HPI:  78F PMHx COPD (not on home o2), HTN/HLD, and hypothyroid.    Was recently admitted to this hospital for sepsis d/t CAP and AE-COPD, also found w/ YADI on CKD and UTI. Received R-sided thoracentesis on 11/11 w/ removal of 500cc fluid. She had TTE done, which showed normal LVEF, but severe MS. Pt was discharged.     Today, pt went to see outpatient pulmonology Dr. Fay Muir, and was referred to ED for worsening SOB, concern for fluid overload. Reports chronic SOB, but recently worse with ambulation.      On arrival, VSS.  CBC w/ stable anemia.   CMP w/ SCr 1.39 (last 0.98 on 11/16).  Trop 33, 32.   ProBNP 2878 (last >10K on 11/9)       On interview, pt reports that her BARBOSA has been getting more frequent. Denies fever, chills, cp, cough, palpitation, abd pain, n/v/d, or significant change in bowel/urinary habits      (19 Dec 2023 23:20)    Hospital Course:  Admitted for dyspnea, remained on RA. DDx includes progression of severe MS, dehydration and decreased cardiac output iso diuretic use, less likely to be AE-COPD. Continued lasix po. Stuctural heart consulted (follows w/ Dr. Garcia) for mitral stenosis seen on recent TTE. S/p COSME 12/22 with severe mitral valve stenosis, and echogenicity on aortic valve ddx vegetation, however this mass was noted on previous COSME and unlikely vegetation. Differential includes large Lambls, Papillary fibroelastoma, less likely vegetation. Pt does not fit clinical picture for endocarditis, no fever, WBC noted. As discussed with Dr. Palafox's team, no emergent procedure's at this time. Pt to follow up with interventional cardiology Dr. Palafox next week on Wednesday 12/27 outpatient, who has been made aware of COSME. Medically cleared to be dc'ed per attending Dr. Menendez.       Important Medication Changes and Reason:  N/A  Active or Pending Issues Requiring Follow-up:  F/u Dr. Palafox (interventional cards) next week on wednesday 12/27  Advanced Directives:   [ x] Full code  [ ] DNR  [ ] Hospice    Discharge Diagnoses:  dyspnea on exertion 2/2 mitral stenosis

## 2023-12-22 NOTE — PROGRESS NOTE ADULT - SUBJECTIVE AND OBJECTIVE BOX
[facility] Division of Hospital Medicine  Rocio Menendez MD  Available via MS Teams      SUBJECTIVE / OVERNIGHT EVENTS: No acute events overnight. Pt denies orthopnea, cough. Feels baseline. Wants to go home after COSME.     ADDITIONAL REVIEW OF SYSTEMS: ROS reviewed and found to be negative     MEDICATIONS  (STANDING):  atorvastatin 40 milliGRAM(s) Oral at bedtime  budesonide 160 MICROgram(s)/formoterol 4.5 MICROgram(s) Inhaler 2 Puff(s) Inhalation two times a day  furosemide    Tablet 40 milliGRAM(s) Oral daily  influenza  Vaccine (HIGH DOSE) 0.7 milliLiter(s) IntraMuscular once  levothyroxine 88 MICROGram(s) Oral daily  metoprolol tartrate 50 milliGRAM(s) Oral two times a day    MEDICATIONS  (PRN):  acetaminophen     Tablet .. 650 milliGRAM(s) Oral every 6 hours PRN Temp greater or equal to 38C (100.4F), Mild Pain (1 - 3)  albuterol    90 MICROgram(s) HFA Inhaler 2 Puff(s) Inhalation every 6 hours PRN Shortness of Breath and/or Wheezing  aluminum hydroxide/magnesium hydroxide/simethicone Suspension 30 milliLiter(s) Oral every 4 hours PRN Dyspepsia  melatonin 3 milliGRAM(s) Oral at bedtime PRN Insomnia  ondansetron Injectable 4 milliGRAM(s) IV Push every 8 hours PRN Nausea and/or Vomiting      I&O's Summary    21 Dec 2023 07:01  -  22 Dec 2023 07:00  --------------------------------------------------------  IN: 720 mL / OUT: 0 mL / NET: 720 mL    22 Dec 2023 07:01  -  22 Dec 2023 13:13  --------------------------------------------------------  IN: 240 mL / OUT: 0 mL / NET: 240 mL        PHYSICAL EXAM:  Vital Signs Last 24 Hrs  T(C): 36.4 (22 Dec 2023 10:17), Max: 36.9 (21 Dec 2023 13:25)  T(F): 97.5 (22 Dec 2023 10:17), Max: 98.5 (21 Dec 2023 13:25)  HR: 63 (22 Dec 2023 10:17) (55 - 80)  BP: 119/68 (22 Dec 2023 10:17) (115/64 - 143/74)  BP(mean): --  RR: 18 (22 Dec 2023 10:17) (17 - 18)  SpO2: 96% (22 Dec 2023 10:17) (90% - 96%)    Parameters below as of 22 Dec 2023 10:17  Patient On (Oxygen Delivery Method): room air      CONSTITUTIONAL: NAD  EYES: Conjunctiva and sclera clear  NECK: Supple  RESPIRATORY: Normal respiratory effort; lungs are clear to auscultation bilaterally  CARDIOVASCULAR: Regular rate and rhythm, normal S1 and S2  ABDOMEN: Nontender to palpation, normoactive bowel sounds  MUSCULOSKELETAL:  Normal gait  PSYCH: A+O to person, place, and time  NEUROLOGY: CN 2-12 are intact and symmetric; no gross sensory deficits   SKIN: No rashes    LABS:                        10.3   7.15  )-----------( 266      ( 22 Dec 2023 08:22 )             31.0     12-22    140  |  104  |  25<H>  ----------------------------<  92  4.7   |  25  |  1.09    Ca    9.2      22 Dec 2023 08:22  Phos  3.5     12-22  Mg     1.8     12-22    Urinalysis Basic - ( 22 Dec 2023 08:22 )                 [facility] Division of Hospital Medicine  Rocio Menendez MD  Available via MS Teams      SUBJECTIVE / OVERNIGHT EVENTS: No acute events overnight. Pt denies orthopnea, cough. Feels baseline. Wants to go home after COSME.     ADDITIONAL REVIEW OF SYSTEMS: ROS reviewed and found to be negative     MEDICATIONS  (STANDING):  atorvastatin 40 milliGRAM(s) Oral at bedtime  budesonide 160 MICROgram(s)/formoterol 4.5 MICROgram(s) Inhaler 2 Puff(s) Inhalation two times a day  furosemide    Tablet 40 milliGRAM(s) Oral daily  influenza  Vaccine (HIGH DOSE) 0.7 milliLiter(s) IntraMuscular once  levothyroxine 88 MICROGram(s) Oral daily  metoprolol tartrate 50 milliGRAM(s) Oral two times a day    MEDICATIONS  (PRN):  acetaminophen     Tablet .. 650 milliGRAM(s) Oral every 6 hours PRN Temp greater or equal to 38C (100.4F), Mild Pain (1 - 3)  albuterol    90 MICROgram(s) HFA Inhaler 2 Puff(s) Inhalation every 6 hours PRN Shortness of Breath and/or Wheezing  aluminum hydroxide/magnesium hydroxide/simethicone Suspension 30 milliLiter(s) Oral every 4 hours PRN Dyspepsia  melatonin 3 milliGRAM(s) Oral at bedtime PRN Insomnia  ondansetron Injectable 4 milliGRAM(s) IV Push every 8 hours PRN Nausea and/or Vomiting      I&O's Summary    21 Dec 2023 07:01  -  22 Dec 2023 07:00  --------------------------------------------------------  IN: 720 mL / OUT: 0 mL / NET: 720 mL    22 Dec 2023 07:01  -  22 Dec 2023 13:13  --------------------------------------------------------  IN: 240 mL / OUT: 0 mL / NET: 240 mL        PHYSICAL EXAM:  Vital Signs Last 24 Hrs  T(C): 36.4 (22 Dec 2023 10:17), Max: 36.9 (21 Dec 2023 13:25)  T(F): 97.5 (22 Dec 2023 10:17), Max: 98.5 (21 Dec 2023 13:25)  HR: 63 (22 Dec 2023 10:17) (55 - 80)  BP: 119/68 (22 Dec 2023 10:17) (115/64 - 143/74)  BP(mean): --  RR: 18 (22 Dec 2023 10:17) (17 - 18)  SpO2: 96% (22 Dec 2023 10:17) (90% - 96%)    Parameters below as of 22 Dec 2023 10:17  Patient On (Oxygen Delivery Method): room air      CONSTITUTIONAL: NAD  EYES: Conjunctiva and sclera clear  NECK: Supple  RESPIRATORY: Normal respiratory effort; lungs are clear to auscultation bilaterally  CARDIOVASCULAR: Regular rate and rhythm, normal S1 and S2  ABDOMEN: Nontender to palpation, normoactive bowel sounds  MUSCULOSKELETAL:  Normal gait  PSYCH: A+O to person, place, and time  NEUROLOGY: CN 2-12 are intact and symmetric; no gross sensory deficits   SKIN: No rashes    LABS:                        10.3   7.15  )-----------( 266      ( 22 Dec 2023 08:22 )             31.0     12-22    140  |  104  |  25<H>  ----------------------------<  92  4.7   |  25  |  1.09    Ca    9.2      22 Dec 2023 08:22  Phos  3.5     12-22  Mg     1.8     12-22    Urinalysis Basic - ( 22 Dec 2023 08:22 )

## 2023-12-22 NOTE — PROGRESS NOTE ADULT - PROBLEM SELECTOR PLAN 1
Pt. scheduled for COSME this afternoon for further evaluation of mitral stenosis. Her shortness of breath has improved since admission and furosemide has been changed to PO.  If no acute findings on COSME, she would like to be discharged home and return to see us as an outpatient next week to review the COSME and discuss treatment options and recommendations.   I have given her an appointment with Dr. Palafox for Wednesday 12/27/23 at 12:30pm in the CT Surgery office on [address] [facility].     SHADI Lopez NP  [id number]  Available on TEAMS Pt. scheduled for COSME this afternoon for further evaluation of mitral stenosis. Her shortness of breath has improved since admission and furosemide has been changed to PO.  If no acute findings on COSME, she would like to be discharged home and return to see us as an outpatient next week to review the COSME and discuss treatment options and recommendations.   I have given her an appointment with Dr. Palafox for Wednesday 12/27/23 at 12:30pm in the CT Surgery office on 1 [facility].     SHADI Lopez NP  [id number]  Available on TEAMS

## 2023-12-22 NOTE — PROGRESS NOTE ADULT - PROBLEM SELECTOR PLAN 4
-hold losatan iso YADI. resume when appropriate
-Will resume once tolerating Lasix PO resume when appropriate
-Will resume on DC once tolerating Lasix PO

## 2023-12-22 NOTE — PRE-ANESTHESIA EVALUATION ADULT - NSANTHOSAYNRD_GEN_A_CORE
No. ALEISHA screening performed.  STOP BANG Legend: 0-2 = LOW Risk; 3-4 = INTERMEDIATE Risk; 5-8 = HIGH Risk

## 2023-12-22 NOTE — PRE-ANESTHESIA EVALUATION ADULT - NSRADCARDRESULTSFT_GEN_ALL_CORE
< from: TTE W or WO Ultrasound Enhancing Agent (11.10.23 @ 13:54) >    CONCLUSIONS:      1. Left ventricular systolic function is hyperdynamic withan ejection fraction of 69 % by Shepard's method of disks.   2. Mild to moderate left ventricular hypertrophy.   3. Normal right ventricular cavity size, wall thickness, and systolic function.   4. The left atrium is severely dilated in size.   5. The right atrium is normal in size.   6. The mitral valve leaflets are calcified. Severe mitral valve stenosis is present with a mean transmitral valve gradient = 20 mmHg at a HR of 89bpm.   7. Mild mitral regurgitation.   8. The aortic valve appears calcified with grossly normal opening.   9. Trace tricuspid regurgitation.  10. Trace pulmonic regurgitation.  11. The inferior vena cava is normal in size measuring 1.80 cm in diameter, (normal <2.1cm) with normal inspiratory collapse (normal >50%) consistent with normal right atrial pressure (~3, range 0-5mmHg).  12. Trace pericardial effusion.

## 2023-12-26 ENCOUNTER — TRANSCRIPTION ENCOUNTER (OUTPATIENT)
Age: 78
End: 2023-12-26

## 2023-12-26 RX ORDER — ASPIRIN ENTERIC COATED TABLETS 81 MG 81 MG/1
81 TABLET, DELAYED RELEASE ORAL
Qty: 30 | Refills: 0 | Status: DISCONTINUED | COMMUNITY
End: 2023-12-26

## 2023-12-27 ENCOUNTER — APPOINTMENT (OUTPATIENT)
Dept: CARDIOTHORACIC SURGERY | Facility: CLINIC | Age: 78
End: 2023-12-27
Payer: MEDICARE

## 2023-12-27 VITALS
OXYGEN SATURATION: 95 % | DIASTOLIC BLOOD PRESSURE: 58 MMHG | RESPIRATION RATE: 16 BRPM | HEIGHT: 61 IN | BODY MASS INDEX: 20.77 KG/M2 | HEART RATE: 54 BPM | SYSTOLIC BLOOD PRESSURE: 103 MMHG | WEIGHT: 110 LBS

## 2023-12-27 PROCEDURE — 99204 OFFICE O/P NEW MOD 45 MIN: CPT

## 2023-12-27 RX ORDER — ALBUTEROL SULFATE 90 UG/1
108 (90 BASE) INHALANT RESPIRATORY (INHALATION)
Qty: 1 | Refills: 2 | Status: DISCONTINUED | COMMUNITY
Start: 2022-06-10 | End: 2023-12-27

## 2023-12-27 RX ORDER — ZOLEDRONIC ACID 5 MG/100ML
5 INJECTION INTRAVENOUS
Qty: 1 | Refills: 0 | Status: DISCONTINUED | COMMUNITY
Start: 2022-03-28 | End: 2023-12-27

## 2023-12-27 RX ORDER — PHENYLEPHRINE HCL 10 MG
7 TABLET ORAL DAILY
Refills: 0 | Status: DISCONTINUED | COMMUNITY
Start: 2023-12-26 | End: 2023-12-27

## 2023-12-27 RX ORDER — VARENICLINE 1 MG/1
1 TABLET, FILM COATED ORAL
Qty: 180 | Refills: 0 | Status: DISCONTINUED | COMMUNITY
Start: 2022-11-11 | End: 2023-12-27

## 2023-12-29 NOTE — PHYSICAL EXAM
[Well Developed] : well developed [Well Nourished] : well nourished [Normal S1, S2] : normal S1, S2 [Murmur] : murmur [Clear Lung Fields] : clear lung fields [No Edema] : no edema [Normal] : alert and oriented, normal memory [de-identified] : II/VI Diastolic Murmur [de-identified] : Mildly diminished at the bases

## 2023-12-29 NOTE — DISCUSSION/SUMMARY
[FreeTextEntry1] : Ms. Luong has symptomatic severe MS and underlying pulmonary disease, both of which I suspect are contributing to her dyspnea. She had an inpatient COSME here last week which documented MAC with severe MS and mild to moderate MR. She was told by Dr Garcia that she is not a candidate for surgery due to her underlying pulmonary disease. I recommended that she get the TMVR CT to see if she may be a candidate for either the APOLLO or SUMMIT trials (for TMVR). I will review her COSME with Dr Lorraine Alvarado to further quantify the degree of MR--which needs to be at least moderate to be considered a candidate for the MAC cohort of the APOLLO trial. If she does not have enough MR to be considered for APOLLO, I would refer her to [place] to be considered for the MAC cohort in the SUMMIT trial (which does not have an MR requirement in patients with symptomatic severe MS and MAC). If Dr Alvarado' impression is that her degree of MR is at least moderate and enough to qualify for APOLLO, she should be scheduled to come back here to see either Leigha Keita or Jeannine and review the consent for the APOLLO trial. I have explained my impressions and recommendations to her and her daughter in extensive detail and have answered all of their questions. We will follow up with her next week regarding the TMVR CT and follow up appointments.

## 2023-12-29 NOTE — REVIEW OF SYSTEMS
[Feeling Fatigued] : feeling fatigued [SOB] : shortness of breath [Dyspnea on exertion] : dyspnea during exertion [Lower Ext Edema] : lower extremity edema [Cough] : cough [Wheezing] : wheezing [Negative] : Psychiatric [Fever] : no fever [Blurry Vision] : no blurred vision [Chest Discomfort] : no chest discomfort [Palpitations] : no palpitations [Syncope] : no syncope

## 2023-12-29 NOTE — REASON FOR VISIT
[Structural Heart and Valve Disease] : structural heart and valve disease [Family Member] : family member [FreeTextEntry3] : Dr. Cruz

## 2023-12-29 NOTE — HISTORY OF PRESENT ILLNESS
[FreeTextEntry1] : Ms. Luong is a 78-year-old female referred by Tanisha Cruz and Jose for evaluation of Mitral Stenosis. She was initially seen while she was in the hospital last week and returns today to review the findings from her inpatient COSME and discuss potential treatment options. She has a past medical history of COPD, HTN, HLD, Hypothyroid, Acute on Chronic Kidney disease and recent sepsis. She is a current every day smoker and was recently hospitalized for CHF. She had seen her pulmonologist, and she was found to be in Heart failure and had a pleural effusion, for which she was sent to the hospital.  Today she states she feels OK. Leading up to the last hospitalization, she noted that she had pedal edema, as well as SOB, which she states is constant. She denies any chest pain or dizziness. She still smokes on occasion, but it is a lot less than in the past. She lives on her own and is independent in her ADL's. She sleeps with 2 pillows at night and has not woken up due to dyspnea.  She had a Transesophageal Echocardiogram which demonstrated diffuse MV thickening of the anterior and posterior leaflets, moderate MAC, and Severe Mitral Stenosis presumed secondary to dystrophic MAC; the Mean Mitral Gradient was 10 mmHg and the Mitral Valve Area calculated at 1.38 cm2. Also noted was a 7mm x 3mm mobile echo density on the ventricular side of the aortic valve that may represent a Lambl's Excrescence or a Papillary Fibroelastoma (and less likely a vegetation).  She has had 2 recent admissions for HF that were attributed to her MS. She becomes short of breath with minimal activity for the past 2 months. Her Lasix was increased in November after she was at [facility] but "she filled up with fluid a month later" prompting her recent admission here. She notes losing a significant amount of weight over the past few months since her Lasix was increased. She has not gained weight since her recent discharge. She has no angina, dizziness, or syncope. She does not report fatigue. Her appetite is notable for "I try to eat" but "nothing is tasting good" as of late. She has emphysema (and has a puffer breathing pattern) but was told "it's the heart" that has caused her recent clinical decline. She reports no history of rheumatic fever.   NYHA Classification: III STS 30-day Mortality Risk Score:  6.2% MVR/ 3.8% MV Repair

## 2024-01-08 ENCOUNTER — APPOINTMENT (OUTPATIENT)
Dept: CARDIOLOGY | Facility: CLINIC | Age: 79
End: 2024-01-08
Payer: MEDICARE

## 2024-01-08 VITALS
OXYGEN SATURATION: 90 % | SYSTOLIC BLOOD PRESSURE: 111 MMHG | HEART RATE: 67 BPM | HEIGHT: 61 IN | BODY MASS INDEX: 20.58 KG/M2 | DIASTOLIC BLOOD PRESSURE: 69 MMHG | RESPIRATION RATE: 16 BRPM | WEIGHT: 109 LBS

## 2024-01-08 PROCEDURE — 99214 OFFICE O/P EST MOD 30 MIN: CPT

## 2024-01-08 RX ORDER — METOPROLOL TARTRATE 50 MG/1
50 TABLET, FILM COATED ORAL
Qty: 180 | Refills: 1 | Status: COMPLETED | COMMUNITY
Start: 2023-11-27 | End: 2024-01-08
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
## 2024-01-08 NOTE — DISCUSSION/SUMMARY
[FreeTextEntry1] : severe mitral stenosis aortic atheroma moderate LLE PAD hx of HFpEF - now euvolemic   -cont asa 81mg daily -cont atorvastatin 40mg daily -change tp metoprolol succinate to 100mg daily. cont losartan 25mg once daily for HTN. -cont furosemide 40mg daily for maintenance. currently euvolemic on exam. -f/u structural. for TMVR CT and consideration of TMVR trial if candidate -check labs and consider spironolactone   [phone] Nicol. carolr. primary contact.

## 2024-01-08 NOTE — PHYSICAL EXAM
Left msg for Kathy to schedule. Informed her mychart would be sent as well.   [Normal S1, S2] : normal S1, S2 [No Rub] : no rub [No Gallop] : no gallop [Murmur] : murmur [Soft] : abdomen soft [Non Tender] : non-tender [No Edema] : no edema [Normal] : alert and oriented, normal memory [de-identified] : 2/6 systolic RUSB, LLSB
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
## 2024-01-10 ENCOUNTER — MESSAGE (OUTPATIENT)
Age: 79
End: 2024-01-10

## 2024-01-10 LAB
ALBUMIN SERPL ELPH-MCNC: 4.2 G/DL
ALP BLD-CCNC: 84 U/L
ALT SERPL-CCNC: 10 U/L
ANION GAP SERPL CALC-SCNC: 13 MMOL/L
AST SERPL-CCNC: 17 U/L
BILIRUB SERPL-MCNC: 0.4 MG/DL
BUN SERPL-MCNC: 36 MG/DL
CALCIUM SERPL-MCNC: 9.8 MG/DL
CHLORIDE SERPL-SCNC: 100 MMOL/L
CO2 SERPL-SCNC: 24 MMOL/L
CREAT SERPL-MCNC: 1.25 MG/DL
EGFR: 44 ML/MIN/1.73M2
GLUCOSE SERPL-MCNC: 101 MG/DL
NT-PROBNP SERPL-MCNC: 3089 PG/ML
POTASSIUM SERPL-SCNC: 5.2 MMOL/L
PROT SERPL-MCNC: 7.4 G/DL
SODIUM SERPL-SCNC: 138 MMOL/L

## 2024-01-27 ENCOUNTER — APPOINTMENT (OUTPATIENT)
Dept: CT IMAGING | Facility: CLINIC | Age: 79
End: 2024-01-27
Payer: MEDICARE

## 2024-01-27 ENCOUNTER — OUTPATIENT (OUTPATIENT)
Dept: OUTPATIENT SERVICES | Facility: HOSPITAL | Age: 79
LOS: 1 days | End: 2024-01-27
Payer: MEDICARE

## 2024-01-27 DIAGNOSIS — Z90.49 ACQUIRED ABSENCE OF OTHER SPECIFIED PARTS OF DIGESTIVE TRACT: Chronic | ICD-10-CM

## 2024-01-27 DIAGNOSIS — Z98.890 OTHER SPECIFIED POSTPROCEDURAL STATES: Chronic | ICD-10-CM

## 2024-01-27 DIAGNOSIS — I05.0 RHEUMATIC MITRAL STENOSIS: ICD-10-CM

## 2024-01-27 PROCEDURE — 74174 CTA ABD&PLVS W/CONTRAST: CPT | Mod: 26,MH

## 2024-01-27 PROCEDURE — 75574 CT ANGIO HRT W/3D IMAGE: CPT

## 2024-01-27 PROCEDURE — 75574 CT ANGIO HRT W/3D IMAGE: CPT | Mod: 26,MH

## 2024-01-27 PROCEDURE — 74174 CTA ABD&PLVS W/CONTRAST: CPT

## 2024-01-27 PROCEDURE — 71275 CT ANGIOGRAPHY CHEST: CPT | Mod: 26,MH

## 2024-01-27 PROCEDURE — 71275 CT ANGIOGRAPHY CHEST: CPT

## 2024-02-06 ENCOUNTER — APPOINTMENT (OUTPATIENT)
Dept: CARDIOLOGY | Facility: CLINIC | Age: 79
End: 2024-02-06
Payer: MEDICARE

## 2024-02-06 VITALS
BODY MASS INDEX: 20.78 KG/M2 | HEART RATE: 60 BPM | WEIGHT: 110.06 LBS | DIASTOLIC BLOOD PRESSURE: 71 MMHG | SYSTOLIC BLOOD PRESSURE: 126 MMHG | OXYGEN SATURATION: 92 % | HEIGHT: 61 IN

## 2024-02-06 PROCEDURE — 99214 OFFICE O/P EST MOD 30 MIN: CPT

## 2024-02-06 PROCEDURE — G2211 COMPLEX E/M VISIT ADD ON: CPT

## 2024-02-06 PROCEDURE — 36415 COLL VENOUS BLD VENIPUNCTURE: CPT

## 2024-02-06 RX ORDER — METOPROLOL SUCCINATE 100 MG/1
100 TABLET, EXTENDED RELEASE ORAL
Qty: 90 | Refills: 2 | Status: ACTIVE | COMMUNITY
Start: 2024-01-08 | End: 1900-01-01

## 2024-02-06 NOTE — DISCUSSION/SUMMARY
[FreeTextEntry1] : severe mitral stenosis aortic atheroma moderate LLE PAD hx of HFpEF - with ongoing episodes of LE edema   -cont asa 81mg daily -cont atorvastatin 40mg daily -cont metoprolol succinate to 100mg daily. cont losartan 25mg once daily for HTN. -cont furosemide 40mg daily for maintenance. check labs as pt with elevated BUN and will likely increase maintenance diuretic. continues to follow low sodium and fluid restriction -f/u structural. for TMVR CT and consideration of TMVR trial if candidate   [phone] Nicol. carolr. primary contact.

## 2024-02-06 NOTE — PHYSICAL EXAM
[Normal S1, S2] : normal S1, S2 [No Rub] : no rub [No Gallop] : no gallop [Murmur] : murmur [Soft] : abdomen soft [Non Tender] : non-tender [No Edema] : no edema [Normal] : alert and oriented, normal memory [de-identified] : 2/6 systolic RUSB, LLSB
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
## 2024-02-06 NOTE — REVIEW OF SYSTEMS
[Dyspnea on exertion] : dyspnea during exertion [Chest Discomfort] : no chest discomfort [Lower Ext Edema] : lower extremity edema [Palpitations] : no palpitations [Orthopnea] : no orthopnea [Syncope] : no syncope [Negative] : Heme/Lymph

## 2024-02-07 ENCOUNTER — NON-APPOINTMENT (OUTPATIENT)
Age: 79
End: 2024-02-07

## 2024-02-15 ENCOUNTER — MESSAGE (OUTPATIENT)
Age: 79
End: 2024-02-15

## 2024-02-15 LAB
ANION GAP SERPL CALC-SCNC: 17 MMOL/L
BUN SERPL-MCNC: 41 MG/DL
CALCIUM SERPL-MCNC: 9.4 MG/DL
CHLORIDE SERPL-SCNC: 97 MMOL/L
CO2 SERPL-SCNC: 21 MMOL/L
CREAT SERPL-MCNC: 1.47 MG/DL
EGFR: 36 ML/MIN/1.73M2
GLUCOSE SERPL-MCNC: 79 MG/DL
NT-PROBNP SERPL-MCNC: 3417 PG/ML
POTASSIUM SERPL-SCNC: 4.8 MMOL/L
SODIUM SERPL-SCNC: 135 MMOL/L

## 2024-02-16 ENCOUNTER — APPOINTMENT (OUTPATIENT)
Dept: INTERNAL MEDICINE | Facility: CLINIC | Age: 79
End: 2024-02-16
Payer: MEDICARE

## 2024-02-16 VITALS
HEART RATE: 68 BPM | HEIGHT: 60.63 IN | SYSTOLIC BLOOD PRESSURE: 110 MMHG | WEIGHT: 107 LBS | DIASTOLIC BLOOD PRESSURE: 52 MMHG | TEMPERATURE: 98 F | BODY MASS INDEX: 20.46 KG/M2

## 2024-02-16 DIAGNOSIS — E03.9 HYPOTHYROIDISM, UNSPECIFIED: ICD-10-CM

## 2024-02-16 DIAGNOSIS — Z86.39 PERSONAL HISTORY OF OTHER ENDOCRINE, NUTRITIONAL AND METABOLIC DISEASE: ICD-10-CM

## 2024-02-16 PROCEDURE — 99213 OFFICE O/P EST LOW 20 MIN: CPT

## 2024-02-16 NOTE — HISTORY OF PRESENT ILLNESS
[Family Member] : family member [FreeTextEntry1] : f/u thyroid [de-identified] : cardio and pulm notes reviewed meds noted states she is taking thyroid meds other meds confirmed

## 2024-02-19 LAB — TSH SERPL-ACNC: 20 UIU/ML

## 2024-02-22 ENCOUNTER — APPOINTMENT (OUTPATIENT)
Dept: RADIOLOGY | Facility: CLINIC | Age: 79
End: 2024-02-22
Payer: MEDICARE

## 2024-02-22 ENCOUNTER — APPOINTMENT (OUTPATIENT)
Dept: PULMONOLOGY | Facility: CLINIC | Age: 79
End: 2024-02-22
Payer: MEDICARE

## 2024-02-22 VITALS
SYSTOLIC BLOOD PRESSURE: 148 MMHG | BODY MASS INDEX: 20.47 KG/M2 | WEIGHT: 107 LBS | HEART RATE: 57 BPM | DIASTOLIC BLOOD PRESSURE: 70 MMHG

## 2024-02-22 VITALS — OXYGEN SATURATION: 95 %

## 2024-02-22 DIAGNOSIS — M54.9 DORSALGIA, UNSPECIFIED: ICD-10-CM

## 2024-02-22 PROBLEM — R60.0 LOWER EXTREMITY EDEMA: Status: ACTIVE | Noted: 2020-06-29

## 2024-02-22 PROBLEM — F17.200 CURRENT EVERY DAY SMOKER: Status: ACTIVE | Noted: 2018-12-19

## 2024-02-22 PROBLEM — I34.2 NONRHEUMATIC MITRAL VALVE STENOSIS: Status: ACTIVE | Noted: 2020-07-01

## 2024-02-22 PROCEDURE — 71045 X-RAY EXAM CHEST 1 VIEW: CPT | Mod: XU,76

## 2024-02-22 PROCEDURE — 71046 X-RAY EXAM CHEST 2 VIEWS: CPT

## 2024-02-22 PROCEDURE — 99214 OFFICE O/P EST MOD 30 MIN: CPT

## 2024-02-22 NOTE — DATA REVIEWED
[FreeTextEntry1] : CTA Structural of the chest from 12/27/23 Acquisitions: 1.  Non-contrast prospectively-gated computed tomography heart 2.  Contrast-enhanced retrospectively-gated computed tomography heart 3.  Non-gated helical computed tomography chest, abdomen, and pelvis 4.  Delayed-phase prospectively-gated computed tomography heart  Contrast:  140 mL Omnipaque 350  Quality:  Good  Post-processing:  Three-dimensional volume-rendered and maximal intensity projection images reconstructed with Vitrea software.  FINDINGS:  Cardiovascular:  Mitral valve There is severe mitral annular calcification of the anterior and posterior mitral annulus which extends to the mitral valve leaflets. The mitral valve leaflets appear thickened with decreased leaflet excursion.  Aortic valve The tricuspid aortic valve is mildly calcified and thickened.  Aorta No thoracic aortic dissection or aneurysm is noted.  Moderate noncalcified and calcified plaque is present throughout the thoracic and abdominal aorta.  Pulmonary artery The main pulmonary artery appears dilated.  Coronary arteries This study was not optimized for coronary artery evaluation. Noncalcified and calcified plaque is noted in the epicardial coronary arteries. Refer to invasive coronary angiography performed prior to intervention for coronary artery luminal assessment.  Pericardium There is no pericardial effusion.  Chambers There is no filling defect in the left atrial appendage. The left atrium is severely dilated.  Non-cardiac: No hilar or mediastinal adenopathy is noted. Centrilobular emphysema is noted bilaterally. Compressive atelectasis is noted involving portions of both lower lobes. This is secondary to small bilateral pleural effusions. Liver, spleen and pancreas are unremarkable. Patient is status post cholecystectomy. Thickening/nodularity of both adrenal glands is noted. Both kidneys enhance with contrast. No retroperitoneal adenopathy is noted. Stool is noted throughout the large bowel. Diverticula are noted in the sigmoid colon. Examination of the pelvis demonstrate calcified leiomyomatous uterus. Degenerative changes of the spine are noted.  IMPRESSION: - Severe mitral annular calcification of the anterior and posterior mitral annulus which extends to the mitral valve leaflets. Thickened mitral valve leaflets with decreased leaflet excursion. - Severely dilated left atrium.     Transesophageal Echocardiogram from 12/22/23 at [facility]  - LVEF >75% - Normal RV - There is diffuse mitral valve thickening of the anterior and posterior leaflets. There is moderate calcification of the mitral annulus. There is severe mitral valve stenosis secondary to dystrophic MAC. A hyperdynamic LV contributes to the elevated transmitral gradient. There is mild to moderate MR. - The aortic valve appears trileaflet There is an independently mobile echo density which measures 7 mm x 3 mm attached to the ventricular side of the aortic valve. The differential includes Lambl's, papillary fibroelastoma, less likely vegetation. The mass on the valve appears larger than comparative studies in July 2020.  - Small pericardial effusion is noted adjacent to the right atrium. - Thickened pericardium   Pulmonary Function testing from 12/19/23 at [facility] - FEV1 54 - DLCO 93 - Moderately severe restriction with normal DLCO     11/10/23 TTE  1. Left ventricular systolic function is hyperdynamic with an ejection fraction of 69 % by Shepard's method of disks. 2. Mild to moderate left ventricular hypertrophy. 3. Normal right ventricular cavity size, wall thickness, and systolic function. 4. The left atrium is severely dilated in size. 5. The right atrium is normal in size. 6. The mitral valve leaflets are calcified. Severe mitral valve stenosis is present with a mean transmitral valve gradient = 20 mmHg at a HR of 89bpm. 7. Mild mitral regurgitation. 8. The aortic valve appears calcified with grossly normal opening. 9. Trace tricuspid regurgitation. 10. Trace pulmonic regurgitation. 11. The inferior vena cava is normal in size measuring 1.80 cm in diameter, (normal <2.1cm) with normal inspiratory collapse (normal >50%) consistent with normal right atrial pressure (~3, range 0-5mmHg). 12. Trace pericardial effusion.
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
## 2024-02-22 NOTE — DISCUSSION/SUMMARY
[FreeTextEntry1] : 79 year woman with COPD and mitral valve disease with pleural effusions  CT chest 01/24 demonstrated moderate bilateral pleural effusions  I suspect discomfort  is due to pleural effusions  PLAN Refer for CXR with decubitus to better evaluate continue diuretic asprescribed  Monitor for any chest pain  Go to ER if any persistent or worse symptoms  follow with Dr Wood South MD

## 2024-02-22 NOTE — REASON FOR VISIT
[Acute] : an acute visit [Chest Pain] : chest pain [Shortness of Breath] : shortness of breath [TextBox_44] : chest discomfort

## 2024-02-22 NOTE — PHYSICAL EXAM
[No Acute Distress] : no acute distress [Well Nourished] : well nourished [Normal Rate/Rhythm] : normal rate/rhythm [Well Developed] : well developed [No Resp Distress] : no resp distress [Normal S1, S2] : normal s1, s2 [No Clubbing] : no clubbing [No Edema] : no edema [TextBox_44] : kyphosis [TextBox_68] : severely .sim , mild rhonchi, more diminished at bases [TextBox_54] : distant

## 2024-02-22 NOTE — HISTORY OF PRESENT ILLNESS
[Current] : current [Never] : never [TextBox_4] : 78 year old woman long term smoker (quit 3/2023) with asthma/COPD, HTN, HLD, aortic atheroma and valvular heart disease here for f/u. Accompanied by her daughter and granddaughter today  She was hospitalized November, 2023 at [facility] after presenting with back pain. She had worsening bilateral pleural effusions, volume overload and renal failure. Underwent right-sided thoracentesis with 500 cc of transudateive fluid drained. Cultures and cytology were negative.  having discomfort across lower chest and back for about 1 week  No fever, sputum production No significant chest pain pressure  She uses Breztri  She has been a smoker

## 2024-02-27 ENCOUNTER — APPOINTMENT (OUTPATIENT)
Dept: CARDIOTHORACIC SURGERY | Facility: CLINIC | Age: 79
End: 2024-02-27

## 2024-02-27 ENCOUNTER — APPOINTMENT (OUTPATIENT)
Dept: PULMONOLOGY | Facility: CLINIC | Age: 79
End: 2024-02-27
Payer: MEDICARE

## 2024-02-27 VITALS
WEIGHT: 108 LBS | HEIGHT: 62 IN | HEART RATE: 56 BPM | OXYGEN SATURATION: 96 % | BODY MASS INDEX: 19.88 KG/M2 | SYSTOLIC BLOOD PRESSURE: 112 MMHG | DIASTOLIC BLOOD PRESSURE: 70 MMHG

## 2024-02-27 DIAGNOSIS — J18.9 PNEUMONIA, UNSPECIFIED ORGANISM: ICD-10-CM

## 2024-02-27 DIAGNOSIS — F17.200 NICOTINE DEPENDENCE, UNSPECIFIED, UNCOMPLICATED: ICD-10-CM

## 2024-02-27 DIAGNOSIS — I34.2 NONRHEUMATIC MITRAL (VALVE) STENOSIS: ICD-10-CM

## 2024-02-27 DIAGNOSIS — R60.0 LOCALIZED EDEMA: ICD-10-CM

## 2024-02-27 DIAGNOSIS — J90 PLEURAL EFFUSION, NOT ELSEWHERE CLASSIFIED: ICD-10-CM

## 2024-02-27 PROCEDURE — 94618 PULMONARY STRESS TESTING: CPT

## 2024-02-27 PROCEDURE — 99215 OFFICE O/P EST HI 40 MIN: CPT | Mod: 25

## 2024-02-27 NOTE — PHYSICAL EXAM
[Normal Oropharynx] : normal oropharynx [Normal Appearance] : normal appearance [No Neck Mass] : no neck mass [Normal S1, S2] : normal s1, s2 [Normal Rate/Rhythm] : normal rate/rhythm [No Murmurs] : no murmurs [No Resp Distress] : no resp distress [Clear to Auscultation Bilaterally] : clear to auscultation bilaterally [No Abnormalities] : no abnormalities [Benign] : benign [Normal Gait] : normal gait [No Cyanosis] : no cyanosis [No Clubbing] : no clubbing [No Edema] : no edema [FROM] : FROM [Normal Color/ Pigmentation] : normal color/ pigmentation [No Focal Deficits] : no focal deficits [Oriented x3] : oriented x3 [Normal Affect] : normal affect [TextBox_2] : Pursed lip breathing [TextBox_68] : Decreased breath sounds left midlung field and right base

## 2024-02-27 NOTE — PHYSICAL EXAM
[Normal Oropharynx] : normal oropharynx [Normal Appearance] : normal appearance [No Neck Mass] : no neck mass [Normal S1, S2] : normal s1, s2 [Normal Rate/Rhythm] : normal rate/rhythm [No Murmurs] : no murmurs [No Resp Distress] : no resp distress [Clear to Auscultation Bilaterally] : clear to auscultation bilaterally [Benign] : benign [No Abnormalities] : no abnormalities [Normal Gait] : normal gait [No Clubbing] : no clubbing [No Cyanosis] : no cyanosis [No Edema] : no edema [FROM] : FROM [Normal Color/ Pigmentation] : normal color/ pigmentation [No Focal Deficits] : no focal deficits [Oriented x3] : oriented x3 [Normal Affect] : normal affect [TextBox_2] : Pursed lip breathing [TextBox_68] : Decreased breath sounds left midlung field and right base

## 2024-02-27 NOTE — HISTORY OF PRESENT ILLNESS
[Former] : former [>= 20 pack years] : >= 20 pack years [Never] : never [TextBox_4] : Ms. LAUREN TORRES is a 78 year old woman long term smoker (quit 3/2023) with asthma/COPD, HTN, HLD, aortic atheroma and valvular heart diease here for f/u. Accompanied by her daughter today  _________________________ History: Was hospitalized  - 2023 at [facility] after presenting with back pain.  Was found to have worsening bilateral pleural effusions, volume overload and renal failure.  Was treated with IV Lasix as well as steroids and prednisone for PNA/copd exacerbation. Underwent right-sided thoracentesis with 500 cc of transudateive fluid drained.  Cultures and cytology were negative. Was discharged home on home o2 which she has not been using.   Interval events: Will send to ER for worsening dyspnea. was last seen 2023.  At that time was sent to emergency room for evaluation. She has been following closely with cardiology and cardiothoracic surgery for her severe mitral valve stenosis.  She has been maintained on 40 mg of Lasix and reports compliance. She continues to have stable symptoms until about 1.5 weeks ago when started not feeling well.  She developed left-sided pain under her scapula (not pleuritic.  Has been feeling generally unwell, fatigued.),  Felt hot but denies chills or night sweats. Was seen by Dr South last week.  Chest x-ray showed mild to moderate large right pleural as well as left lower zone opacity..    PMH: HLD, HTN, aortic atheroma Meds: per chart All: NKDA SH: smoking No known exposures. Worked as . FH: father  at 93, had lung issues; mother  at 51 of heart disease PMD: BENSON LOBO Immunizations: Pneumovax 2019; COVID vaccinated, due for second booster.  [TextBox_13] : 49 [TextBox_11] : 1 [YearQuit] : 3/2023

## 2024-02-27 NOTE — PHYSICAL EXAM
[Normal Oropharynx] : normal oropharynx [Normal Appearance] : normal appearance [No Neck Mass] : no neck mass [Normal Rate/Rhythm] : normal rate/rhythm [Normal S1, S2] : normal s1, s2 [No Murmurs] : no murmurs [Clear to Auscultation Bilaterally] : clear to auscultation bilaterally [No Resp Distress] : no resp distress [No Abnormalities] : no abnormalities [Benign] : benign [Normal Gait] : normal gait [No Clubbing] : no clubbing [No Cyanosis] : no cyanosis [No Edema] : no edema [FROM] : FROM [Normal Color/ Pigmentation] : normal color/ pigmentation [No Focal Deficits] : no focal deficits [Oriented x3] : oriented x3 [Normal Affect] : normal affect [TextBox_2] : Pursed lip breathing [TextBox_68] : Decreased breath sounds left midlung field and right base

## 2024-02-27 NOTE — CONSULT LETTER
[Dear  ___] : Dear  [unfilled], [Consult Letter:] : I had the pleasure of evaluating your patient, [unfilled]. [Consult Closing:] : Thank you very much for allowing me to participate in the care of this patient.  If you have any questions, please do not hesitate to contact me. [Please see my note below.] : Please see my note below. [FreeTextEntry3] : Sincerely,\par  \par  Fay Muir MD\par  [facility] Physician [facility]\par  Pulmonary Medicine\par  tel: [phone]\par  fax: [phone]\par

## 2024-02-27 NOTE — ASSESSMENT
[FreeTextEntry1] : Ms. LAUREN TORRES is a 77 year old woman long term smoker (still smoking) with asthma/COPD/emphysema, HTN, HLD, and severe mitral stenosis with chronic R pleff (s/p thora) is here for f/u.   #SOB - woresening SOB. ?consolidative opacity on CXR. Treat with a course of levofloxacin. If no improvement, will plan for CT chest.   #Asthma/COPD/emphysema - PFTs Aug 2023 with mild obstruction (FEV 1 = 1.42L), mild restriction, reduced DLCO (may be effort related). 6MWD Aug 2023 with O2 jose 91% Today, pulmonary function testing with worsening restriction, no obstruction, normal DLCO Exercise oximetry on RA last visit: at rest - P: O2 98%; with exertion - P: O2 89% -  -- c/w Breztri for now -- No need for oxygen at this time, patient reports that she would not want to carry it along regardless -- repeat 6MWT next visit  #valvular heart disease -severe mitral stenosis, heart failure preserved ejection fraction.  Follows with cardiology, has appointment scheduled with cardio thoracic surgery --c/w Lasix 40 mg daily, appears euvolemic on exam --Blood work today -- Continue to follow-up with her cardiothoracic surgeon and cardiologist  #Smoker - >50 pack year smoking hx, quit 8/2023, continued abstinence encouraged  All questions answered. Patient in agreement with plan.

## 2024-02-27 NOTE — HISTORY OF PRESENT ILLNESS
[Former] : former [>= 20 pack years] : >= 20 pack years [Never] : never [TextBox_4] : Ms. LAUREN TORRES is a 78 year old woman long term smoker (quit 3/2023) with asthma/COPD, HTN, HLD, aortic atheroma and valvular heart diease here for f/u. Accompanied by her daughter today  _________________________ History: Was hospitalized  - 2023 at [facility] after presenting with back pain.  Was found to have worsening bilateral pleural effusions, volume overload and renal failure.  Was treated with IV Lasix as well as steroids and prednisone for PNA/copd exacerbation. Underwent right-sided thoracentesis with 500 cc of transudateive fluid drained.  Cultures and cytology were negative. Was discharged home on home o2 which she has not been using.   Interval events: Will send to ER for worsening dyspnea. was last seen 2023.  At that time was sent to emergency room for evaluation. She has been following closely with cardiology and cardiothoracic surgery for her severe mitral valve stenosis.  She has been maintained on 40 mg of Lasix and reports compliance. She continues to have stable symptoms until about 1.5 weeks ago when started not feeling well.  She developed left-sided pain under her scapula (not pleuritic.  Has been feeling generally unwell, fatigued.),  Felt hot but denies chills or night sweats. Was seen by Dr South last week.  Chest x-ray showed mild to moderate large right pleural as well as left lower zone opacity..    PMH: HLD, HTN, aortic atheroma Meds: per chart All: NKDA SH: smoking No known exposures. Worked as . FH: father  at 93, had lung issues; mother  at 51 of heart disease PMD: BENSON LOBO Immunizations: Pneumovax 2019; COVID vaccinated, due for second booster.  [TextBox_13] : 50 [TextBox_11] : 1 [YearQuit] : 3/2023

## 2024-02-27 NOTE — CONSULT LETTER
[Dear  ___] : Dear  [unfilled], [Consult Letter:] : I had the pleasure of evaluating your patient, [unfilled]. [Consult Closing:] : Thank you very much for allowing me to participate in the care of this patient.  If you have any questions, please do not hesitate to contact me. [Please see my note below.] : Please see my note below. [FreeTextEntry3] : Sincerely,\par  \par  Fay Muir MD\par  [facility] Physician [facility]\par  Pulmonary Medicine\par  tel: [phone]\par  fax: [phone]\par

## 2024-02-27 NOTE — HISTORY OF PRESENT ILLNESS
[Former] : former [>= 20 pack years] : >= 20 pack years [Never] : never [TextBox_4] : Ms. LAUREN TORRES is a 78 year old woman long term smoker (quit 3/2023) with asthma/COPD, HTN, HLD, aortic atheroma and valvular heart diease here for f/u. Accompanied by her daughter today  _________________________ History: Was hospitalized  - 2023 at [facility] after presenting with back pain.  Was found to have worsening bilateral pleural effusions, volume overload and renal failure.  Was treated with IV Lasix as well as steroids and prednisone for PNA/copd exacerbation. Underwent right-sided thoracentesis with 500 cc of transudateive fluid drained.  Cultures and cytology were negative. Was discharged home on home o2 which she has not been using.   Interval events: Will send to ER for worsening dyspnea. was last seen 2023.  At that time was sent to emergency room for evaluation. She has been following closely with cardiology and cardiothoracic surgery for her severe mitral valve stenosis.  She has been maintained on 40 mg of Lasix and reports compliance. She continues to have stable symptoms until about 1.5 weeks ago when started not feeling well.  She developed left-sided pain under her scapula (not pleuritic.  Has been feeling generally unwell, fatigued.),  Felt hot but denies chills or night sweats. Was seen by Dr South last week.  Chest x-ray showed mild to moderate large right pleural as well as left lower zone opacity..    PMH: HLD, HTN, aortic atheroma Meds: per chart All: NKDA SH: smoking No known exposures. Worked as . FH: father  at 93, had lung issues; mother  at 51 of heart disease PMD: BENSON LOBO Immunizations: Pneumovax 2019; COVID vaccinated, due for second booster.  [TextBox_13] : 49 [TextBox_11] : 1 [YearQuit] : 3/2023

## 2024-02-27 NOTE — CONSULT LETTER
[Consult Letter:] : I had the pleasure of evaluating your patient, [unfilled]. [Dear  ___] : Dear  [unfilled], [Please see my note below.] : Please see my note below. [Consult Closing:] : Thank you very much for allowing me to participate in the care of this patient.  If you have any questions, please do not hesitate to contact me. [FreeTextEntry3] : Sincerely,\par  \par  Fay Muir MD\par  [facility] Physician [facility]\par  Pulmonary Medicine\par  tel: [phone]\par  fax: [phone]\par

## 2024-02-28 LAB
ALBUMIN SERPL ELPH-MCNC: 4.6 G/DL
ALP BLD-CCNC: 102 U/L
ALT SERPL-CCNC: 11 U/L
ANION GAP SERPL CALC-SCNC: 16 MMOL/L
AST SERPL-CCNC: 21 U/L
BASOPHILS # BLD AUTO: 0.07 K/UL
BASOPHILS NFR BLD AUTO: 0.9 %
BILIRUB SERPL-MCNC: 0.4 MG/DL
BUN SERPL-MCNC: 46 MG/DL
CALCIUM SERPL-MCNC: 9.8 MG/DL
CHLORIDE SERPL-SCNC: 95 MMOL/L
CO2 SERPL-SCNC: 26 MMOL/L
CREAT SERPL-MCNC: 1.65 MG/DL
EGFR: 31 ML/MIN/1.73M2
EOSINOPHIL # BLD AUTO: 0.26 K/UL
EOSINOPHIL NFR BLD AUTO: 3.4 %
GLUCOSE SERPL-MCNC: 108 MG/DL
HCT VFR BLD CALC: 39.2 %
HGB BLD-MCNC: 12.3 G/DL
IMM GRANULOCYTES NFR BLD AUTO: 0.3 %
LYMPHOCYTES # BLD AUTO: 1.5 K/UL
LYMPHOCYTES NFR BLD AUTO: 19.6 %
MAN DIFF?: NORMAL
MCHC RBC-ENTMCNC: 30.6 PG
MCHC RBC-ENTMCNC: 31.4 GM/DL
MCV RBC AUTO: 97.5 FL
MONOCYTES # BLD AUTO: 0.62 K/UL
MONOCYTES NFR BLD AUTO: 8.1 %
NEUTROPHILS # BLD AUTO: 5.2 K/UL
NEUTROPHILS NFR BLD AUTO: 67.7 %
NT-PROBNP SERPL-MCNC: 3522 PG/ML
PLATELET # BLD AUTO: 284 K/UL
POTASSIUM SERPL-SCNC: 4.5 MMOL/L
PROCALCITONIN SERPL-MCNC: 0.16 NG/ML
PROT SERPL-MCNC: 7.9 G/DL
RBC # BLD: 4.02 M/UL
RBC # FLD: 14.3 %
SODIUM SERPL-SCNC: 137 MMOL/L
WBC # FLD AUTO: 7.67 K/UL

## 2024-02-29 NOTE — REASON FOR VISIT
[Follow-up] : a follow-up of an existing diagnosis [FreeTextEntry1] : Pt Has Fluid in her lungs and pain on the chest area

## 2024-03-11 ENCOUNTER — APPOINTMENT (OUTPATIENT)
Dept: CT IMAGING | Facility: CLINIC | Age: 79
End: 2024-03-11

## 2024-03-11 ENCOUNTER — LABORATORY RESULT (OUTPATIENT)
Age: 79
End: 2024-03-11

## 2024-03-11 ENCOUNTER — APPOINTMENT (OUTPATIENT)
Dept: RADIOLOGY | Facility: CLINIC | Age: 79
End: 2024-03-11

## 2024-03-12 ENCOUNTER — LABORATORY RESULT (OUTPATIENT)
Age: 79
End: 2024-03-12

## 2024-03-13 NOTE — ED PROVIDER NOTE - DURATION
2/week(s)
[FreeTextEntry1] : 38 year old male without past cardiac history but positive FH of CAD with new onset chest pains with and without exertion without SOB, H/a

## 2024-03-15 ENCOUNTER — APPOINTMENT (OUTPATIENT)
Dept: PULMONOLOGY | Facility: CLINIC | Age: 79
End: 2024-03-15
Payer: MEDICARE

## 2024-03-15 VITALS
DIASTOLIC BLOOD PRESSURE: 64 MMHG | HEART RATE: 59 BPM | OXYGEN SATURATION: 95 % | HEIGHT: 62 IN | SYSTOLIC BLOOD PRESSURE: 115 MMHG | RESPIRATION RATE: 15 BRPM | WEIGHT: 106.5 LBS | BODY MASS INDEX: 19.6 KG/M2 | TEMPERATURE: 97.7 F

## 2024-03-15 DIAGNOSIS — Z00.00 ENCOUNTER FOR GENERAL ADULT MEDICAL EXAMINATION W/OUT ABNORMAL FINDINGS: ICD-10-CM

## 2024-03-15 PROCEDURE — 99215 OFFICE O/P EST HI 40 MIN: CPT

## 2024-03-15 RX ORDER — BUDESONIDE, GLYCOPYRROLATE, AND FORMOTEROL FUMARATE 160; 9; 4.8 UG/1; UG/1; UG/1
160-9-4.8 AEROSOL, METERED RESPIRATORY (INHALATION)
Qty: 1 | Refills: 0 | Status: DISCONTINUED | COMMUNITY
Start: 2023-02-07 | End: 2024-03-15

## 2024-03-17 PROBLEM — Z00.00 ENCOUNTER FOR PREVENTIVE HEALTH EXAMINATION: Status: ACTIVE | Noted: 2018-12-04

## 2024-03-17 NOTE — PHYSICAL EXAM
[Normal Oropharynx] : normal oropharynx [Normal Appearance] : normal appearance [No Neck Mass] : no neck mass [Normal Rate/Rhythm] : normal rate/rhythm [Normal S1, S2] : normal s1, s2 [No Murmurs] : no murmurs [No Resp Distress] : no resp distress [Clear to Auscultation Bilaterally] : clear to auscultation bilaterally [No Abnormalities] : no abnormalities [Benign] : benign [Normal Gait] : normal gait [No Clubbing] : no clubbing [No Cyanosis] : no cyanosis [No Edema] : no edema [FROM] : FROM [Normal Color/ Pigmentation] : normal color/ pigmentation [No Focal Deficits] : no focal deficits [Normal Affect] : normal affect [Oriented x3] : oriented x3 [TextBox_2] : Pursed lip breathing [TextBox_68] : decreased BS at the bases

## 2024-03-17 NOTE — ASSESSMENT
[FreeTextEntry1] : Ms. LAUREN TORRES is a 77 year old woman long term smoker (still smoking) with asthma/COPD/emphysema, HTN, HLD, and severe mitral stenosis with chronic R pleff (s/p thora) is here for f/u.   #Asthma/COPD/emphysema - PFTs Aug 2023 with mild obstruction (FEV 1 = 1.42L), mild restriction, reduced DLCO (may be effort related). 6MWD Aug 2023 with O2 jose 91% PFTs Dec 2023 with worsening restriction, no obstruction, normal DLCO She doesn't like Breztri. Not interested in nebulizers.  -- trial of Anoro -- No need for oxygen at this time, patient reports that she would not want to carry it along regardless -- repeat 6MWT next visit  #valvular heart disease -severe mitral stenosis, heart failure preserved ejection fraction.  Follows with cardiology.  CT chest with e/o worsening volume overload -- recommend alternating 40mg daily and bid dosing. Repeat BW in 10-14 days.  -- daily weights -- Continue to follow-up with her cardiothoracic surgeon and cardiologist  #Smoker - >50 pack year smoking hx, quit 8/2023, continued abstinence encouraged  All questions answered. Patient in agreement with plan.

## 2024-03-17 NOTE — CONSULT LETTER
[Dear  ___] : Dear  [unfilled], [Consult Letter:] : I had the pleasure of evaluating your patient, [unfilled]. [Please see my note below.] : Please see my note below. [Consult Closing:] : Thank you very much for allowing me to participate in the care of this patient.  If you have any questions, please do not hesitate to contact me. [FreeTextEntry3] : Sincerely,\par  \par  Fay Muir MD\par  [facility] Physician [facility]\par  Pulmonary Medicine\par  tel: [phone]\par  fax: [phone]\par

## 2024-03-17 NOTE — HISTORY OF PRESENT ILLNESS
[Former] : former [>= 20 pack years] : >= 20 pack years [Never] : never [TextBox_4] : Ms. LAUREN TORRES is a 78 year old woman long term smoker (quit 3/2023) with asthma/COPD, HTN, HLD, aortic atheroma and valvular heart diease here for f/u. Accompanied by her daughter today  _________________________ History: Was hospitalized  - 2023 at [facility] after presenting with back pain.  Was found to have worsening bilateral pleural effusions, volume overload and renal failure.  Was treated with IV Lasix as well as steroids and prednisone for PNA/copd exacerbation. Underwent right-sided thoracentesis with 500 cc of transudateive fluid drained.  Cultures and cytology were negative. Was discharged home on home o2 which she has not been using.   Interval events: Treated for PNA early march. Reports improvement in left sided back pain.  Respiratory sx stable, weight down to 108lb.  She doesn't like breztri bid.    PMH: HLD, HTN, aortic atheroma Meds: per chart All: NKDA SH: smoking No known exposures. Worked as . FH: father  at 93, had lung issues; mother  at 51 of heart disease PMD: BENSON LOBO Immunizations: Pneumovax 2019; COVID vaccinated, due for second booster.  [TextBox_13] : 49 [TextBox_11] : 1 [YearQuit] : 3/2023

## 2024-03-26 ENCOUNTER — LABORATORY RESULT (OUTPATIENT)
Age: 79
End: 2024-03-26

## 2024-04-02 ENCOUNTER — APPOINTMENT (OUTPATIENT)
Dept: PULMONOLOGY | Facility: CLINIC | Age: 79
End: 2024-04-02
Payer: MEDICARE

## 2024-04-02 PROCEDURE — 94060 EVALUATION OF WHEEZING: CPT

## 2024-04-02 PROCEDURE — 94618 PULMONARY STRESS TESTING: CPT

## 2024-04-02 PROCEDURE — 94729 DIFFUSING CAPACITY: CPT

## 2024-04-02 PROCEDURE — 94727 GAS DIL/WSHOT DETER LNG VOL: CPT

## 2024-04-05 ENCOUNTER — APPOINTMENT (OUTPATIENT)
Dept: PULMONOLOGY | Facility: CLINIC | Age: 79
End: 2024-04-05
Payer: MEDICARE

## 2024-04-05 VITALS
DIASTOLIC BLOOD PRESSURE: 71 MMHG | HEIGHT: 63 IN | SYSTOLIC BLOOD PRESSURE: 123 MMHG | BODY MASS INDEX: 18.78 KG/M2 | WEIGHT: 106 LBS | HEART RATE: 64 BPM

## 2024-04-05 VITALS — OXYGEN SATURATION: 92 %

## 2024-04-05 PROCEDURE — 99215 OFFICE O/P EST HI 40 MIN: CPT

## 2024-04-05 RX ORDER — ALBUTEROL SULFATE 90 UG/1
108 (90 BASE) INHALANT RESPIRATORY (INHALATION)
Qty: 1 | Refills: 2 | Status: ACTIVE | COMMUNITY
Start: 2024-04-05 | End: 1900-01-01

## 2024-04-05 NOTE — CONSULT LETTER
[Dear  ___] : Dear  [unfilled], [Consult Letter:] : I had the pleasure of evaluating your patient, [unfilled]. [Please see my note below.] : Please see my note below. [Consult Closing:] : Thank you very much for allowing me to participate in the care of this patient.  If you have any questions, please do not hesitate to contact me. [FreeTextEntry3] : Sincerely,\par  \par  Fay Muir MD\par  [facility] Physician [facility]\par  Pulmonary Medicine\par  tel: [phone]\par  fax: [phone]\par

## 2024-04-05 NOTE — HISTORY OF PRESENT ILLNESS
[Former] : former [>= 20 pack years] : >= 20 pack years [Never] : never [TextBox_4] : Ms. LAUREN TORRES is a 78 year old woman long term smoker (quit 3/2023) with asthma/COPD, HTN, HLD, aortic atheroma and valvular heart diease here for f/u. Accompanied by her daughter today  _________________________ History: Was hospitalized  - 2023 at [facility] after presenting with back pain.  Was found to have worsening bilateral pleural effusions, volume overload and renal failure.  Was treated with IV Lasix as well as steroids and prednisone for PNA/copd exacerbation. Underwent right-sided thoracentesis with 500 cc of transudateive fluid drained.  Cultures and cytology were negative. Was discharged home on home o2 which she has not been using.   Interval events: Treated for PNA early march. Reports improvement in left sided back pain.  Has been compliant with diuretics and Anorol.  Thinks she is feeling a bit better.  Still smoking   PMH: HLD, HTN, aortic atheroma Meds: per chart All: NKDA SH: smoking No known exposures. Worked as . FH: father  at 93, had lung issues; mother  at 51 of heart disease PMD: BENSON LOBO Immunizations: Pneumovax 2019; COVID vaccinated, due for second booster.  [TextBox_11] : 1 [TextBox_13] : 50 [YearQuit] : 3/2023

## 2024-04-05 NOTE — ASSESSMENT
[FreeTextEntry1] : Ms. LAUREN TORRES is a 77 year old woman long term smoker (still smoking) with asthma/COPD/emphysema, HTN, HLD, and severe mitral stenosis with chronic R pleff (s/p thora) is here for f/u.   #Asthma/COPD/emphysema - PFTs Aug 2023 with mild obstruction (FEV 1 = 1.42L), mild restriction, reduced DLCO (may be effort related). 6MWD Aug 2023 with O2 jose 91% PFTs April 2024 with severe restriction, moderately severe obstruction, severely decreased DLCO She doesn't like Breztri. Not interested in nebulizers.  -- c/w  Anoro --She needs O2 with exertion and at night. She is very reluctant. Says she would not want to carry it along regardless. We discussed using O2 at night - she will think about it. Approached NIV as well. next visit  #valvular heart disease -severe mitral stenosis, heart failure preserved ejection fraction.  Follows with cardiology.  CT chest with e/o worsening volume overload -- recommend alternating 40mg daily and bid dosing. Repeat BW in 3-4w  days.  -- daily weights -- Continue to follow-up with her cardiothoracic surgeon and cardiologist  #Smoker - >50 pack year smoking hx, quit 8/2023, continued abstinence encouraged  All questions answered. Patient in agreement with plan.

## 2024-04-05 NOTE — PHYSICAL EXAM
[Normal Oropharynx] : normal oropharynx [Normal Appearance] : normal appearance [No Neck Mass] : no neck mass [Normal Rate/Rhythm] : normal rate/rhythm [Normal S1, S2] : normal s1, s2 [No Murmurs] : no murmurs [No Resp Distress] : no resp distress [Clear to Auscultation Bilaterally] : clear to auscultation bilaterally [No Abnormalities] : no abnormalities [Benign] : benign [Normal Gait] : normal gait [No Clubbing] : no clubbing [No Cyanosis] : no cyanosis [FROM] : FROM [Normal Color/ Pigmentation] : normal color/ pigmentation [No Focal Deficits] : no focal deficits [Oriented x3] : oriented x3 [Normal Affect] : normal affect [TextBox_2] : Pursed lip breathing [TextBox_68] : decreased BS at the bases [TextBox_105] : trace LLE edema

## 2024-04-30 NOTE — ED ADULT NURSE NOTE - ISOLATION TYPE:
POSTOPERATIVE INSTRUCTIONS   FOLLOWING HIP ARTHROSCOPY     *ICE HIP AS MUCH AS POSSIBLE ON AND OFF 20 MINUTES.  DO NOT APPLY HEAT.  *ELEVATE OPERATIVE LEG.  *PERFORM TOE RAISES AND ANKLE PUMPS SEVERAL TIMES PER DAY.  *DO NOT PERFORM STRAIGHT LEGS RAISES.  *KEEP DRESSING/INCISION DRY FOR 2 DAYS THEN OK TO SHOWER AND CHANGE DRESSING.  SEE BELOW INSTRUCTIONS.  *USE CRUTCHES WHEN AMBULATING.  *FLAT FOOT WEIGHT BEAR (20 POUNDS).  *IF CPM MACHINE ORDERED USE 4 HOURS A DAY OR AS MUCH AS POSSIBLE 10-70 DEGREES (MAY INCREASE TO 0-90 DEGREES IF COMFORTABLE).  *HAVE SOMEONE ASSIST YOU WITH HIP CIRCUMDUCTION EXERCISES AS DIRECTED PRE-SURGERY.  *WEAR KNEE HIGH LEBRON STOCKINGS FOR 1 WEEK WHILE AWAKE.  *TAKE PAIN MEDICATION UNTIL NO LONGER NEEDED.  *TAKE YOUR ANTI-INFLAMMATORY MEDICINE AS DIRECTED UNTIL FINISHED.  *NO DRIVING UNTIL CLEARED BY YOUR PHYSICIAN. DECIDING FACTORS INCLUDE PAIN MEDICATION USAGE AND ABILITY TO USE LEG SAFELY.  *REFRAIN FROM SMOKING / NICOTINE AS IT INHIBITS HEALING    DR. NAYELI RAMAN    Home Wound and Incision Care    Sutures or staples  Sutures (stitches) are like threads used to close a  wound or incision. The sutures you can see on top  of the skin do not dissolve.  Any sutures put under  the skin will dissolve. Your doctor will tell you  when to have your sutures or staples removed.  How  long they stay in will depend on the location of your  wound or incision.    Steri-Strips  You may have Steri-Strips (tape) holding your  wound together instead of sutures.  Do not remove  Steri-Strips.  They will slowly fall off in 5 to 7 days  or your doctor will remove them.  If your Steri-Strips  curl up, you may trim them, but do not remove them.    Dressing  Keep your incision/wound clean and dry.  This will  help with healing and prevent infection.   Follow the instructions below:  *You may remove or change the dressing in 2 days.  Blood on the dressing is normal.  *You may shower in 2 days.  A quick shower  with soapy water will not hurt your incision. *Do not submerge your incision under water (no baths or pools).  *Do not remove the Steri-Strips (tapes) that have    been placed across the wound.  They will loosen    on their own in 5 to 7 days or your doctor will    remove them.  *Your wound does not need a bandage and will    heal better left open to the air.  You may cover with Band-Aids or gauze.    Bleeding from your incision  A small amount of discharge (pink or red) is normal.  If it is bright red and it soaks the dressing, a small  blood vessel near your incision may have broken.  Apply a clean cloth or dressing over the incision  and apply pressure.  If bleeding continues, contact  your doctor or the Emergency Department.    Infection  Watch for signs of infection.  Call your doctor  (don’t wait for your next appointment) if you  notice any of these signs:   Increasing pain or tenderness   Increasing swelling   Increasing redness   Red streaks leading away from the wound   Pus draining from the wound   Wound area feels hot to the touch   Fever over 101º F    Pain  You may take acetaminophen (Tylenol®) or  ibuprofen (Advil®) for pain as recommended by  your doctor.  Follow the instructions on the bottle.  If your doctor has prescribed pain medicine, take  it as ordered.  Elevating the wound above the heart  may help to prevent pain or throbbing.  Also, as the  wound heals, it may begin to itch.    You may experience constipation after surgery.  To help prevent constipation, drink 6-8 glasses of water per day, increase fiber in your diet (fresh fruits and vegetables, beans, high fiber cereals), and  take stool softeners regularly.    Please be aware the [facility] has recommended a change in the way physician's can prescribe narcotic medications. There are restrictions on the quantity of medications that can be prescribed, as well as the duration. These medications cannot be called in to a pharmacy. Use  narcotic medications sparingly and only if needed.    Follow-up care  Call office for appointment if you do not have one already set-up.  Please bring your arthroscopic surgery photos to your postoperative visit so that we may review them with you.    Surveys  We appreciate you allowing us to participate in your care.  Please fill out any surveys you receive.  This will help us to continue to provide the best care possible.      None

## 2024-05-01 ENCOUNTER — LABORATORY RESULT (OUTPATIENT)
Age: 79
End: 2024-05-01

## 2024-05-03 ENCOUNTER — LABORATORY RESULT (OUTPATIENT)
Age: 79
End: 2024-05-03

## 2024-05-07 ENCOUNTER — APPOINTMENT (OUTPATIENT)
Dept: PULMONOLOGY | Facility: CLINIC | Age: 79
End: 2024-05-07
Payer: MEDICARE

## 2024-05-07 VITALS
OXYGEN SATURATION: 97 % | DIASTOLIC BLOOD PRESSURE: 55 MMHG | SYSTOLIC BLOOD PRESSURE: 91 MMHG | HEIGHT: 63 IN | WEIGHT: 105.56 LBS | HEART RATE: 65 BPM | TEMPERATURE: 98.2 F | RESPIRATION RATE: 15 BRPM | BODY MASS INDEX: 18.7 KG/M2

## 2024-05-07 DIAGNOSIS — J43.9 EMPHYSEMA, UNSPECIFIED: ICD-10-CM

## 2024-05-07 PROCEDURE — 94618 PULMONARY STRESS TESTING: CPT

## 2024-05-07 PROCEDURE — 99215 OFFICE O/P EST HI 40 MIN: CPT | Mod: 25

## 2024-05-07 RX ORDER — UMECLIDINIUM BROMIDE AND VILANTEROL TRIFENATATE 62.5; 25 UG/1; UG/1
62.5-25 POWDER RESPIRATORY (INHALATION)
Qty: 1 | Refills: 2 | Status: DISCONTINUED | COMMUNITY
Start: 2024-03-15 | End: 2024-05-07

## 2024-05-07 RX ORDER — ALBUTEROL SULFATE 2.5 MG/3ML
(2.5 MG/3ML) SOLUTION RESPIRATORY (INHALATION)
Qty: 1 | Refills: 2 | Status: ACTIVE | COMMUNITY
Start: 2024-05-07 | End: 1900-01-01

## 2024-05-07 RX ORDER — BUDESONIDE, GLYCOPYRROLATE, AND FORMOTEROL FUMARATE 160; 9; 4.8 UG/1; UG/1; UG/1
160-9-4.8 AEROSOL, METERED RESPIRATORY (INHALATION)
Qty: 3 | Refills: 1 | Status: ACTIVE | COMMUNITY
Start: 2024-05-07 | End: 1900-01-01

## 2024-05-07 NOTE — HISTORY OF PRESENT ILLNESS
[Former] : former [>= 20 pack years] : >= 20 pack years [Never] : never [TextBox_4] : Ms. LAUREN TORRES is a 78 year old woman long term smoker (quit 3/2023) with asthma/COPD, HTN, HLD, aortic atheroma and valvular heart diease here for f/u. Accompanied by her daughter today  _________________________ History: Was hospitalized  - 2023 at [facility] after presenting with back pain.  Was found to have worsening bilateral pleural effusions, volume overload and renal failure.  Was treated with IV Lasix as well as steroids and prednisone for PNA/copd exacerbation. Underwent right-sided thoracentesis with 500 cc of transudateive fluid drained.  Cultures and cytology were negative. Was discharged home on home o2 which she has not been using.   Interval events: Had several episodes of severe BARBOSA. Daughter notes peripheral cyanosis.  Willing to consider O2 now.  Still smoking   PMH: HLD, HTN, aortic atheroma Meds: per chart All: NKDA SH: smoking No known exposures. Worked as . FH: father  at 93, had lung issues; mother  at 51 of heart disease PMD: BENSON LOBO Immunizations: Pneumovax 2019; COVID vaccinated, due for second booster.  [TextBox_11] : 1 [TextBox_13] : 50 [YearQuit] : 3/2023

## 2024-05-07 NOTE — CONSULT LETTER
[Dear  ___] : Dear  [unfilled], [Consult Letter:] : I had the pleasure of evaluating your patient, [unfilled]. [Please see my note below.] : Please see my note below. [Consult Closing:] : Thank you very much for allowing me to participate in the care of this patient.  If you have any questions, please do not hesitate to contact me. [FreeTextEntry3] : Sincerely,\par  \par  Fay Muir MD\par  [facility] Physician [facility]\par  Pulmonary Medicine\par  tel: [phone]\par  fax: [phone]\par

## 2024-05-07 NOTE — PHYSICAL EXAM
[Normal Oropharynx] : normal oropharynx [Normal Appearance] : normal appearance [No Neck Mass] : no neck mass [Normal Rate/Rhythm] : normal rate/rhythm [Normal S1, S2] : normal s1, s2 [No Murmurs] : no murmurs [No Resp Distress] : no resp distress [Clear to Auscultation Bilaterally] : clear to auscultation bilaterally [No Abnormalities] : no abnormalities [Benign] : benign [Normal Gait] : normal gait [No Clubbing] : no clubbing [No Cyanosis] : no cyanosis [FROM] : FROM [Normal Color/ Pigmentation] : normal color/ pigmentation [No Focal Deficits] : no focal deficits [Oriented x3] : oriented x3 [Normal Affect] : normal affect [TextBox_2] : Pursed lip breathing [TextBox_68] : decreased BS throughout [TextBox_105] : trace LLE edema

## 2024-05-07 NOTE — ASSESSMENT
[FreeTextEntry1] : Ms. LAUREN TORRES is a 77 year old woman long term smoker (still smoking) with asthma/COPD/emphysema, HTN, HLD, and severe mitral stenosis with chronic R pleff (s/p thora) is here for f/u.   #Asthma/COPD/emphysema - PFTs Aug 2023 with mild obstruction (FEV 1 = 1.42L), mild restriction, reduced DLCO (may be effort related). 6MWD Aug 2023 with O2 jose 91% PFTs April 2024 with severe restriction, moderately severe obstruction, severely decreased DLCO --Tried Anoro. Doesn't see any effect. Will go back to Breztri (use with spacer). Discussed nebs but patient reluctant -- can use Albuterol neb prn 6MWT with exertinal hypoxemia. Patient now willing to use O2 with exertion - 2L NC to maintain leves >88% We discussed potential compnent of hypercapnia. Patient not sure she is willing to use NIV at night but will get ABG for now Advised to use O2 at night for now   #valvular heart disease -severe mitral stenosis, heart failure preserved ejection fraction.  Follows with cardiology.  CT chest with e/o worsening volume overload -- c.w lasix 40mg once/day with extra dose on MWF -- daily weights -- Continue to follow-up with her cardiothoracic surgeon and cardiologist  #Smoker - >50 pack year smoking hx,   All questions answered. Patient in agreement with plan. f/u in 4-6w

## 2024-05-09 ENCOUNTER — APPOINTMENT (OUTPATIENT)
Dept: PULMONOLOGY | Facility: CLINIC | Age: 79
End: 2024-05-09

## 2024-05-09 ENCOUNTER — APPOINTMENT (OUTPATIENT)
Dept: PULMONOLOGY | Facility: CLINIC | Age: 79
End: 2024-05-09
Payer: MEDICARE

## 2024-05-09 PROCEDURE — 82803 BLOOD GASES ANY COMBINATION: CPT

## 2024-05-09 PROCEDURE — 36600 WITHDRAWAL OF ARTERIAL BLOOD: CPT | Mod: 59

## 2024-05-16 ENCOUNTER — APPOINTMENT (OUTPATIENT)
Dept: INTERNAL MEDICINE | Facility: CLINIC | Age: 79
End: 2024-05-16

## 2024-05-23 ENCOUNTER — RX RENEWAL (OUTPATIENT)
Age: 79
End: 2024-05-23

## 2024-05-30 ENCOUNTER — RX RENEWAL (OUTPATIENT)
Age: 79
End: 2024-05-30

## 2024-05-30 RX ORDER — ATORVASTATIN CALCIUM 40 MG/1
40 TABLET, FILM COATED ORAL DAILY
Qty: 90 | Refills: 3 | Status: ACTIVE | COMMUNITY
Start: 2020-07-01 | End: 1900-01-01

## 2024-05-31 ENCOUNTER — APPOINTMENT (OUTPATIENT)
Dept: INTERNAL MEDICINE | Facility: CLINIC | Age: 79
End: 2024-05-31
Payer: MEDICARE

## 2024-05-31 VITALS
HEART RATE: 61 BPM | BODY MASS INDEX: 18.78 KG/M2 | WEIGHT: 106 LBS | TEMPERATURE: 98.2 F | OXYGEN SATURATION: 93 % | DIASTOLIC BLOOD PRESSURE: 65 MMHG | SYSTOLIC BLOOD PRESSURE: 125 MMHG | HEIGHT: 63 IN

## 2024-05-31 DIAGNOSIS — Z99.81 CHRONIC RESPIRATORY FAILURE WITH HYPOXIA: ICD-10-CM

## 2024-05-31 DIAGNOSIS — E03.9 HYPOTHYROIDISM, UNSPECIFIED: ICD-10-CM

## 2024-05-31 DIAGNOSIS — J96.11 CHRONIC RESPIRATORY FAILURE WITH HYPOXIA: ICD-10-CM

## 2024-05-31 DIAGNOSIS — Z72.0 TOBACCO USE: ICD-10-CM

## 2024-05-31 PROCEDURE — 99214 OFFICE O/P EST MOD 30 MIN: CPT

## 2024-05-31 PROCEDURE — G2211 COMPLEX E/M VISIT ADD ON: CPT

## 2024-05-31 NOTE — ASSESSMENT
[FreeTextEntry1] : check tsh, free T4--I advised the pt and dtg she is still hypothyroid and may require further increases in synthroid dosing pending labs complete tobacco cessation, discussed

## 2024-05-31 NOTE — HISTORY OF PRESENT ILLNESS
[Family Member] : family member [FreeTextEntry1] : f/u htn, thyroid seen with dtg [de-identified] : reviewed interim notes  had several inpt adm last yr for chf--on extra (2) lasix/wk using qhs O2 meds confirmed still smoking few cigs/wk

## 2024-06-01 LAB
T4 FREE SERPL-MCNC: 2.2 NG/DL
TSH SERPL-ACNC: 3.43 UIU/ML

## 2024-06-01 RX ORDER — LEVOTHYROXINE SODIUM 0.1 MG/1
100 TABLET ORAL
Qty: 90 | Refills: 1 | Status: ACTIVE | COMMUNITY
Start: 2022-07-16 | End: 1900-01-01

## 2024-06-03 ENCOUNTER — RX RENEWAL (OUTPATIENT)
Age: 79
End: 2024-06-03

## 2024-06-10 ENCOUNTER — INPATIENT (INPATIENT)
Facility: HOSPITAL | Age: 79
LOS: 9 days | Discharge: HOME | DRG: 204 | End: 2024-06-20
Attending: STUDENT IN AN ORGANIZED HEALTH CARE EDUCATION/TRAINING PROGRAM | Admitting: INTERNAL MEDICINE
Payer: MEDICARE

## 2024-06-10 ENCOUNTER — RESULT REVIEW (OUTPATIENT)
Age: 79
End: 2024-06-10

## 2024-06-10 VITALS
HEART RATE: 69 BPM | TEMPERATURE: 96 F | DIASTOLIC BLOOD PRESSURE: 51 MMHG | RESPIRATION RATE: 24 BRPM | WEIGHT: 106.92 LBS | OXYGEN SATURATION: 95 % | HEIGHT: 62 IN | SYSTOLIC BLOOD PRESSURE: 137 MMHG

## 2024-06-10 DIAGNOSIS — E78.5 HYPERLIPIDEMIA, UNSPECIFIED: ICD-10-CM

## 2024-06-10 DIAGNOSIS — R06.02 SHORTNESS OF BREATH: ICD-10-CM

## 2024-06-10 DIAGNOSIS — E03.9 HYPOTHYROIDISM, UNSPECIFIED: ICD-10-CM

## 2024-06-10 DIAGNOSIS — I25.10 ATHEROSCLEROTIC HEART DISEASE OF NATIVE CORONARY ARTERY WITHOUT ANGINA PECTORIS: ICD-10-CM

## 2024-06-10 DIAGNOSIS — Z29.9 ENCOUNTER FOR PROPHYLACTIC MEASURES, UNSPECIFIED: ICD-10-CM

## 2024-06-10 DIAGNOSIS — Z87.09 PERSONAL HISTORY OF OTHER DISEASES OF THE RESPIRATORY SYSTEM: ICD-10-CM

## 2024-06-10 DIAGNOSIS — Z82.49 FAMILY HISTORY OF ISCHEMIC HEART DISEASE AND OTHER DISEASES OF THE CIRCULATORY SYSTEM: ICD-10-CM

## 2024-06-10 DIAGNOSIS — J96.01 ACUTE RESPIRATORY FAILURE WITH HYPOXIA: ICD-10-CM

## 2024-06-10 DIAGNOSIS — N17.9 ACUTE KIDNEY FAILURE, UNSPECIFIED: ICD-10-CM

## 2024-06-10 DIAGNOSIS — Z98.890 OTHER SPECIFIED POSTPROCEDURAL STATES: Chronic | ICD-10-CM

## 2024-06-10 DIAGNOSIS — Z90.49 ACQUIRED ABSENCE OF OTHER SPECIFIED PARTS OF DIGESTIVE TRACT: Chronic | ICD-10-CM

## 2024-06-10 DIAGNOSIS — I50.9 HEART FAILURE, UNSPECIFIED: ICD-10-CM

## 2024-06-10 LAB
ALBUMIN SERPL ELPH-MCNC: 3.7 G/DL — SIGNIFICANT CHANGE UP (ref 3.3–5)
ALP SERPL-CCNC: 97 U/L — SIGNIFICANT CHANGE UP (ref 40–120)
ALT FLD-CCNC: 24 U/L — SIGNIFICANT CHANGE UP (ref 12–78)
ANION GAP SERPL CALC-SCNC: 7 MMOL/L — SIGNIFICANT CHANGE UP (ref 5–17)
ANION GAP SERPL CALC-SCNC: 7 MMOL/L — SIGNIFICANT CHANGE UP (ref 5–17)
APTT BLD: 37.8 SEC — HIGH (ref 24.5–35.6)
AST SERPL-CCNC: 26 U/L — SIGNIFICANT CHANGE UP (ref 15–37)
BASOPHILS # BLD AUTO: 0.04 K/UL — SIGNIFICANT CHANGE UP (ref 0–0.2)
BASOPHILS NFR BLD AUTO: 0.5 % — SIGNIFICANT CHANGE UP (ref 0–2)
BILIRUB SERPL-MCNC: 0.7 MG/DL — SIGNIFICANT CHANGE UP (ref 0.2–1.2)
BUN SERPL-MCNC: 44 MG/DL — HIGH (ref 7–23)
BUN SERPL-MCNC: 45 MG/DL — HIGH (ref 7–23)
CALCIUM SERPL-MCNC: 9.3 MG/DL — SIGNIFICANT CHANGE UP (ref 8.5–10.1)
CALCIUM SERPL-MCNC: 9.8 MG/DL — SIGNIFICANT CHANGE UP (ref 8.5–10.1)
CHLORIDE SERPL-SCNC: 102 MMOL/L — SIGNIFICANT CHANGE UP (ref 96–108)
CHLORIDE SERPL-SCNC: 102 MMOL/L — SIGNIFICANT CHANGE UP (ref 96–108)
CK SERPL-CCNC: 60 U/L — SIGNIFICANT CHANGE UP (ref 26–192)
CO2 SERPL-SCNC: 27 MMOL/L — SIGNIFICANT CHANGE UP (ref 22–31)
CO2 SERPL-SCNC: 27 MMOL/L — SIGNIFICANT CHANGE UP (ref 22–31)
CREAT SERPL-MCNC: 1.4 MG/DL — HIGH (ref 0.5–1.3)
CREAT SERPL-MCNC: 1.5 MG/DL — HIGH (ref 0.5–1.3)
EGFR: 35 ML/MIN/1.73M2 — LOW
EGFR: 38 ML/MIN/1.73M2 — LOW
EOSINOPHIL # BLD AUTO: 0.15 K/UL — SIGNIFICANT CHANGE UP (ref 0–0.5)
EOSINOPHIL NFR BLD AUTO: 1.9 % — SIGNIFICANT CHANGE UP (ref 0–6)
FLUAV AG NPH QL: SIGNIFICANT CHANGE UP
FLUBV AG NPH QL: SIGNIFICANT CHANGE UP
GLUCOSE SERPL-MCNC: 137 MG/DL — HIGH (ref 70–99)
GLUCOSE SERPL-MCNC: 90 MG/DL — SIGNIFICANT CHANGE UP (ref 70–99)
HCT VFR BLD CALC: 37.6 % — SIGNIFICANT CHANGE UP (ref 34.5–45)
HGB BLD-MCNC: 12.5 G/DL — SIGNIFICANT CHANGE UP (ref 11.5–15.5)
IMM GRANULOCYTES NFR BLD AUTO: 0.2 % — SIGNIFICANT CHANGE UP (ref 0–0.9)
INR BLD: 0.85 RATIO — SIGNIFICANT CHANGE UP (ref 0.85–1.18)
LACTATE SERPL-SCNC: 0.8 MMOL/L — SIGNIFICANT CHANGE UP (ref 0.7–2)
LYMPHOCYTES # BLD AUTO: 1.04 K/UL — SIGNIFICANT CHANGE UP (ref 1–3.3)
LYMPHOCYTES # BLD AUTO: 12.9 % — LOW (ref 13–44)
MAGNESIUM SERPL-MCNC: 2.4 MG/DL — SIGNIFICANT CHANGE UP (ref 1.6–2.6)
MCHC RBC-ENTMCNC: 31.3 PG — SIGNIFICANT CHANGE UP (ref 27–34)
MCHC RBC-ENTMCNC: 33.2 GM/DL — SIGNIFICANT CHANGE UP (ref 32–36)
MCV RBC AUTO: 94 FL — SIGNIFICANT CHANGE UP (ref 80–100)
MONOCYTES # BLD AUTO: 0.47 K/UL — SIGNIFICANT CHANGE UP (ref 0–0.9)
MONOCYTES NFR BLD AUTO: 5.8 % — SIGNIFICANT CHANGE UP (ref 2–14)
NEUTROPHILS # BLD AUTO: 6.34 K/UL — SIGNIFICANT CHANGE UP (ref 1.8–7.4)
NEUTROPHILS NFR BLD AUTO: 78.7 % — HIGH (ref 43–77)
NRBC # BLD: 0 /100 WBCS — SIGNIFICANT CHANGE UP (ref 0–0)
NT-PROBNP SERPL-SCNC: 3774 PG/ML — HIGH (ref 0–450)
PLATELET # BLD AUTO: 221 K/UL — SIGNIFICANT CHANGE UP (ref 150–400)
POTASSIUM SERPL-MCNC: 4.4 MMOL/L — SIGNIFICANT CHANGE UP (ref 3.5–5.3)
POTASSIUM SERPL-MCNC: 5.1 MMOL/L — SIGNIFICANT CHANGE UP (ref 3.5–5.3)
POTASSIUM SERPL-SCNC: 4.4 MMOL/L — SIGNIFICANT CHANGE UP (ref 3.5–5.3)
POTASSIUM SERPL-SCNC: 5.1 MMOL/L — SIGNIFICANT CHANGE UP (ref 3.5–5.3)
PROT SERPL-MCNC: 8.2 G/DL — SIGNIFICANT CHANGE UP (ref 6–8.3)
PROTHROM AB SERPL-ACNC: 10 SEC — SIGNIFICANT CHANGE UP (ref 9.5–13)
RBC # BLD: 4 M/UL — SIGNIFICANT CHANGE UP (ref 3.8–5.2)
RBC # FLD: 14.6 % — HIGH (ref 10.3–14.5)
RSV RNA NPH QL NAA+NON-PROBE: SIGNIFICANT CHANGE UP
SARS-COV-2 RNA SPEC QL NAA+PROBE: SIGNIFICANT CHANGE UP
SODIUM SERPL-SCNC: 136 MMOL/L — SIGNIFICANT CHANGE UP (ref 135–145)
SODIUM SERPL-SCNC: 136 MMOL/L — SIGNIFICANT CHANGE UP (ref 135–145)
TROPONIN I, HIGH SENSITIVITY RESULT: 10.1 NG/L — SIGNIFICANT CHANGE UP
WBC # BLD: 8.06 K/UL — SIGNIFICANT CHANGE UP (ref 3.8–10.5)
WBC # FLD AUTO: 8.06 K/UL — SIGNIFICANT CHANGE UP (ref 3.8–10.5)

## 2024-06-10 PROCEDURE — 99223 1ST HOSP IP/OBS HIGH 75: CPT | Mod: GC

## 2024-06-10 PROCEDURE — 93306 TTE W/DOPPLER COMPLETE: CPT | Mod: 26

## 2024-06-10 PROCEDURE — 71045 X-RAY EXAM CHEST 1 VIEW: CPT | Mod: 26

## 2024-06-10 PROCEDURE — 93010 ELECTROCARDIOGRAM REPORT: CPT

## 2024-06-10 PROCEDURE — 99285 EMERGENCY DEPT VISIT HI MDM: CPT

## 2024-06-10 RX ORDER — PREDNISONE 10 MG/1
20 TABLET ORAL DAILY
Refills: 0 | Status: DISCONTINUED | OUTPATIENT
Start: 2024-06-10 | End: 2024-06-12

## 2024-06-10 RX ORDER — METOPROLOL TARTRATE 50 MG
100 TABLET ORAL DAILY
Refills: 0 | Status: DISCONTINUED | OUTPATIENT
Start: 2024-06-10 | End: 2024-06-20

## 2024-06-10 RX ORDER — AZITHROMYCIN 250 MG/1
500 TABLET, FILM COATED ORAL ONCE
Refills: 0 | Status: COMPLETED | OUTPATIENT
Start: 2024-06-10 | End: 2024-06-10

## 2024-06-10 RX ORDER — ASPIRIN 325 MG/1
1 TABLET, FILM COATED ORAL
Refills: 0 | DISCHARGE

## 2024-06-10 RX ORDER — ATORVASTATIN CALCIUM 20 MG/1
40 TABLET, FILM COATED ORAL AT BEDTIME
Refills: 0 | Status: DISCONTINUED | OUTPATIENT
Start: 2024-06-10 | End: 2024-06-20

## 2024-06-10 RX ORDER — HEPARIN SODIUM 50 [USP'U]/ML
5000 INJECTION, SOLUTION INTRAVENOUS EVERY 12 HOURS
Refills: 0 | Status: DISCONTINUED | OUTPATIENT
Start: 2024-06-10 | End: 2024-06-12

## 2024-06-10 RX ORDER — ASPIRIN 325 MG/1
81 TABLET, FILM COATED ORAL DAILY
Refills: 0 | Status: DISCONTINUED | OUTPATIENT
Start: 2024-06-10 | End: 2024-06-20

## 2024-06-10 RX ORDER — IPRATROPIUM BROMIDE AND ALBUTEROL SULFATE .5; 3 MG/3ML; MG/3ML
3 SOLUTION RESPIRATORY (INHALATION) EVERY 6 HOURS
Refills: 0 | Status: DISCONTINUED | OUTPATIENT
Start: 2024-06-10 | End: 2024-06-10

## 2024-06-10 RX ORDER — ALBUTEROL 90 UG/1
1 AEROSOL, METERED ORAL
Refills: 0 | DISCHARGE

## 2024-06-10 RX ORDER — TIOTROPIUM BROMIDE 18 UG/1
2 CAPSULE ORAL; RESPIRATORY (INHALATION) DAILY
Refills: 0 | Status: DISCONTINUED | OUTPATIENT
Start: 2024-06-10 | End: 2024-06-20

## 2024-06-10 RX ORDER — LEVOTHYROXINE SODIUM 25 MCG
100 TABLET ORAL DAILY
Refills: 0 | Status: DISCONTINUED | OUTPATIENT
Start: 2024-06-10 | End: 2024-06-20

## 2024-06-10 RX ORDER — ALBUTEROL 90 MCG
2.5 AEROSOL REFILL (GRAM) INHALATION EVERY 8 HOURS
Refills: 0 | Status: DISCONTINUED | OUTPATIENT
Start: 2024-06-10 | End: 2024-06-12

## 2024-06-10 RX ORDER — NICOTINE POLACRILEX 2 MG/1
1 LOZENGE ORAL DAILY
Refills: 0 | Status: DISCONTINUED | OUTPATIENT
Start: 2024-06-10 | End: 2024-06-20

## 2024-06-10 RX ORDER — IPRATROPIUM BROMIDE AND ALBUTEROL SULFATE .5; 3 MG/3ML; MG/3ML
3 SOLUTION RESPIRATORY (INHALATION) ONCE
Refills: 0 | Status: COMPLETED | OUTPATIENT
Start: 2024-06-10 | End: 2024-06-10

## 2024-06-10 RX ORDER — ALBUTEROL 90 MCG
2 AEROSOL REFILL (GRAM) INHALATION EVERY 6 HOURS
Refills: 0 | Status: DISCONTINUED | OUTPATIENT
Start: 2024-06-10 | End: 2024-06-20

## 2024-06-10 RX ORDER — FUROSEMIDE 10 MG/ML
60 INJECTION, SOLUTION INTRAMUSCULAR; INTRAVENOUS ONCE
Refills: 0 | Status: COMPLETED | OUTPATIENT
Start: 2024-06-10 | End: 2024-06-10

## 2024-06-10 RX ORDER — BUDESONIDE/FORMOTEROL FUMARATE 160-4.5MCG
2 HFA AEROSOL WITH ADAPTER (GRAM) INHALATION
Refills: 0 | Status: DISCONTINUED | OUTPATIENT
Start: 2024-06-10 | End: 2024-06-20

## 2024-06-10 RX ORDER — METHYLPREDNISOLONE ACETATE 20 MG/ML
125 VIAL (ML) INJECTION ONCE
Refills: 0 | Status: COMPLETED | OUTPATIENT
Start: 2024-06-10 | End: 2024-06-10

## 2024-06-10 RX ORDER — CEFTRIAXONE SODIUM 500 MG
1000 VIAL (EA) INJECTION ONCE
Refills: 0 | Status: COMPLETED | OUTPATIENT
Start: 2024-06-10 | End: 2024-06-10

## 2024-06-10 RX ADMIN — HEPARIN SODIUM 5000 UNIT(S): 50 INJECTION, SOLUTION INTRAVENOUS at 20:27

## 2024-06-10 RX ADMIN — ATORVASTATIN CALCIUM 40 MILLIGRAM(S): 20 TABLET, FILM COATED ORAL at 22:45

## 2024-06-10 RX ADMIN — FUROSEMIDE 60 MILLIGRAM(S): 10 INJECTION, SOLUTION INTRAMUSCULAR; INTRAVENOUS at 15:31

## 2024-06-10 RX ADMIN — Medication 2 PUFF(S): at 20:50

## 2024-06-10 RX ADMIN — IPRATROPIUM BROMIDE AND ALBUTEROL SULFATE 3 MILLILITER(S): .5; 3 SOLUTION RESPIRATORY (INHALATION) at 12:53

## 2024-06-10 RX ADMIN — Medication 2.5 MILLIGRAM(S): at 18:11

## 2024-06-10 RX ADMIN — Medication 125 MILLIGRAM(S): at 12:54

## 2024-06-10 RX ADMIN — NICOTINE POLACRILEX 1 PATCH: 2 LOZENGE ORAL at 22:46

## 2024-06-10 RX ADMIN — Medication 100 MILLIGRAM(S): at 14:31

## 2024-06-10 RX ADMIN — AZITHROMYCIN 255 MILLIGRAM(S): 250 TABLET, FILM COATED ORAL at 15:06

## 2024-06-11 LAB
A1C WITH ESTIMATED AVERAGE GLUCOSE RESULT: 5.9 % — HIGH (ref 4–5.6)
ALBUMIN SERPL ELPH-MCNC: 3.3 G/DL — SIGNIFICANT CHANGE UP (ref 3.3–5)
ALP SERPL-CCNC: 77 U/L — SIGNIFICANT CHANGE UP (ref 40–120)
ALT FLD-CCNC: 18 U/L — SIGNIFICANT CHANGE UP (ref 12–78)
ANION GAP SERPL CALC-SCNC: 5 MMOL/L — SIGNIFICANT CHANGE UP (ref 5–17)
ANION GAP SERPL CALC-SCNC: 5 MMOL/L — SIGNIFICANT CHANGE UP (ref 5–17)
AST SERPL-CCNC: 18 U/L — SIGNIFICANT CHANGE UP (ref 15–37)
BASOPHILS # BLD AUTO: 0 K/UL — SIGNIFICANT CHANGE UP (ref 0–0.2)
BASOPHILS NFR BLD AUTO: 0 % — SIGNIFICANT CHANGE UP (ref 0–2)
BILIRUB SERPL-MCNC: 0.4 MG/DL — SIGNIFICANT CHANGE UP (ref 0.2–1.2)
BUN SERPL-MCNC: 48 MG/DL — HIGH (ref 7–23)
BUN SERPL-MCNC: 48 MG/DL — HIGH (ref 7–23)
CALCIUM SERPL-MCNC: 9 MG/DL — SIGNIFICANT CHANGE UP (ref 8.5–10.1)
CALCIUM SERPL-MCNC: 9.2 MG/DL — SIGNIFICANT CHANGE UP (ref 8.5–10.1)
CHLORIDE SERPL-SCNC: 101 MMOL/L — SIGNIFICANT CHANGE UP (ref 96–108)
CHLORIDE SERPL-SCNC: 102 MMOL/L — SIGNIFICANT CHANGE UP (ref 96–108)
CHOLEST SERPL-MCNC: 124 MG/DL — SIGNIFICANT CHANGE UP
CO2 SERPL-SCNC: 30 MMOL/L — SIGNIFICANT CHANGE UP (ref 22–31)
CO2 SERPL-SCNC: 31 MMOL/L — SIGNIFICANT CHANGE UP (ref 22–31)
CREAT SERPL-MCNC: 1.4 MG/DL — HIGH (ref 0.5–1.3)
CREAT SERPL-MCNC: 1.4 MG/DL — HIGH (ref 0.5–1.3)
EGFR: 38 ML/MIN/1.73M2 — LOW
EGFR: 38 ML/MIN/1.73M2 — LOW
EOSINOPHIL # BLD AUTO: 0 K/UL — SIGNIFICANT CHANGE UP (ref 0–0.5)
EOSINOPHIL NFR BLD AUTO: 0 % — SIGNIFICANT CHANGE UP (ref 0–6)
ESTIMATED AVERAGE GLUCOSE: 123 MG/DL — HIGH (ref 68–114)
GLUCOSE SERPL-MCNC: 142 MG/DL — HIGH (ref 70–99)
GLUCOSE SERPL-MCNC: 205 MG/DL — HIGH (ref 70–99)
HCT VFR BLD CALC: 33 % — LOW (ref 34.5–45)
HDLC SERPL-MCNC: 60 MG/DL — SIGNIFICANT CHANGE UP
HGB BLD-MCNC: 10.8 G/DL — LOW (ref 11.5–15.5)
IMM GRANULOCYTES NFR BLD AUTO: 0.4 % — SIGNIFICANT CHANGE UP (ref 0–0.9)
LIPID PNL WITH DIRECT LDL SERPL: 51 MG/DL — SIGNIFICANT CHANGE UP
LYMPHOCYTES # BLD AUTO: 0.54 K/UL — LOW (ref 1–3.3)
LYMPHOCYTES # BLD AUTO: 9.9 % — LOW (ref 13–44)
MAGNESIUM SERPL-MCNC: 2 MG/DL — SIGNIFICANT CHANGE UP (ref 1.6–2.6)
MCHC RBC-ENTMCNC: 30.9 PG — SIGNIFICANT CHANGE UP (ref 27–34)
MCHC RBC-ENTMCNC: 32.7 GM/DL — SIGNIFICANT CHANGE UP (ref 32–36)
MCV RBC AUTO: 94.6 FL — SIGNIFICANT CHANGE UP (ref 80–100)
MONOCYTES # BLD AUTO: 0.42 K/UL — SIGNIFICANT CHANGE UP (ref 0–0.9)
MONOCYTES NFR BLD AUTO: 7.7 % — SIGNIFICANT CHANGE UP (ref 2–14)
NEUTROPHILS # BLD AUTO: 4.45 K/UL — SIGNIFICANT CHANGE UP (ref 1.8–7.4)
NEUTROPHILS NFR BLD AUTO: 82 % — HIGH (ref 43–77)
NON HDL CHOLESTEROL: 65 MG/DL — SIGNIFICANT CHANGE UP
NRBC # BLD: 0 /100 WBCS — SIGNIFICANT CHANGE UP (ref 0–0)
PLATELET # BLD AUTO: 195 K/UL — SIGNIFICANT CHANGE UP (ref 150–400)
POTASSIUM SERPL-MCNC: 4.2 MMOL/L — SIGNIFICANT CHANGE UP (ref 3.5–5.3)
POTASSIUM SERPL-MCNC: 4.2 MMOL/L — SIGNIFICANT CHANGE UP (ref 3.5–5.3)
POTASSIUM SERPL-SCNC: 4.2 MMOL/L — SIGNIFICANT CHANGE UP (ref 3.5–5.3)
POTASSIUM SERPL-SCNC: 4.2 MMOL/L — SIGNIFICANT CHANGE UP (ref 3.5–5.3)
PROCALCITONIN SERPL-MCNC: 0.1 NG/ML — HIGH
PROT SERPL-MCNC: 7.3 G/DL — SIGNIFICANT CHANGE UP (ref 6–8.3)
RBC # BLD: 3.49 M/UL — LOW (ref 3.8–5.2)
RBC # FLD: 14.5 % — SIGNIFICANT CHANGE UP (ref 10.3–14.5)
SODIUM SERPL-SCNC: 137 MMOL/L — SIGNIFICANT CHANGE UP (ref 135–145)
SODIUM SERPL-SCNC: 137 MMOL/L — SIGNIFICANT CHANGE UP (ref 135–145)
TRIGL SERPL-MCNC: 65 MG/DL — SIGNIFICANT CHANGE UP
TSH SERPL-MCNC: 0.93 UIU/ML — SIGNIFICANT CHANGE UP (ref 0.36–3.74)
WBC # BLD: 5.43 K/UL — SIGNIFICANT CHANGE UP (ref 3.8–10.5)
WBC # FLD AUTO: 5.43 K/UL — SIGNIFICANT CHANGE UP (ref 3.8–10.5)

## 2024-06-11 PROCEDURE — 99233 SBSQ HOSP IP/OBS HIGH 50: CPT | Mod: GC

## 2024-06-11 PROCEDURE — 99233 SBSQ HOSP IP/OBS HIGH 50: CPT

## 2024-06-11 PROCEDURE — 71250 CT THORAX DX C-: CPT | Mod: 26

## 2024-06-11 RX ORDER — ACETAMINOPHEN 325 MG
650 TABLET ORAL EVERY 6 HOURS
Refills: 0 | Status: DISCONTINUED | OUTPATIENT
Start: 2024-06-11 | End: 2024-06-20

## 2024-06-11 RX ADMIN — ASPIRIN 81 MILLIGRAM(S): 325 TABLET, FILM COATED ORAL at 11:19

## 2024-06-11 RX ADMIN — TIOTROPIUM BROMIDE 2 PUFF(S): 18 CAPSULE ORAL; RESPIRATORY (INHALATION) at 06:43

## 2024-06-11 RX ADMIN — Medication 2 PUFF(S): at 06:43

## 2024-06-11 RX ADMIN — Medication 2.5 MILLIGRAM(S): at 07:38

## 2024-06-11 RX ADMIN — Medication 650 MILLIGRAM(S): at 10:07

## 2024-06-11 RX ADMIN — Medication 2.5 MILLIGRAM(S): at 19:14

## 2024-06-11 RX ADMIN — HEPARIN SODIUM 5000 UNIT(S): 50 INJECTION, SOLUTION INTRAVENOUS at 06:40

## 2024-06-11 RX ADMIN — PREDNISONE 20 MILLIGRAM(S): 10 TABLET ORAL at 06:45

## 2024-06-11 RX ADMIN — NICOTINE POLACRILEX 1 PATCH: 2 LOZENGE ORAL at 06:33

## 2024-06-11 RX ADMIN — NICOTINE POLACRILEX 1 PATCH: 2 LOZENGE ORAL at 22:12

## 2024-06-11 RX ADMIN — Medication 650 MILLIGRAM(S): at 11:07

## 2024-06-11 RX ADMIN — Medication 2.5 MILLIGRAM(S): at 13:14

## 2024-06-11 RX ADMIN — HEPARIN SODIUM 5000 UNIT(S): 50 INJECTION, SOLUTION INTRAVENOUS at 18:50

## 2024-06-11 RX ADMIN — Medication 2 PUFF(S): at 18:51

## 2024-06-11 RX ADMIN — ATORVASTATIN CALCIUM 40 MILLIGRAM(S): 20 TABLET, FILM COATED ORAL at 21:14

## 2024-06-11 RX ADMIN — NICOTINE POLACRILEX 1 PATCH: 2 LOZENGE ORAL at 19:01

## 2024-06-11 RX ADMIN — Medication 3 MILLIGRAM(S): at 21:14

## 2024-06-11 RX ADMIN — Medication 100 MICROGRAM(S): at 06:45

## 2024-06-12 LAB
ALBUMIN SERPL ELPH-MCNC: 3.3 G/DL — SIGNIFICANT CHANGE UP (ref 3.3–5)
ALP SERPL-CCNC: 74 U/L — SIGNIFICANT CHANGE UP (ref 40–120)
ALT FLD-CCNC: 17 U/L — SIGNIFICANT CHANGE UP (ref 12–78)
ANION GAP SERPL CALC-SCNC: 5 MMOL/L — SIGNIFICANT CHANGE UP (ref 5–17)
AST SERPL-CCNC: 16 U/L — SIGNIFICANT CHANGE UP (ref 15–37)
BASE EXCESS BLDA CALC-SCNC: 9.6 MMOL/L — HIGH (ref -2–3)
BILIRUB SERPL-MCNC: 0.4 MG/DL — SIGNIFICANT CHANGE UP (ref 0.2–1.2)
BLOOD GAS COMMENTS ARTERIAL: SIGNIFICANT CHANGE UP
BUN SERPL-MCNC: 53 MG/DL — HIGH (ref 7–23)
CALCIUM SERPL-MCNC: 9.5 MG/DL — SIGNIFICANT CHANGE UP (ref 8.5–10.1)
CHLORIDE SERPL-SCNC: 102 MMOL/L — SIGNIFICANT CHANGE UP (ref 96–108)
CO2 SERPL-SCNC: 31 MMOL/L — SIGNIFICANT CHANGE UP (ref 22–31)
CREAT SERPL-MCNC: 1.4 MG/DL — HIGH (ref 0.5–1.3)
EGFR: 38 ML/MIN/1.73M2 — LOW
GAS PNL BLDA: SIGNIFICANT CHANGE UP
GLUCOSE SERPL-MCNC: 101 MG/DL — HIGH (ref 70–99)
GRAM STN FLD: SIGNIFICANT CHANGE UP
HCO3 BLDA-SCNC: 34 MMOL/L — HIGH (ref 21–28)
HCT VFR BLD CALC: 29.4 % — LOW (ref 34.5–45)
HGB BLD-MCNC: 9.7 G/DL — LOW (ref 11.5–15.5)
LDH SERPL L TO P-CCNC: 142 U/L — SIGNIFICANT CHANGE UP (ref 50–242)
MAGNESIUM SERPL-MCNC: 2.3 MG/DL — SIGNIFICANT CHANGE UP (ref 1.6–2.6)
MCHC RBC-ENTMCNC: 31.7 PG — SIGNIFICANT CHANGE UP (ref 27–34)
MCHC RBC-ENTMCNC: 33 GM/DL — SIGNIFICANT CHANGE UP (ref 32–36)
MCV RBC AUTO: 96.1 FL — SIGNIFICANT CHANGE UP (ref 80–100)
NRBC # BLD: 0 /100 WBCS — SIGNIFICANT CHANGE UP (ref 0–0)
PCO2 BLDA: 51 MMHG — HIGH (ref 32–35)
PH BLDA: 7.43 — SIGNIFICANT CHANGE UP (ref 7.35–7.45)
PHOSPHATE SERPL-MCNC: 3.8 MG/DL — SIGNIFICANT CHANGE UP (ref 2.5–4.5)
PLATELET # BLD AUTO: 186 K/UL — SIGNIFICANT CHANGE UP (ref 150–400)
PO2 BLDA: 59 MMHG — LOW (ref 83–108)
POTASSIUM SERPL-MCNC: 4.7 MMOL/L — SIGNIFICANT CHANGE UP (ref 3.5–5.3)
POTASSIUM SERPL-SCNC: 4.7 MMOL/L — SIGNIFICANT CHANGE UP (ref 3.5–5.3)
PROT SERPL-MCNC: 7 G/DL — SIGNIFICANT CHANGE UP (ref 6–8.3)
RBC # BLD: 3.06 M/UL — LOW (ref 3.8–5.2)
RBC # FLD: 14.8 % — HIGH (ref 10.3–14.5)
SAO2 % BLDA: 92.8 % — LOW (ref 94–98)
SODIUM SERPL-SCNC: 138 MMOL/L — SIGNIFICANT CHANGE UP (ref 135–145)
SPECIMEN SOURCE: SIGNIFICANT CHANGE UP
WBC # BLD: 11.19 K/UL — HIGH (ref 3.8–10.5)
WBC # FLD AUTO: 11.19 K/UL — HIGH (ref 3.8–10.5)

## 2024-06-12 PROCEDURE — 88108 CYTOPATH CONCENTRATE TECH: CPT | Mod: 26

## 2024-06-12 PROCEDURE — 88305 TISSUE EXAM BY PATHOLOGIST: CPT | Mod: 26

## 2024-06-12 PROCEDURE — 99233 SBSQ HOSP IP/OBS HIGH 50: CPT

## 2024-06-12 PROCEDURE — 99233 SBSQ HOSP IP/OBS HIGH 50: CPT | Mod: GC

## 2024-06-12 PROCEDURE — 32555 ASPIRATE PLEURA W/ IMAGING: CPT | Mod: RT

## 2024-06-12 PROCEDURE — 71045 X-RAY EXAM CHEST 1 VIEW: CPT | Mod: 26

## 2024-06-12 PROCEDURE — 78582 LUNG VENTILAT&PERFUS IMAGING: CPT | Mod: 26

## 2024-06-12 RX ORDER — ALBUTEROL 90 MCG
2.5 AEROSOL REFILL (GRAM) INHALATION EVERY 6 HOURS
Refills: 0 | Status: DISCONTINUED | OUTPATIENT
Start: 2024-06-12 | End: 2024-06-20

## 2024-06-12 RX ORDER — PREDNISONE 10 MG/1
40 TABLET ORAL DAILY
Refills: 0 | Status: DISCONTINUED | OUTPATIENT
Start: 2024-06-12 | End: 2024-06-16

## 2024-06-12 RX ORDER — PREDNISONE 10 MG/1
20 TABLET ORAL ONCE
Refills: 0 | Status: COMPLETED | OUTPATIENT
Start: 2024-06-12 | End: 2024-06-12

## 2024-06-12 RX ADMIN — Medication 100 MILLIGRAM(S): at 05:09

## 2024-06-12 RX ADMIN — HEPARIN SODIUM 5000 UNIT(S): 50 INJECTION, SOLUTION INTRAVENOUS at 05:08

## 2024-06-12 RX ADMIN — NICOTINE POLACRILEX 1 PATCH: 2 LOZENGE ORAL at 19:42

## 2024-06-12 RX ADMIN — PREDNISONE 20 MILLIGRAM(S): 10 TABLET ORAL at 05:09

## 2024-06-12 RX ADMIN — TIOTROPIUM BROMIDE 2 PUFF(S): 18 CAPSULE ORAL; RESPIRATORY (INHALATION) at 05:09

## 2024-06-12 RX ADMIN — Medication 2.5 MILLIGRAM(S): at 19:35

## 2024-06-12 RX ADMIN — Medication 3 MILLIGRAM(S): at 20:43

## 2024-06-12 RX ADMIN — NICOTINE POLACRILEX 1 PATCH: 2 LOZENGE ORAL at 15:02

## 2024-06-12 RX ADMIN — Medication 100 MICROGRAM(S): at 05:09

## 2024-06-12 RX ADMIN — PREDNISONE 20 MILLIGRAM(S): 10 TABLET ORAL at 12:08

## 2024-06-12 RX ADMIN — ATORVASTATIN CALCIUM 40 MILLIGRAM(S): 20 TABLET, FILM COATED ORAL at 20:43

## 2024-06-12 RX ADMIN — Medication 2.5 MILLIGRAM(S): at 15:03

## 2024-06-12 RX ADMIN — ASPIRIN 81 MILLIGRAM(S): 325 TABLET, FILM COATED ORAL at 15:01

## 2024-06-12 RX ADMIN — Medication 2 PUFF(S): at 05:09

## 2024-06-12 RX ADMIN — Medication 2.5 MILLIGRAM(S): at 07:41

## 2024-06-12 RX ADMIN — Medication 2 PUFF(S): at 18:24

## 2024-06-13 ENCOUNTER — TRANSCRIPTION ENCOUNTER (OUTPATIENT)
Age: 79
End: 2024-06-13

## 2024-06-13 LAB
ALBUMIN FLD-MCNC: 1.6 G/DL — SIGNIFICANT CHANGE UP
ALBUMIN SERPL ELPH-MCNC: 2.8 G/DL — LOW (ref 3.3–5)
ALP SERPL-CCNC: 71 U/L — SIGNIFICANT CHANGE UP (ref 40–120)
ALT FLD-CCNC: 16 U/L — SIGNIFICANT CHANGE UP (ref 12–78)
ANION GAP SERPL CALC-SCNC: 3 MMOL/L — LOW (ref 5–17)
AST SERPL-CCNC: 16 U/L — SIGNIFICANT CHANGE UP (ref 15–37)
B PERT IGG+IGM PNL SER: ABNORMAL
BASOPHILS # BLD AUTO: 0.01 K/UL — SIGNIFICANT CHANGE UP (ref 0–0.2)
BASOPHILS NFR BLD AUTO: 0.1 % — SIGNIFICANT CHANGE UP (ref 0–2)
BILIRUB SERPL-MCNC: 0.4 MG/DL — SIGNIFICANT CHANGE UP (ref 0.2–1.2)
BUN SERPL-MCNC: 45 MG/DL — HIGH (ref 7–23)
CALCIUM SERPL-MCNC: 8.8 MG/DL — SIGNIFICANT CHANGE UP (ref 8.5–10.1)
CHLORIDE SERPL-SCNC: 104 MMOL/L — SIGNIFICANT CHANGE UP (ref 96–108)
CO2 SERPL-SCNC: 32 MMOL/L — HIGH (ref 22–31)
COLOR FLD: YELLOW — SIGNIFICANT CHANGE UP
CREAT SERPL-MCNC: 1.1 MG/DL — SIGNIFICANT CHANGE UP (ref 0.5–1.3)
EGFR: 51 ML/MIN/1.73M2 — LOW
EOSINOPHIL # BLD AUTO: 0.02 K/UL — SIGNIFICANT CHANGE UP (ref 0–0.5)
EOSINOPHIL # FLD: 0 % — SIGNIFICANT CHANGE UP
EOSINOPHIL NFR BLD AUTO: 0.2 % — SIGNIFICANT CHANGE UP (ref 0–6)
FLUID INTAKE SUBSTANCE CLASS: SIGNIFICANT CHANGE UP
FOLATE+VIT B12 SERBLD-IMP: 0 % — SIGNIFICANT CHANGE UP
GLUCOSE FLD-MCNC: 141 MG/DL — SIGNIFICANT CHANGE UP
GLUCOSE SERPL-MCNC: 100 MG/DL — HIGH (ref 70–99)
HCT VFR BLD CALC: 28.1 % — LOW (ref 34.5–45)
HCT VFR BLD CALC: 29.8 % — LOW (ref 34.5–45)
HGB BLD-MCNC: 9.3 G/DL — LOW (ref 11.5–15.5)
HGB BLD-MCNC: 9.6 G/DL — LOW (ref 11.5–15.5)
IMM GRANULOCYTES NFR BLD AUTO: 0.4 % — SIGNIFICANT CHANGE UP (ref 0–0.9)
LDH SERPL L TO P-CCNC: 50 U/L — SIGNIFICANT CHANGE UP
LYMPHOCYTES # BLD AUTO: 1.16 K/UL — SIGNIFICANT CHANGE UP (ref 1–3.3)
LYMPHOCYTES # BLD AUTO: 14 % — SIGNIFICANT CHANGE UP (ref 13–44)
LYMPHOCYTES # FLD: 27 % — SIGNIFICANT CHANGE UP
MAGNESIUM SERPL-MCNC: 2.1 MG/DL — SIGNIFICANT CHANGE UP (ref 1.6–2.6)
MCHC RBC-ENTMCNC: 31.6 PG — SIGNIFICANT CHANGE UP (ref 27–34)
MCHC RBC-ENTMCNC: 33.1 GM/DL — SIGNIFICANT CHANGE UP (ref 32–36)
MCV RBC AUTO: 95.6 FL — SIGNIFICANT CHANGE UP (ref 80–100)
MESOTHL CELL # FLD: 0 % — SIGNIFICANT CHANGE UP
MONOCYTES # BLD AUTO: 0.8 K/UL — SIGNIFICANT CHANGE UP (ref 0–0.9)
MONOCYTES NFR BLD AUTO: 9.7 % — SIGNIFICANT CHANGE UP (ref 2–14)
MONOS+MACROS # FLD: 57 % — SIGNIFICANT CHANGE UP
NEUTROPHILS # BLD AUTO: 6.27 K/UL — SIGNIFICANT CHANGE UP (ref 1.8–7.4)
NEUTROPHILS NFR BLD AUTO: 75.6 % — SIGNIFICANT CHANGE UP (ref 43–77)
NEUTROPHILS-BODY FLUID: 16 % — SIGNIFICANT CHANGE UP
NRBC # BLD: 0 /100 WBCS — SIGNIFICANT CHANGE UP (ref 0–0)
NRBC # FLD: 0 % — SIGNIFICANT CHANGE UP
OTHER CELLS FLD MANUAL: 0 % — SIGNIFICANT CHANGE UP
PH FLD: 7.8 — SIGNIFICANT CHANGE UP
PHOSPHATE SERPL-MCNC: 2.6 MG/DL — SIGNIFICANT CHANGE UP (ref 2.5–4.5)
PLATELET # BLD AUTO: 170 K/UL — SIGNIFICANT CHANGE UP (ref 150–400)
POTASSIUM SERPL-MCNC: 4.5 MMOL/L — SIGNIFICANT CHANGE UP (ref 3.5–5.3)
POTASSIUM SERPL-SCNC: 4.5 MMOL/L — SIGNIFICANT CHANGE UP (ref 3.5–5.3)
PROT FLD-MCNC: 2.6 G/DL — SIGNIFICANT CHANGE UP
PROT SERPL-MCNC: 6.5 G/DL — SIGNIFICANT CHANGE UP (ref 6–8.3)
RBC # BLD: 2.94 M/UL — LOW (ref 3.8–5.2)
RBC # FLD: 14.6 % — HIGH (ref 10.3–14.5)
RCV VOL RI: <2000 /UL — HIGH (ref 0–0)
SODIUM SERPL-SCNC: 139 MMOL/L — SIGNIFICANT CHANGE UP (ref 135–145)
TOTAL NUCLEATED CELL COUNT, BODY FLUID: 336 /UL — SIGNIFICANT CHANGE UP
TUBE TYPE: SIGNIFICANT CHANGE UP
WBC # BLD: 8.29 K/UL — SIGNIFICANT CHANGE UP (ref 3.8–10.5)
WBC # FLD AUTO: 8.29 K/UL — SIGNIFICANT CHANGE UP (ref 3.8–10.5)

## 2024-06-13 PROCEDURE — 99233 SBSQ HOSP IP/OBS HIGH 50: CPT

## 2024-06-13 PROCEDURE — 99233 SBSQ HOSP IP/OBS HIGH 50: CPT | Mod: GC

## 2024-06-13 RX ORDER — PANTOPRAZOLE SODIUM 40 MG/10ML
40 INJECTION, POWDER, FOR SOLUTION INTRAVENOUS
Refills: 0 | Status: DISCONTINUED | OUTPATIENT
Start: 2024-06-13 | End: 2024-06-20

## 2024-06-13 RX ADMIN — Medication 100 MICROGRAM(S): at 05:20

## 2024-06-13 RX ADMIN — Medication 2.5 MILLIGRAM(S): at 13:55

## 2024-06-13 RX ADMIN — NICOTINE POLACRILEX 1 PATCH: 2 LOZENGE ORAL at 19:18

## 2024-06-13 RX ADMIN — ASPIRIN 81 MILLIGRAM(S): 325 TABLET, FILM COATED ORAL at 11:50

## 2024-06-13 RX ADMIN — PREDNISONE 40 MILLIGRAM(S): 10 TABLET ORAL at 05:20

## 2024-06-13 RX ADMIN — Medication 2 PUFF(S): at 05:19

## 2024-06-13 RX ADMIN — NICOTINE POLACRILEX 1 PATCH: 2 LOZENGE ORAL at 11:50

## 2024-06-13 RX ADMIN — PANTOPRAZOLE SODIUM 40 MILLIGRAM(S): 40 INJECTION, POWDER, FOR SOLUTION INTRAVENOUS at 11:51

## 2024-06-13 RX ADMIN — Medication 2.5 MILLIGRAM(S): at 07:35

## 2024-06-13 RX ADMIN — NICOTINE POLACRILEX 1 PATCH: 2 LOZENGE ORAL at 15:01

## 2024-06-13 RX ADMIN — TIOTROPIUM BROMIDE 2 PUFF(S): 18 CAPSULE ORAL; RESPIRATORY (INHALATION) at 05:19

## 2024-06-13 RX ADMIN — Medication 100 MILLIGRAM(S): at 05:20

## 2024-06-13 RX ADMIN — Medication 2.5 MILLIGRAM(S): at 19:15

## 2024-06-13 RX ADMIN — ATORVASTATIN CALCIUM 40 MILLIGRAM(S): 20 TABLET, FILM COATED ORAL at 21:17

## 2024-06-13 RX ADMIN — Medication 2 PUFF(S): at 18:34

## 2024-06-13 RX ADMIN — NICOTINE POLACRILEX 1 PATCH: 2 LOZENGE ORAL at 08:36

## 2024-06-14 LAB
ALBUMIN SERPL ELPH-MCNC: 3 G/DL — LOW (ref 3.3–5)
ALP SERPL-CCNC: 63 U/L — SIGNIFICANT CHANGE UP (ref 40–120)
ALT FLD-CCNC: 15 U/L — SIGNIFICANT CHANGE UP (ref 12–78)
ANION GAP SERPL CALC-SCNC: 3 MMOL/L — LOW (ref 5–17)
AST SERPL-CCNC: 16 U/L — SIGNIFICANT CHANGE UP (ref 15–37)
BILIRUB SERPL-MCNC: 0.6 MG/DL — SIGNIFICANT CHANGE UP (ref 0.2–1.2)
BUN SERPL-MCNC: 47 MG/DL — HIGH (ref 7–23)
CALCIUM SERPL-MCNC: 9.3 MG/DL — SIGNIFICANT CHANGE UP (ref 8.5–10.1)
CHLORIDE SERPL-SCNC: 105 MMOL/L — SIGNIFICANT CHANGE UP (ref 96–108)
CO2 SERPL-SCNC: 32 MMOL/L — HIGH (ref 22–31)
CREAT SERPL-MCNC: 1.1 MG/DL — SIGNIFICANT CHANGE UP (ref 0.5–1.3)
EGFR: 51 ML/MIN/1.73M2 — LOW
GLUCOSE SERPL-MCNC: 94 MG/DL — SIGNIFICANT CHANGE UP (ref 70–99)
HCT VFR BLD CALC: 29.9 % — LOW (ref 34.5–45)
HGB BLD-MCNC: 9.9 G/DL — LOW (ref 11.5–15.5)
MAGNESIUM SERPL-MCNC: 2.2 MG/DL — SIGNIFICANT CHANGE UP (ref 1.6–2.6)
MCHC RBC-ENTMCNC: 32.1 PG — SIGNIFICANT CHANGE UP (ref 27–34)
MCHC RBC-ENTMCNC: 33.1 GM/DL — SIGNIFICANT CHANGE UP (ref 32–36)
MCV RBC AUTO: 97.1 FL — SIGNIFICANT CHANGE UP (ref 80–100)
NRBC # BLD: 0 /100 WBCS — SIGNIFICANT CHANGE UP (ref 0–0)
PHOSPHATE SERPL-MCNC: 2.6 MG/DL — SIGNIFICANT CHANGE UP (ref 2.5–4.5)
PLATELET # BLD AUTO: 179 K/UL — SIGNIFICANT CHANGE UP (ref 150–400)
POTASSIUM SERPL-MCNC: 4.7 MMOL/L — SIGNIFICANT CHANGE UP (ref 3.5–5.3)
POTASSIUM SERPL-SCNC: 4.7 MMOL/L — SIGNIFICANT CHANGE UP (ref 3.5–5.3)
PROT SERPL-MCNC: 6.6 G/DL — SIGNIFICANT CHANGE UP (ref 6–8.3)
RBC # BLD: 3.08 M/UL — LOW (ref 3.8–5.2)
RBC # FLD: 14.5 % — SIGNIFICANT CHANGE UP (ref 10.3–14.5)
SODIUM SERPL-SCNC: 140 MMOL/L — SIGNIFICANT CHANGE UP (ref 135–145)
WBC # BLD: 8.13 K/UL — SIGNIFICANT CHANGE UP (ref 3.8–10.5)
WBC # FLD AUTO: 8.13 K/UL — SIGNIFICANT CHANGE UP (ref 3.8–10.5)

## 2024-06-14 PROCEDURE — 99233 SBSQ HOSP IP/OBS HIGH 50: CPT | Mod: GC

## 2024-06-14 PROCEDURE — 99233 SBSQ HOSP IP/OBS HIGH 50: CPT

## 2024-06-14 RX ORDER — FUROSEMIDE 10 MG/ML
20 INJECTION, SOLUTION INTRAMUSCULAR; INTRAVENOUS ONCE
Refills: 0 | Status: COMPLETED | OUTPATIENT
Start: 2024-06-14 | End: 2024-06-14

## 2024-06-14 RX ADMIN — PANTOPRAZOLE SODIUM 40 MILLIGRAM(S): 40 INJECTION, POWDER, FOR SOLUTION INTRAVENOUS at 05:23

## 2024-06-14 RX ADMIN — NICOTINE POLACRILEX 1 PATCH: 2 LOZENGE ORAL at 07:44

## 2024-06-14 RX ADMIN — PREDNISONE 40 MILLIGRAM(S): 10 TABLET ORAL at 05:23

## 2024-06-14 RX ADMIN — ASPIRIN 81 MILLIGRAM(S): 325 TABLET, FILM COATED ORAL at 12:02

## 2024-06-14 RX ADMIN — Medication 2 PUFF(S): at 18:21

## 2024-06-14 RX ADMIN — NICOTINE POLACRILEX 1 PATCH: 2 LOZENGE ORAL at 19:00

## 2024-06-14 RX ADMIN — TIOTROPIUM BROMIDE 2 PUFF(S): 18 CAPSULE ORAL; RESPIRATORY (INHALATION) at 06:17

## 2024-06-14 RX ADMIN — Medication 2.5 MILLIGRAM(S): at 13:58

## 2024-06-14 RX ADMIN — Medication 2.5 MILLIGRAM(S): at 19:21

## 2024-06-14 RX ADMIN — Medication 650 MILLIGRAM(S): at 01:12

## 2024-06-14 RX ADMIN — NICOTINE POLACRILEX 1 PATCH: 2 LOZENGE ORAL at 12:02

## 2024-06-14 RX ADMIN — Medication 2.5 MILLIGRAM(S): at 07:55

## 2024-06-14 RX ADMIN — Medication 650 MILLIGRAM(S): at 02:12

## 2024-06-14 RX ADMIN — ATORVASTATIN CALCIUM 40 MILLIGRAM(S): 20 TABLET, FILM COATED ORAL at 21:16

## 2024-06-14 RX ADMIN — Medication 100 MICROGRAM(S): at 05:23

## 2024-06-14 RX ADMIN — Medication 2 PUFF(S): at 06:17

## 2024-06-14 RX ADMIN — Medication 3 MILLIGRAM(S): at 01:12

## 2024-06-14 RX ADMIN — FUROSEMIDE 20 MILLIGRAM(S): 10 INJECTION, SOLUTION INTRAMUSCULAR; INTRAVENOUS at 12:01

## 2024-06-14 RX ADMIN — Medication 650 MILLIGRAM(S): at 22:16

## 2024-06-14 RX ADMIN — NICOTINE POLACRILEX 1 PATCH: 2 LOZENGE ORAL at 11:44

## 2024-06-14 RX ADMIN — Medication 650 MILLIGRAM(S): at 21:16

## 2024-06-15 LAB
ALBUMIN SERPL ELPH-MCNC: 2.9 G/DL — LOW (ref 3.3–5)
ALP SERPL-CCNC: 67 U/L — SIGNIFICANT CHANGE UP (ref 40–120)
ALT FLD-CCNC: 15 U/L — SIGNIFICANT CHANGE UP (ref 12–78)
ANION GAP SERPL CALC-SCNC: 5 MMOL/L — SIGNIFICANT CHANGE UP (ref 5–17)
AST SERPL-CCNC: 15 U/L — SIGNIFICANT CHANGE UP (ref 15–37)
BILIRUB SERPL-MCNC: 0.7 MG/DL — SIGNIFICANT CHANGE UP (ref 0.2–1.2)
BUN SERPL-MCNC: 50 MG/DL — HIGH (ref 7–23)
CALCIUM SERPL-MCNC: 9.2 MG/DL — SIGNIFICANT CHANGE UP (ref 8.5–10.1)
CHLORIDE SERPL-SCNC: 103 MMOL/L — SIGNIFICANT CHANGE UP (ref 96–108)
CO2 SERPL-SCNC: 33 MMOL/L — HIGH (ref 22–31)
CREAT SERPL-MCNC: 1.2 MG/DL — SIGNIFICANT CHANGE UP (ref 0.5–1.3)
CULTURE RESULTS: SIGNIFICANT CHANGE UP
CULTURE RESULTS: SIGNIFICANT CHANGE UP
EGFR: 46 ML/MIN/1.73M2 — LOW
GLUCOSE SERPL-MCNC: 99 MG/DL — SIGNIFICANT CHANGE UP (ref 70–99)
HCT VFR BLD CALC: 30.3 % — LOW (ref 34.5–45)
HGB BLD-MCNC: 9.9 G/DL — LOW (ref 11.5–15.5)
MAGNESIUM SERPL-MCNC: 2.1 MG/DL — SIGNIFICANT CHANGE UP (ref 1.6–2.6)
MCHC RBC-ENTMCNC: 31.5 PG — SIGNIFICANT CHANGE UP (ref 27–34)
MCHC RBC-ENTMCNC: 32.7 GM/DL — SIGNIFICANT CHANGE UP (ref 32–36)
MCV RBC AUTO: 96.5 FL — SIGNIFICANT CHANGE UP (ref 80–100)
NRBC # BLD: 0 /100 WBCS — SIGNIFICANT CHANGE UP (ref 0–0)
PHOSPHATE SERPL-MCNC: 2.7 MG/DL — SIGNIFICANT CHANGE UP (ref 2.5–4.5)
PLATELET # BLD AUTO: 205 K/UL — SIGNIFICANT CHANGE UP (ref 150–400)
POTASSIUM SERPL-MCNC: 4.5 MMOL/L — SIGNIFICANT CHANGE UP (ref 3.5–5.3)
POTASSIUM SERPL-SCNC: 4.5 MMOL/L — SIGNIFICANT CHANGE UP (ref 3.5–5.3)
PROT SERPL-MCNC: 6.7 G/DL — SIGNIFICANT CHANGE UP (ref 6–8.3)
RAPID RVP RESULT: SIGNIFICANT CHANGE UP
RBC # BLD: 3.14 M/UL — LOW (ref 3.8–5.2)
RBC # FLD: 14.7 % — HIGH (ref 10.3–14.5)
SARS-COV-2 RNA SPEC QL NAA+PROBE: SIGNIFICANT CHANGE UP
SODIUM SERPL-SCNC: 141 MMOL/L — SIGNIFICANT CHANGE UP (ref 135–145)
SPECIMEN SOURCE: SIGNIFICANT CHANGE UP
SPECIMEN SOURCE: SIGNIFICANT CHANGE UP
WBC # BLD: 9.61 K/UL — SIGNIFICANT CHANGE UP (ref 3.8–10.5)
WBC # FLD AUTO: 9.61 K/UL — SIGNIFICANT CHANGE UP (ref 3.8–10.5)

## 2024-06-15 PROCEDURE — 99232 SBSQ HOSP IP/OBS MODERATE 35: CPT

## 2024-06-15 PROCEDURE — 99233 SBSQ HOSP IP/OBS HIGH 50: CPT | Mod: GC

## 2024-06-15 PROCEDURE — 71045 X-RAY EXAM CHEST 1 VIEW: CPT | Mod: 26

## 2024-06-15 RX ORDER — FUROSEMIDE 10 MG/ML
20 INJECTION, SOLUTION INTRAMUSCULAR; INTRAVENOUS ONCE
Refills: 0 | Status: COMPLETED | OUTPATIENT
Start: 2024-06-15 | End: 2024-06-15

## 2024-06-15 RX ADMIN — ASPIRIN 81 MILLIGRAM(S): 325 TABLET, FILM COATED ORAL at 11:38

## 2024-06-15 RX ADMIN — Medication 2.5 MILLIGRAM(S): at 19:41

## 2024-06-15 RX ADMIN — Medication 2.5 MILLIGRAM(S): at 07:43

## 2024-06-15 RX ADMIN — TIOTROPIUM BROMIDE 2 PUFF(S): 18 CAPSULE ORAL; RESPIRATORY (INHALATION) at 06:01

## 2024-06-15 RX ADMIN — PANTOPRAZOLE SODIUM 40 MILLIGRAM(S): 40 INJECTION, POWDER, FOR SOLUTION INTRAVENOUS at 05:33

## 2024-06-15 RX ADMIN — Medication 2.5 MILLIGRAM(S): at 14:11

## 2024-06-15 RX ADMIN — NICOTINE POLACRILEX 1 PATCH: 2 LOZENGE ORAL at 12:39

## 2024-06-15 RX ADMIN — PREDNISONE 40 MILLIGRAM(S): 10 TABLET ORAL at 05:33

## 2024-06-15 RX ADMIN — Medication 2 PUFF(S): at 17:46

## 2024-06-15 RX ADMIN — Medication 3 MILLIGRAM(S): at 01:00

## 2024-06-15 RX ADMIN — NICOTINE POLACRILEX 1 PATCH: 2 LOZENGE ORAL at 19:00

## 2024-06-15 RX ADMIN — FUROSEMIDE 20 MILLIGRAM(S): 10 INJECTION, SOLUTION INTRAMUSCULAR; INTRAVENOUS at 14:16

## 2024-06-15 RX ADMIN — Medication 2.5 MILLIGRAM(S): at 00:44

## 2024-06-15 RX ADMIN — Medication 100 MICROGRAM(S): at 05:33

## 2024-06-15 RX ADMIN — Medication 2 PUFF(S): at 06:01

## 2024-06-15 RX ADMIN — Medication 100 MILLIGRAM(S): at 05:33

## 2024-06-15 RX ADMIN — NICOTINE POLACRILEX 1 PATCH: 2 LOZENGE ORAL at 11:38

## 2024-06-16 LAB
ALBUMIN SERPL ELPH-MCNC: 3 G/DL — LOW (ref 3.3–5)
ALP SERPL-CCNC: 66 U/L — SIGNIFICANT CHANGE UP (ref 40–120)
ALT FLD-CCNC: 16 U/L — SIGNIFICANT CHANGE UP (ref 12–78)
ANION GAP SERPL CALC-SCNC: 4 MMOL/L — LOW (ref 5–17)
AST SERPL-CCNC: 17 U/L — SIGNIFICANT CHANGE UP (ref 15–37)
BILIRUB SERPL-MCNC: 0.8 MG/DL — SIGNIFICANT CHANGE UP (ref 0.2–1.2)
BUN SERPL-MCNC: 54 MG/DL — HIGH (ref 7–23)
CALCIUM SERPL-MCNC: 9.6 MG/DL — SIGNIFICANT CHANGE UP (ref 8.5–10.1)
CHLORIDE SERPL-SCNC: 101 MMOL/L — SIGNIFICANT CHANGE UP (ref 96–108)
CO2 SERPL-SCNC: 36 MMOL/L — HIGH (ref 22–31)
CREAT SERPL-MCNC: 1.1 MG/DL — SIGNIFICANT CHANGE UP (ref 0.5–1.3)
EGFR: 51 ML/MIN/1.73M2 — LOW
GLUCOSE SERPL-MCNC: 94 MG/DL — SIGNIFICANT CHANGE UP (ref 70–99)
HCT VFR BLD CALC: 30.7 % — LOW (ref 34.5–45)
HGB BLD-MCNC: 10.1 G/DL — LOW (ref 11.5–15.5)
MAGNESIUM SERPL-MCNC: 2 MG/DL — SIGNIFICANT CHANGE UP (ref 1.6–2.6)
MCHC RBC-ENTMCNC: 31.6 PG — SIGNIFICANT CHANGE UP (ref 27–34)
MCHC RBC-ENTMCNC: 32.9 GM/DL — SIGNIFICANT CHANGE UP (ref 32–36)
MCV RBC AUTO: 95.9 FL — SIGNIFICANT CHANGE UP (ref 80–100)
NRBC # BLD: 0 /100 WBCS — SIGNIFICANT CHANGE UP (ref 0–0)
PHOSPHATE SERPL-MCNC: 2.7 MG/DL — SIGNIFICANT CHANGE UP (ref 2.5–4.5)
PLATELET # BLD AUTO: 221 K/UL — SIGNIFICANT CHANGE UP (ref 150–400)
POTASSIUM SERPL-MCNC: 4.6 MMOL/L — SIGNIFICANT CHANGE UP (ref 3.5–5.3)
POTASSIUM SERPL-SCNC: 4.6 MMOL/L — SIGNIFICANT CHANGE UP (ref 3.5–5.3)
PROT SERPL-MCNC: 6.4 G/DL — SIGNIFICANT CHANGE UP (ref 6–8.3)
RBC # BLD: 3.2 M/UL — LOW (ref 3.8–5.2)
RBC # FLD: 14.3 % — SIGNIFICANT CHANGE UP (ref 10.3–14.5)
SODIUM SERPL-SCNC: 141 MMOL/L — SIGNIFICANT CHANGE UP (ref 135–145)
WBC # BLD: 9.27 K/UL — SIGNIFICANT CHANGE UP (ref 3.8–10.5)
WBC # FLD AUTO: 9.27 K/UL — SIGNIFICANT CHANGE UP (ref 3.8–10.5)

## 2024-06-16 PROCEDURE — 99233 SBSQ HOSP IP/OBS HIGH 50: CPT

## 2024-06-16 RX ORDER — OXYMETAZOLINE HYDROCHLORIDE 0.05 G/100ML
2 SPRAY NASAL EVERY 12 HOURS
Refills: 0 | Status: COMPLETED | OUTPATIENT
Start: 2024-06-16 | End: 2024-06-18

## 2024-06-16 RX ORDER — PREDNISONE 10 MG/1
20 TABLET ORAL DAILY
Refills: 0 | Status: DISCONTINUED | OUTPATIENT
Start: 2024-06-17 | End: 2024-06-19

## 2024-06-16 RX ADMIN — Medication 2.5 MILLIGRAM(S): at 07:40

## 2024-06-16 RX ADMIN — Medication 2.5 MILLIGRAM(S): at 14:01

## 2024-06-16 RX ADMIN — Medication 2.5 MILLIGRAM(S): at 00:49

## 2024-06-16 RX ADMIN — NICOTINE POLACRILEX 1 PATCH: 2 LOZENGE ORAL at 11:59

## 2024-06-16 RX ADMIN — PANTOPRAZOLE SODIUM 40 MILLIGRAM(S): 40 INJECTION, POWDER, FOR SOLUTION INTRAVENOUS at 06:38

## 2024-06-16 RX ADMIN — Medication 100 MICROGRAM(S): at 06:38

## 2024-06-16 RX ADMIN — Medication 2.5 MILLIGRAM(S): at 19:13

## 2024-06-16 RX ADMIN — ATORVASTATIN CALCIUM 40 MILLIGRAM(S): 20 TABLET, FILM COATED ORAL at 21:31

## 2024-06-16 RX ADMIN — ASPIRIN 81 MILLIGRAM(S): 325 TABLET, FILM COATED ORAL at 11:23

## 2024-06-16 RX ADMIN — Medication 100 MILLIGRAM(S): at 06:38

## 2024-06-16 RX ADMIN — TIOTROPIUM BROMIDE 2 PUFF(S): 18 CAPSULE ORAL; RESPIRATORY (INHALATION) at 08:07

## 2024-06-16 RX ADMIN — Medication 3 MILLIGRAM(S): at 00:09

## 2024-06-16 RX ADMIN — PREDNISONE 40 MILLIGRAM(S): 10 TABLET ORAL at 06:38

## 2024-06-16 RX ADMIN — Medication 2 PUFF(S): at 08:07

## 2024-06-16 RX ADMIN — OXYMETAZOLINE HYDROCHLORIDE 2 SPRAY(S): 0.05 SPRAY NASAL at 21:31

## 2024-06-16 RX ADMIN — Medication 2 PUFF(S): at 21:31

## 2024-06-16 RX ADMIN — NICOTINE POLACRILEX 1 PATCH: 2 LOZENGE ORAL at 11:22

## 2024-06-17 LAB
ALBUMIN SERPL ELPH-MCNC: 2.8 G/DL — LOW (ref 3.3–5)
ALP SERPL-CCNC: 68 U/L — SIGNIFICANT CHANGE UP (ref 40–120)
ALT FLD-CCNC: 18 U/L — SIGNIFICANT CHANGE UP (ref 12–78)
ANION GAP SERPL CALC-SCNC: 4 MMOL/L — LOW (ref 5–17)
AST SERPL-CCNC: 16 U/L — SIGNIFICANT CHANGE UP (ref 15–37)
BILIRUB SERPL-MCNC: 0.7 MG/DL — SIGNIFICANT CHANGE UP (ref 0.2–1.2)
BUN SERPL-MCNC: 50 MG/DL — HIGH (ref 7–23)
CALCIUM SERPL-MCNC: 9.2 MG/DL — SIGNIFICANT CHANGE UP (ref 8.5–10.1)
CHLORIDE SERPL-SCNC: 103 MMOL/L — SIGNIFICANT CHANGE UP (ref 96–108)
CO2 SERPL-SCNC: 34 MMOL/L — HIGH (ref 22–31)
CREAT SERPL-MCNC: 0.98 MG/DL — SIGNIFICANT CHANGE UP (ref 0.5–1.3)
CULTURE RESULTS: SIGNIFICANT CHANGE UP
EGFR: 58 ML/MIN/1.73M2 — LOW
GLUCOSE SERPL-MCNC: 87 MG/DL — SIGNIFICANT CHANGE UP (ref 70–99)
HCT VFR BLD CALC: 31 % — LOW (ref 34.5–45)
HGB BLD-MCNC: 10.4 G/DL — LOW (ref 11.5–15.5)
MAGNESIUM SERPL-MCNC: 2.2 MG/DL — SIGNIFICANT CHANGE UP (ref 1.6–2.6)
MCHC RBC-ENTMCNC: 32.1 PG — SIGNIFICANT CHANGE UP (ref 27–34)
MCHC RBC-ENTMCNC: 33.5 GM/DL — SIGNIFICANT CHANGE UP (ref 32–36)
MCV RBC AUTO: 95.7 FL — SIGNIFICANT CHANGE UP (ref 80–100)
NRBC # BLD: 0 /100 WBCS — SIGNIFICANT CHANGE UP (ref 0–0)
PLATELET # BLD AUTO: 218 K/UL — SIGNIFICANT CHANGE UP (ref 150–400)
POTASSIUM SERPL-MCNC: 4.4 MMOL/L — SIGNIFICANT CHANGE UP (ref 3.5–5.3)
POTASSIUM SERPL-SCNC: 4.4 MMOL/L — SIGNIFICANT CHANGE UP (ref 3.5–5.3)
PROT SERPL-MCNC: 6.7 G/DL — SIGNIFICANT CHANGE UP (ref 6–8.3)
RBC # BLD: 3.24 M/UL — LOW (ref 3.8–5.2)
RBC # FLD: 14.5 % — SIGNIFICANT CHANGE UP (ref 10.3–14.5)
SODIUM SERPL-SCNC: 141 MMOL/L — SIGNIFICANT CHANGE UP (ref 135–145)
SPECIMEN SOURCE: SIGNIFICANT CHANGE UP
WBC # BLD: 7.25 K/UL — SIGNIFICANT CHANGE UP (ref 3.8–10.5)
WBC # FLD AUTO: 7.25 K/UL — SIGNIFICANT CHANGE UP (ref 3.8–10.5)

## 2024-06-17 PROCEDURE — 99233 SBSQ HOSP IP/OBS HIGH 50: CPT

## 2024-06-17 PROCEDURE — 99221 1ST HOSP IP/OBS SF/LOW 40: CPT

## 2024-06-17 RX ORDER — LEVOTHYROXINE SODIUM 25 MCG
1 TABLET ORAL
Qty: 0 | Refills: 0 | DISCHARGE
Start: 2024-06-17

## 2024-06-17 RX ORDER — FUROSEMIDE 10 MG/ML
1 INJECTION, SOLUTION INTRAMUSCULAR; INTRAVENOUS
Qty: 0 | Refills: 0 | DISCHARGE
Start: 2024-06-17

## 2024-06-17 RX ORDER — METOPROLOL TARTRATE 50 MG
1 TABLET ORAL
Refills: 0 | DISCHARGE

## 2024-06-17 RX ORDER — METOPROLOL TARTRATE 50 MG
1 TABLET ORAL
Qty: 0 | Refills: 0 | DISCHARGE
Start: 2024-06-17

## 2024-06-17 RX ORDER — LEVOTHYROXINE SODIUM 25 MCG
1 TABLET ORAL
Refills: 0 | DISCHARGE

## 2024-06-17 RX ORDER — FUROSEMIDE 10 MG/ML
40 INJECTION, SOLUTION INTRAMUSCULAR; INTRAVENOUS
Refills: 0 | Status: DISCONTINUED | OUTPATIENT
Start: 2024-06-17 | End: 2024-06-20

## 2024-06-17 RX ORDER — FUROSEMIDE 10 MG/ML
40 INJECTION, SOLUTION INTRAMUSCULAR; INTRAVENOUS DAILY
Refills: 0 | Status: DISCONTINUED | OUTPATIENT
Start: 2024-06-17 | End: 2024-06-20

## 2024-06-17 RX ADMIN — Medication 2.5 MILLIGRAM(S): at 20:42

## 2024-06-17 RX ADMIN — OXYMETAZOLINE HYDROCHLORIDE 2 SPRAY(S): 0.05 SPRAY NASAL at 08:38

## 2024-06-17 RX ADMIN — Medication 2 PUFF(S): at 20:50

## 2024-06-17 RX ADMIN — Medication 2.5 MILLIGRAM(S): at 13:51

## 2024-06-17 RX ADMIN — NICOTINE POLACRILEX 1 PATCH: 2 LOZENGE ORAL at 00:39

## 2024-06-17 RX ADMIN — ASPIRIN 81 MILLIGRAM(S): 325 TABLET, FILM COATED ORAL at 11:11

## 2024-06-17 RX ADMIN — PREDNISONE 20 MILLIGRAM(S): 10 TABLET ORAL at 06:11

## 2024-06-17 RX ADMIN — TIOTROPIUM BROMIDE 2 PUFF(S): 18 CAPSULE ORAL; RESPIRATORY (INHALATION) at 06:17

## 2024-06-17 RX ADMIN — Medication 2.5 MILLIGRAM(S): at 08:00

## 2024-06-17 RX ADMIN — NICOTINE POLACRILEX 1 PATCH: 2 LOZENGE ORAL at 08:02

## 2024-06-17 RX ADMIN — PANTOPRAZOLE SODIUM 40 MILLIGRAM(S): 40 INJECTION, POWDER, FOR SOLUTION INTRAVENOUS at 06:11

## 2024-06-17 RX ADMIN — Medication 100 MICROGRAM(S): at 06:11

## 2024-06-17 RX ADMIN — NICOTINE POLACRILEX 1 PATCH: 2 LOZENGE ORAL at 11:11

## 2024-06-17 RX ADMIN — Medication 100 MILLIGRAM(S): at 06:11

## 2024-06-17 RX ADMIN — FUROSEMIDE 40 MILLIGRAM(S): 10 INJECTION, SOLUTION INTRAMUSCULAR; INTRAVENOUS at 18:48

## 2024-06-17 RX ADMIN — NICOTINE POLACRILEX 1 PATCH: 2 LOZENGE ORAL at 20:56

## 2024-06-17 RX ADMIN — ATORVASTATIN CALCIUM 40 MILLIGRAM(S): 20 TABLET, FILM COATED ORAL at 20:50

## 2024-06-17 RX ADMIN — Medication 2.5 MILLIGRAM(S): at 00:33

## 2024-06-17 RX ADMIN — Medication 3 MILLIGRAM(S): at 21:06

## 2024-06-17 RX ADMIN — Medication 2 PUFF(S): at 06:16

## 2024-06-17 RX ADMIN — OXYMETAZOLINE HYDROCHLORIDE 2 SPRAY(S): 0.05 SPRAY NASAL at 20:50

## 2024-06-18 LAB
ALBUMIN SERPL ELPH-MCNC: 2.9 G/DL — LOW (ref 3.3–5)
ALP SERPL-CCNC: 90 U/L — SIGNIFICANT CHANGE UP (ref 40–120)
ALT FLD-CCNC: 31 U/L — SIGNIFICANT CHANGE UP (ref 12–78)
ANION GAP SERPL CALC-SCNC: 6 MMOL/L — SIGNIFICANT CHANGE UP (ref 5–17)
AST SERPL-CCNC: 27 U/L — SIGNIFICANT CHANGE UP (ref 15–37)
BASOPHILS # BLD AUTO: 0.03 K/UL — SIGNIFICANT CHANGE UP (ref 0–0.2)
BASOPHILS NFR BLD AUTO: 0.3 % — SIGNIFICANT CHANGE UP (ref 0–2)
BILIRUB SERPL-MCNC: 0.5 MG/DL — SIGNIFICANT CHANGE UP (ref 0.2–1.2)
BUN SERPL-MCNC: 56 MG/DL — HIGH (ref 7–23)
CALCIUM SERPL-MCNC: 9.1 MG/DL — SIGNIFICANT CHANGE UP (ref 8.5–10.1)
CHLORIDE SERPL-SCNC: 102 MMOL/L — SIGNIFICANT CHANGE UP (ref 96–108)
CO2 SERPL-SCNC: 35 MMOL/L — HIGH (ref 22–31)
CREAT SERPL-MCNC: 1.2 MG/DL — SIGNIFICANT CHANGE UP (ref 0.5–1.3)
EGFR: 46 ML/MIN/1.73M2 — LOW
EOSINOPHIL # BLD AUTO: 0.25 K/UL — SIGNIFICANT CHANGE UP (ref 0–0.5)
EOSINOPHIL NFR BLD AUTO: 2.8 % — SIGNIFICANT CHANGE UP (ref 0–6)
GLUCOSE SERPL-MCNC: 98 MG/DL — SIGNIFICANT CHANGE UP (ref 70–99)
HCT VFR BLD CALC: 29.4 % — LOW (ref 34.5–45)
HGB BLD-MCNC: 9.4 G/DL — LOW (ref 11.5–15.5)
IMM GRANULOCYTES NFR BLD AUTO: 0.4 % — SIGNIFICANT CHANGE UP (ref 0–0.9)
LYMPHOCYTES # BLD AUTO: 2.47 K/UL — SIGNIFICANT CHANGE UP (ref 1–3.3)
LYMPHOCYTES # BLD AUTO: 27.3 % — SIGNIFICANT CHANGE UP (ref 13–44)
MAGNESIUM SERPL-MCNC: 1.9 MG/DL — SIGNIFICANT CHANGE UP (ref 1.6–2.6)
MCHC RBC-ENTMCNC: 31.5 PG — SIGNIFICANT CHANGE UP (ref 27–34)
MCHC RBC-ENTMCNC: 32 GM/DL — SIGNIFICANT CHANGE UP (ref 32–36)
MCV RBC AUTO: 98.7 FL — SIGNIFICANT CHANGE UP (ref 80–100)
MONOCYTES # BLD AUTO: 0.95 K/UL — HIGH (ref 0–0.9)
MONOCYTES NFR BLD AUTO: 10.5 % — SIGNIFICANT CHANGE UP (ref 2–14)
NEUTROPHILS # BLD AUTO: 5.31 K/UL — SIGNIFICANT CHANGE UP (ref 1.8–7.4)
NEUTROPHILS NFR BLD AUTO: 58.7 % — SIGNIFICANT CHANGE UP (ref 43–77)
NRBC # BLD: 0 /100 WBCS — SIGNIFICANT CHANGE UP (ref 0–0)
PHOSPHATE SERPL-MCNC: 3.6 MG/DL — SIGNIFICANT CHANGE UP (ref 2.5–4.5)
PLATELET # BLD AUTO: 249 K/UL — SIGNIFICANT CHANGE UP (ref 150–400)
POTASSIUM SERPL-MCNC: 4.5 MMOL/L — SIGNIFICANT CHANGE UP (ref 3.5–5.3)
POTASSIUM SERPL-SCNC: 4.5 MMOL/L — SIGNIFICANT CHANGE UP (ref 3.5–5.3)
PROT SERPL-MCNC: 6.1 G/DL — SIGNIFICANT CHANGE UP (ref 6–8.3)
RBC # BLD: 2.98 M/UL — LOW (ref 3.8–5.2)
RBC # FLD: 14.6 % — HIGH (ref 10.3–14.5)
SODIUM SERPL-SCNC: 143 MMOL/L — SIGNIFICANT CHANGE UP (ref 135–145)
WBC # BLD: 9.05 K/UL — SIGNIFICANT CHANGE UP (ref 3.8–10.5)
WBC # FLD AUTO: 9.05 K/UL — SIGNIFICANT CHANGE UP (ref 3.8–10.5)

## 2024-06-18 PROCEDURE — 99232 SBSQ HOSP IP/OBS MODERATE 35: CPT

## 2024-06-18 PROCEDURE — 71250 CT THORAX DX C-: CPT | Mod: 26

## 2024-06-18 PROCEDURE — 99233 SBSQ HOSP IP/OBS HIGH 50: CPT

## 2024-06-18 RX ORDER — PIPERACILLIN SODIUM AND TAZOBACTAM SODIUM 3; .375 G/15ML; G/15ML
3.38 INJECTION, POWDER, LYOPHILIZED, FOR SOLUTION INTRAVENOUS ONCE
Refills: 0 | Status: COMPLETED | OUTPATIENT
Start: 2024-06-18 | End: 2024-06-18

## 2024-06-18 RX ORDER — FUROSEMIDE 10 MG/ML
1 INJECTION, SOLUTION INTRAMUSCULAR; INTRAVENOUS
Qty: 60 | Refills: 0
Start: 2024-06-18 | End: 2024-08-16

## 2024-06-18 RX ORDER — PIPERACILLIN SODIUM AND TAZOBACTAM SODIUM 3; .375 G/15ML; G/15ML
3.38 INJECTION, POWDER, LYOPHILIZED, FOR SOLUTION INTRAVENOUS EVERY 8 HOURS
Refills: 0 | Status: DISCONTINUED | OUTPATIENT
Start: 2024-06-19 | End: 2024-06-19

## 2024-06-18 RX ORDER — FUROSEMIDE 10 MG/ML
1 INJECTION, SOLUTION INTRAMUSCULAR; INTRAVENOUS
Refills: 0 | DISCHARGE

## 2024-06-18 RX ORDER — LOSARTAN POTASSIUM 100 MG/1
1 TABLET, FILM COATED ORAL
Qty: 0 | Refills: 0 | DISCHARGE

## 2024-06-18 RX ORDER — FUROSEMIDE 10 MG/ML
40 INJECTION, SOLUTION INTRAMUSCULAR; INTRAVENOUS ONCE
Refills: 0 | Status: COMPLETED | OUTPATIENT
Start: 2024-06-18 | End: 2024-06-18

## 2024-06-18 RX ADMIN — ASPIRIN 81 MILLIGRAM(S): 325 TABLET, FILM COATED ORAL at 11:03

## 2024-06-18 RX ADMIN — Medication 100 MICROGRAM(S): at 06:03

## 2024-06-18 RX ADMIN — Medication 2 PUFF(S): at 19:47

## 2024-06-18 RX ADMIN — OXYMETAZOLINE HYDROCHLORIDE 2 SPRAY(S): 0.05 SPRAY NASAL at 07:59

## 2024-06-18 RX ADMIN — NICOTINE POLACRILEX 1 PATCH: 2 LOZENGE ORAL at 06:58

## 2024-06-18 RX ADMIN — Medication 650 MILLIGRAM(S): at 22:31

## 2024-06-18 RX ADMIN — Medication 2.5 MILLIGRAM(S): at 02:28

## 2024-06-18 RX ADMIN — Medication 2.5 MILLIGRAM(S): at 20:16

## 2024-06-18 RX ADMIN — PREDNISONE 20 MILLIGRAM(S): 10 TABLET ORAL at 06:03

## 2024-06-18 RX ADMIN — Medication 3 MILLIGRAM(S): at 21:31

## 2024-06-18 RX ADMIN — FUROSEMIDE 40 MILLIGRAM(S): 10 INJECTION, SOLUTION INTRAMUSCULAR; INTRAVENOUS at 17:13

## 2024-06-18 RX ADMIN — Medication 650 MILLIGRAM(S): at 21:31

## 2024-06-18 RX ADMIN — ATORVASTATIN CALCIUM 40 MILLIGRAM(S): 20 TABLET, FILM COATED ORAL at 21:31

## 2024-06-18 RX ADMIN — Medication 2.5 MILLIGRAM(S): at 07:56

## 2024-06-18 RX ADMIN — PIPERACILLIN SODIUM AND TAZOBACTAM SODIUM 25 GRAM(S): 3; .375 INJECTION, POWDER, LYOPHILIZED, FOR SOLUTION INTRAVENOUS at 21:31

## 2024-06-18 RX ADMIN — NICOTINE POLACRILEX 1 PATCH: 2 LOZENGE ORAL at 11:03

## 2024-06-18 RX ADMIN — NICOTINE POLACRILEX 1 PATCH: 2 LOZENGE ORAL at 19:27

## 2024-06-18 RX ADMIN — Medication 2 PUFF(S): at 07:58

## 2024-06-18 RX ADMIN — PIPERACILLIN SODIUM AND TAZOBACTAM SODIUM 200 GRAM(S): 3; .375 INJECTION, POWDER, LYOPHILIZED, FOR SOLUTION INTRAVENOUS at 16:40

## 2024-06-18 RX ADMIN — TIOTROPIUM BROMIDE 2 PUFF(S): 18 CAPSULE ORAL; RESPIRATORY (INHALATION) at 07:58

## 2024-06-18 RX ADMIN — PANTOPRAZOLE SODIUM 40 MILLIGRAM(S): 40 INJECTION, POWDER, FOR SOLUTION INTRAVENOUS at 06:04

## 2024-06-19 DIAGNOSIS — J96.21 ACUTE AND CHRONIC RESPIRATORY FAILURE WITH HYPOXIA: ICD-10-CM

## 2024-06-19 DIAGNOSIS — I10 ESSENTIAL (PRIMARY) HYPERTENSION: ICD-10-CM

## 2024-06-19 LAB
ANION GAP SERPL CALC-SCNC: 7 MMOL/L — SIGNIFICANT CHANGE UP (ref 5–17)
BUN SERPL-MCNC: 61 MG/DL — HIGH (ref 7–23)
CALCIUM SERPL-MCNC: 9 MG/DL — SIGNIFICANT CHANGE UP (ref 8.5–10.1)
CHLORIDE SERPL-SCNC: 99 MMOL/L — SIGNIFICANT CHANGE UP (ref 96–108)
CO2 SERPL-SCNC: 34 MMOL/L — HIGH (ref 22–31)
CREAT SERPL-MCNC: 1.2 MG/DL — SIGNIFICANT CHANGE UP (ref 0.5–1.3)
EGFR: 46 ML/MIN/1.73M2 — LOW
GLUCOSE SERPL-MCNC: 95 MG/DL — SIGNIFICANT CHANGE UP (ref 70–99)
HCT VFR BLD CALC: 29.4 % — LOW (ref 34.5–45)
HGB BLD-MCNC: 9.9 G/DL — LOW (ref 11.5–15.5)
MCHC RBC-ENTMCNC: 32 PG — SIGNIFICANT CHANGE UP (ref 27–34)
MCHC RBC-ENTMCNC: 33.7 GM/DL — SIGNIFICANT CHANGE UP (ref 32–36)
MCV RBC AUTO: 95.1 FL — SIGNIFICANT CHANGE UP (ref 80–100)
NRBC # BLD: 0 /100 WBCS — SIGNIFICANT CHANGE UP (ref 0–0)
PLATELET # BLD AUTO: 249 K/UL — SIGNIFICANT CHANGE UP (ref 150–400)
POTASSIUM SERPL-MCNC: 3.8 MMOL/L — SIGNIFICANT CHANGE UP (ref 3.5–5.3)
POTASSIUM SERPL-SCNC: 3.8 MMOL/L — SIGNIFICANT CHANGE UP (ref 3.5–5.3)
RBC # BLD: 3.09 M/UL — LOW (ref 3.8–5.2)
RBC # FLD: 14.5 % — SIGNIFICANT CHANGE UP (ref 10.3–14.5)
SODIUM SERPL-SCNC: 140 MMOL/L — SIGNIFICANT CHANGE UP (ref 135–145)
WBC # BLD: 7.99 K/UL — SIGNIFICANT CHANGE UP (ref 3.8–10.5)
WBC # FLD AUTO: 7.99 K/UL — SIGNIFICANT CHANGE UP (ref 3.8–10.5)

## 2024-06-19 PROCEDURE — 99233 SBSQ HOSP IP/OBS HIGH 50: CPT | Mod: GC

## 2024-06-19 PROCEDURE — 32555 ASPIRATE PLEURA W/ IMAGING: CPT | Mod: RT

## 2024-06-19 PROCEDURE — 99497 ADVNCD CARE PLAN 30 MIN: CPT | Mod: 25

## 2024-06-19 PROCEDURE — 71045 X-RAY EXAM CHEST 1 VIEW: CPT | Mod: 26

## 2024-06-19 PROCEDURE — 99233 SBSQ HOSP IP/OBS HIGH 50: CPT

## 2024-06-19 RX ORDER — PREDNISONE 10 MG/1
10 TABLET ORAL DAILY
Refills: 0 | Status: DISCONTINUED | OUTPATIENT
Start: 2024-06-19 | End: 2024-06-19

## 2024-06-19 RX ADMIN — Medication 100 MICROGRAM(S): at 05:54

## 2024-06-19 RX ADMIN — Medication 2 PUFF(S): at 17:39

## 2024-06-19 RX ADMIN — Medication 3 MILLIGRAM(S): at 21:19

## 2024-06-19 RX ADMIN — ASPIRIN 81 MILLIGRAM(S): 325 TABLET, FILM COATED ORAL at 11:31

## 2024-06-19 RX ADMIN — ATORVASTATIN CALCIUM 40 MILLIGRAM(S): 20 TABLET, FILM COATED ORAL at 21:19

## 2024-06-19 RX ADMIN — FUROSEMIDE 40 MILLIGRAM(S): 10 INJECTION, SOLUTION INTRAMUSCULAR; INTRAVENOUS at 05:54

## 2024-06-19 RX ADMIN — Medication 2.5 MILLIGRAM(S): at 13:28

## 2024-06-19 RX ADMIN — NICOTINE POLACRILEX 1 PATCH: 2 LOZENGE ORAL at 20:00

## 2024-06-19 RX ADMIN — TIOTROPIUM BROMIDE 2 PUFF(S): 18 CAPSULE ORAL; RESPIRATORY (INHALATION) at 05:55

## 2024-06-19 RX ADMIN — PIPERACILLIN SODIUM AND TAZOBACTAM SODIUM 25 GRAM(S): 3; .375 INJECTION, POWDER, LYOPHILIZED, FOR SOLUTION INTRAVENOUS at 05:55

## 2024-06-19 RX ADMIN — NICOTINE POLACRILEX 1 PATCH: 2 LOZENGE ORAL at 11:37

## 2024-06-19 RX ADMIN — Medication 100 MILLIGRAM(S): at 05:54

## 2024-06-19 RX ADMIN — Medication 2 PUFF(S): at 05:55

## 2024-06-19 RX ADMIN — PANTOPRAZOLE SODIUM 40 MILLIGRAM(S): 40 INJECTION, POWDER, FOR SOLUTION INTRAVENOUS at 05:54

## 2024-06-19 RX ADMIN — Medication 2.5 MILLIGRAM(S): at 19:34

## 2024-06-19 RX ADMIN — Medication 2.5 MILLIGRAM(S): at 07:38

## 2024-06-19 RX ADMIN — PREDNISONE 20 MILLIGRAM(S): 10 TABLET ORAL at 05:54

## 2024-06-19 RX ADMIN — FUROSEMIDE 40 MILLIGRAM(S): 10 INJECTION, SOLUTION INTRAMUSCULAR; INTRAVENOUS at 17:39

## 2024-06-19 RX ADMIN — NICOTINE POLACRILEX 1 PATCH: 2 LOZENGE ORAL at 11:31

## 2024-06-19 RX ADMIN — Medication 2.5 MILLIGRAM(S): at 01:19

## 2024-06-20 VITALS
SYSTOLIC BLOOD PRESSURE: 100 MMHG | TEMPERATURE: 98 F | RESPIRATION RATE: 20 BRPM | DIASTOLIC BLOOD PRESSURE: 44 MMHG | HEART RATE: 76 BPM | OXYGEN SATURATION: 91 %

## 2024-06-20 DIAGNOSIS — D63.8 ANEMIA IN OTHER CHRONIC DISEASES CLASSIFIED ELSEWHERE: ICD-10-CM

## 2024-06-20 LAB
ALBUMIN SERPL ELPH-MCNC: 2.7 G/DL — LOW (ref 3.3–5)
ALP SERPL-CCNC: 63 U/L — SIGNIFICANT CHANGE UP (ref 40–120)
ALT FLD-CCNC: 24 U/L — SIGNIFICANT CHANGE UP (ref 12–78)
ANION GAP SERPL CALC-SCNC: 5 MMOL/L — SIGNIFICANT CHANGE UP (ref 5–17)
AST SERPL-CCNC: 16 U/L — SIGNIFICANT CHANGE UP (ref 15–37)
BASOPHILS # BLD AUTO: 0.02 K/UL — SIGNIFICANT CHANGE UP (ref 0–0.2)
BASOPHILS NFR BLD AUTO: 0.2 % — SIGNIFICANT CHANGE UP (ref 0–2)
BILIRUB SERPL-MCNC: 0.6 MG/DL — SIGNIFICANT CHANGE UP (ref 0.2–1.2)
BUN SERPL-MCNC: 65 MG/DL — HIGH (ref 7–23)
CALCIUM SERPL-MCNC: 8.4 MG/DL — LOW (ref 8.5–10.1)
CHLORIDE SERPL-SCNC: 99 MMOL/L — SIGNIFICANT CHANGE UP (ref 96–108)
CO2 SERPL-SCNC: 36 MMOL/L — HIGH (ref 22–31)
CREAT SERPL-MCNC: 1.3 MG/DL — SIGNIFICANT CHANGE UP (ref 0.5–1.3)
EGFR: 42 ML/MIN/1.73M2 — LOW
EOSINOPHIL # BLD AUTO: 0.17 K/UL — SIGNIFICANT CHANGE UP (ref 0–0.5)
EOSINOPHIL NFR BLD AUTO: 1.9 % — SIGNIFICANT CHANGE UP (ref 0–6)
FERRITIN SERPL-MCNC: 209 NG/ML — SIGNIFICANT CHANGE UP (ref 13–330)
FOLATE SERPL-MCNC: 13.1 NG/ML — SIGNIFICANT CHANGE UP
GLUCOSE SERPL-MCNC: 93 MG/DL — SIGNIFICANT CHANGE UP (ref 70–99)
HCT VFR BLD CALC: 27 % — LOW (ref 34.5–45)
HCT VFR BLD CALC: 27.7 % — LOW (ref 34.5–45)
HGB BLD-MCNC: 9.1 G/DL — LOW (ref 11.5–15.5)
HGB BLD-MCNC: 9.2 G/DL — LOW (ref 11.5–15.5)
IMM GRANULOCYTES NFR BLD AUTO: 0.2 % — SIGNIFICANT CHANGE UP (ref 0–0.9)
IRON SATN MFR SERPL: 21 % — SIGNIFICANT CHANGE UP (ref 14–50)
IRON SATN MFR SERPL: 58 UG/DL — SIGNIFICANT CHANGE UP (ref 30–160)
LYMPHOCYTES # BLD AUTO: 2.61 K/UL — SIGNIFICANT CHANGE UP (ref 1–3.3)
LYMPHOCYTES # BLD AUTO: 29.6 % — SIGNIFICANT CHANGE UP (ref 13–44)
MAGNESIUM SERPL-MCNC: 2.1 MG/DL — SIGNIFICANT CHANGE UP (ref 1.6–2.6)
MCHC RBC-ENTMCNC: 31.8 PG — SIGNIFICANT CHANGE UP (ref 27–34)
MCHC RBC-ENTMCNC: 33.7 GM/DL — SIGNIFICANT CHANGE UP (ref 32–36)
MCV RBC AUTO: 94.4 FL — SIGNIFICANT CHANGE UP (ref 80–100)
MONOCYTES # BLD AUTO: 0.92 K/UL — HIGH (ref 0–0.9)
MONOCYTES NFR BLD AUTO: 10.4 % — SIGNIFICANT CHANGE UP (ref 2–14)
NEUTROPHILS # BLD AUTO: 5.07 K/UL — SIGNIFICANT CHANGE UP (ref 1.8–7.4)
NEUTROPHILS NFR BLD AUTO: 57.7 % — SIGNIFICANT CHANGE UP (ref 43–77)
NRBC # BLD: 0 /100 WBCS — SIGNIFICANT CHANGE UP (ref 0–0)
OB PNL STL: NEGATIVE — SIGNIFICANT CHANGE UP
PHOSPHATE SERPL-MCNC: 4 MG/DL — SIGNIFICANT CHANGE UP (ref 2.5–4.5)
PLATELET # BLD AUTO: 259 K/UL — SIGNIFICANT CHANGE UP (ref 150–400)
POTASSIUM SERPL-MCNC: 3.9 MMOL/L — SIGNIFICANT CHANGE UP (ref 3.5–5.3)
POTASSIUM SERPL-SCNC: 3.9 MMOL/L — SIGNIFICANT CHANGE UP (ref 3.5–5.3)
PROT SERPL-MCNC: 6.2 G/DL — SIGNIFICANT CHANGE UP (ref 6–8.3)
RBC # BLD: 2.86 M/UL — LOW (ref 3.8–5.2)
RBC # BLD: 2.9 M/UL — LOW (ref 3.8–5.2)
RBC # FLD: 14.2 % — SIGNIFICANT CHANGE UP (ref 10.3–14.5)
RETICS #: 35.1 K/UL — SIGNIFICANT CHANGE UP (ref 25–125)
RETICS/RBC NFR: 1.2 % — SIGNIFICANT CHANGE UP (ref 0.5–2.5)
SODIUM SERPL-SCNC: 140 MMOL/L — SIGNIFICANT CHANGE UP (ref 135–145)
TIBC SERPL-MCNC: 272 UG/DL — SIGNIFICANT CHANGE UP (ref 220–430)
TRANSFERRIN SERPL-MCNC: 204 MG/DL — SIGNIFICANT CHANGE UP (ref 200–360)
UIBC SERPL-MCNC: 213 UG/DL — SIGNIFICANT CHANGE UP (ref 110–370)
VIT B12 SERPL-MCNC: 490 PG/ML — SIGNIFICANT CHANGE UP (ref 232–1245)
WBC # BLD: 8.81 K/UL — SIGNIFICANT CHANGE UP (ref 3.8–10.5)
WBC # FLD AUTO: 8.81 K/UL — SIGNIFICANT CHANGE UP (ref 3.8–10.5)

## 2024-06-20 PROCEDURE — 78582 LUNG VENTILAT&PERFUS IMAGING: CPT | Mod: MC

## 2024-06-20 PROCEDURE — 71250 CT THORAX DX C-: CPT | Mod: MC

## 2024-06-20 PROCEDURE — 85014 HEMATOCRIT: CPT

## 2024-06-20 PROCEDURE — 82042 OTHER SOURCE ALBUMIN QUAN EA: CPT

## 2024-06-20 PROCEDURE — 82550 ASSAY OF CK (CPK): CPT

## 2024-06-20 PROCEDURE — 84466 ASSAY OF TRANSFERRIN: CPT

## 2024-06-20 PROCEDURE — 85027 COMPLETE CBC AUTOMATED: CPT

## 2024-06-20 PROCEDURE — 83735 ASSAY OF MAGNESIUM: CPT

## 2024-06-20 PROCEDURE — 84157 ASSAY OF PROTEIN OTHER: CPT

## 2024-06-20 PROCEDURE — 85610 PROTHROMBIN TIME: CPT

## 2024-06-20 PROCEDURE — 80061 LIPID PANEL: CPT

## 2024-06-20 PROCEDURE — 87070 CULTURE OTHR SPECIMN AEROBIC: CPT

## 2024-06-20 PROCEDURE — 71045 X-RAY EXAM CHEST 1 VIEW: CPT

## 2024-06-20 PROCEDURE — 82272 OCCULT BLD FECES 1-3 TESTS: CPT

## 2024-06-20 PROCEDURE — 88305 TISSUE EXAM BY PATHOLOGIST: CPT

## 2024-06-20 PROCEDURE — 88108 CYTOPATH CONCENTRATE TECH: CPT

## 2024-06-20 PROCEDURE — 80048 BASIC METABOLIC PNL TOTAL CA: CPT

## 2024-06-20 PROCEDURE — 83036 HEMOGLOBIN GLYCOSYLATED A1C: CPT

## 2024-06-20 PROCEDURE — 80053 COMPREHEN METABOLIC PANEL: CPT

## 2024-06-20 PROCEDURE — 84443 ASSAY THYROID STIM HORMONE: CPT

## 2024-06-20 PROCEDURE — 84145 PROCALCITONIN (PCT): CPT

## 2024-06-20 PROCEDURE — 83540 ASSAY OF IRON: CPT

## 2024-06-20 PROCEDURE — 97530 THERAPEUTIC ACTIVITIES: CPT

## 2024-06-20 PROCEDURE — A9540: CPT

## 2024-06-20 PROCEDURE — 87075 CULTR BACTERIA EXCEPT BLOOD: CPT

## 2024-06-20 PROCEDURE — 87040 BLOOD CULTURE FOR BACTERIA: CPT

## 2024-06-20 PROCEDURE — 84484 ASSAY OF TROPONIN QUANT: CPT

## 2024-06-20 PROCEDURE — 87637 SARSCOV2&INF A&B&RSV AMP PRB: CPT

## 2024-06-20 PROCEDURE — 87205 SMEAR GRAM STAIN: CPT

## 2024-06-20 PROCEDURE — 99239 HOSP IP/OBS DSCHRG MGMT >30: CPT

## 2024-06-20 PROCEDURE — 83880 ASSAY OF NATRIURETIC PEPTIDE: CPT

## 2024-06-20 PROCEDURE — 82945 GLUCOSE OTHER FLUID: CPT

## 2024-06-20 PROCEDURE — 96374 THER/PROPH/DIAG INJ IV PUSH: CPT

## 2024-06-20 PROCEDURE — 83615 LACTATE (LD) (LDH) ENZYME: CPT

## 2024-06-20 PROCEDURE — 89051 BODY FLUID CELL COUNT: CPT

## 2024-06-20 PROCEDURE — 85045 AUTOMATED RETICULOCYTE COUNT: CPT

## 2024-06-20 PROCEDURE — C1729: CPT

## 2024-06-20 PROCEDURE — 82746 ASSAY OF FOLIC ACID SERUM: CPT

## 2024-06-20 PROCEDURE — 83605 ASSAY OF LACTIC ACID: CPT

## 2024-06-20 PROCEDURE — 99232 SBSQ HOSP IP/OBS MODERATE 35: CPT

## 2024-06-20 PROCEDURE — 94760 N-INVAS EAR/PLS OXIMETRY 1: CPT

## 2024-06-20 PROCEDURE — 0225U NFCT DS DNA&RNA 21 SARSCOV2: CPT

## 2024-06-20 PROCEDURE — 87102 FUNGUS ISOLATION CULTURE: CPT

## 2024-06-20 PROCEDURE — 82803 BLOOD GASES ANY COMBINATION: CPT

## 2024-06-20 PROCEDURE — 84100 ASSAY OF PHOSPHORUS: CPT

## 2024-06-20 PROCEDURE — 36415 COLL VENOUS BLD VENIPUNCTURE: CPT

## 2024-06-20 PROCEDURE — 83986 ASSAY PH BODY FLUID NOS: CPT

## 2024-06-20 PROCEDURE — 85018 HEMOGLOBIN: CPT

## 2024-06-20 PROCEDURE — 82728 ASSAY OF FERRITIN: CPT

## 2024-06-20 PROCEDURE — 93005 ELECTROCARDIOGRAM TRACING: CPT

## 2024-06-20 PROCEDURE — 32555 ASPIRATE PLEURA W/ IMAGING: CPT

## 2024-06-20 PROCEDURE — A9567: CPT

## 2024-06-20 PROCEDURE — 83550 IRON BINDING TEST: CPT

## 2024-06-20 PROCEDURE — 99285 EMERGENCY DEPT VISIT HI MDM: CPT

## 2024-06-20 PROCEDURE — 93306 TTE W/DOPPLER COMPLETE: CPT

## 2024-06-20 PROCEDURE — 85025 COMPLETE CBC W/AUTO DIFF WBC: CPT

## 2024-06-20 PROCEDURE — 85730 THROMBOPLASTIN TIME PARTIAL: CPT

## 2024-06-20 PROCEDURE — 97162 PT EVAL MOD COMPLEX 30 MIN: CPT

## 2024-06-20 PROCEDURE — 82607 VITAMIN B-12: CPT

## 2024-06-20 PROCEDURE — 94660 CPAP INITIATION&MGMT: CPT

## 2024-06-20 PROCEDURE — 94640 AIRWAY INHALATION TREATMENT: CPT

## 2024-06-20 PROCEDURE — 97116 GAIT TRAINING THERAPY: CPT

## 2024-06-20 RX ORDER — LOSARTAN POTASSIUM 100 MG/1
1 TABLET, FILM COATED ORAL
Refills: 0 | DISCHARGE

## 2024-06-20 RX ORDER — FUROSEMIDE 10 MG/ML
1 INJECTION, SOLUTION INTRAMUSCULAR; INTRAVENOUS
Qty: 0 | Refills: 0 | DISCHARGE
Start: 2024-06-20

## 2024-06-20 RX ORDER — FUROSEMIDE 10 MG/ML
1 INJECTION, SOLUTION INTRAMUSCULAR; INTRAVENOUS
Qty: 60 | Refills: 0
Start: 2024-06-20 | End: 2024-07-19

## 2024-06-20 RX ADMIN — Medication 2.5 MILLIGRAM(S): at 14:30

## 2024-06-20 RX ADMIN — NICOTINE POLACRILEX 1 PATCH: 2 LOZENGE ORAL at 06:07

## 2024-06-20 RX ADMIN — Medication 2.5 MILLIGRAM(S): at 00:58

## 2024-06-20 RX ADMIN — Medication 2 PUFF(S): at 05:35

## 2024-06-20 RX ADMIN — Medication 100 MILLIGRAM(S): at 05:34

## 2024-06-20 RX ADMIN — PANTOPRAZOLE SODIUM 40 MILLIGRAM(S): 40 INJECTION, POWDER, FOR SOLUTION INTRAVENOUS at 05:34

## 2024-06-20 RX ADMIN — TIOTROPIUM BROMIDE 2 PUFF(S): 18 CAPSULE ORAL; RESPIRATORY (INHALATION) at 05:35

## 2024-06-20 RX ADMIN — Medication 2.5 MILLIGRAM(S): at 07:33

## 2024-06-20 RX ADMIN — ASPIRIN 81 MILLIGRAM(S): 325 TABLET, FILM COATED ORAL at 12:32

## 2024-06-20 RX ADMIN — Medication 100 MICROGRAM(S): at 05:34

## 2024-06-21 ENCOUNTER — NON-APPOINTMENT (OUTPATIENT)
Age: 79
End: 2024-06-21

## 2024-06-24 ENCOUNTER — APPOINTMENT (OUTPATIENT)
Dept: CARDIOLOGY | Facility: CLINIC | Age: 79
End: 2024-06-24
Payer: MEDICARE

## 2024-06-24 ENCOUNTER — NON-APPOINTMENT (OUTPATIENT)
Age: 79
End: 2024-06-24

## 2024-06-24 VITALS
WEIGHT: 98 LBS | HEIGHT: 63 IN | TEMPERATURE: 97.6 F | SYSTOLIC BLOOD PRESSURE: 110 MMHG | OXYGEN SATURATION: 92 % | HEART RATE: 73 BPM | DIASTOLIC BLOOD PRESSURE: 63 MMHG | BODY MASS INDEX: 17.36 KG/M2

## 2024-06-24 DIAGNOSIS — I50.30 UNSPECIFIED DIASTOLIC (CONGESTIVE) HEART FAILURE: ICD-10-CM

## 2024-06-24 DIAGNOSIS — I05.0 RHEUMATIC MITRAL STENOSIS: ICD-10-CM

## 2024-06-24 DIAGNOSIS — I10 ESSENTIAL (PRIMARY) HYPERTENSION: ICD-10-CM

## 2024-06-24 DIAGNOSIS — Q24.8 OTHER SPECIFIED CONGENITAL MALFORMATIONS OF HEART: ICD-10-CM

## 2024-06-24 DIAGNOSIS — I70.0 ATHEROSCLEROSIS OF AORTA: ICD-10-CM

## 2024-06-24 DIAGNOSIS — R06.09 OTHER FORMS OF DYSPNEA: ICD-10-CM

## 2024-06-24 PROCEDURE — 93000 ELECTROCARDIOGRAM COMPLETE: CPT

## 2024-06-24 PROCEDURE — 36415 COLL VENOUS BLD VENIPUNCTURE: CPT

## 2024-06-24 PROCEDURE — G2211 COMPLEX E/M VISIT ADD ON: CPT

## 2024-06-24 PROCEDURE — 99215 OFFICE O/P EST HI 40 MIN: CPT

## 2024-06-24 RX ORDER — LEVOFLOXACIN 750 MG/1
750 TABLET, FILM COATED ORAL DAILY
Qty: 7 | Refills: 0 | Status: DISCONTINUED | COMMUNITY
Start: 2024-02-27 | End: 2024-06-24

## 2024-06-24 RX ORDER — LOSARTAN POTASSIUM 25 MG/1
25 TABLET, FILM COATED ORAL
Qty: 90 | Refills: 3 | Status: COMPLETED | COMMUNITY
Start: 2022-06-16 | End: 2024-06-24

## 2024-06-24 RX ORDER — ASPIRIN ENTERIC COATED TABLETS 81 MG 81 MG/1
81 TABLET, DELAYED RELEASE ORAL DAILY
Qty: 90 | Refills: 3 | Status: ACTIVE | COMMUNITY
Start: 2024-06-24

## 2024-06-24 RX ORDER — FUROSEMIDE 40 MG/1
40 TABLET ORAL TWICE DAILY
Refills: 0 | Status: ACTIVE | COMMUNITY
Start: 2022-04-26

## 2024-06-25 ENCOUNTER — NON-APPOINTMENT (OUTPATIENT)
Age: 79
End: 2024-06-25

## 2024-06-25 ENCOUNTER — APPOINTMENT (OUTPATIENT)
Dept: PULMONOLOGY | Facility: CLINIC | Age: 79
End: 2024-06-25
Payer: MEDICARE

## 2024-06-25 VITALS
HEIGHT: 63 IN | OXYGEN SATURATION: 96 % | SYSTOLIC BLOOD PRESSURE: 109 MMHG | WEIGHT: 98.19 LBS | HEART RATE: 70 BPM | BODY MASS INDEX: 17.4 KG/M2 | TEMPERATURE: 97.7 F | DIASTOLIC BLOOD PRESSURE: 63 MMHG | RESPIRATION RATE: 15 BRPM

## 2024-06-25 DIAGNOSIS — J45.909 UNSPECIFIED ASTHMA, UNCOMPLICATED: ICD-10-CM

## 2024-06-25 DIAGNOSIS — F32.A DEPRESSION, UNSPECIFIED: ICD-10-CM

## 2024-06-25 PROBLEM — I10 BENIGN HYPERTENSION: Status: ACTIVE | Noted: 2021-03-29

## 2024-06-25 PROBLEM — R06.09 DYSPNEA ON EXERTION: Status: ACTIVE | Noted: 2020-06-29

## 2024-06-25 PROBLEM — Q24.8 LEFT VENTRICULAR OUTFLOW TRACT OBSTRUCTION: Status: ACTIVE | Noted: 2024-06-25

## 2024-06-25 PROBLEM — I70.0 ATHEROSCLEROSIS OF AORTA: Status: ACTIVE | Noted: 2020-07-23

## 2024-06-25 PROBLEM — I05.0 MITRAL STENOSIS: Status: ACTIVE | Noted: 2020-07-01

## 2024-06-25 PROBLEM — I05.0 SEVERE MITRAL VALVE STENOSIS: Status: ACTIVE | Noted: 2023-11-22

## 2024-06-25 PROCEDURE — G2211 COMPLEX E/M VISIT ADD ON: CPT

## 2024-06-25 PROCEDURE — G2212 PROLONG OUTPT/OFFICE VIS: CPT

## 2024-06-25 PROCEDURE — 99215 OFFICE O/P EST HI 40 MIN: CPT

## 2024-06-25 RX ORDER — INHALER, ASSIST DEVICES
SPACER (EA) MISCELLANEOUS
Qty: 1 | Refills: 0 | Status: ACTIVE | COMMUNITY
Start: 2024-05-07 | End: 1900-01-01

## 2024-06-25 RX ORDER — IPRATROPIUM BROMIDE AND ALBUTEROL SULFATE 2.5; .5 MG/3ML; MG/3ML
0.5-2.5 (3) SOLUTION RESPIRATORY (INHALATION)
Qty: 1 | Refills: 3 | Status: ACTIVE | COMMUNITY
Start: 2024-06-25 | End: 1900-01-01

## 2024-06-27 ENCOUNTER — MESSAGE (OUTPATIENT)
Age: 79
End: 2024-06-27

## 2024-06-27 LAB
ALBUMIN SERPL ELPH-MCNC: 3.7 G/DL
ALP BLD-CCNC: 80 U/L
ALT SERPL-CCNC: 20 U/L
ANION GAP SERPL CALC-SCNC: 13 MMOL/L
AST SERPL-CCNC: 25 U/L
BILIRUB SERPL-MCNC: 0.6 MG/DL
BUN SERPL-MCNC: 65 MG/DL
CALCIUM SERPL-MCNC: 9.4 MG/DL
CHLORIDE SERPL-SCNC: 92 MMOL/L
CO2 SERPL-SCNC: 34 MMOL/L
CREAT SERPL-MCNC: 1.5 MG/DL
EGFR: 35 ML/MIN/1.73M2
GLUCOSE SERPL-MCNC: 197 MG/DL
NT-PROBNP SERPL-MCNC: 2214 PG/ML
POTASSIUM SERPL-SCNC: 4.6 MMOL/L
PROT SERPL-MCNC: 6.7 G/DL
SODIUM SERPL-SCNC: 139 MMOL/L

## 2024-06-28 PROBLEM — F32.A DEPRESSION: Status: ACTIVE | Noted: 2024-06-28

## 2024-07-08 ENCOUNTER — APPOINTMENT (OUTPATIENT)
Dept: INTERNAL MEDICINE | Facility: CLINIC | Age: 79
End: 2024-07-08
Payer: MEDICARE

## 2024-07-08 VITALS
SYSTOLIC BLOOD PRESSURE: 108 MMHG | HEIGHT: 62 IN | HEART RATE: 58 BPM | OXYGEN SATURATION: 92 % | RESPIRATION RATE: 16 BRPM | TEMPERATURE: 97.5 F | DIASTOLIC BLOOD PRESSURE: 60 MMHG | BODY MASS INDEX: 18.84 KG/M2 | WEIGHT: 102.4 LBS

## 2024-07-08 DIAGNOSIS — I05.0 RHEUMATIC MITRAL STENOSIS: ICD-10-CM

## 2024-07-08 DIAGNOSIS — I10 ESSENTIAL (PRIMARY) HYPERTENSION: ICD-10-CM

## 2024-07-08 DIAGNOSIS — E03.9 HYPOTHYROIDISM, UNSPECIFIED: ICD-10-CM

## 2024-07-08 PROCEDURE — 99213 OFFICE O/P EST LOW 20 MIN: CPT

## 2024-07-08 PROCEDURE — G2211 COMPLEX E/M VISIT ADD ON: CPT

## 2024-07-10 ENCOUNTER — APPOINTMENT (OUTPATIENT)
Dept: GERIATRICS | Facility: CLINIC | Age: 79
End: 2024-07-10

## 2024-07-10 LAB
CULTURE RESULTS: SIGNIFICANT CHANGE UP
SPECIMEN SOURCE: SIGNIFICANT CHANGE UP

## 2024-07-12 ENCOUNTER — RX RENEWAL (OUTPATIENT)
Age: 79
End: 2024-07-12

## 2024-07-16 ENCOUNTER — RX RENEWAL (OUTPATIENT)
Age: 79
End: 2024-07-16

## 2024-07-27 ENCOUNTER — INPATIENT (INPATIENT)
Facility: HOSPITAL | Age: 79
LOS: 5 days | Discharge: HOME | DRG: 204 | End: 2024-08-02
Attending: STUDENT IN AN ORGANIZED HEALTH CARE EDUCATION/TRAINING PROGRAM | Admitting: INTERNAL MEDICINE
Payer: MEDICARE

## 2024-07-27 VITALS
SYSTOLIC BLOOD PRESSURE: 121 MMHG | HEIGHT: 62 IN | WEIGHT: 106.04 LBS | OXYGEN SATURATION: 80 % | HEART RATE: 70 BPM | RESPIRATION RATE: 18 BRPM | DIASTOLIC BLOOD PRESSURE: 65 MMHG | TEMPERATURE: 97 F

## 2024-07-27 DIAGNOSIS — E03.9 HYPOTHYROIDISM, UNSPECIFIED: ICD-10-CM

## 2024-07-27 DIAGNOSIS — Z90.49 ACQUIRED ABSENCE OF OTHER SPECIFIED PARTS OF DIGESTIVE TRACT: Chronic | ICD-10-CM

## 2024-07-27 DIAGNOSIS — Z29.9 ENCOUNTER FOR PROPHYLACTIC MEASURES, UNSPECIFIED: ICD-10-CM

## 2024-07-27 DIAGNOSIS — Z87.09 PERSONAL HISTORY OF OTHER DISEASES OF THE RESPIRATORY SYSTEM: ICD-10-CM

## 2024-07-27 DIAGNOSIS — Z98.890 OTHER SPECIFIED POSTPROCEDURAL STATES: Chronic | ICD-10-CM

## 2024-07-27 DIAGNOSIS — I50.9 HEART FAILURE, UNSPECIFIED: ICD-10-CM

## 2024-07-27 DIAGNOSIS — E78.5 HYPERLIPIDEMIA, UNSPECIFIED: ICD-10-CM

## 2024-07-27 DIAGNOSIS — I10 ESSENTIAL (PRIMARY) HYPERTENSION: ICD-10-CM

## 2024-07-27 DIAGNOSIS — N17.9 ACUTE KIDNEY FAILURE, UNSPECIFIED: ICD-10-CM

## 2024-07-27 DIAGNOSIS — R06.02 SHORTNESS OF BREATH: ICD-10-CM

## 2024-07-27 LAB
ALBUMIN SERPL ELPH-MCNC: 3.4 G/DL — SIGNIFICANT CHANGE UP (ref 3.3–5)
ALP SERPL-CCNC: 84 U/L — SIGNIFICANT CHANGE UP (ref 40–120)
ALT FLD-CCNC: 21 U/L — SIGNIFICANT CHANGE UP (ref 12–78)
ANION GAP SERPL CALC-SCNC: 8 MMOL/L — SIGNIFICANT CHANGE UP (ref 5–17)
APPEARANCE UR: CLEAR — SIGNIFICANT CHANGE UP
APTT BLD: 32.5 SEC — SIGNIFICANT CHANGE UP (ref 24.5–35.6)
AST SERPL-CCNC: 30 U/L — SIGNIFICANT CHANGE UP (ref 15–37)
BACTERIA # UR AUTO: ABNORMAL /HPF
BASOPHILS # BLD AUTO: 0.04 K/UL — SIGNIFICANT CHANGE UP (ref 0–0.2)
BASOPHILS NFR BLD AUTO: 0.6 % — SIGNIFICANT CHANGE UP (ref 0–2)
BILIRUB SERPL-MCNC: 0.6 MG/DL — SIGNIFICANT CHANGE UP (ref 0.2–1.2)
BILIRUB UR-MCNC: NEGATIVE — SIGNIFICANT CHANGE UP
BUN SERPL-MCNC: 50 MG/DL — HIGH (ref 7–23)
CALCIUM SERPL-MCNC: 9.7 MG/DL — SIGNIFICANT CHANGE UP (ref 8.5–10.1)
CHLORIDE SERPL-SCNC: 97 MMOL/L — SIGNIFICANT CHANGE UP (ref 96–108)
CK SERPL-CCNC: 66 U/L — SIGNIFICANT CHANGE UP (ref 26–192)
CO2 SERPL-SCNC: 31 MMOL/L — SIGNIFICANT CHANGE UP (ref 22–31)
COLOR SPEC: YELLOW — SIGNIFICANT CHANGE UP
CREAT SERPL-MCNC: 1.4 MG/DL — HIGH (ref 0.5–1.3)
D DIMER BLD IA.RAPID-MCNC: 381 NG/ML DDU — HIGH
DIFF PNL FLD: NEGATIVE — SIGNIFICANT CHANGE UP
EGFR: 38 ML/MIN/1.73M2 — LOW
EOSINOPHIL # BLD AUTO: 0.14 K/UL — SIGNIFICANT CHANGE UP (ref 0–0.5)
EOSINOPHIL NFR BLD AUTO: 2 % — SIGNIFICANT CHANGE UP (ref 0–6)
EPI CELLS # UR: PRESENT
FLUAV AG NPH QL: SIGNIFICANT CHANGE UP
FLUBV AG NPH QL: SIGNIFICANT CHANGE UP
GLUCOSE SERPL-MCNC: 175 MG/DL — HIGH (ref 70–99)
GLUCOSE UR QL: NEGATIVE MG/DL — SIGNIFICANT CHANGE UP
HCT VFR BLD CALC: 32.2 % — LOW (ref 34.5–45)
HGB BLD-MCNC: 10.3 G/DL — LOW (ref 11.5–15.5)
IMM GRANULOCYTES NFR BLD AUTO: 0.3 % — SIGNIFICANT CHANGE UP (ref 0–0.9)
INR BLD: 0.88 RATIO — SIGNIFICANT CHANGE UP (ref 0.85–1.18)
KETONES UR-MCNC: NEGATIVE MG/DL — SIGNIFICANT CHANGE UP
LACTATE SERPL-SCNC: 1.2 MMOL/L — SIGNIFICANT CHANGE UP (ref 0.7–2)
LACTATE SERPL-SCNC: 2.1 MMOL/L — HIGH (ref 0.7–2)
LEUKOCYTE ESTERASE UR-ACNC: ABNORMAL
LYMPHOCYTES # BLD AUTO: 0.95 K/UL — LOW (ref 1–3.3)
LYMPHOCYTES # BLD AUTO: 13.4 % — SIGNIFICANT CHANGE UP (ref 13–44)
MCHC RBC-ENTMCNC: 30.7 PG — SIGNIFICANT CHANGE UP (ref 27–34)
MCHC RBC-ENTMCNC: 32 GM/DL — SIGNIFICANT CHANGE UP (ref 32–36)
MCV RBC AUTO: 95.8 FL — SIGNIFICANT CHANGE UP (ref 80–100)
MONOCYTES # BLD AUTO: 0.55 K/UL — SIGNIFICANT CHANGE UP (ref 0–0.9)
MONOCYTES NFR BLD AUTO: 7.8 % — SIGNIFICANT CHANGE UP (ref 2–14)
NEUTROPHILS # BLD AUTO: 5.37 K/UL — SIGNIFICANT CHANGE UP (ref 1.8–7.4)
NEUTROPHILS NFR BLD AUTO: 75.9 % — SIGNIFICANT CHANGE UP (ref 43–77)
NITRITE UR-MCNC: NEGATIVE — SIGNIFICANT CHANGE UP
NRBC # BLD: 0 /100 WBCS — SIGNIFICANT CHANGE UP (ref 0–0)
NT-PROBNP SERPL-SCNC: 4367 PG/ML — HIGH (ref 0–450)
PH UR: 7 — SIGNIFICANT CHANGE UP (ref 5–8)
PLATELET # BLD AUTO: 284 K/UL — SIGNIFICANT CHANGE UP (ref 150–400)
POTASSIUM SERPL-MCNC: 4 MMOL/L — SIGNIFICANT CHANGE UP (ref 3.5–5.3)
POTASSIUM SERPL-SCNC: 4 MMOL/L — SIGNIFICANT CHANGE UP (ref 3.5–5.3)
PROT SERPL-MCNC: 8.2 G/DL — SIGNIFICANT CHANGE UP (ref 6–8.3)
PROT UR-MCNC: NEGATIVE MG/DL — SIGNIFICANT CHANGE UP
PROTHROM AB SERPL-ACNC: 10.3 SEC — SIGNIFICANT CHANGE UP (ref 9.5–13)
RBC # BLD: 3.36 M/UL — LOW (ref 3.8–5.2)
RBC # FLD: 13.8 % — SIGNIFICANT CHANGE UP (ref 10.3–14.5)
RBC CASTS # UR COMP ASSIST: 2 /HPF — SIGNIFICANT CHANGE UP (ref 0–4)
RSV RNA NPH QL NAA+NON-PROBE: SIGNIFICANT CHANGE UP
SARS-COV-2 RNA SPEC QL NAA+PROBE: SIGNIFICANT CHANGE UP
SODIUM SERPL-SCNC: 136 MMOL/L — SIGNIFICANT CHANGE UP (ref 135–145)
SP GR SPEC: 1.01 — SIGNIFICANT CHANGE UP (ref 1–1.03)
TROPONIN I, HIGH SENSITIVITY RESULT: 9.4 NG/L — SIGNIFICANT CHANGE UP
UROBILINOGEN FLD QL: 0.2 MG/DL — SIGNIFICANT CHANGE UP (ref 0.2–1)
WBC # BLD: 7.07 K/UL — SIGNIFICANT CHANGE UP (ref 3.8–10.5)
WBC # FLD AUTO: 7.07 K/UL — SIGNIFICANT CHANGE UP (ref 3.8–10.5)
WBC UR QL: 6 /HPF — HIGH (ref 0–5)

## 2024-07-27 PROCEDURE — 99285 EMERGENCY DEPT VISIT HI MDM: CPT

## 2024-07-27 PROCEDURE — 71045 X-RAY EXAM CHEST 1 VIEW: CPT | Mod: 26

## 2024-07-27 PROCEDURE — 93010 ELECTROCARDIOGRAM REPORT: CPT

## 2024-07-27 PROCEDURE — 99223 1ST HOSP IP/OBS HIGH 75: CPT | Mod: GC

## 2024-07-27 PROCEDURE — 71250 CT THORAX DX C-: CPT | Mod: 26,MC

## 2024-07-27 PROCEDURE — 93970 EXTREMITY STUDY: CPT | Mod: 26

## 2024-07-27 RX ORDER — FUROSEMIDE 10 MG/ML
40 INJECTION, SOLUTION INTRAVENOUS
Refills: 0 | Status: DISCONTINUED | OUTPATIENT
Start: 2024-07-27 | End: 2024-07-27

## 2024-07-27 RX ORDER — METHYLPREDNISOLONE ACETATE 40 MG/ML
125 INJECTION, SUSPENSION INTRA-ARTICULAR; INTRALESIONAL; INTRAMUSCULAR; INTRASYNOVIAL; SOFT TISSUE ONCE
Refills: 0 | Status: COMPLETED | OUTPATIENT
Start: 2024-07-27 | End: 2024-07-27

## 2024-07-27 RX ORDER — IPRATROPIUM BROMIDE AND ALBUTEROL SULFATE 2.5; .5 MG/3ML; MG/3ML
3 SOLUTION RESPIRATORY (INHALATION) ONCE
Refills: 0 | Status: DISCONTINUED | OUTPATIENT
Start: 2024-07-27 | End: 2024-07-27

## 2024-07-27 RX ORDER — IPRATROPIUM BROMIDE AND ALBUTEROL SULFATE 2.5; .5 MG/3ML; MG/3ML
3 SOLUTION RESPIRATORY (INHALATION) ONCE
Refills: 0 | Status: COMPLETED | OUTPATIENT
Start: 2024-07-27 | End: 2024-07-27

## 2024-07-27 RX ORDER — FUROSEMIDE 10 MG/ML
40 INJECTION, SOLUTION INTRAVENOUS DAILY
Refills: 0 | Status: DISCONTINUED | OUTPATIENT
Start: 2024-07-28 | End: 2024-07-28

## 2024-07-27 RX ORDER — LEVOTHYROXINE SODIUM 175 MCG
100 TABLET ORAL DAILY
Refills: 0 | Status: DISCONTINUED | OUTPATIENT
Start: 2024-07-27 | End: 2024-08-02

## 2024-07-27 RX ORDER — HEPARIN SODIUM 1000 [USP'U]/ML
5000 INJECTION, SOLUTION INTRAVENOUS; SUBCUTANEOUS EVERY 8 HOURS
Refills: 0 | Status: DISCONTINUED | OUTPATIENT
Start: 2024-07-27 | End: 2024-07-29

## 2024-07-27 RX ORDER — ALBUTEROL 90 MCG
1 AEROSOL REFILL (GRAM) INHALATION EVERY 4 HOURS
Refills: 0 | Status: DISCONTINUED | OUTPATIENT
Start: 2024-07-27 | End: 2024-08-02

## 2024-07-27 RX ORDER — ATORVASTATIN CALCIUM 40 MG/1
40 TABLET, FILM COATED ORAL AT BEDTIME
Refills: 0 | Status: DISCONTINUED | OUTPATIENT
Start: 2024-07-27 | End: 2024-08-02

## 2024-07-27 RX ORDER — METOPROLOL TARTRATE 100 MG
100 TABLET ORAL DAILY
Refills: 0 | Status: DISCONTINUED | OUTPATIENT
Start: 2024-07-27 | End: 2024-07-27

## 2024-07-27 RX ORDER — ENOXAPARIN SODIUM 120 MG/.8ML
40 INJECTION SUBCUTANEOUS EVERY 24 HOURS
Refills: 0 | Status: DISCONTINUED | OUTPATIENT
Start: 2024-07-27 | End: 2024-07-27

## 2024-07-27 RX ORDER — METOPROLOL TARTRATE 100 MG
100 TABLET ORAL DAILY
Refills: 0 | Status: DISCONTINUED | OUTPATIENT
Start: 2024-07-27 | End: 2024-07-28

## 2024-07-27 RX ORDER — IPRATROPIUM BROMIDE AND ALBUTEROL SULFATE 2.5; .5 MG/3ML; MG/3ML
3 SOLUTION RESPIRATORY (INHALATION) EVERY 6 HOURS
Refills: 0 | Status: DISCONTINUED | OUTPATIENT
Start: 2024-07-27 | End: 2024-08-02

## 2024-07-27 RX ORDER — FUROSEMIDE 10 MG/ML
40 INJECTION, SOLUTION INTRAVENOUS ONCE
Refills: 0 | Status: COMPLETED | OUTPATIENT
Start: 2024-07-27 | End: 2024-07-27

## 2024-07-27 RX ADMIN — METHYLPREDNISOLONE ACETATE 125 MILLIGRAM(S): 40 INJECTION, SUSPENSION INTRA-ARTICULAR; INTRALESIONAL; INTRAMUSCULAR; INTRASYNOVIAL; SOFT TISSUE at 18:34

## 2024-07-27 RX ADMIN — HEPARIN SODIUM 5000 UNIT(S): 1000 INJECTION, SOLUTION INTRAVENOUS; SUBCUTANEOUS at 21:21

## 2024-07-27 RX ADMIN — IPRATROPIUM BROMIDE AND ALBUTEROL SULFATE 3 MILLILITER(S): 2.5; .5 SOLUTION RESPIRATORY (INHALATION) at 18:34

## 2024-07-27 RX ADMIN — FUROSEMIDE 40 MILLIGRAM(S): 10 INJECTION, SOLUTION INTRAVENOUS at 21:21

## 2024-07-27 RX ADMIN — ATORVASTATIN CALCIUM 40 MILLIGRAM(S): 40 TABLET, FILM COATED ORAL at 21:21

## 2024-07-27 NOTE — H&P ADULT - HISTORY OF PRESENT ILLNESS
79-year-old female with a history of CHF, COPD, mitral valve stenosis, hypertension, high cholesterol, pleural effusion requiring thoracentesis presents with increasing shortness of breath over past 1 week.  Patient states was worse today.  Patient was found to be out of breath at home, came to the ED and improved with oxygen. Denies chest pain.  fever, chills, or cough. Endorses lower extremity edema bilaterally.  Denies weakness or dizziness.  ED COURSE:  Vitals: T 96.8  F , 70  HR  ,  /65 , RR 18  , SpO2  80% on 4L NC  Labs significant for: Hgb 10.3, d-dimer 381, BUN 50, Cr 1.4, Lactate 2.1 -> 1.2, Troponin 9.4, BNP= 4367  Imaging: bibasilar effusions  Pt received: Albuterol, methylprednisolone

## 2024-07-27 NOTE — ED ADULT NURSE NOTE - OBJECTIVE STATEMENT
Pt presents to the ED c/o sob x2 days. Hx of copd, chf. Denies chest pain, dizziness, lightheadedness, fevers, chills, palpitations. IV established in right arm with a 20G, labs drawn and sent, call bell in reach, warm blanket provided, bed in lowest position, side rails up x2, MD evaluation in progress, pt on telemetry. SpO2 100% on 6 L NC, respirations labored. Pt not in acute distress.

## 2024-07-27 NOTE — H&P ADULT - ASSESSMENT
79-year-old female with a history of CHF, COPD, mitral valve stenosis, hypertension, high cholesterol, pleural effusion requiring thoracentesis presents with increasing shortness of breath over past 1 week noted to have increasing bibasilar effusions.

## 2024-07-27 NOTE — ED ADULT TRIAGE NOTE - CHIEF COMPLAINT QUOTE
Patient c/o SOB x2 days - hx of CHF. Patient currently on ATC O2 @2l but has needed it at 4L. Current O2 @80%

## 2024-07-27 NOTE — ED PROVIDER NOTE - DIFFERENTIAL DIAGNOSIS
Differential Diagnosis Rule out acute CHF, COPD, pneumonia, electrolyte abnormality, leukocytosis, other acute pathology

## 2024-07-27 NOTE — H&P ADULT - IN ACCORDANCE WITH [place] STATE LAW, WE OFFER EVERY PATIENT A HEPATITIS C TEST. WOULD YOU LIKE TO BE TESTED TODAY?
Consent: The risks of atrophy were reviewed with the patient. Size Of Lesion (Optional): 0 Concentration Of Solution Injected (Mg/Ml): 2.5 Kenalog Preparation: Kenalog Detail Level: Detailed Total Volume Injected (Ccs- Only Use Numbers And Decimals): 2 Opt out

## 2024-07-27 NOTE — PROGRESS NOTE ADULT - SUBJECTIVE AND OBJECTIVE BOX
Consulted for YADI  Chart reviewed  Okay to give lasix IV as ordered  Will monitor renal tolerance  Full consult note to follow, thank you

## 2024-07-27 NOTE — ED PROVIDER NOTE - CLINICAL SUMMARY MEDICAL DECISION MAKING FREE TEXT BOX
Acute shortness of breath over past 1 week, with lower extremity edema.  CHF versus COPD, rule out pneumonia/.  COVID.  Admission

## 2024-07-27 NOTE — H&P ADULT - ATTENDING COMMENTS
79-year-old female with a history of CHF, COPD, mitral valve stenosis, hypertension, high cholesterol, pleural effusion requiring thoracentesis presents with increasing shortness of breath over past 1 week noted to have increasing bibasilar effusions.     HPI as above.     T(C): 36.4 (07-27-24 @ 19:36), Max: 36.4 (07-27-24 @ 19:36)  HR: 64 (07-27-24 @ 19:36) (64 - 70)  BP: 103/53 (07-27-24 @ 19:36) (103/53 - 121/65)  RR: 19 (07-27-24 @ 19:36) (18 - 20)  SpO2: 98% (07-27-24 @ 19:36) (80% - 100%)  Wt(kg): --    Physical Exam:   GENERAL: well-groomed, thin female, NAD  HEENT: head NC/AT, conjunctiva & sclera clear; hearing grossly intact, moist mucous membranes  NECK: supple, no JVD  RESPIRATORY: decreaseed BS b/l lung bases, no wheezing  CARDIOVASCULAR: S1&S2, RRR  ABDOMEN: soft, non-tender, non-distended, + Bowel sounds x4 quadrants, no guarding, rebound or rigidity  MUSCULOSKELETAL:  no clubbing, cyanosis of all 4 extremities. b/l LE edema 1+ pitting  LYMPH: no cervical lymphadenopathy  VASCULAR: Radial pulses 2+ bilaterally, no varicose veins   SKIN: warm and dry, color normal  NEUROLOGIC: AA&O X3, MAEx4    Plan:  Acute HFpEF: conitnue IV lasix  -monitor electrolytes  -intake/output, daily weights  -monitor on tele  -may need thoracentesis on this admission. Pulm and cardio consulted    Anemia: no signs or symptoms of active bleeding. Continue to monitor hgb.     remainder as above 79-year-old female with a history of CHF, COPD, mitral valve stenosis, hypertension, high cholesterol, pleural effusion requiring thoracentesis presents with increasing shortness of breath over past 1 week noted to have increasing bibasilar effusions.     HPI as above.     T(C): 36.4 (07-27-24 @ 19:36), Max: 36.4 (07-27-24 @ 19:36)  HR: 64 (07-27-24 @ 19:36) (64 - 70)  BP: 103/53 (07-27-24 @ 19:36) (103/53 - 121/65)  RR: 19 (07-27-24 @ 19:36) (18 - 20)  SpO2: 98% (07-27-24 @ 19:36) (80% - 100%)  Wt(kg): --    Physical Exam:   GENERAL: well-groomed, thin female, NAD  HEENT: head NC/AT, conjunctiva & sclera clear; hearing grossly intact, moist mucous membranes  NECK: supple, no JVD  RESPIRATORY: decreaseed BS b/l lung bases, no wheezing  CARDIOVASCULAR: S1&S2, RRR  ABDOMEN: soft, non-tender, non-distended, + Bowel sounds x4 quadrants, no guarding, rebound or rigidity  MUSCULOSKELETAL:  no clubbing, cyanosis of all 4 extremities. b/l LE edema 1+ pitting  LYMPH: no cervical lymphadenopathy  VASCULAR: Radial pulses 2+ bilaterally, no varicose veins   SKIN: warm and dry, color normal  NEUROLOGIC: AA&O X3, MAEx4    Plan:  Acute HFpEF: conitnue IV lasix  -monitor electrolytes  -intake/output, daily weights  -monitor on tele  -may need thoracentesis on this admission. Pulm and cardio consulted    Anemia: no signs or symptoms of active bleeding. Continue to monitor hgb.     COPD: no acute exacerbation  -conitnue duo neb     HLD: continue lipitor    remainder as above

## 2024-07-27 NOTE — H&P ADULT - NSHPPHYSICALEXAM_GEN_ALL_CORE
Vital Signs Last 24 Hrs  T(C): 36.4 (27 Jul 2024 19:36), Max: 36.4 (27 Jul 2024 19:36)  T(F): 97.6 (27 Jul 2024 19:36), Max: 97.6 (27 Jul 2024 19:36)  HR: 64 (27 Jul 2024 19:36) (64 - 70)  BP: 103/53 (27 Jul 2024 19:36) (103/53 - 121/65)  BP(mean): --  RR: 19 (27 Jul 2024 19:36) (18 - 20)  SpO2: 98% (27 Jul 2024 19:36) (80% - 100%)    Parameters below as of 27 Jul 2024 19:36  Patient On (Oxygen Delivery Method): nasal cannula  O2 Flow (L/min): 4      CONSTITUTIONAL: Sitting on side of bed in moderate respiratatory distress while on 6L NC  RESP: Muffled wet crackles bilaterally in lower lobes  CV: +S1S2, no MRG  GI: Bowel sounds present. Soft, nontender, nondistended, no rebound or guarding  MSK: Mild 1+ pitting edema bilaterally. No calf tenderness.  SKIN: Erythema noted on L. shin with mild warmth  NEURO: AOx3, answering questions and following commands appropriately

## 2024-07-27 NOTE — H&P ADULT - PROBLEM SELECTOR PLAN 2
BUN 50, Cr 1.4 , baseline 1.1  - Monitor AM CMP  - hold home furosemide in setting of YADI BUN 50, Cr 1.4 , baseline 1.1  - Monitor AM CMP  -Dr Villaseñor consulted

## 2024-07-27 NOTE — H&P ADULT - NSHPREVIEWOFSYSTEMS_GEN_ALL_CORE
REVIEW OF SYSTEMS:  CONSTITUTIONAL: No fever, chills or fatigue.  HENMT: No HA, dizziness, rhinorrhea  RESPIRATORY: +shortness of breath.  CARDIOVASCULAR: No chest pain, palpitations.  GASTROINTESTINAL: No abdominal pain. No nausea or vomiting; No diarrhea or constipation. No blood per rectum.  GENITOURINARY: No dysuria, changes in frequency, hematuria, or incontinence  NEUROLOGICAL: Baseline strength. Sensation intact bilaterally.  SKIN: Rash like appearance on left shin  MUSCULOSKELETAL: No joint pain or swelling; No muscle, back, or extremity pain

## 2024-07-27 NOTE — H&P ADULT - PROBLEM SELECTOR PLAN 1
- Patient has a history of CHF with pleural effusion requiring U/S guided thoracentesis   - CT chest shows increasing bibasilar pleural effusions   - On continuous pulse ox with remote telemetry   - Pulmonologist Dr. Ngo following, will see patient AM and discuss possible thoracentesis  - f/u Cardio consult w/ Dr. Woodson  - f/u AM Mag Phos  - hold home furosemide setting of YADI  - hold home metop succ in setting of CHF exacerbation   - Lactate normalized, likely elevated due to hypoxia, low concern for infection, continue monitoring fluid and O2 status  - D-dimer 381, corrected not concerning for VTE, likely reactive to hypoxia - Patient has a history of CHF with pleural effusion requiring U/S guided thoracentesis   - CT chest shows increasing bibasilar pleural effusions   - On continuous pulse ox with remote telemetry   - Pulmonologist Dr. Ngo consulted  - f/u Cardio consult w/ Dr. Woodson  - f/u AM Mag Phos  -continue IV lasix 40mg daily, monitor renal fxn closely  -continue Toprol  - Lactate normalized, likely elevated due to hypoxia, low concern for infection, continue monitoring fluid and O2 status  - D-dimer 381, corrected not concerning for VTE, likely reactive to hypoxia - Patient has a history of CHF with pleural effusion requiring U/S guided thoracentesis   - CT chest shows increasing bibasilar pleural effusions   - On continuous pulse ox with remote telemetry   - Pulmonologist Dr. Ngo consulted  - f/u Cardio consult w/ Dr. Woodson  - f/u AM Mag Phos  -continue IV lasix 40mg daily, monitor renal fxn closely  -continue metop  - Lactate normalized, likely elevated due to hypoxia, low concern for infection, continue monitoring fluid and O2 status  - D-dimer 381, corrected not concerning for VTE, likely reactive to hypoxia

## 2024-07-27 NOTE — ED PROVIDER NOTE - OBJECTIVE STATEMENT
79-year-old female with a history of CHF, COPD, mitral valve stenosis, hypertension, high cholesterol, pleural effusion requiring thoracentesis presents with having some increased shortness of breath over past 1 week.  Patient states was worse today.  Patient was found to be hypoxic at home, came to the ED and is improved after increased oxygen.  No acute chest pain.  No fever or chills.  No cough/URI.  Patient complains of lower extremity edema which is increased.  No aggravating or alleviating factors otherwise noted.  No weakness or dizziness.  No aggravating or alleviating factors otherwise noted.  No other acute injury or complaints.

## 2024-07-27 NOTE — H&P ADULT - PROBLEM SELECTOR PLAN 3
chronic  - hold home meds metop succ and furosemide chronic  - continue lasix and toprol chronic  - continue lasix and metop

## 2024-07-27 NOTE — ED PROVIDER NOTE - RESPIRATORY, MLM
Normal respiratory effort, decreased breath sounds lower 1/2-2/3.  Some end expiratory wheezing noted as well.  No accessory muscle use.

## 2024-07-27 NOTE — ED PROVIDER NOTE - CARE PLAN
1 Principal Discharge DX:	Shortness of breath  Secondary Diagnosis:	Pleural effusion  Secondary Diagnosis:	COPD exacerbation

## 2024-07-27 NOTE — ED ADULT TRIAGE NOTE - NSWEIGHTCALCTOOLDRUG_GEN_A_CORE
College Age/Young Adult Health      Albert Florence  August 22, 2022    Visit Vitals  BP (!) 110/55 (BP Location: RUE - Right upper extremity, Patient Position: Sitting, Cuff Size: Regular)   Pulse 82   Temp 98.2 °F (36.8 °C) (Temporal)   Ht 6' 2\" (1.88 m)   Wt 81.2 kg (179 lb)   SpO2 98%   BMI 22.98 kg/m²     Weight: 179 lbs      Congratulations!  As you leave high school for a working career, the , or college, you are beginning one of the most exciting times of your life.  You definitely have more independence, more responsibility, and more exposure to new ideas and experiences, both good and bad.    Most people your age are at one of the healthiest points in their lives.  Complete physical exams, for example, are recommended only every 5-10 years for both males and females.  In addition to complete exams, women need to schedule brief annual exams to ensure reproductive health.  These annual exams can be done by a gynecologist (a specialist in diseases of the female organs), or by your general physician.  Your physical exams may have been done up to now by a pediatrician.  If so, it is time to seek care from an internal medicine specialist (a physician who specializes in the non-surgical care of adults), an internal medicine pediatrician (a physician trained in the non-surgical care of both children and adults), or a family practice physician (a physician trained in the care of children and adults and in pregnancy care).    FOR THOSE STARTING A JOB:  1.  Health insurance is often a benefit provided by your employer.  If you have trouble getting insurance, start by asking your parents for advice.  2.  Make sure that you have had three doses of HEPATITIS B vaccine.  Approximately 6,000 people per year die from this viral disease of the liver.  Although Hepatitis B is often transmitted sexually or through contact with contaminated blood products, only one-half of infected individuals can identify a  possible source for their infection.  There is no curative treatment, so it is very important to be vaccinated in order to prevent the disease.  3.  If you will be moving to the [facility] or [place] to go to school or work, or if you plan to travel outside the [place] for work, school, or vacation, you should have two doses of HEPATITIS A vaccine.  Hepatitis A is a viral liver disease which, while less serious than Hepatitis B, still kills about 100 persons per year in the [place].  It can give you unpleasant stomach symptoms for months.  4.  Keep an emergency phone contact number in your wallet or purse.  It is not usually necessary to know your blood type, but a card listing any serious medical conditions, kept in your wallet or purse, or a medical alert bracelet can be very important.    FOR THOSE GOING INTO THE :  Your healthcare will be provided by the .    FOR THOSE GOING TO COLLEGE:  1.  A physical examination is often required before you arrive on campus.   2.  Some campuses in urban areas or campuses with many students from other countries require a tuberculosis skin test.  This is usually administered at the time of the physical exam, but it must be \"checked\" by a healthcare professional between 48 and 72 hours after it is applied.   3.  Make sure that your immunizations are up to date.  A DIPHTHERIA/ TETANUS immunization (often abbreviated dT or Td) is recommended every ten years to protect against those diseases.  Tetanus is a germ that causes a disease commonly called \"lock-jaw\" because of the severe muscle spasms associated with it.  The disease results most often from infected cuts.  The death rate, even with modern medical care, is 3 percent.  Diphtheria is a severe form of sore throat that used to cause about 5,000 deaths per year in 1900.  It still occasionally kills adults who have forgotten to keep their immunizations up to date.    4.  Most colleges require  you either to be immunized against measles and chickenpox (also called Varicella) or to provide proof that you have had those diseases.  Your parents, your doctors, and sometimes the health department will have the information that you need.  5.  Make sure that you have had three doses of HEPATITIS B vaccine.  Approximately 6,000 people per year die from this viral disease of the liver.  Although Hepatitis B is often transmitted sexually or through contact with contaminated blood products, only one-half of infected individuals can identify a possible source for their infection.  There is no curative treatment, so it is very important to be vaccinated in order to prevent the disease.  6.  If you will be moving to the [facility] or [facility] to go to school or work, or if you plan to travel outside the [place] for work, school, or vacation, you should have two doses of HEPATITIS A vaccine.  Hepatitis A is a viral liver disease which, while less serious than Hepatitis B, still kills about 100 persons per year in the [place].  It can give you unpleasant stomach symptoms for months.  7.  Students living in dormitories are four times more likely to contract Meningococcal Meningitis (a bacterial brain infection) than people living elsewhere in the community.  This risk applies only to the first year in the dormitory.  The risk for students living in dormitories compared with persons living elsewhere, increases from about one case per 250,000 persons per year to one case per 60,000 persons per year.  This would be about one student per year on a large college campus.  The death rate is about one in ten, and the brain damage rate is about one in three.  A single dose of either meningococcal vaccine - Menactra (R) or Menomune (R) - will lower that risk by 80 percent.  The risk of serious side effects from the vaccine is significantly lower than the risk of the illness.  8.  Take a copy of your parents' health  insurance card along with you to college if you are still covered under their policy.  9.  Keep an emergency phone contact number in your wallet or purse.  It is not usually necessary to know your blood type, but a card listing any serious medical conditions, kept in your wallet or purse, or a medical alert bracelet can be very important.    HEALTH MAINTENANCE:  There are many things to learn about and experience in young adult life.  They occur at all hours of the day and night.  It is often tempting to \"burn the candle at both ends.\"  Young adults are often so excited about these opportunities that they cheat themselves out of sleep, exercise, and a healthy diet.  When they do eat, they may overeat or eat too rapidly.    LACK OF SLEEP:  Failure to get enough sleep can cause:  1.  Low resistance to physical illness.  Many students become sick after prolonged periods with too little sleep.   2.  Low resistance to mental illness.  College and work are exciting, but any change in your life is stressful.  About 15 percent of young adults experience excessive anxiety.  Common worries include school grades, money, being away from home, and whether or not you are \"normal\" compared to the wider variety of people you meet outside your own community.  Another 15 percent suffer from depression for many of the same reasons, and sometimes without any apparent reason.  Anxiety and depression may be normal responses to stress when they last less than two weeks and correct without treatment.  3.  Poor grades at school, mistakes in your work, and increased accidents at work.  4.  Automobile accidents.  Many young people (and older people) overestimate their ability to drive safely when tired.    LACK OF EXERCISE:  Failure to get enough exercise may cause many of the same problems as lack of sleep.  Light from the sun and exercise are good ways to prevent anxiety and depression.  Exercise also helps you sleep better and improves  your alertness.  This is one of the reasons that many student-athletes do well in school despite having less time for study.    POOR DIET:  It is hard to prove that poor diet leads to short-term health problems other than \"the freshman 15\" (the average weight gain in the freshmen year of college).  For obvious reasons, this is less likely at jobs that require physical labor or in the armed services.  High-fat diets from junk food can cause grogginess, which leads some people to try to compensate with increased caffeine intake, cigarettes, or even illegal stimulants.    Menstruating females, especially athletes, may do better with a vitamin with both folic acid and iron.  About one-third of teen females are low in iron and more tired than necessary, even without being anemic.  Folic acid is necessary to prevent birth defects, and there is some evidence that it may lower the risk of heart disease.    HEALTH HAZARDS:  The top five causes of death in the young adult age range in the [place] are accidents, assault, suicide, cancer, and heart trouble.  These conditions cause 85 percent of the deaths in this age range.  Of the top three, accidents cause about 50 percent of the deaths, and assault and suicide cause another 12-15 percent each.  Cancer and heart trouble are much rarer.  The heart trouble often has been present since birth.  At this point we do not know how to prevent cancer in this age range, although we know that smoking dramatically increases your risk of cancer.  Note that the top three causes can either be prevented or treated.  Alcohol in particular is involved in almost 1,400 deaths per year in this age range.  30 percent of surveyed young adults have driven after drinking.  Please don't drink and drive.    Assault is not always preventable, but the risk can be reduced.  If you are moving to an area that is unfamiliar to you, learn safe habits:  1.  Talk to others at work or school about dangerous areas.  If you are going to college, call the campus security office and ask if there have been dangerous areas on or off campus, especially after dark.  2.  If a campus or business offers an escort service from classrooms to dorms or parking lots, use it.  One or more unfortunate incidents usually led to the establishment of the service.  3.  Never get in a car where the occupants have been drinking.  4.  If drinking will be part of an evening of entertainment, choose a \"designated .\"  5.  Females should never accept a drink or even a smoke when they did not see the source.  It may seem cool or even economical to have someone buy you drinks and cigarettes, but you run the risk of unknowingly taking \"date rape\" drugs.  Alcoholic drinks themselves are a danger in that one out of every ten college students (male and female) reports an unplanned sexual exposure after drinking.  6.  When walking on or off campus in areas that are dark, remote, or unfamiliar, travel in groups.  This applies to both males and females.  It is more fun anyway.  7.  Stick to well-lit, well-witnessed, and preferably well-patrolled areas at night.  8.  Go on \"group dates\" if you are dating and prefer dates in public, visible places.  If your date has grown up with violence as a way of solving problems, he or she may seem very nice most of the time but resort to violence when frustrated or disappointed or under stress.  Listen to what your dates say.  Watch to see if they treat others with respect.  Jealousy is associated most often with violence in males and retaliation for embarrassment in females.  It is not entirely clear how often \"date rape\" occurs.  Almost one-third of young adults have witnessed violence in their own families.  Violence in male-female relationships occurs at similar levels in high school and college.  Interestingly, milder forms of violence are perpetrated more often by females and more severe violence (with a weapon) is  done more often by males.  There is more violence when males and females live together than just in the course of dating.  In the sad event that you feel trapped in a violent relationship and cannot bring yourself to ask for help from parents or from the college, please be aware that there is a national Domestic Violence Hot Line at 1-686.526.7746.    PREGNANCY:  Pregnancy is a BIG deal.  A woman who becomes pregnant has a one in 2,000 chance of dying even with modern medical care.  Pregnancy is not a risk-free situation.  Women who get pregnant outside of marriage have a very high risk of living in poverty for much of their adult lives.  It is pretty clear that the woman bears most of the risk.  If a couple has sexual intercourse without any form of birth control in this age range during the first two weeks after the menstrual period, there is one chance in 12 that the woman will become pregnant.    About one-half of female high school seniors have had sexual intercourse by graduation.  That number increases to 70 percent in the next four years.  When birth control is used correctly, about one sexually active woman out of 100 becomes pregnant each year.  Most young adults use birth control inconsistently, which can increase the pregnancy rate to one in 20 sexually active females per year.    If you do decide to use birth control, make sure that you get it from a reputable source.  Some Internet birth control pills do not work very well.  When someone tells you that a method of birth control is \"safe,\" ask about the types of complications and how often they happen.  When someone tells you that a method of birth control is \"effective,\" ask how many women get pregnant per year when using this method.  We are not advocating birth control.  In fact, abstinence is the only completely effective way of avoiding pregnancy and venereal disease.  But if a couple chooses otherwise, it is best to choose with the most  information possible.  Please do not let someone encourage you to do something against your wishes.  If they do so, you are not getting the respect you deserve.    SEXUALLY TRANSMITTED DISEASES:   There are approximately 16 million cases per year of venereal disease in the [place] among young people between the ages of 15 and 25.  Some are brief and treatable.  Some are chronic (herpes), and can only be controlled.  Some are fatal (AIDS).  Some are treatable but have minimal symptoms, yet can lead to enough permanent damage to the reproductive organs that a woman may have trouble having children (chlamydia).  Some can cause cancer (genital warts).  Learn about them.  Avoid having multiple partners.  Try to know as much as possible about your partner if you plan to have a sexual relationship.  Stay away from people who are defensive or evasive about their background.  Avoid people who say, \"What, don't you like me?\" or \"Do I look infected?\" or any of dozens of statements that make you seem unreasonable for asking.  When you consider that about one in four persons in your age range has or has had a venereal disease, it is reasonable for you to ask questions and to get honest answers about your partner's sexual history.  Don't be cheated and don't be hurt.    SUBSTANCE ABUSE:  Smoking:  About 30 percent of college students smoke.  The smoking rate is increasing among young adults, even as it declines among other age groups.  98 percent of smokers in this age range say that they are aware of the hazards of smoking.  The reasons why people smoke anyway include advertising, role models (parents, movie stars, bosses, professors), weight control, and stimulation from the effects of nicotine.  In addition, half of college smokers blame stress, and a third use smoking as a way of self-medicating depression.  The downsides of smoking are well known.  Half of all smokers don't reach their life expectancy for a wide variety of  reasons.  It is a difficult addiction to overcome.  Quitting often requires multiple attempts and may be helped by nicotine replacement (gum or patches) or by prescription medicine.     Alcohol:  The rate of alcohol use is about the same for college students as for other high school graduates.  Almost 80 percent of young adults drink alcohol from time to time, but the rate of binge drinking is higher in college students.  Binge drinking is defined as having more than five drinks (four for females) at a sitting.  40 percent of college students binge-drink.  80 percent of fraternity members and only a slightly smaller proportion of sorority members binge-drink.  Binge drinking can be habit-forming.  One-half of males and one-third of females who engage in binge drinking as young adults still drink heavily at age 30.    Marijuana: Despite many media reports to the contrary, college students are less likely to use marijuana than others the same age.  It is likely that inhaled smoke from marijuana, like every other form of inhaled smoke -  from cigarettes, cars, factories, etc. - eventually will be found to lead to lung cancer.  Smoking marijuana causes coughing, wheezing, and bronchitis.  It makes driving dangerous and is at least psychologically addicting (\"I can't relax without it\").  When used chronically, it creates a state of apathy (stereotypically, the \"heavy doper\") that makes achievement of life goals more difficult.  Particularly if you think that you might be using marijuana to cope with intense nervousness, fearfulness, sadness, loneliness, or depression, get help from home, from your physician, from your college, or from your employee assistance program.    Other Drugs:  The most frightening illegal drugs are cocaine and amphetamines.  They are highly addictive and are associated with violent behavior and mental illness.  Because they are illegal, they are not standardized, and deaths and overdoses occur  when a drug of unexpectedly high potency is used.  Both are associated with occasional strokes, high blood pressure, and paranoia.    Sedatives can be frightening when they are not used for legitimate purposes under the supervision of a physician.  As \"date-rape\" drugs, they are often used to take advantage of others.  They have a wide variety of street names and are hard to detect by testing.  In overdose they can kill.      People who sell illegal substances are not careful about drug purity or your personal safety.  The drug dealers whom you may encounter at college or work are likely to be more dangerous than the ones who sell to high school students.    YOU NEED TO BE ESPECIALLY CAREFUL ABOUT SMOKING, DRINKING, OR DRUG USE IF THERE IS A HISTORY OF ADDICTION IN YOUR FAMILY.  DISCUSS THIS WITH YOUR PHSYICIAN.    MENTAL HEALTH:  About 15 percent of college students suffer from serious anxiety.  They may have phobias (e.g., specific fears of crowds or germs), panic attacks (rapid onset of panic and often intense fears or intense, uncomfortable trouble with breathing, racing heart, or dizziness), or general restlessness or nervousness.  Another 15 percent have depression for longer than two weeks at a time.  These statistics are not greatly different between college and non-college young adults.  These conditions do need some form of treatment.      Depression has almost one chance in five of leading to suicide if not treated.  If you tend to be sad, take an active role in treating problems when you have them.  You may want to try self-help methods such as exercise, meditation, increased time outdoors, a low-fat diet, avoiding caffeine (especially in the 6 hours prior to sleep), and avoiding unnecessarily stressful situations (intense movies, plays, dares, or dangerous situations).  If these methods do not help, or if you cannot bring yourself to try them, GET PROFESSIONAL HELP.  Get help IMMEDIATELY if you answer  \"yes\" to any of the first six of the following questions or \"no,\" \"nothing,\" or \"no one\" to any of the last three questions:    1.  Are your negative feelings strong enough that life does not seem worth living?  2.  Have you thought about suicide, and if so are the thoughts frequent or prolonged?  3.  Does it take a lot of effort to resist thoughts of suicide?  4.  Is the pain/sadness intolerable?   5.  Have you ever made any specific suicide plans? (For example, have you planned a location or method, or have you accumulated supplies?)  6.  Do you think that life after death would be much better?  7.  What holds you back?   8.  If it's getting better, could you resist suicide if thoughts returned?  9.  Who is the easiest person to turn to if thoughts of suicide come up?    WE REALIZE THAT, AS PHYSICIANS, WE TEND TO FOCUS ON DISEASES AND PROBLEMS.  MOST YOUNG ADULTS HAVE LONG AND HAPPY LIVES, AND THE NEXT YEARS FOR YOU ARE LIKELY TO BE AS INTERESTING, EXCITING, AND POSITIVE AS ANY.  WE WISH YOU THE BEST, BUT WE ARE AVAILABLE AND WOULD LIKE TO HELP IF YOU ARE HAVING PROBLEMS.    Thank you for entrusting your care to [facility].    used

## 2024-07-27 NOTE — PATIENT PROFILE ADULT - FALL HARM RISK - HARM RISK INTERVENTIONS

## 2024-07-28 LAB
ALBUMIN SERPL ELPH-MCNC: 2.9 G/DL — LOW (ref 3.3–5)
ALP SERPL-CCNC: 62 U/L — SIGNIFICANT CHANGE UP (ref 40–120)
ALT FLD-CCNC: 17 U/L — SIGNIFICANT CHANGE UP (ref 12–78)
ANION GAP SERPL CALC-SCNC: 6 MMOL/L — SIGNIFICANT CHANGE UP (ref 5–17)
AST SERPL-CCNC: 19 U/L — SIGNIFICANT CHANGE UP (ref 15–37)
BASE EXCESS BLDV CALC-SCNC: 10.5 MMOL/L — HIGH (ref -2–3)
BASOPHILS # BLD AUTO: 0.01 K/UL — SIGNIFICANT CHANGE UP (ref 0–0.2)
BASOPHILS NFR BLD AUTO: 0.2 % — SIGNIFICANT CHANGE UP (ref 0–2)
BILIRUB SERPL-MCNC: 0.5 MG/DL — SIGNIFICANT CHANGE UP (ref 0.2–1.2)
BLOOD GAS COMMENTS, VENOUS: SIGNIFICANT CHANGE UP
BUN SERPL-MCNC: 47 MG/DL — HIGH (ref 7–23)
CALCIUM SERPL-MCNC: 9.1 MG/DL — SIGNIFICANT CHANGE UP (ref 8.5–10.1)
CHLORIDE SERPL-SCNC: 99 MMOL/L — SIGNIFICANT CHANGE UP (ref 96–108)
CO2 SERPL-SCNC: 33 MMOL/L — HIGH (ref 22–31)
CREAT SERPL-MCNC: 1.3 MG/DL — SIGNIFICANT CHANGE UP (ref 0.5–1.3)
EGFR: 42 ML/MIN/1.73M2 — LOW
EOSINOPHIL # BLD AUTO: 0 K/UL — SIGNIFICANT CHANGE UP (ref 0–0.5)
EOSINOPHIL NFR BLD AUTO: 0 % — SIGNIFICANT CHANGE UP (ref 0–6)
GAS PNL BLDV: SIGNIFICANT CHANGE UP
GLUCOSE SERPL-MCNC: 135 MG/DL — HIGH (ref 70–99)
HCO3 BLDV-SCNC: 35 MMOL/L — HIGH (ref 22–29)
HCT VFR BLD CALC: 27.4 % — LOW (ref 34.5–45)
HGB BLD-MCNC: 8.8 G/DL — LOW (ref 11.5–15.5)
IMM GRANULOCYTES NFR BLD AUTO: 0.2 % — SIGNIFICANT CHANGE UP (ref 0–0.9)
LYMPHOCYTES # BLD AUTO: 0.47 K/UL — LOW (ref 1–3.3)
LYMPHOCYTES # BLD AUTO: 9 % — LOW (ref 13–44)
MAGNESIUM SERPL-MCNC: 2.3 MG/DL — SIGNIFICANT CHANGE UP (ref 1.6–2.6)
MCHC RBC-ENTMCNC: 30.9 PG — SIGNIFICANT CHANGE UP (ref 27–34)
MCHC RBC-ENTMCNC: 32.1 GM/DL — SIGNIFICANT CHANGE UP (ref 32–36)
MCV RBC AUTO: 96.1 FL — SIGNIFICANT CHANGE UP (ref 80–100)
MONOCYTES # BLD AUTO: 0.11 K/UL — SIGNIFICANT CHANGE UP (ref 0–0.9)
MONOCYTES NFR BLD AUTO: 2.1 % — SIGNIFICANT CHANGE UP (ref 2–14)
NEUTROPHILS # BLD AUTO: 4.61 K/UL — SIGNIFICANT CHANGE UP (ref 1.8–7.4)
NEUTROPHILS NFR BLD AUTO: 88.5 % — HIGH (ref 43–77)
NRBC # BLD: 0 /100 WBCS — SIGNIFICANT CHANGE UP (ref 0–0)
PCO2 BLDV: 54 MMHG — HIGH (ref 39–42)
PH BLDV: 7.42 — SIGNIFICANT CHANGE UP (ref 7.32–7.43)
PHOSPHATE SERPL-MCNC: 4.4 MG/DL — SIGNIFICANT CHANGE UP (ref 2.5–4.5)
PLATELET # BLD AUTO: 247 K/UL — SIGNIFICANT CHANGE UP (ref 150–400)
PO2 BLDV: 91 MMHG — HIGH (ref 25–45)
POTASSIUM SERPL-MCNC: 3.9 MMOL/L — SIGNIFICANT CHANGE UP (ref 3.5–5.3)
POTASSIUM SERPL-SCNC: 3.9 MMOL/L — SIGNIFICANT CHANGE UP (ref 3.5–5.3)
PROT SERPL-MCNC: 7 G/DL — SIGNIFICANT CHANGE UP (ref 6–8.3)
RBC # BLD: 2.85 M/UL — LOW (ref 3.8–5.2)
RBC # FLD: 13.6 % — SIGNIFICANT CHANGE UP (ref 10.3–14.5)
SAO2 % BLDV: 98 % — HIGH (ref 67–88)
SODIUM SERPL-SCNC: 138 MMOL/L — SIGNIFICANT CHANGE UP (ref 135–145)
WBC # BLD: 5.05 K/UL — SIGNIFICANT CHANGE UP (ref 3.8–10.5)
WBC # FLD AUTO: 5.05 K/UL — SIGNIFICANT CHANGE UP (ref 3.8–10.5)

## 2024-07-28 PROCEDURE — 99233 SBSQ HOSP IP/OBS HIGH 50: CPT

## 2024-07-28 PROCEDURE — 99223 1ST HOSP IP/OBS HIGH 75: CPT

## 2024-07-28 RX ORDER — BUMETANIDE 0.5 MG/1
1 TABLET ORAL
Qty: 20 | Refills: 0 | Status: DISCONTINUED | OUTPATIENT
Start: 2024-07-28 | End: 2024-07-30

## 2024-07-28 RX ORDER — METOPROLOL TARTRATE 100 MG
100 TABLET ORAL EVERY 12 HOURS
Refills: 0 | Status: DISCONTINUED | OUTPATIENT
Start: 2024-07-28 | End: 2024-08-02

## 2024-07-28 RX ORDER — FUROSEMIDE 10 MG/ML
20 INJECTION, SOLUTION INTRAVENOUS ONCE
Refills: 0 | Status: COMPLETED | OUTPATIENT
Start: 2024-07-28 | End: 2024-07-28

## 2024-07-28 RX ORDER — MELATONIN 3 MG
5 TABLET ORAL ONCE
Refills: 0 | Status: COMPLETED | OUTPATIENT
Start: 2024-07-28 | End: 2024-07-28

## 2024-07-28 RX ORDER — METHYLPREDNISOLONE ACETATE 40 MG/ML
40 INJECTION, SUSPENSION INTRA-ARTICULAR; INTRALESIONAL; INTRAMUSCULAR; INTRASYNOVIAL; SOFT TISSUE
Refills: 0 | Status: DISCONTINUED | OUTPATIENT
Start: 2024-07-28 | End: 2024-07-30

## 2024-07-28 RX ORDER — ALBUMIN HUMAN 25 %
50 INTRAVENOUS SOLUTION INTRAVENOUS EVERY 8 HOURS
Refills: 0 | Status: COMPLETED | OUTPATIENT
Start: 2024-07-28 | End: 2024-07-29

## 2024-07-28 RX ADMIN — Medication 100 MICROGRAM(S): at 05:13

## 2024-07-28 RX ADMIN — FUROSEMIDE 20 MILLIGRAM(S): 10 INJECTION, SOLUTION INTRAVENOUS at 00:29

## 2024-07-28 RX ADMIN — IPRATROPIUM BROMIDE AND ALBUTEROL SULFATE 3 MILLILITER(S): 2.5; .5 SOLUTION RESPIRATORY (INHALATION) at 17:52

## 2024-07-28 RX ADMIN — HEPARIN SODIUM 5000 UNIT(S): 1000 INJECTION, SOLUTION INTRAVENOUS; SUBCUTANEOUS at 21:26

## 2024-07-28 RX ADMIN — HEPARIN SODIUM 5000 UNIT(S): 1000 INJECTION, SOLUTION INTRAVENOUS; SUBCUTANEOUS at 13:18

## 2024-07-28 RX ADMIN — HEPARIN SODIUM 5000 UNIT(S): 1000 INJECTION, SOLUTION INTRAVENOUS; SUBCUTANEOUS at 05:13

## 2024-07-28 RX ADMIN — ATORVASTATIN CALCIUM 40 MILLIGRAM(S): 40 TABLET, FILM COATED ORAL at 21:25

## 2024-07-28 RX ADMIN — Medication 50 MILLILITER(S): at 21:29

## 2024-07-28 RX ADMIN — METHYLPREDNISOLONE ACETATE 40 MILLIGRAM(S): 40 INJECTION, SUSPENSION INTRA-ARTICULAR; INTRALESIONAL; INTRAMUSCULAR; INTRASYNOVIAL; SOFT TISSUE at 17:04

## 2024-07-28 RX ADMIN — Medication 100 MILLIGRAM(S): at 05:13

## 2024-07-28 RX ADMIN — IPRATROPIUM BROMIDE AND ALBUTEROL SULFATE 3 MILLILITER(S): 2.5; .5 SOLUTION RESPIRATORY (INHALATION) at 13:21

## 2024-07-28 RX ADMIN — FUROSEMIDE 40 MILLIGRAM(S): 10 INJECTION, SOLUTION INTRAVENOUS at 08:07

## 2024-07-28 RX ADMIN — IPRATROPIUM BROMIDE AND ALBUTEROL SULFATE 3 MILLILITER(S): 2.5; .5 SOLUTION RESPIRATORY (INHALATION) at 08:38

## 2024-07-28 RX ADMIN — Medication 50 MILLILITER(S): at 15:50

## 2024-07-28 RX ADMIN — IPRATROPIUM BROMIDE AND ALBUTEROL SULFATE 3 MILLILITER(S): 2.5; .5 SOLUTION RESPIRATORY (INHALATION) at 00:48

## 2024-07-28 RX ADMIN — Medication 100 MILLIGRAM(S): at 17:04

## 2024-07-28 RX ADMIN — BUMETANIDE 5 MG/HR: 0.5 TABLET ORAL at 11:40

## 2024-07-28 NOTE — PROGRESS NOTE ADULT - PROBLEM SELECTOR PLAN 2
BUN 50, Cr 1.4 , baseline 1.1  - Monitor AM CMP- Cr 1.3  -Dr Villaseñor consulted- will start on bumex gtt and reassess daily

## 2024-07-28 NOTE — CONSULT NOTE ADULT - ASSESSMENT
79-year-old female with a history of CHF, COPD, severe mitral valve stenosis, LVOT obstruction, hypertension, high cholesterol, pleural effusion requiring thoracentesis presents with increasing shortness of breath over past 1 week.     - Patient admitted with acute decompensated diastolic heart failure secondary to severe MS and LVOT obstruction  - Admit to telemetry.  Patient at risk for AF  - Agree with renal to change IV Lasix to Bumex infusion at 1mg/hr.  Discussed with DR. Chen  - Monitor I, O, K, creatinine closely  - Need to optimize BB.  Increase Toprol to 100 bid.  They report that low BP has been an obstacle in increasing this dose  - No ARB any longer  - Continue statin  - Repeat echo  - She has been deemed not a surgical candidate for valve replacement  - See was seen by structural heart at . She is not a candidate for TMVR via [place] trial as she does not have significant enough MR.  She was supposed to follow at [place] for consideration for the [place] trial, but missed her appointment.  She needs to follow at [place] after discharge.  This was explained to patient and family in detail  - I agree with thoracentesis  - To follow closely while admitted. Patient is at risk for abrupt decompensation

## 2024-07-28 NOTE — CONSULT NOTE ADULT - ASSESSMENT
79-year-old female with a history of CHF, COPD, mitral valve stenosis, hypertension, high cholesterol, pleural effusion requiring thoracentesis presents with increasing shortness of breath     copd  chf  op  oa  effusion  atelectasis  dyspnea  valv heart disease  HTN  HLD   79-year-old female with a history of CHF, COPD, mitral valve stenosis, hypertension, high cholesterol, pleural effusion requiring thoracentesis presents with increasing shortness of breath     copd  chf  op  oa  effusion  atelectasis  dyspnea  valv heart disease  HTN  HLD    diuresis  [facility]vs rx regimen optimization  I and O  serial labs  replete lytes  TTE reviewed  COPD - NEBS and Systemic steroids  VBG  pleural eff - plan for US guided IR thoracentesis - Monday  tele monitoring  anxiolysis  o2 support as needed - goal sat > 88 pct  monitor VS and Sat  assist with needs  OLD record reviewed  CT chest reviewed with the patient

## 2024-07-28 NOTE — CONSULT NOTE ADULT - ASSESSMENT
Acute on CKD Stage 3a  Acute on Chronic CHF  Pleural Effusions  COPD exacerbation  HTN with CKD        -YADI in the setting of cardiorenal syndrome  -Will check urine indices  -Hold off renal imaging for now  -IV lasix as ordered; however, recommend to consider Bumex 1mg/hr x 10 hrs and reassess  -Consider albumin 25% x 2  -Thoracentesis??  -Pulm elliot noted  -Monitor urine output  -BP stable  -Daily chem    Thank you

## 2024-07-28 NOTE — CHART NOTE - NSCHARTNOTEFT_GEN_A_CORE
Called by RN for increased shortness of breath, admitted for CHF exacerbation s/p Duonebs and Lasix IVP in ED. Patient was seen and examined at bedside. Endorses shortness of breath that has been present since admission. On exam, bilateral wheezing present, some crackles  O2 sat: high 80s on 5L O2, other vital signs stable    - Duoneb x1  - Lasix 20mg IVP  - RN to call with any changes

## 2024-07-28 NOTE — PROGRESS NOTE ADULT - SUBJECTIVE AND OBJECTIVE BOX
INTERVAL HPI/OVERNIGHT EVENTS:  Patient seen and examined at bedside earlier this AM. States she is feeling better this AM. States her breathing is a bit more stable, but still not back to baseline. Plan for thoracentesis tomorrow AM with IR.    MEDICATIONS  (STANDING):  albumin human 25% IVPB 50 milliLiter(s) IV Intermittent every 8 hours  albuterol/ipratropium for Nebulization 3 milliLiter(s) Nebulizer every 6 hours  atorvastatin 40 milliGRAM(s) Oral at bedtime  buMETAnide Infusion 1 mG/Hr (5 mL/Hr) IV Continuous <Continuous>  heparin   Injectable 5000 Unit(s) SubCutaneous every 8 hours  levothyroxine 100 MICROGram(s) Oral daily  methylPREDNISolone sodium succinate Injectable 40 milliGRAM(s) IV Push two times a day  metoprolol succinate  milliGRAM(s) Oral every 12 hours    MEDICATIONS  (PRN):  albuterol    90 MICROgram(s) HFA Inhaler 1 Puff(s) Inhalation every 4 hours PRN for shortness of breath and/or wheezing      Allergies    No Known Allergies    Intolerances      ROS:  CONSTITUTIONAL: +weakness, no fevers or chills  EYES/ENT: No visual changes;  No vertigo or throat pain   NECK: No pain or stiffness  RESPIRATORY: No cough, wheezing, hemoptysis; + shortness of breath  CARDIOVASCULAR: No chest pain or palpitations  GASTROINTESTINAL: No abdominal or epigastric pain. No nausea, vomiting, or hematemesis  GENITOURINARY: No dysuria, frequency or hematuria  NEUROLOGICAL: No numbness/tingling or HA  SKIN: No itching, burning, rashes, or lesions     Vital Signs Last 24 Hrs  T(C): 36.8 (28 Jul 2024 11:55), Max: 36.9 (27 Jul 2024 21:29)  T(F): 98.3 (28 Jul 2024 11:55), Max: 98.4 (27 Jul 2024 21:29)  HR: 93 (28 Jul 2024 16:53) (64 - 93)  BP: 123/60 (28 Jul 2024 16:53) (103/53 - 138/72)  BP(mean): --  RR: 18 (28 Jul 2024 11:55) (18 - 20)  SpO2: 95% (28 Jul 2024 14:09) (92% - 98%)    Parameters below as of 28 Jul 2024 14:09  Patient On (Oxygen Delivery Method): nasal cannula        07-28 @ 07:01  -  07-28 @ 17:09  --------------------------------------------------------  IN: 0 mL / OUT: 300 mL / NET: -300 mL      Physical Exam:  General: frail, appears fatigued, NAD  Neurology: A&Ox3, nonfocal, MENDOZA x 4  Respiratory: decreased breath sounds throughout, +rales B/L bases  CV: RRR, S1S2  Abdominal: Soft, NT, ND +BS  Extremities: +mild edema B/L LE, + peripheral pulses      LABS:                        8.8    5.05  )-----------( 247      ( 28 Jul 2024 07:40 )             27.4     07-28    138  |  99  |  47<H>  ----------------------------<  135<H>  3.9   |  33<H>  |  1.30    Ca    9.1      28 Jul 2024 07:40  Phos  4.4     07-28  Mg     2.3     07-28    TPro  7.0  /  Alb  2.9<L>  /  TBili  0.5  /  DBili  x   /  AST  19  /  ALT  17  /  AlkPhos  62  07-28    PT/INR - ( 27 Jul 2024 15:30 )   PT: 10.3 sec;   INR: 0.88 ratio         PTT - ( 27 Jul 2024 15:30 )  PTT:32.5 sec  Urinalysis Basic - ( 28 Jul 2024 07:40 )    Color: x / Appearance: x / SG: x / pH: x  Gluc: 135 mg/dL / Ketone: x  / Bili: x / Urobili: x   Blood: x / Protein: x / Nitrite: x   Leuk Esterase: x / RBC: x / WBC x   Sq Epi: x / Non Sq Epi: x / Bacteria: x        RADIOLOGY & ADDITIONAL TESTS:

## 2024-07-28 NOTE — CONSULT NOTE ADULT - SUBJECTIVE AND OBJECTIVE BOX
Patient is a 79y old  Female who presents with a chief complaint of SOB (2024 06:18)       HPI:   79-year-old female with a history of CHF, COPD, mitral valve stenosis, hypertension, high cholesterol, pleural effusion requiring thoracentesis presents with increasing shortness of breath over past 1 week.  Patient states was worse today.  Patient was found to be out of breath at home, came to the ED and improved with oxygen. Denies chest pain.  fever, chills, or cough. Endorses lower extremity edema bilaterally.  Denies weakness or dizziness.  ED COURSE:  Vitals: T 96.8  F , 70  HR  ,  /65 , RR 18  , SpO2  80% on 4L NC  Labs significant for: Hgb 10.3, d-dimer 381, BUN 50, Cr 1.4, Lactate 2.1 -> 1.2, Troponin 9.4, BNP= 4367  Imaging: bibasilar effusions  Pt received: Albuterol, methylprednisolone   (2024 19:40)    Renal consulted for YADI and need for diuresis. Chart reviewed. Patient urinating well. Still SOB and is wheezing. Denies prior kidney issues.        PAST MEDICAL & SURGICAL HISTORY:  History of COPD  No home O2 use      Asthma      Thyroid nodule      Hyperlipidemia      Hypertension      Hypothyroidism      History of cholecystectomy        History of repair of hip fracture  ORIF .  Hardware was eventually removed           FAMILY HISTORY:  FH: CAD (coronary artery disease) (Mother)    [place]    Social History:Non smoker    MEDICATIONS  (STANDING):  albuterol/ipratropium for Nebulization 3 milliLiter(s) Nebulizer every 6 hours  atorvastatin 40 milliGRAM(s) Oral at bedtime  furosemide   Injectable 40 milliGRAM(s) IV Push daily  heparin   Injectable 5000 Unit(s) SubCutaneous every 8 hours  levothyroxine 100 MICROGram(s) Oral daily  metoprolol succinate  milliGRAM(s) Oral daily    MEDICATIONS  (PRN):  albuterol    90 MICROgram(s) HFA Inhaler 1 Puff(s) Inhalation every 4 hours PRN for shortness of breath and/or wheezing   Meds reviewed    Allergies    No Known Allergies    Intolerances         REVIEW OF SYSTEMS:    Review of Systems:   Constitutional: Denies fatigue  HEENT: Denies headaches and dizziness  Respiratory: as per HPI  Cardiovascular: denies CP, palpitations  Gastrointestinal: Denies nausea, denies vomiting, diarrhea, constipation, abdominal pain, or bloody stools  Genitourinary: denies painful urination, increased frequency, urgency, or bloody urine  Skin: denies rashes or itching  Musculoskeletal: denies muscle aches, joint swelling  Neurologic: Denies generalized weakness, denies loss of sensation, numbness, or tingling      Vital Signs Last 24 Hrs  T(C): 36.3 (2024 04:51), Max: 36.9 (2024 21:29)  T(F): 97.3 (2024 04:51), Max: 98.4 (2024 21:29)  HR: 91 (2024 04:51) (64 - 91)  BP: 120/62 (2024 04:51) (103/53 - 138/72)  BP(mean): --  RR: 18 (2024 04:51) (18 - 20)  SpO2: 92% (2024 04:51) (80% - 100%)    Parameters below as of 2024 04:51  Patient On (Oxygen Delivery Method): nasal cannula  O2 Flow (L/min): 5    Daily Height in cm: 157.48 (2024 14:30)    Daily Weight in k.2 (2024 04:51)    PHYSICAL EXAM:    GENERAL: winded when speaks  HEAD:  Atraumatic, Normocephalic  EYES: EOMI  ENMT: No Drainage from nares, No drainage from ears  NECK: Supple  NERVOUS SYSTEM:  Awake and Alert  CHEST/LUNG: wheezing with reduced breath sounds  HEART: Regular rate and rhythm; No murmurs, rubs, or gallops  ABDOMEN: Soft, Nontender, Nondistended; Bowel sounds present  EXTREMITIES:  No Edema        LABS:    24: pending                        10.3   7.07  )-----------( 284      ( 2024 15:30 )             32.2     07-    136  |  97  |  50<H>  ----------------------------<  175<H>  4.0   |  31  |  1.40<H>    Ca    9.7      2024 15:30    TPro  8.2  /  Alb  3.4  /  TBili  0.6  /  DBili  x   /  AST  30  /  ALT  21  /  AlkPhos  84  07-27    PT/INR - ( 2024 15:30 )   PT: 10.3 sec;   INR: 0.88 ratio         PTT - ( 2024 15:30 )  PTT:32.5 sec  Urinalysis Basic - ( 2024 22:11 )    Color: Yellow / Appearance: Clear / S.010 / pH: x  Gluc: x / Ketone: Negative mg/dL  / Bili: Negative / Urobili: 0.2 mg/dL   Blood: x / Protein: Negative mg/dL / Nitrite: Negative   Leuk Esterase: Trace / RBC: 2 /HPF / WBC 6 /HPF   Sq Epi: x / Non Sq Epi: x / Bacteria: Few /HPF              RADIOLOGY & ADDITIONAL TESTS:

## 2024-07-28 NOTE — CONSULT NOTE ADULT - SUBJECTIVE AND OBJECTIVE BOX
Date/Time Patient Seen:  		  Referring MD:   Data Reviewed	       Patient is a 79y old  Female who presents with a chief complaint of SOB (27 Jul 2024 22:47)      Subjective/HPI   79-year-old female with a history of CHF, COPD, mitral valve stenosis, hypertension, high cholesterol, pleural effusion requiring thoracentesis presents with increasing shortness of breath over past 1 week.  Patient states was worse today.  Patient was found to be out of breath at home, came to the ED and improved with oxygen. Denies chest pain.  fever, chills, or cough. Endorses lower extremity edema bilaterally.  Denies weakness or dizziness.  ED COURSE:  Vitals: T 96.8  F , 70  HR  ,  /65 , RR 18  , SpO2  80% on 4L NC  Labs significant for: Hgb 10.3, d-dimer 381, BUN 50, Cr 1.4, Lactate 2.1 -> 1.2, Troponin 9.4, BNP= 4367  Imaging: bibasilar effusions  Pt received: Albuterol, methylprednisolone  PAST MEDICAL & SURGICAL HISTORY:  History of COPD  No home O2 use    Asthma    Thyroid nodule    Hyperlipidemia    Hypertension    Hypothyroidism    History of cholecystectomy  1989    History of repair of hip fracture  ORIF 1982.  Hardware was eventually removed         Review of Systems:  Review of Systems: REVIEW OF SYSTEMS:  CONSTITUTIONAL: No fever, chills or fatigue.  HENMT: No HA, dizziness, rhinorrhea  RESPIRATORY: +shortness of breath.  CARDIOVASCULAR: No chest pain, palpitations.  GASTROINTESTINAL: No abdominal pain. No nausea or vomiting; No diarrhea or constipation. No blood per rectum.  GENITOURINARY: No dysuria, changes in frequency, hematuria, or incontinence  NEUROLOGICAL: Baseline strength. Sensation intact bilaterally.  SKIN: Rash like appearance on left shin  MUSCULOSKELETAL: No joint pain or swelling; No muscle, back, or extremity pain      Allergies and Intolerances:        Allergies:  	No Known Allergies:     Home Medications:   * Patient Currently Takes Medications as of 27-Jul-2024 19:51 documented in Structured Notes  · 	furosemide 40 mg oral tablet: Last Dose Taken:  , 1 tab(s) orally 2 times a day  · 	levothyroxine 100 mcg (0.1 mg) oral tablet: Last Dose Taken:  , 1 tab(s) orally once a day  · 	metoprolol succinate 100 mg oral tablet, extended release: Last Dose Taken:  , 1 tab(s) orally once a day  · 	atorvastatin 40 mg oral tablet: Last Dose Taken:  , 1 tab(s) orally once a day (at bedtime)  · 	Albuterol (Eqv-ProAir HFA) 90 mcg/inh inhalation aerosol: Last Dose Taken:  , 1 puff(s) inhaled every 4 to 6 hours as needed for  shortness of breath and/or wheezing  · 	Breztri Aerosphere 160 mcg-9 mcg-4.8 mcg/inh inhalation aerosol: Last Dose Taken:  , 2 puff(s) inhaled 2 times a day    .    Patient History:    Past Medical, Past Surgical, and Family History:  PAST MEDICAL HISTORY:  Asthma     History of COPD No home O2 use    Hyperlipidemia     Hypertension     Hypothyroidism     Thyroid nodule.     PAST SURGICAL HISTORY:  History of cholecystectomy 1989    History of repair of hip fracture ORIF 1982.  Hardware was eventually removed.     FAMILY HISTORY:  Mother  Still living? Unknown  FH: CAD (coronary artery disease), Age at diagnosis: Age Unknown.     Social History:  · Substance use	Yes  · Tobacco use	former smoker     Tobacco Screening:  · Core Measure Site	Yes  · Has the patient used tobacco in the past 30 days?	No    Risk Assessment:    Present on Admission:  Deep Venous Thrombosis	no  Pulmonary Embolus	no     HIV Screening:  · In accordance with [place] State law, we offer every patient who comes to our ED an HIV test. Would you like to be tested today?	Opt out     Hepatitis C Test Questions:  · In accordance with [place] State Law, we offer every patient a Hepatitis C test. Would you like to be tested today?	Opt out        Medication list         MEDICATIONS  (STANDING):  albuterol/ipratropium for Nebulization 3 milliLiter(s) Nebulizer every 6 hours  atorvastatin 40 milliGRAM(s) Oral at bedtime  furosemide   Injectable 40 milliGRAM(s) IV Push daily  heparin   Injectable 5000 Unit(s) SubCutaneous every 8 hours  levothyroxine 100 MICROGram(s) Oral daily  metoprolol succinate  milliGRAM(s) Oral daily    MEDICATIONS  (PRN):  albuterol    90 MICROgram(s) HFA Inhaler 1 Puff(s) Inhalation every 4 hours PRN for shortness of breath and/or wheezing         Vitals log        ICU Vital Signs Last 24 Hrs  T(C): 36.3 (28 Jul 2024 04:51), Max: 36.9 (27 Jul 2024 21:29)  T(F): 97.3 (28 Jul 2024 04:51), Max: 98.4 (27 Jul 2024 21:29)  HR: 91 (28 Jul 2024 04:51) (64 - 91)  BP: 120/62 (28 Jul 2024 04:51) (103/53 - 138/72)  BP(mean): --  ABP: --  ABP(mean): --  RR: 18 (28 Jul 2024 04:51) (18 - 20)  SpO2: 92% (28 Jul 2024 04:51) (80% - 100%)    O2 Parameters below as of 28 Jul 2024 04:51  Patient On (Oxygen Delivery Method): nasal cannula  O2 Flow (L/min): 5               Input and Output:  I&O's Detail      Lab Data                        10.3   7.07  )-----------( 284      ( 27 Jul 2024 15:30 )             32.2     07-27    136  |  97  |  50<H>  ----------------------------<  175<H>  4.0   |  31  |  1.40<H>    Ca    9.7      27 Jul 2024 15:30    TPro  8.2  /  Alb  3.4  /  TBili  0.6  /  DBili  x   /  AST  30  /  ALT  21  /  AlkPhos  84  07-27      CARDIAC MARKERS ( 27 Jul 2024 15:30 )  x     / x     / 66 U/L / x     / x            Review of Systems	      Objective     Physical Examination        Pertinent Lab findings & Imaging      Monet:  NO   Adequate UO     I&O's Detail           Discussed with:     Cultures:	        Radiology    ACC: [id number] EXAM:  CT CHEST   ORDERED BY: MARY WHITE     PROCEDURE DATE:  07/27/2024          INTERPRETATION:  CLINICAL INFORMATION: SOB, lower extremity edema,   pleural effusion    COMPARISON: CT from June 2024.    CONTRAST/COMPLICATIONS:  IV Contrast: NONE  Oral Contrast: NONE  Complications: None reported at time of study completion    PROCEDURE:  CT of the Chest was performed.  Sagittal and coronal reformats were performed.    FINDINGS:    LUNGS AND LARGE AIRWAYS: Breathing motion artifacts limiting evaluation.   Bilateral large pleural effusions are visible which appear grossly   similar to the prior CT however probably slightly increased. Extensive   areas with compression atelectasis adjacent to the pleural effusion as   well as areas with atelectasis in the right middle lobe and lingula are   noted; concurrent pneumonia in the atelectatic lungs cannot be excluded.   Extensive septal thickening is visible mainly affecting the upper lobes   with associated changes of emphysema. The airspace opacity seen on the CT   from June 2024 have largely resolved.  PLEURA: No pleural effusion.  VESSELS: Within normal limits.  HEART: Cardiomegaly is noted. Mitral valve calcification.. No pericardial   effusion.  MEDIASTINUM AND MATTHEW: Limited evaluation. Multiple prominent mediastinal   lymph nodes are noted which appear grossly similar to the prior CT..  CHEST WALL AND LOWER NECK: Postoperative changes are visible in the   thyroid bed. Stable appearance of the right lower thyroid; the left-sided   thyroid is not visible and may be surgically absent..  VISUALIZED UPPER ABDOMEN: Limited slices through the upper abdomen show   cholecystectomy. Dilated hepatic veins are noted with dilated IVC..  BONES: Stable wedge-shaped deformity of multiple thoracic vertebral   bodies.    IMPRESSION:  Breathing motion artifacts limiting the evaluation.    Cardiomegaly with bilateral large pleural effusions are visible with   subjacent atelectasis and extensive septal thickening, concerning for   CHF/fluid overload.    Limited evaluation for loculated pleural effusion on this noncontrast CT.    Extensive areas with atelectasis are visible mainly in the right middle   lobe and the lingula; concurrent/developing pneumonia in the atelectatic   lungs cannot be excluded.    The airspace opacity seen on the CT from June 2024 have largely resolved.    --- End of Report ---            NANETTE HERNANDEZ MD; Attending Radiologist  This document has been electronically signed. Jul 27 2024  6:39PM                           Date/Time Patient Seen:  		  Referring MD:   Data Reviewed	       Patient is a 79y old  Female who presents with a chief complaint of SOB (27 Jul 2024 22:47)      Subjective/HPI   79-year-old female with a history of CHF, COPD, mitral valve stenosis, hypertension, high cholesterol, pleural effusion requiring thoracentesis presents with increasing shortness of breath over past 1 week.  Patient states was worse today.  Patient was found to be out of breath at home, came to the ED and improved with oxygen. Denies chest pain.  fever, chills, or cough. Endorses lower extremity edema bilaterally.  Denies weakness or dizziness.  ED COURSE:  Vitals: T 96.8  F , 70  HR  ,  /65 , RR 18  , SpO2  80% on 4L NC  Labs significant for: Hgb 10.3, d-dimer 381, BUN 50, Cr 1.4, Lactate 2.1 -> 1.2, Troponin 9.4, BNP= 4367  Imaging: bibasilar effusions  Pt received: Albuterol, methylprednisolone  PAST MEDICAL & SURGICAL HISTORY:  History of COPD  No home O2 use    Asthma    Thyroid nodule    Hyperlipidemia    Hypertension    Hypothyroidism    History of cholecystectomy  1989    History of repair of hip fracture  ORIF 1982.  Hardware was eventually removed         Review of Systems:  Review of Systems: REVIEW OF SYSTEMS:  CONSTITUTIONAL: No fever, chills or fatigue.  HENMT: No HA, dizziness, rhinorrhea  RESPIRATORY: +shortness of breath.  CARDIOVASCULAR: No chest pain, palpitations.  GASTROINTESTINAL: No abdominal pain. No nausea or vomiting; No diarrhea or constipation. No blood per rectum.  GENITOURINARY: No dysuria, changes in frequency, hematuria, or incontinence  NEUROLOGICAL: Baseline strength. Sensation intact bilaterally.  SKIN: Rash like appearance on left shin  MUSCULOSKELETAL: No joint pain or swelling; No muscle, back, or extremity pain      Allergies and Intolerances:        Allergies:  	No Known Allergies:     Home Medications:   * Patient Currently Takes Medications as of 27-Jul-2024 19:51 documented in Structured Notes  · 	furosemide 40 mg oral tablet: Last Dose Taken:  , 1 tab(s) orally 2 times a day  · 	levothyroxine 100 mcg (0.1 mg) oral tablet: Last Dose Taken:  , 1 tab(s) orally once a day  · 	metoprolol succinate 100 mg oral tablet, extended release: Last Dose Taken:  , 1 tab(s) orally once a day  · 	atorvastatin 40 mg oral tablet: Last Dose Taken:  , 1 tab(s) orally once a day (at bedtime)  · 	Albuterol (Eqv-ProAir HFA) 90 mcg/inh inhalation aerosol: Last Dose Taken:  , 1 puff(s) inhaled every 4 to 6 hours as needed for  shortness of breath and/or wheezing  · 	Breztri Aerosphere 160 mcg-9 mcg-4.8 mcg/inh inhalation aerosol: Last Dose Taken:  , 2 puff(s) inhaled 2 times a day    .    Patient History:    Past Medical, Past Surgical, and Family History:  PAST MEDICAL HISTORY:  Asthma     History of COPD No home O2 use    Hyperlipidemia     Hypertension     Hypothyroidism     Thyroid nodule.     PAST SURGICAL HISTORY:  History of cholecystectomy 1989    History of repair of hip fracture ORIF 1982.  Hardware was eventually removed.     FAMILY HISTORY:  Mother  Still living? Unknown  FH: CAD (coronary artery disease), Age at diagnosis: Age Unknown.     Social History:  · Substance use	Yes  · Tobacco use	former smoker     Tobacco Screening:  · Core Measure Site	Yes  · Has the patient used tobacco in the past 30 days?	No    Risk Assessment:    Present on Admission:  Deep Venous Thrombosis	no  Pulmonary Embolus	no     HIV Screening:  · In accordance with [place] State law, we offer every patient who comes to our ED an HIV test. Would you like to be tested today?	Opt out     Hepatitis C Test Questions:  · In accordance with [place] State Law, we offer every patient a Hepatitis C test. Would you like to be tested today?	Opt out        Medication list         MEDICATIONS  (STANDING):  albuterol/ipratropium for Nebulization 3 milliLiter(s) Nebulizer every 6 hours  atorvastatin 40 milliGRAM(s) Oral at bedtime  furosemide   Injectable 40 milliGRAM(s) IV Push daily  heparin   Injectable 5000 Unit(s) SubCutaneous every 8 hours  levothyroxine 100 MICROGram(s) Oral daily  metoprolol succinate  milliGRAM(s) Oral daily    MEDICATIONS  (PRN):  albuterol    90 MICROgram(s) HFA Inhaler 1 Puff(s) Inhalation every 4 hours PRN for shortness of breath and/or wheezing         Vitals log        ICU Vital Signs Last 24 Hrs  T(C): 36.3 (28 Jul 2024 04:51), Max: 36.9 (27 Jul 2024 21:29)  T(F): 97.3 (28 Jul 2024 04:51), Max: 98.4 (27 Jul 2024 21:29)  HR: 91 (28 Jul 2024 04:51) (64 - 91)  BP: 120/62 (28 Jul 2024 04:51) (103/53 - 138/72)  BP(mean): --  ABP: --  ABP(mean): --  RR: 18 (28 Jul 2024 04:51) (18 - 20)  SpO2: 92% (28 Jul 2024 04:51) (80% - 100%)    O2 Parameters below as of 28 Jul 2024 04:51  Patient On (Oxygen Delivery Method): nasal cannula  O2 Flow (L/min): 5               Input and Output:  I&O's Detail      Lab Data                        10.3   7.07  )-----------( 284      ( 27 Jul 2024 15:30 )             32.2     07-27    136  |  97  |  50<H>  ----------------------------<  175<H>  4.0   |  31  |  1.40<H>    Ca    9.7      27 Jul 2024 15:30    TPro  8.2  /  Alb  3.4  /  TBili  0.6  /  DBili  x   /  AST  30  /  ALT  21  /  AlkPhos  84  07-27      CARDIAC MARKERS ( 27 Jul 2024 15:30 )  x     / x     / 66 U/L / x     / x            Review of Systems	  wheeze  sob  mckenzie  weakness  alert  anxious      Objective     Physical Examination  wheeze  head nc  head at  verbal  alert  anxious  heart s1s2  on o2 support        Pertinent Lab findings & Imaging      Monet:  NO   Adequate UO     I&O's Detail           Discussed with:     Cultures:	        Radiology    ACC: [id number] EXAM:  CT CHEST   ORDERED BY: MARY WHITE     PROCEDURE DATE:  07/27/2024          INTERPRETATION:  CLINICAL INFORMATION: SOB, lower extremity edema,   pleural effusion    COMPARISON: CT from June 2024.    CONTRAST/COMPLICATIONS:  IV Contrast: NONE  Oral Contrast: NONE  Complications: None reported at time of study completion    PROCEDURE:  CT of the Chest was performed.  Sagittal and coronal reformats were performed.    FINDINGS:    LUNGS AND LARGE AIRWAYS: Breathing motion artifacts limiting evaluation.   Bilateral large pleural effusions are visible which appear grossly   similar to the prior CT however probably slightly increased. Extensive   areas with compression atelectasis adjacent to the pleural effusion as   well as areas with atelectasis in the right middle lobe and lingula are   noted; concurrent pneumonia in the atelectatic lungs cannot be excluded.   Extensive septal thickening is visible mainly affecting the upper lobes   with associated changes of emphysema. The airspace opacity seen on the CT   from June 2024 have largely resolved.  PLEURA: No pleural effusion.  VESSELS: Within normal limits.  HEART: Cardiomegaly is noted. Mitral valve calcification.. No pericardial   effusion.  MEDIASTINUM AND MATTHEW: Limited evaluation. Multiple prominent mediastinal   lymph nodes are noted which appear grossly similar to the prior CT..  CHEST WALL AND LOWER NECK: Postoperative changes are visible in the   thyroid bed. Stable appearance of the right lower thyroid; the left-sided   thyroid is not visible and may be surgically absent..  VISUALIZED UPPER ABDOMEN: Limited slices through the upper abdomen show   cholecystectomy. Dilated hepatic veins are noted with dilated IVC..  BONES: Stable wedge-shaped deformity of multiple thoracic vertebral   bodies.    IMPRESSION:  Breathing motion artifacts limiting the evaluation.    Cardiomegaly with bilateral large pleural effusions are visible with   subjacent atelectasis and extensive septal thickening, concerning for   CHF/fluid overload.    Limited evaluation for loculated pleural effusion on this noncontrast CT.    Extensive areas with atelectasis are visible mainly in the right middle   lobe and the lingula; concurrent/developing pneumonia in the atelectatic   lungs cannot be excluded.    The airspace opacity seen on the CT from June 2024 have largely resolved.    --- End of Report ---            NANETTE HERNANDEZ MD; Attending Radiologist  This document has been electronically signed. Jul 27 2024  6:39PM

## 2024-07-28 NOTE — PROGRESS NOTE ADULT - PROBLEM SELECTOR PLAN 1
- Patient has a history of CHF with pleural effusion requiring U/S guided thoracentesis   - CT chest shows increasing bibasilar pleural effusions   - On continuous pulse ox with remote telemetry   - Pulmonologist Dr. Ngo consulted- plan for thoracentesis with IR in AM (likely R side)  - f/u Cardio consult w/ Dr. Woodson  -Discussed with cardio and nephrology, will start on Iv bumex gtt and give albumin x3 to aid in diuresis  -Increase metoprolol to BID dosing given LVOT

## 2024-07-29 LAB
ALBUMIN SERPL ELPH-MCNC: 3.7 G/DL — SIGNIFICANT CHANGE UP (ref 3.3–5)
ALP SERPL-CCNC: 67 U/L — SIGNIFICANT CHANGE UP (ref 40–120)
ALT FLD-CCNC: 19 U/L — SIGNIFICANT CHANGE UP (ref 12–78)
ANION GAP SERPL CALC-SCNC: 4 MMOL/L — LOW (ref 5–17)
APPEARANCE UR: CLEAR — SIGNIFICANT CHANGE UP
AST SERPL-CCNC: 17 U/L — SIGNIFICANT CHANGE UP (ref 15–37)
BASOPHILS # BLD AUTO: 0.01 K/UL — SIGNIFICANT CHANGE UP (ref 0–0.2)
BASOPHILS NFR BLD AUTO: 0.1 % — SIGNIFICANT CHANGE UP (ref 0–2)
BILIRUB SERPL-MCNC: 0.6 MG/DL — SIGNIFICANT CHANGE UP (ref 0.2–1.2)
BILIRUB UR-MCNC: NEGATIVE — SIGNIFICANT CHANGE UP
BUN SERPL-MCNC: 61 MG/DL — HIGH (ref 7–23)
CALCIUM SERPL-MCNC: 9.5 MG/DL — SIGNIFICANT CHANGE UP (ref 8.5–10.1)
CHLORIDE SERPL-SCNC: 98 MMOL/L — SIGNIFICANT CHANGE UP (ref 96–108)
CO2 SERPL-SCNC: 38 MMOL/L — HIGH (ref 22–31)
COLOR SPEC: YELLOW — SIGNIFICANT CHANGE UP
CREAT ?TM UR-MCNC: 17 MG/DL — SIGNIFICANT CHANGE UP
CREAT SERPL-MCNC: 1.3 MG/DL — SIGNIFICANT CHANGE UP (ref 0.5–1.3)
DIFF PNL FLD: NEGATIVE — SIGNIFICANT CHANGE UP
EGFR: 42 ML/MIN/1.73M2 — LOW
EOSINOPHIL # BLD AUTO: 0 K/UL — SIGNIFICANT CHANGE UP (ref 0–0.5)
EOSINOPHIL NFR BLD AUTO: 0 % — SIGNIFICANT CHANGE UP (ref 0–6)
GLUCOSE SERPL-MCNC: 120 MG/DL — HIGH (ref 70–99)
GLUCOSE UR QL: NEGATIVE MG/DL — SIGNIFICANT CHANGE UP
HCT VFR BLD CALC: 28.1 % — LOW (ref 34.5–45)
HGB BLD-MCNC: 8.6 G/DL — LOW (ref 11.5–15.5)
IMM GRANULOCYTES NFR BLD AUTO: 0.5 % — SIGNIFICANT CHANGE UP (ref 0–0.9)
KETONES UR-MCNC: NEGATIVE MG/DL — SIGNIFICANT CHANGE UP
LDH SERPL L TO P-CCNC: 183 U/L — SIGNIFICANT CHANGE UP (ref 50–242)
LEUKOCYTE ESTERASE UR-ACNC: NEGATIVE — SIGNIFICANT CHANGE UP
LYMPHOCYTES # BLD AUTO: 0.38 K/UL — LOW (ref 1–3.3)
LYMPHOCYTES # BLD AUTO: 4.3 % — LOW (ref 13–44)
MAGNESIUM SERPL-MCNC: 2.4 MG/DL — SIGNIFICANT CHANGE UP (ref 1.6–2.6)
MCHC RBC-ENTMCNC: 30.2 PG — SIGNIFICANT CHANGE UP (ref 27–34)
MCHC RBC-ENTMCNC: 30.6 GM/DL — LOW (ref 32–36)
MCV RBC AUTO: 98.6 FL — SIGNIFICANT CHANGE UP (ref 80–100)
MONOCYTES # BLD AUTO: 0.58 K/UL — SIGNIFICANT CHANGE UP (ref 0–0.9)
MONOCYTES NFR BLD AUTO: 6.5 % — SIGNIFICANT CHANGE UP (ref 2–14)
NEUTROPHILS # BLD AUTO: 7.85 K/UL — HIGH (ref 1.8–7.4)
NEUTROPHILS NFR BLD AUTO: 88.6 % — HIGH (ref 43–77)
NITRITE UR-MCNC: NEGATIVE — SIGNIFICANT CHANGE UP
NRBC # BLD: 0 /100 WBCS — SIGNIFICANT CHANGE UP (ref 0–0)
OSMOLALITY UR: 330 MOSM/KG — SIGNIFICANT CHANGE UP (ref 50–1200)
PH UR: 7 — SIGNIFICANT CHANGE UP (ref 5–8)
PHOSPHATE SERPL-MCNC: 4.8 MG/DL — HIGH (ref 2.5–4.5)
PLATELET # BLD AUTO: 253 K/UL — SIGNIFICANT CHANGE UP (ref 150–400)
POTASSIUM SERPL-MCNC: 4.5 MMOL/L — SIGNIFICANT CHANGE UP (ref 3.5–5.3)
POTASSIUM SERPL-SCNC: 4.5 MMOL/L — SIGNIFICANT CHANGE UP (ref 3.5–5.3)
POTASSIUM UR-SCNC: 33.8 MMOL/L — SIGNIFICANT CHANGE UP
PROT ?TM UR-MCNC: <4 MG/DL — SIGNIFICANT CHANGE UP (ref 0–12)
PROT SERPL-MCNC: 7.8 G/DL — SIGNIFICANT CHANGE UP (ref 6–8.3)
PROT UR-MCNC: NEGATIVE MG/DL — SIGNIFICANT CHANGE UP
PROT/CREAT UR-RTO: SIGNIFICANT CHANGE UP RATIO (ref 0–0.2)
RBC # BLD: 2.85 M/UL — LOW (ref 3.8–5.2)
RBC # FLD: 13.8 % — SIGNIFICANT CHANGE UP (ref 10.3–14.5)
SODIUM SERPL-SCNC: 140 MMOL/L — SIGNIFICANT CHANGE UP (ref 135–145)
SODIUM UR-SCNC: 91 MMOL/L — SIGNIFICANT CHANGE UP
SP GR SPEC: 1.01 — SIGNIFICANT CHANGE UP (ref 1–1.03)
UROBILINOGEN FLD QL: 0.2 MG/DL — SIGNIFICANT CHANGE UP (ref 0.2–1)
UUN UR-MCNC: 288 MG/DL — SIGNIFICANT CHANGE UP
WBC # BLD: 8.86 K/UL — SIGNIFICANT CHANGE UP (ref 3.8–10.5)
WBC # FLD AUTO: 8.86 K/UL — SIGNIFICANT CHANGE UP (ref 3.8–10.5)

## 2024-07-29 PROCEDURE — 88108 CYTOPATH CONCENTRATE TECH: CPT | Mod: 26

## 2024-07-29 PROCEDURE — 99233 SBSQ HOSP IP/OBS HIGH 50: CPT

## 2024-07-29 PROCEDURE — 32555 ASPIRATE PLEURA W/ IMAGING: CPT | Mod: RT

## 2024-07-29 PROCEDURE — 71045 X-RAY EXAM CHEST 1 VIEW: CPT | Mod: 26

## 2024-07-29 PROCEDURE — 88305 TISSUE EXAM BY PATHOLOGIST: CPT | Mod: 26

## 2024-07-29 PROCEDURE — 99232 SBSQ HOSP IP/OBS MODERATE 35: CPT

## 2024-07-29 RX ORDER — BUDESONIDE AND FORMOTEROL FUMARATE DIHYDRATE 80; 4.5 UG/1; UG/1
2 AEROSOL RESPIRATORY (INHALATION)
Refills: 0 | Status: DISCONTINUED | OUTPATIENT
Start: 2024-07-29 | End: 2024-08-02

## 2024-07-29 RX ORDER — MELATONIN 3 MG
3 TABLET ORAL AT BEDTIME
Refills: 0 | Status: DISCONTINUED | OUTPATIENT
Start: 2024-07-29 | End: 2024-08-02

## 2024-07-29 RX ADMIN — Medication 50 MILLILITER(S): at 05:31

## 2024-07-29 RX ADMIN — METHYLPREDNISOLONE ACETATE 40 MILLIGRAM(S): 40 INJECTION, SUSPENSION INTRA-ARTICULAR; INTRALESIONAL; INTRAMUSCULAR; INTRASYNOVIAL; SOFT TISSUE at 17:20

## 2024-07-29 RX ADMIN — Medication 100 MILLIGRAM(S): at 05:31

## 2024-07-29 RX ADMIN — Medication 3 MILLIGRAM(S): at 01:00

## 2024-07-29 RX ADMIN — IPRATROPIUM BROMIDE AND ALBUTEROL SULFATE 3 MILLILITER(S): 2.5; .5 SOLUTION RESPIRATORY (INHALATION) at 07:35

## 2024-07-29 RX ADMIN — IPRATROPIUM BROMIDE AND ALBUTEROL SULFATE 3 MILLILITER(S): 2.5; .5 SOLUTION RESPIRATORY (INHALATION) at 12:46

## 2024-07-29 RX ADMIN — Medication 100 MICROGRAM(S): at 05:31

## 2024-07-29 RX ADMIN — BUMETANIDE 5 MG/HR: 0.5 TABLET ORAL at 05:30

## 2024-07-29 RX ADMIN — BUDESONIDE AND FORMOTEROL FUMARATE DIHYDRATE 2 PUFF(S): 80; 4.5 AEROSOL RESPIRATORY (INHALATION) at 17:30

## 2024-07-29 RX ADMIN — METHYLPREDNISOLONE ACETATE 40 MILLIGRAM(S): 40 INJECTION, SUSPENSION INTRA-ARTICULAR; INTRALESIONAL; INTRAMUSCULAR; INTRASYNOVIAL; SOFT TISSUE at 05:32

## 2024-07-29 RX ADMIN — IPRATROPIUM BROMIDE AND ALBUTEROL SULFATE 3 MILLILITER(S): 2.5; .5 SOLUTION RESPIRATORY (INHALATION) at 00:40

## 2024-07-29 RX ADMIN — Medication 100 MILLIGRAM(S): at 17:20

## 2024-07-29 RX ADMIN — ATORVASTATIN CALCIUM 40 MILLIGRAM(S): 40 TABLET, FILM COATED ORAL at 21:18

## 2024-07-29 RX ADMIN — IPRATROPIUM BROMIDE AND ALBUTEROL SULFATE 3 MILLILITER(S): 2.5; .5 SOLUTION RESPIRATORY (INHALATION) at 19:55

## 2024-07-29 NOTE — PROGRESS NOTE ADULT - SUBJECTIVE AND OBJECTIVE BOX
Vanesa Chavez M.D.    Patient is a 79y old  Female who presents with a chief complaint of Shortness of breath     (29 Jul 2024 11:52)      SUBJECTIVE / OVERNIGHT EVENTS:    Patient denies chest pain, SOB, abd pain, N/V, fever, chills, dysuria or any other complaints. All remainder ROS negative.     MEDICATIONS  (STANDING):  albuterol/ipratropium for Nebulization 3 milliLiter(s) Nebulizer every 6 hours  atorvastatin 40 milliGRAM(s) Oral at bedtime  buMETAnide Infusion 1 mG/Hr (5 mL/Hr) IV Continuous <Continuous>  levothyroxine 100 MICROGram(s) Oral daily  methylPREDNISolone sodium succinate Injectable 40 milliGRAM(s) IV Push two times a day  metoprolol succinate  milliGRAM(s) Oral every 12 hours    MEDICATIONS  (PRN):  albuterol    90 MICROgram(s) HFA Inhaler 1 Puff(s) Inhalation every 4 hours PRN for shortness of breath and/or wheezing  melatonin 3 milliGRAM(s) Oral at bedtime PRN Insomnia      I&O's Summary    28 Jul 2024 07:01  -  29 Jul 2024 07:00  --------------------------------------------------------  IN: 65 mL / OUT: 1000 mL / NET: -935 mL        PHYSICAL EXAM:  Vital Signs Last 24 Hrs  T(C): 36.3 (29 Jul 2024 12:04), Max: 36.5 (28 Jul 2024 19:34)  T(F): 97.3 (29 Jul 2024 12:04), Max: 97.7 (28 Jul 2024 19:34)  HR: 73 (29 Jul 2024 12:04) (73 - 93)  BP: 118/51 (29 Jul 2024 12:04) (109/62 - 145/80)  BP(mean): --  RR: 18 (29 Jul 2024 12:04) (18 - 18)  SpO2: 98% (29 Jul 2024 12:04) (94% - 98%)    Parameters below as of 29 Jul 2024 12:04  Patient On (Oxygen Delivery Method): nasal cannula  O2 Flow (L/min): 2      CONSTITUTIONAL: NAD, on NC  RESPIRATORY: Diminished bs, and diffuse exp wheezes noted  CARDIOVASCULAR: Regular rate and rhythm, no LE edema  ABDOMEN: Nontender to palpation, normoactive bowel sounds, no rebound/guarding; No hepatosplenomegaly  MUSCLOSKELETAL:  Normal gait; no clubbing or cyanosis of digits; no joint swelling or tenderness to palpation  PSYCH: A+O x3; affect appropriate  NEUROLOGY: CN 2-12 are intact and symmetric; no gross sensory deficits;   SKIN: No rashes; no palpable lesions    LABS:                        8.6    8.86  )-----------( 253      ( 29 Jul 2024 07:47 )             28.1     07-29    140  |  98  |  61<H>  ----------------------------<  120<H>  4.5   |  38<H>  |  1.30    Ca    9.5      29 Jul 2024 07:47  Phos  4.8     07-29  Mg     2.4     07-29    TPro  7.8  /  Alb  3.7  /  TBili  0.6  /  DBili  x   /  AST  17  /  ALT  19  /  AlkPhos  67  07-29    PT/INR - ( 27 Jul 2024 15:30 )   PT: 10.3 sec;   INR: 0.88 ratio         PTT - ( 27 Jul 2024 15:30 )  PTT:32.5 sec  CARDIAC MARKERS ( 27 Jul 2024 15:30 )  x     / x     / 66 U/L / x     / x          Urinalysis Basic - ( 29 Jul 2024 07:47 )    Color: x / Appearance: x / SG: x / pH: x  Gluc: 120 mg/dL / Ketone: x  / Bili: x / Urobili: x   Blood: x / Protein: x / Nitrite: x   Leuk Esterase: x / RBC: x / WBC x   Sq Epi: x / Non Sq Epi: x / Bacteria: x        Urinalysis with Rflx Culture (collected 27 Jul 2024 22:11)    Culture - Blood (collected 27 Jul 2024 20:30)  Source: .Blood Blood-Peripheral  Preliminary Report (29 Jul 2024 04:01):    No growth at 24 hours    Culture - Blood (collected 27 Jul 2024 20:20)  Source: .Blood Blood-Peripheral  Preliminary Report (29 Jul 2024 04:01):    No growth at 24 hours      CAPILLARY BLOOD GLUCOSE          RADIOLOGY & ADDITIONAL TESTS:  Results Reviewed:   Imaging Personally Reviewed:  Electrocardiogram Personally Reviewed:

## 2024-07-29 NOTE — PROGRESS NOTE ADULT - SUBJECTIVE AND OBJECTIVE BOX
Date/Time Patient Seen:  		  Referring MD:   Data Reviewed	       Patient is a 79y old  Female who presents with a chief complaint of SOB (28 Jul 2024 17:08)      Subjective/HPI     PAST MEDICAL & SURGICAL HISTORY:  History of COPD  No home O2 use    Asthma    Thyroid nodule    Hyperlipidemia    Hypertension    Hypothyroidism    History of cholecystectomy  1989    History of repair of hip fracture  ORIF 1982.  Hardware was eventually removed          Medication list         MEDICATIONS  (STANDING):  albuterol/ipratropium for Nebulization 3 milliLiter(s) Nebulizer every 6 hours  atorvastatin 40 milliGRAM(s) Oral at bedtime  buMETAnide Infusion 1 mG/Hr (5 mL/Hr) IV Continuous <Continuous>  levothyroxine 100 MICROGram(s) Oral daily  methylPREDNISolone sodium succinate Injectable 40 milliGRAM(s) IV Push two times a day  metoprolol succinate  milliGRAM(s) Oral every 12 hours    MEDICATIONS  (PRN):  albuterol    90 MICROgram(s) HFA Inhaler 1 Puff(s) Inhalation every 4 hours PRN for shortness of breath and/or wheezing  melatonin 3 milliGRAM(s) Oral at bedtime PRN Insomnia         Vitals log        ICU Vital Signs Last 24 Hrs  T(C): 36.4 (29 Jul 2024 05:17), Max: 36.8 (28 Jul 2024 11:55)  T(F): 97.6 (29 Jul 2024 05:17), Max: 98.3 (28 Jul 2024 11:55)  HR: 76 (29 Jul 2024 05:17) (72 - 93)  BP: 109/62 (29 Jul 2024 05:17) (105/55 - 145/80)  BP(mean): --  ABP: --  ABP(mean): --  RR: 18 (29 Jul 2024 05:17) (18 - 18)  SpO2: 94% (29 Jul 2024 05:17) (93% - 98%)    O2 Parameters below as of 29 Jul 2024 05:17  Patient On (Oxygen Delivery Method): room air                 Input and Output:  I&O's Detail    28 Jul 2024 07:01  -  29 Jul 2024 05:44  --------------------------------------------------------  IN:    Bumetanide: 20 mL  Total IN: 20 mL    OUT:    Incontinent per Condom Catheter (mL): 1000 mL  Total OUT: 1000 mL    Total NET: -980 mL          Lab Data                        8.8    5.05  )-----------( 247      ( 28 Jul 2024 07:40 )             27.4     07-28    138  |  99  |  47<H>  ----------------------------<  135<H>  3.9   |  33<H>  |  1.30    Ca    9.1      28 Jul 2024 07:40  Phos  4.4     07-28  Mg     2.3     07-28    TPro  7.0  /  Alb  2.9<L>  /  TBili  0.5  /  DBili  x   /  AST  19  /  ALT  17  /  AlkPhos  62  07-28      CARDIAC MARKERS ( 27 Jul 2024 15:30 )  x     / x     / 66 U/L / x     / x            Review of Systems	      Objective     Physical Examination    heart s1s2  lung dc BS  head nc      Pertinent Lab findings & Imaging      Chiki:  NO   Adequate UO     I&O's Detail    28 Jul 2024 07:01  -  29 Jul 2024 05:44  --------------------------------------------------------  IN:    Bumetanide: 20 mL  Total IN: 20 mL    OUT:    Incontinent per Condom Catheter (mL): 1000 mL  Total OUT: 1000 mL    Total NET: -980 mL               Discussed with:     Cultures:	        Radiology

## 2024-07-29 NOTE — PROGRESS NOTE ADULT - ASSESSMENT
79-year-old female with a history of CHF, COPD, severe mitral valve stenosis, LVOT obstruction, hypertension, high cholesterol, pleural effusion requiring thoracentesis presents with increasing shortness of breath over past 1 week.     ADHF (Diastolic Dysfunction)/Severe MS/Pleural Effusion  - Patient admitted with acute decompensated diastolic heart failure secondary to severe MS and LVOT obstruction  - Continue Toprol  mg daily.  Would increase as tolerated by BP  - Continue Bumex gtt at 1mg/hr.  Continue strict I/O's  - Pulm following.  Planned for Rt Thora with IR today  - Encourage incentive spirometer.  Continue nebs and steroid per Pulm    - TTE on 6/10 showed hyperdynamic LVF, EF 79%, LVH, mod LVOT obstruction (resting gradient 41mmHg and 47 mmHg with Valsavla)  - Repeat echo  - Keep off ARB to allow room for BB    - She has been deemed not a surgical candidate for valve replacement  - She was seen by structural heart at . She is not a candidate for TMVR via [place] trial as she does not have significant enough MR.  She was supposed to follow at [place] for consideration for the [place] trial, but missed her appointment.  She needs to follow at [place] after discharge.  This was explained to patient and family in detail    - Continue statin for Hx of HLD  - BP labile at systolic 100's-140's    - Monitor electrolytes, replete to keep K>4 and Mag>2  - Will continue to follow.  Patient is at risk for abrupt decompensation    Lindsey Cooney DNP, NP-C, AGACNP-C  Cardiology   Call TEAMS            79-year-old female with a history of CHF, COPD, severe mitral valve stenosis, LVOT obstruction, hypertension, high cholesterol, pleural effusion requiring thoracentesis presents with increasing shortness of breath over past 1 week.     ADHF (Diastolic Dysfunction)/Severe MS/Pleural Effusion  - Patient admitted with acute decompensated diastolic heart failure secondary to severe MS and LVOT obstruction  - Continue Toprol  mg BID  - Continue Bumex gtt at 1mg/hr.  Continue strict I/O's  - Pulm following. Planned for Rt Thora with IR today  - Encourage incentive spirometer.  Continue nebs and steroid per Pulm    - TTE on 6/10 showed hyperdynamic LVF, EF 79%, LVH, mod LVOT obstruction (resting gradient 41mmHg and 47 mmHg with Valsalva  - Repeat echo  - Keep off ARB to allow room for BB    - She has been deemed not a surgical candidate for valve replacement  - She was seen by structural heart at . She is not a candidate for TMVR via [place] trial as she does not have significant enough MR.  She was supposed to follow at [place] for consideration for the [place] trial, but missed her appointment.  She needs to follow at [place] after discharge.  This was explained to patient and family in detail    - Continue statin for Hx of HLD  - BP labile at systolic 100's-140's    - Monitor electrolytes, replete to keep K>4 and Mag>2  - Will continue to follow.  Patient is at risk for abrupt decompensation    Lindsey Cooney DNP, NP-C, AGACNP-C  Cardiology   Call TEAMS

## 2024-07-29 NOTE — DIETITIAN INITIAL EVALUATION ADULT - ORAL INTAKE PTA/DIET HISTORY
Pt with fair appetite/intake PTA. Typically consumes 2 meals/day. Consumes 2 ensure shakes daily. Consumes balanced meals at home. Follows a low salt diet and 1.5 L fluid restriction at home. Overall decrease in po intake over the past year.

## 2024-07-29 NOTE — DIETITIAN NUTRITION RISK NOTIFICATION - TREATMENT: THE FOLLOWING DIET HAS BEEN RECOMMENDED
Diet, DASH/TLC:   Sodium & Cholesterol Restricted  1200mL Fluid Restriction ([id number])  Supplement Feeding Modality:  Oral  Ensure Plus High Protein Cans or Servings Per Day:  1       Frequency:  Two Times a day (07-28-24 @ 10:58) [Active]

## 2024-07-29 NOTE — DIETITIAN INITIAL EVALUATION ADULT - PERTINENT LABORATORY DATA
07-29    140  |  98  |  61<H>  ----------------------------<  120<H>  4.5   |  38<H>  |  1.30    Ca    9.5      29 Jul 2024 07:47  Phos  4.8     07-29  Mg     2.4     07-29    TPro  7.8  /  Alb  3.7  /  TBili  0.6  /  DBili  x   /  AST  17  /  ALT  19  /  AlkPhos  67  07-29  A1C with Estimated Average Glucose Result: 5.9 % (06-11-24 @ 06:33)

## 2024-07-29 NOTE — CARE COORDINATION ASSESSMENT. - CURRENT MENTAL STATUS/COGNITIVE FUNCTIONING
alert/oriented to person/oriented to place/oriented to time/oriented to situation
oriented to person, place and time

## 2024-07-29 NOTE — DIETITIAN INITIAL EVALUATION ADULT - SIGNS/SYMPTOMS
as evidenced by >20% wt loss x 1 year, <75% of EER for >1 month, muscle depletion.  as evidenced by CHF exacerbation.

## 2024-07-29 NOTE — PROGRESS NOTE ADULT - ASSESSMENT
Acute on CKD Stage 3a  Acute on Chronic CHF  Pleural Effusions  COPD exacerbation  HTN with CKD        -YADI in the setting of cardiorenal syndrome  -Will check urine indices  -Hold off renal imaging for now  -IV lasix as ordered; however, recommend to consider Bumex 1mg/hr x 10 hrs and reassess  -Consider albumin 25% x 2  -Thoracentesis??  -Pulm elliot noted  -Monitor urine output  -BP stable  -Daily chem    Thank you Acute on CKD Stage 3a  Acute on Chronic CHF  Pleural Effusions  COPD exacerbation  HTN with CKD        -YADI in the setting of cardiorenal syndrome  -Will check urine indices  -Hold off renal imaging for now  -Bumex gtt, tolerating thus far from renal perspective  -Planned for thora with IR  -Pulm/cardio eval noted  -Monitor urine output  -BP stable  -Daily chem    Thank you

## 2024-07-29 NOTE — CARE COORDINATION ASSESSMENT. - OTHER PERTINENT DISCHARGE PLANNING INFORMATION:
CM met with the patient and daughter at the bedside, Pt deferred for CM to speak with her daughter. Educated daughter on role of CM and transition planning. Daughter verbalized understanding. CM provided direct contact/resource folder and remains available. Patient is identified as a CMS STAR patient. Transition care management program explained, pt was recently discharged, already aware of STAR program. Per daughter pt resides in a house alone, independent with ambulating, ADLs and negotiating steps. Pt has home oxygen portable and concentrator 2L through [facility] in good working condition. Pt had home care visiting nurse services through [facility] prior to admission and would like for patient to be referred to them upon discharge. PMD David Glasgow, Pharm[facility], [address] [place].

## 2024-07-29 NOTE — PROGRESS NOTE ADULT - SUBJECTIVE AND OBJECTIVE BOX
[facility] -- Mateo Bobby, Chintan, Adrian Shabazz, , Noemy Pavon  Office # [phone]    Follow Up:      Subjective/Observations:     REVIEW OF SYSTEMS: All other review of systems is negative unless indicated above  PAST MEDICAL & SURGICAL HISTORY:  History of COPD  No home O2 use      Asthma      Thyroid nodule      Hyperlipidemia      Hypertension      Hypothyroidism      History of cholecystectomy  1989      History of repair of hip fracture  ORIF 1982.  Hardware was eventually removed        MEDICATIONS  (STANDING):  albuterol/ipratropium for Nebulization 3 milliLiter(s) Nebulizer every 6 hours  atorvastatin 40 milliGRAM(s) Oral at bedtime  buMETAnide Infusion 1 mG/Hr (5 mL/Hr) IV Continuous <Continuous>  levothyroxine 100 MICROGram(s) Oral daily  methylPREDNISolone sodium succinate Injectable 40 milliGRAM(s) IV Push two times a day  metoprolol succinate  milliGRAM(s) Oral every 12 hours    MEDICATIONS  (PRN):  albuterol    90 MICROgram(s) HFA Inhaler 1 Puff(s) Inhalation every 4 hours PRN for shortness of breath and/or wheezing  melatonin 3 milliGRAM(s) Oral at bedtime PRN Insomnia    Allergies    No Known Allergies    Intolerances      Vital Signs Last 24 Hrs  T(C): 36.4 (29 Jul 2024 05:17), Max: 36.8 (28 Jul 2024 11:55)  T(F): 97.6 (29 Jul 2024 05:17), Max: 98.3 (28 Jul 2024 11:55)  HR: 76 (29 Jul 2024 05:17) (72 - 93)  BP: 109/62 (29 Jul 2024 05:17) (105/55 - 145/80)  BP(mean): --  RR: 18 (29 Jul 2024 05:17) (18 - 18)  SpO2: 94% (29 Jul 2024 05:17) (93% - 98%)    Parameters below as of 29 Jul 2024 05:17  Patient On (Oxygen Delivery Method): room air      I&O's Summary    28 Jul 2024 07:01  -  29 Jul 2024 07:00  --------------------------------------------------------  IN: 65 mL / OUT: 1000 mL / NET: -935 mL        PHYSICAL EXAM:  TELE:   Constitutional: NAD, awake and alert, well-developed  HEENT: Moist Mucous Membranes, Anicteric  Pulmonary: Non-labored, breath sounds are clear bilaterally, No wheezing, rales or rhonchi  Cardiovascular: Regular, S1 and S2, No murmurs, rubs, gallops or clicks  Gastrointestinal: Bowel Sounds present, soft, nontender.   Lymph: No peripheral edema. No lymphadenopathy.  Skin: No visible rashes or ulcers.  Psych:  Mood & affect appropriate  LABS: All Labs Reviewed:                        8.8    5.05  )-----------( 247      ( 28 Jul 2024 07:40 )             27.4                         10.3   7.07  )-----------( 284      ( 27 Jul 2024 15:30 )             32.2     28 Jul 2024 07:40    138    |  99     |  47     ----------------------------<  135    3.9     |  33     |  1.30   27 Jul 2024 15:30    136    |  97     |  50     ----------------------------<  175    4.0     |  31     |  1.40     Ca    9.1        28 Jul 2024 07:40  Ca    9.7        27 Jul 2024 15:30  Phos  4.4       28 Jul 2024 07:40  Mg     2.3       28 Jul 2024 07:40    TPro  7.0    /  Alb  2.9    /  TBili  0.5    /  DBili  x      /  AST  19     /  ALT  17     /  AlkPhos  62     28 Jul 2024 07:40  TPro  8.2    /  Alb  3.4    /  TBili  0.6    /  DBili  x      /  AST  30     /  ALT  21     /  AlkPhos  84     27 Jul 2024 15:30    PT/INR - ( 27 Jul 2024 15:30 )   PT: 10.3 sec;   INR: 0.88 ratio         PTT - ( 27 Jul 2024 15:30 )  PTT:32.5 sec  CARDIAC MARKERS ( 27 Jul 2024 15:30 )  x     / x     / 66 U/L / x     / x          12 Lead ECG:   Ventricular Rate 69 BPM    Atrial Rate 69 BPM    P-R Interval 188 ms    QRS Duration 96 ms    Q-T Interval 438 ms    QTC Calculation(Bazett) 469 ms    P Axis 78 degrees    R Axis -15 degrees    T Axis 49 degrees    Diagnosis Line Normal sinus rhythm  Possible Left atrial enlargement  Low voltage QRS  Septal infarct (cited on or before 10-FRANCISCO JAVIER-2024)    Confirmed by GOLD RASHEED ([id number]) on 7/28/2024 8:39:03 AM (07-27-24 @ 21:03)      TRANSTHORACIC ECHOCARDIOGRAM REPORT  ________________________________________________________________________________                                      _______       Pt. Name:       SANJUANA TORRES Study Date:    6/10/2024  MRN:            [id number]    YOB: 1945  Accession #:    [id number]    Age:           79 years  Account#:       [id number]   Gender:        F  Visit ID#  Heart Rate:                  Height:        62.20 in (158.00 cm)  Rhythm:                      Weight: 108.02 lb (49.00 kg)  Blood Pressure: 115/61 mmHg  BSA/BMI:       1.47 m² / 19.63 kg/m²  ________________________________________________________________________________________  Referring Physician:    [id number] Marco Antonio Coronado  Interpreting Physician: Kentrell Galindo  Primary Sonographer:    Derrell Harry    CPT:               ECHO TTE WO CON COMP W DOPP - 40082.m  Indication(s):     Heart failure, unspecified - I50.9  Procedure:         Transthoracic echocardiogram with 2-D, M-mode and complete                     spectral and color flow Doppler.  Ordering Location: [facility]  Admission Status:  ED  Study Information: Image quality for this study is technically difficult.    _______________________________________________________________________________________     CONCLUSIONS:      1. Technically difficult image quality.   2. There is increased LV mass and concentric hypertrophy.   3. Left ventricular systolic function is hyperdynamic with an ejection fraction of 79 % by Shepard's method ofdisks.   4. Hyperdynamic left ventricular systolic function. Basal septal hypertrophy (measures 1.8 cm) with evidence left ventricular outflow tract obstruction with a resting gradient of 41mmHg and a gradient of 47 mmHg with valsalva, overall consistent with moderate LVOT obstruction.   5. Normal right ventricular cavity size and normal systolic function.   6. The right atrium is normal in size.   7. The left atrium is severely dilated.   8. Interatrial septum is stretched and displaced to the right, consistent with left atrial volume overload.   9. Tricuspid aortic valve with normal leaflet excursion. There is moderate thickening of the aortic valve leaflets.  10. Trace aortic regurgitation.  11. Severe mitral valve leaflet calcification.  12. Peak gradient across the mitral valve is 41mmHg with a mean gradient of 19mmHg, at a heart rate of 78 beats per minute.  13. Severe mitral valve stenosis.  14. Mild mitral regurgitation.  15. Mild to moderate tricuspid regurgitation.  16. The inferior vena cava is normal in size measuring 1.56 cm in diameter, (normal <2.1cm) with normal inspiratory collapse (normal >50%) consistent with normal right atrial pressure (~3, range 0-5mmHg).  17. Estimated pulmonary artery systolic pressure is 47 mmHg, consistent with moderate pulmonary hypertension.  18. Small pericardial effusion noted adjacent to the right atrium.  19. Bilateral pleural effusion noted.    ________________________________________________________________________________________  FINDINGS:     Left Ventricle:  Left ventricular systolic function is hyperdynamic with a calculated ejection fraction of 79 % by the Shepard's biplane method of disks. There is increased LV mass and concentric hypertrophy. There is basal septal thickening (sigmoid septum) (measuring 1.8 cm) with evidence left ventricular outflow tract obstruction with a gradient of 47 mmHg. Hyperdynamic left ventricular systolic function.     Right Ventricle:  The right ventricular cavity is normal in size and normal systolic function. Tricuspid annular plane systolic excursion (TAPSE) is 2.7 cm (normal >=1.7 cm).     Left Atrium:  The left atrium is severely dilated with an indexed volume of 70.52 ml/m².     Right Atrium:  The right atrium is normal in sizewith an indexed volume of 12.14 ml/m². There is a prominent Eustachian valve present, which is a normal variant.     Interatrial Septum:  The interatrial septum is stretched and displaced to the right, consistent with left atrial volume overload.     Aortic Valve:  The aortic valve is tricuspid with normal leaflet excursion. There is moderate thickening of the aortic valve leaflets. There is trace aortic regurgitation.     Mitral Valve:  There is diffuse mitral valve thickening of the anterior and posterior leaflets. Peak gradient across the mitral valve is 41mmHg with a mean gradient of 19mmHg, at a heart rate of 78 beats per minute. There is severe leaflet calcification. There is severe mitral valve stenosis. The calculated mitral valve area is 1.4 cm². There is mild mitral regurgitation.     Tricuspid Valve:  There is mild to moderate tricuspid regurgitation. Estimated pulmonary artery systolic pressure is 47 mmHg, consistent with moderate pulmonary hypertension.     Pulmonic Valve:  The pulmonic valve was not well visualized.     Aorta:  The aortic root at the sinuses of Valsalva is normal in size, measuring 3.20 cm (indexed 2.17 cm/m²). The ascending aorta diameter is normal in size, measuring 2.50 cm (indexed 1.70 cm/m²).     Pericardium:  There is a small pericardial effusion noted adjacent to the right atrium.     Pleura:  Bilateral pleural effusion noted.     Systemic Veins:  The inferior vena cava is normal in size measuring 1.56 cm in diameter, (normal <2.1cm) with normal inspiratory collapse (normal >50%) consistent with normal right atrial pressure (~3, range 0-5mmHg).  ____________________________________________________________________  QUANTITATIVE DATA:  Left Ventricle Measurements: (Indexed to BSA)     IVSd (2D):   1.1 cm  LVPWd (2D):  1.1 cm  LVIDd (2D):  4.4 cm  LVIDs (2D):  2.8 cm  LV Mass:     165 g  111.9 g/m²  LV Vol d, MOD A2C: 33.4 ml 22.65 ml/m²  LV Vol d, MOD A4C: 29.9 ml 20.27 ml/m²  LV Vol d, MOD BP:  35.7 ml 24.18 ml/m²  LV Vol s, MOD A2C: 6.7 ml  4.55 ml/m²  LV Vol s, MOD A4C: 6.4 ml  4.31 ml/m²  LV Vol s, MOD BP:  7.3 ml  4.97 ml/m²  LVOT SV MOD BP:    28.3 ml  LV EF% MOD BP:     79 %     MV E Vmax:    2.67 m/s  MV A Vmax:    3.19 m/s  MV E/A:       0.84  e' lateral:   9.14 cm/s  e' medial:    6.74 cm/s  E/e' lateral: 29.21  E/e' medial:  39.61  E/e' Average: 33.63  MV DT:        446 msec    Aorta Measurements: (Normal range) (Indexed to BSA)     Sinuses of Valsalva: 3.20 cm (2.7 - 3.3 cm)  Ao Asc prox:         2.50 cm       Left Atrium Measurements: (Indexed to BSA)  LA Diam 2D: 4.70 cm    Right Ventricle Measurements: Right Atrial Measurements:     TAPSE:            2.7 cm      RA Vol:       17.90 ml  RV Base (RVID1):  3.1 cm      RA Vol Index: 12.14 ml/m²  RV Mid (RVID2):   3.6 cm  RV Major (RVID3): 6.4 cm       LVOT / RVOT/ Qp/Qs Data: (Indexed to BSA)  LVOT Diameter: 2.00 cm  LVOT Area:     3.14 cm²    Mitral Valve Measurements:     MV Vmax:         2.90 m/s  MV VTI, lane      1.105 m  MV Mean Grad:    15.5 mmHg  MV PeakGrad:    41.2 mmHg  MV E Vmax:       2.7 m/s  MV A Vmax:       3.2 m/s  MV E/A:          0.8  MV P1/2T:        160 msec  MV Area P 1/2 t: 1.4 cm²  MV Area P1/2 T:  1.4 cm²       Tricuspid Valve Measurements:     TR Vmax:          3.3 m/s  TR Peak Gradient: 44.1 mmHg  RA Pressure:      3 mmHg  PASP:             47 mmHg    ________________________________________________________________________________________  Electronically signed on 6/11/2024 at 5:46:20 PM by Kentrell Galindo         *** Final ***      [facility] -- Mateo Bobby, Chintan, Adrian Shabazz, , Noemy Pavon  Office # [phone]    Follow Up:  Pleural Effusion, LVOT Obstruction    Subjective/Observations: States, breathing is unchanged from admission.  Admits to be feeling anxious re: thora.  NC at 3L/min in use.  Denies CP or palpitations    REVIEW OF SYSTEMS: All other review of systems is negative unless indicated above  PAST MEDICAL & SURGICAL HISTORY:  History of COPD  No home O2 use      Asthma      Thyroid nodule      Hyperlipidemia      Hypertension      Hypothyroidism      History of cholecystectomy  1989      History of repair of hip fracture  ORIF 1982.  Hardware was eventually removed    MEDICATIONS  (STANDING):  albuterol/ipratropium for Nebulization 3 milliLiter(s) Nebulizer every 6 hours  atorvastatin 40 milliGRAM(s) Oral at bedtime  buMETAnide Infusion 1 mG/Hr (5 mL/Hr) IV Continuous <Continuous>  levothyroxine 100 MICROGram(s) Oral daily  methylPREDNISolone sodium succinate Injectable 40 milliGRAM(s) IV Push two times a day  metoprolol succinate  milliGRAM(s) Oral every 12 hours    MEDICATIONS  (PRN):  albuterol    90 MICROgram(s) HFA Inhaler 1 Puff(s) Inhalation every 4 hours PRN for shortness of breath and/or wheezing  melatonin 3 milliGRAM(s) Oral at bedtime PRN Insomnia    Allergies    No Known Allergies    Intolerances    Vital Signs Last 24 Hrs  T(C): 36.4 (29 Jul 2024 05:17), Max: 36.8 (28 Jul 2024 11:55)  T(F): 97.6 (29 Jul 2024 05:17), Max: 98.3 (28 Jul 2024 11:55)  HR: 76 (29 Jul 2024 05:17) (72 - 93)  BP: 109/62 (29 Jul 2024 05:17) (105/55 - 145/80)  BP(mean): --  RR: 18 (29 Jul 2024 05:17) (18 - 18)  SpO2: 94% (29 Jul 2024 05:17) (93% - 98%)    Parameters below as of 29 Jul 2024 05:17  Patient On (Oxygen Delivery Method): room air    I&O's Summary    28 Jul 2024 07:01  -  29 Jul 2024 07:00  --------------------------------------------------------  IN: 65 mL / OUT: 1000 mL / NET: -935 mL      PHYSICAL EXAM:  TELE: Fady  Constitutional: NAD, awake and alert, frail  HEENT: Moist Mucous Membranes, Anicteric  Pulmonary: Non-labored, breath sounds are diminished bilaterally, +wheezing, +rales no rhonchi  Cardiovascular: Regular, S1 and S2, +murmurs, no rubs, gallops or clicks  Gastrointestinal: Bowel Sounds present, soft, nontender.   Lymph: No peripheral edema. No lymphadenopathy.  Skin: No visible rashes or ulcers.  Psych:  Mood & affect appropriate  LABS: All Labs Reviewed:                        8.8    5.05  )-----------( 247      ( 28 Jul 2024 07:40 )             27.4                         10.3   7.07  )-----------( 284      ( 27 Jul 2024 15:30 )             32.2     28 Jul 2024 07:40    138    |  99     |  47     ----------------------------<  135    3.9     |  33     |  1.30   27 Jul 2024 15:30    136    |  97     |  50     ----------------------------<  175    4.0     |  31     |  1.40     Ca    9.1        28 Jul 2024 07:40  Ca    9.7        27 Jul 2024 15:30  Phos  4.4       28 Jul 2024 07:40  Mg     2.3       28 Jul 2024 07:40    TPro  7.0    /  Alb  2.9    /  TBili  0.5    /  DBili  x      /  AST  19     /  ALT  17     /  AlkPhos  62     28 Jul 2024 07:40  TPro  8.2    /  Alb  3.4    /  TBili  0.6    /  DBili  x      /  AST  30     /  ALT  21     /  AlkPhos  84     27 Jul 2024 15:30    PT/INR - ( 27 Jul 2024 15:30 )   PT: 10.3 sec;   INR: 0.88 ratio         PTT - ( 27 Jul 2024 15:30 )  PTT:32.5 sec  CARDIAC MARKERS ( 27 Jul 2024 15:30 )  x     / x     / 66 U/L / x     / x          12 Lead ECG:   Ventricular Rate 69 BPM    Atrial Rate 69 BPM    P-R Interval 188 ms    QRS Duration 96 ms    Q-T Interval 438 ms    QTC Calculation(Bazett) 469 ms    P Axis 78 degrees    R Axis -15 degrees    T Axis 49 degrees    Diagnosis Line Normal sinus rhythm  Possible Left atrial enlargement  Low voltage QRS  Septal infarct (cited on or before 10-FRANCISCO JAVIER-2024)    Confirmed by GOLD RASHEED ([id number]) on 7/28/2024 8:39:03 AM (07-27-24 @ 21:03)      TRANSTHORACIC ECHOCARDIOGRAM REPORT  ________________________________________________________________________________                                      _______       Pt. Name:       SANJUANA TORRES Study Date:    6/10/2024  MRN:            [id number]    YOB: 1945  Accession #:    [id number]    Age:           79 years  Account#:       [id number]   Gender:        F  Visit ID#  Heart Rate:                  Height:        62.20 in (158.00 cm)  Rhythm:                      Weight: 108.02 lb (49.00 kg)  Blood Pressure: 115/61 mmHg  BSA/BMI:       1.47 m² / 19.63 kg/m²  ________________________________________________________________________________________  Referring Physician:    [id number] Marco Antonio Coronado  Interpreting Physician: Kentrell Galindo  Primary Sonographer:    Derrell Harry    CPT:               ECHO TTE WO CON COMP W DOPP - 86089.m  Indication(s):     Heart failure, unspecified - I50.9  Procedure:         Transthoracic echocardiogram with 2-D, M-mode and complete                     spectral and color flow Doppler.  Ordering Location: [facility] ([place])  Admission Status:  ED  Study Information: Image quality for this study is technically difficult.    _______________________________________________________________________________________     CONCLUSIONS:      1. Technically difficult image quality.   2. There is increased LV mass and concentric hypertrophy.   3. Left ventricular systolic function is hyperdynamic with an ejection fraction of 79 % by Shepard's method ofdisks.   4. Hyperdynamic left ventricular systolic function. Basal septal hypertrophy (measures 1.8 cm) with evidence left ventricular outflow tract obstruction with a resting gradient of 41mmHg and a gradient of 47 mmHg with valsalva, overall consistent with moderate LVOT obstruction.   5. Normal right ventricular cavity size and normal systolic function.   6. The right atrium is normal in size.   7. The left atrium is severely dilated.   8. Interatrial septum is stretched and displaced to the right, consistent with left atrial volume overload.   9. Tricuspid aortic valve with normal leaflet excursion. There is moderate thickening of the aortic valve leaflets.  10. Trace aortic regurgitation.  11. Severe mitral valve leaflet calcification.  12. Peak gradient across the mitral valve is 41mmHg with a mean gradient of 19mmHg, at a heart rate of 78 beats per minute.  13. Severe mitral valve stenosis.  14. Mild mitral regurgitation.  15. Mild to moderate tricuspid regurgitation.  16. The inferior vena cava is normal in size measuring 1.56 cm in diameter, (normal <2.1cm) with normal inspiratory collapse (normal >50%) consistent with normal right atrial pressure (~3, range 0-5mmHg).  17. Estimated pulmonary artery systolic pressure is 47 mmHg, consistent with moderate pulmonary hypertension.  18. Small pericardial effusion noted adjacent to the right atrium.  19. Bilateral pleural effusion noted.    ________________________________________________________________________________________  FINDINGS:     Left Ventricle:  Left ventricular systolic function is hyperdynamic with a calculated ejection fraction of 79 % by the Shepard's biplane method of disks. There is increased LV mass and concentric hypertrophy. There is basal septal thickening (sigmoid septum) (measuring 1.8 cm) with evidence left ventricular outflow tract obstruction with a gradient of 47 mmHg. Hyperdynamic left ventricular systolic function.     Right Ventricle:  The right ventricular cavity is normal in size and normal systolic function. Tricuspid annular plane systolic excursion (TAPSE) is 2.7 cm (normal >=1.7 cm).     Left Atrium:  The left atrium is severely dilated with an indexed volume of 70.52 ml/m².     Right Atrium:  The right atrium is normal in sizewith an indexed volume of 12.14 ml/m². There is a prominent Eustachian valve present, which is a normal variant.     Interatrial Septum:  The interatrial septum is stretched and displaced to the right, consistent with left atrial volume overload.     Aortic Valve:  The aortic valve is tricuspid with normal leaflet excursion. There is moderate thickening of the aortic valve leaflets. There is trace aortic regurgitation.     Mitral Valve:  There is diffuse mitral valve thickening of the anterior and posterior leaflets. Peak gradient across the mitral valve is 41mmHg with a mean gradient of 19mmHg, at a heart rate of 78 beats per minute. There is severe leaflet calcification. There is severe mitral valve stenosis. The calculated mitral valve area is 1.4 cm². There is mild mitral regurgitation.     Tricuspid Valve:  There is mild to moderate tricuspid regurgitation. Estimated pulmonary artery systolic pressure is 47 mmHg, consistent with moderate pulmonary hypertension.     Pulmonic Valve:  The pulmonic valve was not well visualized.     Aorta:  The aortic root at the sinuses of Valsalva is normal in size, measuring 3.20 cm (indexed 2.17 cm/m²). The ascending aorta diameter is normal in size, measuring 2.50 cm (indexed 1.70 cm/m²).     Pericardium:  There is a small pericardial effusion noted adjacent to the right atrium.     Pleura:  Bilateral pleural effusion noted.     Systemic Veins:  The inferior vena cava is normal in size measuring 1.56 cm in diameter, (normal <2.1cm) with normal inspiratory collapse (normal >50%) consistent with normal right atrial pressure (~3, range 0-5mmHg).  ____________________________________________________________________  QUANTITATIVE DATA:  Left Ventricle Measurements: (Indexed to BSA)     IVSd (2D):   1.1 cm  LVPWd (2D):  1.1 cm  LVIDd (2D):  4.4 cm  LVIDs (2D):  2.8 cm  LV Mass:     165 g  111.9 g/m²  LV Vol d, MOD A2C: 33.4 ml 22.65 ml/m²  LV Vol d, MOD A4C: 29.9 ml 20.27 ml/m²  LV Vol d, MOD BP:  35.7 ml 24.18 ml/m²  LV Vol s, MOD A2C: 6.7 ml  4.55 ml/m²  LV Vol s, MOD A4C: 6.4 ml  4.31 ml/m²  LV Vol s, MOD BP:  7.3 ml  4.97 ml/m²  LVOT SV MOD BP:    28.3 ml  LV EF% MOD BP:     79 %     MV E Vmax:    2.67 m/s  MV A Vmax:    3.19 m/s  MV E/A:       0.84  e' lateral:   9.14 cm/s  e' medial:    6.74 cm/s  E/e' lateral: 29.21  E/e' medial:  39.61  E/e' Average: 33.63  MV DT:        446 msec    Aorta Measurements: (Normal range) (Indexed to BSA)     Sinuses of Valsalva: 3.20 cm (2.7 - 3.3 cm)  Ao Asc prox:         2.50 cm       Left Atrium Measurements: (Indexed to BSA)  LA Diam 2D: 4.70 cm    Right Ventricle Measurements: Right Atrial Measurements:     TAPSE:            2.7 cm      RA Vol:       17.90 ml  RV Base (RVID1):  3.1 cm      RA Vol Index: 12.14 ml/m²  RV Mid (RVID2):   3.6 cm  RV Major (RVID3): 6.4 cm       LVOT / RVOT/ Qp/Qs Data: (Indexed to BSA)  LVOT Diameter: 2.00 cm  LVOT Area:     3.14 cm²    Mitral Valve Measurements:     MV Vmax:         2.90 m/s  MV VTI, lane      1.105 m  MV Mean Grad:    15.5 mmHg  MV PeakGrad:    41.2 mmHg  MV E Vmax:       2.7 m/s  MV A Vmax:       3.2 m/s  MV E/A:          0.8  MV P1/2T:        160 msec  MV Area P 1/2 t: 1.4 cm²  MV Area P1/2 T:  1.4 cm²       Tricuspid Valve Measurements:     TR Vmax:          3.3 m/s  TR Peak Gradient: 44.1 mmHg  RA Pressure:      3 mmHg  PASP:             47 mmHg    ________________________________________________________________________________________  Electronically signed on 6/11/2024 at 5:46:20 PM by Kentrell Galindo         *** Final ***      [facility] -- Mateo Bobby, Chintan, Adrian Shabazz, , Noemy Pavon  Office # [phone]    Follow Up:  Pleural Effusion, LVOT Obstruction    Subjective/Observations: States, breathing is unchanged from admission.  Admits to be feeling anxious re: thora.  NC at 3L/min in use.  Denies CP or palpitations    REVIEW OF SYSTEMS: All other review of systems is negative unless indicated above  PAST MEDICAL & SURGICAL HISTORY:  History of COPD  No home O2 use      Asthma      Thyroid nodule      Hyperlipidemia      Hypertension      Hypothyroidism      History of cholecystectomy  1989      History of repair of hip fracture  ORIF 1982.  Hardware was eventually removed    MEDICATIONS  (STANDING):  albuterol/ipratropium for Nebulization 3 milliLiter(s) Nebulizer every 6 hours  atorvastatin 40 milliGRAM(s) Oral at bedtime  buMETAnide Infusion 1 mG/Hr (5 mL/Hr) IV Continuous <Continuous>  levothyroxine 100 MICROGram(s) Oral daily  methylPREDNISolone sodium succinate Injectable 40 milliGRAM(s) IV Push two times a day  metoprolol succinate  milliGRAM(s) Oral every 12 hours    MEDICATIONS  (PRN):  albuterol    90 MICROgram(s) HFA Inhaler 1 Puff(s) Inhalation every 4 hours PRN for shortness of breath and/or wheezing  melatonin 3 milliGRAM(s) Oral at bedtime PRN Insomnia    Allergies    No Known Allergies    Intolerances    Vital Signs Last 24 Hrs  T(C): 36.4 (29 Jul 2024 05:17), Max: 36.8 (28 Jul 2024 11:55)  T(F): 97.6 (29 Jul 2024 05:17), Max: 98.3 (28 Jul 2024 11:55)  HR: 76 (29 Jul 2024 05:17) (72 - 93)  BP: 109/62 (29 Jul 2024 05:17) (105/55 - 145/80)  BP(mean): --  RR: 18 (29 Jul 2024 05:17) (18 - 18)  SpO2: 94% (29 Jul 2024 05:17) (93% - 98%)    Parameters below as of 29 Jul 2024 05:17  Patient On (Oxygen Delivery Method): room air    I&O's Summary    28 Jul 2024 07:01  -  29 Jul 2024 07:00  --------------------------------------------------------  IN: 65 mL / OUT: 1000 mL / NET: -935 mL      PHYSICAL EXAM:  TELE: Fady  Constitutional: NAD, awake and alert, frail  HEENT: Moist Mucous Membranes, Anicteric  Pulmonary: Non-labored, breath sounds are diminished bilaterally, +wheezing, +rales no rhonchi  Cardiovascular: Regular, S1 and S2, +murmurs, no rubs, gallops or clicks  Gastrointestinal: Bowel Sounds present, soft, nontender.   Lymph: No peripheral edema. No lymphadenopathy.  Skin: No visible rashes or ulcers.  Psych:  Mood & affect appropriate  LABS: All Labs Reviewed:                        8.8    5.05  )-----------( 247      ( 28 Jul 2024 07:40 )             27.4                         10.3   7.07  )-----------( 284      ( 27 Jul 2024 15:30 )             32.2     28 Jul 2024 07:40    138    |  99     |  47     ----------------------------<  135    3.9     |  33     |  1.30   27 Jul 2024 15:30    136    |  97     |  50     ----------------------------<  175    4.0     |  31     |  1.40     Ca    9.1        28 Jul 2024 07:40  Ca    9.7        27 Jul 2024 15:30  Phos  4.4       28 Jul 2024 07:40  Mg     2.3       28 Jul 2024 07:40    TPro  7.0    /  Alb  2.9    /  TBili  0.5    /  DBili  x      /  AST  19     /  ALT  17     /  AlkPhos  62     28 Jul 2024 07:40  TPro  8.2    /  Alb  3.4    /  TBili  0.6    /  DBili  x      /  AST  30     /  ALT  21     /  AlkPhos  84     27 Jul 2024 15:30    PT/INR - ( 27 Jul 2024 15:30 )   PT: 10.3 sec;   INR: 0.88 ratio         PTT - ( 27 Jul 2024 15:30 )  PTT:32.5 sec  CARDIAC MARKERS ( 27 Jul 2024 15:30 )  x     / x     / 66 U/L / x     / x          12 Lead ECG:   Ventricular Rate 69 BPM    Atrial Rate 69 BPM    P-R Interval 188 ms    QRS Duration 96 ms    Q-T Interval 438 ms    QTC Calculation(Bazett) 469 ms    P Axis 78 degrees    R Axis -15 degrees    T Axis 49 degrees    Diagnosis Line Normal sinus rhythm  Possible Left atrial enlargement  Low voltage QRS  Septal infarct (cited on or before 10-FRANCISCO JAVIER-2024)    Confirmed by GOLD RASHEED ([id number]) on 7/28/2024 8:39:03 AM (07-27-24 @ 21:03)      TRANSTHORACIC ECHOCARDIOGRAM REPORT  ________________________________________________________________________________                                      _______       Pt. Name:       SANJUANA TORRES Study Date:    6/10/2024  MRN:            [id number]    YOB: 1945  Accession #:    [id number]    Age:           79 years  Account#:       [id number]   Gender:        F  Visit ID#  Heart Rate:                  Height:        62.20 in (158.00 cm)  Rhythm:                      Weight: 108.02 lb (49.00 kg)  Blood Pressure: 115/61 mmHg  BSA/BMI:       1.47 m² / 19.63 kg/m²  ________________________________________________________________________________________  Referring Physician:    [id number] Marco Antonio Coronado  Interpreting Physician: Kentrell Galindo  Primary Sonographer:    Derrell Harry    CPT:               ECHO TTE WO CON COMP W DOPP - 71845.m  Indication(s):     Heart failure, unspecified - I50.9  Procedure:         Transthoracic echocardiogram with 2-D, M-mode and complete                     spectral and color flow Doppler.  Ordering Location: [facility]  Admission Status:  ED  Study Information: Image quality for this study is technically difficult.    _______________________________________________________________________________________     CONCLUSIONS:      1. Technically difficult image quality.   2. There is increased LV mass and concentric hypertrophy.   3. Left ventricular systolic function is hyperdynamic with an ejection fraction of 79 % by Shepard's method ofdisks.   4. Hyperdynamic left ventricular systolic function. Basal septal hypertrophy (measures 1.8 cm) with evidence left ventricular outflow tract obstruction with a resting gradient of 41mmHg and a gradient of 47 mmHg with valsalva, overall consistent with moderate LVOT obstruction.   5. Normal right ventricular cavity size and normal systolic function.   6. The right atrium is normal in size.   7. The left atrium is severely dilated.   8. Interatrial septum is stretched and displaced to the right, consistent with left atrial volume overload.   9. Tricuspid aortic valve with normal leaflet excursion. There is moderate thickening of the aortic valve leaflets.  10. Trace aortic regurgitation.  11. Severe mitral valve leaflet calcification.  12. Peak gradient across the mitral valve is 41mmHg with a mean gradient of 19mmHg, at a heart rate of 78 beats per minute.  13. Severe mitral valve stenosis.  14. Mild mitral regurgitation.  15. Mild to moderate tricuspid regurgitation.  16. The inferior vena cava is normal in size measuring 1.56 cm in diameter, (normal <2.1cm) with normal inspiratory collapse (normal >50%) consistent with normal right atrial pressure (~3, range 0-5mmHg).  17. Estimated pulmonary artery systolic pressure is 47 mmHg, consistent with moderate pulmonary hypertension.  18. Small pericardial effusion noted adjacent to the right atrium.  19. Bilateral pleural effusion noted.    ________________________________________________________________________________________  FINDINGS:     Left Ventricle:  Left ventricular systolic function is hyperdynamic with a calculated ejection fraction of 79 % by the Shepard's biplane method of disks. There is increased LV mass and concentric hypertrophy. There is basal septal thickening (sigmoid septum) (measuring 1.8 cm) with evidence left ventricular outflow tract obstruction with a gradient of 47 mmHg. Hyperdynamic left ventricular systolic function.     Right Ventricle:  The right ventricular cavity is normal in size and normal systolic function. Tricuspid annular plane systolic excursion (TAPSE) is 2.7 cm (normal >=1.7 cm).     Left Atrium:  The left atrium is severely dilated with an indexed volume of 70.52 ml/m².     Right Atrium:  The right atrium is normal in sizewith an indexed volume of 12.14 ml/m². There is a prominent Eustachian valve present, which is a normal variant.     Interatrial Septum:  The interatrial septum is stretched and displaced to the right, consistent with left atrial volume overload.     Aortic Valve:  The aortic valve is tricuspid with normal leaflet excursion. There is moderate thickening of the aortic valve leaflets. There is trace aortic regurgitation.     Mitral Valve:  There is diffuse mitral valve thickening of the anterior and posterior leaflets. Peak gradient across the mitral valve is 41mmHg with a mean gradient of 19mmHg, at a heart rate of 78 beats per minute. There is severe leaflet calcification. There is severe mitral valve stenosis. The calculated mitral valve area is 1.4 cm². There is mild mitral regurgitation.     Tricuspid Valve:  There is mild to moderate tricuspid regurgitation. Estimated pulmonary artery systolic pressure is 47 mmHg, consistent with moderate pulmonary hypertension.     Pulmonic Valve:  The pulmonic valve was not well visualized.     Aorta:  The aortic root at the sinuses of Valsalva is normal in size, measuring 3.20 cm (indexed 2.17 cm/m²). The ascending aorta diameter is normal in size, measuring 2.50 cm (indexed 1.70 cm/m²).     Pericardium:  There is a small pericardial effusion noted adjacent to the right atrium.     Pleura:  Bilateral pleural effusion noted.     Systemic Veins:  The inferior vena cava is normal in size measuring 1.56 cm in diameter, (normal <2.1cm) with normal inspiratory collapse (normal >50%) consistent with normal right atrial pressure (~3, range 0-5mmHg).  ____________________________________________________________________  QUANTITATIVE DATA:  Left Ventricle Measurements: (Indexed to BSA)     IVSd (2D):   1.1 cm  LVPWd (2D):  1.1 cm  LVIDd (2D):  4.4 cm  LVIDs (2D):  2.8 cm  LV Mass:     165 g  111.9 g/m²  LV Vol d, MOD A2C: 33.4 ml 22.65 ml/m²  LV Vol d, MOD A4C: 29.9 ml 20.27 ml/m²  LV Vol d, MOD BP:  35.7 ml 24.18 ml/m²  LV Vol s, MOD A2C: 6.7 ml  4.55 ml/m²  LV Vol s, MOD A4C: 6.4 ml  4.31 ml/m²  LV Vol s, MOD BP:  7.3 ml  4.97 ml/m²  LVOT SV MOD BP:    28.3 ml  LV EF% MOD BP:     79 %     MV E Vmax:    2.67 m/s  MV A Vmax:    3.19 m/s  MV E/A:       0.84  e' lateral:   9.14 cm/s  e' medial:    6.74 cm/s  E/e' lateral: 29.21  E/e' medial:  39.61  E/e' Average: 33.63  MV DT:        446 msec    Aorta Measurements: (Normal range) (Indexed to BSA)     Sinuses of Valsalva: 3.20 cm (2.7 - 3.3 cm)  Ao Asc prox:         2.50 cm       Left Atrium Measurements: (Indexed to BSA)  LA Diam 2D: 4.70 cm    Right Ventricle Measurements: Right Atrial Measurements:     TAPSE:            2.7 cm      RA Vol:       17.90 ml  RV Base (RVID1):  3.1 cm      RA Vol Index: 12.14 ml/m²  RV Mid (RVID2):   3.6 cm  RV Major (RVID3): 6.4 cm       LVOT / RVOT/ Qp/Qs Data: (Indexed to BSA)  LVOT Diameter: 2.00 cm  LVOT Area:     3.14 cm²    Mitral Valve Measurements:     MV Vmax:         2.90 m/s  MV VTI, lane      1.105 m  MV Mean Grad:    15.5 mmHg  MV PeakGrad:    41.2 mmHg  MV E Vmax:       2.7 m/s  MV A Vmax:       3.2 m/s  MV E/A:          0.8  MV P1/2T:        160 msec  MV Area P 1/2 t: 1.4 cm²  MV Area P1/2 T:  1.4 cm²       Tricuspid Valve Measurements:     TR Vmax:          3.3 m/s  TR Peak Gradient: 44.1 mmHg  RA Pressure:      3 mmHg  PASP:             47 mmHg    ________________________________________________________________________________________  Electronically signed on 6/11/2024 at 5:46:20 PM by Kentrell Galindo         *** Final ***

## 2024-07-29 NOTE — DIETITIAN INITIAL EVALUATION ADULT - PERTINENT MEDS FT
MEDICATIONS  (STANDING):  albuterol/ipratropium for Nebulization 3 milliLiter(s) Nebulizer every 6 hours  atorvastatin 40 milliGRAM(s) Oral at bedtime  buMETAnide Infusion 1 mG/Hr (5 mL/Hr) IV Continuous <Continuous>  levothyroxine 100 MICROGram(s) Oral daily  methylPREDNISolone sodium succinate Injectable 40 milliGRAM(s) IV Push two times a day  metoprolol succinate  milliGRAM(s) Oral every 12 hours    MEDICATIONS  (PRN):  albuterol    90 MICROgram(s) HFA Inhaler 1 Puff(s) Inhalation every 4 hours PRN for shortness of breath and/or wheezing  melatonin 3 milliGRAM(s) Oral at bedtime PRN Insomnia

## 2024-07-29 NOTE — CARE COORDINATION ASSESSMENT. - NSCAREPROVIDERS_GEN_ALL_CORE_FT
CARE PROVIDERS:  Accepting Physician: John Norwood  Administration: Tin Bowen  Administration: Wilton Richards  Administration: Breenice Berg  Admitting: John Norwood  Attending: Vanesa Chavez  Consultant: Aisha Shearer  Consultant: Valdemar Villsaeñor  Consultant: Nila Marc  Consultant: Linsdey Cooney  Consultant: Rita Holm  Consultant: Taco Woodson  Consultant: Kemar Ngo  Consultant: Amee Pavon  Covering Team: Ev Aguero  Covering Team: Dileep Ambrosio  Covering Team: Ness Handley ED Attending: Rashard Knowles  ED Nurse: Kelly Singh  Nurse: Rose Mary Hill  Nurse: Juan Carlos Tavera  Nurse: Hugo Sahu  Nurse: Frannie Henson  Nurse: Carol Mcdaniel  Ordered: ServiceAccount, SCMMLM  Outpatient Provider: Fay Muir  Outpatient Provider: Neel Cruz  Override: Juan Carlos Tavera  PCA/Nursing Assistant: Ray Jasso  PCA/Nursing Assistant: Lady Tonja  Primary Team: Vanesa Chavez  Primary Team: John Norwood  Primary Team: Deniz Busby  Registered Dietitian: Leila Palma  : Nano Vegas  : Rosie Abreu  : Dayna Elliott  Team: [facility], Team

## 2024-07-29 NOTE — DIETITIAN INITIAL EVALUATION ADULT - ADD RECOMMEND
1) Continue current diet as tolerated; honor food preferences as able to optimize po intake/tolerance  2) Monitor po intake, diet tolerance, weight trends, labs, GI function, skin integrity

## 2024-07-29 NOTE — DIETITIAN INITIAL EVALUATION ADULT - OTHER INFO
Pt is a "79-year-old female with a history of CHF, COPD, mitral valve stenosis, hypertension, high cholesterol, pleural effusion requiring thoracentesis presents with increasing shortness of breath over past 1 week noted to have increasing bibasilar effusions."    Visited pt at bedside this am. Pt's daughter and daughter in law present during visit. Pt with fair appetite/intake. PO intakes 50-75% per nursing documentation. Tolerating diet. No food allergies. No chewing/swallowing difficulties. Denies N/V/D/C. No BMs thus far. CBW on admission 106#. Pt reports wt loss over the past year. Previous wt of 138#. No edema noted. Skin: ecchymotic. Pt currently on DASH/TLC diet with 1200ml FR and ensure plus HP BID. Plan for thoracentesis today with IR. Provided verbal and written HF diet education during visit today. Food preferences obtained to optimize po intake/tolerance. RD remains available and will continue to follow-up.

## 2024-07-29 NOTE — PROGRESS NOTE ADULT - PROBLEM SELECTOR PLAN 1
- Patient has a history of CHF with pleural effusion requiring U/S guided thoracentesis   - CT chest shows increasing bibasilar pleural effusions   - On continuous pulse ox with remote telemetry   - Pulmonologist Dr. Ngo consulted- plan for thoracentesis with IR (R side) 7/29  - Serial chest xray  - f/u Cardio consult w/ Dr. Woodson  - Discussed with cardio and nephrology, will start on Iv bumex gtt and give albumin x3 to aid in diuresis  - metoprolol to BID dosing given LVOT - Patient has a history of CHF with pleural effusion requiring U/S guided thoracentesis   - CT chest shows increasing bibasilar pleural effusions   - On continuous pulse ox with remote telemetry   - Pulmonologist Dr. Ngo consulted- plan for thoracentesis with IR (R side) 7/29  - Serial chest xray  - f/u Cardio consult w/ Dr. Woodson  - Discussed with cardio and nephrology, c/w IV bumex gtt and give albumin x3 to aid in diuresis  - metoprolol to BID dosing given LVOT

## 2024-07-29 NOTE — PRE PROCEDURE NOTE - PRE PROCEDURE EVALUATION
R>L effusions, unchanged despite diuresis. Pt dyspneic. Consented for right thoracentesis. Specimens requested.

## 2024-07-29 NOTE — PROGRESS NOTE ADULT - SUBJECTIVE AND OBJECTIVE BOX
Patient is a 79y old  Female who presents with a chief complaint of SOB (28 Jul 2024 06:18)       HPI:   79-year-old female with a history of CHF, COPD, mitral valve stenosis, hypertension, high cholesterol, pleural effusion requiring thoracentesis presents with increasing shortness of breath over past 1 week.  Patient states was worse today.  Patient was found to be out of breath at home, came to the ED and improved with oxygen. Denies chest pain.  fever, chills, or cough. Endorses lower extremity edema bilaterally.  Denies weakness or dizziness.  ED COURSE:  Vitals: T 96.8  F , 70  HR  ,  /65 , RR 18  , SpO2  80% on 4L NC  Labs significant for: Hgb 10.3, d-dimer 381, BUN 50, Cr 1.4, Lactate 2.1 -> 1.2, Troponin 9.4, BNP= 4367  Imaging: bibasilar effusions  Pt received: Albuterol, methylprednisolone   (27 Jul 2024 19:40)    Renal consulted for YADI and need for diuresis. Chart reviewed. Patient urinating well. Still SOB and is wheezing. Denies prior kidney issues.        SOB labile.  No N/V    PAST MEDICAL & SURGICAL HISTORY:  History of COPD  No home O2 use      Asthma      Thyroid nodule      Hyperlipidemia      Hypertension      Hypothyroidism      History of cholecystectomy  1989      History of repair of hip fracture  ORIF 1982.  Hardware was eventually removed           FAMILY HISTORY:  FH: CAD (coronary artery disease) (Mother)    NC    Social History:Non smoker    MEDICATIONS  (STANDING):  albuterol/ipratropium for Nebulization 3 milliLiter(s) Nebulizer every 6 hours  atorvastatin 40 milliGRAM(s) Oral at bedtime  furosemide   Injectable 40 milliGRAM(s) IV Push daily  heparin   Injectable 5000 Unit(s) SubCutaneous every 8 hours  levothyroxine 100 MICROGram(s) Oral daily  metoprolol succinate  milliGRAM(s) Oral daily    MEDICATIONS  (PRN):  albuterol    90 MICROgram(s) HFA Inhaler 1 Puff(s) Inhalation every 4 hours PRN for shortness of breath and/or wheezing   Meds reviewed    Allergies    No Known Allergies    Intolerances         REVIEW OF SYSTEMS:  as above    ICU Vital Signs Last 24 Hrs  T(C): 36.4 (29 Jul 2024 05:17), Max: 36.8 (28 Jul 2024 11:55)  T(F): 97.6 (29 Jul 2024 05:17), Max: 98.3 (28 Jul 2024 11:55)  HR: 76 (29 Jul 2024 05:17) (75 - 93)  BP: 109/62 (29 Jul 2024 05:17) (107/53 - 145/80)  BP(mean): --  ABP: --  ABP(mean): --  RR: 18 (29 Jul 2024 05:17) (18 - 18)  SpO2: 94% (29 Jul 2024 05:17) (93% - 98%)    O2 Parameters below as of 29 Jul 2024 09:47  Patient On (Oxygen Delivery Method): nasal cannula          PHYSICAL EXAM:    GENERAL: winded when speaks  HEAD:  Atraumatic, Normocephalic  EYES: EOMI  ENMT: No Drainage from nares, No drainage from ears  NECK: Supple  NERVOUS SYSTEM:  Awake and Alert  EXTREMITIES:  No Edema        LABS:                          8.6    8.86  )-----------( 253      ( 29 Jul 2024 07:47 )             28.1     07-29    140  |  98  |  61<H>  ----------------------------<  120<H>  4.5   |  38<H>  |  1.30    Ca    9.5      29 Jul 2024 07:47  Phos  4.8     07-29  Mg     2.4     07-29    TPro  7.8  /  Alb  3.7  /  TBili  0.6  /  DBili  x   /  AST  17  /  ALT  19  /  AlkPhos  67  07-29    PT/INR - ( 27 Jul 2024 15:30 )   PT: 10.3 sec;   INR: 0.88 ratio         PTT - ( 27 Jul 2024 15:30 )  PTT:32.5 sec  Urinalysis Basic - ( 29 Jul 2024 07:47 )    Color: x / Appearance: x / SG: x / pH: x  Gluc: 120 mg/dL / Ketone: x  / Bili: x / Urobili: x   Blood: x / Protein: x / Nitrite: x   Leuk Esterase: x / RBC: x / WBC x   Sq Epi: x / Non Sq Epi: x / Bacteria: x

## 2024-07-29 NOTE — DIETITIAN INITIAL EVALUATION ADULT - PROBLEM SELECTOR PLAN 1
- Patient has a history of CHF with pleural effusion requiring U/S guided thoracentesis   - CT chest shows increasing bibasilar pleural effusions   - On continuous pulse ox with remote telemetry   - Pulmonologist Dr. Ngo consulted  - f/u Cardio consult w/ Dr. Woodson  - f/u AM Mag Phos  -continue IV lasix 40mg daily, monitor renal fxn closely  -continue metop  - Lactate normalized, likely elevated due to hypoxia, low concern for infection, continue monitoring fluid and O2 status  - D-dimer 381, corrected not concerning for VTE, likely reactive to hypoxia

## 2024-07-29 NOTE — DIETITIAN INITIAL EVALUATION ADULT - NS FNS DIET ORDER
Diet, DASH/TLC:   Sodium & Cholesterol Restricted  1200mL Fluid Restriction ([id number])  Supplement Feeding Modality:  Oral  Ensure Plus High Protein Cans or Servings Per Day:  1       Frequency:  Two Times a day (07-28-24 @ 10:58) [Active]

## 2024-07-29 NOTE — PATIENT CHOICE NOTE. - NSPTCHOICENOTES_GEN_ALL_CORE
Pt had services with [facility] prior to admission and would like to be referred back to them upon discharge

## 2024-07-29 NOTE — DIETITIAN INITIAL EVALUATION ADULT - PHYSCIAL ASSESSMENT
BMI 19  NFPE deferred at this time; observable signs of muscle depletion noted below other (specify)

## 2024-07-29 NOTE — DIETITIAN INITIAL EVALUATION ADULT - NUTRITION CONSULT
My signature below certifies that the above stated patient is homebound and upon completion of the Face-To-Face encounter, has the need for intermittent skilled nursing, physical therapy and/or speech or occupational therapy services in their home for their current diagnosis as outlined in their initial plan of care. These services will continue to be monitored by myself or another physician.
yes

## 2024-07-29 NOTE — GOALS OF CARE CONVERSATION - ADVANCED CARE PLANNING - CONVERSATION DETAILS
[facility]-Palliative care SW met with patient at bedside. Reviewed patient's medical and social history as well as events leading to patient's hospitalization. Writer discussed patient's current diagnosis (SOB, CHF exacerbation, COPD, CHF, HTN, HLD, hypothyroid, mitral valve stenosis), medical condition and management. Patient is known to PC team from previous hospital admission. Patient states she has a HCP and Living Will with her son Jae as health care agent.  Inquired about patient's wishes regarding extent of medical care to be provided including thoughts regarding cardiopulmonary resuscitation and mechanical ventilation/intubation. Patient states that she would not want CPR or mechanical ventilation/intubation and reports she had a MOLST form at home at one time but is unsure of where form is located. Discussed completion of new MOLST. New MOLST completed with DNR/DNI/Trial NIV orders and placed on chart. Patient showed insight into medical condition. All questions answered.   Psychosocial support provided.

## 2024-07-29 NOTE — DIETITIAN INITIAL EVALUATION ADULT - NUTRITION DIAGNOSITC TERMINOLOGY #1
I have reviewed discharge instructions with the patient. The patient verbalized understanding. Malnutrition...

## 2024-07-29 NOTE — PROGRESS NOTE ADULT - ASSESSMENT
79-year-old female with a history of CHF, COPD, mitral valve stenosis, hypertension, high cholesterol, pleural effusion requiring thoracentesis presents with increasing shortness of breath     copd  chf  op  oa  effusion  atelectasis  dyspnea  valv heart disease  HTN  HLD    bumex -   steroids - bronchodilators  thoracentesis this am -     diuresis  [facility] rx regimen optimization  I and O  serial labs  replete lytes  TTE reviewed  COPD - NEBS and Systemic steroids  VBG  pleural eff - plan for US guided IR thoracentesis - Monday  tele monitoring  anxiolysis  o2 support as needed - goal sat > 88 pct  monitor VS and Sat  assist with needs  OLD record reviewed  CT chest reviewed with the patient

## 2024-07-30 LAB
ALBUMIN FLD-MCNC: 1.4 G/DL — SIGNIFICANT CHANGE UP
ANION GAP SERPL CALC-SCNC: 7 MMOL/L — SIGNIFICANT CHANGE UP (ref 5–17)
B PERT IGG+IGM PNL SER: ABNORMAL
BUN SERPL-MCNC: 75 MG/DL — HIGH (ref 7–23)
CALCIUM SERPL-MCNC: 9.3 MG/DL — SIGNIFICANT CHANGE UP (ref 8.5–10.1)
CHLORIDE SERPL-SCNC: 97 MMOL/L — SIGNIFICANT CHANGE UP (ref 96–108)
CO2 SERPL-SCNC: 35 MMOL/L — HIGH (ref 22–31)
COLOR FLD: YELLOW — SIGNIFICANT CHANGE UP
CREAT SERPL-MCNC: 1.3 MG/DL — SIGNIFICANT CHANGE UP (ref 0.5–1.3)
EGFR: 42 ML/MIN/1.73M2 — LOW
EOSINOPHIL # FLD: 0 % — SIGNIFICANT CHANGE UP
FLUID INTAKE SUBSTANCE CLASS: SIGNIFICANT CHANGE UP
FOLATE+VIT B12 SERBLD-IMP: 0 % — SIGNIFICANT CHANGE UP
GLUCOSE FLD-MCNC: 163 MG/DL — SIGNIFICANT CHANGE UP
GLUCOSE SERPL-MCNC: 102 MG/DL — HIGH (ref 70–99)
GRAM STN FLD: SIGNIFICANT CHANGE UP
HCT VFR BLD CALC: 26.8 % — LOW (ref 34.5–45)
HGB BLD-MCNC: 8.5 G/DL — LOW (ref 11.5–15.5)
LDH SERPL L TO P-CCNC: 55 U/L — SIGNIFICANT CHANGE UP
LYMPHOCYTES # FLD: 30 % — SIGNIFICANT CHANGE UP
MCHC RBC-ENTMCNC: 30.8 PG — SIGNIFICANT CHANGE UP (ref 27–34)
MCHC RBC-ENTMCNC: 31.7 GM/DL — LOW (ref 32–36)
MCV RBC AUTO: 97.1 FL — SIGNIFICANT CHANGE UP (ref 80–100)
MESOTHL CELL # FLD: 1 % — SIGNIFICANT CHANGE UP
MONOS+MACROS # FLD: 54 % — SIGNIFICANT CHANGE UP
NEUTROPHILS-BODY FLUID: 15 % — SIGNIFICANT CHANGE UP
NRBC # BLD: 0 /100 WBCS — SIGNIFICANT CHANGE UP (ref 0–0)
NRBC # FLD: 0 % — SIGNIFICANT CHANGE UP
OTHER CELLS FLD MANUAL: 0 % — SIGNIFICANT CHANGE UP
PH FLD: 7.8 — SIGNIFICANT CHANGE UP
PLATELET # BLD AUTO: 235 K/UL — SIGNIFICANT CHANGE UP (ref 150–400)
POTASSIUM SERPL-MCNC: 4.5 MMOL/L — SIGNIFICANT CHANGE UP (ref 3.5–5.3)
POTASSIUM SERPL-SCNC: 4.5 MMOL/L — SIGNIFICANT CHANGE UP (ref 3.5–5.3)
PROT FLD-MCNC: 2.2 G/DL — SIGNIFICANT CHANGE UP
RBC # BLD: 2.76 M/UL — LOW (ref 3.8–5.2)
RBC # FLD: 13.9 % — SIGNIFICANT CHANGE UP (ref 10.3–14.5)
RCV VOL RI: 1000 /UL — HIGH (ref 0–0)
SODIUM SERPL-SCNC: 139 MMOL/L — SIGNIFICANT CHANGE UP (ref 135–145)
SPECIMEN SOURCE: SIGNIFICANT CHANGE UP
TOTAL NUCLEATED CELL COUNT, BODY FLUID: 368 /UL — SIGNIFICANT CHANGE UP
TUBE TYPE: SIGNIFICANT CHANGE UP
WBC # BLD: 9.1 K/UL — SIGNIFICANT CHANGE UP (ref 3.8–10.5)
WBC # FLD AUTO: 9.1 K/UL — SIGNIFICANT CHANGE UP (ref 3.8–10.5)

## 2024-07-30 PROCEDURE — 99233 SBSQ HOSP IP/OBS HIGH 50: CPT

## 2024-07-30 RX ORDER — PREDNISONE 10 MG/1
20 TABLET ORAL DAILY
Refills: 0 | Status: DISCONTINUED | OUTPATIENT
Start: 2024-07-30 | End: 2024-08-02

## 2024-07-30 RX ORDER — HEPARIN SODIUM 1000 [USP'U]/ML
5000 INJECTION, SOLUTION INTRAVENOUS; SUBCUTANEOUS EVERY 12 HOURS
Refills: 0 | Status: DISCONTINUED | OUTPATIENT
Start: 2024-07-30 | End: 2024-08-02

## 2024-07-30 RX ORDER — BUMETANIDE 0.5 MG/1
1 TABLET ORAL
Refills: 0 | Status: DISCONTINUED | OUTPATIENT
Start: 2024-07-30 | End: 2024-07-31

## 2024-07-30 RX ORDER — FUROSEMIDE 10 MG/ML
40 INJECTION, SOLUTION INTRAVENOUS
Refills: 0 | Status: DISCONTINUED | OUTPATIENT
Start: 2024-07-30 | End: 2024-07-30

## 2024-07-30 RX ADMIN — HEPARIN SODIUM 5000 UNIT(S): 1000 INJECTION, SOLUTION INTRAVENOUS; SUBCUTANEOUS at 17:20

## 2024-07-30 RX ADMIN — IPRATROPIUM BROMIDE AND ALBUTEROL SULFATE 3 MILLILITER(S): 2.5; .5 SOLUTION RESPIRATORY (INHALATION) at 13:33

## 2024-07-30 RX ADMIN — ATORVASTATIN CALCIUM 40 MILLIGRAM(S): 40 TABLET, FILM COATED ORAL at 21:08

## 2024-07-30 RX ADMIN — IPRATROPIUM BROMIDE AND ALBUTEROL SULFATE 3 MILLILITER(S): 2.5; .5 SOLUTION RESPIRATORY (INHALATION) at 01:14

## 2024-07-30 RX ADMIN — Medication 1 PUFF(S): at 17:20

## 2024-07-30 RX ADMIN — Medication 100 MICROGRAM(S): at 06:13

## 2024-07-30 RX ADMIN — PREDNISONE 20 MILLIGRAM(S): 10 TABLET ORAL at 06:40

## 2024-07-30 RX ADMIN — Medication 100 MILLIGRAM(S): at 06:14

## 2024-07-30 RX ADMIN — IPRATROPIUM BROMIDE AND ALBUTEROL SULFATE 3 MILLILITER(S): 2.5; .5 SOLUTION RESPIRATORY (INHALATION) at 20:45

## 2024-07-30 RX ADMIN — BUDESONIDE AND FORMOTEROL FUMARATE DIHYDRATE 2 PUFF(S): 80; 4.5 AEROSOL RESPIRATORY (INHALATION) at 07:20

## 2024-07-30 RX ADMIN — IPRATROPIUM BROMIDE AND ALBUTEROL SULFATE 3 MILLILITER(S): 2.5; .5 SOLUTION RESPIRATORY (INHALATION) at 08:13

## 2024-07-30 RX ADMIN — Medication 3 MILLIGRAM(S): at 00:18

## 2024-07-30 RX ADMIN — FUROSEMIDE 40 MILLIGRAM(S): 10 INJECTION, SOLUTION INTRAVENOUS at 13:55

## 2024-07-30 NOTE — PROGRESS NOTE ADULT - PROBLEM SELECTOR PLAN 7
DVT ppx: Heparin restarted  PT eval     Discussed  with daughter in  law at bedside and daughter   over  the phone, aware and in agreement withabove DVT ppx: Heparin restarted  PT eval     Discussed  with daughter in  law at bedside and daughter   over  the phone, aware and in agreement with above

## 2024-07-30 NOTE — PROGRESS NOTE ADULT - SUBJECTIVE AND OBJECTIVE BOX
Patient is a 79y old  Female who presents with a chief complaint of SOB (28 Jul 2024 06:18)       HPI:   79-year-old female with a history of CHF, COPD, mitral valve stenosis, hypertension, high cholesterol, pleural effusion requiring thoracentesis presents with increasing shortness of breath over past 1 week.  Patient states was worse today.  Patient was found to be out of breath at home, came to the ED and improved with oxygen. Denies chest pain.  fever, chills, or cough. Endorses lower extremity edema bilaterally.  Denies weakness or dizziness.  ED COURSE:  Vitals: T 96.8  F , 70  HR  ,  /65 , RR 18  , SpO2  80% on 4L NC  Labs significant for: Hgb 10.3, d-dimer 381, BUN 50, Cr 1.4, Lactate 2.1 -> 1.2, Troponin 9.4, BNP= 4367  Imaging: bibasilar effusions  Pt received: Albuterol, methylprednisolone   (27 Jul 2024 19:40)    Renal consulted for YADI and need for diuresis. Chart reviewed. Patient urinating well. Still SOB and is wheezing. Denies prior kidney issues.      Breathing improving per patient; she says she is 80-90% better    PAST MEDICAL & SURGICAL HISTORY:  History of COPD  No home O2 use      Asthma      Thyroid nodule      Hyperlipidemia      Hypertension      Hypothyroidism      History of cholecystectomy  1989      History of repair of hip fracture  ORIF 1982.  Hardware was eventually removed           FAMILY HISTORY:  FH: CAD (coronary artery disease) (Mother)    [place]    Social History:Non smoker    MEDICATIONS  (STANDING):  albuterol/ipratropium for Nebulization 3 milliLiter(s) Nebulizer every 6 hours  atorvastatin 40 milliGRAM(s) Oral at bedtime  budesonide 160 MICROgram(s)/formoterol 4.5 MICROgram(s) Inhaler 2 Puff(s) Inhalation two times a day  buMETAnide Infusion 1 mG/Hr (5 mL/Hr) IV Continuous <Continuous>  levothyroxine 100 MICROGram(s) Oral daily  metoprolol succinate  milliGRAM(s) Oral every 12 hours  predniSONE   Tablet 20 milliGRAM(s) Oral daily    MEDICATIONS  (PRN):  albuterol    90 MICROgram(s) HFA Inhaler 1 Puff(s) Inhalation every 4 hours PRN for shortness of breath and/or wheezing  melatonin 3 milliGRAM(s) Oral at bedtime PRN Insomnia    Allergies    No Known Allergies    Intolerances         REVIEW OF SYSTEMS:  as above    Vital Signs Last 24 Hrs  T(C): 36.7 (30 Jul 2024 04:39), Max: 36.7 (29 Jul 2024 20:46)  T(F): 98.1 (30 Jul 2024 04:39), Max: 98.1 (30 Jul 2024 04:39)  HR: 67 (30 Jul 2024 04:39) (67 - 85)  BP: 109/65 (30 Jul 2024 04:39) (105/52 - 123/55)  BP(mean): 72 (29 Jul 2024 15:54) (72 - 77)  RR: 18 (30 Jul 2024 04:39) (15 - 20)  SpO2: 96% (30 Jul 2024 09:01) (95% - 100%)    Parameters below as of 30 Jul 2024 09:01  Patient On (Oxygen Delivery Method): nasal cannula, 2L        PHYSICAL EXAM:    GENERAL: no distress, on NC  HEAD:  Atraumatic, Normocephalic  EYES: EOMI  NECK: Supple  NERVOUS SYSTEM:  Awake and Alert  RESP: reduced  EXTREMITIES:  No Edema        LABS:                              8.5    9.10  )-----------( 235      ( 30 Jul 2024 05:30 )             26.8     07-30    139  |  97  |  75<H>  ----------------------------<  102<H>  4.5   |  35<H>  |  1.30    Ca    9.3      30 Jul 2024 05:30  Phos  4.8     07-29  Mg     2.4     07-29    TPro  7.8  /  Alb  3.7  /  TBili  0.6  /  DBili  x   /  AST  17  /  ALT  19  /  AlkPhos  67  07-29      Urinalysis Basic - ( 30 Jul 2024 05:30 )    Color: x / Appearance: x / SG: x / pH: x  Gluc: 102 mg/dL / Ketone: x  / Bili: x / Urobili: x   Blood: x / Protein: x / Nitrite: x   Leuk Esterase: x / RBC: x / WBC x   Sq Epi: x / Non Sq Epi: x / Bacteria: x

## 2024-07-30 NOTE — PROGRESS NOTE ADULT - SUBJECTIVE AND OBJECTIVE BOX
[facility] -- Mateo Bobby Pannella, Patel, Savella Goodger, Cohen  Office # [phone]      Follow Up:  Shortness of breath     Subjective/Observations:     Remains on nasal cannula   no chest pain dizziness palpitations   REVIEW OF SYSTEMS: All other review of systems is negative unless indicated above    PAST MEDICAL & SURGICAL HISTORY:  History of COPD  No home O2 use      Asthma      Thyroid nodule      Hyperlipidemia      Hypertension      Hypothyroidism      History of cholecystectomy        History of repair of hip fracture  ORIF .  Hardware was eventually removed          MEDICATIONS  (STANDING):  albuterol/ipratropium for Nebulization 3 milliLiter(s) Nebulizer every 6 hours  atorvastatin 40 milliGRAM(s) Oral at bedtime  budesonide 160 MICROgram(s)/formoterol 4.5 MICROgram(s) Inhaler 2 Puff(s) Inhalation two times a day  buMETAnide Infusion 1 mG/Hr (5 mL/Hr) IV Continuous <Continuous>  levothyroxine 100 MICROGram(s) Oral daily  metoprolol succinate  milliGRAM(s) Oral every 12 hours  predniSONE   Tablet 20 milliGRAM(s) Oral daily    MEDICATIONS  (PRN):  albuterol    90 MICROgram(s) HFA Inhaler 1 Puff(s) Inhalation every 4 hours PRN for shortness of breath and/or wheezing  melatonin 3 milliGRAM(s) Oral at bedtime PRN Insomnia      Allergies    No Known Allergies    Intolerances        Vital Signs Last 24 Hrs  T(C): 36.7 (2024 04:39), Max: 36.7 (2024 20:46)  T(F): 98.1 (2024 04:39), Max: 98.1 (2024 04:39)  HR: 67 (2024 04:39) (67 - 85)  BP: 109/65 (2024 04:39) (105/52 - 123/55)  BP(mean): 72 (2024 15:54) (72 - 77)  RR: 18 (2024 04:39) (15 - 20)  SpO2: 98% (2024 04:39) (95% - 100%)    Parameters below as of 2024 04:39  Patient On (Oxygen Delivery Method): nasal cannula  O2 Flow (L/min): 3      I&O's Summary    2024 07:01  -  2024 07:00  --------------------------------------------------------  IN: 0 mL / OUT: 1700 mL / NET: -1700 mL          PHYSICAL EXAM:  TELE:   Constitutional: NAD, awake and alert, frail   HEENT: Moist Mucous Membranes, Anicteric  Pulmonary: Non-labored, breath sounds are DIM  Cardiovascular: Regular, S1 and S2 nl, murmur   Gastrointestinal: Bowel Sounds present, soft, nontender.   Lymph: No lymphadenopathy. No peripheral edema.  Skin: No visible rashes or ulcers.  Psych:  Mood & affect appropriate    LABS: All Labs Reviewed:                        8.6    8.86  )-----------( 253      ( 2024 07:47 )             28.1                         8.8    5.05  )-----------( 247      ( 2024 07:40 )             27.4                         10.3   7.07  )-----------( 284      ( 2024 15:30 )             32.2     2024 07:47    140    |  98     |  61     ----------------------------<  120    4.5     |  38     |  1.30   2024 07:40    138    |  99     |  47     ----------------------------<  135    3.9     |  33     |  1.30   2024 15:30    136    |  97     |  50     ----------------------------<  175    4.0     |  31     |  1.40     Ca    9.5        2024 07:47  Ca    9.1        2024 07:40  Ca    9.7        2024 15:30  Phos  4.8       2024 07:47  Phos  4.4       2024 07:40  Mg     2.4       2024 07:47  Mg     2.3       2024 07:40    TPro  7.8    /  Alb  3.7    /  TBili  0.6    /  DBili  x      /  AST  17     /  ALT  19     /  AlkPhos  67     2024 07:47  TPro  7.0    /  Alb  2.9    /  TBili  0.5    /  DBili  x      /  AST  19     /  ALT  17     /  AlkPhos  62     2024 07:40  TPro  8.2    /  Alb  3.4    /  TBili  0.6    /  DBili  x      /  AST  30     /  ALT  21     /  AlkPhos  84     2024 15:30             EC Lead ECG:   Ventricular Rate 69 BPM    Atrial Rate 69 BPM    P-R Interval 188 ms    QRS Duration 96 ms    Q-T Interval 438 ms    QTC Calculation(Bazett) 469 ms    P Axis 78 degrees    R Axis -15 degrees    T Axis 49 degrees    Diagnosis Line Normal sinus rhythm  Possible Left atrial enlargement  Low voltage QRS  Septal infarct (cited on or before 10-FRANCISCO JAVIER-2024)    Confirmed by GOLD RASHEED ([id number]) on 2024 8:39:03 AM (24 @ 21:03)      TRANSTHORACIC ECHOCARDIOGRAM REPORT  ________________________________________________________________________________                                      _______       Pt. Name:       SANJUANA TORRES Study Date:    6/10/2024  MRN:            [id number]    YOB: 1945  Accession #:    [id number]    Age:           79 years  Account#:       [id number]   Gender:        F  Visit ID#  Heart Rate:                  Height:        62.20 in (158.00 cm)  Rhythm:                      Weight: 108.02 lb (49.00 kg)  Blood Pressure: 115/61 mmHg  BSA/BMI:       1.47 m² / 19.63 kg/m²  ________________________________________________________________________________________  Referring Physician:    [id number] Marco Antonio Coronado  Interpreting Physician: Kentrell Galindo  Primary Sonographer:    Derrell Harry    CPT:               ECHO TTE WO CON COMP W DOPP - 12547.m  Indication(s):     Heart failure, unspecified - I50.9  Procedure:         Transthoracic echocardiogram with 2-D, M-mode and complete                     spectral and color flow Doppler.  Ordering Location: [facility]  Admission Status:  ED  Study Information: Image quality for this study is technically difficult.    _______________________________________________________________________________________     CONCLUSIONS:      1. Technically difficult image quality.   2. There is increased LV mass and concentric hypertrophy.   3. Left ventricular systolic function is hyperdynamic with an ejection fraction of 79 % by Shepard's method ofdisks.   4. Hyperdynamic left ventricular systolic function. Basal septal hypertrophy (measures 1.8 cm) with evidence left ventricular outflow tract obstruction with a resting gradient of 41mmHg and a gradient of 47 mmHg with valsalva, overall consistent with moderate LVOT obstruction.   5. Normal right ventricular cavity size and normal systolic function.   6. The right atrium is normal in size.   7. The left atrium is severely dilated.   8. Interatrial septum is stretched and displaced to the right, consistent with left atrial volume overload.   9. Tricuspid aortic valve with normal leaflet excursion. There is moderate thickening of the aortic valve leaflets.  10. Trace aortic regurgitation.  11. Severe mitral valve leaflet calcification.  12. Peak gradient across the mitral valve is 41mmHg with a mean gradient of 19mmHg, at a heart rate of 78 beats per minute.  13. Severe mitral valve stenosis.  14. Mild mitral regurgitation.  15. Mild to moderate tricuspid regurgitation.  16. The inferior vena cava is normal in size measuring 1.56 cm in diameter, (normal <2.1cm) with normal inspiratory collapse (normal >50%) consistent with normal right atrial pressure (~3, range 0-5mmHg).  17. Estimated pulmonary artery systolic pressure is 47 mmHg, consistent with moderate pulmonary hypertension.  18. Small pericardial effusion noted adjacent to the right atrium.  19. Bilateral pleural effusion noted.    ________________________________________________________________________________________  FINDINGS:     Left Ventricle:  Left ventricular systolic function is hyperdynamic with a calculated ejection fraction of 79 % by the Shepard's biplane method of disks. There is increased LV mass and concentric hypertrophy. There is basal septal thickening (sigmoid septum) (measuring 1.8 cm) with evidence left ventricular outflow tract obstruction with a gradient of 47 mmHg. Hyperdynamic left ventricular systolic function.     Right Ventricle:  The right ventricular cavity is normal in size and normal systolic function. Tricuspid annular plane systolic excursion (TAPSE) is 2.7 cm (normal >=1.7 cm).     Left Atrium:  The left atrium is severely dilated with an indexed volume of 70.52 ml/m².     Right Atrium:  The right atrium is normal in sizewith an indexed volume of 12.14 ml/m². There is a prominent Eustachian valve present, which is a normal variant.     Interatrial Septum:  The interatrial septum is stretched and displaced to the right, consistent with left atrial volume overload.     Aortic Valve:  The aortic valve is tricuspid with normal leaflet excursion. There is moderate thickening of the aortic valve leaflets. There is trace aortic regurgitation.     Mitral Valve:  There is diffuse mitral valve thickening of the anterior and posterior leaflets. Peak gradient across the mitral valve is 41mmHg with a mean gradient of 19mmHg, at a heart rate of 78 beats per minute. There is severe leaflet calcification. There is severe mitral valve stenosis. The calculated mitral valve area is 1.4 cm². There is mild mitral regurgitation.     Tricuspid Valve:  There is mild to moderate tricuspid regurgitation. Estimated pulmonary artery systolic pressure is 47 mmHg, consistent with moderate pulmonary hypertension.     Pulmonic Valve:  The pulmonic valve was not well visualized.     Aorta:  The aortic root at the sinuses of Valsalva is normal in size, measuring 3.20 cm (indexed 2.17 cm/m²). The ascending aorta diameter is normal in size, measuring 2.50 cm (indexed 1.70 cm/m²).     Pericardium:  There is a small pericardial effusion noted adjacent to the right atrium.     Pleura:  Bilateral pleural effusion noted.     Systemic Veins:  The inferior vena cava is normal in size measuring 1.56 cm in diameter, (normal <2.1cm) with normal inspiratory collapse (normal >50%) consistent with normal right atrial pressure (~3, range 0-5mmHg).  ____________________________________________________________________  QUANTITATIVE DATA:  Left Ventricle Measurements: (Indexed to BSA)     IVSd (2D):   1.1 cm  LVPWd (2D):  1.1 cm  LVIDd (2D):  4.4 cm  LVIDs (2D):  2.8 cm  LV Mass:     165 g  111.9 g/m²  LV Vol d, MOD A2C: 33.4 ml 22.65 ml/m²  LV Vol d, MOD A4C: 29.9 ml 20.27 ml/m²  LV Vol d, MOD BP:  35.7 ml 24.18 ml/m²  LV Vol s, MOD A2C: 6.7 ml  4.55 ml/m²  LV Vol s, MOD A4C: 6.4 ml  4.31 ml/m²  LV Vol s, MOD BP:  7.3 ml  4.97 ml/m²  LVOT SV MOD BP:    28.3 ml  LV EF% MOD BP:     79 %     MV E Vmax:    2.67 m/s  MV A Vmax:    3.19 m/s  MV E/A:       0.84  e' lateral:   9.14 cm/s  e' medial:    6.74 cm/s  E/e' lateral: 29.21  E/e' medial:  39.61  E/e' Average: 33.63  MV DT:        446 msec    Aorta Measurements: (Normal range) (Indexed to BSA)     Sinuses of Valsalva: 3.20 cm (2.7 - 3.3 cm)  Ao Asc prox:         2.50 cm       Left Atrium Measurements: (Indexed to BSA)  LA Diam 2D: 4.70 cm    Right Ventricle Measurements: Right Atrial Measurements:     TAPSE:            2.7 cm      RA Vol:       17.90 ml  RV Base (RVID1):  3.1 cm      RA Vol Index: 12.14 ml/m²  RV Mid (RVID2):   3.6 cm  RV Major (RVID3): 6.4 cm       LVOT / RVOT/ Qp/Qs Data: (Indexed to BSA)  LVOT Diameter: 2.00 cm  LVOT Area:     3.14 cm²    Mitral Valve Measurements:     MV Vmax:         2.90 m/s  MV VTI, lane      1.105 m  MV Mean Grad:    15.5 mmHg  MV PeakGrad:    41.2 mmHg  MV E Vmax:       2.7 m/s  MV A Vmax:       3.2 m/s  MV E/A:          0.8  MV P1/2T:        160 msec  MV Area P 1/2 t: 1.4 cm²  MV Area P1/2 T:  1.4 cm²       Tricuspid Valve Measurements:     TR Vmax:          3.3 m/s  TR Peak Gradient: 44.1 mmHg  RA Pressure:      3 mmHg  PASP:             47 mmHg    ________________________________________________________________________________________  Electronically signed on 2024 at 5:46:20 PM by Kentrell Galindo         *** Final ***      Radiology:         [facility] -- Mateo Bobby Pannella, Patel, Savella Goodger, Cohen  Office # [phone]      Follow Up:  Shortness of breath     Subjective/Observations:     Remains on nasal cannula   no chest pain dizziness palpitations   reports improvement in shortness of breath   remains on Nasal cannula   remains on Bumex gtt    REVIEW OF SYSTEMS: All other review of systems is negative unless indicated above    PAST MEDICAL & SURGICAL HISTORY:  History of COPD  No home O2 use      Asthma      Thyroid nodule      Hyperlipidemia      Hypertension      Hypothyroidism      History of cholecystectomy        History of repair of hip fracture  ORIF .  Hardware was eventually removed          MEDICATIONS  (STANDING):  albuterol/ipratropium for Nebulization 3 milliLiter(s) Nebulizer every 6 hours  atorvastatin 40 milliGRAM(s) Oral at bedtime  budesonide 160 MICROgram(s)/formoterol 4.5 MICROgram(s) Inhaler 2 Puff(s) Inhalation two times a day  buMETAnide Infusion 1 mG/Hr (5 mL/Hr) IV Continuous <Continuous>  levothyroxine 100 MICROGram(s) Oral daily  metoprolol succinate  milliGRAM(s) Oral every 12 hours  predniSONE   Tablet 20 milliGRAM(s) Oral daily    MEDICATIONS  (PRN):  albuterol    90 MICROgram(s) HFA Inhaler 1 Puff(s) Inhalation every 4 hours PRN for shortness of breath and/or wheezing  melatonin 3 milliGRAM(s) Oral at bedtime PRN Insomnia      Allergies    No Known Allergies    Intolerances        Vital Signs Last 24 Hrs  T(C): 36.7 (2024 04:39), Max: 36.7 (2024 20:46)  T(F): 98.1 (2024 04:39), Max: 98.1 (2024 04:39)  HR: 67 (2024 04:39) (67 - 85)  BP: 109/65 (2024 04:39) (105/52 - 123/55)  BP(mean): 72 (2024 15:54) (72 - 77)  RR: 18 (2024 04:39) (15 - 20)  SpO2: 98% (2024 04:39) (95% - 100%)    Parameters below as of 2024 04:39  Patient On (Oxygen Delivery Method): nasal cannula  O2 Flow (L/min): 3      I&O's Summary    2024 07:01  -  2024 07:00  --------------------------------------------------------  IN: 0 mL / OUT: 1700 mL / NET: -1700 mL          PHYSICAL EXAM:  TELE: Sr   Constitutional: NAD, awake and alert, frail   HEENT: Moist Mucous Membranes, Anicteric  Pulmonary: Non-labored, breath sounds are DIM  Cardiovascular: Regular, S1 and S2 nl, murmur   Gastrointestinal: Bowel Sounds present, soft, nontender.   Lymph: No lymphadenopathy. No peripheral edema.  Skin: No visible rashes or ulcers.  Psych:  Mood & affect appropriate    LABS: All Labs Reviewed:                        8.6    8.86  )-----------( 253      ( 2024 07:47 )             28.1                         8.8    5.05  )-----------( 247      ( 2024 07:40 )             27.4                         10.3   7.07  )-----------( 284      ( 2024 15:30 )             32.2     2024 07:47    140    |  98     |  61     ----------------------------<  120    4.5     |  38     |  1.30   2024 07:40    138    |  99     |  47     ----------------------------<  135    3.9     |  33     |  1.30   2024 15:30    136    |  97     |  50     ----------------------------<  175    4.0     |  31     |  1.40     Ca    9.5        2024 07:47  Ca    9.1        2024 07:40  Ca    9.7        2024 15:30  Phos  4.8       2024 07:47  Phos  4.4       2024 07:40  Mg     2.4       2024 07:47  Mg     2.3       2024 07:40    TPro  7.8    /  Alb  3.7    /  TBili  0.6    /  DBili  x      /  AST  17     /  ALT  19     /  AlkPhos  67     2024 07:47  TPro  7.0    /  Alb  2.9    /  TBili  0.5    /  DBili  x      /  AST  19     /  ALT  17     /  AlkPhos  62     2024 07:40  TPro  8.2    /  Alb  3.4    /  TBili  0.6    /  DBili  x      /  AST  30     /  ALT  21     /  AlkPhos  84     2024 15:30             EC Lead ECG:   Ventricular Rate 69 BPM    Atrial Rate 69 BPM    P-R Interval 188 ms    QRS Duration 96 ms    Q-T Interval 438 ms    QTC Calculation(Bazett) 469 ms    P Axis 78 degrees    R Axis -15 degrees    T Axis 49 degrees    Diagnosis Line Normal sinus rhythm  Possible Left atrial enlargement  Low voltage QRS  Septal infarct (cited on or before 10-FRANCISCO JAVIER-2024)    Confirmed by GOLD RASHEED ([id number]) on 2024 8:39:03 AM (24 @ 21:03)      TRANSTHORACIC ECHOCARDIOGRAM REPORT  ________________________________________________________________________________                                      _______       Pt. Name:       SANJUANA TORRES Study Date:    6/10/2024  MRN:            [id number]    YOB: 1945  Accession #:    [id number]    Age:           79 years  Account#:       [id number]   Gender:        F  Visit ID#  Heart Rate:                  Height:        62.20 in (158.00 cm)  Rhythm:                      Weight: 108.02 lb (49.00 kg)  Blood Pressure: 115/61 mmHg  BSA/BMI:       1.47 m² / 19.63 kg/m²  ________________________________________________________________________________________  Referring Physician:    [id number] Marco Antonio Coronado  Interpreting Physician: Kentrell Galindo  Primary Sonographer:    Derrell Harry    CPT:               ECHO TTE WO CON COMP W DOPP - 16362.m  Indication(s):     Heart failure, unspecified - I50.9  Procedure:         Transthoracic echocardiogram with 2-D, M-mode and complete                     spectral and color flow Doppler.  Ordering Location: [facility]  Admission Status:  ED  Study Information: Image quality for this study is technically difficult.    _______________________________________________________________________________________     CONCLUSIONS:      1. Technically difficult image quality.   2. There is increased LV mass and concentric hypertrophy.   3. Left ventricular systolic function is hyperdynamic with an ejection fraction of 79 % by Shepard's method ofdisks.   4. Hyperdynamic left ventricular systolic function. Basal septal hypertrophy (measures 1.8 cm) with evidence left ventricular outflow tract obstruction with a resting gradient of 41mmHg and a gradient of 47 mmHg with valsalva, overall consistent with moderate LVOT obstruction.   5. Normal right ventricular cavity size and normal systolic function.   6. The right atrium is normal in size.   7. The left atrium is severely dilated.   8. Interatrial septum is stretched and displaced to the right, consistent with left atrial volume overload.   9. Tricuspid aortic valve with normal leaflet excursion. There is moderate thickening of the aortic valve leaflets.  10. Trace aortic regurgitation.  11. Severe mitral valve leaflet calcification.  12. Peak gradient across the mitral valve is 41mmHg with a mean gradient of 19mmHg, at a heart rate of 78 beats per minute.  13. Severe mitral valve stenosis.  14. Mild mitral regurgitation.  15. Mild to moderate tricuspid regurgitation.  16. The inferior vena cava is normal in size measuring 1.56 cm in diameter, (normal <2.1cm) with normal inspiratory collapse (normal >50%) consistent with normal right atrial pressure (~3, range 0-5mmHg).  17. Estimated pulmonary artery systolic pressure is 47 mmHg, consistent with moderate pulmonary hypertension.  18. Small pericardial effusion noted adjacent to the right atrium.  19. Bilateral pleural effusion noted.    ________________________________________________________________________________________  FINDINGS:     Left Ventricle:  Left ventricular systolic function is hyperdynamic with a calculated ejection fraction of 79 % by the Shepard's biplane method of disks. There is increased LV mass and concentric hypertrophy. There is basal septal thickening (sigmoid septum) (measuring 1.8 cm) with evidence left ventricular outflow tract obstruction with a gradient of 47 mmHg. Hyperdynamic left ventricular systolic function.     Right Ventricle:  The right ventricular cavity is normal in size and normal systolic function. Tricuspid annular plane systolic excursion (TAPSE) is 2.7 cm (normal >=1.7 cm).     Left Atrium:  The left atrium is severely dilated with an indexed volume of 70.52 ml/m².     Right Atrium:  The right atrium is normal in sizewith an indexed volume of 12.14 ml/m². There is a prominent Eustachian valve present, which is a normal variant.     Interatrial Septum:  The interatrial septum is stretched and displaced to the right, consistent with left atrial volume overload.     Aortic Valve:  The aortic valve is tricuspid with normal leaflet excursion. There is moderate thickening of the aortic valve leaflets. There is trace aortic regurgitation.     Mitral Valve:  There is diffuse mitral valve thickening of the anterior and posterior leaflets. Peak gradient across the mitral valve is 41mmHg with a mean gradient of 19mmHg, at a heart rate of 78 beats per minute. There is severe leaflet calcification. There is severe mitral valve stenosis. The calculated mitral valve area is 1.4 cm². There is mild mitral regurgitation.     Tricuspid Valve:  There is mild to moderate tricuspid regurgitation. Estimated pulmonary artery systolic pressure is 47 mmHg, consistent with moderate pulmonary hypertension.     Pulmonic Valve:  The pulmonic valve was not well visualized.     Aorta:  The aortic root at the sinuses of Valsalva is normal in size, measuring 3.20 cm (indexed 2.17 cm/m²). The ascending aorta diameter is normal in size, measuring 2.50 cm (indexed 1.70 cm/m²).     Pericardium:  There is a small pericardial effusion noted adjacent to the right atrium.     Pleura:  Bilateral pleural effusion noted.     Systemic Veins:  The inferior vena cava is normal in size measuring 1.56 cm in diameter, (normal <2.1cm) with normal inspiratory collapse (normal >50%) consistent with normal right atrial pressure (~3, range 0-5mmHg).  ____________________________________________________________________  QUANTITATIVE DATA:  Left Ventricle Measurements: (Indexed to BSA)     IVSd (2D):   1.1 cm  LVPWd (2D):  1.1 cm  LVIDd (2D):  4.4 cm  LVIDs (2D):  2.8 cm  LV Mass:     165 g  111.9 g/m²  LV Vol d, MOD A2C: 33.4 ml 22.65 ml/m²  LV Vol d, MOD A4C: 29.9 ml 20.27 ml/m²  LV Vol d, MOD BP:  35.7 ml 24.18 ml/m²  LV Vol s, MOD A2C: 6.7 ml  4.55 ml/m²  LV Vol s, MOD A4C: 6.4 ml  4.31 ml/m²  LV Vol s, MOD BP:  7.3 ml  4.97 ml/m²  LVOT SV MOD BP:    28.3 ml  LV EF% MOD BP:     79 %     MV E Vmax:    2.67 m/s  MV A Vmax:    3.19 m/s  MV E/A:       0.84  e' lateral:   9.14 cm/s  e' medial:    6.74 cm/s  E/e' lateral: 29.21  E/e' medial:  39.61  E/e' Average: 33.63  MV DT:        446 msec    Aorta Measurements: (Normal range) (Indexed to BSA)     Sinuses of Valsalva: 3.20 cm (2.7 - 3.3 cm)  Ao Asc prox:         2.50 cm       Left Atrium Measurements: (Indexed to BSA)  LA Diam 2D: 4.70 cm    Right Ventricle Measurements: Right Atrial Measurements:     TAPSE:            2.7 cm      RA Vol:       17.90 ml  RV Base (RVID1):  3.1 cm      RA Vol Index: 12.14 ml/m²  RV Mid (RVID2):   3.6 cm  RV Major (RVID3): 6.4 cm       LVOT / RVOT/ Qp/Qs Data: (Indexed to BSA)  LVOT Diameter: 2.00 cm  LVOT Area:     3.14 cm²    Mitral Valve Measurements:     MV Vmax:         2.90 m/s  MV VTI, lane      1.105 m  MV Mean Grad:    15.5 mmHg  MV PeakGrad:    41.2 mmHg  MV E Vmax:       2.7 m/s  MV A Vmax:       3.2 m/s  MV E/A:          0.8  MV P1/2T:        160 msec  MV Area P 1/2 t: 1.4 cm²  MV Area P1/2 T:  1.4 cm²       Tricuspid Valve Measurements:     TR Vmax:          3.3 m/s  TR Peak Gradient: 44.1 mmHg  RA Pressure:      3 mmHg  PASP:             47 mmHg    ________________________________________________________________________________________  Electronically signed on 2024 at 5:46:20 PM by Kentrell Galindo         *** Final ***      Radiology:

## 2024-07-30 NOTE — PROGRESS NOTE ADULT - PROBLEM SELECTOR PLAN 3
chronic  -increase metoprolol to BID (with hold parameters)  -Stop bumux gtt  switch to IV  Lasix    -monitor vitals chronic  -increase metoprolol to BID (with hold parameters)  -Stop bumux gtt  switch to IV  BID  -monitor vitals

## 2024-07-30 NOTE — PHYSICAL THERAPY INITIAL EVALUATION ADULT - GAIT TRAINING, PT EVAL
Patient will ambulate >300 feet within 3-5 sessions to allow patient to ambulate community distances.

## 2024-07-30 NOTE — PROGRESS NOTE ADULT - SUBJECTIVE AND OBJECTIVE BOX
Date/Time Patient Seen:  		  Referring MD:   Data Reviewed	       Patient is a 79y old  Female who presents with a chief complaint of SOB (29 Jul 2024 12:28)      Subjective/HPI     PAST MEDICAL & SURGICAL HISTORY:  History of COPD  No home O2 use    Asthma    Thyroid nodule    Hyperlipidemia    Hypertension    Hypothyroidism    History of cholecystectomy  1989    History of repair of hip fracture  ORIF 1982.  Hardware was eventually removed          Medication list         MEDICATIONS  (STANDING):  albuterol/ipratropium for Nebulization 3 milliLiter(s) Nebulizer every 6 hours  atorvastatin 40 milliGRAM(s) Oral at bedtime  budesonide 160 MICROgram(s)/formoterol 4.5 MICROgram(s) Inhaler 2 Puff(s) Inhalation two times a day  buMETAnide Infusion 1 mG/Hr (5 mL/Hr) IV Continuous <Continuous>  levothyroxine 100 MICROGram(s) Oral daily  methylPREDNISolone sodium succinate Injectable 40 milliGRAM(s) IV Push two times a day  metoprolol succinate  milliGRAM(s) Oral every 12 hours    MEDICATIONS  (PRN):  albuterol    90 MICROgram(s) HFA Inhaler 1 Puff(s) Inhalation every 4 hours PRN for shortness of breath and/or wheezing  melatonin 3 milliGRAM(s) Oral at bedtime PRN Insomnia         Vitals log        ICU Vital Signs Last 24 Hrs  T(C): 36.7 (30 Jul 2024 04:39), Max: 36.7 (29 Jul 2024 20:46)  T(F): 98.1 (30 Jul 2024 04:39), Max: 98.1 (30 Jul 2024 04:39)  HR: 67 (30 Jul 2024 04:39) (67 - 85)  BP: 109/65 (30 Jul 2024 04:39) (105/52 - 123/55)  BP(mean): 72 (29 Jul 2024 15:54) (72 - 77)  ABP: --  ABP(mean): --  RR: 18 (30 Jul 2024 04:39) (15 - 20)  SpO2: 98% (30 Jul 2024 04:39) (95% - 100%)    O2 Parameters below as of 30 Jul 2024 04:39  Patient On (Oxygen Delivery Method): nasal cannula  O2 Flow (L/min): 3               Input and Output:  I&O's Detail    28 Jul 2024 07:01  -  29 Jul 2024 07:00  --------------------------------------------------------  IN:    Bumetanide: 65 mL  Total IN: 65 mL    OUT:    Incontinent per Condom Catheter (mL): 1000 mL  Total OUT: 1000 mL    Total NET: -935 mL      29 Jul 2024 07:01  -  30 Jul 2024 06:00  --------------------------------------------------------  IN:  Total IN: 0 mL    OUT:    Incontinent per Condom Catheter (mL): 700 mL    Other (mL): 1000 mL  Total OUT: 1700 mL    Total NET: -1700 mL          Lab Data                        8.6    8.86  )-----------( 253      ( 29 Jul 2024 07:47 )             28.1     07-29    140  |  98  |  61<H>  ----------------------------<  120<H>  4.5   |  38<H>  |  1.30    Ca    9.5      29 Jul 2024 07:47  Phos  4.8     07-29  Mg     2.4     07-29    TPro  7.8  /  Alb  3.7  /  TBili  0.6  /  DBili  x   /  AST  17  /  ALT  19  /  AlkPhos  67  07-29            Review of Systems	      Objective     Physical Examination    heart s1s2  lung dc BS  head nc      Pertinent Lab findings & Imaging      Chiki:  NO   Adequate UO     I&O's Detail    28 Jul 2024 07:01  -  29 Jul 2024 07:00  --------------------------------------------------------  IN:    Bumetanide: 65 mL  Total IN: 65 mL    OUT:    Incontinent per Condom Catheter (mL): 1000 mL  Total OUT: 1000 mL    Total NET: -935 mL      29 Jul 2024 07:01  -  30 Jul 2024 06:00  --------------------------------------------------------  IN:  Total IN: 0 mL    OUT:    Incontinent per Condom Catheter (mL): 700 mL    Other (mL): 1000 mL  Total OUT: 1700 mL    Total NET: -1700 mL               Discussed with:     Cultures:	        Radiology

## 2024-07-30 NOTE — PROGRESS NOTE ADULT - ASSESSMENT
79Fwith a history of systolic CHF, COPD, mitral valve stenosis, hypertension, high cholesterol, pleural effusion requiring thoracentesis presents with increasing shortness of breath over past 1 week noted to have increasing bibasilar effusions. Acute hypoxic respiratory failure 2/2  systolic CHF  exacerbation.

## 2024-07-30 NOTE — PHYSICAL THERAPY INITIAL EVALUATION ADULT - ADDITIONAL COMMENTS
Patient reports she lives in private home alone with one step to enter and flight of stairs inside to bedroom, however has bedroom and bathroom on first floor if needed. Patient reports being independent with no assistive device with all functional mobility and ADLs. Patient reports owning a single axis cane, rolling walker, raised toilet seat.

## 2024-07-30 NOTE — PROGRESS NOTE ADULT - ASSESSMENT
79-year-old female with a history of CHF, COPD, mitral valve stenosis, hypertension, high cholesterol, pleural effusion requiring thoracentesis presents with increasing shortness of breath     copd  chf  op  oa  effusion  atelectasis  dyspnea  valv heart disease  HTN  HLD    s/p thoracentesis - 1 L drained - transudate  vs noted  diuresis  steroid taper this am -     diuresis  cvs rx regimen optimization  I and O  serial labs  replete lytes  TTE reviewed  COPD - NEBS and Systemic steroids  VBG  tele monitoring  anxiolysis  o2 support as needed - goal sat > 88 pct  monitor VS and Sat  assist with needs  OLD record reviewed  CT chest reviewed with the patient

## 2024-07-30 NOTE — PHYSICAL THERAPY INITIAL EVALUATION ADULT - DIAGNOSIS, PT EVAL
Pt presents with impaired respiratory function and capacity, resulting in limited endurance which creates impairments in functional activities, and risk for deconditioning

## 2024-07-30 NOTE — PHYSICAL THERAPY INITIAL EVALUATION ADULT - PERTINENT HX OF CURRENT PROBLEM, REHAB EVAL
79-year-old female with a history of CHF, COPD, mitral valve stenosis, hypertension, high cholesterol, pleural effusion requiring thoracentesis presents with increasing shortness of breath over past 1 week.  Patient states was worse today.  Patient was found to be out of breath at home, came to the ED and improved with oxygen. Denies chest pain.  fever, chills, or cough. Endorses lower extremity edema bilaterally.  Denies weakness or dizziness.

## 2024-07-30 NOTE — PROGRESS NOTE ADULT - SUBJECTIVE AND OBJECTIVE BOX
Patient is a 79y old  Female who presents with a chief complaint of SOB (30 Jul 2024 10:47)    INTERVAL HPI/OVERNIGHT EVENTS: Patient seen and examined at bedside. No overnight events.  s/p thoracentesis.  Reports significant improvement in  SOB    MEDICATIONS  (STANDING):  albuterol/ipratropium for Nebulization 3 milliLiter(s) Nebulizer every 6 hours  atorvastatin 40 milliGRAM(s) Oral at bedtime  budesonide 160 MICROgram(s)/formoterol 4.5 MICROgram(s) Inhaler 2 Puff(s) Inhalation two times a day  furosemide   Injectable 40 milliGRAM(s) IV Push two times a day  levothyroxine 100 MICROGram(s) Oral daily  metoprolol succinate  milliGRAM(s) Oral every 12 hours  predniSONE   Tablet 20 milliGRAM(s) Oral daily    MEDICATIONS  (PRN):  albuterol    90 MICROgram(s) HFA Inhaler 1 Puff(s) Inhalation every 4 hours PRN for shortness of breath and/or wheezing  melatonin 3 milliGRAM(s) Oral at bedtime PRN Insomnia      Allergies    No Known Allergies    Intolerances        REVIEW OF SYSTEMS:  CONSTITUTIONAL: No fever or chills  CARDIOVASCULAR: No chest pain, palpitations  GASTROINTESTINAL: No abd pain, nausea, vomiting    Vital Signs Last 24 Hrs  T(C): 36.7 (30 Jul 2024 04:39), Max: 36.7 (29 Jul 2024 20:46)  T(F): 98.1 (30 Jul 2024 04:39), Max: 98.1 (30 Jul 2024 04:39)  HR: 67 (30 Jul 2024 04:39) (67 - 85)  BP: 109/65 (30 Jul 2024 04:39) (105/52 - 123/55)  BP(mean): 72 (29 Jul 2024 15:54) (72 - 77)  RR: 18 (30 Jul 2024 04:39) (15 - 20)  SpO2: 96% (30 Jul 2024 09:01) (95% - 100%)    Parameters below as of 30 Jul 2024 09:01  Patient On (Oxygen Delivery Method): nasal cannula, 2L      I&O's Summary    29 Jul 2024 07:01  -  30 Jul 2024 07:00  --------------------------------------------------------  IN: 0 mL / OUT: 1700 mL / NET: -1700 mL      BMI (kg/m2): 19.4 (07-27-24 @ 14:30)    PHYSICAL EXAM:  GENERAL: NAD frail,  thin  HEENT:  AT/NC, anicteric, moist mucous membranes, EOMI, PERRL conjunctiva and sclera clear  CHEST/LUNG:  diminished breath sounds throughout, no wheezing, poor respiratory effort, tight lungs, no intercostal retractions  HEART:  IRRR, S1, S2, 4/6 systolic murmurs; no pitting edema  ABDOMEN:  BS+, soft, nontender, nondistended  MSK/EXTREMITIES: palpable peripheral pulses, no clubbing or cyanosis  NERVOUS SYSTEM: answers questions and follows commands appropriately, A&Ox3 grossly moves all extremities   PSYCH: Appropriate affect, Alert & Awake; Good judgement      LABS: Personally reviewed  CBC                        8.5    9.10  )-----------( 235      ( 30 Jul 2024 05:30 )             26.8     CMP  07-30    139  |  97  |  75  ----------------------------<  102  4.5   |  35  |  1.30    Ca    9.3      30 Jul 2024 05:30  Phos  4.8     07-29  Mg     2.4     07-29    TPro  7.8  /  Alb  3.7  /  TBili  0.6  /  DBili  x   /  AST  17  /  ALT  19  /  AlkPhos  67  07-29          PT/INR - ( 27 Jul 2024 15:30 )   PT: 10.3 sec;   INR: 0.88 ratio         PTT - ( 27 Jul 2024 15:30 )  PTT:32.5 sec  Lactate, Blood: 1.2 mmol/L (07-27 @ 19:29)  Lactate, Blood: 2.1 mmol/L (07-27 @ 15:30)      CARDIAC MARKERS ( 27 Jul 2024 15:30 )  x     / 9.4 ng/L / 66 U/L / x     / x          06-11 Chol 124 mg/dL LDL -- HDL 60 mg/dL Trig 65 mg/dL    08:34 - VBG - pH: 7.42  | pCO2: 54    | pO2: 91    | Lactate:                      Urinalysis Basic - ( 30 Jul 2024 05:30 )    Color: x / Appearance: x / SG: x / pH: x  Gluc: 102 mg/dL / Ketone: x  / Bili: x / Urobili: x   Blood: x / Protein: x / Nitrite: x   Leuk Esterase: x / RBC: x / WBC x   Sq Epi: x / Non Sq Epi: x / Bacteria: x        Culture - Body Fluid with Gram Stain (collected 29 Jul 2024 15:40)  Source: Pleural Fl Pleural Fluid  Gram Stain (30 Jul 2024 04:07):    polymorphonuclear leukocytes seen    No organisms seen    by cytocentrifuge    Culture - Fungal, Body Fluid (collected 29 Jul 2024 15:40)  Source: Pleural Fl Pleural Fluid  Preliminary Report (30 Jul 2024 06:54):    Testing in progress    Culture - Blood (collected 27 Jul 2024 20:30)  Source: .Blood Blood-Peripheral  Preliminary Report (30 Jul 2024 04:01):    No growth at 48 Hours    Culture - Blood (collected 27 Jul 2024 20:20)  Source: .Blood Blood-Peripheral  Preliminary Report (30 Jul 2024 04:01):    No growth at 48 Hours            Culture - Body Fluid with Gram Stain (collected 07-29-24 @ 15:40)  Source: Pleural Fl Pleural Fluid  Gram Stain (07-30-24 @ 04:07):    polymorphonuclear leukocytes seen    No organisms seen    by cytocentrifuge    Culture - Fungal, Body Fluid (collected 07-29-24 @ 15:40)  Source: Pleural Fl Pleural Fluid  Preliminary Report (07-30-24 @ 06:54):    Testing in progress    Urinalysis with Rflx Culture (collected 07-27-24 @ 22:11)    Culture - Blood (collected 07-27-24 @ 20:30)  Source: .Blood Blood-Peripheral  Preliminary Report (07-30-24 @ 04:01):    No growth at 48 Hours    Culture - Blood (collected 07-27-24 @ 20:20)  Source: .Blood Blood-Peripheral  Preliminary Report (07-30-24 @ 04:01):    No growth at 48 Hours        RADIOLOGY & ADDITIONAL TESTS: Personally reviewed.     Consultant(s) Notes Reviewed:  [x] YES  [ ] NO   Discussed with SW/CM, RN

## 2024-07-30 NOTE — PROGRESS NOTE ADULT - PROBLEM SELECTOR PLAN 2
BUN 50, Cr 1.4 , baseline 1.1  - Monitor AM CMP  - avoid nephrotoxic  medications, continue Lasix IV  -  off Bumex gtt   -Dr Villaseñor consulted recs appreciated

## 2024-07-30 NOTE — PROGRESS NOTE ADULT - ASSESSMENT
79-year-old female with a history of CHF, COPD, severe mitral valve stenosis, LVOT obstruction, hypertension, high cholesterol, pleural effusion requiring thoracentesis presents with increasing shortness of breath over past 1 week.     ADHF (Diastolic Dysfunction)/Severe MS/Pleural Effusion  - Patient admitted with acute decompensated diastolic heart failure secondary to severe MS and LVOT obstruction  - Continue Toprol  mg BID  - Continue Bumex gtt at 1mg/hr.  Continue strict I/O's  -AM labs pending . fu renal recs   - Pulm following. sp IR right thoracentesis 1liter drained 7/29  - Encourage incentive spirometer.  Continue nebs and steroid per Pulm    - TTE on 6/10/24 showed hyperdynamic LVF, EF 79%, LVH, mod LVOT obstruction (resting gradient 41mmHg and 47 mmHg with Valsalva    - She has been deemed not a surgical candidate for valve replacement  - She was seen by structural heart at . She is not a candidate for TMVR via [place] trial as she does not have significant enough MR.  She was supposed to follow at [facility] for consideration for the [place] trial, but missed her appointment.  She needs to follow at [facility] after discharge.  This was explained to patient and family in detail    - Continue statin for Hx of HLD  - BP stable , off arb while diuresing , continue bb     - Monitor electrolytes, replete to keep K>4 and Mag>2  - Will continue to follow.  Patient is at risk for abrupt decompensation   79-year-old female with a history of CHF, COPD, severe mitral valve stenosis, LVOT obstruction, hypertension, high cholesterol, pleural effusion requiring thoracentesis presents with increasing shortness of breath over past 1 week.     ADHF (Diastolic Dysfunction)/Severe MS/Pleural Effusion  - Patient admitted with acute decompensated diastolic heart failure secondary to severe MS and LVOT obstruction  - Continue Toprol  mg BID  -on bumex gtt, now with increase in BUN and bicarb , fu renal would hold for now   - Pulm following. sp IR right thoracentesis 1liter drained 7/29  - Encourage incentive spirometer.  Continue nebs and steroid per Pulm    - TTE on 6/10/24 showed hyperdynamic LVF, EF 79%, LVH, mod LVOT obstruction (resting gradient 41mmHg and 47 mmHg with Valsalva    - She has been deemed not a surgical candidate for valve replacement  - She was seen by structural heart at . She is not a candidate for TMVR via [place] trial as she does not have significant enough MR.  She was supposed to follow at [facility] for consideration for the [place] trial, but missed her appointment.  She needs to follow at [facility] after discharge.  This was explained to patient and family in detail    - Continue statin for Hx of HLD  - BP stable , off arb while diuresing , continue bb     - Monitor electrolytes, replete to keep K>4 and Mag>2  - Will continue to follow.  Patient is at risk for abrupt decompensation

## 2024-07-30 NOTE — PROGRESS NOTE ADULT - ASSESSMENT
Acute on CKD Stage 3a  Acute on Chronic CHF  Pleural Effusions  COPD exacerbation  HTN with CKD        -YADI in the setting of cardiorenal syndrome  -urine indices reviewed  -Hold off renal imaging for now  -Bumex gtt, tolerating thus far from renal perspective; can change to BID dosing at this point; 1mg BID IV  -S/p Thora 7/29/24  -Pulm/cardio eval noted  -Monitor urine output  -BP stable  -Daily chem; creatinine improved; BUN remains high due to active diuresis but asymptomatic  -Upon DC recommend torsemide for maintenance    Thank you    D/[facility]

## 2024-07-30 NOTE — PROGRESS NOTE ADULT - PROBLEM SELECTOR PLAN 6
chronic  - duonebs q6, symbicort   - continue steroid taper per pulm - Prednisone 20mg daily x  5  days  - wean O2 as tolerated  -dr natarajan consulted, recs appreciated

## 2024-07-30 NOTE — PROGRESS NOTE ADULT - PROBLEM SELECTOR PLAN 1
Patient has a history of CHF with pleural effusion requiring U/S guided thoracentesis   - CT chest shows increasing bibasilar pleural effusions   - On continuous pulse ox with remote telemetry   - Pulmonologist Dr. Ngo consulted- s/p thoracentesis with IR (R side) 7/29  1 L drained  - Serial chest xrays  - Cardio consult w/ Dr. Woodson recs appreciated.   - Discussed with cardio Dr Salguero  and nephrology Dr lala, stop bumex gtt will switch  to IV lasix BID - transition to PO Torsemide once  closer to dc   - continue  metoprolol Patient has a history of CHF with pleural effusion requiring U/S guided thoracentesis   - CT chest shows increasing bibasilar pleural effusions   - On continuous pulse ox with remote telemetry   - Pulmonologist Dr. Ngo consulted- s/p thoracentesis with IR (R side) 7/29  1 L drained  - Serial chest xrays  - Cardio consult w/ Dr. Woodson recs appreciated.   - Discussed with cardio Dr Salguero  and nephrology Dr lala, stop bumex gtt will switch  to IV BID - transition to PO Torsemide once  closer to dc   - continue  metoprolol

## 2024-07-31 LAB
ANION GAP SERPL CALC-SCNC: 6 MMOL/L — SIGNIFICANT CHANGE UP (ref 5–17)
APPEARANCE UR: CLEAR — SIGNIFICANT CHANGE UP
BILIRUB UR-MCNC: NEGATIVE — SIGNIFICANT CHANGE UP
BUN SERPL-MCNC: 87 MG/DL — HIGH (ref 7–23)
CALCIUM SERPL-MCNC: 9.4 MG/DL — SIGNIFICANT CHANGE UP (ref 8.5–10.1)
CHLORIDE SERPL-SCNC: 94 MMOL/L — LOW (ref 96–108)
CO2 SERPL-SCNC: 41 MMOL/L — HIGH (ref 22–31)
COLOR SPEC: YELLOW — SIGNIFICANT CHANGE UP
CREAT SERPL-MCNC: 1.4 MG/DL — HIGH (ref 0.5–1.3)
DIFF PNL FLD: NEGATIVE — SIGNIFICANT CHANGE UP
EGFR: 38 ML/MIN/1.73M2 — LOW
EPI CELLS # UR: PRESENT
GLUCOSE SERPL-MCNC: 90 MG/DL — SIGNIFICANT CHANGE UP (ref 70–99)
GLUCOSE UR QL: NEGATIVE MG/DL — SIGNIFICANT CHANGE UP
HCT VFR BLD CALC: 29.2 % — LOW (ref 34.5–45)
HGB BLD-MCNC: 9.2 G/DL — LOW (ref 11.5–15.5)
KETONES UR-MCNC: NEGATIVE MG/DL — SIGNIFICANT CHANGE UP
LEUKOCYTE ESTERASE UR-ACNC: NEGATIVE — SIGNIFICANT CHANGE UP
MCHC RBC-ENTMCNC: 30.6 PG — SIGNIFICANT CHANGE UP (ref 27–34)
MCHC RBC-ENTMCNC: 31.5 GM/DL — LOW (ref 32–36)
MCV RBC AUTO: 97 FL — SIGNIFICANT CHANGE UP (ref 80–100)
NITRITE UR-MCNC: NEGATIVE — SIGNIFICANT CHANGE UP
NRBC # BLD: 0 /100 WBCS — SIGNIFICANT CHANGE UP (ref 0–0)
PH UR: 6.5 — SIGNIFICANT CHANGE UP (ref 5–8)
PLATELET # BLD AUTO: 249 K/UL — SIGNIFICANT CHANGE UP (ref 150–400)
POTASSIUM SERPL-MCNC: 3.6 MMOL/L — SIGNIFICANT CHANGE UP (ref 3.5–5.3)
POTASSIUM SERPL-SCNC: 3.6 MMOL/L — SIGNIFICANT CHANGE UP (ref 3.5–5.3)
PROT UR-MCNC: NEGATIVE MG/DL — SIGNIFICANT CHANGE UP
RBC # BLD: 3.01 M/UL — LOW (ref 3.8–5.2)
RBC # FLD: 13.8 % — SIGNIFICANT CHANGE UP (ref 10.3–14.5)
RBC CASTS # UR COMP ASSIST: 0 /HPF — SIGNIFICANT CHANGE UP (ref 0–4)
SODIUM SERPL-SCNC: 141 MMOL/L — SIGNIFICANT CHANGE UP (ref 135–145)
SP GR SPEC: 1.01 — SIGNIFICANT CHANGE UP (ref 1–1.03)
UROBILINOGEN FLD QL: 0.2 MG/DL — SIGNIFICANT CHANGE UP (ref 0.2–1)
WBC # BLD: 10.95 K/UL — HIGH (ref 3.8–10.5)
WBC # FLD AUTO: 10.95 K/UL — HIGH (ref 3.8–10.5)
WBC UR QL: 0 /HPF — SIGNIFICANT CHANGE UP (ref 0–5)

## 2024-07-31 PROCEDURE — 99233 SBSQ HOSP IP/OBS HIGH 50: CPT | Mod: GC

## 2024-07-31 PROCEDURE — 99233 SBSQ HOSP IP/OBS HIGH 50: CPT

## 2024-07-31 PROCEDURE — 71045 X-RAY EXAM CHEST 1 VIEW: CPT | Mod: 26

## 2024-07-31 RX ORDER — ACETAZOLAMIDE 250 MG
250 TABLET ORAL
Refills: 0 | Status: DISCONTINUED | OUTPATIENT
Start: 2024-07-31 | End: 2024-07-31

## 2024-07-31 RX ORDER — ALBUMIN HUMAN 25 %
50 INTRAVENOUS SOLUTION INTRAVENOUS ONCE
Refills: 0 | Status: COMPLETED | OUTPATIENT
Start: 2024-07-31 | End: 2024-07-31

## 2024-07-31 RX ORDER — ACETAZOLAMIDE 250 MG
250 TABLET ORAL EVERY 12 HOURS
Refills: 0 | Status: COMPLETED | OUTPATIENT
Start: 2024-07-31 | End: 2024-08-01

## 2024-07-31 RX ORDER — TORSEMIDE 20 MG
20 TABLET ORAL EVERY 12 HOURS
Refills: 0 | Status: DISCONTINUED | OUTPATIENT
Start: 2024-07-31 | End: 2024-08-02

## 2024-07-31 RX ADMIN — BUMETANIDE 1 MILLIGRAM(S): 0.5 TABLET ORAL at 05:17

## 2024-07-31 RX ADMIN — Medication 20 MILLIGRAM(S): at 18:16

## 2024-07-31 RX ADMIN — Medication 250 MILLIGRAM(S): at 17:39

## 2024-07-31 RX ADMIN — HEPARIN SODIUM 5000 UNIT(S): 1000 INJECTION, SOLUTION INTRAVENOUS; SUBCUTANEOUS at 05:20

## 2024-07-31 RX ADMIN — Medication 100 MILLIGRAM(S): at 05:20

## 2024-07-31 RX ADMIN — ATORVASTATIN CALCIUM 40 MILLIGRAM(S): 40 TABLET, FILM COATED ORAL at 21:07

## 2024-07-31 RX ADMIN — BUDESONIDE AND FORMOTEROL FUMARATE DIHYDRATE 2 PUFF(S): 80; 4.5 AEROSOL RESPIRATORY (INHALATION) at 05:18

## 2024-07-31 RX ADMIN — IPRATROPIUM BROMIDE AND ALBUTEROL SULFATE 3 MILLILITER(S): 2.5; .5 SOLUTION RESPIRATORY (INHALATION) at 20:08

## 2024-07-31 RX ADMIN — IPRATROPIUM BROMIDE AND ALBUTEROL SULFATE 3 MILLILITER(S): 2.5; .5 SOLUTION RESPIRATORY (INHALATION) at 07:52

## 2024-07-31 RX ADMIN — IPRATROPIUM BROMIDE AND ALBUTEROL SULFATE 3 MILLILITER(S): 2.5; .5 SOLUTION RESPIRATORY (INHALATION) at 00:55

## 2024-07-31 RX ADMIN — Medication 50 MILLILITER(S): at 10:24

## 2024-07-31 RX ADMIN — IPRATROPIUM BROMIDE AND ALBUTEROL SULFATE 3 MILLILITER(S): 2.5; .5 SOLUTION RESPIRATORY (INHALATION) at 14:01

## 2024-07-31 RX ADMIN — Medication 100 MICROGRAM(S): at 05:18

## 2024-07-31 RX ADMIN — HEPARIN SODIUM 5000 UNIT(S): 1000 INJECTION, SOLUTION INTRAVENOUS; SUBCUTANEOUS at 17:39

## 2024-07-31 RX ADMIN — BUDESONIDE AND FORMOTEROL FUMARATE DIHYDRATE 2 PUFF(S): 80; 4.5 AEROSOL RESPIRATORY (INHALATION) at 17:40

## 2024-07-31 RX ADMIN — Medication 3 MILLIGRAM(S): at 21:08

## 2024-07-31 RX ADMIN — Medication 100 MILLIGRAM(S): at 17:39

## 2024-07-31 RX ADMIN — PREDNISONE 20 MILLIGRAM(S): 10 TABLET ORAL at 05:18

## 2024-07-31 NOTE — PROGRESS NOTE ADULT - PROBLEM SELECTOR PLAN 1
Patient has a history of CHF with pleural effusion requiring U/S guided thoracentesis   - CT chest shows increasing bibasilar pleural effusions   - On continuous pulse ox with remote telemetry   - Pulmonologist Dr. Ngo consulted- s/p thoracentesis with IR (R side) 7/29  1 L drained  - Serial chest xrays  - Cardio consult w/ Dr. Woodson recs appreciated.   - Discussed with cardio Dr Salguero  and nephrology Dr lala, stop bumex gtt will switch  to IV BID - transition to PO Torsemide once  closer to dc   - continue  metoprolol Patient has a history of CHF with pleural effusion requiring U/S guided thoracentesis   - CT chest shows increasing bibasilar pleural effusions   - On continuous pulse ox with remote telemetry   - Cardio consult w/ Dr. Woodson, recs appreciated.   - continue metoprolol BID  - Discussed with cardio Dr Salguero and nephrology Dr lala -> received IV Bumex x1 this AM -> transition to PO torsemide 20mg BID this evening  - Pulmonologist Dr. Ngo consulted- s/p thoracentesis with IR (R side) 7/29, 1 L drained  - X-ray done 7/31, appears stable compared to previous CXR s/p thoracentesis, f/u official read With acute on chronic diastolic CHF secondary to severe MS with pleural effusion requiring U/S guided thoracentesis   - CT chest shows increasing bibasilar pleural effusions   - Previous TTE 6/2024 reviewed with severe MS/LVOT - EF hyperdynamic EF 79% - no need to repeat  - On continuous pulse ox with remote telemetry   - Cardio consult w/ Dr. Woodson, recs appreciated.   - continue metoprolol BID  - Discussed with cardio Dr Woodson and nephrology Dr lala -> received IV Bumex x1 this AM -> transition to PO torsemide 20mg BID this evening  - Pulmonologist Dr. Ngo consulted- s/p thoracentesis with IR (R side) 7/29, 1 L drained  - X-ray done 7/31, appears stable compared to previous CXR s/p thoracentesis, f/u official read  - S/p bumex gtt to IV bumex BID and now on PO torsemide  - Patient not a candidate for [place] trial. Made appt with [facility] 8/20 to evaluate for [place] trial.  - To further discuss GOC after establishing candidacy for TMVR at [facility] as patient is at risk for recurrent rehospitalizations for decompensated HF.

## 2024-07-31 NOTE — PROGRESS NOTE ADULT - ASSESSMENT
Acute on CKD Stage 3a  Acute on Chronic CHF  Pleural Effusions  COPD exacerbation  HTN with CKD  Dysuria      -YADI in the setting of cardiorenal syndrome  -urine indices reviewed  -Hold off renal imaging for now  -Bumex gtt, tolerating thus far from renal perspective; can change to BID dosing at this point; 1mg BID IV  -Now with worsening alkalosis; Add Diamox 250mg IV BID (refer to [facility] trial)  -S/p Thora 7/29/24  -Pulm/cardio eval noted  -Monitor urine output  -BP stable  -Daily chem; Creatinine slowly climbing; BUN high due to active diuresis  -Repeat CXR  -Check urine studies due to new onset dysuria; consider Abx therapy    Thank you    D/W Primary

## 2024-07-31 NOTE — PROGRESS NOTE ADULT - ASSESSMENT
79-year-old female with a history of CHF, COPD, mitral valve stenosis, hypertension, high cholesterol, pleural effusion requiring thoracentesis presents with increasing shortness of breath     copd  chf  op  oa  effusion  atelectasis  dyspnea  valv heart disease  HTN  HLD    s/p thoracentesis - 1 L drained - transudate  vs noted  diuresis  short course of Prednisone    diuresis  cvs rx regimen optimization  I and O  serial labs  replete lytes  TTE reviewed  COPD - NEBS and Systemic steroids  VBG  tele monitoring  anxiolysis  o2 support as needed - goal sat > 88 pct  monitor VS and Sat  assist with needs  OLD record reviewed  CT chest reviewed with the patient

## 2024-07-31 NOTE — PROGRESS NOTE ADULT - SUBJECTIVE AND OBJECTIVE BOX
Patient is a 79y old  Female who presents with a chief complaint of SOB (31 Jul 2024 08:23)      Subjective:  INTERVAL HPI/OVERNIGHT EVENTS: Patient seen and examined at bedside. No overnight events. Reports slight worsening of her shortness of breath this morning,     MEDICATIONS  (STANDING):  acetaZOLAMIDE Injectable 250 milliGRAM(s) IV Push every 12 hours  albuterol/ipratropium for Nebulization 3 milliLiter(s) Nebulizer every 6 hours  atorvastatin 40 milliGRAM(s) Oral at bedtime  budesonide 160 MICROgram(s)/formoterol 4.5 MICROgram(s) Inhaler 2 Puff(s) Inhalation two times a day  buMETAnide Injectable 1 milliGRAM(s) IV Push two times a day  heparin   Injectable 5000 Unit(s) SubCutaneous every 12 hours  levothyroxine 100 MICROGram(s) Oral daily  metoprolol succinate  milliGRAM(s) Oral every 12 hours  predniSONE   Tablet 20 milliGRAM(s) Oral daily    MEDICATIONS  (PRN):  albuterol    90 MICROgram(s) HFA Inhaler 1 Puff(s) Inhalation every 4 hours PRN for shortness of breath and/or wheezing  melatonin 3 milliGRAM(s) Oral at bedtime PRN Insomnia      Allergies    No Known Allergies    Intolerances        REVIEW OF SYSTEMS:  CONSTITUTIONAL: No fever or chills  HEENT:  No headache, no sore throat  RESPIRATORY: (+) shortness of breath, denies cough or wheezing  CARDIOVASCULAR: No chest pain, palpitations  GASTROINTESTINAL: No abd pain, nausea, vomiting, or diarrhea  GENITOURINARY: No dysuria, frequency, or hematuria  NEUROLOGICAL: no focal weakness or dizziness  MUSCULOSKELETAL: no myalgias     Objective:  Vital Signs Last 24 Hrs  T(C): 36.4 (31 Jul 2024 05:36), Max: 36.5 (30 Jul 2024 12:16)  T(F): 97.5 (31 Jul 2024 05:36), Max: 97.7 (30 Jul 2024 12:16)  HR: 80 (31 Jul 2024 08:42) (76 - 90)  BP: 103/52 (31 Jul 2024 05:36) (102/55 - 119/59)  BP(mean): --  RR: 16 (31 Jul 2024 05:36) (16 - 18)  SpO2: 88% (31 Jul 2024 08:42) (88% - 98%)    Parameters below as of 31 Jul 2024 08:42  Patient On (Oxygen Delivery Method): nasal cannula      PHYSICAL EXAM:  GENERAL: thin, elderly female in NAD, frail  HEENT:  AT/NC, anicteric, moist mucous membranes  CHEST/LUNG:  diminished breath sounds b/l, no wheezing  HEART:  Regular rate and rhythm, S1, S2, systolic murmur present  ABDOMEN:  soft, nontender, nondistended  MSK/EXTREMITIES: palpable peripheral pulses, no clubbing or cyanosis  NERVOUS SYSTEM: answers questions and follows commands appropriately, A&Ox3, grossly moves all extremities   PSYCH: Appropriate affect, Alert & Awake      LABS:                        9.2    10.95 )-----------( 249      ( 31 Jul 2024 05:15 )             29.2     CBC Full  -  ( 31 Jul 2024 05:15 )  WBC Count : 10.95 K/uL  Hemoglobin : 9.2 g/dL  Hematocrit : 29.2 %  Platelet Count - Automated : 249 K/uL  Mean Cell Volume : 97.0 fl  Mean Cell Hemoglobin : 30.6 pg  Mean Cell Hemoglobin Concentration : 31.5 gm/dL  Auto Neutrophil # : x  Auto Lymphocyte # : x  Auto Monocyte # : x  Auto Eosinophil # : x  Auto Basophil # : x  Auto Neutrophil % : x  Auto Lymphocyte % : x  Auto Monocyte % : x  Auto Eosinophil % : x  Auto Basophil % : x    31 Jul 2024 05:15    141    |  94     |  87     ----------------------------<  90     3.6     |  41     |  1.40     Ca    9.4        31 Jul 2024 05:15        Urinalysis Basic - ( 31 Jul 2024 05:15 )    Color: x / Appearance: x / SG: x / pH: x  Gluc: 90 mg/dL / Ketone: x  / Bili: x / Urobili: x   Blood: x / Protein: x / Nitrite: x   Leuk Esterase: x / RBC: x / WBC x   Sq Epi: x / Non Sq Epi: x / Bacteria: x      CAPILLARY BLOOD GLUCOSE            Culture - Body Fluid with Gram Stain (collected 07-29-24 @ 15:40)  Source: Pleural Fl Pleural Fluid  Gram Stain (07-30-24 @ 04:07):    polymorphonuclear leukocytes seen    No organisms seen    by cytocentrifuge  Preliminary Report (07-30-24 @ 19:04):    No growth    Culture - Fungal, Body Fluid (collected 07-29-24 @ 15:40)  Source: Pleural Fl Pleural Fluid  Preliminary Report (07-30-24 @ 06:54):    Testing in progress    Urinalysis with Rflx Culture (collected 07-27-24 @ 22:11)    Culture - Blood (collected 07-27-24 @ 20:30)  Source: .Blood Blood-Peripheral  Preliminary Report (07-31-24 @ 04:00):    No growth at 72 Hours    Culture - Blood (collected 07-27-24 @ 20:20)  Source: .Blood Blood-Peripheral  Preliminary Report (07-31-24 @ 04:00):    No growth at 72 Hours        RADIOLOGY & ADDITIONAL TESTS:    Personally reviewed.     Consultant(s) Notes Reviewed:  [x] YES  [ ] NO     Patient is a 79y old  Female who presents with a chief complaint of SOB (31 Jul 2024 08:23)      Subjective:  INTERVAL HPI/OVERNIGHT EVENTS: Patient seen and examined at bedside. No overnight events. Reports slight worsening of her shortness of breath this morning, otherwise denies any other complaints.     MEDICATIONS  (STANDING):  acetaZOLAMIDE Injectable 250 milliGRAM(s) IV Push every 12 hours  albuterol/ipratropium for Nebulization 3 milliLiter(s) Nebulizer every 6 hours  atorvastatin 40 milliGRAM(s) Oral at bedtime  budesonide 160 MICROgram(s)/formoterol 4.5 MICROgram(s) Inhaler 2 Puff(s) Inhalation two times a day  buMETAnide Injectable 1 milliGRAM(s) IV Push two times a day  heparin   Injectable 5000 Unit(s) SubCutaneous every 12 hours  levothyroxine 100 MICROGram(s) Oral daily  metoprolol succinate  milliGRAM(s) Oral every 12 hours  predniSONE   Tablet 20 milliGRAM(s) Oral daily    MEDICATIONS  (PRN):  albuterol    90 MICROgram(s) HFA Inhaler 1 Puff(s) Inhalation every 4 hours PRN for shortness of breath and/or wheezing  melatonin 3 milliGRAM(s) Oral at bedtime PRN Insomnia      Allergies    No Known Allergies    Intolerances        REVIEW OF SYSTEMS:  CONSTITUTIONAL: No fever or chills  HEENT:  No headache, no sore throat  RESPIRATORY: (+) shortness of breath, denies cough or wheezing  CARDIOVASCULAR: No chest pain, palpitations  GASTROINTESTINAL: No abd pain, nausea, vomiting, or diarrhea  GENITOURINARY: No dysuria, frequency, or hematuria  NEUROLOGICAL: no focal weakness or dizziness  MUSCULOSKELETAL: no myalgias     Objective:  Vital Signs Last 24 Hrs  T(C): 36.4 (31 Jul 2024 05:36), Max: 36.5 (30 Jul 2024 12:16)  T(F): 97.5 (31 Jul 2024 05:36), Max: 97.7 (30 Jul 2024 12:16)  HR: 80 (31 Jul 2024 08:42) (76 - 90)  BP: 103/52 (31 Jul 2024 05:36) (102/55 - 119/59)  BP(mean): --  RR: 16 (31 Jul 2024 05:36) (16 - 18)  SpO2: 88% (31 Jul 2024 08:42) (88% - 98%)    Parameters below as of 31 Jul 2024 08:42  Patient On (Oxygen Delivery Method): nasal cannula      PHYSICAL EXAM:  GENERAL: thin, elderly female in NAD, frail  HEENT:  AT/NC, anicteric, moist mucous membranes  CHEST/LUNG:  diminished breath sounds b/l, no wheezing  HEART:  Regular rate and rhythm, S1, S2, systolic murmur present  ABDOMEN:  soft, nontender, nondistended  MSK/EXTREMITIES: palpable peripheral pulses, no clubbing or cyanosis  NERVOUS SYSTEM: answers questions and follows commands appropriately, A&Ox3, grossly moves all extremities   PSYCH: Appropriate affect, Alert & Awake      LABS:                        9.2    10.95 )-----------( 249      ( 31 Jul 2024 05:15 )             29.2     CBC Full  -  ( 31 Jul 2024 05:15 )  WBC Count : 10.95 K/uL  Hemoglobin : 9.2 g/dL  Hematocrit : 29.2 %  Platelet Count - Automated : 249 K/uL  Mean Cell Volume : 97.0 fl  Mean Cell Hemoglobin : 30.6 pg  Mean Cell Hemoglobin Concentration : 31.5 gm/dL  Auto Neutrophil # : x  Auto Lymphocyte # : x  Auto Monocyte # : x  Auto Eosinophil # : x  Auto Basophil # : x  Auto Neutrophil % : x  Auto Lymphocyte % : x  Auto Monocyte % : x  Auto Eosinophil % : x  Auto Basophil % : x    31 Jul 2024 05:15    141    |  94     |  87     ----------------------------<  90     3.6     |  41     |  1.40     Ca    9.4        31 Jul 2024 05:15        Urinalysis Basic - ( 31 Jul 2024 05:15 )    Color: x / Appearance: x / SG: x / pH: x  Gluc: 90 mg/dL / Ketone: x  / Bili: x / Urobili: x   Blood: x / Protein: x / Nitrite: x   Leuk Esterase: x / RBC: x / WBC x   Sq Epi: x / Non Sq Epi: x / Bacteria: x      CAPILLARY BLOOD GLUCOSE            Culture - Body Fluid with Gram Stain (collected 07-29-24 @ 15:40)  Source: Pleural Fl Pleural Fluid  Gram Stain (07-30-24 @ 04:07):    polymorphonuclear leukocytes seen    No organisms seen    by cytocentrifuge  Preliminary Report (07-30-24 @ 19:04):    No growth    Culture - Fungal, Body Fluid (collected 07-29-24 @ 15:40)  Source: Pleural Fl Pleural Fluid  Preliminary Report (07-30-24 @ 06:54):    Testing in progress    Urinalysis with Rflx Culture (collected 07-27-24 @ 22:11)    Culture - Blood (collected 07-27-24 @ 20:30)  Source: .Blood Blood-Peripheral  Preliminary Report (07-31-24 @ 04:00):    No growth at 72 Hours    Culture - Blood (collected 07-27-24 @ 20:20)  Source: .Blood Blood-Peripheral  Preliminary Report (07-31-24 @ 04:00):    No growth at 72 Hours        RADIOLOGY & ADDITIONAL TESTS:    Personally reviewed.     Consultant(s) Notes Reviewed:  [x] YES  [ ] NO     Patient is a 79y old  Female who presents with a chief complaint of SOB (31 Jul 2024 08:23)    Subjective:  INTERVAL HPI/OVERNIGHT EVENTS: Patient seen and examined at bedside. No overnight events. Reports slight worsening of her shortness of breath this morning, otherwise denies any other complaints.     Tolerating IV bumex. Bicarb trending up. Patient made appt with [facility] for [facility] trial to address her severe MS with LVOT.    MEDICATIONS  (STANDING):  acetaZOLAMIDE Injectable 250 milliGRAM(s) IV Push every 12 hours  albuterol/ipratropium for Nebulization 3 milliLiter(s) Nebulizer every 6 hours  atorvastatin 40 milliGRAM(s) Oral at bedtime  budesonide 160 MICROgram(s)/formoterol 4.5 MICROgram(s) Inhaler 2 Puff(s) Inhalation two times a day  buMETAnide Injectable 1 milliGRAM(s) IV Push two times a day  heparin   Injectable 5000 Unit(s) SubCutaneous every 12 hours  levothyroxine 100 MICROGram(s) Oral daily  metoprolol succinate  milliGRAM(s) Oral every 12 hours  predniSONE   Tablet 20 milliGRAM(s) Oral daily    MEDICATIONS  (PRN):  albuterol    90 MICROgram(s) HFA Inhaler 1 Puff(s) Inhalation every 4 hours PRN for shortness of breath and/or wheezing  melatonin 3 milliGRAM(s) Oral at bedtime PRN Insomnia      Allergies    No Known Allergies    Intolerances        REVIEW OF SYSTEMS:  CONSTITUTIONAL: No fever or chills  HEENT:  No headache, no sore throat  RESPIRATORY: (+) shortness of breath, denies cough or wheezing  CARDIOVASCULAR: No chest pain, palpitations  GASTROINTESTINAL: No abd pain, nausea, vomiting, or diarrhea  GENITOURINARY: No dysuria, frequency, or hematuria  NEUROLOGICAL: no focal weakness or dizziness  MUSCULOSKELETAL: no myalgias     Objective:  Vital Signs Last 24 Hrs  T(C): 36.4 (31 Jul 2024 05:36), Max: 36.5 (30 Jul 2024 12:16)  T(F): 97.5 (31 Jul 2024 05:36), Max: 97.7 (30 Jul 2024 12:16)  HR: 80 (31 Jul 2024 08:42) (76 - 90)  BP: 103/52 (31 Jul 2024 05:36) (102/55 - 119/59)  BP(mean): --  RR: 16 (31 Jul 2024 05:36) (16 - 18)  SpO2: 88% (31 Jul 2024 08:42) (88% - 98%)    Parameters below as of 31 Jul 2024 08:42  Patient On (Oxygen Delivery Method): nasal cannula      PHYSICAL EXAM:  GENERAL: thin, elderly female in NAD, frail  HEENT:  AT/NC, anicteric, moist mucous membranes  CHEST/LUNG:  diminished breath sounds b/l, no wheezing  HEART:  Regular rate and rhythm, S1, S2, systolic murmur present  ABDOMEN:  soft, nontender, nondistended  MSK/EXTREMITIES: palpable peripheral pulses, no clubbing or cyanosis  NERVOUS SYSTEM: answers questions and follows commands appropriately, A&Ox3, grossly moves all extremities   PSYCH: Appropriate affect, Alert & Awake      LABS:                        9.2    10.95 )-----------( 249      ( 31 Jul 2024 05:15 )             29.2     CBC Full  -  ( 31 Jul 2024 05:15 )  WBC Count : 10.95 K/uL  Hemoglobin : 9.2 g/dL  Hematocrit : 29.2 %  Platelet Count - Automated : 249 K/uL  Mean Cell Volume : 97.0 fl  Mean Cell Hemoglobin : 30.6 pg  Mean Cell Hemoglobin Concentration : 31.5 gm/dL  Auto Neutrophil # : x  Auto Lymphocyte # : x  Auto Monocyte # : x  Auto Eosinophil # : x  Auto Basophil # : x  Auto Neutrophil % : x  Auto Lymphocyte % : x  Auto Monocyte % : x  Auto Eosinophil % : x  Auto Basophil % : x    31 Jul 2024 05:15    141    |  94     |  87     ----------------------------<  90     3.6     |  41     |  1.40     Ca    9.4        31 Jul 2024 05:15        Urinalysis Basic - ( 31 Jul 2024 05:15 )    Color: x / Appearance: x / SG: x / pH: x  Gluc: 90 mg/dL / Ketone: x  / Bili: x / Urobili: x   Blood: x / Protein: x / Nitrite: x   Leuk Esterase: x / RBC: x / WBC x   Sq Epi: x / Non Sq Epi: x / Bacteria: x      CAPILLARY BLOOD GLUCOSE            Culture - Body Fluid with Gram Stain (collected 07-29-24 @ 15:40)  Source: Pleural Fl Pleural Fluid  Gram Stain (07-30-24 @ 04:07):    polymorphonuclear leukocytes seen    No organisms seen    by cytocentrifuge  Preliminary Report (07-30-24 @ 19:04):    No growth    Culture - Fungal, Body Fluid (collected 07-29-24 @ 15:40)  Source: Pleural Fl Pleural Fluid  Preliminary Report (07-30-24 @ 06:54):    Testing in progress    Urinalysis with Rflx Culture (collected 07-27-24 @ 22:11)    Culture - Blood (collected 07-27-24 @ 20:30)  Source: .Blood Blood-Peripheral  Preliminary Report (07-31-24 @ 04:00):    No growth at 72 Hours    Culture - Blood (collected 07-27-24 @ 20:20)  Source: .Blood Blood-Peripheral  Preliminary Report (07-31-24 @ 04:00):    No growth at 72 Hours        RADIOLOGY & ADDITIONAL TESTS:    Personally reviewed.     Consultant(s) Notes Reviewed:  [x] YES  [ ] NO

## 2024-07-31 NOTE — PROGRESS NOTE ADULT - ASSESSMENT
79Fwith a history of systolic CHF, COPD, mitral valve stenosis, hypertension, high cholesterol, pleural effusion requiring thoracentesis presents with increasing shortness of breath over past 1 week noted to have increasing bibasilar effusions. Acute hypoxic respiratory failure 2/2  systolic CHF  exacerbation.   79Fwith a history of diastolic CHF, COPD, mitral valve stenosis, hypertension, high cholesterol, pleural effusion requiring thoracentesis presents with increasing shortness of breath over past 1 week noted to have increasing bibasilar effusions. Acute on chronic hypoxic/hypercapnic respiratory failure 2/2 diastolic CHF exacerbation secondary to severe MS.

## 2024-07-31 NOTE — PROGRESS NOTE ADULT - SUBJECTIVE AND OBJECTIVE BOX
Patient is a 79y old  Female who presents with a chief complaint of SOB (28 Jul 2024 06:18)       HPI:   79-year-old female with a history of CHF, COPD, mitral valve stenosis, hypertension, high cholesterol, pleural effusion requiring thoracentesis presents with increasing shortness of breath over past 1 week.  Patient states was worse today.  Patient was found to be out of breath at home, came to the ED and improved with oxygen. Denies chest pain.  fever, chills, or cough. Endorses lower extremity edema bilaterally.  Denies weakness or dizziness.  ED COURSE:  Vitals: T 96.8  F , 70  HR  ,  /65 , RR 18  , SpO2  80% on 4L NC  Labs significant for: Hgb 10.3, d-dimer 381, BUN 50, Cr 1.4, Lactate 2.1 -> 1.2, Troponin 9.4, BNP= 4367  Imaging: bibasilar effusions  Pt received: Albuterol, methylprednisolone   (27 Jul 2024 19:40)    Renal consulted for YADI and need for diuresis. Chart reviewed. Patient urinating well. Still SOB and is wheezing. Denies prior kidney issues.      Still SOB with desaturations  Complaining of burning with urine as well    PAST MEDICAL & SURGICAL HISTORY:  History of COPD  No home O2 use      Asthma      Thyroid nodule      Hyperlipidemia      Hypertension      Hypothyroidism      History of cholecystectomy  1989      History of repair of hip fracture  ORIF 1982.  Hardware was eventually removed           FAMILY HISTORY:  FH: CAD (coronary artery disease) (Mother)    NC    Social History:Non smoker    MEDICATIONS  (STANDING):  acetaZOLAMIDE  IVPB 250 milliGRAM(s) IV Intermittent two times a day  albumin human 25% IVPB 50 milliLiter(s) IV Intermittent once  albuterol/ipratropium for Nebulization 3 milliLiter(s) Nebulizer every 6 hours  atorvastatin 40 milliGRAM(s) Oral at bedtime  budesonide 160 MICROgram(s)/formoterol 4.5 MICROgram(s) Inhaler 2 Puff(s) Inhalation two times a day  buMETAnide Injectable 1 milliGRAM(s) IV Push two times a day  heparin   Injectable 5000 Unit(s) SubCutaneous every 12 hours  levothyroxine 100 MICROGram(s) Oral daily  metoprolol succinate  milliGRAM(s) Oral every 12 hours  predniSONE   Tablet 20 milliGRAM(s) Oral daily    MEDICATIONS  (PRN):  albuterol    90 MICROgram(s) HFA Inhaler 1 Puff(s) Inhalation every 4 hours PRN for shortness of breath and/or wheezing  melatonin 3 milliGRAM(s) Oral at bedtime PRN Insomnia  every 4 hours PRN for shortness of breath and/or wheezing  melatonin 3 milliGRAM(s) Oral at bedtime PRN Insomnia    Allergies    No Known Allergies    Intolerances         REVIEW OF SYSTEMS:  as above    MEDICATIONS  (STANDING):  acetaZOLAMIDE  IVPB 250 milliGRAM(s) IV Intermittent two times a day  albumin human 25% IVPB 50 milliLiter(s) IV Intermittent once  albuterol/ipratropium for Nebulization 3 milliLiter(s) Nebulizer every 6 hours  atorvastatin 40 milliGRAM(s) Oral at bedtime  budesonide 160 MICROgram(s)/formoterol 4.5 MICROgram(s) Inhaler 2 Puff(s) Inhalation two times a day  buMETAnide Injectable 1 milliGRAM(s) IV Push two times a day  heparin   Injectable 5000 Unit(s) SubCutaneous every 12 hours  levothyroxine 100 MICROGram(s) Oral daily  metoprolol succinate  milliGRAM(s) Oral every 12 hours  predniSONE   Tablet 20 milliGRAM(s) Oral daily    MEDICATIONS  (PRN):  albuterol    90 MICROgram(s) HFA Inhaler 1 Puff(s) Inhalation every 4 hours PRN for shortness of breath and/or wheezing  melatonin 3 milliGRAM(s) Oral at bedtime PRN Insomnia        PHYSICAL EXAM:    GENERAL: no distress, on NC  HEAD:  Atraumatic, Normocephalic  EYES: EOMI  NECK: Supple  NERVOUS SYSTEM:  Awake and Alert  RESP: reduced  EXTREMITIES:  No Edema        LABS:                                                9.2    10.95 )-----------( 249      ( 31 Jul 2024 05:15 )             29.2     07-31    141  |  94<L>  |  87<H>  ----------------------------<  90  3.6   |  41<H>  |  1.40<H>    Ca    9.4      31 Jul 2024 05:15        Urinalysis Basic - ( 31 Jul 2024 05:15 )    Color: x / Appearance: x / SG: x / pH: x  Gluc: 90 mg/dL / Ketone: x  / Bili: x / Urobili: x   Blood: x / Protein: x / Nitrite: x   Leuk Esterase: x / RBC: x / WBC x   Sq Epi: x / Non Sq Epi: x / Bacteria: x

## 2024-07-31 NOTE — PROGRESS NOTE ADULT - SUBJECTIVE AND OBJECTIVE BOX
[facility] -- Mateo Bobby Pannella, Patel, Savella Goodger, Cohen  Office # [phone]      Follow Up:  Shortness of breath     Subjective/Observations:   No events overnight resting comfortably in bed.  No complaints of chest pain, dyspnea, or palpitations reported. No signs of orthopnea or PND.  remains on nasal cannula     REVIEW OF SYSTEMS: All other review of systems is negative unless indicated above    PAST MEDICAL & SURGICAL HISTORY:  History of COPD  No home O2 use      Asthma      Thyroid nodule      Hyperlipidemia      Hypertension      Hypothyroidism      History of cholecystectomy        History of repair of hip fracture  ORIF .  Hardware was eventually removed          MEDICATIONS  (STANDING):  albuterol/ipratropium for Nebulization 3 milliLiter(s) Nebulizer every 6 hours  atorvastatin 40 milliGRAM(s) Oral at bedtime  budesonide 160 MICROgram(s)/formoterol 4.5 MICROgram(s) Inhaler 2 Puff(s) Inhalation two times a day  buMETAnide Injectable 1 milliGRAM(s) IV Push two times a day  heparin   Injectable 5000 Unit(s) SubCutaneous every 12 hours  levothyroxine 100 MICROGram(s) Oral daily  metoprolol succinate  milliGRAM(s) Oral every 12 hours  predniSONE   Tablet 20 milliGRAM(s) Oral daily    MEDICATIONS  (PRN):  albuterol    90 MICROgram(s) HFA Inhaler 1 Puff(s) Inhalation every 4 hours PRN for shortness of breath and/or wheezing  melatonin 3 milliGRAM(s) Oral at bedtime PRN Insomnia      Allergies    No Known Allergies    Intolerances        Vital Signs Last 24 Hrs  T(C): 36.4 (2024 05:36), Max: 36.5 (2024 12:16)  T(F): 97.5 (2024 05:36), Max: 97.7 (2024 12:16)  HR: 79 (2024 05:36) (76 - 90)  BP: 103/52 (2024 05:36) (102/55 - 119/59)  BP(mean): --  RR: 16 (2024 05:36) (16 - 18)  SpO2: 92% (2024 05:36) (88% - 98%)    Parameters below as of 2024 05:36  Patient On (Oxygen Delivery Method): nasal cannula  O2 Flow (L/min): 2      I&O's Summary    2024 07:01  -  2024 07:00  --------------------------------------------------------  IN: 0 mL / OUT: 500 mL / NET: -500 mL          PHYSICAL EXAM:  TELE:   Constitutional: NAD, awake and alert, frail   HEENT: Moist Mucous Membranes, Anicteric  Pulmonary: Non-labored, breath sounds are DIM   Cardiovascular: Regular, S1 and S2 nl, No murmurs, rubs, gallops or clicks  Gastrointestinal: Bowel Sounds present, soft, nontender.   Lymph: No lymphadenopathy. No peripheral edema.  Skin: No visible rashes or ulcers.  Psych:  Mood & affect appropriate    LABS: All Labs Reviewed:                        9.2    10.95 )-----------( 249      ( 2024 05:15 )             29.2                         8.5    9.10  )-----------( 235      ( 2024 05:30 )             26.8                         8.6    8.86  )-----------( 253      ( 2024 07:47 )             28.1     2024 05:30    139    |  97     |  75     ----------------------------<  102    4.5     |  35     |  1.30   2024 07:47    140    |  98     |  61     ----------------------------<  120    4.5     |  38     |  1.30     Ca    9.3        2024 05:30  Ca    9.5        2024 07:47  Phos  4.8       2024 07:47  Mg     2.4       2024 07:47    TPro  7.8    /  Alb  3.7    /  TBili  0.6    /  DBili  x      /  AST  17     /  ALT  19     /  AlkPhos  67     2024 07:47             EC Lead ECG:   Ventricular Rate 69 BPM    Atrial Rate 69 BPM    P-R Interval 188 ms    QRS Duration 96 ms    Q-T Interval 438 ms    QTC Calculation(Bazett) 469 ms    P Axis 78 degrees    R Axis -15 degrees    T Axis 49 degrees    Diagnosis Line Normal sinus rhythm  Possible Left atrial enlargement  Low voltage QRS  Septal infarct (cited on or before 10-FRANCISCO JAVIER-2024)    Confirmed by GOLD RASHEED ([id number]) on 2024 8:39:03 AM (24 @ 21:03)      TRANSTHORACIC ECHOCARDIOGRAM REPORT  ________________________________________________________________________________                                      _______       Pt. Name:       SANJUANA TORRES Study Date:    6/10/2024  MRN:            [id number]    YOB: 1945  Accession #:    [id number]    Age:           79 years  Account#:       [id number]   Gender:        F  Visit ID#  Heart Rate:                  Height:        62.20 in (158.00 cm)  Rhythm:                      Weight: 108.02 lb (49.00 kg)  Blood Pressure: 115/61 mmHg  BSA/BMI:       1.47 m² / 19.63 kg/m²  ________________________________________________________________________________________  Referring Physician:    [id number] Marco Antonio Coronado  Interpreting Physician: Kentrell Galindo  Primary Sonographer:    Derrell Harry    CPT:               ECHO TTE WO CON COMP W DOPP - 11632.m  Indication(s):     Heart failure, unspecified - I50.9  Procedure:         Transthoracic echocardiogram with 2-D, M-mode and complete                     spectral and color flow Doppler.  Ordering Location: [facility]  Admission Status:  ED  Study Information: Image quality for this study is technically difficult.    _______________________________________________________________________________________     CONCLUSIONS:      1. Technically difficult image quality.   2. There is increased LV mass and concentric hypertrophy.   3. Left ventricular systolic function is hyperdynamic with an ejection fraction of 79 % by Shepard's method ofdisks.   4. Hyperdynamic left ventricular systolic function. Basal septal hypertrophy (measures 1.8 cm) with evidence left ventricular outflow tract obstruction with a resting gradient of 41mmHg and a gradient of 47 mmHg with valsalva, overall consistent with moderate LVOT obstruction.   5. Normal right ventricular cavity size and normal systolic function.   6. The right atrium is normal in size.   7. The left atrium is severely dilated.   8. Interatrial septum is stretched and displaced to the right, consistent with left atrial volume overload.   9. Tricuspid aortic valve with normal leaflet excursion. There is moderate thickening of the aortic valve leaflets.  10. Trace aortic regurgitation.  11. Severe mitral valve leaflet calcification.  12. Peak gradient across the mitral valve is 41mmHg with a mean gradient of 19mmHg, at a heart rate of 78 beats per minute.  13. Severe mitral valve stenosis.  14. Mild mitral regurgitation.  15. Mild to moderate tricuspid regurgitation.  16. The inferior vena cava is normal in size measuring 1.56 cm in diameter, (normal <2.1cm) with normal inspiratory collapse (normal >50%) consistent with normal right atrial pressure (~3, range 0-5mmHg).  17. Estimated pulmonary artery systolic pressure is 47 mmHg, consistent with moderate pulmonary hypertension.  18. Small pericardial effusion noted adjacent to the right atrium.  19. Bilateral pleural effusion noted.    ________________________________________________________________________________________  FINDINGS:     Left Ventricle:  Left ventricular systolic function is hyperdynamic with a calculated ejection fraction of 79 % by the Shepard's biplane method of disks. There is increased LV mass and concentric hypertrophy. There is basal septal thickening (sigmoid septum) (measuring 1.8 cm) with evidence left ventricular outflow tract obstruction with a gradient of 47 mmHg. Hyperdynamic left ventricular systolic function.     Right Ventricle:  The right ventricular cavity is normal in size and normal systolic function. Tricuspid annular plane systolic excursion (TAPSE) is 2.7 cm (normal >=1.7 cm).     Left Atrium:  The left atrium is severely dilated with an indexed volume of 70.52 ml/m².     Right Atrium:  The right atrium is normal in sizewith an indexed volume of 12.14 ml/m². There is a prominent Eustachian valve present, which is a normal variant.     Interatrial Septum:  The interatrial septum is stretched and displaced to the right, consistent with left atrial volume overload.     Aortic Valve:  The aortic valve is tricuspid with normal leaflet excursion. There is moderate thickening of the aortic valve leaflets. There is trace aortic regurgitation.     Mitral Valve:  There is diffuse mitral valve thickening of the anterior and posterior leaflets. Peak gradient across the mitral valve is 41mmHg with a mean gradient of 19mmHg, at a heart rate of 78 beats per minute. There is severe leaflet calcification. There is severe mitral valve stenosis. The calculated mitral valve area is 1.4 cm². There is mild mitral regurgitation.     Tricuspid Valve:  There is mild to moderate tricuspid regurgitation. Estimated pulmonary artery systolic pressure is 47 mmHg, consistent with moderate pulmonary hypertension.     Pulmonic Valve:  The pulmonic valve was not well visualized.     Aorta:  The aortic root at the sinuses of Valsalva is normal in size, measuring 3.20 cm (indexed 2.17 cm/m²). The ascending aorta diameter is normal in size, measuring 2.50 cm (indexed 1.70 cm/m²).     Pericardium:  There is a small pericardial effusion noted adjacent to the right atrium.     Pleura:  Bilateral pleural effusion noted.     Systemic Veins:  The inferior vena cava is normal in size measuring 1.56 cm in diameter, (normal <2.1cm) with normal inspiratory collapse (normal >50%) consistent with normal right atrial pressure (~3, range 0-5mmHg).  ____________________________________________________________________  QUANTITATIVE DATA:  Left Ventricle Measurements: (Indexed to BSA)     IVSd (2D):   1.1 cm  LVPWd (2D):  1.1 cm  LVIDd (2D):  4.4 cm  LVIDs (2D):  2.8 cm  LV Mass:     165 g  111.9 g/m²  LV Vol d, MOD A2C: 33.4 ml 22.65 ml/m²  LV Vol d, MOD A4C: 29.9 ml 20.27 ml/m²  LV Vol d, MOD BP:  35.7 ml 24.18 ml/m²  LV Vol s, MOD A2C: 6.7 ml  4.55 ml/m²  LV Vol s, MOD A4C: 6.4 ml  4.31 ml/m²  LV Vol s, MOD BP:  7.3 ml  4.97 ml/m²  LVOT SV MOD BP:    28.3 ml  LV EF% MOD BP:     79 %     MV E Vmax:    2.67 m/s  MV A Vmax:    3.19 m/s  MV E/A:       0.84  e' lateral:   9.14 cm/s  e' medial:    6.74 cm/s  E/e' lateral: 29.21  E/e' medial:  39.61  E/e' Average: 33.63  MV DT:        446 msec    Aorta Measurements: (Normal range) (Indexed to BSA)     Sinuses of Valsalva: 3.20 cm (2.7 - 3.3 cm)  Ao Asc prox:         2.50 cm       Left Atrium Measurements: (Indexed to BSA)  LA Diam 2D: 4.70 cm    Right Ventricle Measurements: Right Atrial Measurements:     TAPSE:            2.7 cm      RA Vol:       17.90 ml  RV Base (RVID1):  3.1 cm      RA Vol Index: 12.14 ml/m²  RV Mid (RVID2):   3.6 cm  RV Major (RVID3): 6.4 cm       LVOT / RVOT/ Qp/Qs Data: (Indexed to BSA)  LVOT Diameter: 2.00 cm  LVOT Area:     3.14 cm²    Mitral Valve Measurements:     MV Vmax:         2.90 m/s  MV VTI, lane      1.105 m  MV Mean Grad:    15.5 mmHg  MV PeakGrad:    41.2 mmHg  MV E Vmax:       2.7 m/s  MV A Vmax:       3.2 m/s  MV E/A:          0.8  MV P1/2T:        160 msec  MV Area P 1/2 t: 1.4 cm²  MV Area P1/2 T:  1.4 cm²       Tricuspid Valve Measurements:     TR Vmax:          3.3 m/s  TR Peak Gradient: 44.1 mmHg  RA Pressure:      3 mmHg  PASP:             47 mmHg    ________________________________________________________________________________________  Electronically signed on 2024 at 5:46:20 PM by Kentrell Galindo         *** Final ***      Radiology:         [facility] -- Mateo Bobby Pannella, Patel, Savella Goodger, Cohen  Office # [phone]      Follow Up:  Shortness of breath     Subjective/Observations:   No events overnight resting comfortably in bed.  No complaints of chest pain, dyspnea, or palpitations reported. No signs of orthopnea or PND.  remains on nasal cannula     REVIEW OF SYSTEMS: All other review of systems is negative unless indicated above    PAST MEDICAL & SURGICAL HISTORY:  History of COPD  No home O2 use      Asthma      Thyroid nodule      Hyperlipidemia      Hypertension      Hypothyroidism      History of cholecystectomy        History of repair of hip fracture  ORIF .  Hardware was eventually removed          MEDICATIONS  (STANDING):  albuterol/ipratropium for Nebulization 3 milliLiter(s) Nebulizer every 6 hours  atorvastatin 40 milliGRAM(s) Oral at bedtime  budesonide 160 MICROgram(s)/formoterol 4.5 MICROgram(s) Inhaler 2 Puff(s) Inhalation two times a day  buMETAnide Injectable 1 milliGRAM(s) IV Push two times a day  heparin   Injectable 5000 Unit(s) SubCutaneous every 12 hours  levothyroxine 100 MICROGram(s) Oral daily  metoprolol succinate  milliGRAM(s) Oral every 12 hours  predniSONE   Tablet 20 milliGRAM(s) Oral daily    MEDICATIONS  (PRN):  albuterol    90 MICROgram(s) HFA Inhaler 1 Puff(s) Inhalation every 4 hours PRN for shortness of breath and/or wheezing  melatonin 3 milliGRAM(s) Oral at bedtime PRN Insomnia      Allergies    No Known Allergies    Intolerances        Vital Signs Last 24 Hrs  T(C): 36.4 (2024 05:36), Max: 36.5 (2024 12:16)  T(F): 97.5 (2024 05:36), Max: 97.7 (2024 12:16)  HR: 79 (2024 05:36) (76 - 90)  BP: 103/52 (2024 05:36) (102/55 - 119/59)  BP(mean): --  RR: 16 (2024 05:36) (16 - 18)  SpO2: 92% (2024 05:36) (88% - 98%)    Parameters below as of 2024 05:36  Patient On (Oxygen Delivery Method): nasal cannula  O2 Flow (L/min): 2      I&O's Summary    2024 07:01  -  2024 07:00  --------------------------------------------------------  IN: 0 mL / OUT: 500 mL / NET: -500 mL          PHYSICAL EXAM:  TELE: SR  Constitutional: NAD, , frail   HEENT: Moist Mucous Membranes, Anicteric  Pulmonary: Non-labored, breath sounds are DIM   Cardiovascular: Regular, S1 and S2 nl, Murmur   Gastrointestinal: Bowel Sounds present, soft, nontender.   Lymph: No lymphadenopathy. No peripheral edema.  Skin: No visible rashes or ulcers.  Psych:  Mood & affect appropriate, tired closing eyes during exam    LABS: All Labs Reviewed:                        9.2    10.95 )-----------( 249      ( 2024 05:15 )             29.2                         8.5    9.10  )-----------( 235      ( 2024 05:30 )             26.8                         8.6    8.86  )-----------( 253      ( 2024 07:47 )             28.1     2024 05:30    139    |  97     |  75     ----------------------------<  102    4.5     |  35     |  1.30   2024 07:47    140    |  98     |  61     ----------------------------<  120    4.5     |  38     |  1.30     Ca    9.3        2024 05:30  Ca    9.5        2024 07:47  Phos  4.8       2024 07:47  Mg     2.4       2024 07:47    TPro  7.8    /  Alb  3.7    /  TBili  0.6    /  DBili  x      /  AST  17     /  ALT  19     /  AlkPhos  67     2024 07:47             EC Lead ECG:   Ventricular Rate 69 BPM    Atrial Rate 69 BPM    P-R Interval 188 ms    QRS Duration 96 ms    Q-T Interval 438 ms    QTC Calculation(Bazett) 469 ms    P Axis 78 degrees    R Axis -15 degrees    T Axis 49 degrees    Diagnosis Line Normal sinus rhythm  Possible Left atrial enlargement  Low voltage QRS  Septal infarct (cited on or before 10-FRANCISCO JAVIER-2024)    Confirmed by GOLD RASHEED ([id number]) on 2024 8:39:03 AM (24 @ 21:03)      TRANSTHORACIC ECHOCARDIOGRAM REPORT  ________________________________________________________________________________                                      _______       Pt. Name:       SANJUANA TORRES Study Date:    6/10/2024  MRN:            [id number]    YOB: 1945  Accession #:    [id number]    Age:           79 years  Account#:       [id number]   Gender:        F  Visit ID#  Heart Rate:                  Height:        62.20 in (158.00 cm)  Rhythm:                      Weight: 108.02 lb (49.00 kg)  Blood Pressure: 115/61 mmHg  BSA/BMI:       1.47 m² / 19.63 kg/m²  ________________________________________________________________________________________  Referring Physician:    [id number] Marco Antonio Coronado  Interpreting Physician: Kentrell Galindo  Primary Sonographer:    Derrell Harry    CPT:               ECHO TTE WO CON COMP W DOPP - 01813.m  Indication(s):     Heart failure, unspecified - I50.9  Procedure:         Transthoracic echocardiogram with 2-D, M-mode and complete                     spectral and color flow Doppler.  Ordering Location: [facility]  Admission Status:  ED  Study Information: Image quality for this study is technically difficult.    _______________________________________________________________________________________     CONCLUSIONS:      1. Technically difficult image quality.   2. There is increased LV mass and concentric hypertrophy.   3. Left ventricular systolic function is hyperdynamic with an ejection fraction of 79 % by Shepard's method ofdisks.   4. Hyperdynamic left ventricular systolic function. Basal septal hypertrophy (measures 1.8 cm) with evidence left ventricular outflow tract obstruction with a resting gradient of 41mmHg and a gradient of 47 mmHg with valsalva, overall consistent with moderate LVOT obstruction.   5. Normal right ventricular cavity size and normal systolic function.   6. The right atrium is normal in size.   7. The left atrium is severely dilated.   8. Interatrial septum is stretched and displaced to the right, consistent with left atrial volume overload.   9. Tricuspid aortic valve with normal leaflet excursion. There is moderate thickening of the aortic valve leaflets.  10. Trace aortic regurgitation.  11. Severe mitral valve leaflet calcification.  12. Peak gradient across the mitral valve is 41mmHg with a mean gradient of 19mmHg, at a heart rate of 78 beats per minute.  13. Severe mitral valve stenosis.  14. Mild mitral regurgitation.  15. Mild to moderate tricuspid regurgitation.  16. The inferior vena cava is normal in size measuring 1.56 cm in diameter, (normal <2.1cm) with normal inspiratory collapse (normal >50%) consistent with normal right atrial pressure (~3, range 0-5mmHg).  17. Estimated pulmonary artery systolic pressure is 47 mmHg, consistent with moderate pulmonary hypertension.  18. Small pericardial effusion noted adjacent to the right atrium.  19. Bilateral pleural effusion noted.    ________________________________________________________________________________________  FINDINGS:     Left Ventricle:  Left ventricular systolic function is hyperdynamic with a calculated ejection fraction of 79 % by the Shepard's biplane method of disks. There is increased LV mass and concentric hypertrophy. There is basal septal thickening (sigmoid septum) (measuring 1.8 cm) with evidence left ventricular outflow tract obstruction with a gradient of 47 mmHg. Hyperdynamic left ventricular systolic function.     Right Ventricle:  The right ventricular cavity is normal in size and normal systolic function. Tricuspid annular plane systolic excursion (TAPSE) is 2.7 cm (normal >=1.7 cm).     Left Atrium:  The left atrium is severely dilated with an indexed volume of 70.52 ml/m².     Right Atrium:  The right atrium is normal in sizewith an indexed volume of 12.14 ml/m². There is a prominent Eustachian valve present, which is a normal variant.     Interatrial Septum:  The interatrial septum is stretched and displaced to the right, consistent with left atrial volume overload.     Aortic Valve:  The aortic valve is tricuspid with normal leaflet excursion. There is moderate thickening of the aortic valve leaflets. There is trace aortic regurgitation.     Mitral Valve:  There is diffuse mitral valve thickening of the anterior and posterior leaflets. Peak gradient across the mitral valve is 41mmHg with a mean gradient of 19mmHg, at a heart rate of 78 beats per minute. There is severe leaflet calcification. There is severe mitral valve stenosis. The calculated mitral valve area is 1.4 cm². There is mild mitral regurgitation.     Tricuspid Valve:  There is mild to moderate tricuspid regurgitation. Estimated pulmonary artery systolic pressure is 47 mmHg, consistent with moderate pulmonary hypertension.     Pulmonic Valve:  The pulmonic valve was not well visualized.     Aorta:  The aortic root at the sinuses of Valsalva is normal in size, measuring 3.20 cm (indexed 2.17 cm/m²). The ascending aorta diameter is normal in size, measuring 2.50 cm (indexed 1.70 cm/m²).     Pericardium:  There is a small pericardial effusion noted adjacent to the right atrium.     Pleura:  Bilateral pleural effusion noted.     Systemic Veins:  The inferior vena cava is normal in size measuring 1.56 cm in diameter, (normal <2.1cm) with normal inspiratory collapse (normal >50%) consistent with normal right atrial pressure (~3, range 0-5mmHg).  ____________________________________________________________________  QUANTITATIVE DATA:  Left Ventricle Measurements: (Indexed to BSA)     IVSd (2D):   1.1 cm  LVPWd (2D):  1.1 cm  LVIDd (2D):  4.4 cm  LVIDs (2D):  2.8 cm  LV Mass:     165 g  111.9 g/m²  LV Vol d, MOD A2C: 33.4 ml 22.65 ml/m²  LV Vol d, MOD A4C: 29.9 ml 20.27 ml/m²  LV Vol d, MOD BP:  35.7 ml 24.18 ml/m²  LV Vol s, MOD A2C: 6.7 ml  4.55 ml/m²  LV Vol s, MOD A4C: 6.4 ml  4.31 ml/m²  LV Vol s, MOD BP:  7.3 ml  4.97 ml/m²  LVOT SV MOD BP:    28.3 ml  LV EF% MOD BP:     79 %     MV E Vmax:    2.67 m/s  MV A Vmax:    3.19 m/s  MV E/A:       0.84  e' lateral:   9.14 cm/s  e' medial:    6.74 cm/s  E/e' lateral: 29.21  E/e' medial:  39.61  E/e' Average: 33.63  MV DT:        446 msec    Aorta Measurements: (Normal range) (Indexed to BSA)     Sinuses of Valsalva: 3.20 cm (2.7 - 3.3 cm)  Ao Asc prox:         2.50 cm       Left Atrium Measurements: (Indexed to BSA)  LA Diam 2D: 4.70 cm    Right Ventricle Measurements: Right Atrial Measurements:     TAPSE:            2.7 cm      RA Vol:       17.90 ml  RV Base (RVID1):  3.1 cm      RA Vol Index: 12.14 ml/m²  RV Mid (RVID2):   3.6 cm  RV Major (RVID3): 6.4 cm       LVOT / RVOT/ Qp/Qs Data: (Indexed to BSA)  LVOT Diameter: 2.00 cm  LVOT Area:     3.14 cm²    Mitral Valve Measurements:     MV Vmax:         2.90 m/s  MV VTI, lane      1.105 m  MV Mean Grad:    15.5 mmHg  MV PeakGrad:    41.2 mmHg  MV E Vmax:       2.7 m/s  MV A Vmax:       3.2 m/s  MV E/A:          0.8  MV P1/2T:        160 msec  MV Area P 1/2 t: 1.4 cm²  MV Area P1/2 T:  1.4 cm²       Tricuspid Valve Measurements:     TR Vmax:          3.3 m/s  TR Peak Gradient: 44.1 mmHg  RA Pressure:      3 mmHg  PASP:             47 mmHg    ________________________________________________________________________________________  Electronically signed on 2024 at 5:46:20 PM by Kentrell Galindo         *** Final ***      Radiology:

## 2024-07-31 NOTE — PROGRESS NOTE ADULT - SUBJECTIVE AND OBJECTIVE BOX
Date/Time Patient Seen:  		  Referring MD:   Data Reviewed	       Patient is a 79y old  Female who presents with a chief complaint of SOB (30 Jul 2024 11:55)      Subjective/HPI     PAST MEDICAL & SURGICAL HISTORY:  History of COPD  No home O2 use    Asthma    Thyroid nodule    Hyperlipidemia    Hypertension    Hypothyroidism    History of cholecystectomy  1989    History of repair of hip fracture  ORIF 1982.  Hardware was eventually removed          Medication list         MEDICATIONS  (STANDING):  albuterol/ipratropium for Nebulization 3 milliLiter(s) Nebulizer every 6 hours  atorvastatin 40 milliGRAM(s) Oral at bedtime  budesonide 160 MICROgram(s)/formoterol 4.5 MICROgram(s) Inhaler 2 Puff(s) Inhalation two times a day  buMETAnide Injectable 1 milliGRAM(s) IV Push two times a day  heparin   Injectable 5000 Unit(s) SubCutaneous every 12 hours  levothyroxine 100 MICROGram(s) Oral daily  metoprolol succinate  milliGRAM(s) Oral every 12 hours  predniSONE   Tablet 20 milliGRAM(s) Oral daily    MEDICATIONS  (PRN):  albuterol    90 MICROgram(s) HFA Inhaler 1 Puff(s) Inhalation every 4 hours PRN for shortness of breath and/or wheezing  melatonin 3 milliGRAM(s) Oral at bedtime PRN Insomnia         Vitals log        ICU Vital Signs Last 24 Hrs  T(C): 36.4 (31 Jul 2024 05:36), Max: 36.5 (30 Jul 2024 12:16)  T(F): 97.5 (31 Jul 2024 05:36), Max: 97.7 (30 Jul 2024 12:16)  HR: 79 (31 Jul 2024 05:36) (76 - 90)  BP: 103/52 (31 Jul 2024 05:36) (102/55 - 119/59)  BP(mean): --  ABP: --  ABP(mean): --  RR: 16 (31 Jul 2024 05:36) (16 - 18)  SpO2: 92% (31 Jul 2024 05:36) (90% - 98%)    O2 Parameters below as of 31 Jul 2024 05:36  Patient On (Oxygen Delivery Method): nasal cannula  O2 Flow (L/min): 2               Input and Output:  I&O's Detail    29 Jul 2024 07:01  -  30 Jul 2024 07:00  --------------------------------------------------------  IN:  Total IN: 0 mL    OUT:    Incontinent per Condom Catheter (mL): 700 mL    Other (mL): 1000 mL  Total OUT: 1700 mL    Total NET: -1700 mL      30 Jul 2024 07:01  -  31 Jul 2024 05:53  --------------------------------------------------------  IN:  Total IN: 0 mL    OUT:    Incontinent per Condom Catheter (mL): 500 mL  Total OUT: 500 mL    Total NET: -500 mL          Lab Data                        8.5    9.10  )-----------( 235      ( 30 Jul 2024 05:30 )             26.8     07-30    139  |  97  |  75<H>  ----------------------------<  102<H>  4.5   |  35<H>  |  1.30    Ca    9.3      30 Jul 2024 05:30  Phos  4.8     07-29  Mg     2.4     07-29    TPro  7.8  /  Alb  3.7  /  TBili  0.6  /  DBili  x   /  AST  17  /  ALT  19  /  AlkPhos  67  07-29            Review of Systems	      Objective     Physical Examination    heart s1s2  lung dc BS  head nc      Pertinent Lab findings & Imaging      Chiki:  NO   Adequate UO     I&O's Detail    29 Jul 2024 07:01  -  30 Jul 2024 07:00  --------------------------------------------------------  IN:  Total IN: 0 mL    OUT:    Incontinent per Condom Catheter (mL): 700 mL    Other (mL): 1000 mL  Total OUT: 1700 mL    Total NET: -1700 mL      30 Jul 2024 07:01  -  31 Jul 2024 05:53  --------------------------------------------------------  IN:  Total IN: 0 mL    OUT:    Incontinent per Condom Catheter (mL): 500 mL  Total OUT: 500 mL    Total NET: -500 mL               Discussed with:     Cultures:	        Radiology

## 2024-07-31 NOTE — PROGRESS NOTE ADULT - PROBLEM SELECTOR PLAN 3
chronic  -increase metoprolol to BID (with hold parameters)  -Stop bumux gtt  switch to IV  BID  -monitor vitals chronic  - c/w metoprolol BID  - Transition to oral torsemide 20mg BID, first dose 7/31 evening  -monitor vitals

## 2024-07-31 NOTE — PROGRESS NOTE ADULT - PROBLEM SELECTOR PLAN 2
BUN 50, Cr 1.4 , baseline 1.1  - Monitor AM CMP  - avoid nephrotoxic  medications, continue Lasix IV  -  off Bumex gtt   -Dr Villaseñor consulted recs appreciated BUN 87, Cr 1.4 , baseline 1.1; changes 2/2 active diuresis  - Continue to monitor AM CMP  - Dr Villaseñor consulted, recs appreciated  - Avoid nephrotoxic  medications -> currently off Bumex gtt, received IV Bumex x1 in AM  - To start torsemide 20mg BID starting in the evening  - Worsening alkalosis -> Diamox 250mg IV BID started BUN 87, Cr 1.4 , baseline 1.1; changes 2/2 active diuresis  - Continue to monitor AM CMP  - Dr Villaseñor consulted, recs appreciated  - Avoid nephrotoxic  medications -> currently off Bumex gtt, received IV Bumex x1 in AM  - To start torsemide 20mg BID starting in the evening  - Worsening alkalosis -> Diamox 250mg IV BID started  - F/u VBG BUN 87, Cr 1.4 , baseline 1.1; changes 2/2 active diuresis  - Continue to monitor AM CMP  - Dr Villaseñor consulted, recs appreciated  - Avoid nephrotoxic  medications -> currently off Bumex gtt, received IV Bumex x1 in AM  - To start torsemide 20mg BID starting in the evening  - Worsening contraction alkalosis likely iso loop diuretics on top of chronic metabolic compensation for chronic hypercapnia from COPD -> Diamox 250mg IV BID started  - F/u VBG in AM

## 2024-07-31 NOTE — PROGRESS NOTE ADULT - PROBLEM SELECTOR PLAN 7
DVT ppx: Heparin restarted  PT eval     Discussed  with daughter in  law at bedside and daughter   over  the phone, aware and in agreement with above DVT ppx: Heparin restarted  PT eval DVT ppx: SC UFH  PT eval

## 2024-07-31 NOTE — CASE MANAGEMENT PROGRESS NOTE - NSCMPROGRESSNOTE_GEN_ALL_CORE
Discussed pt in rounds, pt remains acute, receiving IV Bumex, nasal cannula 2L, Albumin. Awaiting chest xray and echo.

## 2024-07-31 NOTE — PROGRESS NOTE ADULT - PROBLEM SELECTOR PLAN 6
chronic  - duonebs q6, symbicort   - continue steroid taper per pulm - Prednisone 20mg daily x  5  days  - wean O2 as tolerated  -dr natarajan consulted, recs appreciated chronic  - duonebs q6, symbicort   - continue steroid taper per pulm - Prednisone 20mg daily x 5 days (2/5 received)  - wean O2 as tolerated  - Dr natarajan consulted, recs appreciated

## 2024-07-31 NOTE — PROGRESS NOTE ADULT - ASSESSMENT
79-year-old female with a history of CHF, COPD, severe mitral valve stenosis, LVOT obstruction, hypertension, high cholesterol, pleural effusion requiring thoracentesis presents with increasing shortness of breath over past 1 week.     ADHF (Diastolic Dysfunction)/Severe MS/Pleural Effusion  -  admitted with acute decompensated diastolic heart failure secondary to severe MS and LVOT obstruction  - Continue Toprol  mg BID  -sp bumex gtt, followed by renal now changed to bumex IV BID   -please keep accurate intake and output   - Pulm following. sp IR right thoracentesis 1liter drained 7/29  - Encourage incentive spirometer.  Continue nebs and steroid per Pulm    - TTE on 6/10/24 showed hyperdynamic LVF, EF 79%, LVH, mod LVOT obstruction (resting gradient 41mmHg and 47 mmHg with Valsalva    - She has been deemed not a surgical candidate for valve replacement  - She was seen by structural heart at . She is not a candidate for TMVR via [place] trial as she does not have significant enough MR.  She was supposed to follow at [place] for consideration for the [place] trial, but missed her appointment.  She needs to follow at [place] after discharge.  This was explained to patient and family in detail    - Continue statin for Hx of HLD  - BP soft ,keep off arb while diuresing , continue bb     - Monitor electrolytes, replete to keep K>4 and Mag>2  - Will continue to follow.  Patient is at risk for abrupt decompensation       79-year-old female with a history of CHF, COPD, severe mitral valve stenosis, LVOT obstruction, hypertension, high cholesterol, pleural effusion requiring thoracentesis presents with increasing shortness of breath over past 1 week.     ADHF (Diastolic Dysfunction)/Severe MS/Pleural Effusion  -  admitted with acute decompensated diastolic heart failure secondary to severe MS and LVOT obstruction  - Continue Toprol  mg BID  -fu repeat chest xray this am   -sp bumex gtt, followed by renal now changed to bumex IV BID , Diamox added this AM   -please keep accurate intake and output   - Pulm following. sp IR right thoracentesis 1liter drained 7/29  - Encourage incentive spirometer.  Continue nebs and steroid per Pulm    - TTE on 6/10/24 showed hyperdynamic LVF, EF 79%, LVH, mod LVOT obstruction (resting gradient 41mmHg and 47 mmHg with Valsalva    - She has been deemed not a surgical candidate for valve replacement  - She was seen by structural heart at . She is not a candidate for TMVR via [place] trial as she does not have significant enough MR.  She was supposed to follow at [place] for consideration for the [place] trial, but missed her appointment.  She needs to follow at [place] after discharge.  This was explained to patient and family in detail    - Continue statin for Hx of HLD  - BP soft ,keep off arb while diuresing , continue bb     - Monitor electrolytes, replete to keep K>4 and Mag>2  - Will continue to follow.  Patient is at risk for abrupt decompensation

## 2024-08-01 ENCOUNTER — TRANSCRIPTION ENCOUNTER (OUTPATIENT)
Age: 79
End: 2024-08-01

## 2024-08-01 LAB
ALBUMIN SERPL ELPH-MCNC: 3.4 G/DL — SIGNIFICANT CHANGE UP (ref 3.3–5)
ALP SERPL-CCNC: 63 U/L — SIGNIFICANT CHANGE UP (ref 40–120)
ALT FLD-CCNC: 18 U/L — SIGNIFICANT CHANGE UP (ref 12–78)
ANION GAP SERPL CALC-SCNC: 5 MMOL/L — SIGNIFICANT CHANGE UP (ref 5–17)
AST SERPL-CCNC: 14 U/L — LOW (ref 15–37)
BASE EXCESS BLDV CALC-SCNC: 15.6 MMOL/L — HIGH (ref -2–3)
BASOPHILS # BLD AUTO: 0.01 K/UL — SIGNIFICANT CHANGE UP (ref 0–0.2)
BASOPHILS NFR BLD AUTO: 0.1 % — SIGNIFICANT CHANGE UP (ref 0–2)
BILIRUB SERPL-MCNC: 0.9 MG/DL — SIGNIFICANT CHANGE UP (ref 0.2–1.2)
BLOOD GAS COMMENTS, VENOUS: SIGNIFICANT CHANGE UP
BUN SERPL-MCNC: 90 MG/DL — HIGH (ref 7–23)
CALCIUM SERPL-MCNC: 9.7 MG/DL — SIGNIFICANT CHANGE UP (ref 8.5–10.1)
CHLORIDE SERPL-SCNC: 97 MMOL/L — SIGNIFICANT CHANGE UP (ref 96–108)
CO2 SERPL-SCNC: 36 MMOL/L — HIGH (ref 22–31)
CREAT SERPL-MCNC: 1.3 MG/DL — SIGNIFICANT CHANGE UP (ref 0.5–1.3)
CULTURE RESULTS: SIGNIFICANT CHANGE UP
EGFR: 42 ML/MIN/1.73M2 — LOW
EOSINOPHIL # BLD AUTO: 0.12 K/UL — SIGNIFICANT CHANGE UP (ref 0–0.5)
EOSINOPHIL NFR BLD AUTO: 1.1 % — SIGNIFICANT CHANGE UP (ref 0–6)
GLUCOSE SERPL-MCNC: 103 MG/DL — HIGH (ref 70–99)
HCO3 BLDV-SCNC: 40 MMOL/L — HIGH (ref 22–29)
HCT VFR BLD CALC: 29.7 % — LOW (ref 34.5–45)
HGB BLD-MCNC: 9.6 G/DL — LOW (ref 11.5–15.5)
HOROWITZ INDEX BLDV+IHG-RTO: 33 — SIGNIFICANT CHANGE UP
IMM GRANULOCYTES NFR BLD AUTO: 0.3 % — SIGNIFICANT CHANGE UP (ref 0–0.9)
LYMPHOCYTES # BLD AUTO: 0.74 K/UL — LOW (ref 1–3.3)
LYMPHOCYTES # BLD AUTO: 6.8 % — LOW (ref 13–44)
MAGNESIUM SERPL-MCNC: 2.4 MG/DL — SIGNIFICANT CHANGE UP (ref 1.6–2.6)
MCHC RBC-ENTMCNC: 31.1 PG — SIGNIFICANT CHANGE UP (ref 27–34)
MCHC RBC-ENTMCNC: 32.3 GM/DL — SIGNIFICANT CHANGE UP (ref 32–36)
MCV RBC AUTO: 96.1 FL — SIGNIFICANT CHANGE UP (ref 80–100)
MONOCYTES # BLD AUTO: 0.7 K/UL — SIGNIFICANT CHANGE UP (ref 0–0.9)
MONOCYTES NFR BLD AUTO: 6.5 % — SIGNIFICANT CHANGE UP (ref 2–14)
NEUTROPHILS # BLD AUTO: 9.25 K/UL — HIGH (ref 1.8–7.4)
NEUTROPHILS NFR BLD AUTO: 85.2 % — HIGH (ref 43–77)
NRBC # BLD: 0 /100 WBCS — SIGNIFICANT CHANGE UP (ref 0–0)
PCO2 BLDV: 57 MMHG — HIGH (ref 39–42)
PH BLDV: 7.45 — HIGH (ref 7.32–7.43)
PHOSPHATE SERPL-MCNC: 3.5 MG/DL — SIGNIFICANT CHANGE UP (ref 2.5–4.5)
PLATELET # BLD AUTO: 248 K/UL — SIGNIFICANT CHANGE UP (ref 150–400)
PO2 BLDV: 154 MMHG — HIGH (ref 25–45)
POTASSIUM SERPL-MCNC: 3.9 MMOL/L — SIGNIFICANT CHANGE UP (ref 3.5–5.3)
POTASSIUM SERPL-SCNC: 3.9 MMOL/L — SIGNIFICANT CHANGE UP (ref 3.5–5.3)
PROT SERPL-MCNC: 7.5 G/DL — SIGNIFICANT CHANGE UP (ref 6–8.3)
RBC # BLD: 3.09 M/UL — LOW (ref 3.8–5.2)
RBC # FLD: 13.8 % — SIGNIFICANT CHANGE UP (ref 10.3–14.5)
SAO2 % BLDV: 99.3 % — HIGH (ref 67–88)
SODIUM SERPL-SCNC: 138 MMOL/L — SIGNIFICANT CHANGE UP (ref 135–145)
SPECIMEN SOURCE: SIGNIFICANT CHANGE UP
WBC # BLD: 10.85 K/UL — HIGH (ref 3.8–10.5)
WBC # FLD AUTO: 10.85 K/UL — HIGH (ref 3.8–10.5)

## 2024-08-01 PROCEDURE — 99233 SBSQ HOSP IP/OBS HIGH 50: CPT | Mod: GC

## 2024-08-01 PROCEDURE — 99233 SBSQ HOSP IP/OBS HIGH 50: CPT

## 2024-08-01 RX ORDER — ACETAZOLAMIDE 250 MG
250 TABLET ORAL ONCE
Refills: 0 | Status: COMPLETED | OUTPATIENT
Start: 2024-08-01 | End: 2024-08-01

## 2024-08-01 RX ADMIN — Medication 250 MILLIGRAM(S): at 12:31

## 2024-08-01 RX ADMIN — IPRATROPIUM BROMIDE AND ALBUTEROL SULFATE 3 MILLILITER(S): 2.5; .5 SOLUTION RESPIRATORY (INHALATION) at 00:48

## 2024-08-01 RX ADMIN — Medication 20 MILLIGRAM(S): at 05:29

## 2024-08-01 RX ADMIN — Medication 100 MICROGRAM(S): at 05:28

## 2024-08-01 RX ADMIN — ATORVASTATIN CALCIUM 40 MILLIGRAM(S): 40 TABLET, FILM COATED ORAL at 21:17

## 2024-08-01 RX ADMIN — HEPARIN SODIUM 5000 UNIT(S): 1000 INJECTION, SOLUTION INTRAVENOUS; SUBCUTANEOUS at 18:59

## 2024-08-01 RX ADMIN — IPRATROPIUM BROMIDE AND ALBUTEROL SULFATE 3 MILLILITER(S): 2.5; .5 SOLUTION RESPIRATORY (INHALATION) at 13:57

## 2024-08-01 RX ADMIN — IPRATROPIUM BROMIDE AND ALBUTEROL SULFATE 3 MILLILITER(S): 2.5; .5 SOLUTION RESPIRATORY (INHALATION) at 09:12

## 2024-08-01 RX ADMIN — Medication 20 MILLIGRAM(S): at 18:59

## 2024-08-01 RX ADMIN — Medication 3 MILLIGRAM(S): at 21:17

## 2024-08-01 RX ADMIN — BUDESONIDE AND FORMOTEROL FUMARATE DIHYDRATE 2 PUFF(S): 80; 4.5 AEROSOL RESPIRATORY (INHALATION) at 05:27

## 2024-08-01 RX ADMIN — PREDNISONE 20 MILLIGRAM(S): 10 TABLET ORAL at 05:28

## 2024-08-01 RX ADMIN — IPRATROPIUM BROMIDE AND ALBUTEROL SULFATE 3 MILLILITER(S): 2.5; .5 SOLUTION RESPIRATORY (INHALATION) at 20:35

## 2024-08-01 RX ADMIN — HEPARIN SODIUM 5000 UNIT(S): 1000 INJECTION, SOLUTION INTRAVENOUS; SUBCUTANEOUS at 05:28

## 2024-08-01 RX ADMIN — BUDESONIDE AND FORMOTEROL FUMARATE DIHYDRATE 2 PUFF(S): 80; 4.5 AEROSOL RESPIRATORY (INHALATION) at 19:17

## 2024-08-01 RX ADMIN — Medication 100 MILLIGRAM(S): at 05:28

## 2024-08-01 RX ADMIN — Medication 100 MILLIGRAM(S): at 18:58

## 2024-08-01 NOTE — PROGRESS NOTE ADULT - ATTENDING COMMENTS
79F with a history of diastolic CHF, COPD, mitral valve stenosis, hypertension, high cholesterol, pleural effusion requiring thoracentesis presents with increasing shortness of breath over past 1 week noted to have increasing bibasilar effusions. Acute on chronic hypoxic/hypercapnic respiratory failure 2/2 diastolic CHF exacerbation secondary to severe MS. Improving overall. Underlying cause is severe MS which needs to be addressed by structural heart. Possible eligibility for [place] trial as there is no parameter for degree of MR. D/w cardio - plan to reach out to Dr. Platt to see if patient can be transferred to [facility] vs have earlier appointment. DC planning likely 8/2 - either home vs transfer to [facility]. D/w pt & family and CM.
79F with PMHx of HFpEF, COPD (on 2L home O2), mitral valve stenosis, hypertension, high cholesterol, pleural effusion requiring thoracentesis presents with increasing shortness of breath over past 1 week noted to have increasing bibasilar effusions. Acute on chronic hypoxic/hypercapnic respiratory failure 2/2 diastolic CHF exacerbation secondary to severe MS. S/p IV bumex gtt and IV bumex BID. Worsening contraction alkalosis. Start IV diamox BID. Change to PO torsemide. Discussed importance of following up with structural heart at [facility] to evaluate for candidacy for [place] trial transcatheter mitral valve replacement. Sister in law and daughter informed me that they made appointment today for 8/20. Continue short course of systemic steroids and nebs for COPD component. Patient declining [facility] and wants to go home. Needs commode for home. Discussed with patient, sister in law, daughter, nephrology, SW/CM.

## 2024-08-01 NOTE — PROGRESS NOTE ADULT - SUBJECTIVE AND OBJECTIVE BOX
Patient is a 79y old  Female who presents with a chief complaint of SOB (01 Aug 2024 09:59)      Subjective:  INTERVAL HPI/OVERNIGHT EVENTS: Patient seen and examined at bedside. Overnight BPs soft to  Patient has no complaints at this time. Denies fevers, chills, headache, lightheadedness, chest pain, dyspnea, abdominal pain, n/v/d/c.    MEDICATIONS  (STANDING):  albuterol/ipratropium for Nebulization 3 milliLiter(s) Nebulizer every 6 hours  atorvastatin 40 milliGRAM(s) Oral at bedtime  budesonide 160 MICROgram(s)/formoterol 4.5 MICROgram(s) Inhaler 2 Puff(s) Inhalation two times a day  heparin   Injectable 5000 Unit(s) SubCutaneous every 12 hours  levothyroxine 100 MICROGram(s) Oral daily  metoprolol succinate  milliGRAM(s) Oral every 12 hours  predniSONE   Tablet 20 milliGRAM(s) Oral daily  torsemide 20 milliGRAM(s) Oral every 12 hours    MEDICATIONS  (PRN):  albuterol    90 MICROgram(s) HFA Inhaler 1 Puff(s) Inhalation every 4 hours PRN for shortness of breath and/or wheezing  melatonin 3 milliGRAM(s) Oral at bedtime PRN Insomnia      Allergies    No Known Allergies    Intolerances        REVIEW OF SYSTEMS:  CONSTITUTIONAL: No fever or chills  HEENT:  No headache, no sore throat  RESPIRATORY: No cough, wheezing, or shortness of breath  CARDIOVASCULAR: No chest pain, palpitations  GASTROINTESTINAL: No abd pain, nausea, vomiting, or diarrhea  GENITOURINARY: No dysuria, frequency, or hematuria  NEUROLOGICAL: no focal weakness or dizziness  MUSCULOSKELETAL: no myalgias     Objective:  Vital Signs Last 24 Hrs  T(C): 36.5 (01 Aug 2024 11:40), Max: 36.6 (31 Jul 2024 20:14)  T(F): 97.7 (01 Aug 2024 11:40), Max: 97.9 (31 Jul 2024 20:14)  HR: 64 (01 Aug 2024 11:40) (64 - 82)  BP: 110/60 (01 Aug 2024 11:40) (95/51 - 119/62)  BP(mean): --  RR: 18 (01 Aug 2024 11:40) (18 - 18)  SpO2: 93% (01 Aug 2024 11:40) (90% - 98%)    Parameters below as of 01 Aug 2024 11:40  Patient On (Oxygen Delivery Method): nasal cannula  O2 Flow (L/min): 2      GENERAL: NAD, lying in bed comfortably  HEAD:  Atraumatic, Normocephalic  EYES: EOMI, PERRLA, conjunctiva and sclera clear  ENT: Moist mucous membranes  NECK: Supple, No JVD  CHEST/LUNG: Clear to auscultation bilaterally; No rales, rhonchi, wheezing, or rubs. Unlabored respirations  HEART: Regular rate and rhythm; No murmurs, rubs, or gallops  ABDOMEN: Bowel sounds present; Soft, Nontender, Nondistended. No hepatomegaly  EXTREMITIES:  2+ Peripheral Pulses, brisk capillary refill. No clubbing, cyanosis, or edema  NERVOUS SYSTEM:  Alert & Oriented X3, speech clear. No deficits   MSK: FROM all 4 extremities, full and equal strength  SKIN: No rashes or lesions    LABS:                        9.6    10.85 )-----------( 248      ( 01 Aug 2024 08:40 )             29.7     CBC Full  -  ( 01 Aug 2024 08:40 )  WBC Count : 10.85 K/uL  Hemoglobin : 9.6 g/dL  Hematocrit : 29.7 %  Platelet Count - Automated : 248 K/uL  Mean Cell Volume : 96.1 fl  Mean Cell Hemoglobin : 31.1 pg  Mean Cell Hemoglobin Concentration : 32.3 gm/dL  Auto Neutrophil # : 9.25 K/uL  Auto Lymphocyte # : 0.74 K/uL  Auto Monocyte # : 0.70 K/uL  Auto Eosinophil # : 0.12 K/uL  Auto Basophil # : 0.01 K/uL  Auto Neutrophil % : 85.2 %  Auto Lymphocyte % : 6.8 %  Auto Monocyte % : 6.5 %  Auto Eosinophil % : 1.1 %  Auto Basophil % : 0.1 %    01 Aug 2024 08:40    138    |  97     |  90     ----------------------------<  103    3.9     |  36     |  1.30     Ca    9.7        01 Aug 2024 08:40  Phos  3.5       01 Aug 2024 08:40  Mg     2.4       01 Aug 2024 08:40    TPro  7.5    /  Alb  3.4    /  TBili  0.9    /  DBili  x      /  AST  14     /  ALT  18     /  AlkPhos  63     01 Aug 2024 08:40      Urinalysis Basic - ( 01 Aug 2024 08:40 )    Color: x / Appearance: x / SG: x / pH: x  Gluc: 103 mg/dL / Ketone: x  / Bili: x / Urobili: x   Blood: x / Protein: x / Nitrite: x   Leuk Esterase: x / RBC: x / WBC x   Sq Epi: x / Non Sq Epi: x / Bacteria: x      CAPILLARY BLOOD GLUCOSE            Culture - Body Fluid with Gram Stain (collected 07-29-24 @ 15:40)  Source: Pleural Fl Pleural Fluid  Gram Stain (07-30-24 @ 04:07):    polymorphonuclear leukocytes seen    No organisms seen    by cytocentrifuge  Preliminary Report (07-30-24 @ 19:04):    No growth    Culture - Fungal, Body Fluid (collected 07-29-24 @ 15:40)  Source: Pleural Fl Pleural Fluid  Preliminary Report (07-31-24 @ 23:03):    Culture is being performed. Fungal cultures are held for 4 weeks.    Urinalysis with Rflx Culture (collected 07-27-24 @ 22:11)    Culture - Blood (collected 07-27-24 @ 20:30)  Source: .Blood Blood-Peripheral  Preliminary Report (08-01-24 @ 04:01):    No growth at 4 days    Culture - Blood (collected 07-27-24 @ 20:20)  Source: .Blood Blood-Peripheral  Preliminary Report (08-01-24 @ 04:01):    No growth at 4 days        RADIOLOGY & ADDITIONAL TESTS:    Personally reviewed.     Consultant(s) Notes Reviewed:  [x] YES  [ ] NO     Patient is a 79y old  Female who presents with a chief complaint of SOB (01 Aug 2024 09:59)      Subjective:  INTERVAL HPI/OVERNIGHT EVENTS: Patient seen and examined at bedside. Overnight BPs soft to 90s/60s, morning Diamox held. Reports improvement in her shortness of breath since yesterday, no other complaints this morning besides wanting to go home. Denies fevers, chills, headache, dizziness, lightheadedness, chest pain, dyspnea, abdominal pain, n/v/d/c.    MEDICATIONS  (STANDING):  albuterol/ipratropium for Nebulization 3 milliLiter(s) Nebulizer every 6 hours  atorvastatin 40 milliGRAM(s) Oral at bedtime  budesonide 160 MICROgram(s)/formoterol 4.5 MICROgram(s) Inhaler 2 Puff(s) Inhalation two times a day  heparin   Injectable 5000 Unit(s) SubCutaneous every 12 hours  levothyroxine 100 MICROGram(s) Oral daily  metoprolol succinate  milliGRAM(s) Oral every 12 hours  predniSONE   Tablet 20 milliGRAM(s) Oral daily  torsemide 20 milliGRAM(s) Oral every 12 hours    MEDICATIONS  (PRN):  albuterol    90 MICROgram(s) HFA Inhaler 1 Puff(s) Inhalation every 4 hours PRN for shortness of breath and/or wheezing  melatonin 3 milliGRAM(s) Oral at bedtime PRN Insomnia      Allergies    No Known Allergies    Intolerances        REVIEW OF SYSTEMS:  CONSTITUTIONAL: No fever or chills  HEENT:  No headache, no sore throat  RESPIRATORY: No cough, wheezing, or shortness of breath  CARDIOVASCULAR: No chest pain, palpitations  GASTROINTESTINAL: No abd pain, nausea, vomiting, or diarrhea  GENITOURINARY: No dysuria, frequency, or hematuria  NEUROLOGICAL: no focal weakness or dizziness  MUSCULOSKELETAL: no myalgias     Objective:  Vital Signs Last 24 Hrs  T(C): 36.5 (01 Aug 2024 11:40), Max: 36.6 (31 Jul 2024 20:14)  T(F): 97.7 (01 Aug 2024 11:40), Max: 97.9 (31 Jul 2024 20:14)  HR: 64 (01 Aug 2024 11:40) (64 - 82)  BP: 110/60 (01 Aug 2024 11:40) (95/51 - 119/62)  BP(mean): --  RR: 18 (01 Aug 2024 11:40) (18 - 18)  SpO2: 93% (01 Aug 2024 11:40) (90% - 98%)    Parameters below as of 01 Aug 2024 11:40  Patient On (Oxygen Delivery Method): nasal cannula  O2 Flow (L/min): 2      PHYSICAL EXAM:  GENERAL: thin, elderly female in NAD, frail  HEENT:  AT/NC, anicteric, moist mucous membranes  CHEST/LUNG:  diminished breath sounds b/l, no wheezing  HEART:  Regular rate and rhythm, S1, S2, systolic murmur present  ABDOMEN:  soft, nontender, nondistended  MSK/EXTREMITIES: palpable peripheral pulses, no clubbing or cyanosis  NERVOUS SYSTEM: answers questions and follows commands appropriately, A&Ox3, grossly moves all extremities   PSYCH: Appropriate affect, Alert & Awake      LABS:                        9.6    10.85 )-----------( 248      ( 01 Aug 2024 08:40 )             29.7     CBC Full  -  ( 01 Aug 2024 08:40 )  WBC Count : 10.85 K/uL  Hemoglobin : 9.6 g/dL  Hematocrit : 29.7 %  Platelet Count - Automated : 248 K/uL  Mean Cell Volume : 96.1 fl  Mean Cell Hemoglobin : 31.1 pg  Mean Cell Hemoglobin Concentration : 32.3 gm/dL  Auto Neutrophil # : 9.25 K/uL  Auto Lymphocyte # : 0.74 K/uL  Auto Monocyte # : 0.70 K/uL  Auto Eosinophil # : 0.12 K/uL  Auto Basophil # : 0.01 K/uL  Auto Neutrophil % : 85.2 %  Auto Lymphocyte % : 6.8 %  Auto Monocyte % : 6.5 %  Auto Eosinophil % : 1.1 %  Auto Basophil % : 0.1 %    01 Aug 2024 08:40    138    |  97     |  90     ----------------------------<  103    3.9     |  36     |  1.30     Ca    9.7        01 Aug 2024 08:40  Phos  3.5       01 Aug 2024 08:40  Mg     2.4       01 Aug 2024 08:40    TPro  7.5    /  Alb  3.4    /  TBili  0.9    /  DBili  x      /  AST  14     /  ALT  18     /  AlkPhos  63     01 Aug 2024 08:40      Urinalysis Basic - ( 01 Aug 2024 08:40 )    Color: x / Appearance: x / SG: x / pH: x  Gluc: 103 mg/dL / Ketone: x  / Bili: x / Urobili: x   Blood: x / Protein: x / Nitrite: x   Leuk Esterase: x / RBC: x / WBC x   Sq Epi: x / Non Sq Epi: x / Bacteria: x      CAPILLARY BLOOD GLUCOSE            Culture - Body Fluid with Gram Stain (collected 07-29-24 @ 15:40)  Source: Pleural Fl Pleural Fluid  Gram Stain (07-30-24 @ 04:07):    polymorphonuclear leukocytes seen    No organisms seen    by cytocentrifuge  Preliminary Report (07-30-24 @ 19:04):    No growth    Culture - Fungal, Body Fluid (collected 07-29-24 @ 15:40)  Source: Pleural Fl Pleural Fluid  Preliminary Report (07-31-24 @ 23:03):    Culture is being performed. Fungal cultures are held for 4 weeks.    Urinalysis with Rflx Culture (collected 07-27-24 @ 22:11)    Culture - Blood (collected 07-27-24 @ 20:30)  Source: .Blood Blood-Peripheral  Preliminary Report (08-01-24 @ 04:01):    No growth at 4 days    Culture - Blood (collected 07-27-24 @ 20:20)  Source: .Blood Blood-Peripheral  Preliminary Report (08-01-24 @ 04:01):    No growth at 4 days        RADIOLOGY & ADDITIONAL TESTS:    Personally reviewed.     Consultant(s) Notes Reviewed:  [x] YES  [ ] NO     Patient is a 79y old  Female who presents with a chief complaint of SOB (01 Aug 2024 09:59)    Subjective:  INTERVAL HPI/OVERNIGHT EVENTS: Patient seen and examined at bedside. Overnight BPs soft to 90s/60s, morning Diamox held. Reports improvement in her shortness of breath since yesterday, no other complaints this morning besides wanting to go home. Denies fevers, chills, headache, dizziness, lightheadedness, chest pain, dyspnea, abdominal pain, n/v/d/c.    Discussed possible discussion with [facility] structural cardiologist for either transfer to immediately address valve vs earlier appt. Cardio Dr. Campbell offered to patient in AM but patient declined. With family, patient is agreeable now. Cardiology reached out to [facility] however has not heard back. May possibly discharge home tomorrow.    MEDICATIONS  (STANDING):  albuterol/ipratropium for Nebulization 3 milliLiter(s) Nebulizer every 6 hours  atorvastatin 40 milliGRAM(s) Oral at bedtime  budesonide 160 MICROgram(s)/formoterol 4.5 MICROgram(s) Inhaler 2 Puff(s) Inhalation two times a day  heparin   Injectable 5000 Unit(s) SubCutaneous every 12 hours  levothyroxine 100 MICROGram(s) Oral daily  metoprolol succinate  milliGRAM(s) Oral every 12 hours  predniSONE   Tablet 20 milliGRAM(s) Oral daily  torsemide 20 milliGRAM(s) Oral every 12 hours    MEDICATIONS  (PRN):  albuterol    90 MICROgram(s) HFA Inhaler 1 Puff(s) Inhalation every 4 hours PRN for shortness of breath and/or wheezing  melatonin 3 milliGRAM(s) Oral at bedtime PRN Insomnia      Allergies    No Known Allergies    Intolerances        REVIEW OF SYSTEMS:  CONSTITUTIONAL: No fever or chills  HEENT:  No headache, no sore throat  RESPIRATORY: No cough, wheezing; chronic shortness of breath  CARDIOVASCULAR: No chest pain, palpitations  GASTROINTESTINAL: No abd pain, nausea, vomiting, or diarrhea  GENITOURINARY: No dysuria, frequency, or hematuria  NEUROLOGICAL: no focal weakness or dizziness  MUSCULOSKELETAL: no myalgias     Objective:  Vital Signs Last 24 Hrs  T(C): 36.5 (01 Aug 2024 11:40), Max: 36.6 (31 Jul 2024 20:14)  T(F): 97.7 (01 Aug 2024 11:40), Max: 97.9 (31 Jul 2024 20:14)  HR: 64 (01 Aug 2024 11:40) (64 - 82)  BP: 110/60 (01 Aug 2024 11:40) (95/51 - 119/62)  RR: 18 (01 Aug 2024 11:40) (18 - 18)  SpO2: 93% (01 Aug 2024 11:40) (90% - 98%)    Parameters below as of 01 Aug 2024 11:40  Patient On (Oxygen Delivery Method): nasal cannula  O2 Flow (L/min): 2      PHYSICAL EXAM:  GENERAL: thin, elderly female in NAD, frail  HEENT:  AT/NC, anicteric, moist mucous membranes  CHEST/LUNG:  diminished breath sounds b/l, no wheezing  HEART:  Regular rate and rhythm, S1, S2, systolic murmur present  ABDOMEN:  soft, nontender, nondistended  MSK/EXTREMITIES: palpable peripheral pulses, no clubbing or cyanosis  NERVOUS SYSTEM: answers questions and follows commands appropriately, A&Ox3, grossly moves all extremities   PSYCH: Appropriate affect, Alert & Awake      LABS:                        9.6    10.85 )-----------( 248      ( 01 Aug 2024 08:40 )             29.7     CBC Full  -  ( 01 Aug 2024 08:40 )  WBC Count : 10.85 K/uL  Hemoglobin : 9.6 g/dL  Hematocrit : 29.7 %  Platelet Count - Automated : 248 K/uL  Mean Cell Volume : 96.1 fl  Mean Cell Hemoglobin : 31.1 pg  Mean Cell Hemoglobin Concentration : 32.3 gm/dL  Auto Neutrophil # : 9.25 K/uL  Auto Lymphocyte # : 0.74 K/uL  Auto Monocyte # : 0.70 K/uL  Auto Eosinophil # : 0.12 K/uL  Auto Basophil # : 0.01 K/uL  Auto Neutrophil % : 85.2 %  Auto Lymphocyte % : 6.8 %  Auto Monocyte % : 6.5 %  Auto Eosinophil % : 1.1 %  Auto Basophil % : 0.1 %    01 Aug 2024 08:40    138    |  97     |  90     ----------------------------<  103    3.9     |  36     |  1.30     Ca    9.7        01 Aug 2024 08:40  Phos  3.5       01 Aug 2024 08:40  Mg     2.4       01 Aug 2024 08:40    TPro  7.5    /  Alb  3.4    /  TBili  0.9    /  DBili  x      /  AST  14     /  ALT  18     /  AlkPhos  63     01 Aug 2024 08:40      Urinalysis Basic - ( 01 Aug 2024 08:40 )    Color: x / Appearance: x / SG: x / pH: x  Gluc: 103 mg/dL / Ketone: x  / Bili: x / Urobili: x   Blood: x / Protein: x / Nitrite: x   Leuk Esterase: x / RBC: x / WBC x   Sq Epi: x / Non Sq Epi: x / Bacteria: x      CAPILLARY BLOOD GLUCOSE            Culture - Body Fluid with Gram Stain (collected 07-29-24 @ 15:40)  Source: Pleural Fl Pleural Fluid  Gram Stain (07-30-24 @ 04:07):    polymorphonuclear leukocytes seen    No organisms seen    by cytocentrifuge  Preliminary Report (07-30-24 @ 19:04):    No growth    Culture - Fungal, Body Fluid (collected 07-29-24 @ 15:40)  Source: Pleural Fl Pleural Fluid  Preliminary Report (07-31-24 @ 23:03):    Culture is being performed. Fungal cultures are held for 4 weeks.    Urinalysis with Rflx Culture (collected 07-27-24 @ 22:11)    Culture - Blood (collected 07-27-24 @ 20:30)  Source: .Blood Blood-Peripheral  Preliminary Report (08-01-24 @ 04:01):    No growth at 4 days    Culture - Blood (collected 07-27-24 @ 20:20)  Source: .Blood Blood-Peripheral  Preliminary Report (08-01-24 @ 04:01):    No growth at 4 days        RADIOLOGY & ADDITIONAL TESTS:    Personally reviewed.     Consultant(s) Notes Reviewed:  [x] YES  [ ] NO

## 2024-08-01 NOTE — PROGRESS NOTE ADULT - PROBLEM SELECTOR PLAN 3
chronic  - c/w metoprolol BID  - Transition to oral torsemide 20mg BID, first dose 7/31 evening  -monitor vitals chronic  - c/w metoprolol BID  - cont to monitor vitals

## 2024-08-01 NOTE — PROGRESS NOTE ADULT - PROBLEM SELECTOR PROBLEM 2
YADI (acute kidney injury)

## 2024-08-01 NOTE — PROGRESS NOTE ADULT - PROBLEM SELECTOR PLAN 4
chronic  - continue home levothyroxine

## 2024-08-01 NOTE — PROGRESS NOTE ADULT - NUTRITIONAL ASSESSMENT
This patient has been assessed with a concern for Malnutrition and has been determined to have a diagnosis/diagnoses of Severe protein-calorie malnutrition.    This patient is being managed with:   Diet DASH/TLC-  Sodium & Cholesterol Restricted  1200mL Fluid Restriction ([id number])  Supplement Feeding Modality:  Oral  Ensure Plus High Protein Cans or Servings Per Day:  1       Frequency:  Two Times a day  Entered: Jul 28 2024 10:58AM  
This patient has been assessed with a concern for Malnutrition and has been determined to have a diagnosis/diagnoses of Severe protein-calorie malnutrition.    This patient is being managed with:   Diet DASH/TLC-  Sodium & Cholesterol Restricted  1200mL Fluid Restriction ([id number])  Supplement Feeding Modality:  Oral  Ensure Plus High Protein Cans or Servings Per Day:  1       Frequency:  Two Times a day  Entered: Jul 28 2024 10:58AM  
This patient has been assessed with a concern for Malnutrition and has been determined to have a diagnosis/diagnoses of Severe protein-calorie malnutrition.    This patient is being managed with:   Diet DASH/TLC-  Sodium & Cholesterol Restricted  1200mL Fluid Restriction ([id number])  Supplement Feeding Modality:  Oral  Ensure Plus High Protein Cans or Servings Per Day:  1       Frequency:  Two Times a day  Entered: Jul 28 2024 10:58AM  
This patient has been assessed with a concern for Malnutrition and has been determined to have a diagnosis/diagnoses of Severe protein-calorie malnutrition.    This patient is being managed with:   Diet DASH/TLC-  Sodium & Cholesterol Restricted  1200mL Fluid Restriction ([id number])  Supplement Feeding Modality:  Oral  Ensure Plus High Protein Cans or Servings Per Day:  1       Frequency:  Two Times a day  Entered: Jul 28 2024 10:58AM

## 2024-08-01 NOTE — PROGRESS NOTE ADULT - PROBLEM SELECTOR PLAN 5
chronic   - continue home statin

## 2024-08-01 NOTE — PROGRESS NOTE ADULT - SUBJECTIVE AND OBJECTIVE BOX
[facility] -- Kanu Hansen,  Mateo, Mele Woodson Savella, Goodger, Cohen  Office # [phone]    Follow Up:  Shortness of breath     Subjective/Observations: Remains on 2L NC," breathing better". No complaints of chest pain, or palpitations reported. No signs of orthopnea or PND.     REVIEW OF SYSTEMS: All other review of systems is negative unless indicated above  PAST MEDICAL & SURGICAL HISTORY:  History of COPD  No home O2 use      Asthma      Thyroid nodule      Hyperlipidemia      Hypertension      Hypothyroidism      History of cholecystectomy  1989      History of repair of hip fracture  ORIF 1982.  Hardware was eventually removed        MEDICATIONS  (STANDING):  albuterol/ipratropium for Nebulization 3 milliLiter(s) Nebulizer every 6 hours  atorvastatin 40 milliGRAM(s) Oral at bedtime  budesonide 160 MICROgram(s)/formoterol 4.5 MICROgram(s) Inhaler 2 Puff(s) Inhalation two times a day  heparin   Injectable 5000 Unit(s) SubCutaneous every 12 hours  levothyroxine 100 MICROGram(s) Oral daily  metoprolol succinate  milliGRAM(s) Oral every 12 hours  predniSONE   Tablet 20 milliGRAM(s) Oral daily  torsemide 20 milliGRAM(s) Oral every 12 hours    MEDICATIONS  (PRN):  albuterol    90 MICROgram(s) HFA Inhaler 1 Puff(s) Inhalation every 4 hours PRN for shortness of breath and/or wheezing  melatonin 3 milliGRAM(s) Oral at bedtime PRN Insomnia    Allergies    No Known Allergies    Intolerances      Vital Signs Last 24 Hrs  T(C): 36.5 (01 Aug 2024 04:51), Max: 36.8 (31 Jul 2024 11:56)  T(F): 97.7 (01 Aug 2024 04:51), Max: 98.3 (31 Jul 2024 11:56)  HR: 65 (01 Aug 2024 08:59) (65 - 82)  BP: 96/44 (01 Aug 2024 06:08) (95/51 - 119/62)  BP(mean): --  RR: 18 (01 Aug 2024 04:51) (17 - 18)  SpO2: 93% (01 Aug 2024 08:59) (90% - 98%)    Parameters below as of 01 Aug 2024 08:59  Patient On (Oxygen Delivery Method): nasal cannula, 3L      I&O's Summary      TELE: SR  PHYSICAL EXAM:  Constitutional: NAD, awake and alert  HEENT: Moist Mucous Membranes, Anicteric  Pulmonary: Non-labored, breath sounds are clear bilaterally, No wheezing, rales or rhonchi  Cardiovascular: Regular, S1 and S2, + murmurs, no rubs, gallops or clicks  Gastrointestinal: Bowel Sounds present, soft, nontender.   Lymph: No peripheral edema. No lymphadenopathy.  Skin: No visible rashes or ulcers.  Psych:  Mood & affect appropriate  LABS: All Labs Reviewed:                        9.6    10.85 )-----------( 248      ( 01 Aug 2024 08:40 )             29.7                         9.2    10.95 )-----------( 249      ( 31 Jul 2024 05:15 )             29.2                         8.5    9.10  )-----------( 235      ( 30 Jul 2024 05:30 )             26.8     01 Aug 2024 08:40    138    |  97     |  90     ----------------------------<  103    3.9     |  36     |  1.30   31 Jul 2024 05:15    141    |  94     |  87     ----------------------------<  90     3.6     |  41     |  1.40   30 Jul 2024 05:30    139    |  97     |  75     ----------------------------<  102    4.5     |  35     |  1.30     Ca    9.7        01 Aug 2024 08:40  Ca    9.4        31 Jul 2024 05:15  Ca    9.3        30 Jul 2024 05:30  Phos  3.5       01 Aug 2024 08:40  Mg     2.4       01 Aug 2024 08:40    TPro  7.5    /  Alb  3.4    /  TBili  0.9    /  DBili  x      /  AST  14     /  ALT  18     /  AlkPhos  63     01 Aug 2024 08:40          12 Lead ECG:   Ventricular Rate 69 BPM    Atrial Rate 69 BPM    P-R Interval 188 ms    QRS Duration 96 ms    Q-T Interval 438 ms    QTC Calculation(Bazett) 469 ms    P Axis 78 degrees    R Axis -15 degrees    T Axis 49 degrees    Diagnosis Line Normal sinus rhythm  Possible Left atrial enlargement  Low voltage QRS  Septal infarct (cited on or before 10-FRANCISCO JAVIER-2024)    Confirmed by GOLD WOODSON ([id number]) on 7/28/2024 8:39:03 AM (07-27-24 @ 21:03)

## 2024-08-01 NOTE — PROGRESS NOTE ADULT - SUBJECTIVE AND OBJECTIVE BOX
Date/Time Patient Seen:  		  Referring MD:   Data Reviewed	       Patient is a 79y old  Female who presents with a chief complaint of SOB (31 Jul 2024 11:27)      Subjective/HPI     PAST MEDICAL & SURGICAL HISTORY:  History of COPD  No home O2 use    Asthma    Thyroid nodule    Hyperlipidemia    Hypertension    Hypothyroidism    History of cholecystectomy  1989    History of repair of hip fracture  ORIF 1982.  Hardware was eventually removed          Medication list         MEDICATIONS  (STANDING):  albuterol/ipratropium for Nebulization 3 milliLiter(s) Nebulizer every 6 hours  atorvastatin 40 milliGRAM(s) Oral at bedtime  budesonide 160 MICROgram(s)/formoterol 4.5 MICROgram(s) Inhaler 2 Puff(s) Inhalation two times a day  heparin   Injectable 5000 Unit(s) SubCutaneous every 12 hours  levothyroxine 100 MICROGram(s) Oral daily  metoprolol succinate  milliGRAM(s) Oral every 12 hours  predniSONE   Tablet 20 milliGRAM(s) Oral daily  torsemide 20 milliGRAM(s) Oral every 12 hours    MEDICATIONS  (PRN):  albuterol    90 MICROgram(s) HFA Inhaler 1 Puff(s) Inhalation every 4 hours PRN for shortness of breath and/or wheezing  melatonin 3 milliGRAM(s) Oral at bedtime PRN Insomnia         Vitals log        ICU Vital Signs Last 24 Hrs  T(C): 36.5 (01 Aug 2024 04:51), Max: 36.8 (31 Jul 2024 11:56)  T(F): 97.7 (01 Aug 2024 04:51), Max: 98.3 (31 Jul 2024 11:56)  HR: 67 (01 Aug 2024 04:51) (67 - 82)  BP: 96/44 (01 Aug 2024 06:08) (95/51 - 119/62)  BP(mean): --  ABP: --  ABP(mean): --  RR: 18 (01 Aug 2024 04:51) (17 - 18)  SpO2: 92% (01 Aug 2024 04:51) (88% - 98%)    O2 Parameters below as of 01 Aug 2024 04:51  Patient On (Oxygen Delivery Method): nasal cannula  O2 Flow (L/min): 2               Input and Output:  I&O's Detail      Lab Data                        9.2    10.95 )-----------( 249      ( 31 Jul 2024 05:15 )             29.2     07-31    141  |  94<L>  |  87<H>  ----------------------------<  90  3.6   |  41<H>  |  1.40<H>    Ca    9.4      31 Jul 2024 05:15              Review of Systems	      Objective     Physical Examination    heart s1s2  lung dc BS  head nc  head at      Pertinent Lab findings & Imaging      Chiki:  NO   Adequate UO     I&O's Detail           Discussed with:     Cultures:	        Radiology

## 2024-08-01 NOTE — PROGRESS NOTE ADULT - TIME BILLING
as above
Reviewing chart notes and data, reviewing telemetry monitor records, face to face time counseling the patient, communicating with Dr. Shearer renal  and Dr Salguero cardio, coordinating care with SW/CM at [facility].
activities including direct patient care, counseling and/or coordinating care, reviewing notes/lab data/imaging, and discussion with multidisciplinary team (excluding time spent on resident teaching)
activities including direct patient care, counseling and/or coordinating care, reviewing notes/lab data/imaging, and discussion with multidisciplinary team (excluding time spent on resident teaching)

## 2024-08-01 NOTE — PROGRESS NOTE ADULT - PROBLEM/PLAN-2
Cbc, cmp, ldh, tryptase, spep, immunofixation, iron, tibc, ferritin today    No follow up
DISPLAY PLAN FREE TEXT

## 2024-08-01 NOTE — PROGRESS NOTE ADULT - ASSESSMENT
79-year-old female with a history of CHF, COPD, severe mitral valve stenosis, LVOT obstruction, hypertension, high cholesterol, pleural effusion requiring thoracentesis presents with increasing shortness of breath over past 1 week.     ADHF (Diastolic Dysfunction)/Severe MS/Pleural Effusion  -  admitted with acute decompensated diastolic heart failure secondary to severe MS and LVOT obstruction  - Continue Toprol  mg BID  -sp bumex gtt, followed by renal now changed to bumex IV BID   - CXR 7/31 showed Large left pleural effusion, unchanged.Small right pleural effusion, increased  - please keep accurate intake and output   - Pulm following. sp IR right thoracentesis 1liter drained 7/29  - Encourage incentive spirometer.  Continue nebs and steroid per Pulm  - TTE on 6/10/24 showed hyperdynamic LVF, EF 79%, LVH, mod LVOT obstruction (resting gradient 41mmHg and 47 mmHg with Valsalva  - She has been deemed not a surgical candidate for valve replacement  - She was seen by structural heart at . She is not a candidate for TMVR via [place] trial as she does not have significant enough MR.  She was supposed to follow at [facility] for consideration for the [place] trial, but missed her appointment.  She needs to follow at [facility] after discharge. Has an appt Aug 20.   - Continue statin for Hx of HLD  - BP soft ,keep off arb while diuresing     - Monitor and replete lytes, keep K>4, Mg>2.  - Will continue to follow.    Nila Marc NP  Nurse Practitioner- Cardiology   Call TEAMS

## 2024-08-01 NOTE — PROGRESS NOTE ADULT - PROBLEM SELECTOR PLAN 6
chronic  - duonebs q6, symbicort   - continue steroid taper per pulm - Prednisone 20mg daily x 5 days (2/5 received)  - wean O2 as tolerated  - Dr natarajan consulted, recs appreciated chronic  - duonebs q6, symbicort   - continue steroid taper per pulm - Prednisone 20mg daily x 5 days (3/5 received)  - wean O2 as tolerated  - Dr natarajan consulted, recs appreciated

## 2024-08-01 NOTE — PROGRESS NOTE ADULT - ASSESSMENT
79-year-old female with a history of CHF, COPD, mitral valve stenosis, hypertension, high cholesterol, pleural effusion requiring thoracentesis presents with increasing shortness of breath     copd  chf  op  oa  effusion  atelectasis  dyspnea  valv heart disease  HTN  HLD    s/p thoracentesis - 1 L drained - transudate  vs noted  diuresis as tolerated by BP and HD - AM diuretic HELD  short course of Prednisone    diuresis  cvs rx regimen optimization  I and O  serial labs  replete lytes  TTE reviewed  COPD - NEBS and Systemic steroids  VBG  tele monitoring  anxiolysis  o2 support as needed - goal sat > 88 pct  monitor VS and Sat  assist with needs  OLD record reviewed  CT chest reviewed with the patient

## 2024-08-01 NOTE — PROGRESS NOTE ADULT - SUBJECTIVE AND OBJECTIVE BOX
Patient is a 79y old  Female who presents with a chief complaint of SOB (28 Jul 2024 06:18)       HPI:   79-year-old female with a history of CHF, COPD, mitral valve stenosis, hypertension, high cholesterol, pleural effusion requiring thoracentesis presents with increasing shortness of breath over past 1 week.  Patient states was worse today.  Patient was found to be out of breath at home, came to the ED and improved with oxygen. Denies chest pain.  fever, chills, or cough. Endorses lower extremity edema bilaterally.  Denies weakness or dizziness.  ED COURSE:  Vitals: T 96.8  F , 70  HR  ,  /65 , RR 18  , SpO2  80% on 4L NC  Labs significant for: Hgb 10.3, d-dimer 381, BUN 50, Cr 1.4, Lactate 2.1 -> 1.2, Troponin 9.4, BNP= 4367  Imaging: bibasilar effusions  Pt received: Albuterol, methylprednisolone   (27 Jul 2024 19:40)    Renal consulted for YADI and need for diuresis. Chart reviewed. Patient urinating well. Still SOB and is wheezing. Denies prior kidney issues.      Still SOB with desaturations  Complaining of burning with urine as well    PAST MEDICAL & SURGICAL HISTORY:  History of COPD  No home O2 use      Asthma      Thyroid nodule      Hyperlipidemia      Hypertension      Hypothyroidism      History of cholecystectomy  1989      History of repair of hip fracture  ORIF 1982.  Hardware was eventually removed           FAMILY HISTORY:  FH: CAD (coronary artery disease) (Mother)    NC    Social History:Non smoker    MEDICATIONS  (STANDING):  acetaZOLAMIDE  IVPB 250 milliGRAM(s) IV Intermittent two times a day  albumin human 25% IVPB 50 milliLiter(s) IV Intermittent once  albuterol/ipratropium for Nebulization 3 milliLiter(s) Nebulizer every 6 hours  atorvastatin 40 milliGRAM(s) Oral at bedtime  budesonide 160 MICROgram(s)/formoterol 4.5 MICROgram(s) Inhaler 2 Puff(s) Inhalation two times a day  buMETAnide Injectable 1 milliGRAM(s) IV Push two times a day  heparin   Injectable 5000 Unit(s) SubCutaneous every 12 hours  levothyroxine 100 MICROGram(s) Oral daily  metoprolol succinate  milliGRAM(s) Oral every 12 hours  predniSONE   Tablet 20 milliGRAM(s) Oral daily    MEDICATIONS  (PRN):  albuterol    90 MICROgram(s) HFA Inhaler 1 Puff(s) Inhalation every 4 hours PRN for shortness of breath and/or wheezing  melatonin 3 milliGRAM(s) Oral at bedtime PRN Insomnia  every 4 hours PRN for shortness of breath and/or wheezing  melatonin 3 milliGRAM(s) Oral at bedtime PRN Insomnia    Allergies    No Known Allergies    Intolerances         REVIEW OF SYSTEMS:  as above    MEDICATIONS  (STANDING):  acetaZOLAMIDE  IVPB 250 milliGRAM(s) IV Intermittent two times a day  albumin human 25% IVPB 50 milliLiter(s) IV Intermittent once  albuterol/ipratropium for Nebulization 3 milliLiter(s) Nebulizer every 6 hours  atorvastatin 40 milliGRAM(s) Oral at bedtime  budesonide 160 MICROgram(s)/formoterol 4.5 MICROgram(s) Inhaler 2 Puff(s) Inhalation two times a day  buMETAnide Injectable 1 milliGRAM(s) IV Push two times a day  heparin   Injectable 5000 Unit(s) SubCutaneous every 12 hours  levothyroxine 100 MICROGram(s) Oral daily  metoprolol succinate  milliGRAM(s) Oral every 12 hours  predniSONE   Tablet 20 milliGRAM(s) Oral daily    MEDICATIONS  (PRN):  albuterol    90 MICROgram(s) HFA Inhaler 1 Puff(s) Inhalation every 4 hours PRN for shortness of breath and/or wheezing  melatonin 3 milliGRAM(s) Oral at bedtime PRN Insomnia    ICU Vital Signs Last 24 Hrs  T(C): 36.5 (01 Aug 2024 11:40), Max: 36.6 (31 Jul 2024 20:14)  T(F): 97.7 (01 Aug 2024 11:40), Max: 97.9 (31 Jul 2024 20:14)  HR: 66 (01 Aug 2024 14:02) (64 - 82)  BP: 110/60 (01 Aug 2024 11:40) (95/51 - 119/61)  BP(mean): --  ABP: --  ABP(mean): --  RR: 18 (01 Aug 2024 11:40) (18 - 18)  SpO2: 91% (01 Aug 2024 14:02) (90% - 98%)    O2 Parameters below as of 01 Aug 2024 14:02  Patient On (Oxygen Delivery Method): nasal cannula, 2L            PHYSICAL EXAM:    GENERAL: no distress, on NC  HEAD:  Atraumatic, Normocephalic  EYES: EOMI  NECK: Supple  NERVOUS SYSTEM:  Awake and Alert  RESP: reduced  EXTREMITIES:  No Edema        LABS:                                                           9.6    10.85 )-----------( 248      ( 01 Aug 2024 08:40 )             29.7     08-01    138  |  97  |  90<H>  ----------------------------<  103<H>  3.9   |  36<H>  |  1.30    Ca    9.7      01 Aug 2024 08:40  Phos  3.5     08-01  Mg     2.4     08-01    TPro  7.5  /  Alb  3.4  /  TBili  0.9  /  DBili  x   /  AST  14<L>  /  ALT  18  /  AlkPhos  63  08-01      Urinalysis Basic - ( 01 Aug 2024 08:40 )    Color: x / Appearance: x / SG: x / pH: x  Gluc: 103 mg/dL / Ketone: x  / Bili: x / Urobili: x   Blood: x / Protein: x / Nitrite: x   Leuk Esterase: x / RBC: x / WBC x   Sq Epi: x / Non Sq Epi: x / Bacteria: x

## 2024-08-01 NOTE — PROGRESS NOTE ADULT - PROBLEM SELECTOR PLAN 1
With acute on chronic diastolic CHF secondary to severe MS with pleural effusion requiring U/S guided thoracentesis   - CT chest shows increasing bibasilar pleural effusions   - Previous TTE 6/2024 reviewed with severe MS/LVOT - EF hyperdynamic EF 79% - no need to repeat  - On continuous pulse ox with remote telemetry   - Cardio consult w/ Dr. Woodson, recs appreciated.   - continue metoprolol BID  - Discussed with cardio Dr Woodson and nephrology Dr lala -> received IV Bumex x1 this AM -> transition to PO torsemide 20mg BID this evening  - Pulmonologist Dr. Ngo consulted- s/p thoracentesis with IR (R side) 7/29, 1 L drained  - X-ray done 7/31, appears stable compared to previous CXR s/p thoracentesis, f/u official read  - S/p bumex gtt to IV bumex BID and now on PO torsemide  - Patient not a candidate for [place] trial. Made appt with [facility] 8/20 to evaluate for [facility] trial.  - To further discuss GOC after establishing candidacy for TMVR at [facility] as patient is at risk for recurrent rehospitalizations for decompensated HF. With acute on chronic diastolic CHF secondary to severe MS with pleural effusion requiring U/S guided thoracentesis   - CT chest shows increasing bibasilar pleural effusions   - Previous TTE 6/2024 reviewed with severe MS/LVOT - EF hyperdynamic EF 79% - no need to repeat  - On continuous pulse ox with remote telemetry   - Cardio consult w/ Dr. Woodson, recs appreciated.   - continue metoprolol BID  - Pulmonologist Dr. Ngo consulted- s/p thoracentesis with IR (R side) 7/29, 1 L drained  - X-ray done 7/31 -> large left pleural effusion, unchanged; small right pleural effusion, increased  - S/p bumex gtt to IV bumex BID and now on PO torsemide  - Patient not a candidate for [place] trial. Made appt with [facility] 8/20 to evaluate for [facility] trial.  - To further discuss GOC after establishing candidacy for TMVR at [facility] as patient is at risk for recurrent rehospitalizations for decompensated HF.  - Cardio following (Dr. Campbell) -> attempted to contact cardiology at [facility] for possible transfer with no answer With acute on chronic diastolic CHF secondary to severe MS with pleural effusion requiring U/S guided thoracentesis   - CT chest shows increasing bibasilar pleural effusions   - Previous TTE 6/2024 reviewed with severe MS/LVOT - EF hyperdynamic EF 79% - no need to repeat  - On continuous pulse ox with remote telemetry   - Cardio consult w/ Dr. Woodson, recs appreciated.   - continue metoprolol BID  - Pulmonologist Dr. Ngo consulted- s/p thoracentesis with IR (R side) 7/29, 1 L drained  - X-ray done 7/31 -> large left pleural effusion, unchanged; small right pleural effusion, increased  - S/p bumex gtt to IV bumex BID and now on PO torsemide  - Patient not a candidate for [place] trial. Made appt with [facility] 8/20 to evaluate for [facility] trial.  - To further discuss GOC after establishing candidacy for TMVR at [facility] as patient is at risk for recurrent rehospitalizations for decompensated HF.  - Cardio following (Dr. Campbell) -> attempted to contact cardiology at [facility] for possible transfer vs earlier appt with no answer - already has appt for 8/20 but will let patient know if Dr. Platt responds

## 2024-08-01 NOTE — CHART NOTE - NSCHARTNOTEFT_GEN_A_CORE
Nutrition follow up ( chart reviewed, events noted) brief hx     Factors impacting intake: [ ] none [ ] nausea  [ ] vomiting [ ] diarrhea [ ] constipation  [ ]chewing problems [ ] swallowing issues  [ ] other:     Diet Presciption: Diet, DASH/TLC:   Sodium & Cholesterol Restricted  1200mL Fluid Restriction ([id number])  Supplement Feeding Modality:  Oral  Ensure Plus High Protein Cans or Servings Per Day:  1       Frequency:  Two Times a day (07-28-24 @ 10:58)    Intake:     Current Weight: Weight (kg): 48.1 (07-27 @ 14:30)    Pertinent Medications: MEDICATIONS  (STANDING):  albuterol/ipratropium for Nebulization 3 milliLiter(s) Nebulizer every 6 hours  atorvastatin 40 milliGRAM(s) Oral at bedtime  budesonide 160 MICROgram(s)/formoterol 4.5 MICROgram(s) Inhaler 2 Puff(s) Inhalation two times a day  heparin   Injectable 5000 Unit(s) SubCutaneous every 12 hours  levothyroxine 100 MICROGram(s) Oral daily  metoprolol succinate  milliGRAM(s) Oral every 12 hours  predniSONE   Tablet 20 milliGRAM(s) Oral daily  torsemide 20 milliGRAM(s) Oral every 12 hours    MEDICATIONS  (PRN):  albuterol    90 MICROgram(s) HFA Inhaler 1 Puff(s) Inhalation every 4 hours PRN for shortness of breath and/or wheezing  melatonin 3 milliGRAM(s) Oral at bedtime PRN Insomnia    Pertinent Labs: 08-01 Na138 mmol/L Glu 103 mg/dL<H> K+ 3.9 mmol/L Cr  1.30 mg/dL BUN 90 mg/dL<H> 08-01 Phos 3.5 mg/dL 08-01 Alb 3.4 g/dL     CAPILLARY BLOOD GLUCOSE        Skin:     Estimated Needs:   [ ] no change since previous assessment  [ ] recalculated:     Previous Nutrition Diagnosis:   [ ] Inadequate Energy Intake [ ]Inadequate Oral Intake [ ] Excessive Energy Intake   [ ] Underweight [ ] Increased Nutrient Needs [ ] Overweight/Obesity   [ ] Altered GI Function [ ] Unintended Weight Loss [ ] Food & Nutrition Related Knowledge Deficit [ ] Malnutrition     Nutrition Diagnosis is [ ] ongoing  [ ] resolved [ ] not applicable     New Nutrition Diagnosis: [ ] not applicable       Interventions:   Recommend  [ ] Change Diet To:  [ ] Nutrition Supplement  [ ] Nutrition Support  [ ] Other:     Monitoring and Evaluation:   [ ] PO intake [ x ] Tolerance to diet prescription [ x ] weights [ x ] labs[ x ] follow up per protocol  [ ] other: Nutrition follow up ( chart reviewed, events noted) brief hx 79Fwith a history of diastolic CHF, COPD, mitral valve stenosis, hypertension, high cholesterol, pleural effusion requiring thoracentesis presents with increasing shortness of breath over past 1 week noted to have increasing bibasilar effusions. Acute on chronic hypoxic/hypercapnic respiratory failure 2/2 diastolic CHF exacerbation secondary to severe MS.          Factors impacting intake: [ ] none [ ] nausea  [ ] vomiting [ ] diarrhea [ ] constipation  [ ]chewing problems [ ] swallowing issues  [ ] other:     Diet Presciption: Diet, DASH/TLC:   Sodium & Cholesterol Restricted  1200mL Fluid Restriction ([id number])  Supplement Feeding Modality:  Oral  Ensure Plus High Protein Cans or Servings Per Day:  1       Frequency:  Two Times a day (07-28-24 @ 10:58)    Intake:     Current Weight: Weight (kg): 48.1 (07-27 @ 14:30)    Pertinent Medications: MEDICATIONS  (STANDING):  albuterol/ipratropium for Nebulization 3 milliLiter(s) Nebulizer every 6 hours  atorvastatin 40 milliGRAM(s) Oral at bedtime  budesonide 160 MICROgram(s)/formoterol 4.5 MICROgram(s) Inhaler 2 Puff(s) Inhalation two times a day  heparin   Injectable 5000 Unit(s) SubCutaneous every 12 hours  levothyroxine 100 MICROGram(s) Oral daily  metoprolol succinate  milliGRAM(s) Oral every 12 hours  predniSONE   Tablet 20 milliGRAM(s) Oral daily  torsemide 20 milliGRAM(s) Oral every 12 hours    MEDICATIONS  (PRN):  albuterol    90 MICROgram(s) HFA Inhaler 1 Puff(s) Inhalation every 4 hours PRN for shortness of breath and/or wheezing  melatonin 3 milliGRAM(s) Oral at bedtime PRN Insomnia    Pertinent Labs: 08-01 Na138 mmol/L Glu 103 mg/dL<H> K+ 3.9 mmol/L Cr  1.30 mg/dL BUN 90 mg/dL<H> 08-01 Phos 3.5 mg/dL 08-01 Alb 3.4 g/dL     CAPILLARY BLOOD GLUCOSE        Skin:     Estimated Needs:   [ ] no change since previous assessment  [ ] recalculated:     Previous Nutrition Diagnosis:   [ ] Inadequate Energy Intake [ ]Inadequate Oral Intake [ ] Excessive Energy Intake   [ ] Underweight [ ] Increased Nutrient Needs [ ] Overweight/Obesity   [ ] Altered GI Function [ ] Unintended Weight Loss [ ] Food & Nutrition Related Knowledge Deficit [ ] Malnutrition     Nutrition Diagnosis is [ ] ongoing  [ ] resolved [ ] not applicable     New Nutrition Diagnosis: [ ] not applicable       Interventions:   Recommend  [ ] Change Diet To:  [ ] Nutrition Supplement  [ ] Nutrition Support  [ ] Other:     Monitoring and Evaluation:   [ ] PO intake [ x ] Tolerance to diet prescription [ x ] weights [ x ] labs[ x ] follow up per protocol  [ ] other: Nutrition follow up ( chart reviewed, events noted) brief hx 79Fwith a history of diastolic CHF, COPD, mitral valve stenosis, hypertension, high cholesterol, pleural effusion requiring thoracentesis presents with increasing shortness of breath over past 1 week noted to have increasing bibasilar effusions. Acute on chronic hypoxic/hypercapnic respiratory failure 2/2 diastolic CHF exacerbation secondary to severe MS.      At time of RD visit to pts bedside, states she had 1 pancake this am, and getting ready to have some Ensure, appetite described as fair, last BM was 2 days ago .    Factors impacting intake: [ X] none [ ] nausea  [ ] vomiting [ ] diarrhea [ ] constipation  [ ]chewing problems [ ] swallowing issues  [ ] other:     Diet Prescription: Diet, DASH/TLC:   Sodium & Cholesterol Restricted  1200mL Fluid Restriction ([id number])  Supplement Feeding Modality:  Oral  Ensure Plus High Protein Cans or Servings Per Day:  1       Frequency:  Two Times a day (07-28-24 @ 10:58)    Intake: 26-50%    Current Weight: Weight (kg): 48.1 (07-27 @ 14:30)    Pertinent Medications: MEDICATIONS  (STANDING):  albuterol/ipratropium for Nebulization 3 milliLiter(s) Nebulizer every 6 hours  atorvastatin 40 milliGRAM(s) Oral at bedtime  budesonide 160 MICROgram(s)/formoterol 4.5 MICROgram(s) Inhaler 2 Puff(s) Inhalation two times a day  heparin   Injectable 5000 Unit(s) SubCutaneous every 12 hours  levothyroxine 100 MICROGram(s) Oral daily  metoprolol succinate  milliGRAM(s) Oral every 12 hours  predniSONE   Tablet 20 milliGRAM(s) Oral daily  torsemide 20 milliGRAM(s) Oral every 12 hours    MEDICATIONS  (PRN):  albuterol    90 MICROgram(s) HFA Inhaler 1 Puff(s) Inhalation every 4 hours PRN for shortness of breath and/or wheezing  melatonin 3 milliGRAM(s) Oral at bedtime PRN Insomnia    Pertinent Labs: 08-01 Na138 mmol/L Glu 103 mg/dL<H> K+ 3.9 mmol/L Cr  1.30 mg/dL BUN 90 mg/dL<H> 08-01 Phos 3.5 mg/dL 08-01 Alb 3.4 g/dL     CAPILLARY BLOOD GLUCOSE    Skin: intact    Estimated Needs:   [X ] no change since previous assessment, based on IBD or 49.8 kg ( 25-30 kcals/kg) 7719-7929 ( 1.1-1.3 gm pro/kg)  55-65 gm protein       Previous Nutrition Diagnosis:   [ ] Inadequate Energy Intake [ ]Inadequate Oral Intake [ ] Excessive Energy Intake   [ ] Underweight [ ] Increased Nutrient Needs [ ] Overweight/Obesity   [ ] Altered GI Function [ ] Unintended Weight Loss [ ] Food & Nutrition Related Knowledge Deficit [ x] Malnutrition - severe in context of chronic illness    Nutrition Diagnosis is [x ] ongoing  [ ] resolved [ ] not applicable     New Nutrition Diagnosis: [ ] not applicable       Interventions:   Recommend  [ ] Change Diet To:  [ ] Nutrition Supplement  [ ] Nutrition Support  [X ] Other: continue current POC    Monitoring and Evaluation:   [X ] PO intake [ x ] Tolerance to diet prescription [ x ] weights [ x ] labs[ x ] follow up per protocol  [ x] other:skin integrity

## 2024-08-01 NOTE — PROGRESS NOTE ADULT - PROBLEM SELECTOR PLAN 2
BUN 87, Cr 1.4 , baseline 1.1; changes 2/2 active diuresis  - Continue to monitor AM CMP  - Dr Villaseñor consulted, recs appreciated  - Avoid nephrotoxic  medications -> currently off Bumex gtt, received IV Bumex x1 in AM  - To start torsemide 20mg BID starting in the evening  - Worsening contraction alkalosis likely iso loop diuretics on top of chronic metabolic compensation for chronic hypercapnia from COPD -> Diamox 250mg IV BID started  - F/u VBG in AM BUN 90, Cr 1.3 , baseline 1.1; changes 2/2 active diuresis  - Continue to monitor AM CMP  - Dr Villaseñor consulted, recs appreciated  - Avoid nephrotoxic medications  - c/w torsemide 20mg BID  - Contraction alkalosis improving this AM s/p Diamox x1 (bicarb: 41 -> 36) -> BP improved since ovn -> give 1x Diamox 250mg

## 2024-08-01 NOTE — CHART NOTE - NSCHARTNOTEFT_GEN_A_CORE
Called by RN for patient's electronic BP of 95/60. Asked for repeat manual BP.   Hold Diamox morning dose for now, until further reevaluation of BP parameters by day team. Called by RN for patient's electronic BP of 95/60. Asked for repeat manual BP.   Patient is not complaining of any SOB, dizziness, headaches.   Held morning Diamox dose for now, recommend primary team to place BP parameters.

## 2024-08-01 NOTE — DISCHARGE NOTE NURSING/CASE MANAGEMENT/SOCIAL WORK - PATIENT PORTAL LINK FT
You can access the FollowMyHealth Patient Portal offered by [facility] by registering at the following website: [email or web address]. By joining [employer]’s FollowMyHealth portal, you will also be able to view your health information using other applications (apps) compatible with our system.

## 2024-08-01 NOTE — DISCHARGE NOTE NURSING/CASE MANAGEMENT/SOCIAL WORK - MODE OF TRANSPORTATION
[place]lette Nasal mucosa clear.  Mouth with normal mucosa  Throat has no vesicles, no oropharyngeal exudates and uvula is midline.

## 2024-08-01 NOTE — DISCHARGE NOTE NURSING/CASE MANAGEMENT/SOCIAL WORK - NSSCNAMETXT_GEN_ALL_CORE
[facility] -  Nurse to visit the day after hospital discharge; physical therapist to follow. Please contact the home care agency at the above phone number if you have not heard from them by 12 noon on the day after your hospital discharge.

## 2024-08-01 NOTE — PROGRESS NOTE ADULT - ASSESSMENT
79Fwith a history of diastolic CHF, COPD, mitral valve stenosis, hypertension, high cholesterol, pleural effusion requiring thoracentesis presents with increasing shortness of breath over past 1 week noted to have increasing bibasilar effusions. Acute on chronic hypoxic/hypercapnic respiratory failure 2/2 diastolic CHF exacerbation secondary to severe MS.   79F with a history of diastolic CHF, COPD, mitral valve stenosis, hypertension, high cholesterol, pleural effusion requiring thoracentesis presents with increasing shortness of breath over past 1 week noted to have increasing bibasilar effusions. Acute on chronic hypoxic/hypercapnic respiratory failure 2/2 diastolic CHF exacerbation secondary to severe MS.

## 2024-08-02 ENCOUNTER — TRANSCRIPTION ENCOUNTER (OUTPATIENT)
Age: 79
End: 2024-08-02

## 2024-08-02 ENCOUNTER — INPATIENT (INPATIENT)
Facility: HOSPITAL | Age: 79
LOS: 4 days | Discharge: HOME | DRG: 303 | End: 2024-08-07
Attending: THORACIC SURGERY (CARDIOTHORACIC VASCULAR SURGERY) | Admitting: THORACIC SURGERY (CARDIOTHORACIC VASCULAR SURGERY)
Payer: MEDICARE

## 2024-08-02 VITALS
SYSTOLIC BLOOD PRESSURE: 102 MMHG | RESPIRATION RATE: 17 BRPM | OXYGEN SATURATION: 95 % | DIASTOLIC BLOOD PRESSURE: 55 MMHG | TEMPERATURE: 98 F | HEART RATE: 65 BPM

## 2024-08-02 VITALS
RESPIRATION RATE: 19 BRPM | OXYGEN SATURATION: 99 % | HEART RATE: 64 BPM | TEMPERATURE: 98 F | SYSTOLIC BLOOD PRESSURE: 111 MMHG | DIASTOLIC BLOOD PRESSURE: 53 MMHG

## 2024-08-02 DIAGNOSIS — I25.10 ATHEROSCLEROTIC HEART DISEASE OF NATIVE CORONARY ARTERY WITHOUT ANGINA PECTORIS: ICD-10-CM

## 2024-08-02 DIAGNOSIS — Z90.49 ACQUIRED ABSENCE OF OTHER SPECIFIED PARTS OF DIGESTIVE TRACT: Chronic | ICD-10-CM

## 2024-08-02 DIAGNOSIS — Z98.890 OTHER SPECIFIED POSTPROCEDURAL STATES: Chronic | ICD-10-CM

## 2024-08-02 LAB
ALBUMIN SERPL ELPH-MCNC: 3.2 G/DL — LOW (ref 3.3–5)
ALP SERPL-CCNC: 74 U/L — SIGNIFICANT CHANGE UP (ref 40–120)
ALT FLD-CCNC: 17 U/L — SIGNIFICANT CHANGE UP (ref 12–78)
ANION GAP SERPL CALC-SCNC: 8 MMOL/L — SIGNIFICANT CHANGE UP (ref 5–17)
AST SERPL-CCNC: 16 U/L — SIGNIFICANT CHANGE UP (ref 15–37)
BASOPHILS # BLD AUTO: 0.02 K/UL — SIGNIFICANT CHANGE UP (ref 0–0.2)
BASOPHILS NFR BLD AUTO: 0.3 % — SIGNIFICANT CHANGE UP (ref 0–2)
BILIRUB SERPL-MCNC: 0.4 MG/DL — SIGNIFICANT CHANGE UP (ref 0.2–1.2)
BUN SERPL-MCNC: 102 MG/DL — HIGH (ref 7–23)
CALCIUM SERPL-MCNC: 9.6 MG/DL — SIGNIFICANT CHANGE UP (ref 8.5–10.1)
CHLORIDE SERPL-SCNC: 94 MMOL/L — LOW (ref 96–108)
CO2 SERPL-SCNC: 38 MMOL/L — HIGH (ref 22–31)
CREAT SERPL-MCNC: 1.4 MG/DL — HIGH (ref 0.5–1.3)
CULTURE RESULTS: SIGNIFICANT CHANGE UP
CULTURE RESULTS: SIGNIFICANT CHANGE UP
EGFR: 38 ML/MIN/1.73M2 — LOW
EOSINOPHIL # BLD AUTO: 0.23 K/UL — SIGNIFICANT CHANGE UP (ref 0–0.5)
EOSINOPHIL NFR BLD AUTO: 3.4 % — SIGNIFICANT CHANGE UP (ref 0–6)
GLUCOSE SERPL-MCNC: 93 MG/DL — SIGNIFICANT CHANGE UP (ref 70–99)
HCT VFR BLD CALC: 28.1 % — LOW (ref 34.5–45)
HGB BLD-MCNC: 8.9 G/DL — LOW (ref 11.5–15.5)
IMM GRANULOCYTES NFR BLD AUTO: 0.1 % — SIGNIFICANT CHANGE UP (ref 0–0.9)
LYMPHOCYTES # BLD AUTO: 1.64 K/UL — SIGNIFICANT CHANGE UP (ref 1–3.3)
LYMPHOCYTES # BLD AUTO: 24.1 % — SIGNIFICANT CHANGE UP (ref 13–44)
MAGNESIUM SERPL-MCNC: 2.3 MG/DL — SIGNIFICANT CHANGE UP (ref 1.6–2.6)
MCHC RBC-ENTMCNC: 30.5 PG — SIGNIFICANT CHANGE UP (ref 27–34)
MCHC RBC-ENTMCNC: 31.7 GM/DL — LOW (ref 32–36)
MCV RBC AUTO: 96.2 FL — SIGNIFICANT CHANGE UP (ref 80–100)
MONOCYTES # BLD AUTO: 0.73 K/UL — SIGNIFICANT CHANGE UP (ref 0–0.9)
MONOCYTES NFR BLD AUTO: 10.7 % — SIGNIFICANT CHANGE UP (ref 2–14)
NEUTROPHILS # BLD AUTO: 4.17 K/UL — SIGNIFICANT CHANGE UP (ref 1.8–7.4)
NEUTROPHILS NFR BLD AUTO: 61.4 % — SIGNIFICANT CHANGE UP (ref 43–77)
NRBC # BLD: 0 /100 WBCS — SIGNIFICANT CHANGE UP (ref 0–0)
PHOSPHATE SERPL-MCNC: 3.6 MG/DL — SIGNIFICANT CHANGE UP (ref 2.5–4.5)
PLATELET # BLD AUTO: 238 K/UL — SIGNIFICANT CHANGE UP (ref 150–400)
POTASSIUM SERPL-MCNC: 3.6 MMOL/L — SIGNIFICANT CHANGE UP (ref 3.5–5.3)
POTASSIUM SERPL-SCNC: 3.6 MMOL/L — SIGNIFICANT CHANGE UP (ref 3.5–5.3)
PROT SERPL-MCNC: 6.8 G/DL — SIGNIFICANT CHANGE UP (ref 6–8.3)
RBC # BLD: 2.92 M/UL — LOW (ref 3.8–5.2)
RBC # FLD: 13.7 % — SIGNIFICANT CHANGE UP (ref 10.3–14.5)
SODIUM SERPL-SCNC: 140 MMOL/L — SIGNIFICANT CHANGE UP (ref 135–145)
SPECIMEN SOURCE: SIGNIFICANT CHANGE UP
SPECIMEN SOURCE: SIGNIFICANT CHANGE UP
WBC # BLD: 6.8 K/UL — SIGNIFICANT CHANGE UP (ref 3.8–10.5)
WBC # FLD AUTO: 6.8 K/UL — SIGNIFICANT CHANGE UP (ref 3.8–10.5)

## 2024-08-02 PROCEDURE — 83935 ASSAY OF URINE OSMOLALITY: CPT

## 2024-08-02 PROCEDURE — 97162 PT EVAL MOD COMPLEX 30 MIN: CPT

## 2024-08-02 PROCEDURE — 87102 FUNGUS ISOLATION CULTURE: CPT

## 2024-08-02 PROCEDURE — 80048 BASIC METABOLIC PNL TOTAL CA: CPT

## 2024-08-02 PROCEDURE — 84156 ASSAY OF PROTEIN URINE: CPT

## 2024-08-02 PROCEDURE — 83880 ASSAY OF NATRIURETIC PEPTIDE: CPT

## 2024-08-02 PROCEDURE — 82042 OTHER SOURCE ALBUMIN QUAN EA: CPT

## 2024-08-02 PROCEDURE — 87075 CULTR BACTERIA EXCEPT BLOOD: CPT

## 2024-08-02 PROCEDURE — 80053 COMPREHEN METABOLIC PANEL: CPT

## 2024-08-02 PROCEDURE — 88108 CYTOPATH CONCENTRATE TECH: CPT

## 2024-08-02 PROCEDURE — 87086 URINE CULTURE/COLONY COUNT: CPT

## 2024-08-02 PROCEDURE — 85610 PROTHROMBIN TIME: CPT

## 2024-08-02 PROCEDURE — 94640 AIRWAY INHALATION TREATMENT: CPT

## 2024-08-02 PROCEDURE — 87205 SMEAR GRAM STAIN: CPT

## 2024-08-02 PROCEDURE — 85027 COMPLETE CBC AUTOMATED: CPT

## 2024-08-02 PROCEDURE — 85379 FIBRIN DEGRADATION QUANT: CPT

## 2024-08-02 PROCEDURE — 71045 X-RAY EXAM CHEST 1 VIEW: CPT

## 2024-08-02 PROCEDURE — 71250 CT THORAX DX C-: CPT | Mod: MC

## 2024-08-02 PROCEDURE — 85025 COMPLETE CBC W/AUTO DIFF WBC: CPT

## 2024-08-02 PROCEDURE — 82803 BLOOD GASES ANY COMBINATION: CPT

## 2024-08-02 PROCEDURE — 87040 BLOOD CULTURE FOR BACTERIA: CPT

## 2024-08-02 PROCEDURE — 83986 ASSAY PH BODY FLUID NOS: CPT

## 2024-08-02 PROCEDURE — 82570 ASSAY OF URINE CREATININE: CPT

## 2024-08-02 PROCEDURE — 93970 EXTREMITY STUDY: CPT

## 2024-08-02 PROCEDURE — P9047: CPT

## 2024-08-02 PROCEDURE — 82945 GLUCOSE OTHER FLUID: CPT

## 2024-08-02 PROCEDURE — 93005 ELECTROCARDIOGRAM TRACING: CPT

## 2024-08-02 PROCEDURE — 84157 ASSAY OF PROTEIN OTHER: CPT

## 2024-08-02 PROCEDURE — 94760 N-INVAS EAR/PLS OXIMETRY 1: CPT

## 2024-08-02 PROCEDURE — 84100 ASSAY OF PHOSPHORUS: CPT

## 2024-08-02 PROCEDURE — 81003 URINALYSIS AUTO W/O SCOPE: CPT

## 2024-08-02 PROCEDURE — 99239 HOSP IP/OBS DSCHRG MGMT >30: CPT

## 2024-08-02 PROCEDURE — 88305 TISSUE EXAM BY PATHOLOGIST: CPT

## 2024-08-02 PROCEDURE — 89051 BODY FLUID CELL COUNT: CPT

## 2024-08-02 PROCEDURE — 87637 SARSCOV2&INF A&B&RSV AMP PRB: CPT

## 2024-08-02 PROCEDURE — 84133 ASSAY OF URINE POTASSIUM: CPT

## 2024-08-02 PROCEDURE — 32555 ASPIRATE PLEURA W/ IMAGING: CPT

## 2024-08-02 PROCEDURE — 84540 ASSAY OF URINE/UREA-N: CPT

## 2024-08-02 PROCEDURE — 84300 ASSAY OF URINE SODIUM: CPT

## 2024-08-02 PROCEDURE — 82550 ASSAY OF CK (CPK): CPT

## 2024-08-02 PROCEDURE — 85730 THROMBOPLASTIN TIME PARTIAL: CPT

## 2024-08-02 PROCEDURE — 99232 SBSQ HOSP IP/OBS MODERATE 35: CPT

## 2024-08-02 PROCEDURE — 81001 URINALYSIS AUTO W/SCOPE: CPT

## 2024-08-02 PROCEDURE — 87070 CULTURE OTHR SPECIMN AEROBIC: CPT

## 2024-08-02 PROCEDURE — 83735 ASSAY OF MAGNESIUM: CPT

## 2024-08-02 PROCEDURE — 71045 X-RAY EXAM CHEST 1 VIEW: CPT | Mod: 26

## 2024-08-02 PROCEDURE — 36415 COLL VENOUS BLD VENIPUNCTURE: CPT

## 2024-08-02 PROCEDURE — 83615 LACTATE (LD) (LDH) ENZYME: CPT

## 2024-08-02 PROCEDURE — 84484 ASSAY OF TROPONIN QUANT: CPT

## 2024-08-02 PROCEDURE — C1729: CPT

## 2024-08-02 PROCEDURE — 99285 EMERGENCY DEPT VISIT HI MDM: CPT

## 2024-08-02 PROCEDURE — 83605 ASSAY OF LACTIC ACID: CPT

## 2024-08-02 RX ORDER — HEPARIN SODIUM 1000 [USP'U]/ML
5000 INJECTION, SOLUTION INTRAVENOUS; SUBCUTANEOUS EVERY 12 HOURS
Refills: 0 | Status: DISCONTINUED | OUTPATIENT
Start: 2024-08-03 | End: 2024-08-07

## 2024-08-02 RX ORDER — TORSEMIDE 20 MG
20 TABLET ORAL EVERY 12 HOURS
Refills: 0 | Status: DISCONTINUED | OUTPATIENT
Start: 2024-08-03 | End: 2024-08-04

## 2024-08-02 RX ORDER — LEVOTHYROXINE SODIUM 175 MCG
100 TABLET ORAL DAILY
Refills: 0 | Status: DISCONTINUED | OUTPATIENT
Start: 2024-08-02 | End: 2024-08-07

## 2024-08-02 RX ORDER — PREDNISONE 10 MG/1
20 TABLET ORAL DAILY
Refills: 0 | Status: COMPLETED | OUTPATIENT
Start: 2024-08-02 | End: 2024-08-05

## 2024-08-02 RX ORDER — MELATONIN 3 MG
5 TABLET ORAL AT BEDTIME
Refills: 0 | Status: DISCONTINUED | OUTPATIENT
Start: 2024-08-02 | End: 2024-08-07

## 2024-08-02 RX ORDER — PANTOPRAZOLE SODIUM 20 MG/1
40 TABLET, DELAYED RELEASE ORAL
Refills: 0 | Status: DISCONTINUED | OUTPATIENT
Start: 2024-08-02 | End: 2024-08-07

## 2024-08-02 RX ORDER — BUDESONIDE AND FORMOTEROL FUMARATE DIHYDRATE 80; 4.5 UG/1; UG/1
2 AEROSOL RESPIRATORY (INHALATION)
Refills: 0 | Status: DISCONTINUED | OUTPATIENT
Start: 2024-08-03 | End: 2024-08-07

## 2024-08-02 RX ORDER — ALBUTEROL 90 MCG
2 AEROSOL REFILL (GRAM) INHALATION EVERY 6 HOURS
Refills: 0 | Status: DISCONTINUED | OUTPATIENT
Start: 2024-08-02 | End: 2024-08-07

## 2024-08-02 RX ORDER — IPRATROPIUM BROMIDE AND ALBUTEROL SULFATE 2.5; .5 MG/3ML; MG/3ML
3 SOLUTION RESPIRATORY (INHALATION) EVERY 6 HOURS
Refills: 0 | Status: DISCONTINUED | OUTPATIENT
Start: 2024-08-02 | End: 2024-08-07

## 2024-08-02 RX ORDER — ATORVASTATIN CALCIUM 40 MG/1
40 TABLET, FILM COATED ORAL AT BEDTIME
Refills: 0 | Status: DISCONTINUED | OUTPATIENT
Start: 2024-08-02 | End: 2024-08-07

## 2024-08-02 RX ORDER — BACTERIOSTATIC SODIUM CHLORIDE 0.9 %
3 VIAL (ML) INJECTION EVERY 8 HOURS
Refills: 0 | Status: DISCONTINUED | OUTPATIENT
Start: 2024-08-02 | End: 2024-08-07

## 2024-08-02 RX ADMIN — IPRATROPIUM BROMIDE AND ALBUTEROL SULFATE 3 MILLILITER(S): 2.5; .5 SOLUTION RESPIRATORY (INHALATION) at 13:59

## 2024-08-02 RX ADMIN — Medication 20 MILLIGRAM(S): at 18:10

## 2024-08-02 RX ADMIN — BUDESONIDE AND FORMOTEROL FUMARATE DIHYDRATE 2 PUFF(S): 80; 4.5 AEROSOL RESPIRATORY (INHALATION) at 21:04

## 2024-08-02 RX ADMIN — Medication 20 MILLIGRAM(S): at 05:21

## 2024-08-02 RX ADMIN — IPRATROPIUM BROMIDE AND ALBUTEROL SULFATE 3 MILLILITER(S): 2.5; .5 SOLUTION RESPIRATORY (INHALATION) at 07:35

## 2024-08-02 RX ADMIN — Medication 100 MICROGRAM(S): at 05:22

## 2024-08-02 RX ADMIN — HEPARIN SODIUM 5000 UNIT(S): 1000 INJECTION, SOLUTION INTRAVENOUS; SUBCUTANEOUS at 18:11

## 2024-08-02 RX ADMIN — HEPARIN SODIUM 5000 UNIT(S): 1000 INJECTION, SOLUTION INTRAVENOUS; SUBCUTANEOUS at 05:22

## 2024-08-02 RX ADMIN — IPRATROPIUM BROMIDE AND ALBUTEROL SULFATE 3 MILLILITER(S): 2.5; .5 SOLUTION RESPIRATORY (INHALATION) at 00:39

## 2024-08-02 RX ADMIN — IPRATROPIUM BROMIDE AND ALBUTEROL SULFATE 3 MILLILITER(S): 2.5; .5 SOLUTION RESPIRATORY (INHALATION) at 20:26

## 2024-08-02 RX ADMIN — ATORVASTATIN CALCIUM 40 MILLIGRAM(S): 40 TABLET, FILM COATED ORAL at 21:05

## 2024-08-02 RX ADMIN — BUDESONIDE AND FORMOTEROL FUMARATE DIHYDRATE 2 PUFF(S): 80; 4.5 AEROSOL RESPIRATORY (INHALATION) at 05:21

## 2024-08-02 RX ADMIN — PREDNISONE 20 MILLIGRAM(S): 10 TABLET ORAL at 05:22

## 2024-08-02 RX ADMIN — Medication 100 MILLIGRAM(S): at 18:10

## 2024-08-02 NOTE — PROGRESS NOTE ADULT - PROVIDER SPECIALTY LIST ADULT
Cardiology
Nephrology
Pulmonology
Pulmonology
Cardiology
Nephrology
Nephrology
Pulmonology
Cardiology
Nephrology
Nephrology
Hospitalist
Nephrology
Pulmonology
Pulmonology
Hospitalist

## 2024-08-02 NOTE — DISCHARGE NOTE PROVIDER - CARE PROVIDERS DIRECT ADDRESSES
[email or web address]

## 2024-08-02 NOTE — DISCHARGE NOTE PROVIDER - DETAILS OF MALNUTRITION DIAGNOSIS/DIAGNOSES
This patient has been assessed with a concern for Malnutrition and was treated during this hospitalization for the following Nutrition diagnosis/diagnoses:     -  07/29/2024: Severe protein-calorie malnutrition

## 2024-08-02 NOTE — DISCHARGE NOTE PROVIDER - HOSPITAL COURSE
H&P from Admission:  79-year-old female with a history of CHF, COPD, mitral valve stenosis, hypertension, high cholesterol, pleural effusion requiring thoracentesis presents with increasing shortness of breath over past 1 week.  Patient states was worse today.  Patient was found to be out of breath at home, came to the ED and improved with oxygen. Denies chest pain.  fever, chills, or cough. Endorses lower extremity edema bilaterally.  Denies weakness or dizziness.  ED COURSE:  Vitals: T 96.8  F , 70  HR  ,  /65 , RR 18  , SpO2  80% on 4L NC  Labs significant for: Hgb 10.3, d-dimer 381, BUN 50, Cr 1.4, Lactate 2.1 -> 1.2, Troponin 9.4, BNP= 4367  Imaging: bibasilar effusions  Pt received: Albuterol, methylprednisolone      HOSPITAL COURSE:   H&P from Admission:  79-year-old female with a history of CHF, COPD, mitral valve stenosis, hypertension, high cholesterol, pleural effusion requiring thoracentesis presents with increasing shortness of breath over past 1 week.  Patient states was worse today.  Patient was found to be out of breath at home, came to the ED and improved with oxygen. Denies chest pain.  fever, chills, or cough. Endorses lower extremity edema bilaterally.  Denies weakness or dizziness.  ED COURSE:  Vitals: T 96.8  F , 70  HR  ,  /65 , RR 18  , SpO2  80% on 4L NC  Labs significant for: Hgb 10.3, d-dimer 381, BUN 50, Cr 1.4, Lactate 2.1 -> 1.2, Troponin 9.4, BNP= 4367  Imaging: bibasilar effusions  Pt received: Albuterol, methylprednisolone      HOSPITAL COURSE:  Patient admitted for acute on chronic hypoxic/hypercapnic respiratory failure 2/2 diastolic CHF exacerbation secondary to severe MS. Initially placed on IV Lasix 40mg daily for diuresis, nephrology consulted for YADI and diuretic recommendations; advised starting Bumex drip and reevaluating. Patient given Duonebs around the clock for continued SOB. Pulmonology consulted for respiratory failure in setting of bibasilar effusions; recommended U/S guided thoracentesis for drainage. Patient had significant relief s/p drainage of 1L transudate. Cardiology consulted for structural heart disease contributing to HF; deemed not a surgical candidate for valve replacement and not a candidate for APOLLO trial due to not meeting MR requirement, but consider eligibility for SUMMIT trial at [facility]. Appointment made for 8/20, cardio attempted to reach structural cardiologist at [facility] for transfer vs. earlier appt with no answer. Cardiology advised increasing patient's home metoprolol to 100mg BID. Patient was transitioned to IV Bumex 1mg BID and later transitioned to torsemide oral 20mg BID. Noted contraction alkalosis 2/2 continued diuretic treatment, patient received Diamox 250mg x2 with improvement in bicarb. Patient continued to endorse improved shortness of breath compared to admission.     Patient is stable for discharge as per primary medical team and consultants.    PT consulted, recommends discharge home (no skilled PT needs).    Patient showed improvement throughout hospitalization. Patient was seen and examined on day of discharge. Patient was medically optimized for discharge with close outpatient follow up. Advised importance of following up with [facility] regarding eligibility for SUMMIT trial.          CONSULTANTS:   Nephrology (Dr. Shearer)  Pulmonary (Dr. Ngo)  Cardiology (Dr. Rahman)        ---  TIME SPENT:  I, the attending physician, was physically present for the key portions of the evaluation and management (E/M) service provided. The total amount of time spent reviewing the hospital notes, laboratory values, imaging findings, assessing/counseling the patient, discussing with consultant physicians, social work, nursing staff was -- minutes    ---  Primary care provider was made aware of plan for discharge:      [  ] NO     [  ] YES H&P from Admission:  79-year-old female with a history of CHF, COPD, mitral valve stenosis, hypertension, high cholesterol, pleural effusion requiring thoracentesis presents with increasing shortness of breath over past 1 week.  Patient states was worse today.  Patient was found to be out of breath at home, came to the ED and improved with oxygen. Denies chest pain.  fever, chills, or cough. Endorses lower extremity edema bilaterally.  Denies weakness or dizziness.  ED COURSE:  Vitals: T 96.8  F , 70  HR  ,  /65 , RR 18  , SpO2  80% on 4L NC  Labs significant for: Hgb 10.3, d-dimer 381, BUN 50, Cr 1.4, Lactate 2.1 -> 1.2, Troponin 9.4, BNP= 4367  Imaging: bibasilar effusions  Pt received: Albuterol, methylprednisolone      HOSPITAL COURSE:  Patient admitted for acute on chronic hypoxic/hypercapnic respiratory failure 2/2 diastolic CHF exacerbation secondary to severe MS. Initially placed on IV Lasix 40mg daily for diuresis, nephrology consulted for YADI and diuretic recommendations; advised starting Bumex drip and reevaluating. Patient given Duonebs around the clock for continued SOB. Pulmonology consulted for respiratory failure in setting of bibasilar effusions; recommended U/S guided thoracentesis for drainage. Patient had significant relief s/p drainage of 1L transudate. Cardiology consulted for structural heart disease contributing to HF; deemed not a surgical candidate for valve replacement and not a candidate for APOLLO trial due to not meeting MR requirement, but consider eligibility for SUMMIT trial at [facility]. Appointment made for 8/20, cardio attempted to reach structural cardiologist at [facility] for transfer vs. earlier appt with no answer. Cardiology advised increasing patient's home metoprolol to 100mg BID. Patient was transitioned to IV Bumex 1mg BID and later transitioned to torsemide oral 20mg BID. Noted contraction alkalosis 2/2 continued diuretic treatment, patient received Diamox 250mg x2 with improvement in bicarb. Patient continued to endorse improved shortness of breath compared to admission.     Accepted to [facility] under Dr. Platt's service to evaluate for valvular intervention.    Discussed with patient, family & cardiology Dr. Galindo.    Discussed with transfer center, representative Ayad.    Patient is stable for discharge as per primary medical team and consultants.      CONSULTANTS:   Nephrology (Dr. Shearer)  Pulmonary (Dr. Ngo)  Cardiology (Dr. Woodson)

## 2024-08-02 NOTE — PROGRESS NOTE ADULT - SUBJECTIVE AND OBJECTIVE BOX
[facility] -- Kanu Hansen, Mateo Johnson, Mele Woodson Savella  Office # [phone]      Follow Up:  SOB    Subjective/Observations:  No events overnight resting comfortably in bed.  No complaints of chest pain, dyspnea, or palpitations reported. No signs of orthopnea or PND. Wearing nasal cannula       REVIEW OF SYSTEMS: All other review of systems is negative unless indicated above    PAST MEDICAL & SURGICAL HISTORY:  History of COPD  No home O2 use      Asthma      Thyroid nodule      Hyperlipidemia      Hypertension      Hypothyroidism      History of cholecystectomy  1989      History of repair of hip fracture  ORIF 1982.  Hardware was eventually removed          MEDICATIONS  (STANDING):  albuterol/ipratropium for Nebulization 3 milliLiter(s) Nebulizer every 6 hours  atorvastatin 40 milliGRAM(s) Oral at bedtime  budesonide 160 MICROgram(s)/formoterol 4.5 MICROgram(s) Inhaler 2 Puff(s) Inhalation two times a day  heparin   Injectable 5000 Unit(s) SubCutaneous every 12 hours  levothyroxine 100 MICROGram(s) Oral daily  metoprolol succinate  milliGRAM(s) Oral every 12 hours  predniSONE   Tablet 20 milliGRAM(s) Oral daily  torsemide 20 milliGRAM(s) Oral every 12 hours    MEDICATIONS  (PRN):  albuterol    90 MICROgram(s) HFA Inhaler 1 Puff(s) Inhalation every 4 hours PRN for shortness of breath and/or wheezing  melatonin 3 milliGRAM(s) Oral at bedtime PRN Insomnia      Allergies    No Known Allergies    Intolerances        Vital Signs Last 24 Hrs  T(C): 36.6 (02 Aug 2024 04:52), Max: 36.6 (02 Aug 2024 04:52)  T(F): 97.8 (02 Aug 2024 04:52), Max: 97.8 (02 Aug 2024 04:52)  HR: 69 (02 Aug 2024 08:35) (64 - 75)  BP: 92/50 (02 Aug 2024 04:52) (92/50 - 119/60)  BP(mean): --  RR: 17 (02 Aug 2024 04:52) (17 - 18)  SpO2: 94% (02 Aug 2024 08:35) (91% - 95%)    Parameters below as of 02 Aug 2024 08:35  Patient On (Oxygen Delivery Method): nasal cannula        I&O's Summary    01 Aug 2024 07:01  -  02 Aug 2024 07:00  --------------------------------------------------------  IN: 0 mL / OUT: 900 mL / NET: -900 mL          PHYSICAL EXAM:  TELE: SR  Constitutional: NAD, awake and alert, Frail   HEENT: Moist Mucous Membranes, Anicteric  Pulmonary: Non-labored, breath sounds are clear bilaterally, No wheezing, crackles or rhonchi  Cardiovascular: Regular, S1 and S2 nl, + murmur No rubs, gallops or clicks   Gastrointestinal: Bowel Sounds present, soft, nontender.   Lymph: No lymphadenopathy. No peripheral edema.  Skin: No visible rashes or ulcers.  Psych:  Mood & affect appropriate    LABS: All Labs Reviewed:                        8.9    6.80  )-----------( 238      ( 02 Aug 2024 05:35 )             28.1                         9.6    10.85 )-----------( 248      ( 01 Aug 2024 08:40 )             29.7                         9.2    10.95 )-----------( 249      ( 31 Jul 2024 05:15 )             29.2     02 Aug 2024 05:35    140    |  94     |  102    ----------------------------<  93     3.6     |  38     |  1.40   01 Aug 2024 08:40    138    |  97     |  90     ----------------------------<  103    3.9     |  36     |  1.30   31 Jul 2024 05:15    141    |  94     |  87     ----------------------------<  90     3.6     |  41     |  1.40     Ca    9.6        02 Aug 2024 05:35  Ca    9.7        01 Aug 2024 08:40  Ca    9.4        31 Jul 2024 05:15  Phos  3.6       02 Aug 2024 05:35  Phos  3.5       01 Aug 2024 08:40  Mg     2.3       02 Aug 2024 05:35  Mg     2.4       01 Aug 2024 08:40    TPro  6.8    /  Alb  3.2    /  TBili  0.4    /  DBili  x      /  AST  16     /  ALT  17     /  AlkPhos  74     02 Aug 2024 05:35  TPro  7.5    /  Alb  3.4    /  TBili  0.9    /  DBili  x      /  AST  14     /  ALT  18     /  AlkPhos  63     01 Aug 2024 08:40

## 2024-08-02 NOTE — PROGRESS NOTE ADULT - ASSESSMENT
Acute on CKD Stage 3a  Acute on Chronic CHF  Pleural Effusions  COPD exacerbation  HTN with CKD  Dysuria      -YADI in the setting of cardiorenal syndrome  -urine indices reviewed  -Hold off renal imaging for now  -Bumex gtt, changed to Torsemide PO  -S/P Diamox 250mg IV BID (refer to [facility]VOR trial)  -S/p Thora 7/29/24  -Pulm/cardio eval noted  -Monitor urine output  -BP stable  -Complaints of dysuria, UA checked and negative    Thank you

## 2024-08-02 NOTE — DISCHARGE NOTE PROVIDER - NSDCDCMDCOMP_GEN_ALL_CORE
The patient is a 34y Male complaining of abdominal pain. This document is complete and the patient is ready for discharge.

## 2024-08-02 NOTE — DISCHARGE NOTE PROVIDER - INSTRUCTIONS
Diet, DASH/TLC:   Sodium & Cholesterol Restricted  1200mL Fluid Restriction ([id number])  Supplement Feeding Modality:  Oral  Ensure Plus High Protein Cans or Servings Per Day:  1       Frequency:  Two Times a day

## 2024-08-02 NOTE — PROGRESS NOTE ADULT - SUBJECTIVE AND OBJECTIVE BOX
Date/Time Patient Seen:  		  Referring MD:   Data Reviewed	       Patient is a 79y old  Female who presents with a chief complaint of SOB (01 Aug 2024 18:40)      Subjective/HPI     PAST MEDICAL & SURGICAL HISTORY:  History of COPD  No home O2 use    Asthma    Thyroid nodule    Hyperlipidemia    Hypertension    Hypothyroidism    History of cholecystectomy  1989    History of repair of hip fracture  ORIF 1982.  Hardware was eventually removed          Medication list         MEDICATIONS  (STANDING):  albuterol/ipratropium for Nebulization 3 milliLiter(s) Nebulizer every 6 hours  atorvastatin 40 milliGRAM(s) Oral at bedtime  budesonide 160 MICROgram(s)/formoterol 4.5 MICROgram(s) Inhaler 2 Puff(s) Inhalation two times a day  heparin   Injectable 5000 Unit(s) SubCutaneous every 12 hours  levothyroxine 100 MICROGram(s) Oral daily  metoprolol succinate  milliGRAM(s) Oral every 12 hours  predniSONE   Tablet 20 milliGRAM(s) Oral daily  torsemide 20 milliGRAM(s) Oral every 12 hours    MEDICATIONS  (PRN):  albuterol    90 MICROgram(s) HFA Inhaler 1 Puff(s) Inhalation every 4 hours PRN for shortness of breath and/or wheezing  melatonin 3 milliGRAM(s) Oral at bedtime PRN Insomnia         Vitals log        ICU Vital Signs Last 24 Hrs  T(C): 36.6 (02 Aug 2024 04:52), Max: 36.6 (02 Aug 2024 04:52)  T(F): 97.8 (02 Aug 2024 04:52), Max: 97.8 (02 Aug 2024 04:52)  HR: 65 (02 Aug 2024 04:52) (64 - 70)  BP: 92/50 (02 Aug 2024 04:52) (92/50 - 119/60)  BP(mean): --  ABP: --  ABP(mean): --  RR: 17 (02 Aug 2024 04:52) (17 - 18)  SpO2: 94% (02 Aug 2024 04:52) (91% - 94%)    O2 Parameters below as of 02 Aug 2024 04:52  Patient On (Oxygen Delivery Method): nasal cannula  O2 Flow (L/min): 2               Input and Output:  I&O's Detail    01 Aug 2024 07:01  -  02 Aug 2024 06:30  --------------------------------------------------------  IN:  Total IN: 0 mL    OUT:    Voided (mL): 900 mL  Total OUT: 900 mL    Total NET: -900 mL          Lab Data                        9.6    10.85 )-----------( 248      ( 01 Aug 2024 08:40 )             29.7     08-01    138  |  97  |  90<H>  ----------------------------<  103<H>  3.9   |  36<H>  |  1.30    Ca    9.7      01 Aug 2024 08:40  Phos  3.5     08-01  Mg     2.4     08-01    TPro  7.5  /  Alb  3.4  /  TBili  0.9  /  DBili  x   /  AST  14<L>  /  ALT  18  /  AlkPhos  63  08-01            Review of Systems	      Objective     Physical Examination    heart s1s2  lung dc bS  head nc      Pertinent Lab findings & Imaging      Chiki:  NO   Adequate UO     I&O's Detail    01 Aug 2024 07:01  -  02 Aug 2024 06:30  --------------------------------------------------------  IN:  Total IN: 0 mL    OUT:    Voided (mL): 900 mL  Total OUT: 900 mL    Total NET: -900 mL               Discussed with:     Cultures:	        Radiology

## 2024-08-02 NOTE — CASE MANAGEMENT PROGRESS NOTE - NSCMPROGRESSNOTE_GEN_ALL_CORE
Pt for transfer to [facility] today for possible valve replacement with Dr Platt.

## 2024-08-02 NOTE — DISCHARGE NOTE PROVIDER - NSDCCPCAREPLAN_GEN_ALL_CORE_FT
PRINCIPAL DISCHARGE DIAGNOSIS  Diagnosis: Acute on chronic diastolic congestive heart failure  Assessment and Plan of Treatment: Secondary to severe MS with LVOT. Was treated with IV diuretics and transitioned to PO torsemide. To transfer to [facility] under Dr. Platt to address valvular issues (SUMMIT trial for TMVR)      SECONDARY DISCHARGE DIAGNOSES  Diagnosis: Severe mitral valve stenosis  Assessment and Plan of Treatment: To follow up with Dr. Platt for structural evaluation and intervention. Continue beta blockade with toprol XL 100mg BID.    Diagnosis: Pleural effusion  Assessment and Plan of Treatment: With acute on chronic hypoxic respiratory failure secondary to above. Had thoracentesis this hospitalization and maintained on diuretics.    Diagnosis: COPD exacerbation  Assessment and Plan of Treatment: Started on nebulized bronchodilators and short course of PO prednisone in the hospital. Can continue prednisone 20mg daily with last day 8/5 (two more doses left). Follow up with pulmonology.    Diagnosis: Hypothyroidism  Assessment and Plan of Treatment: Continue synthroid    Diagnosis: YADI (acute kidney injury)  Assessment and Plan of Treatment: YADI likely secondary to cardiorenal syndrome. Followed by nephrology and maintained on diuretics. ARB on hold with YADI and given active diuresis.    Diagnosis: Hypertension  Assessment and Plan of Treatment: Continue toprol XL as above.    Diagnosis: Anemia  Assessment and Plan of Treatment: Hgb stable, monitored in the hospital with no acute bleeding seen.

## 2024-08-02 NOTE — PROGRESS NOTE ADULT - ASSESSMENT
79-year-old female with a history of CHF, COPD, severe mitral valve stenosis, LVOT obstruction, hypertension, high cholesterol, pleural effusion requiring thoracentesis presents with increasing shortness of breath over past 1 week.     ADHF (Diastolic Dysfunction)/Severe MS/Pleural Effusion  -  admitted with acute decompensated diastolic heart failure secondary to severe MS and LVOT obstruction  - Continue Toprol  mg BID  -sp bumex gtt,bumex IV BID, now torsemide 20 mg twice daily   - CXR 7/31 showed Large left pleural effusion, unchanged.Small right pleural effusion, increased  - please keep accurate intake and output   - Pulm following. sp IR right thoracentesis 1liter drained 7/29  - Encourage incentive spirometer.  Continue nebs and steroid per Pulm  - TTE on 6/10/24 showed hyperdynamic LVF, EF 79%, LVH, mod LVOT obstruction (resting gradient 41mmHg and 47 mmHg with Valsalva  - She has been deemed not a surgical candidate for valve replacement  - She was seen by structural heart at . She is not a candidate for TMVR via [place] trial as she does not have significant enough MR.  She is to be transferred to [place] for consideration for the [place] trial, today.  - Continue statin for Hx of HLD  - BP: 92/50 (08-02-24 @ 04:52) (92/50 - 119/60)  - BP soft ,keep off arb while diuresing     - Monitor and replete lytes, keep K>4, Mg>2.  - Will continue to follow.    Juan Carlos Gould DNP, ANP-c  Cardiology   Call Teams

## 2024-08-02 NOTE — DISCHARGE NOTE PROVIDER - NSDCFUSCHEDAPPT_GEN_ALL_CORE_FT
Alli Platt  [facility]  CTSURG [address]  Scheduled Appointment: 08/20/2024    Fay Muir  [facility]  PULMMED [address]  Scheduled Appointment: 08/22/2024

## 2024-08-02 NOTE — PROGRESS NOTE ADULT - ASSESSMENT
79-year-old female with a history of CHF, COPD, mitral valve stenosis, hypertension, high cholesterol, pleural effusion requiring thoracentesis presents with increasing shortness of breath     copd  chf  op  oa  effusion  atelectasis  dyspnea  valv heart disease  HTN  HLD    s/p thoracentesis - 1 L drained - transudate  vs noted  diuresis as tolerated by BP and HD -   short course of Prednisone    diuresis  cvs rx regimen optimization  I and O  serial labs  replete lytes  TTE reviewed  COPD - NEBS and Systemic steroids  VBG  tele monitoring  anxiolysis  o2 support as needed - goal sat > 88 pct  monitor VS and Sat  assist with needs  OLD record reviewed  CT chest reviewed with the patient

## 2024-08-02 NOTE — DISCHARGE NOTE PROVIDER - CARE PROVIDER_API CALL
Alli Platt  Thoracic and Cardiac Surgery  [address] [place] [address]  Phone: [phone]  Fax: [phone]  Established Patient  Follow Up Time:     Fay Muir  Pulmonary Disease  [address] [place] [address]  Phone: [phone]  Fax: [phone]  Established Patient  Follow Up Time:     Valdemar Villaseñor  Nephrology  [address]  [place] [address]  Phone: [phone]  Fax: [phone]  Follow Up Time:     David Glasgow  Internal Medicine  [address] [place] [address]  Phone: [phone]  Fax: [phone]  Established Patient  Follow Up Time:     Neel Cruz  Cardiovascular Disease  [address] [place] [address]  Phone: [phone]  Fax: [phone]  Established Patient  Follow Up Time:

## 2024-08-02 NOTE — PROGRESS NOTE ADULT - NS ATTEND AMEND GEN_ALL_CORE FT
79-year-old female with a history of CHF, COPD, severe mitral valve stenosis, LVOT obstruction, hypertension, high cholesterol, pleural effusion requiring thoracentesis presents with increasing shortness of breath over past 1 week.     ADHF (Diastolic Dysfunction)/Severe MS/Pleural Effusion  -  admitted with acute decompensated diastolic heart failure secondary to severe MS and LVOT obstruction  - Continue Toprol  mg BID  -sp bumex gtt,bumex IV BID, now torsemide 20 mg twice daily   - CXR 7/31 showed Large left pleural effusion, unchanged.Small right pleural effusion, increased  - please keep accurate intake and output   - Pulm following. sp IR right thoracentesis 1liter drained 7/29  - Encourage incentive spirometer.  Continue nebs and steroid per Pulm  - TTE on 6/10/24 showed hyperdynamic LVF, EF 79%, LVH, mod LVOT obstruction (resting gradient 41mmHg and 47 mmHg with Valsalva  - She has been deemed not a surgical candidate for valve replacement  - She was seen by structural heart at . She is not a candidate for TMVR via [place] trial as she does not have significant enough MR.  She is to be transferred to [place] for consideration for the [place] trial, today. dw dr whalen  - Continue statin for Hx of HLD  - BP: 92/50 (08-02-24 @ 04:52) (92/50 - 119/60)  - BP soft ,keep off arb while diuresing
I have personally seen and examined the patient in detail.  I have spoken to the provider regarding the assessment and plan of care.  I have made changes to the note accordingly.    79-year-old female with a history of CHF, COPD, severe mitral valve stenosis, LVOT obstruction, hypertension, high cholesterol, pleural effusion requiring thoracentesis presents with increasing shortness of breath over past 1 week.    - Patient admitted with acute decompensated diastolic heart failure secondary to severe MS and LVOT obstruction  - CT chest with bilateral pleural effusions, emphysema, PNA cannot be excluded. Motion artifact.   - Placed on Bumex gtt on 7/29, Cr stable but BUN and Bicarb rising. Would continue gtt for now as still tachypneic  - Pro BNP 4300  - Planned for thoracentesis this AM  - TTE on 6/10 showed hyperdynamic LVF, EF 79%, LVH, mod LVOT obstruction (resting gradient 41mmHg and 47 mmHg with Valsavla)  - Needs repeat TTE  - See was seen by structural heart at . She is not a candidate for TMVR via [place] trial as she does not have significant enough MR.  She was supposed to follow at [place] for consideration for the [place] trial, but missed her appointment. She needs to follow at [place] after discharge.    - At risk for further decompensation.
79-year-old female with a history of CHF, COPD, severe mitral valve stenosis, LVOT obstruction, hypertension, high cholesterol, pleural effusion requiring thoracentesis presents with increasing shortness of breath over past 1 week.     - admitted with acute decompensated diastolic heart failure secondary to severe MS and LVOT obstruction  - Continue Toprol  mg BID  - cxr with vikki effusions, larger on the left  - s/p bumex gtt, followed by renal now changed to bumex IV BID, Diamox added now stopped and now on torsemide alone.  - Pulm following. sp IR right thoracentesis 1 liter drained 7/29  - She has been deemed not a surgical candidate for valve replacement  - She was seen by structural heart at . She is not a candidate for TMVR via [place] trial as she does not have significant enough MR.  She was supposed to follow at [place] for consideration for the [place] trial, but missed her appointment.  She needs to follow at [place] after discharge.  This was explained to patient and family in detail  - Continue statin for Hx of HLD  - BP soft, keep off arb while diuresing, continue bb   - Patient is at risk for abrupt decompensation.
79-year-old female with a history of CHF, COPD, severe mitral valve stenosis, LVOT obstruction, hypertension, high cholesterol, pleural effusion requiring thoracentesis presents with increasing shortness of breath over past 1 week.    - Patient admitted with acute decompensated diastolic heart failure secondary to severe MS and LVOT obstruction  - CT chest with bilateral pleural effusions, emphysema, PNA cannot be excluded. Motion artifact.   - Placed on Bumex gtt on 7/29, Cr stable but BUN and Bicarb rising. Would continue gtt for now as still tachypneic  - Pro BNP 4300  - Planned for thoracentesis this AM  - TTE on 6/10 showed hyperdynamic LVF, EF 79%, LVH, mod LVOT obstruction (resting gradient 41mmHg and 47 mmHg with Valsavla)  - Needs repeat TTE  - She has been deemed not a surgical candidate for valve replacement  - See was seen by structural heart at . She is not a candidate for TMVR via [place] trial as she does not have significant enough MR.  She was supposed to follow at [place] for consideration for the [place] trial, but missed her appointment. She needs to follow at [place] after discharge.    - At risk for further decompensation.
79-year-old female with a history of CHF, COPD, severe mitral valve stenosis, LVOT obstruction, hypertension, high cholesterol, pleural effusion requiring thoracentesis presents with increasing shortness of breath over past 1 week.     A  -  admitted with acute decompensated diastolic heart failure secondary to severe MS and LVOT obstruction  - Continue Toprol  mg BID  -fu repeat chest xray this am   -sp bumex gtt, followed by renal now changed to bumex IV BID , Diamox added this AM   - Pulm following. sp IR right thoracentesis 1liter drained 7/29  - She has been deemed not a surgical candidate for valve replacement  - She was seen by structural heart at . She is not a candidate for TMVR via [place] trial as she does not have significant enough MR.  She was supposed to follow at [place] for consideration for the [place] trial, but missed her appointment.  She needs to follow at [place] after discharge.  This was explained to patient and family in detail  - Continue statin for Hx of HLD  - BP soft ,keep off arb while diuresing , continue bb   - Patient is at risk for abrupt decompensation

## 2024-08-02 NOTE — DISCHARGE NOTE PROVIDER - PROVIDER TOKENS
PROVIDER:[TOKEN:[[id number]],ESTABLISHEDPATIENT:[T]],PROVIDER:[TOKEN:[[id number]],ESTABLISHEDPATIENT:[T]],PROVIDER:[TOKEN:[[id number]]],PROVIDER:[TOKEN:[[id number]],ESTABLISHEDPATIENT:[T]],PROVIDER:[TOKEN:[[id number]],ESTABLISHEDPATIENT:[T]]

## 2024-08-02 NOTE — DISCHARGE NOTE PROVIDER - NSDCMRMEDTOKEN_GEN_ALL_CORE_FT
Albuterol (Eqv-ProAir HFA) 90 mcg/inh inhalation aerosol: 1 puff(s) inhaled every 4 to 6 hours as needed for  shortness of breath and/or wheezing  atorvastatin 40 mg oral tablet: 1 tab(s) orally once a day (at bedtime)  Breztri Aerosphere 160 mcg-9 mcg-4.8 mcg/inh inhalation aerosol: 2 puff(s) inhaled 2 times a day  furosemide 40 mg oral tablet: 1 tab(s) orally 2 times a day  levothyroxine 100 mcg (0.1 mg) oral tablet: 1 tab(s) orally once a day  metoprolol succinate 100 mg oral tablet, extended release: 1 tab(s) orally once a day

## 2024-08-02 NOTE — DISCHARGE NOTE PROVIDER - ATTENDING DISCHARGE PHYSICAL EXAMINATION:
T(C): 36.9 (08-02-24 @ 11:25), Max: 36.9 (08-02-24 @ 11:25)  HR: 69 (08-02-24 @ 11:25) (64 - 75)  BP: 100/58 (08-02-24 @ 11:25) (92/50 - 119/60)  RR: 18 (08-02-24 @ 11:25) (17 - 18)  SpO2: 93% (08-02-24 @ 11:25) (91% - 95%)    General: No apparent distress, breathing with pursed lips (chronic)  Head: normocephalic, atraumatic  Eyes: EOMI, anicteric  ENT: moist mucous membranes, no pharyngeal exudates  Heart: rrr, S1, S2, +murmur  Chest: decreased breath sounds  Abd: BS+, soft, NT, ND  Back: no tenderness  Extr: no edema or cyanosis  Skin: warm, well perfused  Neuro: AA&Ox3, no focal weakness, sensation to light touch intact  Psych: normal affect

## 2024-08-02 NOTE — PROGRESS NOTE ADULT - SUBJECTIVE AND OBJECTIVE BOX
Patient is a 79y old  Female who presents with a chief complaint of SOB (28 Jul 2024 06:18)       HPI:   79-year-old female with a history of CHF, COPD, mitral valve stenosis, hypertension, high cholesterol, pleural effusion requiring thoracentesis presents with increasing shortness of breath over past 1 week.  Patient states was worse today.  Patient was found to be out of breath at home, came to the ED and improved with oxygen. Denies chest pain.  fever, chills, or cough. Endorses lower extremity edema bilaterally.  Denies weakness or dizziness.  ED COURSE:  Vitals: T 96.8  F , 70  HR  ,  /65 , RR 18  , SpO2  80% on 4L NC  Labs significant for: Hgb 10.3, d-dimer 381, BUN 50, Cr 1.4, Lactate 2.1 -> 1.2, Troponin 9.4, BNP= 4367  Imaging: bibasilar effusions  Pt received: Albuterol, methylprednisolone   (27 Jul 2024 19:40)    Renal consulted for YADI and need for diuresis. Chart reviewed. Patient urinating well. Still SOB and is wheezing. Denies prior kidney issues.      Still SOB with desaturations  Complaining of burning with urine as well    PAST MEDICAL & SURGICAL HISTORY:  History of COPD  No home O2 use      Asthma      Thyroid nodule      Hyperlipidemia      Hypertension      Hypothyroidism      History of cholecystectomy  1989      History of repair of hip fracture  ORIF 1982.  Hardware was eventually removed           FAMILY HISTORY:  FH: CAD (coronary artery disease) (Mother)    NC    Social History:Non smoker    MEDICATIONS  (STANDING):  acetaZOLAMIDE  IVPB 250 milliGRAM(s) IV Intermittent two times a day  albumin human 25% IVPB 50 milliLiter(s) IV Intermittent once  albuterol/ipratropium for Nebulization 3 milliLiter(s) Nebulizer every 6 hours  atorvastatin 40 milliGRAM(s) Oral at bedtime  budesonide 160 MICROgram(s)/formoterol 4.5 MICROgram(s) Inhaler 2 Puff(s) Inhalation two times a day  buMETAnide Injectable 1 milliGRAM(s) IV Push two times a day  heparin   Injectable 5000 Unit(s) SubCutaneous every 12 hours  levothyroxine 100 MICROGram(s) Oral daily  metoprolol succinate  milliGRAM(s) Oral every 12 hours  predniSONE   Tablet 20 milliGRAM(s) Oral daily    MEDICATIONS  (PRN):  albuterol    90 MICROgram(s) HFA Inhaler 1 Puff(s) Inhalation every 4 hours PRN for shortness of breath and/or wheezing  melatonin 3 milliGRAM(s) Oral at bedtime PRN Insomnia  every 4 hours PRN for shortness of breath and/or wheezing  melatonin 3 milliGRAM(s) Oral at bedtime PRN Insomnia    Allergies    No Known Allergies    Intolerances         REVIEW OF SYSTEMS:  as above    MEDICATIONS  (STANDING):  acetaZOLAMIDE  IVPB 250 milliGRAM(s) IV Intermittent two times a day  albumin human 25% IVPB 50 milliLiter(s) IV Intermittent once  albuterol/ipratropium for Nebulization 3 milliLiter(s) Nebulizer every 6 hours  atorvastatin 40 milliGRAM(s) Oral at bedtime  budesonide 160 MICROgram(s)/formoterol 4.5 MICROgram(s) Inhaler 2 Puff(s) Inhalation two times a day  buMETAnide Injectable 1 milliGRAM(s) IV Push two times a day  heparin   Injectable 5000 Unit(s) SubCutaneous every 12 hours  levothyroxine 100 MICROGram(s) Oral daily  metoprolol succinate  milliGRAM(s) Oral every 12 hours  predniSONE   Tablet 20 milliGRAM(s) Oral daily    MEDICATIONS  (PRN):  albuterol    90 MICROgram(s) HFA Inhaler 1 Puff(s) Inhalation every 4 hours PRN for shortness of breath and/or wheezing  melatonin 3 milliGRAM(s) Oral at bedtime PRN Insomnia    ICU Vital Signs Last 24 Hrs  T(C): 36.9 (02 Aug 2024 11:25), Max: 36.9 (02 Aug 2024 11:25)  T(F): 98.4 (02 Aug 2024 11:25), Max: 98.4 (02 Aug 2024 11:25)  HR: 69 (02 Aug 2024 11:25) (64 - 75)  BP: 100/58 (02 Aug 2024 11:25) (92/50 - 119/60)  BP(mean): --  ABP: --  ABP(mean): --  RR: 18 (02 Aug 2024 11:25) (17 - 18)  SpO2: 93% (02 Aug 2024 11:25) (92% - 95%)    O2 Parameters below as of 02 Aug 2024 11:25  Patient On (Oxygen Delivery Method): nasal cannula  O2 Flow (L/min): 1                PHYSICAL EXAM:    GENERAL: no distress, on NC  HEAD:  Atraumatic, Normocephalic  EYES: EOMI  NECK: Supple  NERVOUS SYSTEM:  Awake and Alert  RESP: reduced  EXTREMITIES:  No Edema        LABS:                                                           8.9    6.80  )-----------( 238      ( 02 Aug 2024 05:35 )             28.1     08-02    140  |  94<L>  |  102<H>  ----------------------------<  93  3.6   |  38<H>  |  1.40<H>    Ca    9.6      02 Aug 2024 05:35  Phos  3.6     08-02  Mg     2.3     08-02    TPro  6.8  /  Alb  3.2<L>  /  TBili  0.4  /  DBili  x   /  AST  16  /  ALT  17  /  AlkPhos  74  08-02      Urinalysis Basic - ( 02 Aug 2024 05:35 )    Color: x / Appearance: x / SG: x / pH: x  Gluc: 93 mg/dL / Ketone: x  / Bili: x / Urobili: x   Blood: x / Protein: x / Nitrite: x   Leuk Esterase: x / RBC: x / WBC x   Sq Epi: x / Non Sq Epi: x / Bacteria: x

## 2024-08-03 DIAGNOSIS — I05.0 RHEUMATIC MITRAL STENOSIS: ICD-10-CM

## 2024-08-03 DIAGNOSIS — Z29.9 ENCOUNTER FOR PROPHYLACTIC MEASURES, UNSPECIFIED: ICD-10-CM

## 2024-08-03 LAB
ALBUMIN SERPL ELPH-MCNC: 3.4 G/DL — SIGNIFICANT CHANGE UP (ref 3.3–5.2)
ALP SERPL-CCNC: 70 U/L — SIGNIFICANT CHANGE UP (ref 40–120)
ALT FLD-CCNC: 14 U/L — SIGNIFICANT CHANGE UP
ANION GAP SERPL CALC-SCNC: 11 MMOL/L — SIGNIFICANT CHANGE UP (ref 5–17)
APPEARANCE UR: CLEAR — SIGNIFICANT CHANGE UP
APTT BLD: 32.1 SEC — SIGNIFICANT CHANGE UP (ref 24.5–35.6)
AST SERPL-CCNC: 17 U/L — SIGNIFICANT CHANGE UP
BASOPHILS # BLD AUTO: 0.01 K/UL — SIGNIFICANT CHANGE UP (ref 0–0.2)
BASOPHILS NFR BLD AUTO: 0.2 % — SIGNIFICANT CHANGE UP (ref 0–2)
BILIRUB SERPL-MCNC: 0.3 MG/DL — LOW (ref 0.4–2)
BILIRUB UR-MCNC: NEGATIVE — SIGNIFICANT CHANGE UP
BLD GP AB SCN SERPL QL: SIGNIFICANT CHANGE UP
BUN SERPL-MCNC: 91.6 MG/DL — HIGH (ref 8–20)
CALCIUM SERPL-MCNC: 9.1 MG/DL — SIGNIFICANT CHANGE UP (ref 8.4–10.5)
CHLORIDE SERPL-SCNC: 93 MMOL/L — LOW (ref 96–108)
CO2 SERPL-SCNC: 34 MMOL/L — HIGH (ref 22–29)
COLOR SPEC: YELLOW — SIGNIFICANT CHANGE UP
CREAT SERPL-MCNC: 1.13 MG/DL — SIGNIFICANT CHANGE UP (ref 0.5–1.3)
CULTURE RESULTS: SIGNIFICANT CHANGE UP
DIFF PNL FLD: NEGATIVE — SIGNIFICANT CHANGE UP
EGFR: 49 ML/MIN/1.73M2 — LOW
EOSINOPHIL # BLD AUTO: 0.11 K/UL — SIGNIFICANT CHANGE UP (ref 0–0.5)
EOSINOPHIL NFR BLD AUTO: 1.8 % — SIGNIFICANT CHANGE UP (ref 0–6)
GLUCOSE SERPL-MCNC: 93 MG/DL — SIGNIFICANT CHANGE UP (ref 70–99)
GLUCOSE UR QL: NEGATIVE MG/DL — SIGNIFICANT CHANGE UP
HCT VFR BLD CALC: 27.2 % — LOW (ref 34.5–45)
HGB BLD-MCNC: 9.1 G/DL — LOW (ref 11.5–15.5)
IMM GRANULOCYTES NFR BLD AUTO: 0.2 % — SIGNIFICANT CHANGE UP (ref 0–0.9)
INR BLD: 0.93 RATIO — SIGNIFICANT CHANGE UP (ref 0.85–1.18)
KETONES UR-MCNC: NEGATIVE MG/DL — SIGNIFICANT CHANGE UP
LEUKOCYTE ESTERASE UR-ACNC: NEGATIVE — SIGNIFICANT CHANGE UP
LYMPHOCYTES # BLD AUTO: 1.31 K/UL — SIGNIFICANT CHANGE UP (ref 1–3.3)
LYMPHOCYTES # BLD AUTO: 21.2 % — SIGNIFICANT CHANGE UP (ref 13–44)
MAGNESIUM SERPL-MCNC: 2.3 MG/DL — SIGNIFICANT CHANGE UP (ref 1.8–2.6)
MCHC RBC-ENTMCNC: 31.2 PG — SIGNIFICANT CHANGE UP (ref 27–34)
MCHC RBC-ENTMCNC: 33.5 GM/DL — SIGNIFICANT CHANGE UP (ref 32–36)
MCV RBC AUTO: 93.2 FL — SIGNIFICANT CHANGE UP (ref 80–100)
MONOCYTES # BLD AUTO: 0.69 K/UL — SIGNIFICANT CHANGE UP (ref 0–0.9)
MONOCYTES NFR BLD AUTO: 11.2 % — SIGNIFICANT CHANGE UP (ref 2–14)
MRSA PCR RESULT.: SIGNIFICANT CHANGE UP
NEUTROPHILS # BLD AUTO: 4.05 K/UL — SIGNIFICANT CHANGE UP (ref 1.8–7.4)
NEUTROPHILS NFR BLD AUTO: 65.4 % — SIGNIFICANT CHANGE UP (ref 43–77)
NITRITE UR-MCNC: NEGATIVE — SIGNIFICANT CHANGE UP
NT-PROBNP SERPL-SCNC: 3761 PG/ML — HIGH (ref 0–300)
PA ADP PRP-ACNC: 287 PRU — SIGNIFICANT CHANGE UP (ref 180–376)
PH UR: 8 — SIGNIFICANT CHANGE UP (ref 5–8)
PLATELET # BLD AUTO: 232 K/UL — SIGNIFICANT CHANGE UP (ref 150–400)
POTASSIUM SERPL-MCNC: 3.9 MMOL/L — SIGNIFICANT CHANGE UP (ref 3.5–5.3)
POTASSIUM SERPL-SCNC: 3.9 MMOL/L — SIGNIFICANT CHANGE UP (ref 3.5–5.3)
PREALB SERPL-MCNC: 22 MG/DL — SIGNIFICANT CHANGE UP (ref 18–38)
PROT SERPL-MCNC: 6.4 G/DL — LOW (ref 6.6–8.7)
PROT UR-MCNC: NEGATIVE MG/DL — SIGNIFICANT CHANGE UP
PROTHROM AB SERPL-ACNC: 10.3 SEC — SIGNIFICANT CHANGE UP (ref 9.5–13)
RBC # BLD: 2.92 M/UL — LOW (ref 3.8–5.2)
RBC # FLD: 13.5 % — SIGNIFICANT CHANGE UP (ref 10.3–14.5)
S AUREUS DNA NOSE QL NAA+PROBE: SIGNIFICANT CHANGE UP
SODIUM SERPL-SCNC: 138 MMOL/L — SIGNIFICANT CHANGE UP (ref 135–145)
SP GR SPEC: 1.01 — SIGNIFICANT CHANGE UP (ref 1–1.03)
SPECIMEN SOURCE: SIGNIFICANT CHANGE UP
UROBILINOGEN FLD QL: 0.2 MG/DL — SIGNIFICANT CHANGE UP (ref 0.2–1)
WBC # BLD: 6.18 K/UL — SIGNIFICANT CHANGE UP (ref 3.8–10.5)
WBC # FLD AUTO: 6.18 K/UL — SIGNIFICANT CHANGE UP (ref 3.8–10.5)

## 2024-08-03 PROCEDURE — 99222 1ST HOSP IP/OBS MODERATE 55: CPT

## 2024-08-03 PROCEDURE — 99223 1ST HOSP IP/OBS HIGH 75: CPT

## 2024-08-03 PROCEDURE — 93010 ELECTROCARDIOGRAM REPORT: CPT | Mod: 76

## 2024-08-03 RX ORDER — METOPROLOL TARTRATE 100 MG
25 TABLET ORAL
Refills: 0 | Status: DISCONTINUED | OUTPATIENT
Start: 2024-08-03 | End: 2024-08-07

## 2024-08-03 RX ORDER — MELATONIN 3 MG
5 TABLET ORAL ONCE
Refills: 0 | Status: COMPLETED | OUTPATIENT
Start: 2024-08-03 | End: 2024-08-03

## 2024-08-03 RX ADMIN — Medication 5 MILLIGRAM(S): at 21:48

## 2024-08-03 RX ADMIN — IPRATROPIUM BROMIDE AND ALBUTEROL SULFATE 3 MILLILITER(S): 2.5; .5 SOLUTION RESPIRATORY (INHALATION) at 21:12

## 2024-08-03 RX ADMIN — Medication 20 MILLIGRAM(S): at 17:30

## 2024-08-03 RX ADMIN — Medication 25 MILLIGRAM(S): at 05:37

## 2024-08-03 RX ADMIN — Medication 5 MILLIGRAM(S): at 00:29

## 2024-08-03 RX ADMIN — BUDESONIDE AND FORMOTEROL FUMARATE DIHYDRATE 2 PUFF(S): 80; 4.5 AEROSOL RESPIRATORY (INHALATION) at 09:56

## 2024-08-03 RX ADMIN — HEPARIN SODIUM 5000 UNIT(S): 1000 INJECTION, SOLUTION INTRAVENOUS; SUBCUTANEOUS at 05:37

## 2024-08-03 RX ADMIN — PANTOPRAZOLE SODIUM 40 MILLIGRAM(S): 20 TABLET, DELAYED RELEASE ORAL at 05:37

## 2024-08-03 RX ADMIN — BUDESONIDE AND FORMOTEROL FUMARATE DIHYDRATE 2 PUFF(S): 80; 4.5 AEROSOL RESPIRATORY (INHALATION) at 21:12

## 2024-08-03 RX ADMIN — IPRATROPIUM BROMIDE AND ALBUTEROL SULFATE 3 MILLILITER(S): 2.5; .5 SOLUTION RESPIRATORY (INHALATION) at 15:50

## 2024-08-03 RX ADMIN — Medication 20 MILLIGRAM(S): at 05:37

## 2024-08-03 RX ADMIN — Medication 100 MICROGRAM(S): at 05:37

## 2024-08-03 RX ADMIN — Medication 3 MILLILITER(S): at 05:43

## 2024-08-03 RX ADMIN — PREDNISONE 20 MILLIGRAM(S): 10 TABLET ORAL at 05:37

## 2024-08-03 RX ADMIN — Medication 3 MILLILITER(S): at 21:45

## 2024-08-03 RX ADMIN — Medication 3 MILLILITER(S): at 14:55

## 2024-08-03 RX ADMIN — IPRATROPIUM BROMIDE AND ALBUTEROL SULFATE 3 MILLILITER(S): 2.5; .5 SOLUTION RESPIRATORY (INHALATION) at 09:55

## 2024-08-03 RX ADMIN — ATORVASTATIN CALCIUM 40 MILLIGRAM(S): 40 TABLET, FILM COATED ORAL at 21:46

## 2024-08-03 RX ADMIN — HEPARIN SODIUM 5000 UNIT(S): 1000 INJECTION, SOLUTION INTRAVENOUS; SUBCUTANEOUS at 17:31

## 2024-08-03 NOTE — H&P ADULT - ASSESSMENT
79F with h/o CHF, COPD, former smoker, mitral valve stenosis, HTN, HLD, hypothyroidism, who presented as transfer from [facility] for evaluation for [place] trial for mitral valve stenosis.  Pt presented to [facility] c/o worsening shortness of breath x 1 week.  Admitted for acute on chronic hypoxic respiratory failure.  Hospital course complicated by YADI for which nephrology was consulted, initially on lasix for diuresis but changed to bumex.  At time of transfer, pt on torsemide,  Pulmonology consulted, pt on nebulizer and inhalers.  Pt required thoracentesis for pleural effusion during admission with 1L removed.  Cardiology consulted for MS and deemed not a surgical candidate for valve replacement and not a candidate for APOLLO trial due to not meeting MR requirement, but considered for eligibility for SUMMIT trial at [facility].  Pt transferred to [facility] to evaluate for mitral stenosis.

## 2024-08-03 NOTE — H&P ADULT - NSHPSOCIALHISTORY_GEN_ALL_CORE
Assistive Devices: none    Tobacco use: 1pack every other day x 30 years  Alcohol use: social  Illicit drug use: denies

## 2024-08-03 NOTE — CONSULT NOTE ADULT - NS ATTEND AMEND GEN_ALL_CORE FT
-    79F hx CHF, COPD,  mitral valve stenosis, HTN who presented as transfer from [facility] for evaluation for [place] trial for mitral valve stenosis.  Pt presented to [facility] c/o worsening shortness of breath x 1 week.  Admitted for acute on chronic hypoxic respiratory failure.  Hospital course complicated by YADI for which nephrology was consulted, initially on lasix for diuresis but changed to bumex.  At time of transfer, pt on torsemide,  Pulmonology consulted, pt on nebulizer and inhalers.  Pt required thoracentesis for pleural effusion during admission with 1L removed.  Primary Cardiology consulted for MS and deemed not a surgical candidate for valve replacement and not a candidate for APOLLO trial due to not meeting MR requirement, but considered for eligibility for SUMMIT trial at [facility].  Pt transferred to [facility] to evaluate for mitral stenosis.    [facility] Cardiology consulted for COSME and Cardiac Cath as pre-op workup.     - Pt being considered for transcatheter mitral valve implant valve as part of SUMMIT Trial   - echo shows EF 79%, septal hypertrophy, LVOT obstruction resting gradient 41 ->  47 with valsalva, moderate LVOT obstruction   - Severe MS, mild MR, mild to mod TR, mod pulm HTN   - COSME and LHC planned 8/5  - keep NPO at [facility] sunday   - rate control with metoprolol 25mg BID. monitor on telemetry   - pt is euvolemic, continue torsemide

## 2024-08-03 NOTE — H&P ADULT - NSHPLABSRESULTS_GEN_ALL_CORE
< from: TTE W or WO Ultrasound Enhancing Agent (06.10.24 @ 21:44) >    CONCLUSIONS:      1. Technically difficult image quality.   2. There is increased LV mass and concentric hypertrophy.   3. Left ventricular systolic function is hyperdynamic with an ejection fraction of 79 % by Shepard's method ofdisks.   4. Hyperdynamic left ventricular systolic function. Basal septal hypertrophy (measures 1.8 cm) with evidence left ventricular outflow tract obstruction with a resting gradient of 41mmHg and a gradient of 47 mmHg with valsalva, overall consistent with moderate LVOT obstruction.   5. Normal right ventricular cavity size and normal systolic function.   6. The right atrium is normal in size.   7. The left atrium is severely dilated.   8. Interatrial septum is stretched and displaced to the right, consistent with left atrial volume overload.   9. Tricuspid aortic valve with normal leaflet excursion. There is moderate thickening of the aortic valve leaflets.  10. Trace aortic regurgitation.  11. Severe mitral valve leaflet calcification.  12. Peak gradient across the mitral valve is 41mmHg with a mean gradient of 19mmHg, at a heart rate of 78 beats per minute.  13. Severe mitral valve stenosis.  14. Mild mitral regurgitation.  15. Mild to moderate tricuspid regurgitation.  16. The inferior vena cava is normal in size measuring 1.56 cm in diameter, (normal <2.1cm) with normal inspiratory collapse (normal >50%) consistent with normal right atrial pressure (~3, range 0-5mmHg).  17. Estimated pulmonary artery systolic pressure is 47 mmHg, consistent with moderate pulmonary hypertension.  18. Small pericardial effusion noted adjacent to the right atrium.  19. Bilateral pleural effusion noted.    < end of copied text >        < from: Xray Chest 1 View- PORTABLE-Urgent (Xray Chest 1 View- PORTABLE-Urgent .) (07.31.24 @ 08:49) >    Portable/semierect view of the chest is compared to 7/29/2024and is   correlated with the chest CT of 7/27/2024.    FINDINGS: Mild cardiomegaly and mild, central pulmonary venous congestion   with no pneumothorax or acute osseous findings.    Large left pleural effusion, unchanged. Small right pleural   effusion/adjacent atelectasis, increased.    IMPRESSION:    Large left pleural effusion, unchanged.    Small right pleural effusion, increased    --- End of Report ---    < end of copied text >

## 2024-08-03 NOTE — CONSULT NOTE ADULT - PROBLEM SELECTOR RECOMMENDATION 9
- Pt being considered for transcatheter mitral valve implant valve as part of SUMMIT Trial   - echo shows EF 79%, septal hypertrophy, LVOT obstruction resting gradient 41 --> 47 with valsava, moderate LVOT obstruction   - Severe MS, mild MR, mild to mod TR, mod pulm HTN   - repeat TTE pending  - plan for COSME and LHC as part of workup for MVR, procedures to be done monday, keep NPO @ [facility] sunday   - rate control with metoprolol 25mg BID. monitor on telemetry   - pt is euvolemic, continue torsemide  - we will follow

## 2024-08-03 NOTE — H&P ADULT - PROBLEM SELECTOR PLAN 1
TTE 6/24 with EF 79%, severe mitral stenosis    Preop work up ordered including carotid US to assess for carotid stenosis, PFTs to eval lung function, TTE to eval EF / WMA / Valve function, and lab work including TSH, prealbumin, Hgb A1C, P2Y12, BNP, T&S, MRSA/MSSA, and UA.  Pt to be evaluated for [place] trial  Continue home meds  Telemetry, continuous SpO2 monitoring.  Maintain SpO2 >92%. Supplement with O2 therapy PRN.     Plan to be discussed with CT Surgery attendings during AM rounds.

## 2024-08-03 NOTE — CONSULT NOTE ADULT - ASSESSMENT
This is a 79F with h/o CHF, COPD, former smoker, mitral valve stenosis, HTN, HLD, hypothyroidism, who presented as transfer from [facility] for evaluation for [place] trial for mitral valve stenosis.  Pt presented to [facility] c/o worsening shortness of breath x 1 week.  Admitted for acute on chronic hypoxic respiratory failure.  Hospital course complicated by YADI for which nephrology was consulted, initially on lasix for diuresis but changed to bumex.  At time of transfer, pt on torsemide,  Pulmonology consulted, pt on nebulizer and inhalers.  Pt required thoracentesis for pleural effusion during admission with 1L removed.  Primary Cardiology consulted for MS and deemed not a surgical candidate for valve replacement and not a candidate for APOLLO trial due to not meeting MR requirement, but considered for eligibility for SUMMIT trial at [facility].  Pt transferred to [facility] to evaluate for mitral stenosis.    [facility] Cardiology consulted for COSME and Cardiac Cath as pre-op workup.

## 2024-08-03 NOTE — PATIENT PROFILE ADULT - FALL HARM RISK - RISK INTERVENTIONS

## 2024-08-03 NOTE — H&P ADULT - NSHPPHYSICALEXAM_GEN_ALL_CORE
Neuro: A+O x 3, non-focal, speech clear and intact  HEENT: PERRL, EOMI, oral mucosa pink and moist  Neck: supple, no JVD  CV: regular rate, regular rhythm, +S1S2, no murmurs or rub  Pulm/chest: lung sounds CTA and equal bilaterally, no accessory muscle use noted  Abd: soft, NT, ND, +BS  Ext: MENDOZA x 4, no C/C/E  Skin: warm, well perfused, no rashes

## 2024-08-03 NOTE — H&P ADULT - HISTORY OF PRESENT ILLNESS
79F with h/o CHF, COPD, former smoker, mitral valve stenosis, HTN, HLD, hypothyroidism, who presented as transfer from [facility] for evaluation for [place] trial for mitral valve stenosis.  Pt presented to [facility] c/o worsening shortness of breath x 1 week.  Admitted for acute on chronic hypoxic respiratory failure.  Hospital course complicated by YADI for which nephrology was consulted, initially on lasix for diuresis but changed to bumex.  At time of transfer, pt on torsemide,  Pulmonology consulted, pt on nebulizer and inhalers.  Pt required thoracentesis for pleural effusion during admission with 1L removed.  Cardiology consulted for MS and deemed not a surgical candidate for valve replacement and not a candidate for APOLLO trial due to not meeting MR requirement, but considered for eligibility for SUMMIT trial at [facility].  Pt transferred to [facility] to evaluate for mitral stenosis.    Pt seen and assessed at bedside.  Pt laying comfortably in hospital bed in NAD.  States no current physical complaints at this time.  Denies f/c, n/v, chest pain, sob, abdominal pain, d/c, urinary symptoms.  ROS negative x 10 except as indicated in HPI.

## 2024-08-04 LAB
ALBUMIN SERPL ELPH-MCNC: 3.6 G/DL — SIGNIFICANT CHANGE UP (ref 3.3–5.2)
ALP SERPL-CCNC: 68 U/L — SIGNIFICANT CHANGE UP (ref 40–120)
ALT FLD-CCNC: 15 U/L — SIGNIFICANT CHANGE UP
ANION GAP SERPL CALC-SCNC: 14 MMOL/L — SIGNIFICANT CHANGE UP (ref 5–17)
AST SERPL-CCNC: 17 U/L — SIGNIFICANT CHANGE UP
BILIRUB SERPL-MCNC: 0.3 MG/DL — LOW (ref 0.4–2)
BUN SERPL-MCNC: 96.8 MG/DL — HIGH (ref 8–20)
CALCIUM SERPL-MCNC: 9.4 MG/DL — SIGNIFICANT CHANGE UP (ref 8.4–10.5)
CHLORIDE SERPL-SCNC: 93 MMOL/L — LOW (ref 96–108)
CO2 SERPL-SCNC: 31 MMOL/L — HIGH (ref 22–29)
CREAT SERPL-MCNC: 1.28 MG/DL — SIGNIFICANT CHANGE UP (ref 0.5–1.3)
EGFR: 42 ML/MIN/1.73M2 — LOW
GLUCOSE SERPL-MCNC: 97 MG/DL — SIGNIFICANT CHANGE UP (ref 70–99)
HCT VFR BLD CALC: 27.6 % — LOW (ref 34.5–45)
HGB BLD-MCNC: 9.1 G/DL — LOW (ref 11.5–15.5)
MAGNESIUM SERPL-MCNC: 2.3 MG/DL — SIGNIFICANT CHANGE UP (ref 1.6–2.6)
MCHC RBC-ENTMCNC: 30.3 PG — SIGNIFICANT CHANGE UP (ref 27–34)
MCHC RBC-ENTMCNC: 33 GM/DL — SIGNIFICANT CHANGE UP (ref 32–36)
MCV RBC AUTO: 92 FL — SIGNIFICANT CHANGE UP (ref 80–100)
PLATELET # BLD AUTO: 238 K/UL — SIGNIFICANT CHANGE UP (ref 150–400)
POTASSIUM SERPL-MCNC: 4 MMOL/L — SIGNIFICANT CHANGE UP (ref 3.5–5.3)
POTASSIUM SERPL-SCNC: 4 MMOL/L — SIGNIFICANT CHANGE UP (ref 3.5–5.3)
PROT SERPL-MCNC: 6.5 G/DL — LOW (ref 6.6–8.7)
RBC # BLD: 3 M/UL — LOW (ref 3.8–5.2)
RBC # FLD: 13.5 % — SIGNIFICANT CHANGE UP (ref 10.3–14.5)
SODIUM SERPL-SCNC: 138 MMOL/L — SIGNIFICANT CHANGE UP (ref 135–145)
WBC # BLD: 7.37 K/UL — SIGNIFICANT CHANGE UP (ref 3.8–10.5)
WBC # FLD AUTO: 7.37 K/UL — SIGNIFICANT CHANGE UP (ref 3.8–10.5)

## 2024-08-04 PROCEDURE — 71045 X-RAY EXAM CHEST 1 VIEW: CPT | Mod: 26

## 2024-08-04 PROCEDURE — 93880 EXTRACRANIAL BILAT STUDY: CPT | Mod: 26

## 2024-08-04 PROCEDURE — 99232 SBSQ HOSP IP/OBS MODERATE 35: CPT

## 2024-08-04 RX ADMIN — PREDNISONE 20 MILLIGRAM(S): 10 TABLET ORAL at 05:10

## 2024-08-04 RX ADMIN — Medication 3 MILLILITER(S): at 09:48

## 2024-08-04 RX ADMIN — BUDESONIDE AND FORMOTEROL FUMARATE DIHYDRATE 2 PUFF(S): 80; 4.5 AEROSOL RESPIRATORY (INHALATION) at 21:01

## 2024-08-04 RX ADMIN — Medication 5 MILLIGRAM(S): at 21:51

## 2024-08-04 RX ADMIN — IPRATROPIUM BROMIDE AND ALBUTEROL SULFATE 3 MILLILITER(S): 2.5; .5 SOLUTION RESPIRATORY (INHALATION) at 09:01

## 2024-08-04 RX ADMIN — BUDESONIDE AND FORMOTEROL FUMARATE DIHYDRATE 2 PUFF(S): 80; 4.5 AEROSOL RESPIRATORY (INHALATION) at 09:01

## 2024-08-04 RX ADMIN — Medication 100 MICROGRAM(S): at 06:57

## 2024-08-04 RX ADMIN — PANTOPRAZOLE SODIUM 40 MILLIGRAM(S): 20 TABLET, DELAYED RELEASE ORAL at 05:12

## 2024-08-04 RX ADMIN — Medication 3 MILLILITER(S): at 21:16

## 2024-08-04 RX ADMIN — ATORVASTATIN CALCIUM 40 MILLIGRAM(S): 40 TABLET, FILM COATED ORAL at 21:51

## 2024-08-04 RX ADMIN — HEPARIN SODIUM 5000 UNIT(S): 1000 INJECTION, SOLUTION INTRAVENOUS; SUBCUTANEOUS at 05:34

## 2024-08-04 RX ADMIN — IPRATROPIUM BROMIDE AND ALBUTEROL SULFATE 3 MILLILITER(S): 2.5; .5 SOLUTION RESPIRATORY (INHALATION) at 21:01

## 2024-08-04 RX ADMIN — HEPARIN SODIUM 5000 UNIT(S): 1000 INJECTION, SOLUTION INTRAVENOUS; SUBCUTANEOUS at 18:18

## 2024-08-04 RX ADMIN — Medication 25 MILLIGRAM(S): at 18:18

## 2024-08-04 RX ADMIN — IPRATROPIUM BROMIDE AND ALBUTEROL SULFATE 3 MILLILITER(S): 2.5; .5 SOLUTION RESPIRATORY (INHALATION) at 14:29

## 2024-08-04 RX ADMIN — Medication 3 MILLILITER(S): at 05:02

## 2024-08-04 NOTE — PROGRESS NOTE ADULT - ASSESSMENT
79 year old female, former smoker, with a PMHx of CHF, COPD, Mitral Valve Stenosis, HTN, HLD, hypothyroidism, who presented as a transfer from [facility] for evaluation for the [place] trial for Mitral Valve Stenosis.  Patient presented to [facility] with complaint of worsening shortness of breath x 1 week.  Admitted for acute on chronic hypoxic respiratory failure, with Pulmonology consulted and patient placed on nebulizers and inhalers.  Hospital course complicated by YADI for which Nephrology was consulted, initially was placed on Lasix for diuresis, but changed to Bumex, then Torsemide.  Patient required a right thoracentesis for pleural effusion 7/29/24 with 1L removed.  Cardiology consulted for Mitral Stenosis, with patient deemed not a surgical candidate for valve replacement and not a candidate for APOLLO trial due to not meeting MR requirement, but considered for eligibility for SUMMIT trial at [facility].  Pt transferred to [facility] to evaluate for mitral stenosis.

## 2024-08-04 NOTE — PROGRESS NOTE ADULT - SUBJECTIVE AND OBJECTIVE BOX
Mitral Valve Stenosis    HPI:  79F with h/o CHF, COPD, former smoker, mitral valve stenosis, HTN, HLD, hypothyroidism, who presented as transfer from [facility] for evaluation for [place] trial for mitral valve stenosis.  Pt presented to [facility] c/o worsening shortness of breath x 1 week.  Admitted for acute on chronic hypoxic respiratory failure.  Hospital course complicated by YADI for which nephrology was consulted, initially on lasix for diuresis but changed to bumex.  At time of transfer, pt on torsemide,  Pulmonology consulted, pt on nebulizer and inhalers.  Pt required thoracentesis for pleural effusion during admission with 1L removed.  Cardiology consulted for MS and deemed not a surgical candidate for valve replacement and not a candidate for APOLLO trial due to not meeting MR requirement, but considered for eligibility for SUMMIT trial at [facility].  Pt transferred to [facility] to evaluate for mitral stenosis.    Pt seen and assessed at bedside.  Pt laying comfortably in hospital bed in NAD.  States no current physical complaints at this time.  Denies f/c, n/v, chest pain, sob, abdominal pain, d/c, urinary symptoms.  ROS negative x 10 except as indicated in HPI.  (03 Aug 2024 03:11)    PAST MEDICAL & SURGICAL HISTORY:  History of COPD, No home O2 use  Asthma  Thyroid nodule  Hyperlipidemia  Hypertension  Hypothyroidism  History of cholecystectomy   Histry of repair of hip fracture ORIF .  Hardware was eventually removed    FAMILY HISTORY:  FH: CAD (coronary artery disease) (Mother)    Significant recent/past 24 hr events: No overnight events reported.    Subjective: Patient lying in bed in NAD. +Tolerating diet. +Passing gas. +Pain currently controlled. Denies fevers, chills, lightheadedness, dizziness, HA, CP, palpitations, SOB, cough, abdominal pain, N/V, diarrhea, numbness/tingling in extremities, or any other acute complaints. ROS negative x 10 systems except as noted above.    MEDICATIONS  (STANDING):  albuterol/ipratropium for Nebulization 3 milliLiter(s) Nebulizer every 6 hours  atorvastatin 40 milliGRAM(s) Oral at bedtime  budesonide 160 MICROgram(s)/formoterol 4.5 MICROgram(s) Inhaler 2 Puff(s) Inhalation two times a day  heparin   Injectable 5000 Unit(s) SubCutaneous every 12 hours  levothyroxine 100 MICROGram(s) Oral daily  melatonin 5 milliGRAM(s) Oral at bedtime  metoprolol tartrate 25 milliGRAM(s) Oral two times a day  pantoprazole    Tablet 40 milliGRAM(s) Oral before breakfast  predniSONE   Tablet 20 milliGRAM(s) Oral daily  sodium chloride 0.9% lock flush 3 milliLiter(s) IV Push every 8 hours  torsemide 20 milliGRAM(s) Oral every 12 hours    MEDICATIONS  (PRN):  albuterol    90 MICROgram(s) HFA Inhaler 2 Puff(s) Inhalation every 6 hours PRN Bronchospasm    Allergies: No Known Allergies    Vitals   T(C): 36.4 (03 Aug 2024 23:46), Max: 37.2 (03 Aug 2024 16:32)  T(F): 97.6 (03 Aug 2024 23:46), Max: 99 (03 Aug 2024 16:32)  HR: 73 (03 Aug 2024 23:46) (60 - 79)  BP: 92/50 (03 Aug 2024 23:46) (92/50 - 117/74)  RR: 16 (03 Aug 2024 23:46) (16 - 18)  SpO2: 96% (03 Aug 2024 23:46) (92% - 98%)  O2 Parameters below as of 03 Aug 2024 23:46  Patient On (Oxygen Delivery Method): nasal cannula  O2 Flow (L/min): 1    I&O's Detail    03 Aug 2024 07:01  -  04 Aug 2024 00:40  --------------------------------------------------------  IN:    Oral Fluid: 910 mL  Total IN: 910 mL    OUT:    Voided (mL): 1700 mL  Total OUT: 1700 mL    Total NET: -790 mL    Physical Exam  General: Lying in bed in NAD  Neuro: A+O x 3, non-focal, speech clear and intact  HEENT:  NCAT, No icterus, no thrush.    Neck:  Supple, trachea midline  Pulm: +Diminished BSs at bases bilaterally, no accessory muscle use noted  CV: regular rate, regular rhythm, +S1S2, +murmur  Abd: soft, NT, ND, + BS  Ext: MENDOZA x 4, no edema, no cyanosis, distal motor/neuro/circ intact  Skin: warm, dry, perfused    LABS                        9.1    6.18  )-----------( 232      ( 03 Aug 2024 00:55 )             27.2     08-    138  |  93<L>  |  91.6<H>  ----------------------------<  93  3.9   |  34.0<H>  |  1.13    Ca    9.1      03 Aug 2024 00:55  Phos  3.6     08-  Mg     2.3     08-    TPro  6.4<L>  /  Alb  3.4  /  TBili  0.3<L>  /  DBili  x   /  AST  17  /  ALT  14  /  AlkPhos  70  08-    PT/INR - ( 03 Aug 2024 00:55 )   PT: 10.3 sec;   INR: 0.93 ratio      PTT - ( 03 Aug 2024 00:55 )  PTT:32.1 sec    P2Y12 Plt Response Test (24 @ 00:55)    P2Y12 Plt Reactivity: 287    Pro-Brain Natriuretic Peptide (24 @ 00:55)    Pro-Brain Natriuretic Peptide: 3761 pg/mL    Prealbumin, Serum (24 @ 00:55)    Prealbumin, Serum: 22 mg/dL    A1C with Estimated Average Glucose (06.11.24 @ 06:33)    A1C with Estimated Average Glucose Result: 5.9    Thyroid Stimulating Hormone, Serum in AM (06.11.24 @ 06:33)    Thyroid Stimulating Hormone, Serum: 0.93 uIU/mL    Urinalysis Basic - ( 03 Aug 2024 04:35 )  Color: Yellow / Appearance: Clear / S.013 / pH: x  Gluc: x / Ketone: Negative mg/dL  / Bili: Negative / Urobili: 0.2 mg/dL   Blood: x / Protein: Negative mg/dL / Nitrite: Negative   Leuk Esterase: Negative / RBC: x / WBC x   Sq Epi: x / Non Sq Epi: x / Bacteria: x    Last CXR:  < from: Xray Chest 1 View- PORTABLE-Urgent (Xray Chest 1 View- PORTABLE-Urgent .) (24 @ 23:51) >  Heart size is stable. Bilateral pleural effusions not significantly changed. Slightly improved prominent interstitial markings. No pneumothorax.  IMPRESSION: Mild improved aeration. Stable bilateral pleural effusions  < end of copied text >    Last TTE:  < from: TTE W or WO Ultrasound Enhancing Agent (06.10.24 @ 21:44) >  CONCLUSIONS:   1. Technically difficult image quality.   2. There is increased LV mass and concentric hypertrophy.   3. Left ventricular systolic function is hyperdynamic with an ejection fraction of 79 % by Shepard's method of disks.   4. Hyperdynamic left ventricular systolic function. Basal septal hypertrophy (measures 1.8 cm) with evidence left ventricular outflow tract obstruction with a resting gradient of 41mmHg and a gradient of 47 mmHg with valsalva, overall consistent with moderate LVOT obstruction.   5. Normal right ventricular cavity size and normal systolic function.   6. The right atrium is normal in size.   7. The left atrium is severely dilated.   8. Interatrial septum is stretched and displaced to the right, consistent with left atrial volume overload.   9. Tricuspid aortic valve with normal leaflet excursion. There is moderate thickening of the aortic valve leaflets.  10. Trace aortic regurgitation.  11. Severe mitral valve leaflet calcification.  12. Peak gradient across the mitral valve is 41mmHg with a mean gradient of 19mmHg, at a heart rate of 78 beats per minute.  13. Severe mitral valve stenosis.  14. Mild mitral regurgitation.  15. Mild to moderate tricuspid regurgitation.  16. The inferior vena cava is normal in size measuring 1.56 cm in diameter, (normal <2.1cm) with normal inspiratory collapse (normal >50%) consistent with normal right atrial pressure (~3, range 0-5mmHg).  17. Estimated pulmonary artery systolic pressure is 47 mmHg, consistent with moderate pulmonary hypertension.  18. Small pericardial effusion noted adjacent to the right atrium.  19. Bilateral pleural effusion noted.  < end of copied text >

## 2024-08-04 NOTE — PROGRESS NOTE ADULT - PROBLEM SELECTOR PLAN 1
TTE 6/10/24 with EF 79%, with Severe mitral stenosis.  Pt to be evaluated for [place] trial.  Carotid US pending.   Plan for LHC/COSME Monday 8/5/24.   Continue home meds.  Continue Lopressor as tolerated by HR and BP.   Continue Torsemide 20mg BID for diuresis.   Strict I/Os. Supplement electrolytes PRN.   Continuous Telemetry. Continuous SpO2 monitoring.  Maintain SpO2 >92%. Supplement with O2 therapy PRN.   Plan to be discussed / reviewed with CT Surgery attending / team during AM rounds.

## 2024-08-05 ENCOUNTER — RESULT REVIEW (OUTPATIENT)
Age: 79
End: 2024-08-05

## 2024-08-05 DIAGNOSIS — N28.9 DISORDER OF KIDNEY AND URETER, UNSPECIFIED: ICD-10-CM

## 2024-08-05 DIAGNOSIS — J44.9 CHRONIC OBSTRUCTIVE PULMONARY DISEASE, UNSPECIFIED: ICD-10-CM

## 2024-08-05 LAB
ANION GAP SERPL CALC-SCNC: 11 MMOL/L — SIGNIFICANT CHANGE UP (ref 5–17)
BUN SERPL-MCNC: 88.3 MG/DL — HIGH (ref 8–20)
CALCIUM SERPL-MCNC: 9.1 MG/DL — SIGNIFICANT CHANGE UP (ref 8.4–10.5)
CHLORIDE SERPL-SCNC: 93 MMOL/L — LOW (ref 96–108)
CO2 SERPL-SCNC: 32 MMOL/L — HIGH (ref 22–29)
CREAT SERPL-MCNC: 1.05 MG/DL — SIGNIFICANT CHANGE UP (ref 0.5–1.3)
EGFR: 54 ML/MIN/1.73M2 — LOW
GLUCOSE SERPL-MCNC: 81 MG/DL — SIGNIFICANT CHANGE UP (ref 70–99)
HCT VFR BLD CALC: 28.3 % — LOW (ref 34.5–45)
HGB BLD-MCNC: 9.2 G/DL — LOW (ref 11.5–15.5)
MAGNESIUM SERPL-MCNC: 2.4 MG/DL — SIGNIFICANT CHANGE UP (ref 1.6–2.6)
MCHC RBC-ENTMCNC: 29.8 PG — SIGNIFICANT CHANGE UP (ref 27–34)
MCHC RBC-ENTMCNC: 32.5 GM/DL — SIGNIFICANT CHANGE UP (ref 32–36)
MCV RBC AUTO: 91.6 FL — SIGNIFICANT CHANGE UP (ref 80–100)
PLATELET # BLD AUTO: 239 K/UL — SIGNIFICANT CHANGE UP (ref 150–400)
POTASSIUM SERPL-MCNC: 3.9 MMOL/L — SIGNIFICANT CHANGE UP (ref 3.5–5.3)
POTASSIUM SERPL-SCNC: 3.9 MMOL/L — SIGNIFICANT CHANGE UP (ref 3.5–5.3)
RBC # BLD: 3.09 M/UL — LOW (ref 3.8–5.2)
RBC # FLD: 13.6 % — SIGNIFICANT CHANGE UP (ref 10.3–14.5)
SODIUM SERPL-SCNC: 136 MMOL/L — SIGNIFICANT CHANGE UP (ref 135–145)
WBC # BLD: 8.08 K/UL — SIGNIFICANT CHANGE UP (ref 3.8–10.5)
WBC # FLD AUTO: 8.08 K/UL — SIGNIFICANT CHANGE UP (ref 3.8–10.5)

## 2024-08-05 PROCEDURE — 99231 SBSQ HOSP IP/OBS SF/LOW 25: CPT

## 2024-08-05 PROCEDURE — 99232 SBSQ HOSP IP/OBS MODERATE 35: CPT | Mod: 57

## 2024-08-05 PROCEDURE — 93320 DOPPLER ECHO COMPLETE: CPT | Mod: 26

## 2024-08-05 PROCEDURE — 93312 ECHO TRANSESOPHAGEAL: CPT | Mod: 26

## 2024-08-05 PROCEDURE — 93325 DOPPLER ECHO COLOR FLOW MAPG: CPT | Mod: 26

## 2024-08-05 RX ORDER — POTASSIUM CHLORIDE 1500 MG/1
20 TABLET, EXTENDED RELEASE ORAL ONCE
Refills: 0 | Status: COMPLETED | OUTPATIENT
Start: 2024-08-05 | End: 2024-08-05

## 2024-08-05 RX ADMIN — ATORVASTATIN CALCIUM 40 MILLIGRAM(S): 40 TABLET, FILM COATED ORAL at 21:30

## 2024-08-05 RX ADMIN — POTASSIUM CHLORIDE 20 MILLIEQUIVALENT(S): 1500 TABLET, EXTENDED RELEASE ORAL at 14:49

## 2024-08-05 RX ADMIN — HEPARIN SODIUM 5000 UNIT(S): 1000 INJECTION, SOLUTION INTRAVENOUS; SUBCUTANEOUS at 06:26

## 2024-08-05 RX ADMIN — BUDESONIDE AND FORMOTEROL FUMARATE DIHYDRATE 2 PUFF(S): 80; 4.5 AEROSOL RESPIRATORY (INHALATION) at 21:58

## 2024-08-05 RX ADMIN — Medication 5 MILLIGRAM(S): at 21:30

## 2024-08-05 RX ADMIN — IPRATROPIUM BROMIDE AND ALBUTEROL SULFATE 3 MILLILITER(S): 2.5; .5 SOLUTION RESPIRATORY (INHALATION) at 14:27

## 2024-08-05 RX ADMIN — Medication 3 MILLILITER(S): at 06:16

## 2024-08-05 RX ADMIN — Medication 3 MILLILITER(S): at 21:30

## 2024-08-05 RX ADMIN — Medication 3 MILLILITER(S): at 14:27

## 2024-08-05 RX ADMIN — PANTOPRAZOLE SODIUM 40 MILLIGRAM(S): 20 TABLET, DELAYED RELEASE ORAL at 06:24

## 2024-08-05 RX ADMIN — Medication 25 MILLIGRAM(S): at 17:32

## 2024-08-05 RX ADMIN — PREDNISONE 20 MILLIGRAM(S): 10 TABLET ORAL at 06:24

## 2024-08-05 RX ADMIN — HEPARIN SODIUM 5000 UNIT(S): 1000 INJECTION, SOLUTION INTRAVENOUS; SUBCUTANEOUS at 17:32

## 2024-08-05 RX ADMIN — Medication 100 MICROGRAM(S): at 06:23

## 2024-08-05 RX ADMIN — IPRATROPIUM BROMIDE AND ALBUTEROL SULFATE 3 MILLILITER(S): 2.5; .5 SOLUTION RESPIRATORY (INHALATION) at 21:57

## 2024-08-05 RX ADMIN — IPRATROPIUM BROMIDE AND ALBUTEROL SULFATE 3 MILLILITER(S): 2.5; .5 SOLUTION RESPIRATORY (INHALATION) at 04:28

## 2024-08-05 NOTE — PROGRESS NOTE ADULT - ASSESSMENT
Risk Assessments:  ASA: 3  Mallampati: 3  Creat: 1.05  GFR: 54  BRA: 13.0%  Pt assessed for sedation, and educated regarding the plan for Versed/Fentanyl as needed     Indication: [place] Trial Mitral Valve repair         Plan:    -plan for COSME followed by [facility]   -preferred access: RRA   -confirmed appropriate NPO duration  -ECG and Labs reviewed  -procedure discussed with patient; risks and benefits explained, questions answered  -consent obtained by attending IC    Left heart cardiac catheterization and possible percutaneous intervention recommended.  Risks, benefits, and alternatives reviewed.  Risks including but not limited to MI, death, stroke, bleeding, infection, vessel injury, hematoma, renal failure, allergic reaction, urgent open heart surgery, restenosis and stent thrombosis were reviewed.  All questions answered.  Patient is agreeable to proceed.   DNR rescinded for procedure    Risk Assessments:  ASA: 3  Mallampati: 3  Creat: 1.05  GFR: 54  BRA: 13.0%  Pt assessed for sedation, and educated regarding the plan for Versed/Fentanyl as needed     Indication: [place] Trial Mitral Valve repair         Plan:    -plan for COSME followed by [facility]   -preferred access: RRA   -confirmed appropriate NPO duration  -ECG and Labs reviewed  -procedure discussed with patient; risks and benefits explained, questions answered  -consent obtained by attending IC    Left heart cardiac catheterization and possible percutaneous intervention recommended.  Risks, benefits, and alternatives reviewed.  Risks including but not limited to MI, death, stroke, bleeding, infection, vessel injury, hematoma, renal failure, allergic reaction, urgent open heart surgery, restenosis and stent thrombosis were reviewed.  All questions answered.  Patient is agreeable to proceed.   DNR rescinded for procedure     POST CATH ADDENDUM  Now s/p COSME with      CONCLUSIONS:      1. Left ventricular systolic function is hyperdynamic with an ejection fraction visually estimated at >75 %.   2. Normal right ventricular cavity size and normal right ventricular systolic function.   3. 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   4. Trace aortic regurgitation.   5. Prominent Eustachian valve in the right atrium and Chiari network present in the right atrium.   6. Severe mitral valve stenosis.   7. Moderate mitral regurgitation.   8. No pericardial effusion seen.   9. No thrombus seen in the left atrial, left atrial appendage, right atrial or right atrial appendage.  10. Severe calcified and noncalcified atheroscelrotic plaquig in descending aorta. Consider CTA of aorta if clincially indicnated.  11. Agitated saline injection reveals bubbles in the left heart, consistent with a patent foramen ovale.      Now s/p [facility] via .... with Dr. Reza....., tolerated procedure well. Pt arrived to recovery in NAD and HDS, *** access site stable, no bleed/hematoma, distal pulse +,   Intraprocedurally:  Medications:  Fentanyl:  Versed:  Heparin:   Omnipaque:   Closure device:   Post Cath EKG:    -ADMIT due to: / Pt is already in-patient  -post cardiac cath orders  -radial or groin precautions  -bedrest x hours post procedure  -EKG post cath  -labs and EKG in am  -NS 0.9% 250ml/hr x 1 bolus: post procedure CELIA ppx   -continue current medical therapy  -Dual anti platelet therapy with aspirin/ plavix (brilinta), reinforced importance of strict adherence to DAPT   -statin therapy  -beta blocker  -follow up outpt in 2 weeks with Cardiologist  -[facility] following during hospitalization  -Lifestyle modifications discussed to reduce cardiovascular risk factors including weight reduction, smoking cessation, medication compliance, and routine follow up with Cardiologist to track your BMI, cholesterol, and glucose levels.   - .rehab  -Management per Hospitalist / ICU  -   Risk Assessments:  ASA: 3  Mallampati: 3  Creat: 1.05  GFR: 54  BRA: 13.0%  Pt assessed for sedation, and educated regarding the plan for Versed/Fentanyl as needed     Indication: [place] Trial Mitral Valve repair       Plan:    -plan for COSME followed by [facility]   -preferred access: RRA   -confirmed appropriate NPO duration  -ECG and Labs reviewed  -procedure discussed with patient; risks and benefits explained, questions answered  -consent obtained by attending IC    Left heart cardiac catheterization and possible percutaneous intervention recommended.  Risks, benefits, and alternatives reviewed.  Risks including but not limited to MI, death, stroke, bleeding, infection, vessel injury, hematoma, renal failure, allergic reaction, urgent open heart surgery, restenosis and stent thrombosis were reviewed.  All questions answered.  Patient is agreeable to proceed.   DNR rescinded for procedure     POST CATH ADDENDUM  Now s/p COSME with      CONCLUSIONS:      1. Left ventricular systolic function is hyperdynamic with an ejection fraction visually estimated at >75 %.   2. Normal right ventricular cavity size and normal right ventricular systolic function.   3. 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   4. Trace aortic regurgitation.   5. Prominent Eustachian valve in the right atrium and Chiari network present in the right atrium.   6. Severe mitral valve stenosis.   7. Moderate mitral regurgitation.   8. No pericardial effusion seen.   9. No thrombus seen in the left atrial, left atrial appendage, right atrial or right atrial appendage.  10. Severe calcified and noncalcified atheroscelrotic plaquig in descending aorta. Consider CTA of aorta if clincially indicnated.  11. Agitated saline injection reveals bubbles in the left heart, consistent with a patent foramen ovale.      Now s/p C via RRA with Dr. Rodas tolerated procedure well. Pt arrived to recovery in NAD and HDS, *** access site stable, no bleed/hematoma, distal pulse +,   Intraprocedurally: non occlusive CAD   Medications:  Fentanyl: 25mcg  Versed: 0.5mg  Heparin: 3,000 units  Omnipaque: 36ml  Closure device: RRA     -radial precautions, remove radial band at 1230, oob 1300 if remains HDS and no bleeding complications  -continue plan per CTsx

## 2024-08-05 NOTE — PROGRESS NOTE ADULT - PROBLEM SELECTOR PLAN 1
Cath with minimal irregularities  for cath check in the am  echo with severe mitral stenosis  ? MVR this admission Per CT Cath with minimal irregularities  for cath check in the am  echo with severe mitral stenosis  Out patient MVR?? Cath with minimal irregularities   echo with severe mitral stenosis  Out patient MVR?? defer to CTS

## 2024-08-05 NOTE — PROGRESS NOTE ADULT - ASSESSMENT
79 year old female, former smoker, with a PMHx of CHF, COPD, Mitral Valve Stenosis, HTN, HLD, hypothyroidism, who presented as a transfer from [facility] for evaluation for the [place] trial for Mitral Valve Stenosis.  Patient presented to [facility] with complaint of worsening shortness of breath x 1 week.  Admitted for acute on chronic hypoxic respiratory failure, with Pulmonology consulted and patient placed on nebulizers and inhalers.  Hospital course complicated by YADI for which Nephrology was consulted, initially was placed on Lasix for diuresis, but changed to Bumex, then Torsemide.  Patient required a right thoracentesis for pleural effusion 7/29/24 with 1L removed.  Cardiology consulted for Mitral Stenosis, with patient deemed not a surgical candidate for valve replacement and not a candidate for APOLLO trial due to not meeting MR requirement, but considered for eligibility for SUMMIT trial at [facility].  Pt transferred to [facility] to evaluate for mitral stenosis.

## 2024-08-05 NOTE — PROGRESS NOTE ADULT - PROBLEM SELECTOR PLAN 1
TTE 6/10/24 with EF 79%, with Severe mitral stenosis.  Pt to be evaluated for [place] trial.  Carotid US mild plaque b/l, otherwise, wnl  Plan for LHC/COSME Monday 8/5/24, NPO p MN  Continue home meds.  Continue Lopressor as tolerated by HR and BP.   Torsemide held for renal fxn  Strict I/Os. Supplement electrolytes PRN.   Continuous Telemetry. Continuous SpO2 monitoring.  Maintain SpO2 >92%. Supplement with O2 therapy PRN.   PT eval, OOB with nursing, incentive use  Plan to be discussed / reviewed with CT Surgery attending / team during AM rounds.

## 2024-08-05 NOTE — PROGRESS NOTE ADULT - ASSESSMENT
79F with h/o CHF, COPD, former smoker, mitral valve stenosis, HTN, HLD, hypothyroidism, who presented as transfer from [facility] for evaluation for [place] trial for mitral valve stenosis.  Pt presented to [facility] c/o worsening shortness of breath x 1 week.  Admitted for acute on chronic hypoxic respiratory failure.  Hospital course complicated by YADI for which nephrology was consulted, initially on lasix for diuresis but changed to bumex.  At time of transfer, pt on torsemide,  Pulmonology consulted, pt on nebulizer and inhalers.  Pt required thoracentesis for pleural effusion during admission with 1L removed.  Cardiology consulted for MS and deemed not a surgical candidate for valve replacement and not a candidate for APOLLO trial due to not meeting MR requirement, but considered for eligibility for SUMMIT trial at [facility].  Pt transferred to [facility] to evaluate for mitral stenosis. Now for COSME/LHC further evaluation of cardiac structure and function pre TMVR if appropriate candidate  Cath with mild luminal irreguarites

## 2024-08-05 NOTE — PROGRESS NOTE ADULT - SUBJECTIVE AND OBJECTIVE BOX
BRIEF HOSPITAL COURSE  79 year old female, former smoker, with a PMHx of CHF, COPD, Mitral Valve Stenosis, HTN, HLD, hypothyroidism, who presented as a transfer from [facility] for evaluation for the [place] trial for Mitral Valve Stenosis.  Patient presented to [facility] with complaint of worsening shortness of breath x 1 week.  Admitted for acute on chronic hypoxic respiratory failure, with Pulmonology consulted and patient placed on nebulizers and inhalers.  Hospital course complicated by YADI for which Nephrology was consulted, initially was placed on Lasix for diuresis, but changed to Bumex, then Torsemide.  Patient required a right thoracentesis for pleural effusion 24 with 1L removed.  Cardiology consulted for Mitral Stenosis, with patient deemed not a surgical candidate for valve replacement and not a candidate for APOLLO trial due to not meeting MR requirement, but considered for eligibility for SUMMIT trial at [facility]. Pt transferred to [facility] to evaluate for mitral stenosis.    SIGNIFICANT RECENT/PAST 24 HR EVENTS  No acute events reported overnight. NPO after MN for COSME/LHC.    SUBJECTIVE  Patient seen and examined on follow up. Pt currently lying in bed in NAD. Denies fevers, chills, HA, dizziness, CP, SOB, abd pain, N/V/D, numbness/tingling in extremities, or any other acute complaints.  +Tolerating diet  + Bowel movement since admission  ROS negative x 10 systems except as noted above.    PAST MEDICAL & SURGICAL HISTORY  History of COPD  Asthma  Thyroid nodule  Hyperlipidemia  Hypertension  Hypothyroidism  History of cholecystectomy  History of repair of hip fracture    DAILY REVIEW  Telemetry: Sinus rhythm   Vital Signs Last 24 Hrs  T(C): 36.7 (04 Aug 2024 23:47), Max: 36.7 (04 Aug 2024 08:37)  T(F): 98.1 (04 Aug 2024 23:47), Max: 98.1 (04 Aug 2024 16:18)  HR: 74 (04 Aug 2024 23:47) (65 - 94)  BP: 110/59 (04 Aug 2024 23:47) (94/52 - 114/61)  BP(mean): 76 (04 Aug 2024 23:47) (76 - 79)  RR: 19 (04 Aug 2024 23:47) (17 - 19)  SpO2: 97% (04 Aug 2024 23:47) (96% - 99%)    Parameters below as of 04 Aug 2024 23:47  Patient On (Oxygen Delivery Method): nasal cannula  O2 Flow (L/min): 1    I&O's Detail    03 Aug 2024 07:01  -  04 Aug 2024 07:00  --------------------------------------------------------  IN:    Oral Fluid: 910 mL  Total IN: 910 mL    OUT:    Voided (mL): 1700 mL  Total OUT: 1700 mL    Total NET: -790 mL    04 Aug 2024 07:01  -  05 Aug 2024 00:17  --------------------------------------------------------  IN:    Oral Fluid: 750 mL  Total IN: 750 mL    OUT:    Voided (mL): 600 mL  Total OUT: 600 mL    Total NET: 150 mL    Last Bowel Movement: 04-Aug-2024 (24 @ 20:42)    Daily     Daily Weight in k.5 (04 Aug 2024 05:37)  Admit Wt: Drug Dosing Weight  Height (cm): 157.5 (03 Aug 2024 04:00)  Weight (kg): 44 (03 Aug 2024 04:00)  BMI (kg/m2): 17.7 (03 Aug 2024 04:00)  BSA (m2): 1.41 (03 Aug 2024 04:00)    LABS                        9.1    7.37  )-----------( 238      ( 04 Aug 2024 04:15 )             27.6     08-04    138  |  93<L>  |  96.8<H>  ----------------------------<  97  4.0   |  31.0<H>  |  1.28    Ca    9.4      04 Aug 2024 04:15  Mg     2.3     04    TPro  6.5<L>  /  Alb  3.6  /  TBili  0.3<L>  /  DBili  x   /  AST  17  /  ALT  15  /  AlkPhos  68  08-04    LIVER FUNCTIONS - ( 04 Aug 2024 04:15 )  Alb: 3.6 g/dL / Pro: 6.5 g/dL / ALK PHOS: 68 U/L / ALT: 15 U/L / AST: 17 U/L / GGT: x           PT/INR - ( 03 Aug 2024 00:55 )   PT: 10.3 sec;   INR: 0.93 ratio      PTT - ( 03 Aug 2024 00:55 )  PTT:32.1 sec    Pro-Brain Natriuretic Peptide: 3761 pg/mL (24 @ 00:55)  Prealbumin, Serum: 22 mg/dL (24 @ 00:55)  P2Y12 Plt Reactivity: 287 PRU (24 @ 00:55)    Culture - Urine (collected 24 @ 08:40)  Source: Catheterized Catheterized  Final Report (24 @ 23:52):    <10,000 CFU/mL Normal Urogenital Lou    Culture - Fungal, Body Fluid (collected 24 @ 15:40)  Source: Pleural Fl Pleural Fluid  Preliminary Report (24 @ 23:03):    Culture is being performed. Fungal cultures are held for 4 weeks.    Culture - Body Fluid with Gram Stain (collected 24 @ 15:40)  Source: Pleural Fl Pleural Fluid  Gram Stain (24 @ 04:07):    polymorphonuclear leukocytes seen    No organisms seen    by cytocentrifuge  Final Report (24 @ 18:52):    No growth at 5 days    MEDICATIONS  Home Medications:  Albuterol (Eqv-ProAir HFA) 90 mcg/inh inhalation aerosol: 1 puff(s) inhaled every 4 to 6 hours as needed for  shortness of breath and/or wheezing (2024 19:51)  atorvastatin 40 mg oral tablet: 1 tab(s) orally once a day (at bedtime) (2024 19:51)  Breztri Aerosphere 160 mcg-9 mcg-4.8 mcg/inh inhalation aerosol: 2 puff(s) inhaled 2 times a day (2024 19:51)  levothyroxine 100 mcg (0.1 mg) oral tablet: 1 tab(s) orally once a day (2024 19:51)  metoprolol succinate 100 mg oral tablet, extended release: 1 tab(s) orally once a day (2024 19:51)    MEDICATIONS  (STANDING):  albuterol/ipratropium for Nebulization 3 milliLiter(s) Nebulizer every 6 hours  atorvastatin 40 milliGRAM(s) Oral at bedtime  budesonide 160 MICROgram(s)/formoterol 4.5 MICROgram(s) Inhaler 2 Puff(s) Inhalation two times a day  heparin   Injectable 5000 Unit(s) SubCutaneous every 12 hours  levothyroxine 100 MICROGram(s) Oral daily  melatonin 5 milliGRAM(s) Oral at bedtime  metoprolol tartrate 25 milliGRAM(s) Oral two times a day  pantoprazole    Tablet 40 milliGRAM(s) Oral before breakfast  predniSONE   Tablet 20 milliGRAM(s) Oral daily  sodium chloride 0.9% lock flush 3 milliLiter(s) IV Push every 8 hours    MEDICATIONS  (PRN):  albuterol    90 MICROgram(s) HFA Inhaler 2 Puff(s) Inhalation every 6 hours PRN Bronchospasm    ALLERGIES  No Known Allergies    DIAGNOSTICS  All relevant and available laboratory results, radiology and medications reviewed.  Xray Chest 1 View- PORTABLE-Routine (Xray Chest 1 View- PORTABLE-Routine in AM.) (24 @ 06:37)  FINDINGS/  IMPRESSION: Heart size, mediastinal and hilar contours are unchanged and   within normal limits. Coarse calcification of the mitral annulus is   reidentified. Senescent changes with extensive calcification aortic arch   and descending thoracic aorta are redemonstrated. Persistent pulmonary   venous congestion with fluid noted within the right minor fissure as well   as by basilar pleural effusions probably unchanged allowing for   differences in imaging technique. There may be a small loculated   pneumothorax along the inferior lateral aspect of the right lower lung   zone unchanged from prior imaging.    US Duplex Venous Lower Ext Complete, Bilateral (24 @ 20:22)  IMPRESSION:  No evidence of deep venous thrombosis in either lower extremity.    Left popliteal fossa cyst measuring 5.0 x9.0 x 3.0 mm.    CT Chest No Cont (24 @ 18:11)  FINDINGS:  LUNGS AND LARGE AIRWAYS: Breathing motion artifacts limiting evaluation.   Bilateral large pleural effusions are visible which appear grossly   similar to the prior CT however probably slightly increased. Extensive   areas with compression atelectasis adjacent to the pleural effusion as   well as areas with atelectasis in the right middle lobe and lingula are   noted; concurrent pneumonia in the atelectatic lungs cannot be excluded.   Extensive septal thickening is visible mainly affecting the upper lobes   with associated changes of emphysema. The airspace opacity seen on the CT   from 2024 have largely resolved.  PLEURA: No pleural effusion.  VESSELS: Within normal limits.  HEART: Cardiomegaly is noted. Mitral valve calcification.. No pericardial   effusion.  MEDIASTINUM AND MATTHEW: Limited evaluation. Multiple prominent mediastinal   lymph nodes are noted which appear grossly similar to the prior CT..  CHEST WALL AND LOWER NECK: Postoperative changes are visible in the   thyroid bed. Stable appearance of the right lower thyroid; the left-sided   thyroid is not visible and may be surgically absent..  VISUALIZED UPPER ABDOMEN: Limited slices through the upper abdomen show   cholecystectomy. Dilated hepatic veins are noted with dilated IVC..  BONES: Stable wedge-shaped deformity of multiple thoracic vertebral   bodies.    IMPRESSION:  Breathing motion artifacts limiting the evaluation.    Cardiomegaly with bilateral large pleural effusions are visible with   subjacent atelectasis and extensive septal thickening, concerning for   CHF/fluid overload.    Limited evaluation for loculated pleural effusion on this noncontrast CT.    Extensive areas with atelectasis are visible mainly in the right middle   lobe and the lingula; concurrent/developing pneumonia in the atelectatic   lungs cannot be excluded.    The airspace opacity seen on the CT from 2024 have largely resolved.    12 Lead ECG (24 @ 05:55)  Ventricular Rate 55 BPM  Atrial Rate 55 BPM  P-R Interval 186 ms  QRS Duration 94 ms  Q-T Interval 452 ms  QTC Calculation(Bazett) 432 ms  P Axis 68 degrees  R Axis -29 degrees  T Axis 50 degrees  Diagnosis Line Sinus bradycardia  Possible Left atrial enlargement  Borderline ECG    TTE W or WO Ultrasound Enhancing Agent (06.10.24 @ 21:44)  CONCLUSIONS:   1. Technically difficult image quality.   2. There is increased LV mass and concentric hypertrophy.   3. Left ventricular systolic function is hyperdynamic with an ejection fraction of 79 % by Shepard's method ofdisks.   4. Hyperdynamic left ventricular systolic function. Basal septal hypertrophy (measures 1.8 cm) with evidence left ventricular outflow tract obstruction with a resting gradient of 41mmHg and a gradient of 47 mmHg with valsalva, overall consistent with moderate LVOT obstruction.   5. Normal right ventricular cavity size and normal systolic function.   6. The right atrium is normal in size.   7. The left atrium is severely dilated.   8. Interatrial septum is stretched and displaced to the right, consistent with left atrial volume overload.   9. Tricuspid aortic valve with normal leaflet excursion. There is moderate thickening of the aortic valve leaflets.  10. Trace aortic regurgitation.  11. Severe mitral valve leaflet calcification.  12. Peak gradient across the mitral valve is 41mmHg with a mean gradient of 19mmHg, at a heart rate of 78 beats per minute.  13. Severe mitral valve stenosis.  14. Mild mitral regurgitation.  15. Mild to moderate tricuspid regurgitation.  16. The inferior vena cava is normal in size measuring 1.56 cm in diameter, (normal <2.1cm) with normal inspiratory collapse (normal >50%) consistent with normal right atrial pressure (~3, range 0-5mmHg).  17. Estimated pulmonary artery systolic pressure is 47 mmHg, consistent with moderate pulmonary hypertension.  18. Small pericardial effusion noted adjacent to the right atrium.  19. Bilateral pleural effusion noted.    Mitral Valve:  There is diffuse mitral valve thickening of the anterior and posterior leaflets. Peak gradient across the mitral valve is 41mmHg with a mean gradient of 19mmHg, at a heart rate of 78 beats per minute. There is severe leaflet calcification. There is severe mitral valve stenosis. The calculated mitral valve area is 1.4 cm². There is mild mitral regurgitation.    PHYSICAL EXAM  Constitutional: NAD, pleasant elderly female  Neck: supple, trachea midline. No JVD   Respiratory: Breath sounds diminished b/l lower fields to auscultation, no accessory muscle use noted. +fine rales to bases  Cardiovascular: Regular rate, regular rhythm, normal S1, S2; no murmurs or rub   Gastrointestinal: Soft, non-tender, non-distended, + bowel sounds   Extremities: MENDOZA x 4, no peripheral edema, no cyanosis, no clubbing    Vascular: Equal and normal pulses: 2+ peripheral pulses throughout  Neurological: A+O x 3; speech clear and intact; no gross sensory/motor deficits  Psychiatric: calm, normal mood, normal affect   Skin: warm, dry, well perfused, no rashes   Incision: right posterior chest bandage from prior thoracentesis cdi

## 2024-08-05 NOTE — PROGRESS NOTE ADULT - NS ATTEND AMEND GEN_ALL_CORE FT
Patient seen and examined by me. [facility] for workup of severe MS. Patient agreeable to procedure.
79F with h/o CHF, COPD, former smoker, mitral valve stenosis, HTN, HLD, hypothyroidism, who presented as transfer from [facility] for evaluation for [place] trial for mitral valve stenosis.  Pt presented to [facility] c/o worsening shortness of breath x 1 week.  Admitted for acute on chronic hypoxic respiratory failure.  Hospital course complicated by YADI for which nephrology was consulted, initially on lasix for diuresis but changed to bumex.  At time of transfer, pt on torsemide,  Pulmonology consulted, pt on nebulizer and inhalers.  Pt required thoracentesis for pleural effusion during admission with 1L removed.  Cardiology consulted for MS and deemed not a surgical candidate for valve replacement and not a candidate for APOLLO trial due to not meeting MR requirement, but considered for eligibility for [place] trial at [facility].  Pt transferred to [facility] to evaluate for mitral stenosis. Now for COSME/LHC further evaluation of cardiac structure and function pre TMVR if appropriate candidate  Cath with mild luminal irreguarites. Defer to CTS for further workup of the severe MS. We will sign off but please re-consult general cardiology if we can be of further assistance.

## 2024-08-05 NOTE — PROGRESS NOTE ADULT - SUBJECTIVE AND OBJECTIVE BOX
Department of Cardiology                                                                  [facility]/[place] [address] [address] [facility]                                                            Telephone: [phone]. Fax:[phone]                                                                                      Cardiac Cath NP Note           Patient is a 79y old  Female who presents with a chief complaint of mitral valve stenosis (05 Aug 2024 00:16)    Cardiology consulting for Severe Mitral Stenosis   Overnight events: none   Plan: COSME/LHC     HPI:  79F with h/o CHF, COPD, former smoker, mitral valve stenosis, HTN, HLD, hypothyroidism, who presented as transfer from [facility] for evaluation for [place] trial for mitral valve stenosis.  Pt presented to [facility] c/o worsening shortness of breath x 1 week.  Admitted for acute on chronic hypoxic respiratory failure.  Hospital course complicated by YADI for which nephrology was consulted, initially on lasix for diuresis but changed to bumex.  At time of transfer, pt on torsemide,  Pulmonology consulted, pt on nebulizer and inhalers.  Pt required thoracentesis for pleural effusion during admission with 1L removed.  Cardiology consulted for MS and deemed not a surgical candidate for valve replacement and not a candidate for APOLLO trial due to not meeting MR requirement, but considered for eligibility for SUMMIT trial at [facility].  Pt transferred to [facility] to evaluate for mitral stenosis. Now for COSME/LHC further evaluation of cardiac structure and function pre TMVR if appropriate candidate          PAST MEDICAL & SURGICAL HISTORY:  History of COPD  No home O2 use  Asthma  Thyroid nodule  Hyperlipidemia  Hypertension  Hypothyroidism      History of cholecystectomy        History of repair of hip fracture  ORIF .  Hardware was eventually removed          RADIOLOGY & ADDITIONAL STUDIES/DIAGNOSTIC TESTING:      ECHO  FINDINGS:     CONCLUSIONS:      1. Technically difficult image quality.   2. There is increased LV mass and concentric hypertrophy.   3. Left ventricular systolic function is hyperdynamic with an ejection fraction of 79 % by Shepard's method ofdisks.   4. Hyperdynamic left ventricular systolic function. Basal septal hypertrophy (measures 1.8 cm) with evidence left ventricular outflow tract obstruction with a resting gradient of 41mmHg and a gradient of 47 mmHg with valsalva, overall consistent with moderate LVOT obstruction.   5. Normal right ventricular cavity size and normal systolic function.   6. The right atrium is normal in size.   7. The left atrium is severely dilated.   8. Interatrial septum is stretched and displaced to the right, consistent with left atrial volume overload.   9. Tricuspid aortic valve with normal leaflet excursion. There is moderate thickening of the aortic valve leaflets.  10. Trace aortic regurgitation.  11. Severe mitral valve leaflet calcification.  12. Peak gradient across the mitral valve is 41mmHg with a mean gradient of 19mmHg, at a heart rate of 78 beats per minute.  13. Severe mitral valve stenosis.  14. Mild mitral regurgitation.  15. Mild to moderate tricuspid regurgitation.  16. The inferior vena cava is normal in size measuring 1.56 cm in diameter, (normal <2.1cm) with normal inspiratory collapse (normal >50%) consistent with normal right atrial pressure (~3, range 0-5mmHg).  17. Estimated pulmonary artery systolic pressure is 47 mmHg, consistent with moderate pulmonary hypertension.  18. Small pericardial effusion noted adjacent to the right atrium.  19. Bilateral pleural effusion noted.    STRESS  FINDINGS:        Allergies    No Known Allergies    Intolerances        MEDICATIONS  (STANDING):  albuterol/ipratropium for Nebulization 3 milliLiter(s) Nebulizer every 6 hours  atorvastatin 40 milliGRAM(s) Oral at bedtime  budesonide 160 MICROgram(s)/formoterol 4.5 MICROgram(s) Inhaler 2 Puff(s) Inhalation two times a day  heparin   Injectable 5000 Unit(s) SubCutaneous every 12 hours  levothyroxine 100 MICROGram(s) Oral daily  melatonin 5 milliGRAM(s) Oral at bedtime  metoprolol tartrate 25 milliGRAM(s) Oral two times a day  pantoprazole    Tablet 40 milliGRAM(s) Oral before breakfast  potassium chloride   Powder 20 milliEquivalent(s) Oral once  sodium chloride 0.9% lock flush 3 milliLiter(s) IV Push every 8 hours    MEDICATIONS  (PRN):  albuterol    90 MICROgram(s) HFA Inhaler 2 Puff(s) Inhalation every 6 hours PRN Bronchospasm    Social: former smoker, no ETOH or drug use   FAMILY HISTORY:  FH: CAD (coronary artery disease) (Mother)      REVIEW OF SYSTEMS:  General: No fevers/chills, ++fatigue weight loss 20lbs   HEENT: No visual disturbances, no hearing loss, no headaches, no epistaxis.  CV: No chest pain, palpitations ++SOB/BARBOSA  Respiratory:  no cough.  GI: no nausea/vomiting, no black/bloody stools.  : No hematuria  Musculoskeletal: No myalgias, no arthralgias, no back pain.    Vital Signs Last 24 Hrs  T(C): 36.4 (05 Aug 2024 08:42), Max: 36.7 (04 Aug 2024 11:14)  T(F): 97.6 (05 Aug 2024 08:42), Max: 98.1 (04 Aug 2024 16:18)  HR: 66 (05 Aug 2024 08:42) (66 - 94)  BP: 110/51 (05 Aug 2024 08:42) (92/48 - 114/61)  BP(mean): 67 (05 Aug 2024 06:25) (67 - 79)  RR: 16 (05 Aug 2024 08:42) (16 - 19)  SpO2: 95% (05 Aug 2024 08:42) (95% - 100%)    Parameters below as of 05 Aug 2024 08:42  Patient On (Oxygen Delivery Method): room air        Daily     Daily Weight in k.2 (05 Aug 2024 06:40)    I&O's Detail    04 Aug 2024 07:01  -  05 Aug 2024 07:00  --------------------------------------------------------  IN:    Oral Fluid: 750 mL  Total IN: 750 mL    OUT:    Voided (mL): 600 mL  Total OUT: 600 mL    Total NET: 150 mL          PHYSICAL EXAM:   GENERAL: Pt lying comfortably, NAD.  ENMT: PERRL, +EOMI.  NECK: soft, Supple, No JVD,   CHEST/LUNG: Clear to auscultation bilaterally; No wheezing.  HEART: S1S2+, Regular rate and rhythm; No murmurs.  ABDOMEN: Soft, Nontender, Nondistended; Bowel sounds present.  MUSCULOSKELETAL: Normal range of motion.  SKIN: No rashes or lesions.  NEURO: AAOX3, no focal deficits, no motor r sensory loss.  PSYCH: normal mood.  VASCULAR:   Radial +2 R/+2 L  Femoral +2 R/+2 L  PT +2 R/+2 L  DP +2 R/+2 L      INTERPRETATION OF TELEMETRY: Tele     ECG: SB 55bpm     LABS:                        9.2    8.08  )-----------( 239      ( 05 Aug 2024 04:30 )             28.3     08-    136  |  93<L>  |  88.3<H>  ----------------------------<  81  3.9   |  32.0<H>  |  1.05    Ca    9.1      05 Aug 2024 04:30  Mg     2.4     08-    TPro  6.5<L>  /  Alb  3.6  /  TBili  0.3<L>  /  DBili  x   /  AST  17  /  ALT  15  /  AlkPhos  68  08-04          Urinalysis Basic - ( 05 Aug 2024 04:30 )    Color: x / Appearance: x / SG: x / pH: x  Gluc: 81 mg/dL / Ketone: x  / Bili: x / Urobili: x   Blood: x / Protein: x / Nitrite: x   Leuk Esterase: x / RBC: x / WBC x   Sq Epi: x / Non Sq Epi: x / Bacteria: x      I&O's Summary    04 Aug 2024 07:01  -  05 Aug 2024 07:00  --------------------------------------------------------  IN: 750 mL / OUT: 600 mL / NET: 150 mL

## 2024-08-05 NOTE — PROGRESS NOTE ADULT - SUBJECTIVE AND OBJECTIVE BOX
[facility]                                                         CARDIOLOGY AT [address], [place] [address]                                                         Telephone: [phone]. Fax:[phone]                                                                             PROGRESS NOTE    Reason for follow up:  Mitral stenosis  Update: s/p Cath today with minor irregularites      Review of symptoms:   Cardiac:  No chest pain. No dyspnea. No palpitations.  Respiratory: no cough. No dyspnea  Gastrointestinal: No diarrhea. No abdominal pain. No bleeding.   Neuro: No focal neuro complaints.      Vitals:  T(C): 36.4 (08-05-24 @ 12:56), Max: 36.7 (08-04-24 @ 16:18)  HR: 66 (08-05-24 @ 12:56) (66 - 94)  BP: 113/61 (08-05-24 @ 12:56) (92/40 - 114/61)  RR: 16 (08-05-24 @ 12:56) (15 - 19)  SpO2: 96% (08-05-24 @ 12:56) (94% - 100%)  Wt(kg): --  I&O's Summary    04 Aug 2024 07:01  -  05 Aug 2024 07:00  --------------------------------------------------------  IN: 750 mL / OUT: 600 mL / NET: 150 mL    05 Aug 2024 07:01  -  05 Aug 2024 14:47  --------------------------------------------------------  IN: 220 mL / OUT: 0 mL / NET: 220 mL      Weight (kg): 44 (08-03 @ 04:00)      PHYSICAL EXAM:  Appearance: Comfortable. No acute distress  HEENT:  Atraumatic. Normocephalic.  Normal oral mucosa  Neurologic: A & O x 3, no gross focal deficits.  Cardiovascular: RRR S1 S2, No murmur, no rubs/gallops. No JVD  Respiratory: Lungs clear to auscultation, unlabored   Gastrointestinal:  Soft, Non-tender, + BS  Lower Extremities: No edema  Right radial site no ecchymosis or hematoma  Psychiatry: Patient is calm. No agitation.   Skin: warm and dry.      CURRENT MEDICATIONS:  MEDICATIONS  (STANDING):  albuterol/ipratropium for Nebulization 3 milliLiter(s) Nebulizer every 6 hours  atorvastatin 40 milliGRAM(s) Oral at bedtime  budesonide 160 MICROgram(s)/formoterol 4.5 MICROgram(s) Inhaler 2 Puff(s) Inhalation two times a day  heparin   Injectable 5000 Unit(s) SubCutaneous every 12 hours  levothyroxine 100 MICROGram(s) Oral daily  melatonin 5 milliGRAM(s) Oral at bedtime  metoprolol tartrate 25 milliGRAM(s) Oral two times a day  pantoprazole    Tablet 40 milliGRAM(s) Oral before breakfast  potassium chloride   Powder 20 milliEquivalent(s) Oral once  sodium chloride 0.9% lock flush 3 milliLiter(s) IV Push every 8 hours    LABS:	 	                          9.2    8.08  )-----------( 239      ( 05 Aug 2024 04:30 )             28.3     08-05    136  |  93<L>  |  88.3<H>  ----------------------------<  81  3.9   |  32.0<H>  |  1.05    Ca    9.1      05 Aug 2024 04:30  Mg     2.4     08-05    TPro  6.5<L>  /  Alb  3.6  /  TBili  0.3<L>  /  DBili  x   /  AST  17  /  ALT  15  /  AlkPhos  68  08-04      TELEMETRY: NSR  ECG:  	      DIAGNOSTIC TESTING:  [ ] Echocardiogram:  < from: COSME W or WO Ultrasound Enhancing Agent (08.05.24 @ 09:17) >   1. Left ventricular systolic function is hyperdynamic with an ejection fraction visually estimated at >75 %.   2. Normal right ventricular cavity size and normal right ventricular systolic function.   3. 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   4. Trace aortic regurgitation.   5. Prominent Eustachian valve in the right atrium and Chiari network present in the right atrium.   6. Severe mitral valve stenosis.   7. Moderate mitral regurgitation.   8. No pericardial effusion seen.   9. No thrombus seen in the left atrial, left atrial appendage, right atrial or right atrial appendage.  10. Severe calcified and noncalcified atheroscelrotic plaquig in descending aorta. Consider CTA of aorta if clincially indicnated.  11. Agitated saline injection reveals bubbles in the left heart, consistent with a patent foramen ovale.      < end of copied text >    [ ]  Catheterization:  < from: Cardiac Catheterization (08.05.24 @ 11:02) >  Coronary Angiography   The coronary circulation is right dominant.      LM   Left main artery: Angiography shows minor irregularities. Short LM.    Patient: SANJUANA TORRES             MRN: [id number]  Study Date: 08/05/2024   11:02 AM      Page 1 of 4    LAD   Left anterior descending artery: Angiography shows diffuse mild  atherosclerosis. Moderate tortuosity, mid LAD bridging  segment .    CX   Circumflex: Angiography shows mild atherosclerosis.    RCA   Right coronary artery: Angiography shows diffuse mild atherosclerosis.                                                                      [facility]                                                         CARDIOLOGY AT [facility]                                                                  [address], [place] [address]                                                         Telephone: [phone]. Fax:[phone]                                                                             PROGRESS NOTE    Reason for follow up:  Mitral stenosis    Update: s/p Cath today with minor irregularities    COSME:  Left ventricular systolic function is hyperdynamic with an ejection fraction visually estimated at >75 %.  2. Normal right ventricular cavity size and normal right ventricular systolic function.  3. 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  4. Trace aortic regurgitation.  5. Prominent Eustachian valve in the right atrium and Chiari network present in the right atrium.  6. Severe mitral valve stenosis.  7. Moderate mitral regurgitation.  8. No pericardial effusion seen.  9. No thrombus seen in the left atrial, left atrial appendage, right atrial or right atrial appendage.  10. Severe calcified and noncalcified atherosclerotic plaquig in descending aorta. Consider CTA of aorta if   clinically indicantes  11. Agitated saline injection reveals bubbles in the left heart, consistent with a patent foramen ovale.      Review of symptoms:   Cardiac:  No chest pain. No dyspnea. No palpitations.  Respiratory: no cough. No dyspnea  Gastrointestinal: No diarrhea. No abdominal pain. No bleeding.   Neuro: No focal neuro complaints.      Vitals:  T(C): 36.4 (08-05-24 @ 12:56), Max: 36.7 (08-04-24 @ 16:18)  HR: 66 (08-05-24 @ 12:56) (66 - 94)  BP: 113/61 (08-05-24 @ 12:56) (92/40 - 114/61)  RR: 16 (08-05-24 @ 12:56) (15 - 19)  SpO2: 96% (08-05-24 @ 12:56) (94% - 100%)  Wt(kg): --  I&O's Summary    04 Aug 2024 07:01  -  05 Aug 2024 07:00  --------------------------------------------------------  IN: 750 mL / OUT: 600 mL / NET: 150 mL    05 Aug 2024 07:01  -  05 Aug 2024 14:47  --------------------------------------------------------  IN: 220 mL / OUT: 0 mL / NET: 220 mL      Weight (kg): 44 (08-03 @ 04:00)      PHYSICAL EXAM:  Appearance: Comfortable. No acute distress  HEENT:  Atraumatic. Normocephalic.  Normal oral mucosa  Neurologic: A & O x 3, no gross focal deficits.  Cardiovascular: RRR S1 S2, No murmur, no rubs/gallops. No JVD  Respiratory: Lungs clear to auscultation, unlabored   Gastrointestinal:  Soft, Non-tender, + BS  Lower Extremities: No edema  Right radial site no ecchymosis or hematoma  Psychiatry: Patient is calm. No agitation.   Skin: warm and dry.      CURRENT MEDICATIONS:  MEDICATIONS  (STANDING):  albuterol/ipratropium for Nebulization 3 milliLiter(s) Nebulizer every 6 hours  atorvastatin 40 milliGRAM(s) Oral at bedtime  budesonide 160 MICROgram(s)/formoterol 4.5 MICROgram(s) Inhaler 2 Puff(s) Inhalation two times a day  heparin   Injectable 5000 Unit(s) SubCutaneous every 12 hours  levothyroxine 100 MICROGram(s) Oral daily  melatonin 5 milliGRAM(s) Oral at bedtime  metoprolol tartrate 25 milliGRAM(s) Oral two times a day  pantoprazole    Tablet 40 milliGRAM(s) Oral before breakfast  potassium chloride   Powder 20 milliEquivalent(s) Oral once  sodium chloride 0.9% lock flush 3 milliLiter(s) IV Push every 8 hours    LABS:	 	                          9.2    8.08  )-----------( 239      ( 05 Aug 2024 04:30 )             28.3     08-05    136  |  93<L>  |  88.3<H>  ----------------------------<  81  3.9   |  32.0<H>  |  1.05    Ca    9.1      05 Aug 2024 04:30  Mg     2.4     08-05    TPro  6.5<L>  /  Alb  3.6  /  TBili  0.3<L>  /  DBili  x   /  AST  17  /  ALT  15  /  AlkPhos  68  08-04      TELEMETRY: NSR  ECG:  	      DIAGNOSTIC TESTING:  [ ] Echocardiogram:  < from: COSME W or WO Ultrasound Enhancing Agent (08.05.24 @ 09:17) >   1. Left ventricular systolic function is hyperdynamic with an ejection fraction visually estimated at >75 %.   2. Normal right ventricular cavity size and normal right ventricular systolic function.   3. 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   4. Trace aortic regurgitation.   5. Prominent Eustachian valve in the right atrium and Chiari network present in the right atrium.   6. Severe mitral valve stenosis.   7. Moderate mitral regurgitation.   8. No pericardial effusion seen.   9. No thrombus seen in the left atrial, left atrial appendage, right atrial or right atrial appendage.  10. Severe calcified and noncalcified atheroscelrotic plaquig in descending aorta. Consider CTA of aorta if clincially indicnated.  11. Agitated saline injection reveals bubbles in the left heart, consistent with a patent foramen ovale.      < end of copied text >    [ ]  Catheterization:  < from: Cardiac Catheterization (08.05.24 @ 11:02) >  Coronary Angiography   The coronary circulation is right dominant.      LM   Left main artery: Angiography shows minor irregularities. Short LM.    Patient: SANJUANA TORRES             MRN: [id number]  Study Date: 08/05/2024   11:02 AM      Page 1 of 4    LAD   Left anterior descending artery: Angiography shows diffuse mild  atherosclerosis. Moderate tortuosity, mid LAD bridging  segment .    CX   Circumflex: Angiography shows mild atherosclerosis.    RCA   Right coronary artery: Angiography shows diffuse mild atherosclerosis.                                                                      [facility]                                                         CARDIOLOGY AT [facility]                                                                  [address], [place] [address]                                                         Telephone: [phone]. Fax:[phone]                                                                             PROGRESS NOTE    Reason for follow up:  Mitral stenosis    Update: s/p Cath today with minor irregularities    COSME:  Left ventricular systolic function is hyperdynamic with an ejection fraction visually estimated at >75 %.  2. Normal right ventricular cavity size and normal right ventricular systolic function.  3. 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  4. Trace aortic regurgitation.  5. Prominent Eustachian valve in the right atrium and Chiari network present in the right atrium.  6. Severe mitral valve stenosis.  7. Moderate mitral regurgitation.  8. No pericardial effusion seen.  9. No thrombus seen in the left atrial, left atrial appendage, right atrial or right atrial appendage.  10. Severe calcified and noncalcified atherosclerotic plaquig in descending aorta. Consider CTA of aorta if   clinically indicantes  11. Agitated saline injection reveals bubbles in the left heart, consistent with a patent foramen ovale.      Review of symptoms:   Cardiac:  No chest pain. No dyspnea. No palpitations.  Respiratory: no cough. No dyspnea  Gastrointestinal: No diarrhea. No abdominal pain. No bleeding.   Neuro: No focal neuro complaints.      Vitals:  T(C): 36.4 (08-05-24 @ 12:56), Max: 36.7 (08-04-24 @ 16:18)  HR: 66 (08-05-24 @ 12:56) (66 - 94)  BP: 113/61 (08-05-24 @ 12:56) (92/40 - 114/61)  RR: 16 (08-05-24 @ 12:56) (15 - 19)  SpO2: 96% (08-05-24 @ 12:56) (94% - 100%)  Wt(kg): --  I&O's Summary    04 Aug 2024 07:01  -  05 Aug 2024 07:00  --------------------------------------------------------  IN: 750 mL / OUT: 600 mL / NET: 150 mL    05 Aug 2024 07:01  -  05 Aug 2024 14:47  --------------------------------------------------------  IN: 220 mL / OUT: 0 mL / NET: 220 mL      Weight (kg): 44 (08-03 @ 04:00)      PHYSICAL EXAM:  Appearance: Comfortable. No acute distress  HEENT:  Atraumatic. Normocephalic.  Normal oral mucosa  Neurologic: A & O x 3, no gross focal deficits.  Cardiovascular: RRR S1 S2, No murmur, no rubs/gallops. No JVD  Respiratory: Lungs clear to auscultation, unlabored   Gastrointestinal:  Soft, Non-tender, + BS  Lower Extremities: No edema  Right radial site no ecchymosis or hematoma  Psychiatry: Patient is calm. No agitation.   Skin: warm and dry.      CURRENT MEDICATIONS:  MEDICATIONS  (STANDING):  albuterol/ipratropium for Nebulization 3 milliLiter(s) Nebulizer every 6 hours  atorvastatin 40 milliGRAM(s) Oral at bedtime  budesonide 160 MICROgram(s)/formoterol 4.5 MICROgram(s) Inhaler 2 Puff(s) Inhalation two times a day  heparin   Injectable 5000 Unit(s) SubCutaneous every 12 hours  levothyroxine 100 MICROGram(s) Oral daily  melatonin 5 milliGRAM(s) Oral at bedtime  metoprolol tartrate 25 milliGRAM(s) Oral two times a day  pantoprazole    Tablet 40 milliGRAM(s) Oral before breakfast  potassium chloride   Powder 20 milliEquivalent(s) Oral once  sodium chloride 0.9% lock flush 3 milliLiter(s) IV Push every 8 hours    LABS:	 	                          9.2    8.08  )-----------( 239      ( 05 Aug 2024 04:30 )             28.3     08-05    136  |  93<L>  |  88.3<H>  ----------------------------<  81  3.9   |  32.0<H>  |  1.05    Ca    9.1      05 Aug 2024 04:30  Mg     2.4     08-05    TPro  6.5<L>  /  Alb  3.6  /  TBili  0.3<L>  /  DBili  x   /  AST  17  /  ALT  15  /  AlkPhos  68  08-04      TELEMETRY: NSR  ECG:  	      DIAGNOSTIC TESTING:  [ ] Echocardiogram:  < from: COSME W or WO Ultrasound Enhancing Agent (08.05.24 @ 09:17) >   1. Left ventricular systolic function is hyperdynamic with an ejection fraction visually estimated at >75 %.   2. Normal right ventricular cavity size and normal right ventricular systolic function.   3. 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   4. Trace aortic regurgitation.   5. Prominent Eustachian valve in the right atrium and Chiari network present in the right atrium.   6. Severe mitral valve stenosis.   7. Moderate mitral regurgitation.   8. No pericardial effusion seen.   9. No thrombus seen in the left atrial, left atrial appendage, right atrial or right atrial appendage.  10. Severe calcified and noncalcified atheroscelrotic plaquig in descending aorta. Consider CTA of aorta if clincially indicnated.  11. Agitated saline injection reveals bubbles in the left heart, consistent with a patent foramen ovale.      < end of copied text >    [ ]  Catheterization:  < from: Cardiac Catheterization (08.05.24 @ 11:02) >  Coronary Angiography   The coronary circulation is right dominant.      LM   Left main artery: Angiography shows minor irregularities. Short LM.    Patient: SANJUANA TORRES             MRN: [id number]  Study Date: 08/05/2024   11:02 AM      Page 1 of 4    LAD   Left anterior descending artery: Angiography shows diffuse mild  atherosclerosis. Moderate tortuosity, mid LAD bridging  segment .    CX   Circumflex: Angiography shows mild atherosclerosis.    RCA   Right coronary artery: Angiography shows diffuse mild atherosclerosis.

## 2024-08-06 ENCOUNTER — RESULT REVIEW (OUTPATIENT)
Age: 79
End: 2024-08-06

## 2024-08-06 ENCOUNTER — TRANSCRIPTION ENCOUNTER (OUTPATIENT)
Age: 79
End: 2024-08-06

## 2024-08-06 LAB
ANION GAP SERPL CALC-SCNC: 12 MMOL/L — SIGNIFICANT CHANGE UP (ref 5–17)
BUN SERPL-MCNC: 64.5 MG/DL — HIGH (ref 8–20)
CALCIUM SERPL-MCNC: 9.5 MG/DL — SIGNIFICANT CHANGE UP (ref 8.4–10.5)
CHLORIDE SERPL-SCNC: 95 MMOL/L — LOW (ref 96–108)
CO2 SERPL-SCNC: 29 MMOL/L — SIGNIFICANT CHANGE UP (ref 22–29)
CREAT SERPL-MCNC: 1.07 MG/DL — SIGNIFICANT CHANGE UP (ref 0.5–1.3)
EGFR: 53 ML/MIN/1.73M2 — LOW
GLUCOSE SERPL-MCNC: 86 MG/DL — SIGNIFICANT CHANGE UP (ref 70–99)
HCT VFR BLD CALC: 29.1 % — LOW (ref 34.5–45)
HGB BLD-MCNC: 9.4 G/DL — LOW (ref 11.5–15.5)
MAGNESIUM SERPL-MCNC: 2.4 MG/DL — SIGNIFICANT CHANGE UP (ref 1.6–2.6)
MCHC RBC-ENTMCNC: 30 PG — SIGNIFICANT CHANGE UP (ref 27–34)
MCHC RBC-ENTMCNC: 32.3 GM/DL — SIGNIFICANT CHANGE UP (ref 32–36)
MCV RBC AUTO: 93 FL — SIGNIFICANT CHANGE UP (ref 80–100)
PLATELET # BLD AUTO: 248 K/UL — SIGNIFICANT CHANGE UP (ref 150–400)
POTASSIUM SERPL-MCNC: 4.1 MMOL/L — SIGNIFICANT CHANGE UP (ref 3.5–5.3)
POTASSIUM SERPL-SCNC: 4.1 MMOL/L — SIGNIFICANT CHANGE UP (ref 3.5–5.3)
RBC # BLD: 3.13 M/UL — LOW (ref 3.8–5.2)
RBC # FLD: 13.9 % — SIGNIFICANT CHANGE UP (ref 10.3–14.5)
SODIUM SERPL-SCNC: 136 MMOL/L — SIGNIFICANT CHANGE UP (ref 135–145)
WBC # BLD: 9.52 K/UL — SIGNIFICANT CHANGE UP (ref 3.8–10.5)
WBC # FLD AUTO: 9.52 K/UL — SIGNIFICANT CHANGE UP (ref 3.8–10.5)

## 2024-08-06 PROCEDURE — 75574 CT ANGIO HRT W/3D IMAGE: CPT | Mod: 26

## 2024-08-06 PROCEDURE — 71275 CT ANGIOGRAPHY CHEST: CPT | Mod: 26

## 2024-08-06 PROCEDURE — 93306 TTE W/DOPPLER COMPLETE: CPT | Mod: 26

## 2024-08-06 PROCEDURE — 99231 SBSQ HOSP IP/OBS SF/LOW 25: CPT

## 2024-08-06 RX ORDER — SPIRONOLACTONE 50 MG/1
25 TABLET, FILM COATED ORAL DAILY
Refills: 0 | Status: DISCONTINUED | OUTPATIENT
Start: 2024-08-06 | End: 2024-08-07

## 2024-08-06 RX ORDER — TORSEMIDE 20 MG
10 TABLET ORAL DAILY
Refills: 0 | Status: DISCONTINUED | OUTPATIENT
Start: 2024-08-06 | End: 2024-08-07

## 2024-08-06 RX ADMIN — HEPARIN SODIUM 5000 UNIT(S): 1000 INJECTION, SOLUTION INTRAVENOUS; SUBCUTANEOUS at 18:20

## 2024-08-06 RX ADMIN — BUDESONIDE AND FORMOTEROL FUMARATE DIHYDRATE 2 PUFF(S): 80; 4.5 AEROSOL RESPIRATORY (INHALATION) at 20:37

## 2024-08-06 RX ADMIN — IPRATROPIUM BROMIDE AND ALBUTEROL SULFATE 3 MILLILITER(S): 2.5; .5 SOLUTION RESPIRATORY (INHALATION) at 20:20

## 2024-08-06 RX ADMIN — SPIRONOLACTONE 25 MILLIGRAM(S): 50 TABLET, FILM COATED ORAL at 22:07

## 2024-08-06 RX ADMIN — IPRATROPIUM BROMIDE AND ALBUTEROL SULFATE 3 MILLILITER(S): 2.5; .5 SOLUTION RESPIRATORY (INHALATION) at 08:20

## 2024-08-06 RX ADMIN — BUDESONIDE AND FORMOTEROL FUMARATE DIHYDRATE 2 PUFF(S): 80; 4.5 AEROSOL RESPIRATORY (INHALATION) at 08:21

## 2024-08-06 RX ADMIN — Medication 3 MILLILITER(S): at 22:11

## 2024-08-06 RX ADMIN — Medication 3 MILLILITER(S): at 13:24

## 2024-08-06 RX ADMIN — Medication 10 MILLIGRAM(S): at 23:26

## 2024-08-06 RX ADMIN — Medication 3 MILLILITER(S): at 05:19

## 2024-08-06 RX ADMIN — ATORVASTATIN CALCIUM 40 MILLIGRAM(S): 40 TABLET, FILM COATED ORAL at 22:07

## 2024-08-06 RX ADMIN — Medication 5 MILLIGRAM(S): at 22:07

## 2024-08-06 RX ADMIN — PANTOPRAZOLE SODIUM 40 MILLIGRAM(S): 20 TABLET, DELAYED RELEASE ORAL at 05:14

## 2024-08-06 RX ADMIN — Medication 100 MICROGRAM(S): at 04:07

## 2024-08-06 RX ADMIN — IPRATROPIUM BROMIDE AND ALBUTEROL SULFATE 3 MILLILITER(S): 2.5; .5 SOLUTION RESPIRATORY (INHALATION) at 16:20

## 2024-08-06 RX ADMIN — HEPARIN SODIUM 5000 UNIT(S): 1000 INJECTION, SOLUTION INTRAVENOUS; SUBCUTANEOUS at 05:14

## 2024-08-06 NOTE — DISCHARGE NOTE PROVIDER - NSDCCPCAREPLAN_GEN_ALL_CORE_FT
PRINCIPAL DISCHARGE DIAGNOSIS  Diagnosis: Mitral stenosis  Assessment and Plan of Treatment: -You are being worked up for the [place] Trial.  -Please follow up outpatient with Dr. Platt.    -If you develop any new or worsening symptoms please call your cardiologist or go to the nearest emergency room.  -Please take your medications as prescribed.   -Weigh yourself daily.  If you notice a 2 pound or greater weight gain in any 24 hour period please contact your cardiologist.         SECONDARY DISCHARGE DIAGNOSES  Diagnosis: COPD without exacerbation  Assessment and Plan of Treatment: Please wear your oxygen as prescribed.   Please follow up with your primary care doctor and pulmonologist.     PRINCIPAL DISCHARGE DIAGNOSIS  Diagnosis: Mitral stenosis  Assessment and Plan of Treatment: -You are being worked up for the [place] Trial.  -Please follow up outpatient with Dr. Platt.    -If you develop any new or worsening symptoms please call your cardiologist or go to the nearest emergency room.  -Please take your medications as prescribed.   -Initiation of Farxiga to be discussed with Heart Failure Cardiologists as outpatient.  -Weigh yourself daily.  If you notice a 2 pound or greater weight gain in any 24 hour period please contact your cardiologist.         SECONDARY DISCHARGE DIAGNOSES  Diagnosis: COPD without exacerbation  Assessment and Plan of Treatment: Please wear your oxygen as prescribed.   Please follow up with your primary care doctor and pulmonologist.

## 2024-08-06 NOTE — DISCHARGE NOTE PROVIDER - HOSPITAL COURSE
79 year old female, former smoker, with a PMHx of CHF, COPD, Mitral Valve Stenosis, HTN, HLD, hypothyroidism, who presented as a transfer from [facility] for evaluation for the [place] trial for Mitral Valve Stenosis.  Patient presented to [facility] with complaint of worsening shortness of breath x 1 week.  Admitted for acute on chronic hypoxic respiratory failure, with Pulmonology consulted and patient placed on nebulizers and inhalers.  Hospital course complicated by YADI for which Nephrology was consulted, initially was placed on Lasix for diuresis, but changed to Bumex, then Torsemide.  Patient required a right thoracentesis for pleural effusion 7/29/24 with 1L removed.  Cardiology consulted for Mitral Stenosis, with patient deemed not a surgical candidate for valve replacement and not a candidate for APOLLO trial due to not meeting MR requirement, but considered for eligibility for SUMMIT trial at [facility]. Pt transferred to [facility] to evaluate for mitral stenosis.  Work up for [place] trial in progress.  Pt to follow up outpatient with Dr. Platt.  Stable for discharge home per Dr. Platt.  79 year old female, former smoker, with a PMHx of CHF, COPD, Mitral Valve Stenosis, HTN, HLD, hypothyroidism, who presented as a transfer from [facility] for evaluation for the [place] trial for Mitral Valve Stenosis.  Patient presented to [facility] with complaint of worsening shortness of breath x 1 week.  Admitted for acute on chronic hypoxic respiratory failure, with Pulmonology consulted and patient placed on nebulizers and inhalers.  Hospital course complicated by YADI for which Nephrology was consulted, initially was placed on Lasix for diuresis, but changed to Bumex, then Torsemide.  Patient required a right thoracentesis for pleural effusion 7/29/24 with 1L removed.  Cardiology consulted for Mitral Stenosis, with patient deemed not a surgical candidate for valve replacement and not a candidate for APOLLO trial due to not meeting MR requirement, but considered for eligibility for SUMMIT trial at [facility]. Pt transferred to [facility] to evaluate for mitral stenosis.  Work up for [place] trial in progress.  Pt to follow up outpatient with Dr. Platt.  Pt is stable from a hemodynamic and respiratory standpoint.  Stable for discharge home per Dr. Platt.

## 2024-08-06 NOTE — PHYSICAL THERAPY INITIAL EVALUATION ADULT - PERTINENT HX OF CURRENT PROBLEM, REHAB EVAL
79 year old female, former smoker, with a PMHx of CHF, COPD, Mitral Valve Stenosis, HTN, HLD, hypothyroidism, who presented as a transfer from [facility] for evaluation for the [place] trial for Mitral Valve Stenosis.  Patient presented to [facility] with complaint of worsening shortness of breath x 1 week.  Admitted for acute on chronic hypoxic respiratory failure, with Pulmonology consulted and patient placed on nebulizers and inhalers.  Hospital course complicated by YADI for which Nephrology was consulted, initially was placed on Lasix for diuresis, but changed to Bumex, then Torsemide.  Patient required a right thoracentesis for pleural effusion 7/29/24 with 1L removed.  Cardiology consulted for Mitral Stenosis, with patient deemed not a surgical candidate for valve replacement and not a candidate for APOLLO trial due to not meeting MR requirement, but considered for eligibility for SUMMIT trial at [facility]. Pt transferred to [facility] to evaluate for mitral stenosis.

## 2024-08-06 NOTE — DISCHARGE NOTE PROVIDER - NSDCACTIVITY_GEN_ALL_CORE
Do not drive or operate machinery/Showering allowed/Do not make important decisions/Stairs allowed/Walking - Indoors allowed/No heavy lifting/straining/Walking - Outdoors allowed/Activity as tolerated

## 2024-08-06 NOTE — PHYSICAL THERAPY INITIAL EVALUATION ADULT - ADDITIONAL COMMENTS
pt lives alone in a 2-story house with 1 step to enter then 13 to second floor (+rail). has cane, RW, shower chair. was not using them.

## 2024-08-06 NOTE — DISCHARGE NOTE PROVIDER - NSDCFUADDINST_GEN_ALL_CORE_FT
Please call the Cardiothoracic Surgery office at [phone] if you are experiencing any shortness of breath, chest pain, fevers or chills, drainage from the incisions, persistent nausea, vomiting or if you have any questions about your medications. If the symptoms are severe, call 911 and go to the nearest hospital.    If you need any assistance for making any appointments for a new consult or referral in any specialty, please call our [facility] Clinical Coordination Center at [phone].

## 2024-08-06 NOTE — DISCHARGE NOTE PROVIDER - NSDCFUADDAPPT_GEN_ALL_CORE_FT
Follow up with Dr. Platt on 8/20/24 at 1:00PM.  The office is located on the first floor at [facility] behind the main elevators.  [phone].    Please follow up with your cardiologist, pulmonologist, and primary care doctors in 1-2 weeks.  Please call for appointments if you do not already have them.   Follow up with Dr. Platt on 8/20/24 at 1:00PM. The office may be reached at [phone].    The cardiac surgery office is located at [facility], first floor. Take a left at the end of the lobby until the end of that berg (past the elevator bank). Make a left and the office is on your right across from the elevators.    Please follow up with your cardiologist, pulmonologist, and primary care doctors in 1-2 weeks.  Please call for appointments if you do not already have them.

## 2024-08-06 NOTE — PROGRESS NOTE ADULT - SUBJECTIVE AND OBJECTIVE BOX
BRIEF HOSPITAL COURSE  79 year old female, former smoker, with a PMHx of CHF, COPD, Mitral Valve Stenosis, HTN, HLD, hypothyroidism, who presented as a transfer from [facility] for evaluation for the [place] trial for Mitral Valve Stenosis.  Patient presented to [facility] with complaint of worsening shortness of breath x 1 week.  Admitted for acute on chronic hypoxic respiratory failure, with Pulmonology consulted and patient placed on nebulizers and inhalers.  Hospital course complicated by YADI for which Nephrology was consulted, initially was placed on Lasix for diuresis, but changed to Bumex, then Torsemide.  Patient required a right thoracentesis for pleural effusion 24 with 1L removed.  Cardiology consulted for Mitral Stenosis, with patient deemed not a surgical candidate for valve replacement and not a candidate for APOLLO trial due to not meeting MR requirement, but considered for eligibility for SUMMIT trial at [facility]. Pt transferred to [facility] to evaluate for mitral stenosis.    SIGNIFICANT RECENT/PAST 24 HR EVENTS  No acute events reported overnight.     SUBJECTIVE  Patient seen and examined on follow up. Pt currently lying in bed in NAD. Denies fevers, chills, HA, dizziness, CP, SOB, abd pain, N/V/D, numbness/tingling in extremities, or any other acute complaints.  +Tolerating diet  + Bowel movement since admission  ROS negative x 10 systems except as noted above.    PAST MEDICAL & SURGICAL HISTORY  History of COPD  Asthma  Thyroid nodule  Hyperlipidemia  Hypertension  Hypothyroidism  History of cholecystectomy  History of repair of hip fracture    DAILY REVIEW  Telemetry: Sinus rhythm   Vital Signs Last 24 Hrs  T(C): 36.5 (06 Aug 2024 00:10), Max: 36.8 (05 Aug 2024 20:28)  T(F): 97.7 (06 Aug 2024 00:10), Max: 98.3 (05 Aug 2024 20:28)  HR: 76 (06 Aug 2024 00:10) (66 - 80)  BP: 101/48 (06 Aug 2024 00:10) (92/40 - 113/61)  BP(mean): 67 (05 Aug 2024 06:25) (67 - 70)  RR: 18 (06 Aug 2024 00:10) (15 - 18)  SpO2: 93% (06 Aug 2024 00:10) (93% - 100%)    Parameters below as of 06 Aug 2024 00:10  Patient On (Oxygen Delivery Method): room air    I&O's Detail    03 Aug 2024 07:01  -  04 Aug 2024 07:00  --------------------------------------------------------  IN:    Oral Fluid: 910 mL  Total IN: 910 mL    OUT:    Voided (mL): 1700 mL  Total OUT: 1700 mL    Total NET: -790 mL    04 Aug 2024 07:01  -  05 Aug 2024 00:17  --------------------------------------------------------  IN:    Oral Fluid: 750 mL  Total IN: 750 mL    OUT:    Voided (mL): 600 mL  Total OUT: 600 mL    Total NET: 150 mL    Last Bowel Movement: 04-Aug-2024 (24 @ 20:42)    Daily     Daily Weight in k.5 (04 Aug 2024 05:37)  Admit Wt: Drug Dosing Weight  Height (cm): 157.5 (03 Aug 2024 04:00)  Weight (kg): 44 (03 Aug 2024 04:00)  BMI (kg/m2): 17.7 (03 Aug 2024 04:00)  BSA (m2): 1.41 (03 Aug 2024 04:00)    LABS                        9.1    7.37  )-----------( 238      ( 04 Aug 2024 04:15 )             27.6     08-04    138  |  93<L>  |  96.8<H>  ----------------------------<  97  4.0   |  31.0<H>  |  1.28    Ca    9.4      04 Aug 2024 04:15  Mg     2.3     04    TPro  6.5<L>  /  Alb  3.6  /  TBili  0.3<L>  /  DBili  x   /  AST  17  /  ALT  15  /  AlkPhos  68  0804    LIVER FUNCTIONS - ( 04 Aug 2024 04:15 )  Alb: 3.6 g/dL / Pro: 6.5 g/dL / ALK PHOS: 68 U/L / ALT: 15 U/L / AST: 17 U/L / GGT: x           PT/INR - ( 03 Aug 2024 00:55 )   PT: 10.3 sec;   INR: 0.93 ratio      PTT - ( 03 Aug 2024 00:55 )  PTT:32.1 sec    Pro-Brain Natriuretic Peptide: 3761 pg/mL (24 @ 00:55)  Prealbumin, Serum: 22 mg/dL (24 @ 00:55)  P2Y12 Plt Reactivity: 287 PRU (24 @ 00:55)    Culture - Urine (collected 24 @ 08:40)  Source: Catheterized Catheterized  Final Report (24 @ 23:52):    <10,000 CFU/mL Normal Urogenital Lou    Culture - Fungal, Body Fluid (collected 24 @ 15:40)  Source: Pleural Fl Pleural Fluid  Preliminary Report (24 @ 23:03):    Culture is being performed. Fungal cultures are held for 4 weeks.    Culture - Body Fluid with Gram Stain (collected 24 @ 15:40)  Source: Pleural Fl Pleural Fluid  Gram Stain (24 @ 04:07):    polymorphonuclear leukocytes seen    No organisms seen    by cytocentrifuge  Final Report (24 @ 18:52):    No growth at 5 days    MEDICATIONS  Home Medications:  Albuterol (Eqv-ProAir HFA) 90 mcg/inh inhalation aerosol: 1 puff(s) inhaled every 4 to 6 hours as needed for  shortness of breath and/or wheezing (2024 19:51)  atorvastatin 40 mg oral tablet: 1 tab(s) orally once a day (at bedtime) (2024 19:51)  Breztri Aerosphere 160 mcg-9 mcg-4.8 mcg/inh inhalation aerosol: 2 puff(s) inhaled 2 times a day (2024 19:51)  levothyroxine 100 mcg (0.1 mg) oral tablet: 1 tab(s) orally once a day (2024 19:51)  metoprolol succinate 100 mg oral tablet, extended release: 1 tab(s) orally once a day (2024 19:51)    MEDICATIONS  (STANDING):  albuterol/ipratropium for Nebulization 3 milliLiter(s) Nebulizer every 6 hours  atorvastatin 40 milliGRAM(s) Oral at bedtime  budesonide 160 MICROgram(s)/formoterol 4.5 MICROgram(s) Inhaler 2 Puff(s) Inhalation two times a day  heparin   Injectable 5000 Unit(s) SubCutaneous every 12 hours  levothyroxine 100 MICROGram(s) Oral daily  melatonin 5 milliGRAM(s) Oral at bedtime  metoprolol tartrate 25 milliGRAM(s) Oral two times a day  pantoprazole    Tablet 40 milliGRAM(s) Oral before breakfast  predniSONE   Tablet 20 milliGRAM(s) Oral daily  sodium chloride 0.9% lock flush 3 milliLiter(s) IV Push every 8 hours    MEDICATIONS  (PRN):  albuterol    90 MICROgram(s) HFA Inhaler 2 Puff(s) Inhalation every 6 hours PRN Bronchospasm    ALLERGIES  No Known Allergies    DIAGNOSTICS  All relevant and available laboratory results, radiology and medications reviewed.  COSME W or WO Ultrasound Enhancing Agent (24 @ 09:17)  CONCLUSIONS:   1. Left ventricular systolic function is hyperdynamic with an ejection fraction visually estimated at >75 %.   2. Normal right ventricular cavity size and normal right ventricular systolic function.   3. 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   4. Trace aortic regurgitation.   5. Prominent Eustachian valve in the right atrium and Chiari network present in the right atrium.   6. Severe mitral valve stenosis.   7. Moderate mitral regurgitation.   8. No pericardial effusion seen.   9. No thrombus seen in the left atrial, left atrial appendage, right atrial or right atrial appendage.  10. Severe calcified and noncalcified atheroscelrotic plaquig in descending aorta. Consider CTA of aorta if clincially indicnated.  11. Agitated saline injection reveals bubbles in the left heart, consistent with a patent foramen ovale.    Cardiac Catheterization (24 @ 11:02)  LM: Short LM, luminal irregularities    LAD: Mild diffuse disease, mid LAD bridging segment, moderate  torutuosity  LCX: Mild diffuse disease   RCA: Mild diffuse disease      Xray Chest 1 View- PORTABLE-Routine (Xray Chest 1 View- PORTABLE-Routine in AM.) (24 @ 06:37)  FINDINGS/  IMPRESSION: Heart size, mediastinal and hilar contours are unchanged and   within normal limits. Coarse calcification of the mitral annulus is   reidentified. Senescent changes with extensive calcification aortic arch   and descending thoracic aorta are redemonstrated. Persistent pulmonary   venous congestion with fluid noted within the right minor fissure as well   as by basilar pleural effusions probably unchanged allowing for   differences in imaging technique. There may be a small loculated   pneumothorax along the inferior lateral aspect of the right lower lung   zone unchanged from prior imaging.    US Duplex Venous Lower Ext Complete, Bilateral (24 @ 20:22)  IMPRESSION:  No evidence of deep venous thrombosis in either lower extremity.    Left popliteal fossa cyst measuring 5.0 x9.0 x 3.0 mm.    CT Chest No Cont (24 @ 18:11)  FINDINGS:  LUNGS AND LARGE AIRWAYS: Breathing motion artifacts limiting evaluation.   Bilateral large pleural effusions are visible which appear grossly   similar to the prior CT however probably slightly increased. Extensive   areas with compression atelectasis adjacent to the pleural effusion as   well as areas with atelectasis in the right middle lobe and lingula are   noted; concurrent pneumonia in the atelectatic lungs cannot be excluded.   Extensive septal thickening is visible mainly affecting the upper lobes   with associated changes of emphysema. The airspace opacity seen on the CT   from 2024 have largely resolved.  PLEURA: No pleural effusion.  VESSELS: Within normal limits.  HEART: Cardiomegaly is noted. Mitral valve calcification.. No pericardial   effusion.  MEDIASTINUM AND MATTHEW: Limited evaluation. Multiple prominent mediastinal   lymph nodes are noted which appear grossly similar to the prior CT..  CHEST WALL AND LOWER NECK: Postoperative changes are visible in the   thyroid bed. Stable appearance of the right lower thyroid; the left-sided   thyroid is not visible and may be surgically absent..  VISUALIZED UPPER ABDOMEN: Limited slices through the upper abdomen show   cholecystectomy. Dilated hepatic veins are noted with dilated IVC..  BONES: Stable wedge-shaped deformity of multiple thoracic vertebral   bodies.    IMPRESSION:  Breathing motion artifacts limiting the evaluation.    Cardiomegaly with bilateral large pleural effusions are visible with   subjacent atelectasis and extensive septal thickening, concerning for   CHF/fluid overload.    Limited evaluation for loculated pleural effusion on this noncontrast CT.    Extensive areas with atelectasis are visible mainly in the right middle   lobe and the lingula; concurrent/developing pneumonia in the atelectatic   lungs cannot be excluded.    The airspace opacity seen on the CT from 2024 have largely resolved.    12 Lead ECG (24 @ 05:55)  Ventricular Rate 55 BPM  Atrial Rate 55 BPM  P-R Interval 186 ms  QRS Duration 94 ms  Q-T Interval 452 ms  QTC Calculation(Bazett) 432 ms  P Axis 68 degrees  R Axis -29 degrees  T Axis 50 degrees  Diagnosis Line Sinus bradycardia  Possible Left atrial enlargement  Borderline ECG    TTE W or WO Ultrasound Enhancing Agent (06.10.24 @ 21:44)  CONCLUSIONS:   1. Technically difficult image quality.   2. There is increased LV mass and concentric hypertrophy.   3. Left ventricular systolic function is hyperdynamic with an ejection fraction of 79 % by Shepard's method ofdisks.   4. Hyperdynamic left ventricular systolic function. Basal septal hypertrophy (measures 1.8 cm) with evidence left ventricular outflow tract obstruction with a resting gradient of 41mmHg and a gradient of 47 mmHg with valsalva, overall consistent with moderate LVOT obstruction.   5. Normal right ventricular cavity size and normal systolic function.   6. The right atrium is normal in size.   7. The left atrium is severely dilated.   8. Interatrial septum is stretched and displaced to the right, consistent with left atrial volume overload.   9. Tricuspid aortic valve with normal leaflet excursion. There is moderate thickening of the aortic valve leaflets.  10. Trace aortic regurgitation.  11. Severe mitral valve leaflet calcification.  12. Peak gradient across the mitral valve is 41mmHg with a mean gradient of 19mmHg, at a heart rate of 78 beats per minute.  13. Severe mitral valve stenosis.  14. Mild mitral regurgitation.  15. Mild to moderate tricuspid regurgitation.  16. The inferior vena cava is normal in size measuring 1.56 cm in diameter, (normal <2.1cm) with normal inspiratory collapse (normal >50%) consistent with normal right atrial pressure (~3, range 0-5mmHg).  17. Estimated pulmonary artery systolic pressure is 47 mmHg, consistent with moderate pulmonary hypertension.  18. Small pericardial effusion noted adjacent to the right atrium.  19. Bilateral pleural effusion noted.    Mitral Valve:  There is diffuse mitral valve thickening of the anterior and posterior leaflets. Peak gradient across the mitral valve is 41mmHg with a mean gradient of 19mmHg, at a heart rate of 78 beats per minute. There is severe leaflet calcification. There is severe mitral valve stenosis. The calculated mitral valve area is 1.4 cm². There is mild mitral regurgitation.    PHYSICAL EXAM  Constitutional: NAD, pleasant elderly female  Neck: supple, trachea midline. No JVD   Respiratory: Breath sounds diminished b/l lower fields to auscultation, no accessory muscle use noted. +fine rales to bases  Cardiovascular: Regular rate, regular rhythm, normal S1, S2; no murmurs or rub   Gastrointestinal: Soft, non-tender, non-distended, + bowel sounds   Extremities: MENDOZA x 4, no peripheral edema, no cyanosis, no clubbing    Vascular: Equal and normal pulses: 2+ peripheral pulses throughout  Neurological: A+O x 3; speech clear and intact; no gross sensory/motor deficits  Psychiatric: calm, normal mood, normal affect   Skin: warm, dry, well perfused, no rashes   Incision: right posterior chest bandage from prior thoracentesis cdi, RRadial cath site CDI

## 2024-08-06 NOTE — DISCHARGE NOTE PROVIDER - CARE PROVIDER_API CALL
Shayy Louis  Adv [facility]  [address], [address] [place] [address] [place] [address]  Phone: [phone]  Fax: [phone]  Follow Up Time: 1 week    Neel Cruz  Cardiovascular Disease  [address] [place] [address]  Phone: [phone]  Fax: [phone]  Follow Up Time: 1 week

## 2024-08-06 NOTE — CONSULT NOTE ADULT - SUBJECTIVE AND OBJECTIVE BOX
ADVANCED HEART FAILURE - CONSULT NOTE  [address] [place] [address]  Office Phone: [phone]/Fax: [phone]  Service/On Call Phone [phone]  _______________________________________________________________________________________________________    HPI:  78 y/o F with a PMH of HFpEF, severe MS, HTN, HLD, aortic atheroma, moderate LLE PAD, COPD (on home O2 at night), former smoker, and hypothyroidism, who presented as transfer from [facility] for evaluation for [place] trial for mitral valve stenosis.    Pt presented to [facility] c/o worsening shortness of breath x 1 week. Her hospital course was complicated by YADI for which nephrology was consulted, initially on lasix for diuresis but changed to bumex. Pt required thoracentesis for pleural effusion during admission with 1L removed.  Cardiology consulted for MS and deemed not a surgical candidate for valve replacement and not a candidate for APOLLO trial due to not meeting MR requirement, but considered for eligibility for SUMMIT trial at [facility].  Pt transferred to [facility] to evaluate for mitral stenosis.      PAST MEDICAL & SURGICAL HISTORY:  History of COPD  No home O2 use      Asthma      Thyroid nodule      Hyperlipidemia      Hypertension      Hypothyroidism      History of cholecystectomy        History of repair of hip fracture  ORIF .  Hardware was eventually removed        REVIEW OF SYSTEMS:  14 point ROS negative in detail apart from as documented in HPI.    MEDICATIONS  (STANDING):  albuterol/ipratropium for Nebulization 3 milliLiter(s) Nebulizer every 6 hours  atorvastatin 40 milliGRAM(s) Oral at bedtime  budesonide 160 MICROgram(s)/formoterol 4.5 MICROgram(s) Inhaler 2 Puff(s) Inhalation two times a day  heparin   Injectable 5000 Unit(s) SubCutaneous every 12 hours  levothyroxine 100 MICROGram(s) Oral daily  melatonin 5 milliGRAM(s) Oral at bedtime  metoprolol tartrate 25 milliGRAM(s) Oral two times a day  pantoprazole    Tablet 40 milliGRAM(s) Oral before breakfast  sodium chloride 0.9% lock flush 3 milliLiter(s) IV Push every 8 hours    MEDICATIONS  (PRN):  albuterol    90 MICROgram(s) HFA Inhaler 2 Puff(s) Inhalation every 6 hours PRN Bronchospasm    Home Medications:  Albuterol (Eqv-ProAir HFA) 90 mcg/inh inhalation aerosol: 1 puff(s) inhaled every 4 to 6 hours as needed for  shortness of breath and/or wheezing (2024 19:51)  atorvastatin 40 mg oral tablet: 1 tab(s) orally once a day (at bedtime) (2024 19:51)  Breztri Aerosphere 160 mcg-9 mcg-4.8 mcg/inh inhalation aerosol: 2 puff(s) inhaled 2 times a day (2024 19:51)  levothyroxine 100 mcg (0.1 mg) oral tablet: 1 tab(s) orally once a day (2024 19:51)  metoprolol succinate 100 mg oral tablet, extended release: 1 tab(s) orally once a day (2024 19:51)    Allergies    No Known Allergies    Intolerances      Social History:  Assistive Devices: none    Tobacco use: 1pack every other day x 30 years  Alcohol use: social  Illicit drug use: denies (03 Aug 2024 03:11)    FAMILY HISTORY:  FH: CAD (coronary artery disease) (Mother)        ICU Vital Signs Last 24 Hrs  T(C): 36.8, Max: 36.8 (-05 @ 20:28)  HR: 69 (65 - 79)  BP: 96/53 (96/53 - 115/55)  BP(mean): --  ABP: --  ABP(mean): --  RR: 18 (18 - 18)  SpO2: 100% (93% - 100%)    Weight in k.3 (24)  Weight in k.2 (24)      I&O's Summary Last 24 Hrs    IN: 635 mL / OUT: 1100 mL / NET: -465 mL      Tele:    Physical Exam:    General: No distress. Comfortable.  Neck: JVP not elevated.  Respiratory: Clear to auscultation bilaterally  CV: RRR. Normal S1 and S2. No murmurs, rub, or gallops. Radial pulses normal.  Abdomen: Soft, non-distended, non-tender  Extremities: Warm, no edema  Neurology: Non-focal, alert and oriented times three.   Psych: Affect normal    Labs:    ( 24 @ 04:25 )               9.4    9.52  )--------( 248                  29.1     ( 24 @ 04:25 )     136  |  95  |  64.5  ---------------------<  86  4.1  |  29.0  |  1.07    Ca 9.5  Phos x   Mg 2.4    ( 24 @ 04:15 )  TPro  6.5  /  Alb  3.6  /  TBili  0.3  /  DBili  x   /  AST  17  /  ALT  15  /  AlkPhos  68      ( 24 @ 15:30 )  TropHS x     / CK 66    / CKMB x                      RADIOLOGY & ADDITIONAL STUDIES:
                                             [facility]                                              CARDIOLOGY AT [place] [address]                                             Telephone: [phone]. Fax:[phone]                                                       CARDIOLOGY CONSULTATION NOTE                                                                                             History obtained by: Patient and medical record   Community Cardiologist: [facility] - Mateo Bobby Pannella, Patel, Savella, Goodger, Librado   obtained: Yes [  ] No [ x ]  Reason for Consultation: mitral stenosis   Available out pt records reviewed: Yes [ x ] No [  ]    Chief complaint:    Patient is a 79y old  Female who presents with a chief complaint of mitral valve stenosis (03 Aug 2024 03:11)      HPI:  79F with h/o CHF, COPD, former smoker, mitral valve stenosis, HTN, HLD, hypothyroidism, who presented as transfer from [facility] for evaluation for [place] trial for mitral valve stenosis.  Pt presented to [facility] c/o worsening shortness of breath x 1 week.  Admitted for acute on chronic hypoxic respiratory failure.  Hospital course complicated by YADI for which nephrology was consulted, initially on lasix for diuresis but changed to bumex.  At time of transfer, pt on torsemide,  Pulmonology consulted, pt on nebulizer and inhalers.  Pt required thoracentesis for pleural effusion during admission with 1L removed.  Cardiology consulted for MS and deemed not a surgical candidate for valve replacement and not a candidate for APOLLO trial due to not meeting MR requirement, but considered for eligibility for SUMMIT trial at [facility].  Pt transferred to [facility] to evaluate for mitral stenosis.    Pt seen and assessed at bedside.  Pt laying comfortably in hospital bed in NAD.  States no current physical complaints at this time.  Denies f/c, n/v, chest pain, sob, abdominal pain, d/c, urinary symptoms.  ROS negative x 10 except as indicated in HPI.     (03 Aug 2024 03:11)      PAST MEDICAL HISTORY  History of COPD    Asthma    Thyroid nodule    Hyperlipidemia    Hypertension    Hypothyroidism        PAST SURGICAL HISTORY  History of cholecystectomy    History of repair of hip fracture        SUBSTANCE USE HISTORY  Denies current and previous substance use [  ]   CIGARETTES -   ALCOHOL -   DRUGS -     FAMILY HISTORY:  FH: CAD (coronary artery disease) (Mother)        CARDIAC SPECIFIC FAMILY HX   No KNOWN family history of Cardiovascular disease, CAD, or sudden death in first degree relatives unless specified below  Family History of Cardiovascular Disease:  [  ]   Coronary Artery Disease in first degree relative:  [  ]   Sudden Cardiac Death in First degree relative: [  ]    HOME MEDICATIONS:  Albuterol (Eqv-ProAir HFA) 90 mcg/inh inhalation aerosol: 1 puff(s) inhaled every 4 to 6 hours as needed for  shortness of breath and/or wheezing (2024 19:51)  atorvastatin 40 mg oral tablet: 1 tab(s) orally once a day (at bedtime) (2024 19:51)  Breztri Aerosphere 160 mcg-9 mcg-4.8 mcg/inh inhalation aerosol: 2 puff(s) inhaled 2 times a day (2024 19:51)  levothyroxine 100 mcg (0.1 mg) oral tablet: 1 tab(s) orally once a day (2024 19:51)  metoprolol succinate 100 mg oral tablet, extended release: 1 tab(s) orally once a day (2024 19:51)      CURRENT CARDIAC MEDICATIONS:  metoprolol tartrate 25 milliGRAM(s) Oral two times a day  torsemide 20 milliGRAM(s) Oral every 12 hours      CURRENT OTHER MEDICATIONS:  albuterol    90 MICROgram(s) HFA Inhaler 2 Puff(s) Inhalation every 6 hours PRN Bronchospasm  albuterol/ipratropium for Nebulization 3 milliLiter(s) Nebulizer every 6 hours  budesonide 160 MICROgram(s)/formoterol 4.5 MICROgram(s) Inhaler 2 Puff(s) Inhalation two times a day  melatonin 5 milliGRAM(s) Oral at bedtime  pantoprazole    Tablet 40 milliGRAM(s) Oral before breakfast  atorvastatin 40 milliGRAM(s) Oral at bedtime  heparin   Injectable 5000 Unit(s) SubCutaneous every 12 hours  levothyroxine 100 MICROGram(s) Oral daily  predniSONE   Tablet 20 milliGRAM(s) Oral daily, Stop order after: 3 Doses  sodium chloride 0.9% lock flush 3 milliLiter(s) IV Push every 8 hours      ALLERGIES:   No Known Allergies      VITAL SIGNS:  T(C): 36.4 (24 @ 07:23), Max: 36.8 (24 @ 22:15)  T(F): 97.6 (24 @ 07:23), Max: 98.2 (24 @ 22:15)  HR: 60 (24 @ 07:23) (60 - 66)  BP: 95/58 (24 @ 07:23) (95/58 - 111/61)  RR: 16 (24 @ 07:23) (16 - 19)  SpO2: 96% (24 @ 07:23) (92% - 99%)    INTAKE AND OUTPUT:      @ 07:01  -   @ 11:30  --------------------------------------------------------  IN: 240 mL / OUT: 0 mL / NET: 240 mL        LABS:  ( 2024 15:30 )  Troponin T  X    ,  CPK  66   , CKMB  X    , BNP X                                  9.1    6.18  )-----------( 232      ( 03 Aug 2024 00:55 )             27.2     08-    138  |  93<L>  |  91.6<H>  ----------------------------<  93  3.9   |  34.0<H>  |  1.13    Ca    9.1      03 Aug 2024 00:55  Phos  3.6     08-  Mg     2.3     -    TPro  6.4<L>  /  Alb  3.4  /  TBili  0.3<L>  /  DBili  x   /  AST  17  /  ALT  14  /  AlkPhos  70  08-03    PT/INR - ( 03 Aug 2024 00:55 )   PT: 10.3 sec;   INR: 0.93 ratio         PTT - ( 03 Aug 2024 00:55 )  PTT:32.1 sec  Urinalysis Basic - ( 03 Aug 2024 04:35 )    Color: Yellow / Appearance: Clear / S.013 / pH: x  Gluc: x / Ketone: Negative mg/dL  / Bili: Negative / Urobili: 0.2 mg/dL   Blood: x / Protein: Negative mg/dL / Nitrite: Negative   Leuk Esterase: Negative / RBC: x / WBC x   Sq Epi: x / Non Sq Epi: x / Bacteria: x      RADIOLOGY IMAGING:   Xray Chest 1 View- PORTABLE-Urgent: Urgent   Indication: pre-op cardiac surgery  Transport: Portable  Exam Completed  Provider's Contact #: [phone] (24 @ 18:31) [Performed]  12 Lead ECG:   Provider's Contact #: [phone] (24 @ 18:31) [Results Available]

## 2024-08-06 NOTE — DISCHARGE NOTE PROVIDER - NSDCFUSCHEDAPPT_GEN_ALL_CORE_FT
Alli Platt  [facility]  CTSURG [address]  Scheduled Appointment: 08/20/2024    Fay Muir  [facility]  PULMMED [address]  Scheduled Appointment: 08/22/2024

## 2024-08-06 NOTE — PROGRESS NOTE ADULT - PROBLEM SELECTOR PLAN 1
TTE 6/10/24 with EF 79%, with Severe mitral stenosis.  Pt to be evaluated for [place] trial.  Carotid US mild plaque b/l, otherwise, wnl  LHC and COSME as above  Continue home meds.  Continue Lopressor as tolerated by HR and BP.   Torsemide held for renal fxn  Euvolemic on exam  Strict I/Os. Supplement electrolytes PRN.   Continuous Telemetry. Continuous SpO2 monitoring.  Maintain SpO2 >92%. Supplement with O2 therapy PRN.   PT eval, OOB with nursing, incentive use  Further plan and recommendations to be determined during AM rounds  PT eval in AM  Remainder of presurgical workup completed  Plan to be discussed / reviewed with CT Surgery attending / team during AM rounds.

## 2024-08-06 NOTE — DISCHARGE NOTE PROVIDER - NSDCMRMEDTOKEN_GEN_ALL_CORE_FT
Albuterol (Eqv-ProAir HFA) 90 mcg/inh inhalation aerosol: 1 puff(s) inhaled every 4 to 6 hours as needed for  shortness of breath and/or wheezing  atorvastatin 40 mg oral tablet: 1 tab(s) orally once a day (at bedtime)  Breztri Aerosphere 160 mcg-9 mcg-4.8 mcg/inh inhalation aerosol: 2 puff(s) inhaled 2 times a day  furosemide 40 mg oral tablet: 1 tab(s) orally 2 times a day  levothyroxine 100 mcg (0.1 mg) oral tablet: 1 tab(s) orally once a day  metoprolol succinate 100 mg oral tablet, extended release: 1 tab(s) orally once a day   Albuterol (Eqv-ProAir HFA) 90 mcg/inh inhalation aerosol: 1 puff(s) inhaled every 4 to 6 hours as needed for  shortness of breath and/or wheezing  atorvastatin 40 mg oral tablet: 1 tab(s) orally once a day (at bedtime)  Breztri Aerosphere 160 mcg-9 mcg-4.8 mcg/inh inhalation aerosol: 2 puff(s) inhaled 2 times a day  levothyroxine 100 mcg (0.1 mg) oral tablet: 1 tab(s) orally once a day  metoprolol tartrate 25 mg oral tablet: 1 tab(s) orally 2 times a day  spironolactone 25 mg oral tablet: 1 tab(s) orally once a day  torsemide 10 mg oral tablet: 1 tab(s) orally once a day

## 2024-08-06 NOTE — DISCHARGE NOTE PROVIDER - PROVIDER TOKENS
PROVIDER:[TOKEN:[[id number]],FOLLOWUP:[1 week]],PROVIDER:[TOKEN:[[id number]],FOLLOWUP:[1 week]]

## 2024-08-06 NOTE — DISCHARGE NOTE PROVIDER - CARE PROVIDERS DIRECT ADDRESSES
[email or web address]

## 2024-08-07 ENCOUNTER — TRANSCRIPTION ENCOUNTER (OUTPATIENT)
Age: 79
End: 2024-08-07

## 2024-08-07 VITALS — WEIGHT: 99.43 LBS

## 2024-08-07 LAB
ANION GAP SERPL CALC-SCNC: 10 MMOL/L — SIGNIFICANT CHANGE UP (ref 5–17)
BUN SERPL-MCNC: 52 MG/DL — HIGH (ref 8–20)
CALCIUM SERPL-MCNC: 8.8 MG/DL — SIGNIFICANT CHANGE UP (ref 8.4–10.5)
CHLORIDE SERPL-SCNC: 101 MMOL/L — SIGNIFICANT CHANGE UP (ref 96–108)
CO2 SERPL-SCNC: 28 MMOL/L — SIGNIFICANT CHANGE UP (ref 22–29)
CREAT SERPL-MCNC: 1.03 MG/DL — SIGNIFICANT CHANGE UP (ref 0.5–1.3)
EGFR: 55 ML/MIN/1.73M2 — LOW
GLUCOSE SERPL-MCNC: 92 MG/DL — SIGNIFICANT CHANGE UP (ref 70–99)
HCT VFR BLD CALC: 27.7 % — LOW (ref 34.5–45)
HGB BLD-MCNC: 9.3 G/DL — LOW (ref 11.5–15.5)
MAGNESIUM SERPL-MCNC: 2.2 MG/DL — SIGNIFICANT CHANGE UP (ref 1.6–2.6)
MCHC RBC-ENTMCNC: 31.2 PG — SIGNIFICANT CHANGE UP (ref 27–34)
MCHC RBC-ENTMCNC: 33.6 GM/DL — SIGNIFICANT CHANGE UP (ref 32–36)
MCV RBC AUTO: 93 FL — SIGNIFICANT CHANGE UP (ref 80–100)
PLATELET # BLD AUTO: 231 K/UL — SIGNIFICANT CHANGE UP (ref 150–400)
POTASSIUM SERPL-MCNC: 4.4 MMOL/L — SIGNIFICANT CHANGE UP (ref 3.5–5.3)
POTASSIUM SERPL-SCNC: 4.4 MMOL/L — SIGNIFICANT CHANGE UP (ref 3.5–5.3)
RBC # BLD: 2.98 M/UL — LOW (ref 3.8–5.2)
RBC # FLD: 14 % — SIGNIFICANT CHANGE UP (ref 10.3–14.5)
SODIUM SERPL-SCNC: 139 MMOL/L — SIGNIFICANT CHANGE UP (ref 135–145)
WBC # BLD: 8.04 K/UL — SIGNIFICANT CHANGE UP (ref 3.8–10.5)
WBC # FLD AUTO: 8.04 K/UL — SIGNIFICANT CHANGE UP (ref 3.8–10.5)

## 2024-08-07 PROCEDURE — 94640 AIRWAY INHALATION TREATMENT: CPT

## 2024-08-07 PROCEDURE — 71275 CT ANGIOGRAPHY CHEST: CPT | Mod: MC

## 2024-08-07 PROCEDURE — 81003 URINALYSIS AUTO W/O SCOPE: CPT

## 2024-08-07 PROCEDURE — 93880 EXTRACRANIAL BILAT STUDY: CPT

## 2024-08-07 PROCEDURE — 93005 ELECTROCARDIOGRAM TRACING: CPT

## 2024-08-07 PROCEDURE — 85730 THROMBOPLASTIN TIME PARTIAL: CPT

## 2024-08-07 PROCEDURE — 84134 ASSAY OF PREALBUMIN: CPT

## 2024-08-07 PROCEDURE — 97163 PT EVAL HIGH COMPLEX 45 MIN: CPT

## 2024-08-07 PROCEDURE — 94010 BREATHING CAPACITY TEST: CPT

## 2024-08-07 PROCEDURE — 83735 ASSAY OF MAGNESIUM: CPT

## 2024-08-07 PROCEDURE — 71045 X-RAY EXAM CHEST 1 VIEW: CPT

## 2024-08-07 PROCEDURE — 86901 BLOOD TYPING SEROLOGIC RH(D): CPT

## 2024-08-07 PROCEDURE — C1887: CPT

## 2024-08-07 PROCEDURE — 36415 COLL VENOUS BLD VENIPUNCTURE: CPT

## 2024-08-07 PROCEDURE — 94760 N-INVAS EAR/PLS OXIMETRY 1: CPT

## 2024-08-07 PROCEDURE — 87640 STAPH A DNA AMP PROBE: CPT

## 2024-08-07 PROCEDURE — 93320 DOPPLER ECHO COMPLETE: CPT

## 2024-08-07 PROCEDURE — 71045 X-RAY EXAM CHEST 1 VIEW: CPT | Mod: 26

## 2024-08-07 PROCEDURE — 93325 DOPPLER ECHO COLOR FLOW MAPG: CPT

## 2024-08-07 PROCEDURE — 80048 BASIC METABOLIC PNL TOTAL CA: CPT

## 2024-08-07 PROCEDURE — 85610 PROTHROMBIN TIME: CPT

## 2024-08-07 PROCEDURE — 93454 CORONARY ARTERY ANGIO S&I: CPT

## 2024-08-07 PROCEDURE — 86850 RBC ANTIBODY SCREEN: CPT

## 2024-08-07 PROCEDURE — 85027 COMPLETE CBC AUTOMATED: CPT

## 2024-08-07 PROCEDURE — 85025 COMPLETE CBC W/AUTO DIFF WBC: CPT

## 2024-08-07 PROCEDURE — C1894: CPT

## 2024-08-07 PROCEDURE — 93312 ECHO TRANSESOPHAGEAL: CPT

## 2024-08-07 PROCEDURE — C1769: CPT

## 2024-08-07 PROCEDURE — 75574 CT ANGIO HRT W/3D IMAGE: CPT | Mod: MC

## 2024-08-07 PROCEDURE — 85576 BLOOD PLATELET AGGREGATION: CPT

## 2024-08-07 PROCEDURE — 99231 SBSQ HOSP IP/OBS SF/LOW 25: CPT

## 2024-08-07 PROCEDURE — 83880 ASSAY OF NATRIURETIC PEPTIDE: CPT

## 2024-08-07 PROCEDURE — 87641 MR-STAPH DNA AMP PROBE: CPT

## 2024-08-07 PROCEDURE — 93306 TTE W/DOPPLER COMPLETE: CPT

## 2024-08-07 PROCEDURE — 80053 COMPREHEN METABOLIC PANEL: CPT

## 2024-08-07 PROCEDURE — 86900 BLOOD TYPING SEROLOGIC ABO: CPT

## 2024-08-07 RX ORDER — TORSEMIDE 20 MG
1 TABLET ORAL
Qty: 30 | Refills: 1
Start: 2024-08-07 | End: 2024-10-05

## 2024-08-07 RX ORDER — SPIRONOLACTONE 50 MG/1
1 TABLET, FILM COATED ORAL
Qty: 30 | Refills: 1
Start: 2024-08-07 | End: 2024-10-05

## 2024-08-07 RX ORDER — METOPROLOL TARTRATE 100 MG
1 TABLET ORAL
Qty: 60 | Refills: 1
Start: 2024-08-07 | End: 2024-10-05

## 2024-08-07 RX ADMIN — Medication 10 MILLIGRAM(S): at 06:30

## 2024-08-07 RX ADMIN — HEPARIN SODIUM 5000 UNIT(S): 1000 INJECTION, SOLUTION INTRAVENOUS; SUBCUTANEOUS at 06:29

## 2024-08-07 RX ADMIN — SPIRONOLACTONE 25 MILLIGRAM(S): 50 TABLET, FILM COATED ORAL at 06:29

## 2024-08-07 RX ADMIN — Medication 100 MICROGRAM(S): at 06:29

## 2024-08-07 RX ADMIN — PANTOPRAZOLE SODIUM 40 MILLIGRAM(S): 20 TABLET, DELAYED RELEASE ORAL at 06:29

## 2024-08-07 RX ADMIN — Medication 3 MILLILITER(S): at 07:00

## 2024-08-07 RX ADMIN — Medication 25 MILLIGRAM(S): at 06:30

## 2024-08-07 RX ADMIN — BUDESONIDE AND FORMOTEROL FUMARATE DIHYDRATE 2 PUFF(S): 80; 4.5 AEROSOL RESPIRATORY (INHALATION) at 08:31

## 2024-08-07 RX ADMIN — IPRATROPIUM BROMIDE AND ALBUTEROL SULFATE 3 MILLILITER(S): 2.5; .5 SOLUTION RESPIRATORY (INHALATION) at 08:31

## 2024-08-07 NOTE — PROGRESS NOTE ADULT - PROBLEM SELECTOR PLAN 3
Subcutaneous Heparin and SCDs for DVT prophylaxis.  Protonix for GI prophylaxis.

## 2024-08-07 NOTE — PROGRESS NOTE ADULT - ASSESSMENT
79 year old female, former smoker, with a PMHx of CHF, COPD, Mitral Valve Stenosis, HTN, HLD, hypothyroidism, who presented as a transfer from [facility] for evaluation for the [place] trial for Mitral Valve Stenosis.  Patient presented to [facility] with complaint of worsening shortness of breath x 1 week.  Admitted for acute on chronic hypoxic respiratory failure, with Pulmonology consulted and patient placed on nebulizers and inhalers. Hospital course complicated by YADI for which Nephrology was consulted, initially was placed on Lasix for diuresis, but changed to Bumex, then Torsemide. Patient required a right thoracentesis for pleural effusion 7/29/24 with 1L removed. Cardiology consulted for Mitral Stenosis, with patient deemed not a surgical candidate for valve replacement and not a candidate for APOLLO trial due to not meeting MR requirement, but considered for eligibility for SUMMIT trial at [facility]. Pt transferred to [facility] to evaluate for mitral stenosis. 8/5 LHC showing mild diffuse CAD, no intervention. COSME showing severe MS, mod MR, hyperdynamic LVEF   >75%, no LVOT obstruction noted (previous TTE showing mod LVOT obstruction). Patient consented and under evaluation for Tendyne Trial. Tendyne TTE and CTA obtained. Heart Failure team following, GDMT with Torsemide, Aldactone, Lopressor. Remainder of workup completed inpatient. Patient pending discharge home later today with o/p follow up with Dr. Platt and Heart Failure.

## 2024-08-07 NOTE — DISCHARGE NOTE NURSING/CASE MANAGEMENT/SOCIAL WORK - NSDCFUADDAPPT_GEN_ALL_CORE_FT
Follow up with Dr. Platt on 8/20/24 at 1:00PM. The office may be reached at [phone].    The cardiac surgery office is located at [facility], first floor. Take a left at the end of the lobby until the end of that berg (past the elevator bank). Make a left and the office is on your right across from the elevators.    Please follow up with your cardiologist, pulmonologist, and primary care doctors in 1-2 weeks.  Please call for appointments if you do not already have them.

## 2024-08-07 NOTE — DISCHARGE NOTE NURSING/CASE MANAGEMENT/SOCIAL WORK - NSDCPEFALRISK_GEN_ALL_CORE
For information on Fall & Injury Prevention, visit: [email or web address] OR  [email or web address] OR  https://www.cdc.gov/steadi/patient.html

## 2024-08-07 NOTE — PROGRESS NOTE ADULT - PROBLEM SELECTOR PROBLEM 2
COPD without exacerbation
Renal insufficiency
Prophylactic measure
Prophylactic measure

## 2024-08-07 NOTE — DIETITIAN INITIAL EVALUATION ADULT - OTHER INFO
79 year old female, former smoker, with a PMHx of CHF, COPD, Mitral Valve Stenosis, HTN, HLD, hypothyroidism, who presented as a transfer from [facility] for evaluation for the [place] trial for Mitral Valve Stenosis.  Patient presented to [facility] with complaint of worsening shortness of breath x 1 week.  Admitted for acute on chronic hypoxic respiratory failure, with Pulmonology consulted and patient placed on nebulizers and inhalers.  Hospital course complicated by YADI for which Nephrology was consulted, initially was placed on Lasix for diuresis, but changed to Bumex, then Torsemide. Patient required a right thoracentesis for pleural effusion 7/29/24 with 1L removed. Pt transferred to [facility] to evaluate for mitral stenosis. 8/5 LHC showing mild diffuse CAD, no intervention. COSME showing severe MS, mod MR, hyperdynamic LVEF. Tendyne TTE and CTA obtained. Heart Failure team following, GDMT with Torsemide, Aldactone, Lopressor. Remainder of workup completed inpatient. Patient pending discharge home later today with o/p follow up with Dr. Platt and Heart Failure.

## 2024-08-07 NOTE — PROGRESS NOTE ADULT - REASON FOR ADMISSION
mitral valve stenosis

## 2024-08-07 NOTE — DIETITIAN INITIAL EVALUATION ADULT - ORAL INTAKE PTA/DIET HISTORY
Patient tolerating current diet well with fair appetite/PO intake. Reports only eating 25-50% of meals as she also is drinking Ensure TID which fills her up. Suggested decreasing Ensure to avoid early satiety - pt still wants supplement TID. States PTA her appetite was the same as it is now and drinks 2 Ensure supplements/day. Pt stating she has lost about 39 lbs in the past year due to being sick (UBW x1 year ago 138 lbs). Current weight 99.4 lbs. Indicates possible 28.2% weight loss x1 year. NFPE conducted pt identified with severe malnutrition. High protein/calorie foods discussed and encouraged - pt receptive of the same. Pt refused DASH/TLC diet education at this time as she is already familiar with restrictions. RD contact information provided for further nutrition-related questions or concerns. Will continue to monitor and follow up as needed. RD remains available.

## 2024-08-07 NOTE — DIETITIAN INITIAL EVALUATION ADULT - PERTINENT LABORATORY DATA
08-07 Na139 mmol/L Glu 92 mg/dL K+ 4.4 mmol/L Cr  1.03 mg/dL BUN 52.0 mg/dL<H>     A1C with Estimated Average Glucose Result: 5.9 % (06-11-24 @ 06:33)

## 2024-08-07 NOTE — DIETITIAN INITIAL EVALUATION ADULT - NS FNS DIET ORDER
Diet, DASH/TLC:   Sodium & Cholesterol Restricted  Supplement Feeding Modality:  Oral  Ensure Enlive Cans or Servings Per Day:  3       Frequency:  Three Times a day (08-06-24 @ 09:47)

## 2024-08-07 NOTE — DIETITIAN INITIAL EVALUATION ADULT - PROBLEM SELECTOR PLAN 1
TTE 6/24 with EF 79%, severe mitral stenosis    Preop work up ordered including carotid US to assess for carotid stenosis, PFTs to eval lung function, TTE to eval EF / WMA / Valve function, and lab work including TSH, prealbumin, Hgb A1C, P2Y12, BNP, T&S, MRSA/MSSA, and UA.  Pt to be evaluated for [place] trial  Continue home meds  Telemetry, continuous SpO2 monitoring.  Maintain SpO2 >92%. Supplement with O2 therapy PRN.     Plan to be discussed with CT Surgery attendings during AM rounds.

## 2024-08-07 NOTE — PROGRESS NOTE ADULT - PROBLEM SELECTOR PLAN 2
bun out of proportion to creat  ? from diuresis  hold diuresis   avoid nephro toxic meds
, supplemental O2 as above  Possible exacerbation at [facility], improving  Continue with prednisone thru 8/5  Symbicort, nebs/inhalers  Check PFTs
, supplemental O2 as above  Possible exacerbation at [facility], improving  Continue with prednisone thru 8/5  Symbicort, nebs/inhalers  Checks PFTs
Subcutaneous Heparin and SCDs for DVT prophylaxis.  Protonix for GI prophylaxis.
, supplemental O2 as above  On room air, o2 sat well  Possible exacerbation at [facility], improving  Completed prednisone taper thru 8/5  Symbicort, nebs/inhalers  PFTs in chart: severe disease, FEV1 26% predicated value

## 2024-08-07 NOTE — CHART NOTE - NSCHARTNOTEFT_GEN_A_CORE
[place] Trial details discussed with the patient. She understands and consented to be enrolled in the study.
CATH SITE: Right wrist benign s/p cath.  No bleeding, no ecchymosis, no hematoma. Extremity Warm to touch, with palpable distal pulses, and brisk capillary refill.   Patient educated about wrist site care, signs and symptoms of complication post procedure, and plan of care moving forward. Patient verbalized understanding with teach-back.
Patient off the floor when attempted to perform [employer] consultation with Dr. Louis. Referring: Dr. Platt.    This patient is an 80 y/o F with a PMH of HFpEF, severe MS, HTN, HLD, aortic atheroma, moderate LLE PAD, COPD (on home O2 at night), former smoker, and hypothyroidism, who presented as transfer from [facility] for evaluation for [place] trial for mitral valve stenosis.    Pt presented to [facility] c/o worsening shortness of breath x 1 week. Her hospital course was complicated by YADI for which nephrology was consulted, initially on lasix for diuresis but changed to bumex. Pt required thoracentesis for pleural effusion during admission with 1L removed.  Cardiology consulted for MS and deemed not a surgical candidate for valve replacement and not a candidate for APOLLO trial due to not meeting MR requirement, but considered for eligibility for SUMMIT trial at [facility].  Pt transferred to [facility] to evaluate for mitral stenosis.    The plan is for her to get discharged home today. Patient was seen earlier in the day by myself and appeared euvolemic on exam and normotensive with preserved end organ function. Discussed initiating GDMT for her HF which includes MRA and SGLT2i. She has had multiple HF hospitalizations over the past year and would also benefit from a CardioMEMS device which was discussed with her and her daughter (Lili Conley, [phone]). Information provided about CardioMEMS and patient will consider as an outpatient. I will arrange for her to be seen by our HF team in 1-2 weeks (patient prefers [place] office even though she lives closer to [place]) and she should also follow up with her primary cardiologist, Dr. Cruz.
Patient requires a commode to help complete their MRADLs due to heart failure. Patient is confined to one level of the home without a bathroom.

## 2024-08-07 NOTE — DIETITIAN INITIAL EVALUATION ADULT - NSFNSPHYEXAMSKINFT_GEN_A_CORE
Orthopaedic Surgery Progress Note    Subjective:   Patient seen and examined today. No acute events overnight. No new episodes of incontinence since yesterday's episode. Pain/numbness have not changed since yesterday. Patient says that although she wants to see how the Medol dosepak will work, she is leaning towards having the surgery.    Objective:  Vital Signs Last 24 Hrs  T(C): 36.7 (29 Jan 2021 06:21), Max: 36.7 (28 Jan 2021 14:07)  T(F): 98 (29 Jan 2021 06:21), Max: 98 (28 Jan 2021 14:07)  HR: 90 (29 Jan 2021 06:21) (67 - 90)  BP: 122/83 (29 Jan 2021 06:21) (122/83 - 160/96)  BP(mean): --  RR: 16 (29 Jan 2021 06:21) (16 - 19)  SpO2: 96% (29 Jan 2021 06:21) (96% - 98%)        PE  General: Awake and alert, Oriented x 3, following commands  Spine: No TTP over spinous processes or paraspinal muscles at C/T/L spine. No palpable step off.   Able to actively SLR BL  No pain to passive SLR    +numbness about the left buttock/left perineal area    Motor exam:        C5       C6       C7       C8       T1   R  5/5      5/5     5/5     5/5      5/5  L   5/5      5/5     5/5     5/5      5/5         L2        L3       L4       L5      S1  R  5/5      5/5     5/5     5/5    5/5  L   5/5     5/5      5/5     5/5    5/5    Sensory:  (0=absent, 1=impaired, 2=normal, NT=not testable)      C5    C6   C7   C8   T1         R   2     2      2     2      2  L    2     2      2     2      2        L2    L3   L4   L5   S1   R   2     2     2     2     2  L    2     2     2     0     2    BL Upper extremity:   Negative Madsen  +Rad Pulse  Compartments soft and compressible                                  14.3   13.49 )-----------( 196      ( 27 Jan 2021 07:10 )             40.1     01-28    133<L>  |  97  |  19  ----------------------------<  158<H>  4.7   |  24  |  0.50    Ca    9.3      28 Jan 2021 10:59  Phos  3.7     01-28  Mg     2.2     01-27         Stage 1 buttocks

## 2024-08-07 NOTE — DIETITIAN INITIAL EVALUATION ADULT - PERTINENT MEDS FT
MEDICATIONS  (STANDING):  levothyroxine 100 MICROGram(s) Oral daily  metoprolol tartrate 25 milliGRAM(s) Oral two times a day  pantoprazole    Tablet 40 milliGRAM(s) Oral before breakfast  spironolactone 25 milliGRAM(s) Oral daily  torsemide 10 milliGRAM(s) Oral daily

## 2024-08-07 NOTE — DISCHARGE NOTE NURSING/CASE MANAGEMENT/SOCIAL WORK - PATIENT PORTAL LINK FT
You can access the FollowMyHealth Patient Portal offered by [facility] by registering at the following website: [email or web address]. By joining [facility]’s FollowMyHealth portal, you will also be able to view your health information using other applications (apps) compatible with our system.

## 2024-08-07 NOTE — DIETITIAN INITIAL EVALUATION ADULT - ADD RECOMMEND
1) Continue diet as tolerated.   2) Encourage po intake, monitor diet tolerance, and provide assistance at meals as needed.   3) Continue Ensure Plus High Protein TID to optimize PO intake and provide an additional 350 kcal and 20g protein per serving.   4) Rx: MVI daily.   5) Obtain daily weights to monitor trends.

## 2024-08-09 ENCOUNTER — APPOINTMENT (OUTPATIENT)
Dept: PULMONOLOGY | Facility: CLINIC | Age: 79
End: 2024-08-09

## 2024-08-09 PROCEDURE — 99495 TRANSJ CARE MGMT MOD F2F 14D: CPT

## 2024-08-09 NOTE — PHYSICAL EXAM
[No Acute Distress] : no acute distress [26969 - Moderate Complexity requires multiple possible diagnoses and/or the management options, moderate complexity of the medical data (tests, etc.) to be reviewed, and moderate risk of significant complications, morbidity, and/or mortality as well as co] : Moderate Complexity

## 2024-08-09 NOTE — ASSESSMENT
[FreeTextEntry1] :  Ms. LAUREN TORRES is a 77 year old woman long term smoker (still smoking) with asthma/COPD/emphysema, HTN, HLD, and severe mitral stenosis with chronic R pleff (s/p thora) is here for f/u. Here with her entire family  #Asthma/COPD/emphysema - PFTs April 2024 with severe restriction, moderately severe obstruction, severely decreased DLCO -- ok to use Symbicort with spacer (pt prefers to Breztri). c/w Duonebs TID -- Patient is finally using oxygen 2 L NC. She is not willing to consider NIV despite evidence of hypercapnia during recent hospitalization. Will continue addressing  #valvular heart disease -severe mitral stenosis, heart failure preserved ejection fraction. Follows with cardiology. Surgical eval ongoing s/p R thora on 7/29/24 -- c/w Torsemide 10mg + spironalactone -- daily weights  #Depression -patient is clearly very depressed to go to her worsening clinical status. Counseling and support provided. I also provided information for therapist and strongly encouraged to follow-up. -- Dr Cruz provided prescription for medical marijuana but she is reluctant to try -- - Start a low-dose antidepressant to address mood and appetite issues - Provide information on hospice and palliative care services for symptom management and support  #Smoker - >50 pack year smoking hx,  All questions answered. Patient in agreement with plan. f/u in 4-6, call sooner if needed.

## 2024-08-09 NOTE — HISTORY OF PRESENT ILLNESS
[Home] : at home, [unfilled] , at the time of the visit. [Medical Office: ([facility])___] : at the medical office located in  [Other:____] : [unfilled] [Verbal consent obtained from patient] : the patient, [unfilled] [[place]] : Post-hospitalization from [facility] [Heart failure] : Heart failure [Respiratory failure with hypoxia] : Respiratory failure with hypoxia [Other: _____] : [unfilled] [Admitted on: ___] : The patient was admitted on [unfilled] [Discharged on ___] : discharged on [unfilled] [Discharge Summary] : discharge summary [Pertinent Labs] : pertinent labs [Radiology Findings] : radiology findings [Discharge Med List] : discharge medication list [Med Reconciliation] : medication reconciliation has been completed [Other: ____] : [unfilled] [Patient Contacted By: ____] : and contacted by [unfilled] [FreeTextEntry2] : 79 year old female, former smoker, with a PMHx of CHF, COPD, Mitral Valve Stenosis, HTN, HLD, hypothyroidism, who presented as a transfer from [facility] for evaluation for the [place] trial for Mitral Valve Stenosis. Patient presented to [facility] with complaint of worsening shortnes of breath x 1 week.  Admitted for acute on chronic hypoxic respiratory failure, with Pulmonology consulted and patient placed on nebulizers and inhalers.  Hospital course complicated by YADI. Patient was aggressively diuresed. s/p R sided thoracentesis 7/29/24. Was transferred to [facility] for surgical eval for mitral stenosis. Eval ongoing Today, she is feeling better. Compliant with diuretics.  Likes Symbicort more than Breztri, using duonebs tid.

## 2024-08-13 PROBLEM — I35.8 LAMBL'S EXCRESCENCE ON AORTIC VALVE: Status: ACTIVE | Noted: 2024-08-13

## 2024-08-13 PROBLEM — I27.20 SEVERE PULMONARY HYPERTENSION: Status: ACTIVE | Noted: 2024-08-13
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
## 2024-08-13 NOTE — DATA REVIEWED
[FreeTextEntry1] : Cardiac CTA Chest/Abdomen/Pelvis 8/6/24 -Cardiac enlargement, particularly the left atrium. Bulky mitral annular calcification, mitral valve leaflet calcification, and mitral valve leaflet thickening. -Emphysema. New left lower lobe patchy consolidative opacity, for which infection cannot be excluded. Other groundglass and patchy opacities of the thorax have improved since 7/27/2024. Persistent small pleural effusions, with slight loculation on the left, have also improved. Follow to resolution with repeat chest CT in one month, or sooner is likely warranted.  Transthoracic Echo 8/6/2024 - Left atrium is severely dilated. - Left ventricular systolic function is hyperdynamic with an ejection fraction visually estimated at >75 %. - The right atrium is normal in size. - Normal right ventricular cavity size and normal right ventricular systolic function. - Based on PA pressures, Mean gradient, valve morphology, and MV area by continuity equation, severe mitral stenosis by echocardiography. Degenerative non-rheumatic MS. Mild mitral regurgitation. Yang Score = 12. Severe calcification. - Estimated pulmonary artery systolic pressure is 66 mmHg, consistent with severe pulmonary hypertension. - No pericardial effusion seen. - Moderate bilateral pleural effusion noted.  Cardiac Catheterization 8/5/2024 Diagnostic Conclusions:  LM: Short LM, luminal irregularities   LAD: Mild diffuse disease, mid LAD bridging segment, moderate torutuosity LCX: Mild diffuse disease  RCA: Mild diffuse disease   Recommendations:  Continued workup of severe mitral stenosis.   Acute complication:    No complications   Transesophageal Echocardiogram  - Left ventricular systolic function is hyperdynamic with an ejection fraction visually estimated at >75 %. - Normal right ventricular cavity size and normal right ventricular systolic function. 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 - Trace aortic regurgitation. - Prominent Eustachian valve in the right atrium and Chiari network present in the right atrium. - Severe mitral valve stenosis.  - Moderate mitral regurgitation. - No pericardial effusion seen. - No thrombus seen in the left atrial, left atrial appendage, right atrial or right atrial appendage. - Severe calcified and noncalcified atheroscelrotic plaquig in descending aorta. Consider CTA of aorta if clincially indicnated. - Agitated saline injection reveals bubbles in the left heart, consistent with a patent foramen ovale.

## 2024-08-13 NOTE — ASSESSMENT
[FreeTextEntry1] : I have independently reviewed the medical records and imaging at the time of this office consultation and discussed the following interpretations with Ms. TORRES.   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

## 2024-08-14 ENCOUNTER — RX RENEWAL (OUTPATIENT)
Age: 79
End: 2024-08-14

## 2024-08-14 RX ORDER — BUDESONIDE AND FORMOTEROL FUMARATE DIHYDRATE 160; 4.5 UG/1; UG/1
160-4.5 AEROSOL RESPIRATORY (INHALATION) TWICE DAILY
Qty: 3 | Refills: 1 | Status: ACTIVE | COMMUNITY
Start: 2024-08-14 | End: 1900-01-01

## 2024-08-15 NOTE — H&P PST ADULT - NS SC CAGE ALCOHOL EYE OPENER
[FreeTextEntry1] : Patient s/p cycle 4 TCHP.\par  She c/o fatigue , decreased appetite and weight loss.  She denies SOB but had intermittent leg swelling during week 2 after treatment, that subsequently resolves.  SHe stopped taking amlodipine and has slightly elevated BP. \par  She requested a [employer] vaccine medical exemption letter for work. I reviewed with patient that she has no contraindication to vaccine and it is FDA approved.  In addition she has increased risk of COVID complications and should receive COVID vaccine.  \par  ECHO ordered.  no

## 2024-08-20 ENCOUNTER — APPOINTMENT (OUTPATIENT)
Dept: CARDIOTHORACIC SURGERY | Facility: CLINIC | Age: 79
End: 2024-08-20
Payer: MEDICARE

## 2024-08-20 DIAGNOSIS — I05.0 RHEUMATIC MITRAL STENOSIS: ICD-10-CM

## 2024-08-20 DIAGNOSIS — R60.0 LOCALIZED EDEMA: ICD-10-CM

## 2024-08-20 DIAGNOSIS — I27.20 PULMONARY HYPERTENSION, UNSPECIFIED: ICD-10-CM

## 2024-08-20 DIAGNOSIS — J43.9 EMPHYSEMA, UNSPECIFIED: ICD-10-CM

## 2024-08-20 DIAGNOSIS — I35.8 OTHER NONRHEUMATIC AORTIC VALVE DISORDERS: ICD-10-CM

## 2024-08-20 DIAGNOSIS — J90 PLEURAL EFFUSION, NOT ELSEWHERE CLASSIFIED: ICD-10-CM

## 2024-08-20 DIAGNOSIS — I34.2 NONRHEUMATIC MITRAL (VALVE) STENOSIS: ICD-10-CM

## 2024-08-22 ENCOUNTER — RX RENEWAL (OUTPATIENT)
Age: 79
End: 2024-08-22

## 2024-08-22 ENCOUNTER — APPOINTMENT (OUTPATIENT)
Dept: PULMONOLOGY | Facility: CLINIC | Age: 79
End: 2024-08-22

## 2024-08-27 ENCOUNTER — APPOINTMENT (OUTPATIENT)
Dept: CARDIOLOGY | Facility: CLINIC | Age: 79
End: 2024-08-27

## 2024-08-27 ENCOUNTER — APPOINTMENT (OUTPATIENT)
Dept: CARDIOTHORACIC SURGERY | Facility: CLINIC | Age: 79
End: 2024-08-27
Payer: MEDICARE

## 2024-08-27 DIAGNOSIS — I05.0 RHEUMATIC MITRAL STENOSIS: ICD-10-CM

## 2024-08-27 PROCEDURE — 99441: CPT | Mod: 93
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
## 2024-08-27 NOTE — DATA REVIEWED
[FreeTextEntry1] : Cardiac CTA Chest/Abdomen/Pelvis performed through [facility] 8/6/24 -Cardiac enlargement, particularly the left atrium. Bulky mitral annular calcification, mitral valve leaflet calcification, and mitral valve leaflet thickening. -Emphysema. New left lower lobe patchy consolidative opacity, for which infection cannot be excluded. Other groundglass and patchy opacities of the thorax have improved since 7/27/2024. Persistent small pleural effusions, with slight loculation on the left, have also improved. Follow to resolution with repeat chest CT in one month, or sooner is likely warranted.      Transthoracic Echo performed through [facility] 8/6/2024 - Left atrium is severely dilated. - Left ventricular systolic function is hyperdynamic with an ejection fraction visually estimated at >75 %. - The right atrium is normal in size. - Normal right ventricular cavity size and normal right ventricular systolic function. - Based on PA pressures, Mean gradient, valve morphology, and MV area by continuity equation, severe mitral stenosis by echocardiography. Degenerative non-rheumatic MS. Mild mitral regurgitation. Yang Score = 12. Severe calcification. - Estimated pulmonary artery systolic pressure is 66 mmHg, consistent with severe pulmonary hypertension. - No pericardial effusion seen. - Moderate bilateral pleural effusion noted.      Cardiac Catheterization performed at [facility] 8/5/2024 Diagnostic Conclusions:  LM: Short LM, luminal irregularities   LAD: Mild diffuse disease, mid LAD bridging segment, moderate torutuosity LCX: Mild diffuse disease  RCA: Mild diffuse disease   Recommendations:  Continued workup of severe mitral stenosis.   Acute complication:    No complications        Transesophageal Echocardiogram performed at [facility] 8/5/24 - Left ventricular systolic function is hyperdynamic with an ejection fraction visually estimated at >75 %. - Normal right ventricular cavity size and normal right ventricular systolic function. 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 - Trace aortic regurgitation. - Prominent Eustachian valve in the right atrium and Chiari network present in the right atrium. - Severe mitral valve stenosis.  - Moderate mitral regurgitation. - No pericardial effusion seen. - No thrombus seen in the left atrial, left atrial appendage, right atrial or right atrial appendage. - Severe calcified and noncalcified atheroscelrotic plaquig in descending aorta. Consider CTA of aorta if clincially indicnated. - Agitated saline injection reveals bubbles in the left heart, consistent with a patent foramen ovale.
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
## 2024-08-27 NOTE — DATA REVIEWED
[FreeTextEntry1] : Cardiac CTA Chest/Abdomen/Pelvis performed through [facility] 8/6/24 -Cardiac enlargement, particularly the left atrium. Bulky mitral annular calcification, mitral valve leaflet calcification, and mitral valve leaflet thickening. -Emphysema. New left lower lobe patchy consolidative opacity, for which infection cannot be excluded. Other groundglass and patchy opacities of the thorax have improved since 7/27/2024. Persistent small pleural effusions, with slight loculation on the left, have also improved. Follow to resolution with repeat chest CT in one month, or sooner is likely warranted.      Transthoracic Echo performed through [facility] 8/6/2024 - Left atrium is severely dilated. - Left ventricular systolic function is hyperdynamic with an ejection fraction visually estimated at >75 %. - The right atrium is normal in size. - Normal right ventricular cavity size and normal right ventricular systolic function. - Based on PA pressures, Mean gradient, valve morphology, and MV area by continuity equation, severe mitral stenosis by echocardiography. Degenerative non-rheumatic MS. Mild mitral regurgitation. Yang Score = 12. Severe calcification. - Estimated pulmonary artery systolic pressure is 66 mmHg, consistent with severe pulmonary hypertension. - No pericardial effusion seen. - Moderate bilateral pleural effusion noted.      Cardiac Catheterization performed at [facility] 8/5/2024 Diagnostic Conclusions:  LM: Short LM, luminal irregularities   LAD: Mild diffuse disease, mid LAD bridging segment, moderate torutuosity LCX: Mild diffuse disease  RCA: Mild diffuse disease   Recommendations:  Continued workup of severe mitral stenosis.   Acute complication:    No complications        Transesophageal Echocardiogram performed at [facility] 8/5/24 - Left ventricular systolic function is hyperdynamic with an ejection fraction visually estimated at >75 %. - Normal right ventricular cavity size and normal right ventricular systolic function. 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 - Trace aortic regurgitation. - Prominent Eustachian valve in the right atrium and Chiari network present in the right atrium. - Severe mitral valve stenosis.  - Moderate mitral regurgitation. - No pericardial effusion seen. - No thrombus seen in the left atrial, left atrial appendage, right atrial or right atrial appendage. - Severe calcified and noncalcified atheroscelrotic plaquig in descending aorta. Consider CTA of aorta if clincially indicnated. - Agitated saline injection reveals bubbles in the left heart, consistent with a patent foramen ovale.

## 2024-08-27 NOTE — REVIEW OF SYSTEMS
[Feeling Poorly] : not feeling poorly [Feeling Tired] : feeling tired [Chest Pain] : no chest pain [Palpitations] : no palpitations [Lower Ext Edema] : no lower extremity edema [Shortness Of Breath] : shortness of breath [SOB on Exertion] : shortness of breath during exertion [Negative] : Heme/Lymph

## 2024-08-27 NOTE — DATA REVIEWED
[FreeTextEntry1] : Cardiac CTA Chest/Abdomen/Pelvis performed through [facility] 8/6/24 -Cardiac enlargement, particularly the left atrium. Bulky mitral annular calcification, mitral valve leaflet calcification, and mitral valve leaflet thickening. -Emphysema. New left lower lobe patchy consolidative opacity, for which infection cannot be excluded. Other groundglass and patchy opacities of the thorax have improved since 7/27/2024. Persistent small pleural effusions, with slight loculation on the left, have also improved. Follow to resolution with repeat chest CT in one month, or sooner is likely warranted.      Transthoracic Echo performed through [facility] 8/6/2024 - Left atrium is severely dilated. - Left ventricular systolic function is hyperdynamic with an ejection fraction visually estimated at >75 %. - The right atrium is normal in size. - Normal right ventricular cavity size and normal right ventricular systolic function. - Based on PA pressures, Mean gradient, valve morphology, and MV area by continuity equation, severe mitral stenosis by echocardiography. Degenerative non-rheumatic MS. Mild mitral regurgitation. Yang Score = 12. Severe calcification. - Estimated pulmonary artery systolic pressure is 66 mmHg, consistent with severe pulmonary hypertension. - No pericardial effusion seen. - Moderate bilateral pleural effusion noted.      Cardiac Catheterization performed at [facility] 8/5/2024 Diagnostic Conclusions:  LM: Short LM, luminal irregularities   LAD: Mild diffuse disease, mid LAD bridging segment, moderate torutuosity LCX: Mild diffuse disease  RCA: Mild diffuse disease   Recommendations:  Continued workup of severe mitral stenosis.   Acute complication:    No complications        Transesophageal Echocardiogram performed at [facility] 8/5/24 - Left ventricular systolic function is hyperdynamic with an ejection fraction visually estimated at >75 %. - Normal right ventricular cavity size and normal right ventricular systolic function. 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 - Trace aortic regurgitation. - Prominent Eustachian valve in the right atrium and Chiari network present in the right atrium. - Severe mitral valve stenosis.  - Moderate mitral regurgitation. - No pericardial effusion seen. - No thrombus seen in the left atrial, left atrial appendage, right atrial or right atrial appendage. - Severe calcified and noncalcified atheroscelrotic plaquig in descending aorta. Consider CTA of aorta if clincially indicnated. - Agitated saline injection reveals bubbles in the left heart, consistent with a patent foramen ovale.
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
## 2024-08-27 NOTE — REASON FOR VISIT
[Home] : at home, [unfilled] , at the time of the visit. [Medical Office: ([facility])___] : at the medical office located in  [Verbal consent obtained from patient] : the patient, [unfilled] [Other:____] : [unfilled]

## 2024-08-27 NOTE — REASON FOR VISIT
[Home] : at home, [unfilled] , at the time of the visit. [Medical Office: ([facility])___] : at the medical office located in  [Verbal consent obtained from patient] : the patient, [unfilled] [Other:____] : [unfilled]

## 2024-08-27 NOTE — REASON FOR VISIT
[Home] : at home, [unfilled] , at the time of the visit. [Medical Office: ([facility])___] : at the medical office located in  [Verbal consent obtained from patient] : the patient, [unfilled] [Other:____] : [unfilled]

## 2024-09-03 ENCOUNTER — APPOINTMENT (OUTPATIENT)
Dept: CARDIOLOGY | Facility: CLINIC | Age: 79
End: 2024-09-03

## 2024-09-03 VITALS
DIASTOLIC BLOOD PRESSURE: 50 MMHG | OXYGEN SATURATION: 93 % | HEART RATE: 65 BPM | HEIGHT: 60.5 IN | BODY MASS INDEX: 20.08 KG/M2 | SYSTOLIC BLOOD PRESSURE: 104 MMHG | WEIGHT: 105 LBS

## 2024-09-10 RX ORDER — METOPROLOL TARTRATE 25 MG/1
25 TABLET, FILM COATED ORAL TWICE DAILY
Qty: 180 | Refills: 0 | Status: ACTIVE | COMMUNITY
Start: 1900-01-01 | End: 1900-01-01

## 2024-10-21 ENCOUNTER — RX RENEWAL (OUTPATIENT)
Age: 79
End: 2024-10-21

## 2024-11-07 ENCOUNTER — RX RENEWAL (OUTPATIENT)
Age: 79
End: 2024-11-07

## 2024-11-21 ENCOUNTER — RX RENEWAL (OUTPATIENT)
Age: 79
End: 2024-11-21

## 2024-12-09 NOTE — ED ADULT TRIAGE NOTE - HOW PATIENT ADDRESSED, PROFILE
Include Z78.9 (Other Specified Conditions Influencing Health Status) As An Associated Diagnosis?: No Show Spray Paint Technique Variable?: Yes Spray Paint Text: The liquid nitrogen was applied to the skin utilizing a spray paint frosting technique. Post-Care Instructions: I reviewed with the patient in detail post-care instructions. Patient is to wear sunprotection, and avoid picking at any of the treated lesions. Pt may apply Vaseline to crusted or scabbing areas. Consent: The patient's consent was obtained including but not limited to risks of crusting, scabbing, blistering, scarring, darker or lighter pigmentary change, recurrence, incomplete removal and infection. Medical Necessity Clause: This procedure was medically necessary because the lesions that were treated were: Detail Level: Detailed Medical Necessity Information: It is in your best interest to select a reason for this procedure from the list below. All of these items fulfill various CMS LCD requirements except the new and changing color options. [email or web address]

## 2024-12-10 ENCOUNTER — RX RENEWAL (OUTPATIENT)
Age: 79
End: 2024-12-10

## 2024-12-10 ENCOUNTER — APPOINTMENT (OUTPATIENT)
Dept: CARDIOLOGY | Facility: CLINIC | Age: 79
End: 2024-12-10

## 2024-12-10 VITALS
OXYGEN SATURATION: 97 % | BODY MASS INDEX: 20.08 KG/M2 | WEIGHT: 105 LBS | HEART RATE: 65 BPM | SYSTOLIC BLOOD PRESSURE: 100 MMHG | HEIGHT: 60.5 IN | DIASTOLIC BLOOD PRESSURE: 62 MMHG

## 2024-12-10 RX ORDER — MELATONIN 3 MG
3 CAPSULE ORAL
Refills: 0 | Status: ACTIVE | COMMUNITY

## 2024-12-10 RX ORDER — SPIRONOLACTONE 25 MG/1
25 TABLET ORAL DAILY
Refills: 0 | Status: ACTIVE | COMMUNITY

## 2024-12-10 RX ORDER — TORSEMIDE 10 MG/1
10 TABLET ORAL
Refills: 0 | Status: ACTIVE | COMMUNITY

## 2024-12-11 ENCOUNTER — APPOINTMENT (OUTPATIENT)
Dept: CARDIOTHORACIC SURGERY | Facility: CLINIC | Age: 79
End: 2024-12-11
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
## 2025-01-01 ENCOUNTER — NON-APPOINTMENT (OUTPATIENT)
Age: 80
End: 2025-01-01

## 2025-01-01 ENCOUNTER — RESULT REVIEW (OUTPATIENT)
Age: 80
End: 2025-01-01

## 2025-01-01 ENCOUNTER — RX RENEWAL (OUTPATIENT)
Age: 80
End: 2025-01-01

## 2025-01-01 ENCOUNTER — APPOINTMENT (OUTPATIENT)
Dept: CARE COORDINATION | Facility: HOME HEALTH | Age: 80
End: 2025-01-01

## 2025-01-01 ENCOUNTER — APPOINTMENT (OUTPATIENT)
Dept: PULMONOLOGY | Facility: HOSPITAL | Age: 80
End: 2025-01-01
Payer: MEDICARE

## 2025-01-01 ENCOUNTER — INPATIENT (INPATIENT)
Facility: HOSPITAL | Age: 80
LOS: 1 days | Discharge: HOME | DRG: 293 | End: 2025-05-09
Attending: INTERNAL MEDICINE | Admitting: INTERNAL MEDICINE
Payer: MEDICARE

## 2025-01-01 ENCOUNTER — INPATIENT (INPATIENT)
Facility: HOSPITAL | Age: 80
LOS: 8 days | End: 2025-05-31
Attending: HOSPITALIST | Admitting: HOSPITALIST
Payer: MEDICARE

## 2025-01-01 VITALS
OXYGEN SATURATION: 93 % | WEIGHT: 102.07 LBS | RESPIRATION RATE: 18 BRPM | HEART RATE: 54 BPM | HEIGHT: 61 IN | DIASTOLIC BLOOD PRESSURE: 55 MMHG | TEMPERATURE: 97 F | SYSTOLIC BLOOD PRESSURE: 91 MMHG

## 2025-01-01 VITALS
OXYGEN SATURATION: 96 % | DIASTOLIC BLOOD PRESSURE: 62 MMHG | SYSTOLIC BLOOD PRESSURE: 111 MMHG | HEART RATE: 67 BPM | TEMPERATURE: 97 F | RESPIRATION RATE: 18 BRPM

## 2025-01-01 VITALS
OXYGEN SATURATION: 100 % | HEIGHT: 63 IN | RESPIRATION RATE: 15 BRPM | DIASTOLIC BLOOD PRESSURE: 47 MMHG | WEIGHT: 102.96 LBS | SYSTOLIC BLOOD PRESSURE: 102 MMHG | HEART RATE: 57 BPM

## 2025-01-01 VITALS — RESPIRATION RATE: 20 BRPM

## 2025-01-01 DIAGNOSIS — J90 PLEURAL EFFUSION, NOT ELSEWHERE CLASSIFIED: ICD-10-CM

## 2025-01-01 DIAGNOSIS — Z90.49 ACQUIRED ABSENCE OF OTHER SPECIFIED PARTS OF DIGESTIVE TRACT: Chronic | ICD-10-CM

## 2025-01-01 DIAGNOSIS — I50.9 HEART FAILURE, UNSPECIFIED: ICD-10-CM

## 2025-01-01 DIAGNOSIS — E78.5 HYPERLIPIDEMIA, UNSPECIFIED: ICD-10-CM

## 2025-01-01 DIAGNOSIS — Z71.89 OTHER SPECIFIED COUNSELING: ICD-10-CM

## 2025-01-01 DIAGNOSIS — Z98.890 OTHER SPECIFIED POSTPROCEDURAL STATES: Chronic | ICD-10-CM

## 2025-01-01 DIAGNOSIS — Z51.5 ENCOUNTER FOR PALLIATIVE CARE: ICD-10-CM

## 2025-01-01 DIAGNOSIS — J96.21 ACUTE AND CHRONIC RESPIRATORY FAILURE WITH HYPOXIA: ICD-10-CM

## 2025-01-01 DIAGNOSIS — R06.09 OTHER FORMS OF DYSPNEA: ICD-10-CM

## 2025-01-01 DIAGNOSIS — J44.9 CHRONIC OBSTRUCTIVE PULMONARY DISEASE, UNSPECIFIED: ICD-10-CM

## 2025-01-01 DIAGNOSIS — R06.00 DYSPNEA, UNSPECIFIED: ICD-10-CM

## 2025-01-01 DIAGNOSIS — E03.9 HYPOTHYROIDISM, UNSPECIFIED: ICD-10-CM

## 2025-01-01 DIAGNOSIS — I50.32 CHRONIC DIASTOLIC (CONGESTIVE) HEART FAILURE: ICD-10-CM

## 2025-01-01 DIAGNOSIS — I05.0 RHEUMATIC MITRAL STENOSIS: ICD-10-CM

## 2025-01-01 DIAGNOSIS — I10 ESSENTIAL (PRIMARY) HYPERTENSION: ICD-10-CM

## 2025-01-01 DIAGNOSIS — J44.1 CHRONIC OBSTRUCTIVE PULMONARY DISEASE WITH (ACUTE) EXACERBATION: ICD-10-CM

## 2025-01-01 DIAGNOSIS — Z29.9 ENCOUNTER FOR PROPHYLACTIC MEASURES, UNSPECIFIED: ICD-10-CM

## 2025-01-01 LAB
ALBUMIN SERPL ELPH-MCNC: 3.4 G/DL — SIGNIFICANT CHANGE UP (ref 3.3–5.2)
ALBUMIN SERPL ELPH-MCNC: 3.5 G/DL — SIGNIFICANT CHANGE UP (ref 3.3–5.2)
ALBUMIN SERPL ELPH-MCNC: 3.5 G/DL — SIGNIFICANT CHANGE UP (ref 3.3–5.2)
ALBUMIN SERPL ELPH-MCNC: 3.7 G/DL — SIGNIFICANT CHANGE UP (ref 3.3–5)
ALBUMIN SERPL ELPH-MCNC: 3.8 G/DL — SIGNIFICANT CHANGE UP (ref 3.3–5.2)
ALP SERPL-CCNC: 80 U/L — SIGNIFICANT CHANGE UP (ref 40–120)
ALP SERPL-CCNC: 81 U/L — SIGNIFICANT CHANGE UP (ref 40–120)
ALP SERPL-CCNC: 83 U/L — SIGNIFICANT CHANGE UP (ref 40–120)
ALP SERPL-CCNC: 84 U/L — SIGNIFICANT CHANGE UP (ref 40–120)
ALP SERPL-CCNC: 87 U/L — SIGNIFICANT CHANGE UP (ref 40–120)
ALT FLD-CCNC: 26 U/L — SIGNIFICANT CHANGE UP
ALT FLD-CCNC: 30 U/L — SIGNIFICANT CHANGE UP
ALT FLD-CCNC: 31 U/L — SIGNIFICANT CHANGE UP
ALT FLD-CCNC: 35 U/L — HIGH
ALT FLD-CCNC: 54 U/L — HIGH (ref 4–33)
ANION GAP SERPL CALC-SCNC: 10 MMOL/L — SIGNIFICANT CHANGE UP (ref 7–14)
ANION GAP SERPL CALC-SCNC: 11 MMOL/L — SIGNIFICANT CHANGE UP (ref 7–14)
ANION GAP SERPL CALC-SCNC: 11 MMOL/L — SIGNIFICANT CHANGE UP (ref 7–14)
ANION GAP SERPL CALC-SCNC: 12 MMOL/L — SIGNIFICANT CHANGE UP (ref 5–17)
ANION GAP SERPL CALC-SCNC: 12 MMOL/L — SIGNIFICANT CHANGE UP (ref 5–17)
ANION GAP SERPL CALC-SCNC: 12 MMOL/L — SIGNIFICANT CHANGE UP (ref 7–14)
ANION GAP SERPL CALC-SCNC: 13 MMOL/L — SIGNIFICANT CHANGE UP (ref 5–17)
ANION GAP SERPL CALC-SCNC: 13 MMOL/L — SIGNIFICANT CHANGE UP (ref 7–14)
ANION GAP SERPL CALC-SCNC: 14 MMOL/L — SIGNIFICANT CHANGE UP (ref 5–17)
ANION GAP SERPL CALC-SCNC: 14 MMOL/L — SIGNIFICANT CHANGE UP (ref 5–17)
ANION GAP SERPL CALC-SCNC: 14 MMOL/L — SIGNIFICANT CHANGE UP (ref 7–14)
ANION GAP SERPL CALC-SCNC: 9 MMOL/L — SIGNIFICANT CHANGE UP (ref 7–14)
ANISOCYTOSIS BLD QL: SIGNIFICANT CHANGE UP
APTT BLD: 30.4 SEC — SIGNIFICANT CHANGE UP (ref 26.1–36.8)
APTT BLD: 37.5 SEC — HIGH (ref 26.1–36.8)
AST SERPL-CCNC: 26 U/L — SIGNIFICANT CHANGE UP
AST SERPL-CCNC: 31 U/L — SIGNIFICANT CHANGE UP (ref 4–32)
AST SERPL-CCNC: 34 U/L — HIGH
AST SERPL-CCNC: 35 U/L — HIGH
AST SERPL-CCNC: 47 U/L — HIGH
B PERT IGG+IGM PNL SER: CLEAR — SIGNIFICANT CHANGE UP
BASE EXCESS BLDA CALC-SCNC: 3.3 MMOL/L — HIGH (ref -2–3)
BASOPHILS # BLD AUTO: 0 K/UL — SIGNIFICANT CHANGE UP (ref 0–0.2)
BASOPHILS # BLD AUTO: 0.01 K/UL — SIGNIFICANT CHANGE UP (ref 0–0.2)
BASOPHILS # BLD AUTO: 0.03 K/UL — SIGNIFICANT CHANGE UP (ref 0–0.2)
BASOPHILS NFR BLD AUTO: 0 % — SIGNIFICANT CHANGE UP (ref 0–2)
BASOPHILS NFR BLD AUTO: 0.1 % — SIGNIFICANT CHANGE UP (ref 0–2)
BASOPHILS NFR BLD AUTO: 0.3 % — SIGNIFICANT CHANGE UP (ref 0–2)
BILIRUB SERPL-MCNC: 0.3 MG/DL — LOW (ref 0.4–2)
BILIRUB SERPL-MCNC: 0.5 MG/DL — SIGNIFICANT CHANGE UP (ref 0.4–2)
BILIRUB SERPL-MCNC: 0.8 MG/DL — SIGNIFICANT CHANGE UP (ref 0.2–1.2)
BLD GP AB SCN SERPL QL: NEGATIVE — SIGNIFICANT CHANGE UP
BLD GP AB SCN SERPL QL: NEGATIVE — SIGNIFICANT CHANGE UP
BLOOD GAS COMMENTS ARTERIAL: SIGNIFICANT CHANGE UP
BLOOD GAS VENOUS COMPREHENSIVE RESULT: SIGNIFICANT CHANGE UP
BUN SERPL-MCNC: 43 MG/DL — HIGH (ref 7–23)
BUN SERPL-MCNC: 43 MG/DL — HIGH (ref 7–23)
BUN SERPL-MCNC: 44 MG/DL — HIGH (ref 7–23)
BUN SERPL-MCNC: 44 MG/DL — HIGH (ref 7–23)
BUN SERPL-MCNC: 47 MG/DL — HIGH (ref 7–23)
BUN SERPL-MCNC: 48 MG/DL — HIGH (ref 7–23)
BUN SERPL-MCNC: 49 MG/DL — HIGH (ref 7–23)
BUN SERPL-MCNC: 50 MG/DL — HIGH (ref 7–23)
BUN SERPL-MCNC: 50 MG/DL — HIGH (ref 7–23)
BUN SERPL-MCNC: 51.7 MG/DL — HIGH (ref 8–20)
BUN SERPL-MCNC: 51.8 MG/DL — HIGH (ref 8–20)
BUN SERPL-MCNC: 52 MG/DL — HIGH (ref 8–20)
BUN SERPL-MCNC: 53.1 MG/DL — HIGH (ref 8–20)
BUN SERPL-MCNC: 54 MG/DL — HIGH (ref 7–23)
BUN SERPL-MCNC: 55.2 MG/DL — HIGH (ref 8–20)
BUN SERPL-MCNC: 65.9 MG/DL — HIGH (ref 8–20)
BUN SERPL-MCNC: 66.7 MG/DL — HIGH (ref 8–20)
CALCIUM SERPL-MCNC: 8.2 MG/DL — LOW (ref 8.4–10.5)
CALCIUM SERPL-MCNC: 8.2 MG/DL — LOW (ref 8.4–10.5)
CALCIUM SERPL-MCNC: 8.4 MG/DL — SIGNIFICANT CHANGE UP (ref 8.4–10.5)
CALCIUM SERPL-MCNC: 8.7 MG/DL — SIGNIFICANT CHANGE UP (ref 8.4–10.5)
CALCIUM SERPL-MCNC: 8.8 MG/DL — SIGNIFICANT CHANGE UP (ref 8.4–10.5)
CALCIUM SERPL-MCNC: 8.9 MG/DL — SIGNIFICANT CHANGE UP (ref 8.4–10.5)
CALCIUM SERPL-MCNC: 9 MG/DL — SIGNIFICANT CHANGE UP (ref 8.4–10.5)
CALCIUM SERPL-MCNC: 9.1 MG/DL — SIGNIFICANT CHANGE UP (ref 8.4–10.5)
CALCIUM SERPL-MCNC: 9.3 MG/DL — SIGNIFICANT CHANGE UP (ref 8.4–10.5)
CALCIUM SERPL-MCNC: 9.3 MG/DL — SIGNIFICANT CHANGE UP (ref 8.4–10.5)
CALCIUM SERPL-MCNC: 9.4 MG/DL — SIGNIFICANT CHANGE UP (ref 8.4–10.5)
CHLORIDE SERPL-SCNC: 100 MMOL/L — SIGNIFICANT CHANGE UP (ref 98–107)
CHLORIDE SERPL-SCNC: 100 MMOL/L — SIGNIFICANT CHANGE UP (ref 98–107)
CHLORIDE SERPL-SCNC: 101 MMOL/L — SIGNIFICANT CHANGE UP (ref 96–108)
CHLORIDE SERPL-SCNC: 101 MMOL/L — SIGNIFICANT CHANGE UP (ref 96–108)
CHLORIDE SERPL-SCNC: 101 MMOL/L — SIGNIFICANT CHANGE UP (ref 98–107)
CHLORIDE SERPL-SCNC: 102 MMOL/L — SIGNIFICANT CHANGE UP (ref 96–108)
CHLORIDE SERPL-SCNC: 102 MMOL/L — SIGNIFICANT CHANGE UP (ref 96–108)
CHLORIDE SERPL-SCNC: 102 MMOL/L — SIGNIFICANT CHANGE UP (ref 98–107)
CHLORIDE SERPL-SCNC: 103 MMOL/L — SIGNIFICANT CHANGE UP (ref 96–108)
CHLORIDE SERPL-SCNC: 103 MMOL/L — SIGNIFICANT CHANGE UP (ref 96–108)
CHLORIDE SERPL-SCNC: 103 MMOL/L — SIGNIFICANT CHANGE UP (ref 98–107)
CHLORIDE SERPL-SCNC: 104 MMOL/L — SIGNIFICANT CHANGE UP (ref 96–108)
CHLORIDE SERPL-SCNC: 104 MMOL/L — SIGNIFICANT CHANGE UP (ref 98–107)
CHLORIDE SERPL-SCNC: 105 MMOL/L — SIGNIFICANT CHANGE UP (ref 98–107)
CHLORIDE SERPL-SCNC: 97 MMOL/L — LOW (ref 98–107)
CHLORIDE SERPL-SCNC: 97 MMOL/L — LOW (ref 98–107)
CHLORIDE SERPL-SCNC: 98 MMOL/L — SIGNIFICANT CHANGE UP (ref 98–107)
CHOLEST FLD-MCNC: 11 MG/DL — SIGNIFICANT CHANGE UP
CO2 SERPL-SCNC: 22 MMOL/L — SIGNIFICANT CHANGE UP (ref 22–31)
CO2 SERPL-SCNC: 22 MMOL/L — SIGNIFICANT CHANGE UP (ref 22–31)
CO2 SERPL-SCNC: 23 MMOL/L — SIGNIFICANT CHANGE UP (ref 22–29)
CO2 SERPL-SCNC: 24 MMOL/L — SIGNIFICANT CHANGE UP (ref 22–29)
CO2 SERPL-SCNC: 24 MMOL/L — SIGNIFICANT CHANGE UP (ref 22–31)
CO2 SERPL-SCNC: 24 MMOL/L — SIGNIFICANT CHANGE UP (ref 22–31)
CO2 SERPL-SCNC: 25 MMOL/L — SIGNIFICANT CHANGE UP (ref 22–29)
CO2 SERPL-SCNC: 25 MMOL/L — SIGNIFICANT CHANGE UP (ref 22–31)
CO2 SERPL-SCNC: 26 MMOL/L — SIGNIFICANT CHANGE UP (ref 22–29)
CO2 SERPL-SCNC: 27 MMOL/L — SIGNIFICANT CHANGE UP (ref 22–31)
COLOR FLD: YELLOW
CREAT SERPL-MCNC: 1.21 MG/DL — SIGNIFICANT CHANGE UP (ref 0.5–1.3)
CREAT SERPL-MCNC: 1.22 MG/DL — SIGNIFICANT CHANGE UP (ref 0.5–1.3)
CREAT SERPL-MCNC: 1.27 MG/DL — SIGNIFICANT CHANGE UP (ref 0.5–1.3)
CREAT SERPL-MCNC: 1.29 MG/DL — SIGNIFICANT CHANGE UP (ref 0.5–1.3)
CREAT SERPL-MCNC: 1.33 MG/DL — HIGH (ref 0.5–1.3)
CREAT SERPL-MCNC: 1.34 MG/DL — HIGH (ref 0.5–1.3)
CREAT SERPL-MCNC: 1.34 MG/DL — HIGH (ref 0.5–1.3)
CREAT SERPL-MCNC: 1.4 MG/DL — HIGH (ref 0.5–1.3)
CREAT SERPL-MCNC: 1.42 MG/DL — HIGH (ref 0.5–1.3)
CREAT SERPL-MCNC: 1.44 MG/DL — HIGH (ref 0.5–1.3)
CREAT SERPL-MCNC: 1.49 MG/DL — HIGH (ref 0.5–1.3)
CREAT SERPL-MCNC: 1.54 MG/DL — HIGH (ref 0.5–1.3)
CREAT SERPL-MCNC: 1.63 MG/DL — HIGH (ref 0.5–1.3)
CREAT SERPL-MCNC: 1.64 MG/DL — HIGH (ref 0.5–1.3)
CREAT SERPL-MCNC: 1.66 MG/DL — HIGH (ref 0.5–1.3)
CREAT SERPL-MCNC: 1.69 MG/DL — HIGH (ref 0.5–1.3)
CREAT SERPL-MCNC: 1.93 MG/DL — HIGH (ref 0.5–1.3)
CULTURE RESULTS: ABNORMAL
CULTURE RESULTS: SIGNIFICANT CHANGE UP
EGFR: 26 ML/MIN/1.73M2 — LOW
EGFR: 26 ML/MIN/1.73M2 — LOW
EGFR: 30 ML/MIN/1.73M2 — LOW
EGFR: 30 ML/MIN/1.73M2 — LOW
EGFR: 31 ML/MIN/1.73M2 — LOW
EGFR: 34 ML/MIN/1.73M2 — LOW
EGFR: 34 ML/MIN/1.73M2 — LOW
EGFR: 35 ML/MIN/1.73M2 — LOW
EGFR: 35 ML/MIN/1.73M2 — LOW
EGFR: 37 ML/MIN/1.73M2 — LOW
EGFR: 38 ML/MIN/1.73M2 — LOW
EGFR: 38 ML/MIN/1.73M2 — LOW
EGFR: 40 ML/MIN/1.73M2 — LOW
EGFR: 42 ML/MIN/1.73M2 — LOW
EGFR: 45 ML/MIN/1.73M2 — LOW
EOSINOPHIL # BLD AUTO: 0 K/UL — SIGNIFICANT CHANGE UP (ref 0–0.5)
EOSINOPHIL # BLD AUTO: 0.09 K/UL — SIGNIFICANT CHANGE UP (ref 0–0.5)
EOSINOPHIL # BLD AUTO: 0.17 K/UL — SIGNIFICANT CHANGE UP (ref 0–0.5)
EOSINOPHIL # FLD: 0 % — SIGNIFICANT CHANGE UP
EOSINOPHIL NFR BLD AUTO: 0 % — SIGNIFICANT CHANGE UP (ref 0–6)
EOSINOPHIL NFR BLD AUTO: 1 % — SIGNIFICANT CHANGE UP (ref 0–6)
EOSINOPHIL NFR BLD AUTO: 1.8 % — SIGNIFICANT CHANGE UP (ref 0–6)
FLUAV AG NPH QL: SIGNIFICANT CHANGE UP
FLUBV AG NPH QL: SIGNIFICANT CHANGE UP
FLUID INTAKE SUBSTANCE CLASS: SIGNIFICANT CHANGE UP
FOLATE+VIT B12 SERBLD-IMP: 0 % — SIGNIFICANT CHANGE UP
GAS PNL BLDA: SIGNIFICANT CHANGE UP
GAS PNL BLDV: SIGNIFICANT CHANGE UP
GIANT PLATELETS BLD QL SMEAR: PRESENT — SIGNIFICANT CHANGE UP
GLUCOSE FLD-MCNC: 95 MG/DL — SIGNIFICANT CHANGE UP
GLUCOSE SERPL-MCNC: 100 MG/DL — HIGH (ref 70–99)
GLUCOSE SERPL-MCNC: 108 MG/DL — HIGH (ref 70–99)
GLUCOSE SERPL-MCNC: 115 MG/DL — HIGH (ref 70–99)
GLUCOSE SERPL-MCNC: 138 MG/DL — HIGH (ref 70–99)
GLUCOSE SERPL-MCNC: 140 MG/DL — HIGH (ref 70–99)
GLUCOSE SERPL-MCNC: 150 MG/DL — HIGH (ref 70–99)
GLUCOSE SERPL-MCNC: 219 MG/DL — HIGH (ref 70–99)
GLUCOSE SERPL-MCNC: 228 MG/DL — HIGH (ref 70–99)
GLUCOSE SERPL-MCNC: 67 MG/DL — LOW (ref 70–99)
GLUCOSE SERPL-MCNC: 73 MG/DL — SIGNIFICANT CHANGE UP (ref 70–99)
GLUCOSE SERPL-MCNC: 75 MG/DL — SIGNIFICANT CHANGE UP (ref 70–99)
GLUCOSE SERPL-MCNC: 82 MG/DL — SIGNIFICANT CHANGE UP (ref 70–99)
GLUCOSE SERPL-MCNC: 83 MG/DL — SIGNIFICANT CHANGE UP (ref 70–99)
GLUCOSE SERPL-MCNC: 87 MG/DL — SIGNIFICANT CHANGE UP (ref 70–99)
GLUCOSE SERPL-MCNC: 89 MG/DL — SIGNIFICANT CHANGE UP (ref 70–99)
GLUCOSE SERPL-MCNC: 94 MG/DL — SIGNIFICANT CHANGE UP (ref 70–99)
GLUCOSE SERPL-MCNC: 94 MG/DL — SIGNIFICANT CHANGE UP (ref 70–99)
GRAM STN FLD: ABNORMAL
GRAM STN FLD: SIGNIFICANT CHANGE UP
HCO3 BLDA-SCNC: 28 MMOL/L — SIGNIFICANT CHANGE UP (ref 21–28)
HCT VFR BLD CALC: 23.2 % — LOW (ref 34.5–45)
HCT VFR BLD CALC: 23.7 % — LOW (ref 34.5–45)
HCT VFR BLD CALC: 23.8 % — LOW (ref 34.5–45)
HCT VFR BLD CALC: 24.4 % — LOW (ref 34.5–45)
HCT VFR BLD CALC: 24.5 % — LOW (ref 34.5–45)
HCT VFR BLD CALC: 24.5 % — LOW (ref 34.5–45)
HCT VFR BLD CALC: 26 % — LOW (ref 34.5–45)
HCT VFR BLD CALC: 26.8 % — LOW (ref 34.5–45)
HCT VFR BLD CALC: 27 % — LOW (ref 34.5–45)
HCT VFR BLD CALC: 27.1 % — LOW (ref 34.5–45)
HCT VFR BLD CALC: 27.4 % — LOW (ref 34.5–45)
HCT VFR BLD CALC: 30.2 % — LOW (ref 34.5–45)
HCT VFR BLD CALC: 30.8 % — LOW (ref 34.5–45)
HGB BLD-MCNC: 7.5 G/DL — LOW (ref 11.5–15.5)
HGB BLD-MCNC: 7.8 G/DL — LOW (ref 11.5–15.5)
HGB BLD-MCNC: 7.8 G/DL — LOW (ref 11.5–15.5)
HGB BLD-MCNC: 7.9 G/DL — LOW (ref 11.5–15.5)
HGB BLD-MCNC: 7.9 G/DL — LOW (ref 11.5–15.5)
HGB BLD-MCNC: 8 G/DL — LOW (ref 11.5–15.5)
HGB BLD-MCNC: 8.2 G/DL — LOW (ref 11.5–15.5)
HGB BLD-MCNC: 8.5 G/DL — LOW (ref 11.5–15.5)
HGB BLD-MCNC: 8.8 G/DL — LOW (ref 11.5–15.5)
HGB BLD-MCNC: 8.8 G/DL — LOW (ref 11.5–15.5)
HGB BLD-MCNC: 9 G/DL — LOW (ref 11.5–15.5)
HGB BLD-MCNC: 9.8 G/DL — LOW (ref 11.5–15.5)
HGB BLD-MCNC: 9.8 G/DL — LOW (ref 11.5–15.5)
IANC: 6.67 K/UL — SIGNIFICANT CHANGE UP (ref 1.8–7.4)
IANC: 8.41 K/UL — HIGH (ref 1.8–7.4)
IMM GRANULOCYTES # BLD AUTO: 0.02 K/UL — SIGNIFICANT CHANGE UP (ref 0–0.07)
IMM GRANULOCYTES NFR BLD AUTO: 0.2 % — SIGNIFICANT CHANGE UP (ref 0–0.9)
IMM GRANULOCYTES NFR BLD AUTO: 0.2 % — SIGNIFICANT CHANGE UP (ref 0–0.9)
INR BLD: 0.9 RATIO — SIGNIFICANT CHANGE UP (ref 0.85–1.16)
INR BLD: 0.94 RATIO — SIGNIFICANT CHANGE UP (ref 0.85–1.16)
LDH SERPL L TO P-CCNC: 42 U/L — SIGNIFICANT CHANGE UP
LYMPHOCYTES # BLD AUTO: 0.39 K/UL — LOW (ref 1–3.3)
LYMPHOCYTES # BLD AUTO: 0.95 K/UL — LOW (ref 1–3.3)
LYMPHOCYTES # BLD AUTO: 1.71 K/UL — SIGNIFICANT CHANGE UP (ref 1–3.3)
LYMPHOCYTES # BLD AUTO: 19.1 % — SIGNIFICANT CHANGE UP (ref 13–44)
LYMPHOCYTES # BLD AUTO: 4.3 % — LOW (ref 13–44)
LYMPHOCYTES # FLD: 17 % — SIGNIFICANT CHANGE UP
LYMPHOCYTES NFR BLD AUTO: 10.3 % — LOW (ref 13–44)
MACROCYTES BLD QL: SIGNIFICANT CHANGE UP
MAGNESIUM SERPL-MCNC: 1.9 MG/DL — SIGNIFICANT CHANGE UP (ref 1.6–2.6)
MAGNESIUM SERPL-MCNC: 2 MG/DL — SIGNIFICANT CHANGE UP (ref 1.6–2.6)
MAGNESIUM SERPL-MCNC: 2.1 MG/DL — SIGNIFICANT CHANGE UP (ref 1.6–2.6)
MAGNESIUM SERPL-MCNC: 2.2 MG/DL — SIGNIFICANT CHANGE UP (ref 1.6–2.6)
MAGNESIUM SERPL-MCNC: 2.2 MG/DL — SIGNIFICANT CHANGE UP (ref 1.8–2.6)
MAGNESIUM SERPL-MCNC: 2.3 MG/DL — SIGNIFICANT CHANGE UP (ref 1.6–2.6)
MAGNESIUM SERPL-MCNC: 2.3 MG/DL — SIGNIFICANT CHANGE UP (ref 1.6–2.6)
MANUAL SMEAR VERIFICATION: SIGNIFICANT CHANGE UP
MCHC RBC-ENTMCNC: 30 PG — SIGNIFICANT CHANGE UP (ref 27–34)
MCHC RBC-ENTMCNC: 30.4 PG — SIGNIFICANT CHANGE UP (ref 27–34)
MCHC RBC-ENTMCNC: 30.6 G/DL — LOW (ref 32–36)
MCHC RBC-ENTMCNC: 30.6 PG — SIGNIFICANT CHANGE UP (ref 27–34)
MCHC RBC-ENTMCNC: 30.8 PG — SIGNIFICANT CHANGE UP (ref 27–34)
MCHC RBC-ENTMCNC: 31 PG — SIGNIFICANT CHANGE UP (ref 27–34)
MCHC RBC-ENTMCNC: 31 PG — SIGNIFICANT CHANGE UP (ref 27–34)
MCHC RBC-ENTMCNC: 31.1 PG — SIGNIFICANT CHANGE UP (ref 27–34)
MCHC RBC-ENTMCNC: 31.3 PG — SIGNIFICANT CHANGE UP (ref 27–34)
MCHC RBC-ENTMCNC: 31.8 G/DL — LOW (ref 32–36)
MCHC RBC-ENTMCNC: 31.9 PG — SIGNIFICANT CHANGE UP (ref 27–34)
MCHC RBC-ENTMCNC: 32 G/DL — SIGNIFICANT CHANGE UP (ref 32–36)
MCHC RBC-ENTMCNC: 32.1 G/DL — SIGNIFICANT CHANGE UP (ref 32–36)
MCHC RBC-ENTMCNC: 32.2 G/DL — SIGNIFICANT CHANGE UP (ref 32–36)
MCHC RBC-ENTMCNC: 32.3 G/DL — SIGNIFICANT CHANGE UP (ref 32–36)
MCHC RBC-ENTMCNC: 32.5 G/DL — SIGNIFICANT CHANGE UP (ref 32–36)
MCHC RBC-ENTMCNC: 32.6 G/DL — SIGNIFICANT CHANGE UP (ref 32–36)
MCHC RBC-ENTMCNC: 32.7 G/DL — SIGNIFICANT CHANGE UP (ref 32–36)
MCHC RBC-ENTMCNC: 32.7 G/DL — SIGNIFICANT CHANGE UP (ref 32–36)
MCHC RBC-ENTMCNC: 32.8 G/DL — SIGNIFICANT CHANGE UP (ref 32–36)
MCHC RBC-ENTMCNC: 33.2 G/DL — SIGNIFICANT CHANGE UP (ref 32–36)
MCHC RBC-ENTMCNC: 33.3 G/DL — SIGNIFICANT CHANGE UP (ref 32–36)
MCV RBC AUTO: 102.3 FL — HIGH (ref 80–100)
MCV RBC AUTO: 93.4 FL — SIGNIFICANT CHANGE UP (ref 80–100)
MCV RBC AUTO: 94.2 FL — SIGNIFICANT CHANGE UP (ref 80–100)
MCV RBC AUTO: 94.2 FL — SIGNIFICANT CHANGE UP (ref 80–100)
MCV RBC AUTO: 94.4 FL — SIGNIFICANT CHANGE UP (ref 80–100)
MCV RBC AUTO: 94.4 FL — SIGNIFICANT CHANGE UP (ref 80–100)
MCV RBC AUTO: 94.8 FL — SIGNIFICANT CHANGE UP (ref 80–100)
MCV RBC AUTO: 95.4 FL — SIGNIFICANT CHANGE UP (ref 80–100)
MCV RBC AUTO: 95.6 FL — SIGNIFICANT CHANGE UP (ref 80–100)
MCV RBC AUTO: 95.7 FL — SIGNIFICANT CHANGE UP (ref 80–100)
MCV RBC AUTO: 96.7 FL — SIGNIFICANT CHANGE UP (ref 80–100)
MCV RBC AUTO: 97.2 FL — SIGNIFICANT CHANGE UP (ref 80–100)
MCV RBC AUTO: 98 FL — SIGNIFICANT CHANGE UP (ref 80–100)
MESOTHL CELL # FLD: 0 % — SIGNIFICANT CHANGE UP
MONOCYTES # BLD AUTO: 0.24 K/UL — SIGNIFICANT CHANGE UP (ref 0–0.9)
MONOCYTES # BLD AUTO: 0.47 K/UL — SIGNIFICANT CHANGE UP (ref 0–0.9)
MONOCYTES # BLD AUTO: 0.74 K/UL — SIGNIFICANT CHANGE UP (ref 0–0.9)
MONOCYTES NFR BLD AUTO: 2.6 % — SIGNIFICANT CHANGE UP (ref 2–14)
MONOCYTES NFR BLD AUTO: 5.2 % — SIGNIFICANT CHANGE UP (ref 2–14)
MONOCYTES NFR BLD AUTO: 8.1 % — SIGNIFICANT CHANGE UP (ref 2–14)
MONOS+MACROS # FLD: 54 % — SIGNIFICANT CHANGE UP
MRSA PCR RESULT.: SIGNIFICANT CHANGE UP
NEUTROPHILS # BLD AUTO: 6.67 K/UL — SIGNIFICANT CHANGE UP (ref 1.8–7.4)
NEUTROPHILS # BLD AUTO: 7.28 K/UL — SIGNIFICANT CHANGE UP (ref 1.8–7.4)
NEUTROPHILS # BLD AUTO: 8.49 K/UL — HIGH (ref 1.8–7.4)
NEUTROPHILS NFR BLD AUTO: 74.4 % — SIGNIFICANT CHANGE UP (ref 43–77)
NEUTROPHILS NFR BLD AUTO: 79.3 % — HIGH (ref 43–77)
NEUTROPHILS NFR BLD AUTO: 93.1 % — HIGH (ref 43–77)
NEUTROPHILS-BODY FLUID: 29 % — SIGNIFICANT CHANGE UP
NIGHT BLUE STAIN TISS: SIGNIFICANT CHANGE UP
NON-GYNECOLOGICAL CYTOLOGY STUDY: SIGNIFICANT CHANGE UP
NRBC # BLD AUTO: 0 K/UL — SIGNIFICANT CHANGE UP (ref 0–0)
NRBC # BLD AUTO: 0.04 K/UL — HIGH (ref 0–0)
NRBC # BLD: 1 /100 WBCS — HIGH (ref 0–0)
NRBC # FLD: 0 K/UL — SIGNIFICANT CHANGE UP (ref 0–0)
NRBC # FLD: 0.04 K/UL — HIGH (ref 0–0)
NRBC BLD AUTO-RTO: 0 /100 WBCS — SIGNIFICANT CHANGE UP (ref 0–0)
NRBC BLD-RTO: 1 /100 WBCS — HIGH (ref 0–0)
NT-PROBNP SERPL-SCNC: 1164 PG/ML — HIGH
NT-PROBNP SERPL-SCNC: 1242 PG/ML — HIGH (ref 0–300)
OTHER CELLS FLD MANUAL: 0 % — SIGNIFICANT CHANGE UP
OVALOCYTES BLD QL SMEAR: SLIGHT — SIGNIFICANT CHANGE UP
PCO2 BLDA: 44 MMHG — SIGNIFICANT CHANGE UP (ref 32–45)
PH BLDA: 7.41 — SIGNIFICANT CHANGE UP (ref 7.35–7.45)
PHOSPHATE SERPL-MCNC: 2.2 MG/DL — LOW (ref 2.5–4.5)
PHOSPHATE SERPL-MCNC: 2.3 MG/DL — LOW (ref 2.5–4.5)
PHOSPHATE SERPL-MCNC: 2.4 MG/DL — LOW (ref 2.5–4.5)
PHOSPHATE SERPL-MCNC: 2.6 MG/DL — SIGNIFICANT CHANGE UP (ref 2.5–4.5)
PHOSPHATE SERPL-MCNC: 3.2 MG/DL — SIGNIFICANT CHANGE UP (ref 2.5–4.5)
PHOSPHATE SERPL-MCNC: 3.3 MG/DL — SIGNIFICANT CHANGE UP (ref 2.5–4.5)
PHOSPHATE SERPL-MCNC: 3.4 MG/DL — SIGNIFICANT CHANGE UP (ref 2.5–4.5)
PHOSPHATE SERPL-MCNC: 4.4 MG/DL — SIGNIFICANT CHANGE UP (ref 2.5–4.5)
PLAT MORPH BLD: ABNORMAL
PLAT MORPH BLD: NORMAL — SIGNIFICANT CHANGE UP
PLATELET # BLD AUTO: 172 K/UL — SIGNIFICANT CHANGE UP (ref 150–400)
PLATELET # BLD AUTO: 173 K/UL — SIGNIFICANT CHANGE UP (ref 150–400)
PLATELET # BLD AUTO: 175 K/UL — SIGNIFICANT CHANGE UP (ref 150–400)
PLATELET # BLD AUTO: 179 K/UL — SIGNIFICANT CHANGE UP (ref 150–400)
PLATELET # BLD AUTO: 186 K/UL — SIGNIFICANT CHANGE UP (ref 150–400)
PLATELET # BLD AUTO: 188 K/UL — SIGNIFICANT CHANGE UP (ref 150–400)
PLATELET # BLD AUTO: 192 K/UL — SIGNIFICANT CHANGE UP (ref 150–400)
PLATELET # BLD AUTO: 195 K/UL — SIGNIFICANT CHANGE UP (ref 150–400)
PLATELET # BLD AUTO: 197 K/UL — SIGNIFICANT CHANGE UP (ref 150–400)
PLATELET # BLD AUTO: 213 K/UL — SIGNIFICANT CHANGE UP (ref 150–400)
PLATELET # BLD AUTO: 216 K/UL — SIGNIFICANT CHANGE UP (ref 150–400)
PLATELET # BLD AUTO: 234 K/UL — SIGNIFICANT CHANGE UP (ref 150–400)
PLATELET # BLD AUTO: SIGNIFICANT CHANGE UP K/UL (ref 150–400)
PLATELET CLUMP BLD QL SMEAR: ABNORMAL
PLATELET COUNT - ESTIMATE: ABNORMAL
PLATELET COUNT - ESTIMATE: NORMAL — SIGNIFICANT CHANGE UP
PMV BLD: 11.8 FL — SIGNIFICANT CHANGE UP (ref 7–13)
PMV BLD: 11.8 FL — SIGNIFICANT CHANGE UP (ref 7–13)
PMV BLD: 12.1 FL — SIGNIFICANT CHANGE UP (ref 7–13)
PO2 BLDA: 79 MMHG — LOW (ref 83–108)
POIKILOCYTOSIS BLD QL AUTO: SLIGHT — SIGNIFICANT CHANGE UP
POTASSIUM SERPL-MCNC: 3.9 MMOL/L — SIGNIFICANT CHANGE UP (ref 3.5–5.3)
POTASSIUM SERPL-MCNC: 4 MMOL/L — SIGNIFICANT CHANGE UP (ref 3.5–5.3)
POTASSIUM SERPL-MCNC: 4.1 MMOL/L — SIGNIFICANT CHANGE UP (ref 3.5–5.3)
POTASSIUM SERPL-MCNC: 4.3 MMOL/L — SIGNIFICANT CHANGE UP (ref 3.5–5.3)
POTASSIUM SERPL-MCNC: 4.4 MMOL/L — SIGNIFICANT CHANGE UP (ref 3.5–5.3)
POTASSIUM SERPL-MCNC: 4.5 MMOL/L — SIGNIFICANT CHANGE UP (ref 3.5–5.3)
POTASSIUM SERPL-MCNC: 4.5 MMOL/L — SIGNIFICANT CHANGE UP (ref 3.5–5.3)
POTASSIUM SERPL-MCNC: 4.6 MMOL/L — SIGNIFICANT CHANGE UP (ref 3.5–5.3)
POTASSIUM SERPL-MCNC: 4.7 MMOL/L — SIGNIFICANT CHANGE UP (ref 3.5–5.3)
POTASSIUM SERPL-MCNC: 4.9 MMOL/L — SIGNIFICANT CHANGE UP (ref 3.5–5.3)
POTASSIUM SERPL-MCNC: 5.1 MMOL/L — SIGNIFICANT CHANGE UP (ref 3.5–5.3)
POTASSIUM SERPL-MCNC: 5.1 MMOL/L — SIGNIFICANT CHANGE UP (ref 3.5–5.3)
POTASSIUM SERPL-MCNC: 5.2 MMOL/L — SIGNIFICANT CHANGE UP (ref 3.5–5.3)
POTASSIUM SERPL-MCNC: 5.4 MMOL/L — HIGH (ref 3.5–5.3)
POTASSIUM SERPL-MCNC: 5.8 MMOL/L — HIGH (ref 3.5–5.3)
POTASSIUM SERPL-SCNC: 3.9 MMOL/L — SIGNIFICANT CHANGE UP (ref 3.5–5.3)
POTASSIUM SERPL-SCNC: 4 MMOL/L — SIGNIFICANT CHANGE UP (ref 3.5–5.3)
POTASSIUM SERPL-SCNC: 4.1 MMOL/L — SIGNIFICANT CHANGE UP (ref 3.5–5.3)
POTASSIUM SERPL-SCNC: 4.3 MMOL/L — SIGNIFICANT CHANGE UP (ref 3.5–5.3)
POTASSIUM SERPL-SCNC: 4.4 MMOL/L — SIGNIFICANT CHANGE UP (ref 3.5–5.3)
POTASSIUM SERPL-SCNC: 4.5 MMOL/L — SIGNIFICANT CHANGE UP (ref 3.5–5.3)
POTASSIUM SERPL-SCNC: 4.5 MMOL/L — SIGNIFICANT CHANGE UP (ref 3.5–5.3)
POTASSIUM SERPL-SCNC: 4.6 MMOL/L — SIGNIFICANT CHANGE UP (ref 3.5–5.3)
POTASSIUM SERPL-SCNC: 4.7 MMOL/L — SIGNIFICANT CHANGE UP (ref 3.5–5.3)
POTASSIUM SERPL-SCNC: 4.9 MMOL/L — SIGNIFICANT CHANGE UP (ref 3.5–5.3)
POTASSIUM SERPL-SCNC: 5.1 MMOL/L — SIGNIFICANT CHANGE UP (ref 3.5–5.3)
POTASSIUM SERPL-SCNC: 5.1 MMOL/L — SIGNIFICANT CHANGE UP (ref 3.5–5.3)
POTASSIUM SERPL-SCNC: 5.2 MMOL/L — SIGNIFICANT CHANGE UP (ref 3.5–5.3)
POTASSIUM SERPL-SCNC: 5.4 MMOL/L — HIGH (ref 3.5–5.3)
POTASSIUM SERPL-SCNC: 5.8 MMOL/L — HIGH (ref 3.5–5.3)
PROCALCITONIN SERPL-MCNC: 0.13 NG/ML — HIGH (ref 0.02–0.1)
PROT FLD-MCNC: 1.6 G/DL — SIGNIFICANT CHANGE UP
PROT SERPL-MCNC: 6.4 G/DL — SIGNIFICANT CHANGE UP (ref 6–8.3)
PROT SERPL-MCNC: 6.8 G/DL — SIGNIFICANT CHANGE UP (ref 6.6–8.7)
PROT SERPL-MCNC: 6.9 G/DL — SIGNIFICANT CHANGE UP (ref 6.6–8.7)
PROT SERPL-MCNC: 7.2 G/DL — SIGNIFICANT CHANGE UP (ref 6.6–8.7)
PROT SERPL-MCNC: 7.4 G/DL — SIGNIFICANT CHANGE UP (ref 6.6–8.7)
PROTHROM AB SERPL-ACNC: 10.4 SEC — SIGNIFICANT CHANGE UP (ref 9.9–13.4)
PROTHROM AB SERPL-ACNC: 10.9 SEC — SIGNIFICANT CHANGE UP (ref 9.9–13.4)
RBC # BLD: 2.4 M/UL — LOW (ref 3.8–5.2)
RBC # BLD: 2.48 M/UL — LOW (ref 3.8–5.2)
RBC # BLD: 2.49 M/UL — LOW (ref 3.8–5.2)
RBC # BLD: 2.52 M/UL — LOW (ref 3.8–5.2)
RBC # BLD: 2.52 M/UL — LOW (ref 3.8–5.2)
RBC # BLD: 2.56 M/UL — LOW (ref 3.8–5.2)
RBC # BLD: 2.62 M/UL — LOW (ref 3.8–5.2)
RBC # BLD: 2.76 M/UL — LOW (ref 3.8–5.2)
RBC # BLD: 2.83 M/UL — LOW (ref 3.8–5.2)
RBC # BLD: 2.89 M/UL — LOW (ref 3.8–5.2)
RBC # BLD: 2.9 M/UL — LOW (ref 3.8–5.2)
RBC # BLD: 3.2 M/UL — LOW (ref 3.8–5.2)
RBC # BLD: 3.27 M/UL — LOW (ref 3.8–5.2)
RBC # FLD: 15.4 % — HIGH (ref 10.3–14.5)
RBC # FLD: 15.6 % — HIGH (ref 10.3–14.5)
RBC # FLD: 15.6 % — HIGH (ref 10.3–14.5)
RBC # FLD: 15.9 % — HIGH (ref 10.3–14.5)
RBC # FLD: 16.2 % — HIGH (ref 10.3–14.5)
RBC # FLD: 16.3 % — HIGH (ref 10.3–14.5)
RBC # FLD: 16.4 % — HIGH (ref 10.3–14.5)
RBC # FLD: 16.5 % — HIGH (ref 10.3–14.5)
RBC # FLD: 17 % — HIGH (ref 10.3–14.5)
RBC # FLD: 17.2 % — HIGH (ref 10.3–14.5)
RBC # FLD: 17.2 % — HIGH (ref 10.3–14.5)
RBC # FLD: 17.5 % — HIGH (ref 10.3–14.5)
RBC # FLD: 18.8 % — HIGH (ref 10.3–14.5)
RBC BLD AUTO: ABNORMAL
RBC BLD AUTO: SIGNIFICANT CHANGE UP
RCV VOL RI: < 2000 CELLS/UL — SIGNIFICANT CHANGE UP (ref 0–5)
RH IG SCN BLD-IMP: POSITIVE — SIGNIFICANT CHANGE UP
RH IG SCN BLD-IMP: POSITIVE — SIGNIFICANT CHANGE UP
RSV RNA NPH QL NAA+NON-PROBE: SIGNIFICANT CHANGE UP
S AUREUS DNA NOSE QL NAA+PROBE: DETECTED
SAO2 % BLDA: 97.2 % — SIGNIFICANT CHANGE UP (ref 94–98)
SARS-COV-2 RNA SPEC QL NAA+PROBE: SIGNIFICANT CHANGE UP
SODIUM SERPL-SCNC: 130 MMOL/L — LOW (ref 135–145)
SODIUM SERPL-SCNC: 133 MMOL/L — LOW (ref 135–145)
SODIUM SERPL-SCNC: 135 MMOL/L — SIGNIFICANT CHANGE UP (ref 135–145)
SODIUM SERPL-SCNC: 137 MMOL/L — SIGNIFICANT CHANGE UP (ref 135–145)
SODIUM SERPL-SCNC: 138 MMOL/L — SIGNIFICANT CHANGE UP (ref 135–145)
SODIUM SERPL-SCNC: 139 MMOL/L — SIGNIFICANT CHANGE UP (ref 135–145)
SODIUM SERPL-SCNC: 140 MMOL/L — SIGNIFICANT CHANGE UP (ref 135–145)
SODIUM SERPL-SCNC: 140 MMOL/L — SIGNIFICANT CHANGE UP (ref 135–145)
SODIUM SERPL-SCNC: 141 MMOL/L — SIGNIFICANT CHANGE UP (ref 135–145)
SODIUM SERPL-SCNC: 141 MMOL/L — SIGNIFICANT CHANGE UP (ref 135–145)
SODIUM SERPL-SCNC: 142 MMOL/L — SIGNIFICANT CHANGE UP (ref 135–145)
SOURCE RESPIRATORY: SIGNIFICANT CHANGE UP
SPECIMEN SOURCE FLD: SIGNIFICANT CHANGE UP
SPECIMEN SOURCE: SIGNIFICANT CHANGE UP
TOTAL CELLS COUNTED, BODY FLUID: 100 CELLS — SIGNIFICANT CHANGE UP
TOTAL NUCLEATED CELL COUNT, BODY FLUID: 150 CELLS/UL — HIGH (ref 0–5)
TRIGL SERPL-MCNC: <12 MG/DL — SIGNIFICANT CHANGE UP
TROPONIN T, HIGH SENSITIVITY RESULT: 68 NG/L — HIGH (ref 0–51)
TROPONIN T, HIGH SENSITIVITY RESULT: 70 NG/L — HIGH (ref 0–51)
TSH SERPL-MCNC: 2.27 UIU/ML — SIGNIFICANT CHANGE UP (ref 0.27–4.2)
TUBE TYPE: SIGNIFICANT CHANGE UP
WBC # BLD: 10.6 K/UL — HIGH (ref 3.8–10.5)
WBC # BLD: 5.77 K/UL — SIGNIFICANT CHANGE UP (ref 3.8–10.5)
WBC # BLD: 5.91 K/UL — SIGNIFICANT CHANGE UP (ref 3.8–10.5)
WBC # BLD: 6.42 K/UL — SIGNIFICANT CHANGE UP (ref 3.8–10.5)
WBC # BLD: 7.28 K/UL — SIGNIFICANT CHANGE UP (ref 3.8–10.5)
WBC # BLD: 7.7 K/UL — SIGNIFICANT CHANGE UP (ref 3.8–10.5)
WBC # BLD: 7.83 K/UL — SIGNIFICANT CHANGE UP (ref 3.8–10.5)
WBC # BLD: 8.09 K/UL — SIGNIFICANT CHANGE UP (ref 3.8–10.5)
WBC # BLD: 8.49 K/UL — SIGNIFICANT CHANGE UP (ref 3.8–10.5)
WBC # BLD: 8.97 K/UL — SIGNIFICANT CHANGE UP (ref 3.8–10.5)
WBC # BLD: 9.12 K/UL — SIGNIFICANT CHANGE UP (ref 3.8–10.5)
WBC # BLD: 9.19 K/UL — SIGNIFICANT CHANGE UP (ref 3.8–10.5)
WBC # BLD: 9.66 K/UL — SIGNIFICANT CHANGE UP (ref 3.8–10.5)
WBC # FLD AUTO: 10.6 K/UL — HIGH (ref 3.8–10.5)
WBC # FLD AUTO: 5.77 K/UL — SIGNIFICANT CHANGE UP (ref 3.8–10.5)
WBC # FLD AUTO: 5.91 K/UL — SIGNIFICANT CHANGE UP (ref 3.8–10.5)
WBC # FLD AUTO: 6.42 K/UL — SIGNIFICANT CHANGE UP (ref 3.8–10.5)
WBC # FLD AUTO: 7.28 K/UL — SIGNIFICANT CHANGE UP (ref 3.8–10.5)
WBC # FLD AUTO: 7.7 K/UL — SIGNIFICANT CHANGE UP (ref 3.8–10.5)
WBC # FLD AUTO: 7.83 K/UL — SIGNIFICANT CHANGE UP (ref 3.8–10.5)
WBC # FLD AUTO: 8.09 K/UL — SIGNIFICANT CHANGE UP (ref 3.8–10.5)
WBC # FLD AUTO: 8.49 K/UL — SIGNIFICANT CHANGE UP (ref 3.8–10.5)
WBC # FLD AUTO: 8.97 K/UL — SIGNIFICANT CHANGE UP (ref 3.8–10.5)
WBC # FLD AUTO: 9.12 K/UL — SIGNIFICANT CHANGE UP (ref 3.8–10.5)
WBC # FLD AUTO: 9.19 K/UL — SIGNIFICANT CHANGE UP (ref 3.8–10.5)
WBC # FLD AUTO: 9.66 K/UL — SIGNIFICANT CHANGE UP (ref 3.8–10.5)

## 2025-01-01 PROCEDURE — 71250 CT THORAX DX C-: CPT | Mod: 26

## 2025-01-01 PROCEDURE — 99233 SBSQ HOSP IP/OBS HIGH 50: CPT

## 2025-01-01 PROCEDURE — 71045 X-RAY EXAM CHEST 1 VIEW: CPT | Mod: 26

## 2025-01-01 PROCEDURE — 99223 1ST HOSP IP/OBS HIGH 75: CPT | Mod: GC,25

## 2025-01-01 PROCEDURE — 71045 X-RAY EXAM CHEST 1 VIEW: CPT | Mod: 26,77

## 2025-01-01 PROCEDURE — 71045 X-RAY EXAM CHEST 1 VIEW: CPT | Mod: 26,77,76

## 2025-01-01 PROCEDURE — 99223 1ST HOSP IP/OBS HIGH 75: CPT

## 2025-01-01 PROCEDURE — 99233 SBSQ HOSP IP/OBS HIGH 50: CPT | Mod: GC

## 2025-01-01 PROCEDURE — 99497 ADVNCD CARE PLAN 30 MIN: CPT | Mod: 25

## 2025-01-01 PROCEDURE — 88305 TISSUE EXAM BY PATHOLOGIST: CPT | Mod: 26

## 2025-01-01 PROCEDURE — 93308 TTE F-UP OR LMTD: CPT | Mod: 26,GC

## 2025-01-01 PROCEDURE — 88112 CYTOPATH CELL ENHANCE TECH: CPT | Mod: 26

## 2025-01-01 PROCEDURE — 78582 LUNG VENTILAT&PERFUS IMAGING: CPT | Mod: 26

## 2025-01-01 PROCEDURE — 93306 TTE W/DOPPLER COMPLETE: CPT | Mod: 26

## 2025-01-01 PROCEDURE — 99233 SBSQ HOSP IP/OBS HIGH 50: CPT | Mod: 25

## 2025-01-01 PROCEDURE — ZZZZZ: CPT

## 2025-01-01 PROCEDURE — 93321 DOPPLER ECHO F-UP/LMTD STD: CPT | Mod: 26

## 2025-01-01 PROCEDURE — 32551 INSERTION OF CHEST TUBE: CPT | Mod: GC,RT

## 2025-01-01 PROCEDURE — 99223 1ST HOSP IP/OBS HIGH 75: CPT | Mod: 25

## 2025-01-01 PROCEDURE — 93010 ELECTROCARDIOGRAM REPORT: CPT

## 2025-01-01 PROCEDURE — 99223 1ST HOSP IP/OBS HIGH 75: CPT | Mod: AI

## 2025-01-01 PROCEDURE — 71045 X-RAY EXAM CHEST 1 VIEW: CPT | Mod: 26,76

## 2025-01-01 PROCEDURE — 99239 HOSP IP/OBS DSCHRG MGMT >30: CPT

## 2025-01-01 PROCEDURE — 99497 ADVNCD CARE PLAN 30 MIN: CPT

## 2025-01-01 PROCEDURE — 99285 EMERGENCY DEPT VISIT HI MDM: CPT | Mod: GC

## 2025-01-01 PROCEDURE — 93970 EXTREMITY STUDY: CPT | Mod: 26

## 2025-01-01 PROCEDURE — 99232 SBSQ HOSP IP/OBS MODERATE 35: CPT

## 2025-01-01 PROCEDURE — 99498 ADVNCD CARE PLAN ADDL 30 MIN: CPT

## 2025-01-01 DEVICE — PACK THORACENTESIS CHG: Type: IMPLANTABLE DEVICE | Site: RIGHT | Status: FUNCTIONAL

## 2025-01-01 RX ORDER — HYDROMORPHONE/SOD CHLOR,ISO/PF 2 MG/10 ML
0.5 SYRINGE (ML) INJECTION
Refills: 0 | Status: DISCONTINUED | OUTPATIENT
Start: 2025-01-01 | End: 2025-01-01

## 2025-01-01 RX ORDER — IPRATROPIUM BROMIDE AND ALBUTEROL SULFATE .5; 2.5 MG/3ML; MG/3ML
3 SOLUTION RESPIRATORY (INHALATION) ONCE
Refills: 0 | Status: DISCONTINUED | OUTPATIENT
Start: 2025-01-01 | End: 2025-01-01

## 2025-01-01 RX ORDER — METOPROLOL SUCCINATE 50 MG/1
2.5 TABLET, EXTENDED RELEASE ORAL ONCE
Refills: 0 | Status: COMPLETED | OUTPATIENT
Start: 2025-01-01 | End: 2025-01-01

## 2025-01-01 RX ORDER — METOPROLOL SUCCINATE 50 MG/1
12.5 TABLET, EXTENDED RELEASE ORAL
Refills: 0 | Status: DISCONTINUED | OUTPATIENT
Start: 2025-01-01 | End: 2025-01-01

## 2025-01-01 RX ORDER — PREDNISONE 20 MG/1
1 TABLET ORAL
Qty: 20 | Refills: 0
Start: 2025-01-01

## 2025-01-01 RX ORDER — HYDROMORPHONE/SOD CHLOR,ISO/PF 2 MG/10 ML
0.5 SYRINGE (ML) INJECTION EVERY 4 HOURS
Refills: 0 | Status: DISCONTINUED | OUTPATIENT
Start: 2025-01-01 | End: 2025-01-01

## 2025-01-01 RX ORDER — SOD PHOS DI, MONO/K PHOS MONO 250 MG
1 TABLET ORAL
Refills: 0 | Status: COMPLETED | OUTPATIENT
Start: 2025-01-01 | End: 2025-01-01

## 2025-01-01 RX ORDER — CEFTRIAXONE 500 MG/1
1000 INJECTION, POWDER, FOR SOLUTION INTRAMUSCULAR; INTRAVENOUS EVERY 24 HOURS
Refills: 0 | Status: DISCONTINUED | OUTPATIENT
Start: 2025-01-01 | End: 2025-01-01

## 2025-01-01 RX ORDER — MELATONIN 5 MG
1 TABLET ORAL
Refills: 0 | DISCHARGE

## 2025-01-01 RX ORDER — MELATONIN 5 MG
3 TABLET ORAL ONCE
Refills: 0 | Status: COMPLETED | OUTPATIENT
Start: 2025-01-01 | End: 2025-01-01

## 2025-01-01 RX ORDER — HYDROMORPHONE/SOD CHLOR,ISO/PF 2 MG/10 ML
0.2 SYRINGE (ML) INJECTION EVERY 4 HOURS
Refills: 0 | Status: DISCONTINUED | OUTPATIENT
Start: 2025-01-01 | End: 2025-01-01

## 2025-01-01 RX ORDER — SENNA 187 MG
2 TABLET ORAL AT BEDTIME
Refills: 0 | Status: DISCONTINUED | OUTPATIENT
Start: 2025-01-01 | End: 2025-01-01

## 2025-01-01 RX ORDER — ONDANSETRON HCL/PF 4 MG/2 ML
4 VIAL (ML) INJECTION EVERY 8 HOURS
Refills: 0 | Status: DISCONTINUED | OUTPATIENT
Start: 2025-01-01 | End: 2025-01-01

## 2025-01-01 RX ORDER — METOPROLOL SUCCINATE 50 MG/1
25 TABLET, EXTENDED RELEASE ORAL
Refills: 0 | Status: DISCONTINUED | OUTPATIENT
Start: 2025-01-01 | End: 2025-01-01

## 2025-01-01 RX ORDER — SODIUM POLYSTYRENE SULFONATE 15 G/60ML
30 SUSPENSION ORAL; RECTAL ONCE
Refills: 0 | Status: COMPLETED | OUTPATIENT
Start: 2025-01-01 | End: 2025-01-01

## 2025-01-01 RX ORDER — SOD PHOS DI, MONO/K PHOS MONO 250 MG
1 TABLET ORAL ONCE
Refills: 0 | Status: COMPLETED | OUTPATIENT
Start: 2025-01-01 | End: 2025-01-01

## 2025-01-01 RX ORDER — TORSEMIDE 10 MG
10 TABLET ORAL DAILY
Refills: 0 | Status: DISCONTINUED | OUTPATIENT
Start: 2025-01-01 | End: 2025-01-01

## 2025-01-01 RX ORDER — MELATONIN 5 MG
5 TABLET ORAL AT BEDTIME
Refills: 0 | Status: DISCONTINUED | OUTPATIENT
Start: 2025-01-01 | End: 2025-01-01

## 2025-01-01 RX ORDER — LEVOTHYROXINE SODIUM 300 MCG
100 TABLET ORAL DAILY
Refills: 0 | Status: DISCONTINUED | OUTPATIENT
Start: 2025-01-01 | End: 2025-01-01

## 2025-01-01 RX ORDER — TRANEXAMIC ACID 1000 MG/10
500 AMPUL (ML) INTRAVENOUS EVERY 8 HOURS
Refills: 0 | Status: DISCONTINUED | OUTPATIENT
Start: 2025-01-01 | End: 2025-01-01

## 2025-01-01 RX ORDER — ACETAMINOPHEN 500 MG/5ML
650 LIQUID (ML) ORAL EVERY 6 HOURS
Refills: 0 | Status: DISCONTINUED | OUTPATIENT
Start: 2025-01-01 | End: 2025-01-01

## 2025-01-01 RX ORDER — GLYCOPYRROLATE 0.2 MG/ML
0.4 INJECTION INTRAMUSCULAR; INTRAVENOUS EVERY 6 HOURS
Refills: 0 | Status: DISCONTINUED | OUTPATIENT
Start: 2025-01-01 | End: 2025-01-01

## 2025-01-01 RX ORDER — PREDNISONE 20 MG/1
30 TABLET ORAL DAILY
Refills: 0 | Status: COMPLETED | OUTPATIENT
Start: 2025-01-01 | End: 2025-01-01

## 2025-01-01 RX ORDER — FORMOTEROL FUMARATE DIHYDRATE 20 UG/2ML
2 SOLUTION RESPIRATORY (INHALATION)
Refills: 0 | DISCHARGE

## 2025-01-01 RX ORDER — LEVALBUTEROL HYDROCHLORIDE 1.25 MG/3ML
0.63 SOLUTION RESPIRATORY (INHALATION) ONCE
Refills: 0 | Status: COMPLETED | OUTPATIENT
Start: 2025-01-01 | End: 2025-01-01

## 2025-01-01 RX ORDER — LORAZEPAM 4 MG/ML
0.2 VIAL (ML) INJECTION
Refills: 0 | Status: DISCONTINUED | OUTPATIENT
Start: 2025-01-01 | End: 2025-01-01

## 2025-01-01 RX ORDER — MAGNESIUM, ALUMINUM HYDROXIDE 200-200 MG
30 TABLET,CHEWABLE ORAL EVERY 4 HOURS
Refills: 0 | Status: DISCONTINUED | OUTPATIENT
Start: 2025-01-01 | End: 2025-01-01

## 2025-01-01 RX ORDER — CEFPODOXIME PROXETIL 200 MG/1
1 TABLET, FILM COATED ORAL
Qty: 8 | Refills: 0
Start: 2025-01-01 | End: 2025-01-01

## 2025-01-01 RX ORDER — ASPIRIN 325 MG
81 TABLET ORAL DAILY
Refills: 0 | Status: DISCONTINUED | OUTPATIENT
Start: 2025-01-01 | End: 2025-01-01

## 2025-01-01 RX ORDER — IPRATROPIUM BROMIDE AND ALBUTEROL SULFATE .5; 2.5 MG/3ML; MG/3ML
3 SOLUTION RESPIRATORY (INHALATION) EVERY 6 HOURS
Refills: 0 | Status: DISCONTINUED | OUTPATIENT
Start: 2025-01-01 | End: 2025-01-01

## 2025-01-01 RX ORDER — MELATONIN 5 MG
3 TABLET ORAL AT BEDTIME
Refills: 0 | Status: DISCONTINUED | OUTPATIENT
Start: 2025-01-01 | End: 2025-01-01

## 2025-01-01 RX ORDER — POLYETHYLENE GLYCOL 3350 17 G/17G
17 POWDER, FOR SOLUTION ORAL DAILY
Refills: 0 | Status: DISCONTINUED | OUTPATIENT
Start: 2025-01-01 | End: 2025-01-01

## 2025-01-01 RX ORDER — TORSEMIDE 10 MG
1 TABLET ORAL
Qty: 30 | Refills: 1
Start: 2025-01-01 | End: 2025-07-07

## 2025-01-01 RX ORDER — SPIRONOLACTONE 25 MG
1 TABLET ORAL
Refills: 0 | DISCHARGE

## 2025-01-01 RX ORDER — ACETAMINOPHEN 500 MG/5ML
700 LIQUID (ML) ORAL ONCE
Refills: 0 | Status: COMPLETED | OUTPATIENT
Start: 2025-01-01 | End: 2025-01-01

## 2025-01-01 RX ORDER — ASPIRIN 325 MG
1 TABLET ORAL
Refills: 0 | DISCHARGE

## 2025-01-01 RX ORDER — PREDNISONE 20 MG/1
40 TABLET ORAL ONCE
Refills: 0 | Status: COMPLETED | OUTPATIENT
Start: 2025-01-01 | End: 2025-01-01

## 2025-01-01 RX ORDER — PREDNISONE 20 MG/1
20 TABLET ORAL DAILY
Refills: 0 | Status: COMPLETED | OUTPATIENT
Start: 2025-01-01 | End: 2025-01-01

## 2025-01-01 RX ORDER — FUROSEMIDE 10 MG/ML
20 INJECTION INTRAMUSCULAR; INTRAVENOUS ONCE
Refills: 0 | Status: COMPLETED | OUTPATIENT
Start: 2025-01-01 | End: 2025-01-01

## 2025-01-01 RX ORDER — SODIUM POLYSTYRENE SULFONATE 15 G/60ML
30 SUSPENSION ORAL; RECTAL ONCE
Refills: 0 | Status: DISCONTINUED | OUTPATIENT
Start: 2025-01-01 | End: 2025-01-01

## 2025-01-01 RX ORDER — ACETAMINOPHEN 500 MG/5ML
2 LIQUID (ML) ORAL
Qty: 0 | Refills: 0 | DISCHARGE
Start: 2025-01-01

## 2025-01-01 RX ORDER — ALPRAZOLAM 0.5 MG
0.25 TABLET, EXTENDED RELEASE 24 HR ORAL
Refills: 0 | Status: DISCONTINUED | OUTPATIENT
Start: 2025-01-01 | End: 2025-01-01

## 2025-01-01 RX ORDER — FUROSEMIDE 10 MG/ML
40 INJECTION INTRAMUSCULAR; INTRAVENOUS ONCE
Refills: 0 | Status: COMPLETED | OUTPATIENT
Start: 2025-01-01 | End: 2025-01-01

## 2025-01-01 RX ORDER — BISACODYL 5 MG
10 TABLET, DELAYED RELEASE (ENTERIC COATED) ORAL DAILY
Refills: 0 | Status: DISCONTINUED | OUTPATIENT
Start: 2025-01-01 | End: 2025-01-01

## 2025-01-01 RX ORDER — REVEFENACIN 175 UG/3ML
175 SOLUTION RESPIRATORY (INHALATION)
Refills: 0 | DISCHARGE

## 2025-01-01 RX ORDER — FORMOTEROL FUMARATE DIHYDRATE 20 UG/2ML
0 SOLUTION RESPIRATORY (INHALATION)
Refills: 0 | DISCHARGE

## 2025-01-01 RX ORDER — AZITHROMYCIN 250 MG
1 CAPSULE ORAL
Qty: 4 | Refills: 0
Start: 2025-01-01 | End: 2025-01-01

## 2025-01-01 RX ORDER — METHYLPREDNISOLONE ACETATE 80 MG/ML
40 INJECTION, SUSPENSION INTRA-ARTICULAR; INTRALESIONAL; INTRAMUSCULAR; SOFT TISSUE EVERY 8 HOURS
Refills: 0 | Status: DISCONTINUED | OUTPATIENT
Start: 2025-01-01 | End: 2025-01-01

## 2025-01-01 RX ORDER — ALBUTEROL SULFATE 2.5 MG/3ML
2 VIAL, NEBULIZER (ML) INHALATION EVERY 6 HOURS
Refills: 0 | Status: DISCONTINUED | OUTPATIENT
Start: 2025-01-01 | End: 2025-01-01

## 2025-01-01 RX ORDER — ATORVASTATIN CALCIUM 80 MG/1
40 TABLET, FILM COATED ORAL AT BEDTIME
Refills: 0 | Status: DISCONTINUED | OUTPATIENT
Start: 2025-01-01 | End: 2025-01-01

## 2025-01-01 RX ORDER — PREDNISONE 20 MG/1
TABLET ORAL
Refills: 0 | Status: DISCONTINUED | OUTPATIENT
Start: 2025-01-01 | End: 2025-01-01

## 2025-01-01 RX ORDER — SPIRONOLACTONE 25 MG
25 TABLET ORAL DAILY
Refills: 0 | Status: DISCONTINUED | OUTPATIENT
Start: 2025-01-01 | End: 2025-01-01

## 2025-01-01 RX ORDER — HYDROMORPHONE/SOD CHLOR,ISO/PF 2 MG/10 ML
0.2 SYRINGE (ML) INJECTION
Refills: 0 | Status: DISCONTINUED | OUTPATIENT
Start: 2025-01-01 | End: 2025-01-01

## 2025-01-01 RX ORDER — HYDROMORPHONE/SOD CHLOR,ISO/PF 2 MG/10 ML
0.5 SYRINGE (ML) INJECTION ONCE
Refills: 0 | Status: DISCONTINUED | OUTPATIENT
Start: 2025-01-01 | End: 2025-01-01

## 2025-01-01 RX ORDER — AZITHROMYCIN 250 MG
500 CAPSULE ORAL DAILY
Refills: 0 | Status: DISCONTINUED | OUTPATIENT
Start: 2025-01-01 | End: 2025-01-01

## 2025-01-01 RX ORDER — TORSEMIDE 20 MG/1
10 TABLET ORAL DAILY
Refills: 0 | Status: DISCONTINUED | OUTPATIENT
Start: 2025-01-01 | End: 2025-01-01

## 2025-01-01 RX ORDER — MUPIROCIN CALCIUM 20 MG/G
1 CREAM TOPICAL
Refills: 0 | Status: DISCONTINUED | OUTPATIENT
Start: 2025-01-01 | End: 2025-01-01

## 2025-01-01 RX ORDER — METHYLPREDNISOLONE ACETATE 80 MG/ML
40 INJECTION, SUSPENSION INTRA-ARTICULAR; INTRALESIONAL; INTRAMUSCULAR; SOFT TISSUE EVERY 12 HOURS
Refills: 0 | Status: DISCONTINUED | OUTPATIENT
Start: 2025-01-01 | End: 2025-01-01

## 2025-01-01 RX ORDER — PREDNISONE 20 MG/1
40 TABLET ORAL DAILY
Refills: 0 | Status: COMPLETED | OUTPATIENT
Start: 2025-01-01 | End: 2025-01-01

## 2025-01-01 RX ADMIN — Medication 1 APPLICATION(S): at 12:43

## 2025-01-01 RX ADMIN — ATORVASTATIN CALCIUM 40 MILLIGRAM(S): 80 TABLET, FILM COATED ORAL at 21:37

## 2025-01-01 RX ADMIN — Medication 650 MILLIGRAM(S): at 05:22

## 2025-01-01 RX ADMIN — MUPIROCIN CALCIUM 1 APPLICATION(S): 20 CREAM TOPICAL at 18:12

## 2025-01-01 RX ADMIN — Medication 280 MILLIGRAM(S): at 22:30

## 2025-01-01 RX ADMIN — Medication 500 MICROGRAM(S): at 10:55

## 2025-01-01 RX ADMIN — METOPROLOL SUCCINATE 12.5 MILLIGRAM(S): 50 TABLET, EXTENDED RELEASE ORAL at 17:34

## 2025-01-01 RX ADMIN — IPRATROPIUM BROMIDE AND ALBUTEROL SULFATE 3 MILLILITER(S): .5; 2.5 SOLUTION RESPIRATORY (INHALATION) at 04:44

## 2025-01-01 RX ADMIN — IPRATROPIUM BROMIDE AND ALBUTEROL SULFATE 3 MILLILITER(S): .5; 2.5 SOLUTION RESPIRATORY (INHALATION) at 03:40

## 2025-01-01 RX ADMIN — Medication 3 MILLIGRAM(S): at 21:36

## 2025-01-01 RX ADMIN — METHYLPREDNISOLONE ACETATE 40 MILLIGRAM(S): 80 INJECTION, SUSPENSION INTRA-ARTICULAR; INTRALESIONAL; INTRAMUSCULAR; SOFT TISSUE at 05:47

## 2025-01-01 RX ADMIN — IPRATROPIUM BROMIDE AND ALBUTEROL SULFATE 3 MILLILITER(S): .5; 2.5 SOLUTION RESPIRATORY (INHALATION) at 10:26

## 2025-01-01 RX ADMIN — IPRATROPIUM BROMIDE AND ALBUTEROL SULFATE 3 MILLILITER(S): .5; 2.5 SOLUTION RESPIRATORY (INHALATION) at 15:30

## 2025-01-01 RX ADMIN — ATORVASTATIN CALCIUM 40 MILLIGRAM(S): 80 TABLET, FILM COATED ORAL at 22:13

## 2025-01-01 RX ADMIN — Medication 1 APPLICATION(S): at 12:09

## 2025-01-01 RX ADMIN — Medication 500 MICROGRAM(S): at 09:54

## 2025-01-01 RX ADMIN — Medication 650 MILLIGRAM(S): at 08:28

## 2025-01-01 RX ADMIN — Medication 500 MILLIGRAM(S): at 16:33

## 2025-01-01 RX ADMIN — Medication 100 MICROGRAM(S): at 05:46

## 2025-01-01 RX ADMIN — Medication 1 PACKET(S): at 12:43

## 2025-01-01 RX ADMIN — Medication 1 DOSE(S): at 21:28

## 2025-01-01 RX ADMIN — Medication 2 TABLET(S): at 21:36

## 2025-01-01 RX ADMIN — Medication 500 MILLIGRAM(S): at 15:35

## 2025-01-01 RX ADMIN — Medication 1 DOSE(S): at 23:27

## 2025-01-01 RX ADMIN — TORSEMIDE 10 MILLIGRAM(S): 20 TABLET ORAL at 05:37

## 2025-01-01 RX ADMIN — Medication 0.25 MILLIGRAM(S): at 05:21

## 2025-01-01 RX ADMIN — Medication 1 DOSE(S): at 08:58

## 2025-01-01 RX ADMIN — Medication 0.25 MILLIGRAM(S): at 15:28

## 2025-01-01 RX ADMIN — PREDNISONE 40 MILLIGRAM(S): 20 TABLET ORAL at 15:18

## 2025-01-01 RX ADMIN — Medication 0.5 MILLIGRAM(S): at 09:48

## 2025-01-01 RX ADMIN — Medication 100 MICROGRAM(S): at 05:22

## 2025-01-01 RX ADMIN — Medication 500 MICROGRAM(S): at 16:13

## 2025-01-01 RX ADMIN — POLYETHYLENE GLYCOL 3350 17 GRAM(S): 17 POWDER, FOR SOLUTION ORAL at 12:44

## 2025-01-01 RX ADMIN — Medication 3 MILLIGRAM(S): at 23:53

## 2025-01-01 RX ADMIN — METOPROLOL SUCCINATE 12.5 MILLIGRAM(S): 50 TABLET, EXTENDED RELEASE ORAL at 21:28

## 2025-01-01 RX ADMIN — MUPIROCIN CALCIUM 1 APPLICATION(S): 20 CREAM TOPICAL at 17:53

## 2025-01-01 RX ADMIN — METOPROLOL SUCCINATE 25 MILLIGRAM(S): 50 TABLET, EXTENDED RELEASE ORAL at 06:09

## 2025-01-01 RX ADMIN — IPRATROPIUM BROMIDE AND ALBUTEROL SULFATE 3 MILLILITER(S): .5; 2.5 SOLUTION RESPIRATORY (INHALATION) at 08:57

## 2025-01-01 RX ADMIN — Medication 0.5 MILLIGRAM(S): at 22:47

## 2025-01-01 RX ADMIN — Medication 1 DOSE(S): at 09:14

## 2025-01-01 RX ADMIN — Medication 1 DOSE(S): at 09:12

## 2025-01-01 RX ADMIN — Medication 500 MILLIGRAM(S): at 20:51

## 2025-01-01 RX ADMIN — PREDNISONE 20 MILLIGRAM(S): 20 TABLET ORAL at 05:22

## 2025-01-01 RX ADMIN — ATORVASTATIN CALCIUM 40 MILLIGRAM(S): 80 TABLET, FILM COATED ORAL at 21:47

## 2025-01-01 RX ADMIN — Medication 25 MILLIGRAM(S): at 05:46

## 2025-01-01 RX ADMIN — Medication 0.5 MILLIGRAM(S): at 04:00

## 2025-01-01 RX ADMIN — Medication 500 MILLIGRAM(S): at 11:17

## 2025-01-01 RX ADMIN — MUPIROCIN CALCIUM 1 APPLICATION(S): 20 CREAM TOPICAL at 05:25

## 2025-01-01 RX ADMIN — IPRATROPIUM BROMIDE AND ALBUTEROL SULFATE 3 MILLILITER(S): .5; 2.5 SOLUTION RESPIRATORY (INHALATION) at 03:38

## 2025-01-01 RX ADMIN — METOPROLOL SUCCINATE 12.5 MILLIGRAM(S): 50 TABLET, EXTENDED RELEASE ORAL at 17:50

## 2025-01-01 RX ADMIN — Medication 0.5 MILLIGRAM(S): at 12:12

## 2025-01-01 RX ADMIN — CEFTRIAXONE 1000 MILLIGRAM(S): 500 INJECTION, POWDER, FOR SOLUTION INTRAMUSCULAR; INTRAVENOUS at 16:56

## 2025-01-01 RX ADMIN — IPRATROPIUM BROMIDE AND ALBUTEROL SULFATE 3 MILLILITER(S): .5; 2.5 SOLUTION RESPIRATORY (INHALATION) at 15:25

## 2025-01-01 RX ADMIN — PREDNISONE 30 MILLIGRAM(S): 20 TABLET ORAL at 06:22

## 2025-01-01 RX ADMIN — Medication 500 MILLIGRAM(S): at 22:29

## 2025-01-01 RX ADMIN — METOPROLOL SUCCINATE 12.5 MILLIGRAM(S): 50 TABLET, EXTENDED RELEASE ORAL at 21:47

## 2025-01-01 RX ADMIN — Medication 3 MILLIGRAM(S): at 22:26

## 2025-01-01 RX ADMIN — Medication 5 MILLIGRAM(S): at 21:24

## 2025-01-01 RX ADMIN — Medication 500 MILLIGRAM(S): at 09:53

## 2025-01-01 RX ADMIN — IPRATROPIUM BROMIDE AND ALBUTEROL SULFATE 3 MILLILITER(S): .5; 2.5 SOLUTION RESPIRATORY (INHALATION) at 08:46

## 2025-01-01 RX ADMIN — Medication 0.5 MILLIGRAM(S): at 06:58

## 2025-01-01 RX ADMIN — Medication 0.5 MILLIGRAM(S): at 03:20

## 2025-01-01 RX ADMIN — Medication 0.5 MILLIGRAM(S): at 04:34

## 2025-01-01 RX ADMIN — Medication 1 PACKET(S): at 17:48

## 2025-01-01 RX ADMIN — IPRATROPIUM BROMIDE AND ALBUTEROL SULFATE 3 MILLILITER(S): .5; 2.5 SOLUTION RESPIRATORY (INHALATION) at 17:26

## 2025-01-01 RX ADMIN — METOPROLOL SUCCINATE 12.5 MILLIGRAM(S): 50 TABLET, EXTENDED RELEASE ORAL at 17:45

## 2025-01-01 RX ADMIN — METOPROLOL SUCCINATE 12.5 MILLIGRAM(S): 50 TABLET, EXTENDED RELEASE ORAL at 05:29

## 2025-01-01 RX ADMIN — Medication 650 MILLIGRAM(S): at 22:00

## 2025-01-01 RX ADMIN — IPRATROPIUM BROMIDE AND ALBUTEROL SULFATE 3 MILLILITER(S): .5; 2.5 SOLUTION RESPIRATORY (INHALATION) at 10:39

## 2025-01-01 RX ADMIN — Medication 280 MILLIGRAM(S): at 20:12

## 2025-01-01 RX ADMIN — Medication 500 MILLIGRAM(S): at 07:55

## 2025-01-01 RX ADMIN — METOPROLOL SUCCINATE 25 MILLIGRAM(S): 50 TABLET, EXTENDED RELEASE ORAL at 21:24

## 2025-01-01 RX ADMIN — Medication 500 MICROGRAM(S): at 22:29

## 2025-01-01 RX ADMIN — METOPROLOL SUCCINATE 25 MILLIGRAM(S): 50 TABLET, EXTENDED RELEASE ORAL at 05:46

## 2025-01-01 RX ADMIN — IPRATROPIUM BROMIDE AND ALBUTEROL SULFATE 3 MILLILITER(S): .5; 2.5 SOLUTION RESPIRATORY (INHALATION) at 21:37

## 2025-01-01 RX ADMIN — Medication 500 MILLIGRAM(S): at 22:24

## 2025-01-01 RX ADMIN — Medication 81 MILLIGRAM(S): at 11:10

## 2025-01-01 RX ADMIN — Medication 100 MICROGRAM(S): at 06:16

## 2025-01-01 RX ADMIN — Medication 0.5 MILLIGRAM(S): at 03:41

## 2025-01-01 RX ADMIN — Medication 650 MILLIGRAM(S): at 13:27

## 2025-01-01 RX ADMIN — METOPROLOL SUCCINATE 12.5 MILLIGRAM(S): 50 TABLET, EXTENDED RELEASE ORAL at 05:37

## 2025-01-01 RX ADMIN — Medication 0.5 MILLIGRAM(S): at 02:22

## 2025-01-01 RX ADMIN — IPRATROPIUM BROMIDE AND ALBUTEROL SULFATE 3 MILLILITER(S): .5; 2.5 SOLUTION RESPIRATORY (INHALATION) at 21:45

## 2025-01-01 RX ADMIN — Medication 0.5 MILLIGRAM(S): at 00:11

## 2025-01-01 RX ADMIN — Medication 1 PACKET(S): at 06:44

## 2025-01-01 RX ADMIN — Medication 40 MILLIGRAM(S): at 17:51

## 2025-01-01 RX ADMIN — Medication 0.5 MILLIGRAM(S): at 01:11

## 2025-01-01 RX ADMIN — Medication 0.2 MILLIGRAM(S): at 06:56

## 2025-01-01 RX ADMIN — Medication 650 MILLIGRAM(S): at 22:26

## 2025-01-01 RX ADMIN — Medication 3 MILLIGRAM(S): at 21:21

## 2025-01-01 RX ADMIN — Medication 40 MILLIGRAM(S): at 20:11

## 2025-01-01 RX ADMIN — Medication 100 MICROGRAM(S): at 06:22

## 2025-01-01 RX ADMIN — IPRATROPIUM BROMIDE AND ALBUTEROL SULFATE 3 MILLILITER(S): .5; 2.5 SOLUTION RESPIRATORY (INHALATION) at 03:25

## 2025-01-01 RX ADMIN — PREDNISONE 20 MILLIGRAM(S): 20 TABLET ORAL at 06:16

## 2025-01-01 RX ADMIN — METOPROLOL SUCCINATE 12.5 MILLIGRAM(S): 50 TABLET, EXTENDED RELEASE ORAL at 17:47

## 2025-01-01 RX ADMIN — Medication 40 MILLIGRAM(S): at 22:27

## 2025-01-01 RX ADMIN — Medication 1 DOSE(S): at 21:49

## 2025-01-01 RX ADMIN — IPRATROPIUM BROMIDE AND ALBUTEROL SULFATE 3 MILLILITER(S): .5; 2.5 SOLUTION RESPIRATORY (INHALATION) at 20:43

## 2025-01-01 RX ADMIN — IPRATROPIUM BROMIDE AND ALBUTEROL SULFATE 3 MILLILITER(S): .5; 2.5 SOLUTION RESPIRATORY (INHALATION) at 03:26

## 2025-01-01 RX ADMIN — Medication 100 MICROGRAM(S): at 05:36

## 2025-01-01 RX ADMIN — Medication 40 MILLIGRAM(S): at 22:30

## 2025-01-01 RX ADMIN — Medication 40 MILLIGRAM(S): at 22:11

## 2025-01-01 RX ADMIN — PREDNISONE 20 MILLIGRAM(S): 20 TABLET ORAL at 05:21

## 2025-01-01 RX ADMIN — ATORVASTATIN CALCIUM 40 MILLIGRAM(S): 80 TABLET, FILM COATED ORAL at 22:31

## 2025-01-01 RX ADMIN — Medication 1 DOSE(S): at 22:10

## 2025-01-01 RX ADMIN — Medication 0.5 MILLIGRAM(S): at 10:53

## 2025-01-01 RX ADMIN — Medication 1 APPLICATION(S): at 12:33

## 2025-01-01 RX ADMIN — IPRATROPIUM BROMIDE AND ALBUTEROL SULFATE 3 MILLILITER(S): .5; 2.5 SOLUTION RESPIRATORY (INHALATION) at 09:44

## 2025-01-01 RX ADMIN — Medication 1 DOSE(S): at 21:47

## 2025-01-01 RX ADMIN — Medication 100 MICROGRAM(S): at 06:40

## 2025-01-01 RX ADMIN — Medication 0.5 MILLIGRAM(S): at 10:45

## 2025-01-01 RX ADMIN — Medication 1 APPLICATION(S): at 12:00

## 2025-01-01 RX ADMIN — Medication 500 MICROGRAM(S): at 16:31

## 2025-01-01 RX ADMIN — Medication 0.5 MILLIGRAM(S): at 05:30

## 2025-01-01 RX ADMIN — PREDNISONE 30 MILLIGRAM(S): 20 TABLET ORAL at 05:36

## 2025-01-01 RX ADMIN — ATORVASTATIN CALCIUM 40 MILLIGRAM(S): 80 TABLET, FILM COATED ORAL at 21:21

## 2025-01-01 RX ADMIN — Medication 650 MILLIGRAM(S): at 06:15

## 2025-01-01 RX ADMIN — Medication 40 MILLIGRAM(S): at 21:27

## 2025-01-01 RX ADMIN — Medication 500 MILLIGRAM(S): at 16:13

## 2025-01-01 RX ADMIN — Medication 1 PACKET(S): at 17:37

## 2025-01-01 RX ADMIN — Medication 500 MILLIGRAM(S): at 10:58

## 2025-01-01 RX ADMIN — Medication 81 MILLIGRAM(S): at 11:17

## 2025-01-01 RX ADMIN — Medication 0.2 MILLIGRAM(S): at 14:31

## 2025-01-01 RX ADMIN — LEVALBUTEROL HYDROCHLORIDE 0.63 MILLIGRAM(S): 1.25 SOLUTION RESPIRATORY (INHALATION) at 23:06

## 2025-01-01 RX ADMIN — Medication 650 MILLIGRAM(S): at 05:57

## 2025-01-01 RX ADMIN — Medication 650 MILLIGRAM(S): at 05:21

## 2025-01-01 RX ADMIN — ATORVASTATIN CALCIUM 40 MILLIGRAM(S): 80 TABLET, FILM COATED ORAL at 21:24

## 2025-01-01 RX ADMIN — ATORVASTATIN CALCIUM 40 MILLIGRAM(S): 80 TABLET, FILM COATED ORAL at 22:08

## 2025-01-01 RX ADMIN — Medication 0.5 MILLIGRAM(S): at 14:31

## 2025-01-01 RX ADMIN — Medication 650 MILLIGRAM(S): at 21:36

## 2025-01-01 RX ADMIN — Medication 3 MILLIGRAM(S): at 22:30

## 2025-01-01 RX ADMIN — Medication 1 DOSE(S): at 22:30

## 2025-01-01 RX ADMIN — METHYLPREDNISOLONE ACETATE 40 MILLIGRAM(S): 80 INJECTION, SUSPENSION INTRA-ARTICULAR; INTRALESIONAL; INTRAMUSCULAR; SOFT TISSUE at 21:24

## 2025-01-01 RX ADMIN — Medication 650 MILLIGRAM(S): at 08:49

## 2025-01-01 RX ADMIN — Medication 10 MILLIGRAM(S): at 06:09

## 2025-01-01 RX ADMIN — METOPROLOL SUCCINATE 12.5 MILLIGRAM(S): 50 TABLET, EXTENDED RELEASE ORAL at 06:16

## 2025-01-01 RX ADMIN — Medication 81 MILLIGRAM(S): at 13:18

## 2025-01-01 RX ADMIN — Medication 100 MICROGRAM(S): at 05:10

## 2025-01-01 RX ADMIN — Medication 3 MILLIGRAM(S): at 23:35

## 2025-01-01 RX ADMIN — SODIUM POLYSTYRENE SULFONATE 30 GRAM(S): 15 SUSPENSION ORAL; RECTAL at 11:10

## 2025-01-01 RX ADMIN — Medication 650 MILLIGRAM(S): at 00:40

## 2025-01-01 RX ADMIN — Medication 40 MILLIGRAM(S): at 21:47

## 2025-01-01 RX ADMIN — Medication 0.2 MILLIGRAM(S): at 12:11

## 2025-01-01 RX ADMIN — Medication 500 MILLIGRAM(S): at 11:10

## 2025-01-01 RX ADMIN — METHYLPREDNISOLONE ACETATE 40 MILLIGRAM(S): 80 INJECTION, SUSPENSION INTRA-ARTICULAR; INTRALESIONAL; INTRAMUSCULAR; SOFT TISSUE at 12:36

## 2025-01-01 RX ADMIN — PREDNISONE 40 MILLIGRAM(S): 20 TABLET ORAL at 09:14

## 2025-01-01 RX ADMIN — PREDNISONE 30 MILLIGRAM(S): 20 TABLET ORAL at 06:39

## 2025-01-01 RX ADMIN — IPRATROPIUM BROMIDE AND ALBUTEROL SULFATE 3 MILLILITER(S): .5; 2.5 SOLUTION RESPIRATORY (INHALATION) at 15:51

## 2025-01-01 RX ADMIN — Medication 650 MILLIGRAM(S): at 23:41

## 2025-01-01 RX ADMIN — IPRATROPIUM BROMIDE AND ALBUTEROL SULFATE 3 MILLILITER(S): .5; 2.5 SOLUTION RESPIRATORY (INHALATION) at 16:46

## 2025-01-01 RX ADMIN — ATORVASTATIN CALCIUM 40 MILLIGRAM(S): 80 TABLET, FILM COATED ORAL at 21:45

## 2025-01-01 RX ADMIN — Medication 1 DOSE(S): at 10:53

## 2025-01-01 RX ADMIN — Medication 3 MILLIGRAM(S): at 22:09

## 2025-01-01 RX ADMIN — Medication 1 DOSE(S): at 11:53

## 2025-01-01 RX ADMIN — ATORVASTATIN CALCIUM 40 MILLIGRAM(S): 80 TABLET, FILM COATED ORAL at 22:21

## 2025-01-01 RX ADMIN — Medication 100 MICROGRAM(S): at 05:43

## 2025-01-01 RX ADMIN — Medication 3 MILLIGRAM(S): at 21:51

## 2025-01-01 RX ADMIN — Medication 1 DOSE(S): at 09:00

## 2025-01-01 RX ADMIN — PREDNISONE 40 MILLIGRAM(S): 20 TABLET ORAL at 05:10

## 2025-01-01 RX ADMIN — Medication 0.5 MILLIGRAM(S): at 15:28

## 2025-01-01 RX ADMIN — IPRATROPIUM BROMIDE AND ALBUTEROL SULFATE 3 MILLILITER(S): .5; 2.5 SOLUTION RESPIRATORY (INHALATION) at 23:43

## 2025-01-01 RX ADMIN — Medication 650 MILLIGRAM(S): at 12:27

## 2025-01-01 RX ADMIN — Medication 0.5 MILLIGRAM(S): at 16:20

## 2025-01-01 RX ADMIN — Medication 500 MICROGRAM(S): at 03:58

## 2025-01-01 RX ADMIN — Medication 650 MILLIGRAM(S): at 12:50

## 2025-01-01 RX ADMIN — Medication 650 MILLIGRAM(S): at 06:58

## 2025-01-01 RX ADMIN — Medication 1 DOSE(S): at 09:32

## 2025-01-01 RX ADMIN — Medication 500 MICROGRAM(S): at 04:33

## 2025-01-01 RX ADMIN — Medication 1 DOSE(S): at 22:28

## 2025-01-01 RX ADMIN — Medication 0.5 MILLIGRAM(S): at 11:45

## 2025-01-01 RX ADMIN — Medication 1 DOSE(S): at 21:37

## 2025-01-01 RX ADMIN — Medication 500 MICROGRAM(S): at 20:53

## 2025-01-01 RX ADMIN — Medication 500 MILLIGRAM(S): at 23:06

## 2025-01-01 RX ADMIN — METHYLPREDNISOLONE ACETATE 40 MILLIGRAM(S): 80 INJECTION, SUSPENSION INTRA-ARTICULAR; INTRALESIONAL; INTRAMUSCULAR; SOFT TISSUE at 22:21

## 2025-01-01 RX ADMIN — Medication 0.25 MILLIGRAM(S): at 10:57

## 2025-01-01 RX ADMIN — Medication 40 MILLIGRAM(S): at 20:22

## 2025-01-01 RX ADMIN — Medication 0.5 MILLIGRAM(S): at 17:49

## 2025-01-01 RX ADMIN — IPRATROPIUM BROMIDE AND ALBUTEROL SULFATE 3 MILLILITER(S): .5; 2.5 SOLUTION RESPIRATORY (INHALATION) at 21:06

## 2025-01-01 RX ADMIN — Medication 1 APPLICATION(S): at 11:24

## 2025-01-01 RX ADMIN — Medication 0.5 MILLIGRAM(S): at 21:47

## 2025-01-01 RX ADMIN — IPRATROPIUM BROMIDE AND ALBUTEROL SULFATE 3 MILLILITER(S): .5; 2.5 SOLUTION RESPIRATORY (INHALATION) at 10:05

## 2025-01-01 RX ADMIN — Medication 1 DOSE(S): at 08:21

## 2025-01-01 RX ADMIN — Medication 10 MILLIGRAM(S): at 05:46

## 2025-01-01 RX ADMIN — Medication 2 TABLET(S): at 22:27

## 2025-01-01 RX ADMIN — Medication 650 MILLIGRAM(S): at 13:50

## 2025-01-01 RX ADMIN — METOPROLOL SUCCINATE 12.5 MILLIGRAM(S): 50 TABLET, EXTENDED RELEASE ORAL at 18:01

## 2025-01-01 RX ADMIN — Medication 0.2 MILLIGRAM(S): at 09:51

## 2025-01-01 RX ADMIN — IPRATROPIUM BROMIDE AND ALBUTEROL SULFATE 3 MILLILITER(S): .5; 2.5 SOLUTION RESPIRATORY (INHALATION) at 15:36

## 2025-01-01 RX ADMIN — METHYLPREDNISOLONE ACETATE 40 MILLIGRAM(S): 80 INJECTION, SUSPENSION INTRA-ARTICULAR; INTRALESIONAL; INTRAMUSCULAR; SOFT TISSUE at 06:09

## 2025-01-01 RX ADMIN — ATORVASTATIN CALCIUM 40 MILLIGRAM(S): 80 TABLET, FILM COATED ORAL at 22:27

## 2025-01-01 RX ADMIN — Medication 100 MICROGRAM(S): at 06:10

## 2025-01-02 NOTE — CONSULT NOTE ADULT - SUBJECTIVE AND OBJECTIVE BOX
[facility] - Mateo Bobby, Mele Woodson, Adrian, Librado Salguero  Office Number: [phone]    Initial Consult Note    CHIEF COMPLAINT: Patient is a 79y old  Female who presents with a chief complaint of SOB       HPI:   79-year-old female with a history of CHF, COPD, severe mitral valve stenosis, LVOT obstruction, hypertension, high cholesterol, pleural effusion requiring thoracentesis presents with increasing shortness of breath over past 1 week.  Patient states was worse today.  Patient was found to be out of breath at home, came to the ED and improved with oxygen. Denies chest pain.  fever, chills, or cough. Endorses lower extremity edema bilaterally.  Denies weakness or dizziness.  ED COURSE:  Vitals: T 96.8  F , 70  HR  ,  /65 , RR 18  , SpO2  80% on 4L NC  Labs significant for: Hgb 10.3, d-dimer 381, BUN 50, Cr 1.4, Lactate 2.1 -> 1.2, Troponin 9.4, BNP= 4367  Imaging: bibasilar effusions  Pt received: Albuterol, methylprednisolone      Patient has been taking Lasix bid, and still developed CHF.  She is short of breath with minimal exertion.  She sees Dr. Cruz.  She has seen Dr. Garica, and has been deemed not a surgical candidate.  She has seen structural heart at , and is not a candidate for the TMVR under Apollo trial criteria.  She was supposed to follow at [facility] to see if she met criterial for the [place] trial, but missed her appointment.       PAST MEDICAL & SURGICAL HISTORY:  History of COPD  No home O2 use      Asthma      Thyroid nodule      Hyperlipidemia      Hypertension      Hypothyroidism      History of cholecystectomy  1989      History of repair of hip fracture  ORIF 1982.  Hardware was eventually removed          SOCIAL HISTORY:  No tobacco, ethanol, or drug abuse.    FAMILY HISTORY:  FH: CAD (coronary artery disease) (Mother)      No family history of acute MI or sudden cardiac death.    MEDICATIONS  (STANDING):  albuterol/ipratropium for Nebulization 3 milliLiter(s) Nebulizer every 6 hours  atorvastatin 40 milliGRAM(s) Oral at bedtime  furosemide   Injectable 40 milliGRAM(s) IV Push daily  heparin   Injectable 5000 Unit(s) SubCutaneous every 8 hours  levothyroxine 100 MICROGram(s) Oral daily  methylPREDNISolone sodium succinate Injectable 40 milliGRAM(s) IV Push two times a day  metoprolol succinate  milliGRAM(s) Oral daily    MEDICATIONS  (PRN):  albuterol    90 MICROgram(s) HFA Inhaler 1 Puff(s) Inhalation every 4 hours PRN for shortness of breath and/or wheezing      Allergies    No Known Allergies             REVIEW OF SYSTEMS:       All other review of systems is negative unless indicated above    VITAL SIGNS:   Vital Signs Last 24 Hrs  T(C): 36.3 (28 Jul 2024 04:51), Max: 36.9 (27 Jul 2024 21:29)  T(F): 97.3 (28 Jul 2024 04:51), Max: 98.4 (27 Jul 2024 21:29)  HR: 78 (28 Jul 2024 09:41) (64 - 91)  BP: 111/54 (28 Jul 2024 08:00) (103/53 - 138/72)  BP(mean): --  RR: 18 (28 Jul 2024 04:51) (18 - 20)  SpO2: 95% (28 Jul 2024 09:41) (80% - 100%)    Parameters below as of 28 Jul 2024 09:41  Patient On (Oxygen Delivery Method): nasal cannula        I&O's Summary      On Exam:    Constitutional: NAD, alert and oriented x 3  Lungs:  Non-labored, breath sounds are clear bilaterally.  Decreased bs b/l.  Bibasilar rales.  Cardiovascular: RRR.  S1 and S2 positive.  2/6 systolic murmur  Gastrointestinal: Bowel Sounds present, soft, nontender.   Lymph: Minimal b/lperipheral edema. No cervical lymphadenopathy.  Neurological: Alert, no focal deficits  Skin: No rashes or ulcers   Psych:  Mood & affect appropriate.    LABS: All Labs Reviewed:                        8.8    5.05  )-----------( 247      ( 28 Jul 2024 07:40 )             27.4                         10.3   7.07  )-----------( 284      ( 27 Jul 2024 15:30 )             32.2     28 Jul 2024 07:40    138    |  99     |  47     ----------------------------<  135    3.9     |  33     |  1.30   27 Jul 2024 15:30    136    |  97     |  50     ----------------------------<  175    4.0     |  31     |  1.40     Ca    9.1        28 Jul 2024 07:40  Ca    9.7        27 Jul 2024 15:30  Phos  4.4       28 Jul 2024 07:40  Mg     2.3       28 Jul 2024 07:40    TPro  7.0    /  Alb  2.9    /  TBili  0.5    /  DBili  x      /  AST  19     /  ALT  17     /  AlkPhos  62     28 Jul 2024 07:40  TPro  8.2    /  Alb  3.4    /  TBili  0.6    /  DBili  x      /  AST  30     /  ALT  21     /  AlkPhos  84     27 Jul 2024 15:30    PT/INR - ( 27 Jul 2024 15:30 )   PT: 10.3 sec;   INR: 0.88 ratio         PTT - ( 27 Jul 2024 15:30 )  PTT:32.5 sec  CARDIAC MARKERS ( 27 Jul 2024 15:30 )  x     / x     / 66 U/L / x     / x          Blood Culture:         RADIOLOGY:  < from: CT Chest No Cont (07.27.24 @ 18:11) >    ACC: [id number] EXAM:  CT CHEST   ORDERED BY: MARY WHITE     PROCEDURE DATE:  07/27/2024          INTERPRETATION:  CLINICAL INFORMATION: SOB, lower extremity edema,   pleural effusion    COMPARISON: CT from June 2024.    CONTRAST/COMPLICATIONS:  IV Contrast: NONE  Oral Contrast: NONE  Complications: None reported at time of study completion    PROCEDURE:  CT of the Chest was performed.  Sagittal and coronal reformats were performed.    FINDINGS:    LUNGS AND LARGE AIRWAYS: Breathing motion artifacts limiting evaluation.   Bilateral large pleural effusions are visible which appear grossly   similar to the prior CT however probably slightly increased. Extensive   areas with compression atelectasis adjacent to the pleural effusion as   well as areas with atelectasis in the right middle lobe and lingula are   noted; concurrent pneumonia in the atelectatic lungs cannot be excluded.   Extensive septal thickening is visible mainly affecting the upper lobes   with associated changes of emphysema. The airspace opacity seen on the CT   from June 2024 have largely resolved.  PLEURA: No pleural effusion.  VESSELS: Within normal limits.  HEART: Cardiomegaly is noted. Mitral valve calcification.. No pericardial   effusion.  MEDIASTINUM AND MATTHEW: Limited evaluation. Multiple prominent mediastinal   lymph nodes are noted which appear grossly similar to the prior CT..  CHEST WALL AND LOWER NECK: Postoperative changes are visible in the   thyroid bed. Stable appearance of the right lower thyroid; the left-sided   thyroid is not visible and may be surgically absent..  VISUALIZED UPPER ABDOMEN: Limited slices through the upper abdomen show   cholecystectomy. Dilated hepatic veins are noted with dilated IVC..  BONES: Stable wedge-shaped deformity of multiple thoracic vertebral   bodies.    IMPRESSION:  Breathing motion artifacts limiting the evaluation.    Cardiomegaly with bilateral large pleural effusions are visible with   subjacent atelectasis and extensive septal thickening, concerning for   CHF/fluid overload.    Limited evaluation for loculated pleural effusion on this noncontrast CT.    Extensive areas with atelectasis are visible mainly in the right middle   lobe and the lingula; concurrent/developing pneumonia in the atelectatic   lungs cannot be excluded.    The airspace opacity seen on the CT from June 2024 have largely resolved.    --- End of Report ---            NANETTE HERNANDEZ MD; Attending Radiologist  This document has been electronically    < end of copied text >  < from: TTE W or WO Ultrasound Enhancing Agent (06.10.24 @ 21:44) >    TRANSTHORACIC ECHOCARDIOGRAM REPORT  ________________________________________________________________________________                                      _______       Pt. Name:       SANJUANA TORRES Study Date:    6/10/2024  MRN:            [id number]    YOB: 1945  Accession #:    [id number]    Age:           79 years  Account#:       [id number]   Gender:        F  Visit ID#  Heart Rate:                  Height:        62.20 in (158.00 cm)  Rhythm:                      Weight: 108.02 lb (49.00 kg)  Blood Pressure: 115/61 mmHg  BSA/BMI:       1.47 m² / 19.63 kg/m²  ________________________________________________________________________________________  Referring Physician:    [id number] Marco Antonio Coronado  Interpreting Physician: Kentrell Galindo  Primary Sonographer:    Derrell Harry    CPT:               ECHO TTE WO CON COMP W DOPP - 98271.m  Indication(s):     Heart failure, unspecified - I50.9  Procedure:         Transthoracic echocardiogram with 2-D, M-mode and complete                     spectral and color flow Doppler.  Ordering Location: [facility]  Admission Status:  ED  Study Information: Image quality for this study is technically difficult.    _______________________________________________________________________________________     CONCLUSIONS:      1. Technically difficult image quality.   2. There is increased LV mass and concentric hypertrophy.   3. Left ventricular systolic function is hyperdynamic with an ejection fraction of 79 % by Shepard's method ofdisks.   4. Hyperdynamic left ventricular systolic function. Basal septal hypertrophy (measures 1.8 cm) with evidence left ventricular outflow tract obstruction with a resting gradient of 41mmHg and a gradient of 47 mmHg with valsalva, overall consistent with moderate LVOT obstruction.   5. Normal right ventricular cavity size and normal systolic function.   6. The right atrium is normal in size.   7. The left atrium is severely dilated.   8. Interatrial septum is stretched and displaced to the right, consistent with left atrial volume overload.   9. Tricuspid aortic valve with normal leaflet excursion. There is moderate thickening of the aortic valve leaflets.  10. Trace aortic regurgitation.  11. Severe mitral valve leaflet calcification.  12. Peak gradient across the mitral valve is 41mmHg with a mean gradient of 19mmHg, at a heart rate of 78 beats per minute.  13. Severe mitral valve stenosis.  14. Mild mitral regurgitation.  15. Mild to moderate tricuspid regurgitation.  16. The inferior vena cava is normal in size measuring 1.56 cm in diameter, (normal <2.1cm) with normal inspiratory collapse (normal >50%) consistent with normal right atrial pressure (~3, range 0-5mmHg).  17. Estimated pulmonary artery systolic pressure is 47 mmHg, consistent with moderate pulmonary hypertension.  18. Small pericardial effusion noted adjacent to the right atrium.  19. Bilateral pleural effusion noted.    ________________________________________________________________________________________  FINDINGS:     Left Ventricle:  Left ventricular systolic function is hyperdynamic with a calculated ejection fraction of 79 % by the Shepard's biplane method of disks. There is increased LV mass and concentric hypertrophy. There is basal septal thickening (sigmoid septum) (measuring 1.8 cm) with evidence left ventricular outflow tract obstruction with a gradient of 47 mmHg. Hyperdynamic left ventricular systolic function.     Right Ventricle:  The right ventricular cavity is normal in size and normal systolic function. Tricuspid annular plane systolic excursion (TAPSE) is 2.7 cm (normal >=1.7 cm).     Left Atrium:  The left atrium is severely dilated with an indexed volume of 70.52 ml/m².     Right Atrium:  The right atrium is normal in sizewith an indexed volume of 12.14 ml/m². There is a prominent Eustachian valve present, which is a normal variant.     Interatrial Septum:  The interatrial septum is stretched and displaced to the right, consistent with left atrial volume overload.     Aortic Valve:  The aortic valve is tricuspid with normal leaflet excursion. There is moderate thickening of the aortic valve leaflets. There is trace aortic regurgitation.     Mitral Valve:  There is diffuse mitral valve thickening of the anterior and posterior leaflets. Peak gradient across the mitral valve is 41mmHg with a mean gradient of 19mmHg, at a heart rate of 78 beats per minute. There is severe leaflet calcification. There is severe mitral valve stenosis. The calculated mitral valve area is 1.4 cm². There is mild mitral regurgitation.     Tricuspid Valve:  There is mild to moderate tricuspid regurgitation. Estimated pulmonary artery systolic pressure is 47 mmHg, consistent with moderate pulmonary hypertension.     Pulmonic Valve:  The pulmonic valve was not well visualized.     Aorta:  The aortic root at the sinuses of Valsalva is normal in size, measuring 3.20 cm (indexed 2.17 cm/m²). The ascending aorta diameter is normal in size, measuring 2.50 cm (indexed 1.70 cm/m²).     Pericardium:  There is a small pericardial effusion noted adjacent to the right atrium.     Pleura:  Bilateral pleural effusion noted.     Systemic Veins:  The inferior vena cava is normal in size measuring 1.56 cm in diameter, (normal <2.1cm) with normal inspiratory collapse (normal >50%) consistent with normal right atrial pressure (~3, range 0-5mmHg).  ____________________________________________________________________  QUANTITATIVE DATA:  Left Ventricle Measurements: (Indexed to BSA)     IVSd (2D):   1.1 cm  LVPWd (2D):  1.1 cm  LVIDd (2D):  4.4 cm  LVIDs (2D):  2.8 cm  LV Mass:     165 g  111.9 g/m²  LV Vol d, MOD A2C: 33.4 ml 22.65 ml/m²  LV Vol d, MOD A4C: 29.9 ml 20.27 ml/m²  LV Vol d, MOD BP:  35.7 ml 24.18 ml/m²  LV Vol s, MOD A2C: 6.7 ml  4.55 ml/m²  LV Vol s, MOD A4C: 6.4 ml  4.31 ml/m²  LV Vol s, MOD BP:  7.3 ml  4.97 ml/m²  LVOT SV MOD BP:    28.3 ml  LV EF% MOD BP:     79 %     MV E Vmax:    2.67 m/s  MV A Vmax:    3.19 m/s  MV E/A:       0.84  e' lateral:   9.14 cm/s  e' medial:    6.74 cm/s  E/e' lateral: 29.21  E/e' medial:  39.61  E/e' Average: 33.63  MV DT:        446 msec    Aorta Measurements: (Normal range) (Indexed to BSA)     Sinuses of Valsalva: 3.20 cm (2.7 - 3.3 cm)  Ao Asc prox:         2.50 cm       Left Atrium Measurements: (Indexed to BSA)  LA Diam 2D: 4.70 cm    Right Ventricle Measurements: Right Atrial Measurements:     TAPSE:            2.7 cm      RA Vol:       17.90 ml  RV Base (RVID1):  3.1 cm      RA Vol Index: 12.14 ml/m²  RV Mid (RVID2):   3.6 cm  RV Major (RVID3): 6.4 cm       LVOT / RVOT/ Qp/Qs Data: (Indexed to BSA)  LVOT Diameter: 2.00 cm  LVOT Area:     3.14 cm²    Mitral Valve Measurements:     MV Vmax:         2.90 m/s  MV VTI, lane      1.105 m  MV Mean Grad:    15.5 mmHg  MV PeakGrad:    41.2 mmHg  MV E Vmax:       2.7 m/s  MV A Vmax:       3.2 m/s  MV E/A:          0.8  MV P1/2T:        160 msec  MV Area P 1/2 t: 1.4 cm²  MV Area P1/2 T:  1.4 cm²       Tricuspid Valve Measurements:     TR Vmax:          3.3 m/s  TR Peak Gradient: 44.1 mmHg  RA Pressure:      3 mmHg  PASP:             47 mmHg    ________________________________________________________________________________________  Electronically signed on 6/11/2024 at 5:46:20 PM by Kentrell Galindo         *** Final ***    < end of copied text >      EKG:  NSR.  LAE.  Low voltage.  Old ASMI       No

## 2025-01-07 ENCOUNTER — RX RENEWAL (OUTPATIENT)
Age: 80
End: 2025-01-07

## 2025-01-23 NOTE — PROGRESS NOTE ADULT - NS_MD_PANP_GEN_ALL_CORE
Patient gives verbal consent to use [employer] to record visit today.    CC:  Michelle Bernal is here today for Physical and Follow-up (Chronic Health Conditions.)   No other concerns.    Medications: medications verified and updated  Refills needed today?  Yes, Losartan  denies Latex allergy or sensitivity  Patient would like communication of their results via:  [facility]    Cell Phone:   Telephone Information:   Mobile [phone]     Okay to leave a message containing results? Yes  Tobacco history: verified  Advanced directives: Not Applicable  Patient's current [facility] status: Active.       Health Maintenance       Shingles Vaccine (1 of 2)  Never done    Pneumococcal Vaccine 50+ (1 of 1 - PCV)  Never done    Influenza Vaccine (1)  Never done    COVID-19 Vaccine (1 - 2024-25 season)  Never done           Following review of the above:  Patient is not proceeding with: COVID-19, Influenza, and Pneumococcal. Will have shingles vaccine.    Note: Refer to final orders and clinician documentation.                           Attending and PA/NP shared services statement (NON-critical care):

## 2025-02-03 ENCOUNTER — RX RENEWAL (OUTPATIENT)
Age: 80
End: 2025-02-03

## 2025-02-12 ENCOUNTER — RX RENEWAL (OUTPATIENT)
Age: 80
End: 2025-02-12

## 2025-02-18 NOTE — HISTORY OF PRESENT ILLNESS
Assessment and Plan:  Sharmin French is a 35 y/o  woman presenting today for contraceptive management.     Diagnoses:  #1 Initial prescription of contraceptive pills    Assessment/Plan:  1. Contraceptive management  - Paula birth control pill sent to pharmacy.  - Discussed with patient the risks, benefits, and alternatives.  - Information about alternative forms of contraception discussed.  - Patient to return to the office virtually or in-person if she would like to follow-up, otherwise, prescription written for the entire year.    Follow up with Dr. Fernandez.    Thelmae Attestation  By signing my name below, I, Dipti Vargas, attest that this documentation has been prepared under the direction and in the presence of Hortencia Fernandez MD on 2025 at 11:55 AM.     HPI:   Sharmin French is a 35 y/o  woman presenting today for contraceptive management. Her last menstrual period ended on . She denies a history of migraines with auras, cHTN or a clotting d/o.    ROS:  Review of Systems    Physical Exam:   Physical Exam  Constitutional:       General: She is not in acute distress.     Appearance: Normal appearance. She is normal weight.   Neurological:      Mental Status: She is alert and oriented to person, place, and time.        [FreeTextEntry1] : foot issue, thyroid [de-identified] : went to ER 3 wks ago for bruise on right foot\par no trauma or pain--improving\par states she increased her synthroid

## 2025-02-25 ENCOUNTER — RX RENEWAL (OUTPATIENT)
Age: 80
End: 2025-02-25

## 2025-03-04 ENCOUNTER — APPOINTMENT (OUTPATIENT)
Dept: PULMONOLOGY | Facility: CLINIC | Age: 80
End: 2025-03-04
Payer: MEDICARE

## 2025-03-04 VITALS
DIASTOLIC BLOOD PRESSURE: 64 MMHG | BODY MASS INDEX: 20.62 KG/M2 | SYSTOLIC BLOOD PRESSURE: 119 MMHG | HEIGHT: 60 IN | OXYGEN SATURATION: 92 % | WEIGHT: 105 LBS | HEART RATE: 106 BPM

## 2025-03-04 DIAGNOSIS — R06.09 OTHER FORMS OF DYSPNEA: ICD-10-CM

## 2025-03-04 PROCEDURE — 36415 COLL VENOUS BLD VENIPUNCTURE: CPT

## 2025-03-04 PROCEDURE — 99215 OFFICE O/P EST HI 40 MIN: CPT

## 2025-03-04 RX ORDER — FLUTICASONE FUROATE, UMECLIDINIUM BROMIDE AND VILANTEROL TRIFENATATE 200; 62.5; 25 UG/1; UG/1; UG/1
200-62.5-25 POWDER RESPIRATORY (INHALATION)
Qty: 1 | Refills: 2 | Status: ACTIVE | COMMUNITY
Start: 2025-03-04 | End: 1900-01-01

## 2025-03-06 LAB
ALBUMIN SERPL ELPH-MCNC: 4.3 G/DL
ALP BLD-CCNC: 101 U/L
ALT SERPL-CCNC: 23 U/L
ANION GAP SERPL CALC-SCNC: 17 MMOL/L
AST SERPL-CCNC: 28 U/L
BILIRUB SERPL-MCNC: 0.3 MG/DL
BUN SERPL-MCNC: 57 MG/DL
CALCIUM SERPL-MCNC: 10.1 MG/DL
CHLORIDE SERPL-SCNC: 99 MMOL/L
CO2 SERPL-SCNC: 24 MMOL/L
CREAT SERPL-MCNC: 1.46 MG/DL
EGFRCR SERPLBLD CKD-EPI 2021: 36 ML/MIN/1.73M2
GLUCOSE SERPL-MCNC: 130 MG/DL
MAGNESIUM SERPL-MCNC: 2.2 MG/DL
NT-PROBNP SERPL-MCNC: 1453 PG/ML
POTASSIUM SERPL-SCNC: 4.9 MMOL/L
PROT SERPL-MCNC: 7.7 G/DL
SODIUM SERPL-SCNC: 141 MMOL/L

## 2025-03-18 ENCOUNTER — APPOINTMENT (OUTPATIENT)
Dept: CARDIOLOGY | Facility: CLINIC | Age: 80
End: 2025-03-18

## 2025-03-18 VITALS
BODY MASS INDEX: 20.81 KG/M2 | WEIGHT: 106 LBS | OXYGEN SATURATION: 98 % | HEIGHT: 60 IN | HEART RATE: 44 BPM | DIASTOLIC BLOOD PRESSURE: 68 MMHG | SYSTOLIC BLOOD PRESSURE: 102 MMHG

## 2025-03-24 DIAGNOSIS — J90 PLEURAL EFFUSION, NOT ELSEWHERE CLASSIFIED: ICD-10-CM

## 2025-03-24 DIAGNOSIS — R06.09 OTHER FORMS OF DYSPNEA: ICD-10-CM

## 2025-03-25 ENCOUNTER — LABORATORY RESULT (OUTPATIENT)
Age: 80
End: 2025-03-25

## 2025-03-26 DIAGNOSIS — I27.20 PULMONARY HYPERTENSION, UNSPECIFIED: ICD-10-CM

## 2025-04-02 ENCOUNTER — LABORATORY RESULT (OUTPATIENT)
Age: 80
End: 2025-04-02

## 2025-04-04 PROBLEM — E87.5 HYPERKALEMIA: Status: ACTIVE | Noted: 2025-04-04

## 2025-04-04 RX ORDER — SODIUM ZIRCONIUM CYCLOSILICATE 10 G/10G
10 POWDER, FOR SUSPENSION ORAL 3 TIMES DAILY
Qty: 2 | Refills: 0 | Status: ACTIVE | COMMUNITY
Start: 2025-04-04 | End: 1900-01-01

## 2025-04-07 ENCOUNTER — LABORATORY RESULT (OUTPATIENT)
Age: 80
End: 2025-04-07

## 2025-04-08 DIAGNOSIS — E87.5 HYPERKALEMIA: ICD-10-CM

## 2025-04-15 RX ORDER — SPIRONOLACTONE 25 MG/1
25 TABLET ORAL
Qty: 90 | Refills: 1 | Status: ACTIVE | COMMUNITY
Start: 2025-04-15 | End: 1900-01-01

## 2025-04-16 ENCOUNTER — NON-APPOINTMENT (OUTPATIENT)
Age: 80
End: 2025-04-16

## 2025-04-22 ENCOUNTER — LABORATORY RESULT (OUTPATIENT)
Age: 80
End: 2025-04-22

## 2025-04-23 ENCOUNTER — RX RENEWAL (OUTPATIENT)
Age: 80
End: 2025-04-23

## 2025-04-25 ENCOUNTER — APPOINTMENT (OUTPATIENT)
Dept: CT IMAGING | Facility: CLINIC | Age: 80
End: 2025-04-25

## 2025-04-25 ENCOUNTER — APPOINTMENT (OUTPATIENT)
Dept: PULMONOLOGY | Facility: CLINIC | Age: 80
End: 2025-04-25
Payer: MEDICARE

## 2025-04-25 VITALS
TEMPERATURE: 95.5 F | RESPIRATION RATE: 15 BRPM | HEIGHT: 60 IN | DIASTOLIC BLOOD PRESSURE: 64 MMHG | OXYGEN SATURATION: 93 % | WEIGHT: 102.56 LBS | HEART RATE: 55 BPM | SYSTOLIC BLOOD PRESSURE: 116 MMHG | BODY MASS INDEX: 20.14 KG/M2

## 2025-04-25 DIAGNOSIS — J96.11 CHRONIC RESPIRATORY FAILURE WITH HYPOXIA: ICD-10-CM

## 2025-04-25 DIAGNOSIS — I05.0 RHEUMATIC MITRAL STENOSIS: ICD-10-CM

## 2025-04-25 DIAGNOSIS — R06.09 OTHER FORMS OF DYSPNEA: ICD-10-CM

## 2025-04-25 DIAGNOSIS — J45.909 UNSPECIFIED ASTHMA, UNCOMPLICATED: ICD-10-CM

## 2025-04-25 DIAGNOSIS — R04.2 HEMOPTYSIS: ICD-10-CM

## 2025-04-25 DIAGNOSIS — Z99.81 CHRONIC RESPIRATORY FAILURE WITH HYPOXIA: ICD-10-CM

## 2025-04-25 DIAGNOSIS — I27.20 PULMONARY HYPERTENSION, UNSPECIFIED: ICD-10-CM

## 2025-04-25 DIAGNOSIS — J44.9 CHRONIC OBSTRUCTIVE PULMONARY DISEASE, UNSPECIFIED: ICD-10-CM

## 2025-04-25 PROCEDURE — 99215 OFFICE O/P EST HI 40 MIN: CPT | Mod: 25

## 2025-04-25 PROCEDURE — 94729 DIFFUSING CAPACITY: CPT

## 2025-04-25 PROCEDURE — 71250 CT THORAX DX C-: CPT | Mod: 26

## 2025-04-25 PROCEDURE — 94010 BREATHING CAPACITY TEST: CPT

## 2025-04-25 PROCEDURE — 94727 GAS DIL/WSHOT DETER LNG VOL: CPT

## 2025-04-25 RX ORDER — FORMOTEROL FUMARATE DIHYDRATE 20 UG/2ML
20 SOLUTION RESPIRATORY (INHALATION)
Qty: 1 | Refills: 3 | Status: ACTIVE | COMMUNITY
Start: 2025-04-25 | End: 1900-01-01

## 2025-04-25 RX ORDER — ENSIFENTRINE 3 MG/2.5ML
3 SUSPENSION RESPIRATORY (INHALATION)
Qty: 1 | Refills: 6 | Status: ACTIVE | COMMUNITY
Start: 2025-04-25 | End: 1900-01-01

## 2025-04-25 RX ORDER — BUDESONIDE, GLYCOPYRROLATE, AND FORMOTEROL FUMARATE 160; 9; 4.8 UG/1; UG/1; UG/1
160-9-4.8 AEROSOL, METERED RESPIRATORY (INHALATION)
Qty: 1 | Refills: 3 | Status: ACTIVE | COMMUNITY
Start: 2025-04-25 | End: 1900-01-01

## 2025-04-25 RX ORDER — REVEFENACIN 175 UG/3ML
175 SOLUTION RESPIRATORY (INHALATION) DAILY
Qty: 3 | Refills: 1 | Status: ACTIVE | COMMUNITY
Start: 2025-04-25 | End: 1900-01-01

## 2025-05-02 ENCOUNTER — APPOINTMENT (OUTPATIENT)
Dept: INTERNAL MEDICINE | Facility: CLINIC | Age: 80
End: 2025-05-02
Payer: MEDICARE

## 2025-05-02 VITALS
OXYGEN SATURATION: 91 % | WEIGHT: 102.03 LBS | HEIGHT: 60 IN | RESPIRATION RATE: 17 BRPM | TEMPERATURE: 98 F | HEART RATE: 63 BPM | BODY MASS INDEX: 20.03 KG/M2

## 2025-05-02 VITALS — SYSTOLIC BLOOD PRESSURE: 122 MMHG | DIASTOLIC BLOOD PRESSURE: 70 MMHG

## 2025-05-02 DIAGNOSIS — I10 ESSENTIAL (PRIMARY) HYPERTENSION: ICD-10-CM

## 2025-05-02 DIAGNOSIS — E03.9 HYPOTHYROIDISM, UNSPECIFIED: ICD-10-CM

## 2025-05-02 DIAGNOSIS — I50.30 UNSPECIFIED DIASTOLIC (CONGESTIVE) HEART FAILURE: ICD-10-CM

## 2025-05-02 PROCEDURE — G2211 COMPLEX E/M VISIT ADD ON: CPT

## 2025-05-02 PROCEDURE — 99213 OFFICE O/P EST LOW 20 MIN: CPT

## 2025-05-07 NOTE — ED PROVIDER NOTE - ATTENDING CONTRIBUTION TO CARE
80-year-old female past medical history of CHF, COPD, hypertension hyperlipidemia hypothyroidism mitral valve stenosis presents with shortness of breath and cough x 11 days.  Reportedly had an outpatient CT chest 11 days ago 4/25 and was told yesterday the results of pulmonary edema and pleural effusions and was told to come to the ED.  Unable to see the results in HIE.  Endorses shortness of breath that is worsening, needing to use her home O2 2 L nasal cannula as needed but has been needing to use it.  On exam patient is nontoxic-appearing, 2 L nasal cannula, not tachypneic, diminished breath sounds at the bases, heart rate regular, abdomen soft nontender, no lower extremity edema.  Clinical picture consistent with fluid overload.  Patient and daughter at bedside declining IV diuresis with Lasix given history of YADI in the past.  Thoracic consulted to evaluate for need for thoracentesis.  Patient admitted for CHF exacerbation on telemetry

## 2025-05-07 NOTE — PATIENT PROFILE ADULT - FALL HARM RISK - HARM RISK INTERVENTIONS

## 2025-05-07 NOTE — CONSULT NOTE ADULT - SUBJECTIVE AND OBJECTIVE BOX
79 yo female with pmh of former smoker, with a PMHx of CHF, COPD, Mitral Valve Stenosis, HTN, HLD, hypothyroidism presenting with SOB , progressively worse since easter. Pt reports of SOB with cough with blood tinged sputum since 11 days ago. pt had a ct chest with Dr. Muir pulmonologist  4/25 , ct non con was done as op , patient was told yesterday  that she has pulm edema and pleural effusions, and was told to come into the ed.  Pt reports that the SOB has been worsening. pt is on home o2 NC as needed but has been on it continuously for last few days. pt denies fever, chest pain, abdominal pain, N/V/D. denies any lower ext swelling. no recent travels. denies any  hx of smoking. reports weight loss recently,  thinks its because of her cardio/ renal restricted diet. in ed noted with elevated pro bnp , cxr pulm edema and pleural effusions. Thoracic surgery consulted for evaluation of pleural effusions    Subjective - patient seen and evaluated bedside. Sitting comfortably in bed. Denies CP, SOB, HA, dizziness, n/v/d    Review of Systems: negative x 10 systems except as noted above          PAST MEDICAL & SURGICAL HISTORY:  History of COPD  No home O2 use      Asthma      Thyroid nodule      Hyperlipidemia      Hypertension      Hypothyroidism      History of cholecystectomy  1989      History of repair of hip fracture  ORIF 1982.  Hardware was eventually removed            acetaminophen     Tablet .. 650 milliGRAM(s) Oral every 6 hours PRN  albuterol    90 MICROgram(s) HFA Inhaler 2 Puff(s) Inhalation every 6 hours PRN  aspirin  chewable 81 milliGRAM(s) Oral daily  atorvastatin 40 milliGRAM(s) Oral at bedtime  azithromycin   Tablet 500 milliGRAM(s) Oral daily  levothyroxine 100 MICROGram(s) Oral daily  methylPREDNISolone sodium succinate Injectable 40 milliGRAM(s) IV Push every 8 hours  metoprolol tartrate 25 milliGRAM(s) Oral two times a day  spironolactone 25 milliGRAM(s) Oral daily  MEDICATIONS  (PRN):  acetaminophen     Tablet .. 650 milliGRAM(s) Oral every 6 hours PRN Temp greater or equal to 38C (100.4F), Mild Pain (1 - 3)  albuterol    90 MICROgram(s) HFA Inhaler 2 Puff(s) Inhalation every 6 hours PRN for shortness of breath and/or wheezing    Height (cm): 154.9 (05-07 @ 11:40)  Weight (kg): 46.3 (05-07 @ 11:40)  BMI (kg/m2): 19.3 (05-07 @ 11:40)  BSA (m2): 1.42 (05-07 @ 11:40)  Daily Height in cm: 154.94 (07 May 2025 11:40)    Daily       ABG - ( 07 May 2025 17:35 )  pH, Arterial: 7.410 pH, Blood: x     /  pCO2: 44    /  pO2: 79    / HCO3: 28    / Base Excess: 3.3   /  SaO2: 97.2                                    9.8    9.19  )-----------( 188      ( 07 May 2025 13:55 )             30.8   05-07    140  |  104  |  52.0[H]  ----------------------------<  73  4.9   |  23.0  |  1.34[H]    Ca    9.0      07 May 2025 14:39    TPro  6.8  /  Alb  3.4  /  TBili  0.5  /  DBili  x   /  AST  35[H]  /  ALT  30  /  AlkPhos  80  05-07      PT/INR - ( 07 May 2025 13:55 )   PT: 10.9 sec;   INR: 0.94 ratio         PTT - ( 07 May 2025 13:55 )  PTT:37.5 sec      Objective:  T(C): 34.6 (05-07-25 @ 15:55), Max: 35.9 (05-07-25 @ 11:40)  HR: 72 (05-07-25 @ 15:55) (54 - 72)  BP: 103/54 (05-07-25 @ 15:55) (91/55 - 103/54)  RR: 19 (05-07-25 @ 15:55) (18 - 19)  SpO2: 96% (05-07-25 @ 15:55) (93% - 96%)  Wt(kg): --CAPILLARY BLOOD GLUCOSE      I&O's Summary      Physical Exam  General: NAD  Neuro: A+O x 3, non-focal, speech clear and intact  Psych: Appropriate affect  HEENT:  NCAT, No conjuctival edema or icterus, no thrush.  Pulm: Crackles throughout, bilaterally  CV: RRR, +S1S2  Abd: soft, NT, ND, +BS  Ext: +DP Pulses b/l, no edema  Skin: Warm, dry, intact          Imaging:      < from: Xray Chest 1 View- PORTABLE-Urgent (05.07.25 @ 13:56) >    INTERPRETATION:  AP chest on May 7, 2025 at 1:41 PM. Patient has chest   pain.    Heart size normal.    On August 7, 2024 there was amoderate left base effusion and mild right   base effusion with fluid in the fissures as well.    Present examination these findings are again noted but the right effusion   and a some degree the left effusion are somewhat increased.    IMPRESSION: Bibasilar effusions somewhat increased from prior.    --- End of Report ---      < end of copied text >

## 2025-05-07 NOTE — ED ADULT TRIAGE NOTE - CHIEF COMPLAINT QUOTE
pt had CT done of lungs (ordered by pulmonologist)  and noted to have excessive fluids in lungs. hx of HF. daughter also states she has been coughing up blood(x2 weeks) hx od COPD, on home 02 PRN, currently using 2L

## 2025-05-07 NOTE — H&P ADULT - NSHPLABSRESULTS_GEN_ALL_CORE
9.8    9.19  )-----------( 188      ( 07 May 2025 13:55 )             30.8   05-07    140  |  104  |  52.0[H]  ----------------------------<  73  4.9   |  23.0  |  1.34[H]    Ca    9.0      07 May 2025 14:39    TPro  6.8  /  Alb  3.4  /  TBili  0.5  /  DBili  x   /  AST  35[H]  /  ALT  30  /  AlkPhos  80  05-07      < from: Xray Chest 1 View- PORTABLE-Urgent (05.07.25 @ 13:56) >      IMPRESSION: Bibasilar effusions somewhat increased from prior.    < end of copied text >

## 2025-05-07 NOTE — H&P ADULT - HISTORY OF PRESENT ILLNESS
79 yo female with pmh of former smoker, with a PMHx of CHF, COPD, Mitral Valve Stenosis, HTN, HLD, hypothyroidism presenting with SOB , progressively worse since easter. Pt reports of SOB with cough with blood tinged sputum since 11 days ago. pt had a ct chest with Dr. Muir pulmonologist  4/25 , ct non con was done as op , patient was told yesterday  that she has pulm edema and pleural effusions, and was told to come into the ed.  Pt reports that the SOB has been worsening. pt is on home o2 NC as needed but has been on it continuously for last few days. pt denies fever, chest pain, abdominal pain, N/V/D. denies any lower ext swelling. no recent travels. denies any  hx of smoking. reports weight loss recently,  thinks its because of her cardio/ renal restricted diet. in ed noted with elevated pro bnp , cxr pulm edema and pleural effusions.in ed  lasix ordered. patient and daughter at bedside concerned about worsening renal function and want to hold off any lasix iv until they speak to cardiology team. ct surgery consulted for possible pleural trap. daughter and patient ok with thoracentesis .

## 2025-05-07 NOTE — ED PROVIDER NOTE - OBJECTIVE STATEMENT
Pt is an 79 yo female with pmh of former smoker, with a PMHx of CHF, COPD, Mitral Valve Stenosis, HTN, HLD, hypothyroidism presenting with SOB. Pt reports of SOB with cough with blood tinged sputum since 11 days ago. pt had a ct chest with Dr. Muir pulmonologist 11 days  4/25 and was told of results yesterday of pulm edema and pleural effusions, and was told to come into the ed for chf exacerbation. Pt reports that the SOB has been worsening. pt is on home o2 NC as needed. pt denies fever, chest pain, abdominal pain, N/V/D.

## 2025-05-07 NOTE — CONSULT NOTE ADULT - PROBLEM SELECTOR RECOMMENDATION 9
Bilateral pleural effusion in setting of CHF exacerbation  Comfortable on 2L NC  Pending CT Chest  CXR with significant pulmonary edema - appears relatively unchanged from discharge CXR on prior admission which was s/p thoracentesis at that time  POCUS with small-moderate b/l fluid pockets R>L  At this point would recommend aggressive diuresis.  Will follow up AM CXR  D/w Dr. Virk

## 2025-05-07 NOTE — ED PROVIDER NOTE - CLINICAL SUMMARY MEDICAL DECISION MAKING FREE TEXT BOX
Pt is an 81 yo female with pmh of former smoker, with a PMHx of CHF, COPD, Mitral Valve Stenosis, HTN, HLD, hypothyroidism presenting with SOB. Pt reports of SOB with cough with blood tinged sputum since 11 days ago. pt had a ct chest with Dr. Muir pulmonologist 11 days  4/25 and was told of results yesterday of pulm edema and pleural effusions, and was told to come into the ed for chf exacerbation. Pt reports that the SOB has been worsening. pt is on home o2 NC as needed.    vs stable, satting well on 2LNC.    acs workup, likely chf exacerbation requiring admission. Pt is an 79 yo female with pmh of former smoker, with a PMHx of CHF, COPD, Mitral Valve Stenosis, HTN, HLD, hypothyroidism presenting with SOB. Pt reports of SOB with cough with blood tinged sputum since 11 days ago. pt had a ct chest with Dr. Muir pulmonologist 11 days  4/25 and was told of results yesterday of pulm edema and pleural effusions, and was told to come into the ed for chf exacerbation. Pt reports that the SOB has been worsening. pt is on home o2 NC as needed.    vs stable, satting well on 2LNC.    acs workup shows chf exacerbation with bibasilar pleural effusions worse than before on the R side. ct surgery consulted, given lasix, cardiology ss consulted.    admit to tele.

## 2025-05-07 NOTE — H&P ADULT - ASSESSMENT
79 yo female with pmh of former smoker, with a PMHx of CHF, COPD, Mitral Valve Stenosis, HTN, HLD, hypothyroidism presenting with SOB , progressively worse since easter. Pt reports of SOB with cough with blood tinged sputum since 11 days ago. pt had a ct chest with Dr. Muir pulmonologist  4/25 , ct non con was done as op , patient was told yesterday  that she has pulm edema and pleural effusions, and was told to come into the ed.  Pt reports that the SOB has been worsening. pt is on home o2 NC as needed but has been on it continuously for last few days. pt denies fever, chest pain, abdominal pain, N/V/D. denies any lower ext swelling. no recent travels. denies any  hx of smoking. reports weight loss recently,  thinks its because of her cardio/ renal restricted diet. in ed noted with elevated pro bnp , cxr pulm edema and pleural effusions.in ed  lasix ordered. patient and daughter at bedside concerned about worsening renal function and want to hold off any lasix iv until they speak to cardiology team. ct surgery consulted for possible pleural trap. daughter and patient ok with thoracentesis .     > sob / progressive/ acute on chronic hypoxic resp failure/ likely multifactorial due to pulm edema / pleural effusion   - bnp elevated   - cxr  Bibasilar effusions somewhat increased from prior  - admit to tele   - monitor wts ,  i/os    - fluid restriction 1500 cc / day   - s/p iv lasix in ed ,  patient and daughter worried about  worsening renal function and want to hold further diuresis until seen by cardio   - check echo   - cardio consulted by ed   - c/w aldactone     > hemoptysis / unclear etiology / possible due to pulm edema ? vs bronchitis vs other etiology   - patient's op pulm though possibly due to recent new inhaler use ( breztri )   - check ct non con   - r/o PE , unable to do CTA PE procotol , will order VQ scan , duplex lower ext   - h/h stable at baseline   - scds for dvt ppx   - c/w aspirin 81 mg po qd for now   - c/w nebs   - will start on azithromycin     79 yo female with pmh of former smoker, with a PMHx of CHF, COPD, Mitral Valve Stenosis, HTN, HLD, hypothyroidism presenting with SOB , progressively worse since easter. Pt reports of SOB with cough with blood tinged sputum since 11 days ago. pt had a ct chest with Dr. Muir pulmonologist  4/25 , ct non con was done as op , patient was told yesterday  that she has pulm edema and pleural effusions, and was told to come into the ed.  Pt reports that the SOB has been worsening. pt is on home o2 NC as needed but has been on it continuously for last few days. pt denies fever, chest pain, abdominal pain, N/V/D. denies any lower ext swelling. no recent travels. denies any  hx of smoking. reports weight loss recently,  thinks its because of her cardio/ renal restricted diet. in ed noted with elevated pro bnp , cxr pulm edema and pleural effusions.in ed  lasix ordered. patient and daughter at bedside concerned about worsening renal function and want to hold off any lasix iv until they speak to cardiology team. ct surgery consulted for possible pleural trap. daughter and patient ok with thoracentesis .      79 yo female with pmh of former smoker, with a PMHx of CHF, COPD, Mitral Valve Stenosis, HTN, HLD, hypothyroidism presenting with SOB , progressively worse since easter. Pt reports of SOB with cough with blood tinged sputum since 11 days ago. pt had a ct chest with Dr. Muir pulmonologist  4/25 , ct non con was done as op , patient was told yesterday  that she has pulm edema and pleural effusions, and was told to come into the ed.  Pt reports that the SOB has been worsening. pt is on home o2 NC as needed but has been on it continuously for last few days. pt denies fever, chest pain, abdominal pain, N/V/D. denies any lower ext swelling. no recent travels. denies any  hx of smoking. reports weight loss recently,  thinks its because of her cardio/ renal restricted diet. in ed noted with elevated pro bnp , cxr pulm edema and pleural effusions.in ed  lasix ordered. patient and daughter at bedside concerned about worsening renal function and want to hold off any lasix iv until they speak to cardiology team. ct surgery consulted for possible pleural trap. daughter and patient ok with thoracentesis .     > sob / progressive/ acute on chronic hypoxic resp failure/ likely multifactorial due to pulm edema / pleural effusion / copd exacerbation   - bnp elevated   - cxr  Bibasilar effusions somewhat increased from prior  - admit to tele   - monitor wts ,  i/os    - fluid restriction 1500 cc / day   - s/p iv lasix in ed ,  patient and daughter worried about  worsening renal function and want to hold further diuresis until seen by cardio   - check echo , trend trops , check tsh   - c/w inhaler , o2   - c/w aldactone   - hold home torsemide dose for now   - will start on brief iv steroids   -cardio consulted by ed     > hemoptysis / unclear etiology / possible due to pulm edema ? vs bronchitis vs other etiology   - patient's op pulm though possibly due to recent new inhaler use ( breztri )   - check ct non con   - check sputum cxs   - less tb risk , no travel/ exposure / immunosuppression hx   - r/o PE , unable to do CTA PE procotol , will order VQ scan , duplex lower ext   - h/h stable at baseline   - scds for dvt ppx   - c/w aspirin 81 mg po qd for now   - c/w nebs   - will start on azithromycin   - may need inpatient pulm consult for further w/u if above neg     > hypothyroidism  - check tsh   - c/w synthroid     >hlp   - c/w statins     > ckd stage  2-3   - recent baseline cr 1.2   - monitor     > dv tppx : scds for now due to hemoptysis     > dispo:plan for eventual dc home in 2-3 days  pending above w/u     plan of care discussed with patient , daughter at bedside          79 yo female with pmh of former smoker, with a PMHx of CHF, COPD, Mitral Valve Stenosis, HTN, HLD, hypothyroidism presenting with SOB , progressively worse since easter. Pt reports of SOB with cough with blood tinged sputum since 11 days ago. pt had a ct chest with Dr. Muir pulmonologist  4/25 , ct non con was done as op , patient was told yesterday  that she has pulm edema and pleural effusions, and was told to come into the ed.  Pt reports that the SOB has been worsening. pt is on home o2 NC as needed but has been on it continuously for last few days. pt denies fever, chest pain, abdominal pain, N/V/D. denies any lower ext swelling. no recent travels. denies any  hx of smoking. reports weight loss recently,  thinks its because of her cardio/ renal restricted diet. in ed noted with elevated pro bnp , cxr pulm edema and pleural effusions.in ed  lasix ordered. patient and daughter at bedside concerned about worsening renal function and want to hold off any lasix iv until they speak to cardiology team. ct surgery consulted for possible pleural trap. daughter and patient ok with thoracentesis .     > sob / progressive/ acute on chronic hypoxic resp failure/ likely multifactorial due to pulm edema / pleural effusion / copd exacerbation   - bnp elevated   - cxr  Bibasilar effusions somewhat increased from prior  - admit to tele   - monitor wts ,  i/os    - fluid restriction 1500 cc / day   - s/p iv lasix in ed ,  patient and daughter worried about  worsening renal function and want to hold further diuresis until seen by cardio   - check echo , trend trops , check tsh   - c/w inhaler , o2   - c/w aldactone   - resume po torsemide from tomorrow am   - will start on brief iv steroids   -cardio consulted by ed     > hemoptysis / unclear etiology / possible due to pulm edema ? vs bronchitis vs other etiology   - patient's op pulm though possibly due to recent new inhaler use ( breztri )   - check ct non con   - check sputum cxs   - less tb risk , no travel/ exposure / immunosuppression hx   - r/o PE , unable to do CTA PE procotol , will order VQ scan , duplex lower ext   - h/h stable at baseline   - scds for dvt ppx   - c/w aspirin 81 mg po qd for now   - c/w nebs   - will start on azithromycin   - may need inpatient pulm consult for further w/u if above neg     > hypothyroidism  - check tsh   - c/w synthroid     >hlp   - c/w statins     > ckd stage  2-3   - recent baseline cr 1.2   - monitor     > dv tppx : scds for now due to hemoptysis     > dispo:plan for eventual dc home in 2-3 days  pending above w/u     plan of care discussed with patient , daughter at bedside

## 2025-05-07 NOTE — H&P ADULT - TIME BILLING
evaluating patient , reviewing labs , imaging , chart review , discussing plan of care with the consultants and medical team and documentation.

## 2025-05-07 NOTE — H&P ADULT - NSHPPHYSICALEXAM_GEN_ALL_CORE
Vital Signs Last 24 Hrs  T(C): 34.6 (07 May 2025 15:55), Max: 35.9 (07 May 2025 11:40)  T(F): 94.3 (07 May 2025 15:55), Max: 96.7 (07 May 2025 11:40)  HR: 72 (07 May 2025 15:55) (54 - 72)  BP: 103/54 (07 May 2025 15:55) (91/55 - 103/54)  BP(mean): --  RR: 19 (07 May 2025 15:55) (18 - 19)  SpO2: 96% (07 May 2025 15:55) (93% - 96%)    Parameters below as of 07 May 2025 15:55  Patient On (Oxygen Delivery Method): nasal cannula      Physical Examination:   General: thin , able to speak in full sentences   Neck: supple   Pulm: diminished air entry at bases. fine crackles. no wheezing.   Cardiac: Regular rate and regular rhythm. no m/r.   Abdomen: Nontender and nondistended, bs nl.   Skin: Skin is warm, dry and intact without rashes or lesions.  Neuro: No motor or sensory deficits above reported baseline  MSK: No deformity or tenderness above reported baseline  ext : trace edema b/l Vital Signs Last 24 Hrs  T(C): 34.6 (07 May 2025 15:55), Max: 35.9 (07 May 2025 11:40)  T(F): 94.3 (07 May 2025 15:55), Max: 96.7 (07 May 2025 11:40)  HR: 72 (07 May 2025 15:55) (54 - 72)  BP: 103/54 (07 May 2025 15:55) (91/55 - 103/54)  BP(mean): --  RR: 19 (07 May 2025 15:55) (18 - 19)  SpO2: 96% (07 May 2025 15:55) (93% - 96%)    Parameters below as of 07 May 2025 15:55  Patient On (Oxygen Delivery Method): nasal cannula      Physical Examination:   General: thin , able to speak in full sentences   Neck: supple   Pulm: diminished air entry at bases. fine crackles. occasional wheezing   Cardiac: Regular rate and regular rhythm. no m/r.   Abdomen: Nontender and nondistended, bs nl.   Skin: Skin is warm, dry and intact without rashes or lesions.  Neuro: No motor or sensory deficits above reported baseline  MSK: No deformity or tenderness above reported baseline  ext : trace edema b/l

## 2025-05-07 NOTE — PATIENT PROFILE ADULT - LEGAL HELP
Patient reports he has been smoking since he was in college.  Patient reports currently smoking 0.5 ppd and was smoking less in the past.  Patient is not ready to quit at this time.   no

## 2025-05-07 NOTE — CONSULT NOTE ADULT - SUBJECTIVE AND OBJECTIVE BOX
[facility]                                              CARDIOLOGY AT [address], [place] [address]                                             Telephone: [phone]. Fax:[phone]                                                       CARDIOLOGY CONSULTATION NOTE                                                                                             History obtained by: Patient and medical record   Community Cardiologist: Dr. Evans   obtained: Yes [  ] No [X]  Reason for Consultation: HFpEF Exacerbation  Available out pt records reviewed: Yes [ X ] No [  ]    Chief complaint:  Patient is a 80y old  Female who presents with a chief complaint of sob ,hemoptysis (07 May 2025 16:00)      HPI: 79 yo female with pmh of former smoker, with a PMHx of CHF, COPD, Mitral Valve Stenosis, HTN, HLD, hypothyroidism presenting with SOB , progressively worse since easter. Pt reports of SOB with cough with blood tinged sputum since 11 days ago. pt had a ct chest with Dr. Muir pulmonologist  4/25 , ct non con was done as op , patient was told yesterday  that she has pulm edema and pleural effusions, and was told to come into the ed.  Pt reports that the SOB has been worsening. pt is on home o2 NC as needed but has been on it continuously for last few days. pt denies fever, chest pain, abdominal pain, N/V/D. denies any lower ext swelling. no recent travels. denies any  hx of smoking. reports weight loss recently,  thinks its because of her cardio/ renal restricted diet. in ed noted with elevated pro bnp , cxr pulm edema and pleural effusions.in ed  lasix ordered. patient and daughter at bedside concerned about worsening renal function and want to hold off any lasix iv until they speak to cardiology team. ct surgery consulted for possible pleural trap. daughter and patient ok with thoracentesis.    (07 May 2025 16:00)    Hospital Course: SANJUANA TORRES is a 80 year old female with past medical history of former smoker, with a PMHx of CHF, CKD, COPD (PRN home O2, 2L) , Mitral Valve Stenosis (evaluated by Dr. Platt, not surgical candidate), HTN, HLD, and hypothyroidism who presented complaining of progressive shortness of breath for the past 5 days. When patient called her pulmonologist to report symptoms, she was informed that her outpatient CT was concerning for pulmonary edema, pleural effusions and volume overload and was advice to present to the ED for further evaluation. Upon presenting, patient was also mentioning hemoptysis. Patient was given 1x 40 Lasix IVP, and ordered for V/Q scan/BL LE Dopplers in the setting of CKD.     Today: Patient reports that she has been having progressive SOB and intermittent hemoptysis. denies chest pain, headache, dizziness, syncope, belly pain, diarrhea, or dysuria.       PAST MEDICAL HISTORY  History of COPD    Asthma    Thyroid nodule    Hyperlipidemia    Hypertension    Hypothyroidism        PAST SURGICAL HISTORY  History of cholecystectomy    History of repair of hip fracture        SUBSTANCE USE HISTORY  Denies current and previous substance use [  ]   CIGARETTES - quit 1 year PTA, 30 Pack year hx  ALCOHOL - quit 1 year PTA  DRUGS - never    FAMILY HISTORY:  FH: CAD (coronary artery disease) (Mother)        CARDIAC SPECIFIC FAMILY HX   No KNOWN family history of Cardiovascular disease, CAD, or sudden death in first degree relatives unless specified below  Family History of Cardiovascular Disease:  [  ]   Coronary Artery Disease in first degree relative:  [  ]   Sudden Cardiac Death in First degree relative: [  ]    HOME MEDICATIONS:  Albuterol (Eqv-ProAir HFA) 90 mcg/inh inhalation aerosol: 1 puff(s) inhaled every 4 to 6 hours as needed for  shortness of breath and/or wheezing (07 May 2025 16:17)  aspirin 81 mg oral tablet: 1 tab(s) orally once a day (07 May 2025 16:17)  atorvastatin 40 mg oral tablet: 1 tab(s) orally once a day (at bedtime) (07 May 2025 16:17)  Breztri Aerosphere 160 mcg-9 mcg-4.8 mcg/inh inhalation aerosol: 2 puff(s) inhaled 2 times a day (07 May 2025 16:17)  levothyroxine 100 mcg (0.1 mg) oral tablet: 1 tab(s) orally once a day (07 May 2025 16:17)      CURRENT CARDIAC MEDICATIONS:  metoprolol tartrate 25 milliGRAM(s) Oral two times a day  spironolactone 25 milliGRAM(s) Oral daily      CURRENT OTHER MEDICATIONS:  albuterol    90 MICROgram(s) HFA Inhaler 2 Puff(s) Inhalation every 6 hours PRN for shortness of breath and/or wheezing  acetaminophen     Tablet .. 650 milliGRAM(s) Oral every 6 hours PRN Temp greater or equal to 38C (100.4F), Mild Pain (1 - 3)  aspirin  chewable 81 milliGRAM(s) Oral daily  atorvastatin 40 milliGRAM(s) Oral at bedtime  azithromycin   Tablet 500 milliGRAM(s) Oral daily  levothyroxine 100 MICROGram(s) Oral daily  methylPREDNISolone sodium succinate Injectable 40 milliGRAM(s) IV Push every 8 hours      ALLERGIES:   No Known Allergies      VITAL SIGNS:  T(C): 34.6 (05-07-25 @ 15:55), Max: 35.9 (05-07-25 @ 11:40)  T(F): 94.3 (05-07-25 @ 15:55), Max: 96.7 (05-07-25 @ 11:40)  HR: 72 (05-07-25 @ 15:55) (54 - 72)  BP: 103/54 (05-07-25 @ 15:55) (91/55 - 103/54)  RR: 19 (05-07-25 @ 15:55) (18 - 19)  SpO2: 96% (05-07-25 @ 15:55) (93% - 96%)    INTAKE AND OUTPUT:       LABS:                            9.8    9.19  )-----------( 188      ( 07 May 2025 13:55 )             30.8     05-07    140  |  104  |  52.0[H]  ----------------------------<  73  4.9   |  23.0  |  1.34[H]    Ca    9.0      07 May 2025 14:39    TPro  6.8  /  Alb  3.4  /  TBili  0.5  /  DBili  x   /  AST  35[H]  /  ALT  30  /  AlkPhos  80  05-07    PT/INR - ( 07 May 2025 13:55 )   PT: 10.9 sec;   INR: 0.94 ratio         PTT - ( 07 May 2025 13:55 )  PTT:37.5 sec  Urinalysis Basic - ( 07 May 2025 14:39 )    Color: x / Appearance: x / SG: x / pH: x  Gluc: 73 mg/dL / Ketone: x  / Bili: x / Urobili: x   Blood: x / Protein: x / Nitrite: x   Leuk Esterase: x / RBC: x / WBC x   Sq Epi: x / Non Sq Epi: x / Bacteria: x              RADIOLOGY IMAGING:   CT Chest No Cont: Urgent   Indication: sob , hemoptysis  Transport: Stretcher-Crib (05-07-25 @ 15:58) [Ordered.]  Xray Chest 1 View- PORTABLE-Urgent: Urgent   Indication: Chest Pain  Transport: Portable  Exam Completed (05-07-25 @ 13:29) [Results Available]  12 Lead ECG (05-07-25 @ 13:29) [Completed]  Xray Chest 1 View- PORTABLE-Urgent: Urgent   Indication: Chest Pain  Transport: Portable (05-07-25 @ 13:19) [Discontinued]

## 2025-05-08 ENCOUNTER — LABORATORY RESULT (OUTPATIENT)
Age: 80
End: 2025-05-08

## 2025-05-08 ENCOUNTER — RESULT REVIEW (OUTPATIENT)
Age: 80
End: 2025-05-08

## 2025-05-08 NOTE — PROGRESS NOTE ADULT - PROBLEM SELECTOR PLAN 2
.  - MVS is severe, likely major contributor to chronic respiratory failure  - Continue with current medical management  - If worsening on echo can consider further discussion with CTS, but Dr. Platt has evaluated the patient in the past, not amenable to surgical intervention .  - MVS is severe, likely major contributor to chronic respiratory failure  - Continue with current medical management  - If worsening on echo can consider further discussion with CTS, but Dr. Platt has evaluated the patient in the past, not amenable to surgical intervention    No further inpatient cardiac work up at this time. Patient Dr. Evans as OP  Will sign off.  Please reconsult if needed.

## 2025-05-08 NOTE — PROGRESS NOTE ADULT - ASSESSMENT
80 year old female with past medical history of former smoker, with a PMHx of CHF, CKD, COPD (PRN home O2, 2L) , Mitral Valve Stenosis (evaluated by Dr. Platt, not surgical candidate), HTN, HLD, and hypothyroidism admitted for multifactorial acute on chronic hypoxic respiratory failure, cardio has been consulted for HFpEF exacerbation

## 2025-05-08 NOTE — PROGRESS NOTE ADULT - ASSESSMENT
79 yo female with pmh of former smoker, with a PMHx of CHF, COPD, Mitral Valve Stenosis, HTN, HLD, hypothyroidism presenting with SOB , progressively worse since easter. Pt reports of SOB with cough with blood tinged sputum since 11 days ago. pt had a ct chest with Dr. Muir pulmonologist  4/25 , ct non con was done as op , patient was told yesterday  that she has pulm edema and pleural effusions, and was told to come into the ed.  Pt reports that the SOB has been worsening. pt is on home o2 NC as needed but has been on it continuously for last few days. pt denies fever, chest pain, abdominal pain, N/V/D. denies any lower ext swelling. no recent travels. denies any  hx of smoking. reports weight loss recently,  thinks its because of her cardio/ renal restricted diet. in ed noted with elevated pro bnp , cxr pulm edema and pleural effusions.in ed  lasix ordered. patient and daughter at bedside concerned about worsening renal function and want to hold off any lasix iv until they speak to cardiology team. ct surgery consulted for possible pleural trap. daughter and patient ok with thoracentesis .     > sob / progressive/ acute on chronic hypoxic resp failure/ likely multifactorial due to mild pulm edema/ pleural effusion/copd exacerbation / possible pna   - bnp elevated   - cxr  Bibasilar effusions somewhat increased from prior  - < from: CT Chest No Cont (05.07.25 @ 18:38) >< from: CT Chest No Cont (05.07.25 @ 18:38) >Since 4/25/2025, the bilateral pleural effusions (right greater than left) are unchanged. Emphysema. Bilateral lower lobe passive atelectasis. Right middle lobe passive atelectasis. Linear atelectasis/scarring in the lingula. Secretions in the trachea and left mainstem bronchus.Since 4/25/2025, the bilateral opacities have increased.  - s/p iv lasix in ed ,  patient and daughter worried about  worsening renal function and want to hold further diuresis until seen by cardio   - monitor wts ,  i/os    - fluid restriction 1500 cc / day   - c/w inhaler , o2   - c/w aldactone , po torsemide    - c/w iv steroids     >elevated  trops   -mildly elevated , likely demand ischemia/ due to tomer   - ekg non specific changes   - denies any cp   -seen by cardio   - f/u echo   - c/w aspirin , bb , statins     > hemoptysis / unclear etiology / possible due to pulm edema ? vs bronchitis vs other etiology   - patient's op pulm though possibly due to recent new inhaler use ( breztri )   - ct as above   - check sputum cxs   - less tb risk , no travel/ exposure / immunosuppression hx   - r/o PE , unable to do CTA PE procotol , will order VQ scan , duplex lower ext   - h/h stable at baseline   - scds for dvt ppx   - c/w aspirin 81 mg po qd for now   - c/w nebs   - c/w  azithromycin , will add cefrtaixone   - f/u sp cxs   - pulm consult called     > hypothyroidism  - Thyroid Stimulating Hormone, Serum: 2.27 uIU/mL (05.07.25 @ 17:20)  - c/w synthroid     >hlp   - c/w statins     > ckd stage  2-3   - recent baseline cr 1.2   - monitor     > dv tppx : scds for now due to hemoptysis     > dispo:plan for eventual dc home in 2-3 days  pending above w/u     plan of care discussed with patient , daughter at bedside

## 2025-05-08 NOTE — DIETITIAN INITIAL EVALUATION ADULT - NS FNS DIET ORDER
Diet, DASH/TLC:   Sodium & Cholesterol Restricted  1500mL Fluid Restriction ([id number])     Special Instructions for Nursin milliLiter(s) to 2000 milliLiter(s) fluid restriction (25 @ 15:58)

## 2025-05-08 NOTE — DIETITIAN INITIAL EVALUATION ADULT - ORAL INTAKE PTA/DIET HISTORY
Patient tolerating current diet well with variable PO intake reported. Pt ate breakfast late today as she was at a test this AM. Reports only picking at breakfast meal. Daughter at bedside to assist in nutrition interview. Pt states she eats about 2 meals/day and follows a low sodium diet. Pt also drinks Ensure daily - recommend addition daily to increase protein and calorie consumption. Pt and daughter declined further diet education at this time stating they have a good understanding of restrictions. Pt reports better appetite PTA. Pt with no c/o N/V/C/D at this time, no BM reported since admission. Pt states her UBW is between 101-104 lbs and weights have been stable for many years. Current weight 102 lbs, no edema noted. No further questions regarding nutrition at this time. Will continue to monitor and follow up as needed. RD remains available.

## 2025-05-08 NOTE — PROGRESS NOTE ADULT - PROBLEM SELECTOR PLAN 1
.  - pending TTE for evaluation of cardiac function and any valvular abnormalities  GDMT       Beta Blocker: continue Metoprolol 25mg PO BID       RAAS Inhibitor: defer for now due to renal function        MRA: spironolactone 25mg PO daily       Diuretic:  lasix d/c changed to PO torsemide, Cr increased to 1.64 from 1.49       ARNi/SGLT2i: n/a  - Strict I&O's  - Daily standing weights (if able).  - Keep K > 4, Mg > 2.    - Monitor renal function with ongoing diuresis.  - continue AS81mg PO daily and lipitor 40mg PO daily  - pending v/q scan due to hemoptysis, rule out PE

## 2025-05-08 NOTE — DIETITIAN INITIAL EVALUATION ADULT - PERTINENT LABORATORY DATA
05-08 Na139 mmol/L Glu 100 mg/dL[H] K+ 5.2 mmol/L Cr  1.64 mg/dL[H] BUN 51.8 mg/dL[H] Alb 3.8 g/dL     A1C with Estimated Average Glucose Result: 5.9 % (06-11-24 @ 06:33)

## 2025-05-08 NOTE — DIETITIAN INITIAL EVALUATION ADULT - ADD RECOMMEND
1) Continue diet as tolerated.   2) Add Ensure Plus High Protein daily to optimize PO intake and provide an additional 350 kcal and 20g protein per serving.   3) Encourage po intake, monitor diet tolerance, and provide assistance at meals as needed.   4) Rx: MVI daily.   5) Obtain daily weights to monitor trends.

## 2025-05-08 NOTE — PROGRESS NOTE ADULT - NS ATTEND AMEND GEN_ALL_CORE FT
Patient seen and examined by me.  Patient to f/u Dr Tim Evans  I have discussed my recommendation with the PA which are outlined above.  Will sign off

## 2025-05-08 NOTE — DIETITIAN INITIAL EVALUATION ADULT - OTHER INFO
Per chart "79 yo female with pmh of former smoker, with a PMHx of CHF, COPD, Mitral Valve Stenosis, HTN, HLD, hypothyroidism presenting with SOB , progressively worse since easter. Pt reports of SOB with cough with blood tinged sputum since 11 days ago. pt had a ct chest with Dr. Muir pulmonologist  4/25 , ct non con was done as op , patient was told yesterday  that she has pulm edema and pleural effusions, and was told to come into the ed.  Pt reports that the SOB has been worsening. pt is on home o2 NC as needed but has been on it continuously for last few days. pt denies fever, chest pain, abdominal pain, N/V/D. denies any lower ext swelling. no recent travels. denies any  hx of smoking. reports weight loss recently,  thinks its because of her cardio/ renal restricted diet. in ed noted with elevated pro bnp , cxr pulm edema and pleural effusions.in ed  lasix ordered. patient and daughter at bedside concerned about worsening renal function and want to hold off any lasix iv until they speak to cardiology team. ct surgery consulted for possible pleural trap. daughter and patient ok with thoracentesis."

## 2025-05-08 NOTE — PROGRESS NOTE ADULT - PROBLEM SELECTOR PLAN 1
Bilateral pleural effusion in setting of CHF exacerbation  Comfortable on 2L NC  CXR with significant pulmonary edema - appears relatively unchanged from discharge CXR on prior admission which was s/p thoracentesis at that time  CT scan with unchanged b/l effusions  POCUS with small-moderate b/l fluid pockets R>L  Continue with diuresis   CXR stable this AM  Patient and daughter in agreement with plan  D/w Dr. Virk. Bilateral pleural effusion in setting of CHF exacerbation  Comfortable on 2L NC  CXR with significant pulmonary edema - appears relatively unchanged from discharge CXR on prior admission which was s/p thoracentesis at that time  CT scan with unchanged b/l effusions  POCUS with small-moderate b/l fluid pockets R>L  Continue with diuresis   CXR stable this AM  Patient and daughter in agreement with plan  No need for thoracic surgery intervention at this time  D/w Dr. Virk.

## 2025-05-08 NOTE — PROGRESS NOTE ADULT - ASSESSMENT
79 yo female with pmh of former smoker, with a PMHx of CHF, COPD, Mitral Valve Stenosis, HTN, HLD, hypothyroidism presenting with SOB , progressively worse since easter. Pt reports of SOB with cough with blood tinged sputum since 11 days ago. pt had a ct chest with Dr. Muir pulmonologist  4/25 , ct non con was done as op , patient was told yesterday  that she has pulm edema and pleural effusions, and was told to come into the ed.  Pt reports that the SOB has been worsening. pt is on home o2 NC as needed but has been on it continuously for last few days. pt denies fever, chest pain, abdominal pain, N/V/D. denies any lower ext swelling. no recent travels. denies any  hx of smoking. reports weight loss recently,  thinks its because of her cardio/ renal restricted diet. in ed noted with elevated pro bnp , cxr pulm edema and pleural effusions. Thoracic surgery consulted for evaluation of pleural effusions

## 2025-05-08 NOTE — DIETITIAN INITIAL EVALUATION ADULT - PERTINENT MEDS FT
MEDICATIONS  (STANDING):  azithromycin   Tablet 500 milliGRAM(s) Oral daily  cefTRIAXone Injectable. 1000 milliGRAM(s) IV Push every 24 hours  levothyroxine 100 MICROGram(s) Oral daily  methylPREDNISolone sodium succinate Injectable 40 milliGRAM(s) IV Push every 8 hours  metoprolol tartrate 25 milliGRAM(s) Oral two times a day  spironolactone 25 milliGRAM(s) Oral daily  torsemide 10 milliGRAM(s) Oral daily

## 2025-05-09 ENCOUNTER — LABORATORY RESULT (OUTPATIENT)
Age: 80
End: 2025-05-09

## 2025-05-09 ENCOUNTER — TRANSCRIPTION ENCOUNTER (OUTPATIENT)
Age: 80
End: 2025-05-09

## 2025-05-09 NOTE — DISCHARGE NOTE PROVIDER - HOSPITAL COURSE
81 yo female with pmh of former smoker, with a PMHx of CHF, ckd , COPD/ on 2 L prn o2 at home , Mitral Valve Stenosis, HTN, HLD, hypothyroidism presenting with progressive SOB , productive cough with hemoptysis and weight loss. admitted for chf , s/p iv lasix ,now on po. started on iv steroids , abxs. sputum cxs mixed elaine. unable to do CTA PE due to elevated cr. VQ low probability for PE. pulm consulted.      > sob / progressive/ acute on chronic hypoxic resp failure/ likely multifactorial due to mild pulm edema/ pleural effusion/copd exacerbation / possible pna   - bnp elevated   - cxr  Bibasilar effusions somewhat increased from prior  - < from: CT Chest No Cont (05.07.25 @ 18:38) >< from: CT Chest No Cont (05.07.25 @ 18:38) >Since 4/25/2025, the bilateral pleural effusions (right greater than left) are unchanged. Emphysema. Bilateral lower lobe passive atelectasis. Right middle lobe passive atelectasis. Linear atelectasis/scarring in the lingula. Secretions in the trachea and left mainstem bronchus.Since 4/25/2025, the bilateral opacities have increased.  - s/p iv lasix in ed ,  patient and daughter worried about  worsening renal function and want to hold further diuresis until seen by cardio   - monitor wts ,  i/os    - fluid restriction 1500 cc / day   - c/w inhaler , o2   - c/w po torsemide  with holding parameters   - hold aldactone due to hyperkalemia   - c/w iv steroids today , change to po taper in am   - seen by cards   - aseen by pulm , agree with above. patient to  follow up with dr Muir as op     > hemoptysis / unclear etiology / possible due to pulm edema ? vs bronchitis vs other etiology   - patient's op pulm thought possibly due to recent new inhaler use ( breztri ) ?  - ct as above   - sp cxs mixed elaine   - less tb risk , no travel/ exposure / immunosuppression hx   - unable to do CTA PE procotol , VQ low prob , duplex neg for dvt   - h/h stable at baseline   - scds for dvt ppx   - c/w aspirin 81 mg po qd for now   - c/w nebs   - c/w  azithromycin ,cefrtaixone . can change to po on dc to complete x 5-7 days course   - pulm consult called     >elevated  trops /  likely demand ischemia/ due to tomer   - ekg non specific changes   - denies any cp   - echo noted severe MS   - c/w aspirin , bb , statins   - cardio signed off     > ckd stage  2-3   - recent baseline cr 1.2   - cr 1.66   - monitor     > hyperkalemia   - hold aldactone   - kayexalate x 1 dose ordered   - repeat bmp   - c/w tele     > hypothyroidism  - Thyroid Stimulating Hormone, Serum: 2.27 uIU/mL (05.07.25 @ 17:20)  - c/w synthroid     >hlp   - c/w statins       > dv tppx : scds for now due to hemoptysis     > dispo: plan for eventual dc home in  1-2 days pending above    plan of care discussed with patient , daughter at bedside

## 2025-05-09 NOTE — DISCHARGE NOTE PROVIDER - CARE PROVIDER_API CALL
Saman Agosto  Cardiovascular Disease  [address] [place] [address]  Phone: [phone]  Fax: [phone]  Follow Up Time: 1 week    primary care,   Phone: (   )    -  Fax: (   )    -  Follow Up Time: 1 week

## 2025-05-09 NOTE — CONSULT NOTE ADULT - ASSESSMENT
80-year-old female presents with worsening hypoxic respiratory failure most likely on the basis of volume overload secondary to severe mitral stenosis complicated by renal insufficiency and further complicated by stage IV chronic obstructive lung disease.  Agree with course of antibiotics as well as drug nebulization but with her degree of volume overload would most likely discontinue her corticosteroids in light of fluid retention and lack of significant bronchospasm.  This may be restarted anytime clinically.  Would pursue aggressive cardiac regiment with supportive care from pulmonary aspect.  Will follow-up unless called on 5/12/2025
ASSESSMENT:  80 year old female with past medical history of former smoker, with a PMHx of CHF, CKD, COPD (PRN home O2, 2L) , Mitral Valve Stenosis (evaluated by Dr. Platt, not surgical candidate), HTN, HLD, and hypothyroidism admitted for multifactorial acute on chronic hypoxic respiratory failure, cardio has been consulted for HFpEF exacerbation    PROBLEM 1: Acute on Chronic HFpEF exacerbation  - mantain telemetry  - Primary team ordered TTE, will follow up results  - Continue home: aldactone and beta blocker  - Continue IV lasix 40 IVP QD, monitor BUN/Cr with diuresis  - will reeval in morning and consider inpatient heart failure consult, patient follows with Dr. Evans    PROBLEM 2: Right Sided Pleural effusion  - Continue diuresis as above  - Agree with thoracic consult for drainage    PROBLEM 3: Mitral Valve stenosis  - MVS is severe, likely major contributor to chronic respiratory failure  - Continue with current medical management  - If worsening on echo can consider further discussion with CTS, but Dr. Platt has evaluated the patient in the past, not amenable to surgical intervention    PROBLEM 4: Hemoptysis, PE r/o  - V/Q scans and dopplers ordered  - Mgmt per primary team  - Will look for Right heart strain on echo    Discussed plan with Dr. Agosto    Will continue to follow  
81 yo female with pmh of former smoker, with a PMHx of CHF, COPD, Mitral Valve Stenosis, HTN, HLD, hypothyroidism presenting with SOB , progressively worse since easter. Pt reports of SOB with cough with blood tinged sputum since 11 days ago. pt had a ct chest with Dr. Muir pulmonologist  4/25 , ct non con was done as op , patient was told yesterday  that she has pulm edema and pleural effusions, and was told to come into the ed.  Pt reports that the SOB has been worsening. pt is on home o2 NC as needed but has been on it continuously for last few days. pt denies fever, chest pain, abdominal pain, N/V/D. denies any lower ext swelling. no recent travels. denies any  hx of smoking. reports weight loss recently,  thinks its because of her cardio/ renal restricted diet. in ed noted with elevated pro bnp , cxr pulm edema and pleural effusions. Thoracic surgery consulted for evaluation of pleural effusions

## 2025-05-09 NOTE — CONSULT NOTE ADULT - SUBJECTIVE AND OBJECTIVE BOX
PULMONARY CONSULT NOTE      ANDRÉS TORRES-[id number]    Patient is a 80y old  Female who presents with a chief complaint of sob , hemoptysis (09 May 2025 11:49)      HISTORY OF PRESENT ILLNESS:  80-year-old female with a history of COPD/asthma overlap syndrome, hypertension, hyperlipidemia, severe mitral stenosis with resultant fluid overload and pleural effusions drained in [facility] in 2023 with a transudative effusion on the right.  Patient is status post other hospitalizations and has been maintained on Trelegy as well as albuterol and 1.5 L of O2.  She does have a 50-pack-year history of cigarette smoking DC'd in 2023 and has been followed by a [place] pulmonologist not in this area by the name of .  Patient's last pulmonary function test were performed in August 2024 demonstrated a forced vital capacity of 0.92 L (39%) with an FEV1 of 0.46 L (26%).  Review of her outpatient chart from May 2025 and demonstrated the presence of moderate pleural effusions requiring enlarging especially on the right with evidence of pulmonary edema.  Previously seen a focal consolidation in the left lower lobe had resolved.    Patient was admitted on 5/7/2025 for increasing shortness of breath as well as progressive bilateral edema.  She had noticed some weight loss and contrary to the above as well as trace hemoptysis.  Course further complicated by renal insufficiency.    MEDICATIONS  (STANDING):  aspirin  chewable 81 milliGRAM(s) Oral daily  atorvastatin 40 milliGRAM(s) Oral at bedtime  azithromycin   Tablet 500 milliGRAM(s) Oral daily  cefTRIAXone Injectable. 1000 milliGRAM(s) IV Push every 24 hours  ipratropium 17 MICROgram(s) HFA Inhaler 1 Puff(s) Inhalation every 6 hours  levothyroxine 100 MICROGram(s) Oral daily  methylPREDNISolone sodium succinate Injectable 40 milliGRAM(s) IV Push every 12 hours  metoprolol tartrate 25 milliGRAM(s) Oral two times a day  torsemide 10 milliGRAM(s) Oral daily      MEDICATIONS  (PRN):  acetaminophen     Tablet .. 650 milliGRAM(s) Oral every 6 hours PRN Temp greater or equal to 38C (100.4F), Mild Pain (1 - 3)  albuterol    90 MICROgram(s) HFA Inhaler 2 Puff(s) Inhalation every 6 hours PRN for shortness of breath and/or wheezing  melatonin 5 milliGRAM(s) Oral at bedtime PRN Insomnia      Allergies    No Known Allergies    Intolerances        PAST MEDICAL & SURGICAL HISTORY:  History of COPD  No home O2 use      Asthma      Thyroid nodule      Hyperlipidemia      Hypertension      Hypothyroidism      History of cholecystectomy  1989      History of repair of hip fracture  ORIF 1982.  Hardware was eventually removed          FAMILY HISTORY:  FH: CAD (coronary artery disease) (Mother)        SOCIAL HISTORY  Smoking History:     REVIEW OF SYSTEMS:    CONSTITUTIONAL:  No fevers, chills, sweats    HEENT:  Eyes:  No diplopia or blurred vision. ENT:  No earache, sore throat or runny nose. sinus headache or postnasl drip    CARDIOVASCULAR:  No pressure, squeezing, tightness, or heaviness about the chest; no palpitations, leg swelling, orthopnea or PND    RESPIRATORY:  No cough, shortness of breath, PND or orthopnea. Mild SOBOE    GASTROINTESTINAL:  No abdominal pain, nausea, vomiting or diarrhea.    GENITOURINARY:  No dysuria, frequency or urgency.    NEUROLOGIC:  No paresthesias, fasciculations, seizures or weakness.    PSYCHIATRIC:  No disorder of thought or mood.    Vital Signs Last 24 Hrs  T(C): 36.3 (09 May 2025 08:11), Max: 36.4 (08 May 2025 15:32)  T(F): 97.3 (09 May 2025 08:11), Max: 97.6 (08 May 2025 15:32)  HR: 70 (09 May 2025 08:11) (70 - 88)  BP: 99/57 (09 May 2025 08:11) (93/57 - 111/49)  BP(mean): 70 (09 May 2025 08:11) (68 - 74)  RR: 24 (09 May 2025 08:11) (18 - 24)  SpO2: 95% (09 May 2025 08:11) (95% - 98%)    Parameters below as of 09 May 2025 08:11  Patient On (Oxygen Delivery Method): nasal cannula  O2 Flow (L/min): 2      PHYSICAL EXAMINATION:    GENERAL: The patient is in no apparent distress.     HEENT: Head is normocephalic and atraumatic. Extraocular muscles are intact. Mucous membranes are moist.     NECK: Supple.     LUNGS: Clear to auscultation without wheezing, rales, or rhonchi. Respirations unlabored    HEART: Regular rate and rhythm without murmur.    ABDOMEN: Soft, nontender, and nondistended.  No hepatosplenomegaly is noted.    EXTREMITIES: Without any cyanosis, clubbing, rash, lesions or edema.    NEUROLOGIC: Grossly intact.      LABS:                        8.8    7.83  )-----------( 186      ( 09 May 2025 04:32 )             27.4     05-09    137  |  101  |  66.7[H]  ----------------------------<  140[H]  5.4[H]   |  24.0  |  1.66[H]    Ca    9.0      09 May 2025 04:32  Mg     2.2     05-09    TPro  6.9  /  Alb  3.5  /  TBili  0.3[L]  /  DBili  x   /  AST  26  /  ALT  26  /  AlkPhos  81  05-09    PT/INR - ( 07 May 2025 13:55 )   PT: 10.9 sec;   INR: 0.94 ratio         PTT - ( 07 May 2025 13:55 )  PTT:37.5 sec  Urinalysis Basic - ( 09 May 2025 04:32 )    Color: x / Appearance: x / SG: x / pH: x  Gluc: 140 mg/dL / Ketone: x  / Bili: x / Urobili: x   Blood: x / Protein: x / Nitrite: x   Leuk Esterase: x / RBC: x / WBC x   Sq Epi: x / Non Sq Epi: x / Bacteria: x      ABG - ( 07 May 2025 17:35 )  pH, Arterial: 7.410 pH, Blood: x     /  pCO2: 44    /  pO2: 79    / HCO3: 28    / Base Excess: 3.3   /  SaO2: 97.2                            MICROBIOLOGY:    RADIOLOGY & ADDITIONAL STUDIES:  < from: NM Pulmonary Ventilation/Perfusion Scan (05.08.25 @ 11:05) >    ACC: [id number] EXAM:  NM PULM VENTILATION PERFUS IMG   ORDERED BY: PATO RODRIGUEZ     PROCEDURE DATE:  05/08/2025          INTERPRETATION:  CLINICAL INFORMATION: 80-year-old female admitted for   worsening shortness of breath and hemoptysis.  Evaluate for pulmonary   embolus.    RADIOPHARMACEUTICAL: 1 mCi Tc-99m-DTPA via aerosol;  6.2 mCi Tc-99m-MAA,   I.V.    TECHNIQUE:  Ventilation and perfusion images of the lungs were obtained   following administration of Tc-99m-DTPA and Tc-99m-MAA. Imageswere   obtained in the anterior, posterior, both lateral, and all 4 oblique   views. The study was interpreted in conjunction with chest radiograph of   05/08/2025..    COMPARISON: VQ scan 6/12/2024    OTHER STUDIES USED FOR CORRELATION: CT chest 05/07/2025 and chest x-ray   05/07/2025    FINDINGS: Bilateral pleural effusions, left side less  than the chest   x-ray from 05/07/2025, the right appears slightly improved as well.    There remains heterogeneous distribution of radiopharmaceutical in both   lung fields that is worse on ventilation than perfusion images.  Compared to the prior VQ scan from 6/2024, there is improved perfusion to   the left lower lobe and lingula, and smaller matched defect in the   posterior aspect of the left upper lobe. In the right lung there is a new   subsegmental matched defect in the lateral segment of the right middle   lobe and stable matched defect in the medial segment of the right middle   lobe. No mismatched perfusion defect that is compatible with pulmonary   embolus.    IMPRESSION: Overall improved VQ defects from 6/2024. New subsegmental   matched defect in the lateral segment of the right middle lobe renders   this examination as low probability of pulmonary embolus.    --- End of Report ---            GERARD PEREIRA MD; Attending Radiologist  This document has been electronically signed. May  8 2025 11:46AM    < end of copied text >  < from: US Duplex Venous Lower Ext Complete, Bilateral (05.08.25 @ 09:20) >    ACC: [id number] EXAM:  US DPLX LWR EXT VEINS COMPL BI   ORDERED BY: PATO RODRIGUEZ     PROCEDURE DATE:  05/08/2025          INTERPRETATION:  CLINICAL INFORMATION: Hemoptysis, shortness of breath,   evaluate for DVT, lower extremity swelling.    COMPARISON: Bilateral lower extremity venous duplex ultrasound 7/27/2024    TECHNIQUE: Duplex sonography of the BILATERAL LOWER extremity veins with   color and spectral Doppler, with and without compression.    FINDINGS:    RIGHT:  Normal compressibilityof the RIGHT common femoral, femoral and popliteal   veins. Doppler examination shows normal spontaneous and phasic flow. No   RIGHT calf vein thrombosis is detected.    LEFT:  Normal compressibility of the LEFT common femoral, femoral and popliteal   veins. Doppler examination shows normal spontaneous and phasic flow. No   LEFT calf vein thrombosis is detected.    IMPRESSION:    No acute DVT of the right or left lower extremity.    --- End of Report ---            MARSHALL LARSON M.D., ATTENDINGRADIOLOGIST  This document has been electronically signed. May  8 2025  9:25AM    < end of copied text >  < from: CT Chest No Cont (05.07.25 @ 18:38) >    ACC: [id number] EXAM:  CT CHEST   ORDERED BY: PATO RODRIGUEZ     PROCEDURE DATE:  05/07/2025          INTERPRETATION:  CLINICAL INFORMATION: Shortness of breath. Hemoptysis    COMPARISON: Radiograph chest 5/7/2025. CT chest 4/25/2025, 8/6/2024, and   7/27/2024.    CONTRAST/COMPLICATIONS:  IV Contrast: NONE  Oral Contrast: NONE  .  TECHNIQUE: Unenhanced helical images were obtained of the chest. Coronal   and sagittal images were reconstructed. Maximum intensity projection   images were generated.          FINDINGS:    Tubes/Lines: None.    Lungs, airways and pleura: Since 4/25/2025, the bilateral pleural   effusions (right greater than left) are unchanged.    However, the bilateral groundglass opacities and septal thickening within   upper lobe predominance have increased.    Emphysema. Bilateral lower lobe passive atelectasis. Right middle lobe   passive atelectasis. Linear atelectasis/scarring in the lingula. No   pneumothorax.    Secretions in the trachea and left mainstem bronchus.    Mediastinum: The visualized thyroid gland is unremarkable. The chest   lymph nodes are unchanged and measure less than 15 mm in the short axis.   Unremarkable esophagus.    Cardiomegaly. Coronary artery calcifications. Mitral annular   calcification. Left-sided aortic arch and left-sided descending thoracic   aorta. Aortic calcifications    Upper Abdomen: Cholecystectomy. Intrahepatic and extrahepatic biliary   ductal dilatation. Left renal cortical calcification. The left adrenal   gland thickening is unchanged. The remainder of the imaged upper abdomen   is unremarkable.    Bones And Soft Tissues: Anterolisthesis of the thoracic spine. The bones   are qualitatively osteopenic. T4, T5, T6, T7, and T9 thoracic spine   vertebral body compression deformities. L2 superior endplate compression   deformity.  The soft tissues are unremarkable.      IMPRESSION:    1.  Since 4/25/2025, the bilateral opacities have increased.    --- End of Report ---            JHONATHAN EDEN MD; Attending Radiologist  This document has been electronically signed. May  7 2025  7:11PM    < end of copied text >  ECHO:  < from: TTE W or WO Ultrasound Enhancing Agent (05.08.25 @ 09:12) >  CONCLUSIONS:      1. Left ventricular systolic function is normal with an ejection fraction of 75 % by Shepard's method ofdisks with an ejection fraction visually estimated at 65 to 70 %.   2. Normal right ventricular cavity size and normal right ventricular systolic function.   3. Left atrium is severely dilated.   4. Severe mitral valve stenosis.   5. Mild to moderatemitral regurgitation.   6. Mild tricuspid regurgitation.   7. Interatrial septum is aneurysmal.   8. Estimated pulmonary artery systolic pressure is 46 mmHg, consistent with mild pulmonary hypertension.   9. Trace pericardial effusion noted adjacent to the right atrium and trace pericardial effusion noted adjacent to the right ventricle.  10. Right pleural effusion noted.  11. In compariosn to prior TTE from 8/6/2024 there is severe mitral senosis; the mean gradient is now increased further.  12.results d/w CardiologyI    < end of copied text >   PULMONARY CONSULT NOTE      ANDRÉS TORRES-[id number]    Patient is a 80y old  Female who presents with a chief complaint of sob , hemoptysis (09 May 2025 11:49)      HISTORY OF PRESENT ILLNESS:  80-year-old female with a history of COPD/asthma overlap syndrome, hypertension, hyperlipidemia, severe mitral stenosis with resultant fluid overload and pleural effusions drained in [facility] in 2023 with a transudative effusion on the right.  Patient is status post other hospitalizations and has been maintained on Trelegy as well as albuterol and 1.5 L of O2.  She does have a 50-pack-year history of cigarette smoking DC'd in 2023 and has been followed by a [place] pulmonologist not in this area by the name of .  Patient's last pulmonary function test were performed in August 2024 demonstrated a forced vital capacity of 0.92 L (39%) with an FEV1 of 0.46 L (26%).  Review of her outpatient chart from May 2025 and demonstrated the presence of moderate pleural effusions requiring enlarging especially on the right with evidence of pulmonary edema.  Previously seen a focal consolidation in the left lower lobe had resolved.    Patient was admitted on 5/7/2025 for increasing shortness of breath as well as progressive bilateral edema.  She had noticed some weight loss and contrary to the above as well as trace hemoptysis.  Course further complicated by renal insufficiency.    Feeling better with diuresis and steroids    Daughter at bedside    MEDICATIONS  (STANDING):  aspirin  chewable 81 milliGRAM(s) Oral daily  atorvastatin 40 milliGRAM(s) Oral at bedtime  azithromycin   Tablet 500 milliGRAM(s) Oral daily  cefTRIAXone Injectable. 1000 milliGRAM(s) IV Push every 24 hours  ipratropium 17 MICROgram(s) HFA Inhaler 1 Puff(s) Inhalation every 6 hours  levothyroxine 100 MICROGram(s) Oral daily  methylPREDNISolone sodium succinate Injectable 40 milliGRAM(s) IV Push every 12 hours  metoprolol tartrate 25 milliGRAM(s) Oral two times a day  torsemide 10 milliGRAM(s) Oral daily      MEDICATIONS  (PRN):  acetaminophen     Tablet .. 650 milliGRAM(s) Oral every 6 hours PRN Temp greater or equal to 38C (100.4F), Mild Pain (1 - 3)  albuterol    90 MICROgram(s) HFA Inhaler 2 Puff(s) Inhalation every 6 hours PRN for shortness of breath and/or wheezing  melatonin 5 milliGRAM(s) Oral at bedtime PRN Insomnia      Allergies    No Known Allergies    Intolerances        PAST MEDICAL & SURGICAL HISTORY:  History of COPD  No home O2 use      Asthma      Thyroid nodule      Hyperlipidemia      Hypertension      Hypothyroidism      History of cholecystectomy  1989      History of repair of hip fracture  ORIF 1982.  Hardware was eventually removed          FAMILY HISTORY:  FH: CAD (coronary artery disease) (Mother)        SOCIAL HISTORY  Smoking History:     REVIEW OF SYSTEMS:    CONSTITUTIONAL:  No fevers, chills, sweats    HEENT:  Eyes:  No diplopia or blurred vision. ENT:  No earache, sore throat or runny nose. sinus headache or postnasl drip    CARDIOVASCULAR:  No pressure, squeezing, tightness, or heaviness about the chest; no palpitations, leg swelling, orthopnea or PND    RESPIRATORY:  above    GASTROINTESTINAL:  No abdominal pain, nausea, vomiting or diarrhea.    GENITOURINARY:  No dysuria, frequency or urgency.    NEUROLOGIC:  No paresthesias, fasciculations, seizures or weakness.    PSYCHIATRIC:  No disorder of thought or mood.    Vital Signs Last 24 Hrs  T(C): 36.3 (09 May 2025 08:11), Max: 36.4 (08 May 2025 15:32)  T(F): 97.3 (09 May 2025 08:11), Max: 97.6 (08 May 2025 15:32)  HR: 70 (09 May 2025 08:11) (70 - 88)  BP: 99/57 (09 May 2025 08:11) (93/57 - 111/49)  BP(mean): 70 (09 May 2025 08:11) (68 - 74)  RR: 24 (09 May 2025 08:11) (18 - 24)  SpO2: 95% (09 May 2025 08:11) (95% - 98%)    Parameters below as of 09 May 2025 08:11  Patient On (Oxygen Delivery Method): nasal cannula  O2 Flow (L/min): 2      PHYSICAL EXAMINATION:    GENERAL: The patient is in no apparent distress.     HEENT: Head is normocephalic and atraumatic. Extraocular muscles are intact. Mucous membranes are moist.     NECK: Supple.     LUNGS: Clear to auscultation without wheezing, rales, or rhonchi. Respirations unlabored, dullness at bases    HEART: Regular rate and rhythm without murmur.    ABDOMEN: Soft, nontender, and nondistended.  No hepatosplenomegaly is noted.    EXTREMITIES: Without any cyanosis, clubbing, rash, lesions or edema.    NEUROLOGIC: Grossly intact.      LABS:                        8.8    7.83  )-----------( 186      ( 09 May 2025 04:32 )             27.4     05-09    137  |  101  |  66.7[H]  ----------------------------<  140[H]  5.4[H]   |  24.0  |  1.66[H]    Ca    9.0      09 May 2025 04:32  Mg     2.2     05-09    TPro  6.9  /  Alb  3.5  /  TBili  0.3[L]  /  DBili  x   /  AST  26  /  ALT  26  /  AlkPhos  81  05-09    PT/INR - ( 07 May 2025 13:55 )   PT: 10.9 sec;   INR: 0.94 ratio         PTT - ( 07 May 2025 13:55 )  PTT:37.5 sec  Urinalysis Basic - ( 09 May 2025 04:32 )    Color: x / Appearance: x / SG: x / pH: x  Gluc: 140 mg/dL / Ketone: x  / Bili: x / Urobili: x   Blood: x / Protein: x / Nitrite: x   Leuk Esterase: x / RBC: x / WBC x   Sq Epi: x / Non Sq Epi: x / Bacteria: x      ABG - ( 07 May 2025 17:35 )  pH, Arterial: 7.410 pH, Blood: x     /  pCO2: 44    /  pO2: 79    / HCO3: 28    / Base Excess: 3.3   /  SaO2: 97.2                            MICROBIOLOGY:    RADIOLOGY & ADDITIONAL STUDIES:  < from: NM Pulmonary Ventilation/Perfusion Scan (05.08.25 @ 11:05) >    ACC: [id number] EXAM:  NM PULM VENTILATION PERFUS IMG   ORDERED BY: PATO RODRIGUEZ     PROCEDURE DATE:  05/08/2025          INTERPRETATION:  CLINICAL INFORMATION: 80-year-old female admitted for   worsening shortness of breath and hemoptysis.  Evaluate for pulmonary   embolus.    RADIOPHARMACEUTICAL: 1 mCi Tc-99m-DTPA via aerosol;  6.2 mCi Tc-99m-MAA,   I.V.    TECHNIQUE:  Ventilation and perfusion images of the lungs were obtained   following administration of Tc-99m-DTPA and Tc-99m-MAA. Imageswere   obtained in the anterior, posterior, both lateral, and all 4 oblique   views. The study was interpreted in conjunction with chest radiograph of   05/08/2025..    COMPARISON: VQ scan 6/12/2024    OTHER STUDIES USED FOR CORRELATION: CT chest 05/07/2025 and chest x-ray   05/07/2025    FINDINGS: Bilateral pleural effusions, left side less  than the chest   x-ray from 05/07/2025, the right appears slightly improved as well.    There remains heterogeneous distribution of radiopharmaceutical in both   lung fields that is worse on ventilation than perfusion images.  Compared to the prior VQ scan from 6/2024, there is improved perfusion to   the left lower lobe and lingula, and smaller matched defect in the   posterior aspect of the left upper lobe. In the right lung there is a new   subsegmental matched defect in the lateral segment of the right middle   lobe and stable matched defect in the medial segment of the right middle   lobe. No mismatched perfusion defect that is compatible with pulmonary   embolus.    IMPRESSION: Overall improved VQ defects from 6/2024. New subsegmental   matched defect in the lateral segment of the right middle lobe renders   this examination as low probability of pulmonary embolus.    --- End of Report ---            GERARD PEREIRA MD; Attending Radiologist  This document has been electronically signed. May  8 2025 11:46AM    < end of copied text >  < from: US Duplex Venous Lower Ext Complete, Bilateral (05.08.25 @ 09:20) >    ACC: [id number] EXAM:  US DPLX LWR EXT VEINS COMPL BI   ORDERED BY: PATO RODRIGUEZ     PROCEDURE DATE:  05/08/2025          INTERPRETATION:  CLINICAL INFORMATION: Hemoptysis, shortness of breath,   evaluate for DVT, lower extremity swelling.    COMPARISON: Bilateral lower extremity venous duplex ultrasound 7/27/2024    TECHNIQUE: Duplex sonography of the BILATERAL LOWER extremity veins with   color and spectral Doppler, with and without compression.    FINDINGS:    RIGHT:  Normal compressibilityof the RIGHT common femoral, femoral and popliteal   veins. Doppler examination shows normal spontaneous and phasic flow. No   RIGHT calf vein thrombosis is detected.    LEFT:  Normal compressibility of the LEFT common femoral, femoral and popliteal   veins. Doppler examination shows normal spontaneous and phasic flow. No   LEFT calf vein thrombosis is detected.    IMPRESSION:    No acute DVT of the right or left lower extremity.    --- End of Report ---            MARSHALL LARSON M.D., ATTENDINGRADIOLOGIST  This document has been electronically signed. May  8 2025  9:25AM    < end of copied text >  < from: CT Chest No Cont (05.07.25 @ 18:38) >    ACC: [id number] EXAM:  CT CHEST   ORDERED BY: PATO RODRIGUEZ     PROCEDURE DATE:  05/07/2025          INTERPRETATION:  CLINICAL INFORMATION: Shortness of breath. Hemoptysis    COMPARISON: Radiograph chest 5/7/2025. CT chest 4/25/2025, 8/6/2024, and   7/27/2024.    CONTRAST/COMPLICATIONS:  IV Contrast: NONE  Oral Contrast: NONE  .  TECHNIQUE: Unenhanced helical images were obtained of the chest. Coronal   and sagittal images were reconstructed. Maximum intensity projection   images were generated.          FINDINGS:    Tubes/Lines: None.    Lungs, airways and pleura: Since 4/25/2025, the bilateral pleural   effusions (right greater than left) are unchanged.    However, the bilateral groundglass opacities and septal thickening within   upper lobe predominance have increased.    Emphysema. Bilateral lower lobe passive atelectasis. Right middle lobe   passive atelectasis. Linear atelectasis/scarring in the lingula. No   pneumothorax.    Secretions in the trachea and left mainstem bronchus.    Mediastinum: The visualized thyroid gland is unremarkable. The chest   lymph nodes are unchanged and measure less than 15 mm in the short axis.   Unremarkable esophagus.    Cardiomegaly. Coronary artery calcifications. Mitral annular   calcification. Left-sided aortic arch and left-sided descending thoracic   aorta. Aortic calcifications    Upper Abdomen: Cholecystectomy. Intrahepatic and extrahepatic biliary   ductal dilatation. Left renal cortical calcification. The left adrenal   gland thickening is unchanged. The remainder of the imaged upper abdomen   is unremarkable.    Bones And Soft Tissues: Anterolisthesis of the thoracic spine. The bones   are qualitatively osteopenic. T4, T5, T6, T7, and T9 thoracic spine   vertebral body compression deformities. L2 superior endplate compression   deformity.  The soft tissues are unremarkable.      IMPRESSION:    1.  Since 4/25/2025, the bilateral opacities have increased.    --- End of Report ---            JHONATHAN EDEN MD; Attending Radiologist  This document has been electronically signed. May  7 2025  7:11PM    < end of copied text >  ECHO:  < from: TTE W or WO Ultrasound Enhancing Agent (05.08.25 @ 09:12) >  CONCLUSIONS:      1. Left ventricular systolic function is normal with an ejection fraction of 75 % by Shepard's method ofdisks with an ejection fraction visually estimated at 65 to 70 %.   2. Normal right ventricular cavity size and normal right ventricular systolic function.   3. Left atrium is severely dilated.   4. Severe mitral valve stenosis.   5. Mild to moderatemitral regurgitation.   6. Mild tricuspid regurgitation.   7. Interatrial septum is aneurysmal.   8. Estimated pulmonary artery systolic pressure is 46 mmHg, consistent with mild pulmonary hypertension.   9. Trace pericardial effusion noted adjacent to the right atrium and trace pericardial effusion noted adjacent to the right ventricle.  10. Right pleural effusion noted.  11. In compariosn to prior TTE from 8/6/2024 there is severe mitral senosis; the mean gradient is now increased further.  12.results d/w CardiologyI    < end of copied text >

## 2025-05-09 NOTE — DISCHARGE NOTE PROVIDER - NSDCCPCAREPLAN_GEN_ALL_CORE_FT
PRINCIPAL DISCHARGE DIAGNOSIS  Diagnosis: Shortness of breath  Assessment and Plan of Treatment: shortness of breath / progressive/ acute on chronic hypoxic resp failure/ likely multifactorial due to mild pulm edema/ pleural effusion/copd exacerbation / possible pna   - fluid restriction 1500 cc / day   - continue with inhaler , o2   - continue with torsemide   - hold aldactone due to hyperkalemia   - take prednisone taper as advised   -. patient to  follow up with dr Muir as op         SECONDARY DISCHARGE DIAGNOSES  Diagnosis: Pleural effusion  Assessment and Plan of Treatment: seen by throacic surgery. no need for thorocentesis for now.  follow up with your pulmonogist as out patient for repeat imaging in 2 weeks.    Diagnosis: Hemoptysis  Assessment and Plan of Treatment: possible due to pulm edema and tracheobronchitis.   VQ low prob , duplex neg for dvt   continue with antibiotics as advised to complete course.       Diagnosis: CKD (chronic kidney disease)  Assessment and Plan of Treatment: follow up with primary care , nephrology in 2 weeks to  repeat blood work.   avoid nephrotoxic meds.    Diagnosis: Hyperkalemia  Assessment and Plan of Treatment: hold aldactone ( spironolactone ) for now.   follow up with cardiology/ primary care for repeat blood work

## 2025-05-09 NOTE — DISCHARGE NOTE PROVIDER - NSDCMRMEDTOKEN_GEN_ALL_CORE_FT
acetaminophen 325 mg oral tablet: 2 tab(s) orally every 6 hours As needed Temp greater or equal to 38C (100.4F), Mild Pain (1 - 3)  Albuterol (Eqv-ProAir HFA) 90 mcg/inh inhalation aerosol: 1 puff(s) inhaled every 4 to 6 hours as needed for  shortness of breath and/or wheezing  aspirin 81 mg oral tablet: 1 tab(s) orally once a day  atorvastatin 40 mg oral tablet: 1 tab(s) orally once a day (at bedtime)  azithromycin 500 mg oral tablet: 1 tab(s) orally once a day  Breztri Aerosphere 160 mcg-9 mcg-4.8 mcg/inh inhalation aerosol: 2 puff(s) inhaled 2 times a day  cefpodoxime 200 mg oral tablet: 1 tab(s) orally 2 times a day  ipratropium 17 mcg/inh inhalation aerosol: 1 puff(s) inhaled every 8 hours as needed for  shortness of breath and/or wheezing  levothyroxine 100 mcg (0.1 mg) oral tablet: 1 tab(s) orally once a day  metoprolol tartrate 25 mg oral tablet: 1 tab(s) orally 2 times a day  predniSONE 10 mg oral tablet: 1 tab(s) orally once a day , take 4 tabs x2 days ,then 3 tabs x 2 days ,then 2 tabs x 2 days , then 1 tabs x 2 days then stop  torsemide 10 mg oral tablet: 1 tab(s) orally once a day

## 2025-05-09 NOTE — DISCHARGE NOTE NURSING/CASE MANAGEMENT/SOCIAL WORK - PATIENT PORTAL LINK FT
You can access the FollowMyHealth Patient Portal offered by [facility] by registering at the following website: [email or web address]. By joining [employer]’s FollowMyHealth portal, you will also be able to view your health information using other applications (apps) compatible with our system.

## 2025-05-09 NOTE — DISCHARGE NOTE PROVIDER - ATTENDING DISCHARGE PHYSICAL EXAMINATION:
Vital Signs Last 24 Hrs  T(C): 36.3 (05-09-25 @ 12:06), Max: 36.4 (05-08-25 @ 15:32)  T(F): 97.4 (05-09-25 @ 12:06), Max: 97.6 (05-08-25 @ 15:32)  HR: 63 (05-09-25 @ 12:06) (63 - 88)  BP: 101/50 (05-09-25 @ 12:06) (93/57 - 111/49)  BP(mean): 70 (05-09-25 @ 08:11) (68 - 74)  RR: 16 (05-09-25 @ 12:06) (16 - 24)  SpO2: 96% (05-09-25 @ 12:06) (95% - 98%)      PHYSICAL EXAM:  General: NAD, A/O x 3, thin   ENT: MMM, no thrush  Neck: Supple, No JVD  Lungs: good air flow   Cardio: RRR, S1/S2, No murmur  Abdomen: Soft, Nontender, Nondistended; Bowel sounds present  Extremities: No calf tenderness, No pitting edema

## 2025-05-09 NOTE — CHART NOTE - NSCHARTNOTEFT_GEN_A_CORE
5/9 CXR reviewed   with thoracic team, continue Diuresis, no thoracic intervention for now, thoracic to s/f consult prn.     D/W Dr. Virk       < from: Xray Chest 1 View- PORTABLE-Routine (Xray Chest 1 View- PORTABLE-Routine in AM.) (05.09.25 @ 06:41) >      ACC: [id number] EXAM:  XR CHEST PORTABLE ROUTINE 1V   ORDERED BY: XI IRAHETA     PROCEDURE DATE:  05/09/2025          INTERPRETATION:  Exam:XR CHEST    clinical history:eval lungs    Stable bilateral airspace disease with pleural effusions. No pneumothorax.    IMPRESSION: No significant change from yesterday    --- End of Report ---            ADAM WHITLEY MD; Attending Radiologist  This document has been electronically signed. May  9 2025 12:01PM    < end of copied text >

## 2025-05-09 NOTE — DISCHARGE NOTE PROVIDER - NSDCFUSCHEDAPPT_GEN_ALL_CORE_FT
Fay Muir  [facility]  [address]  Scheduled Appointment: 05/14/2025    Fay Muir  [facility]  [address]  Scheduled Appointment: 06/03/2025    [facility]  CARDIOLOGY [address] [facility]  Scheduled Appointment: 06/24/2025

## 2025-05-09 NOTE — PROGRESS NOTE ADULT - ASSESSMENT
79 yo female with pmh of former smoker, with a PMHx of CHF, ckd , COPD/ on 2 L prn o2 at home , Mitral Valve Stenosis, HTN, HLD, hypothyroidism presenting with progressive SOB , productive cough with hemoptysis and weight loss. admitted for chf , s/p iv lasix ,now on po. started on iv steroids , abxs. sputum cxs mixed elaine. unable to do CTA PE due to elevated cr. VQ low probability for PE. pulm consulted.      > sob / progressive/ acute on chronic hypoxic resp failure/ likely multifactorial due to mild pulm edema/ pleural effusion/copd exacerbation / possible pna   - bnp elevated   - cxr  Bibasilar effusions somewhat increased from prior  - < from: CT Chest No Cont (05.07.25 @ 18:38) >< from: CT Chest No Cont (05.07.25 @ 18:38) >Since 4/25/2025, the bilateral pleural effusions (right greater than left) are unchanged. Emphysema. Bilateral lower lobe passive atelectasis. Right middle lobe passive atelectasis. Linear atelectasis/scarring in the lingula. Secretions in the trachea and left mainstem bronchus.Since 4/25/2025, the bilateral opacities have increased.  - s/p iv lasix in ed ,  patient and daughter worried about  worsening renal function and want to hold further diuresis until seen by cardio   - monitor wts ,  i/os    - fluid restriction 1500 cc / day   - c/w inhaler , o2   - c/w po torsemide  with holding parameters   - hold aldactone due to hyperkalemia   - c/w iv steroids today , change to po taper in am   - seen by cards   - awaiting pulm consult , follows with dr Muir as op     > hemoptysis / unclear etiology / possible due to pulm edema ? vs bronchitis vs other etiology   - patient's op pulm thought possibly due to recent new inhaler use ( breztri ) ?  - ct as above   - sp cxs mixed elaine   - less tb risk , no travel/ exposure / immunosuppression hx   - unable to do CTA PE procotol , VQ low prob , duplex neg for dvt   - h/h stable at baseline   - scds for dvt ppx   - c/w aspirin 81 mg po qd for now   - c/w nebs   - c/w  azithromycin ,cefrtaixone . can change to po on dc to complete x 5-7 days course   - pulm consult called     >elevated  trops /  likely demand ischemia/ due to tomer   - ekg non specific changes   - denies any cp   - echo noted severe MS   - c/w aspirin , bb , statins   - cardio signed off     > ckd stage  2-3   - recent baseline cr 1.2   - cr 1.66   - monitor     > hyperkalemia   - hold aldactone   - kayexalate x 1 dose ordered   - repeat bmp   - c/w tele     > hypothyroidism  - Thyroid Stimulating Hormone, Serum: 2.27 uIU/mL (05.07.25 @ 17:20)  - c/w synthroid     >hlp   - c/w statins       > dv tppx : scds for now due to hemoptysis     > dispo:plan for eventual dc home in  1-2 days pending above.     plan of care discussed with patient , daughter at bedside            79 yo female with pmh of former smoker, with a PMHx of CHF, ckd , COPD/ on 2 L prn o2 at home , Mitral Valve Stenosis, HTN, HLD, hypothyroidism presenting with progressive SOB , productive cough with hemoptysis and weight loss. admitted for chf , s/p iv lasix ,now on po. started on iv steroids , abxs. sputum cxs mixed elaine. unable to do CTA PE due to elevated cr. VQ low probability for PE. pulm consulted.      > sob / progressive/ acute on chronic hypoxic resp failure/ likely multifactorial due to mild pulm edema/ pleural effusion/copd exacerbation / possible pna   - bnp elevated   - cxr  Bibasilar effusions somewhat increased from prior  - < from: CT Chest No Cont (05.07.25 @ 18:38) >< from: CT Chest No Cont (05.07.25 @ 18:38) >Since 4/25/2025, the bilateral pleural effusions (right greater than left) are unchanged. Emphysema. Bilateral lower lobe passive atelectasis. Right middle lobe passive atelectasis. Linear atelectasis/scarring in the lingula. Secretions in the trachea and left mainstem bronchus.Since 4/25/2025, the bilateral opacities have increased.  - s/p iv lasix in ed ,  patient and daughter worried about  worsening renal function and want to hold further diuresis until seen by cardio   - monitor wts ,  i/os    - fluid restriction 1500 cc / day   - c/w inhaler , o2   - c/w po torsemide  with holding parameters   - hold aldactone due to hyperkalemia   - c/w iv steroids today , change to po taper in am   - seen by cards   - awaiting pulm consult , follows with dr Muir as op     > hemoptysis / unclear etiology / possible due to pulm edema ? vs bronchitis vs other etiology   - patient's op pulm thought possibly due to recent new inhaler use ( breztri ) ?  - ct as above   - sp cxs mixed elaine   - less tb risk , no travel/ exposure / immunosuppression hx   - unable to do CTA PE procotol , VQ low prob , duplex neg for dvt   - h/h stable at baseline   - scds for dvt ppx   - c/w aspirin 81 mg po qd for now   - c/w nebs   - c/w  azithromycin ,cefrtaixone . can change to po on dc to complete x 5-7 days course   - pulm consult called     >elevated  trops /  likely demand ischemia/ due to tomer   - ekg non specific changes   - denies any cp   - echo noted severe MS   - c/w aspirin , bb , statins   - cardio signed off     > ckd stage  2-3   - recent baseline cr 1.2   - cr 1.66   - monitor     > hyperkalemia   - hold aldactone   - kayexalate x 1 dose ordered   - repeat bmp   - c/w tele     > hypothyroidism  - Thyroid Stimulating Hormone, Serum: 2.27 uIU/mL (05.07.25 @ 17:20)  - c/w synthroid     >hlp   - c/w statins       > dv tppx : scds for now due to hemoptysis     > dispo: plan for eventual dc home in  1-2 days pending above    plan of care discussed with patient , daughter at bedside

## 2025-05-09 NOTE — DISCHARGE NOTE NURSING/CASE MANAGEMENT/SOCIAL WORK - FINANCIAL ASSISTANCE
[facility] provides services at a reduced cost to those who are determined to be eligible through [facility]’s financial assistance program. Information regarding [facility]’s financial assistance program can be found by going to [email or web address] or by calling [phone].

## 2025-05-09 NOTE — PROGRESS NOTE ADULT - TIME BILLING
evaluating patient , reviewing labs , imaging , chart review , discussing plan of care with the consultants and medical team and documentation.
evaluating patient , reviewing labs , imaging , chart review , discussing plan of care with the consultants and medical team and documentation.  excludes teaching and separately reported services.

## 2025-05-09 NOTE — DISCHARGE NOTE PROVIDER - PROVIDER TOKENS
PROVIDER:[TOKEN:[[id number]],FOLLOWUP:[1 week]],FREE:[LAST:[primary care],PHONE:[(   )    -],FAX:[(   )    -],FOLLOWUP:[1 week]]

## 2025-05-09 NOTE — PROGRESS NOTE ADULT - SUBJECTIVE AND OBJECTIVE BOX
Patient is a 80y old  Female who presents with a chief complaint of sob , hemoptysis (08 May 2025 15:13)      Patient seen and examined at bedside.   No overnight events reported  less productive cough today   no hemoptysis this am   noted with elevated K , kayexalate given   bp on lower side, denies any dizziness       ALLERGIES:  No Known Allergies    MEDICATIONS  (STANDING):  aspirin  chewable 81 milliGRAM(s) Oral daily  atorvastatin 40 milliGRAM(s) Oral at bedtime  azithromycin   Tablet 500 milliGRAM(s) Oral daily  cefTRIAXone Injectable. 1000 milliGRAM(s) IV Push every 24 hours  ipratropium 17 MICROgram(s) HFA Inhaler 1 Puff(s) Inhalation every 6 hours  levothyroxine 100 MICROGram(s) Oral daily  methylPREDNISolone sodium succinate Injectable 40 milliGRAM(s) IV Push every 12 hours  metoprolol tartrate 25 milliGRAM(s) Oral two times a day  torsemide 10 milliGRAM(s) Oral daily    MEDICATIONS  (PRN):  acetaminophen     Tablet .. 650 milliGRAM(s) Oral every 6 hours PRN Temp greater or equal to 38C (100.4F), Mild Pain (1 - 3)  albuterol    90 MICROgram(s) HFA Inhaler 2 Puff(s) Inhalation every 6 hours PRN for shortness of breath and/or wheezing  melatonin 5 milliGRAM(s) Oral at bedtime PRN Insomnia    Vital Signs Last 24 Hrs  T(F): 97.3 (09 May 2025 08:11), Max: 97.6 (08 May 2025 15:32)  HR: 70 (09 May 2025 08:11) (70 - 88)  BP: 99/57 (09 May 2025 08:11) (93/57 - 111/49)  RR: 24 (09 May 2025 08:11) (18 - 24)  SpO2: 95% (09 May 2025 08:11) (95% - 98%)  I&O's Summary    08 May 2025 07:01  -  09 May 2025 07:00  --------------------------------------------------------  IN: 540 mL / OUT: 400 mL / NET: 140 mL      PHYSICAL EXAM:  General: NAD, A/O x 3, thin   ENT: MMM, no thrush  Neck: Supple, No JVD  Lungs: occasional wheeze   Cardio: RRR, S1/S2, No murmur  Abdomen: Soft, Nontender, Nondistended; Bowel sounds present  Extremities: No calf tenderness, No pitting edema    LABS:                        8.8    7.83  )-----------( 186      ( 09 May 2025 04:32 )             27.4     05-09    137  |  101  |  66.7  ----------------------------<  140  5.4   |  24.0  |  1.66    Ca    9.0      09 May 2025 04:32  Mg     2.2     05-09    TPro  6.9  /  Alb  3.5  /  TBili  0.3  /  DBili  x   /  AST  26  /  ALT  26  /  AlkPhos  81  05-09          PT/INR - ( 07 May 2025 13:55 )   PT: 10.9 sec;   INR: 0.94 ratio         PTT - ( 07 May 2025 13:55 )  PTT:37.5 sec            TSH 2.27   TSH with FT4 reflex --  Total T3 --      ABG - ( 07 May 2025 17:35 )  pH, Arterial: 7.410 pH, Blood: x     /  pCO2: 44    /  pO2: 79    / HCO3: 28    / Base Excess: 3.3   /  SaO2: 97.2        Urinalysis Basic - ( 09 May 2025 04:32 )    Color: x / Appearance: x / SG: x / pH: x  Gluc: 140 mg/dL / Ketone: x  / Bili: x / Urobili: x   Blood: x / Protein: x / Nitrite: x   Leuk Esterase: x / RBC: x / WBC x   Sq Epi: x / Non Sq Epi: x / Bacteria: x        Culture - Sputum (collected 08 May 2025 12:25)  Source: Sputum Sputum  Gram Stain (08 May 2025 21:35):    No polymorphonuclear cells seen per low power field    Few Squamous epithelial cells per low power field    Moderate Gram Positive Cocci in Clusters per oil power field    Moderate Gram Negative Rods per oil power field    Few Gram Positive Cocci in Pairs and Chains per oil power field  Preliminary Report (09 May 2025 09:22):    Commensal elaine consistent with body site        RADIOLOGY & ADDITIONAL TESTS:    Care Discussed with Consultants/Other Providers:   
Subjective: 80yFemale resting in bed comfortably with daughter at bedside. + SOB but improving since admitted. Denies chest pain, palpitations, orthopnea, cough, difficulty breathing, N/V/D/C.    Vital Signs:  Vital Signs Last 24 Hrs  T(C): 36.8 (05-08-25 @ 11:23), Max: 36.8 (05-08-25 @ 04:51)  T(F): 98.3 (05-08-25 @ 11:23), Max: 98.3 (05-08-25 @ 11:23)  HR: 77 (05-08-25 @ 11:23) (58 - 87)  BP: 101/59 (05-08-25 @ 11:23) (94/56 - 108/62)  RR: 18 (05-08-25 @ 11:23) (15 - 20)  SpO2: 99% (05-08-25 @ 11:23) (96% - 100%) on (O2)    Telemetry/Alarms: reviewed     Relevant labs, radiology and Medications reviewed    Pertinent Physical Exam      05-08 @ 07:01  -  05-08 @ 15:14  --------------------------------------------------------  IN:    Oral Fluid: 540 mL  Total IN: 540 mL    OUT:  Total OUT: 0 mL    Total NET: 540 mL        PHYSICAL EXAM:  GENERAL: No acute distress, on NC O2. Able to speak in full sentences   CHEST/LUNG: Decreased breath sounds b/l bases; Expiratory wheezes. No rales, or rhonchi  HEART: RRR; No murmurs, rubs, or gallops  NEUROLOGY: AAO x 4  
Patient is a 80y old  Female who presents with a chief complaint of sob , hemoptysis (08 May 2025 09:28)      Patient seen and examined at bedside.   No overnight events reported.   mild sob   on 3 L nc     ALLERGIES:  No Known Allergies    MEDICATIONS  (STANDING):  aspirin  chewable 81 milliGRAM(s) Oral daily  atorvastatin 40 milliGRAM(s) Oral at bedtime  azithromycin   Tablet 500 milliGRAM(s) Oral daily  cefTRIAXone   IVPB 1000 milliGRAM(s) IV Intermittent every 24 hours  levothyroxine 100 MICROGram(s) Oral daily  methylPREDNISolone sodium succinate Injectable 40 milliGRAM(s) IV Push every 8 hours  metoprolol tartrate 25 milliGRAM(s) Oral two times a day  spironolactone 25 milliGRAM(s) Oral daily  torsemide 10 milliGRAM(s) Oral daily    MEDICATIONS  (PRN):  acetaminophen     Tablet .. 650 milliGRAM(s) Oral every 6 hours PRN Temp greater or equal to 38C (100.4F), Mild Pain (1 - 3)  albuterol    90 MICROgram(s) HFA Inhaler 2 Puff(s) Inhalation every 6 hours PRN for shortness of breath and/or wheezing  melatonin 3 milliGRAM(s) Oral at bedtime PRN Insomnia    Vital Signs Last 24 Hrs  T(F): 98.3 (08 May 2025 11:23), Max: 98.3 (08 May 2025 11:23)  HR: 77 (08 May 2025 11:23) (54 - 87)  BP: 101/59 (08 May 2025 11:23) (91/55 - 108/62)  RR: 18 (08 May 2025 11:23) (15 - 20)  SpO2: 99% (08 May 2025 11:23) (93% - 100%)  I&O's Summary      Physical Examination:   General: thin , able to speak in full sentences   Neck: supple   Pulm: diminished air entry at bases. fine crackles. occasional wheezing   Cardiac: Regular rate and regular rhythm. no m/r.   Abdomen: Nontender and nondistended, bs nl.   Skin: Skin is warm, dry and intact without rashes or lesions.  Neuro: No motor or sensory deficits above reported baseline  MSK: No deformity or tenderness above reported baseline  ext : trace edema b/l      LABS:                        9.8    6.42  )-----------( 179      ( 08 May 2025 03:40 )             30.2     05-08    139  |  103  |  51.8  ----------------------------<  100  5.2   |  24.0  |  1.64    Ca    9.3      08 May 2025 03:40  Mg     2.2     05-08    TPro  7.4  /  Alb  3.8  /  TBili  0.5  /  DBili  x   /  AST  34  /  ALT  31  /  AlkPhos  87  05-08          PT/INR - ( 07 May 2025 13:55 )   PT: 10.9 sec;   INR: 0.94 ratio         PTT - ( 07 May 2025 13:55 )  PTT:37.5 sec            TSH 2.27   TSH with FT4 reflex --  Total T3 --      ABG - ( 07 May 2025 17:35 )  pH, Arterial: 7.410 pH, Blood: x     /  pCO2: 44    /  pO2: 79    / HCO3: 28    / Base Excess: 3.3   /  SaO2: 97.2        Urinalysis Basic - ( 08 May 2025 03:40 )    Color: x / Appearance: x / SG: x / pH: x  Gluc: 100 mg/dL / Ketone: x  / Bili: x / Urobili: x   Blood: x / Protein: x / Nitrite: x   Leuk Esterase: x / RBC: x / WBC x   Sq Epi: x / Non Sq Epi: x / Bacteria: x          RADIOLOGY & ADDITIONAL TESTS:    Care Discussed with Consultants/Other Providers:   
                                                         [facility]                                                         CARDIOLOGY AT [facility]                                                                  [address] [facility], [id number] [place] [address]                                                         Telephone: [phone]. Fax:[phone]                                                                             PROGRESS NOTE    Reason for follow up: HFpEF  Update: appears more euvolemic on exam, back on PO torsemide, Cr elevated 1.64 today. TTE is pending      Review of symptoms:   Cardiac:  No chest pain. No dyspnea. No palpitations.  Respiratory: no cough. No dyspnea  Gastrointestinal: No diarrhea. No abdominal pain. No bleeding.   Neuro: No focal neuro complaints.    Vitals:  T(C): 36.4 (05-08-25 @ 07:19), Max: 36.8 (05-08-25 @ 04:51)  HR: 71 (05-08-25 @ 08:25) (54 - 87)  BP: 102/62 (05-08-25 @ 08:25) (91/55 - 108/62)  RR: 20 (05-08-25 @ 08:25) (15 - 20)  SpO2: 97% (05-08-25 @ 08:25) (93% - 100%)  Wt(kg): --  I&O's Summary    Weight (kg): 46.3 (05-07 @ 11:40)    PHYSICAL EXAM:  Appearance: Comfortable. No acute distress  HEENT:  Atraumatic. Normocephalic.  Normal oral mucosa  Neurologic: A & O x 3, no gross focal deficits.  Cardiovascular: RRR S1 S2, No murmur, no rubs/gallops. No JVD  Respiratory: Lungs clear to auscultation, unlabored   Gastrointestinal:  Soft, Non-tender, + BS  Lower Extremities: 2+ Peripheral Pulses, No clubbing, cyanosis, or edema  Psychiatry: Patient is calm. No agitation.   Skin: warm and dry.    CURRENT CARDIAC MEDICATIONS:  metoprolol tartrate 25 milliGRAM(s) Oral two times a day  spironolactone 25 milliGRAM(s) Oral daily  torsemide 10 milliGRAM(s) Oral daily      CURRENT OTHER MEDICATIONS:  albuterol    90 MICROgram(s) HFA Inhaler 2 Puff(s) Inhalation every 6 hours PRN for shortness of breath and/or wheezing  azithromycin   Tablet 500 milliGRAM(s) Oral daily  cefTRIAXone   IVPB 1000 milliGRAM(s) IV Intermittent every 24 hours  acetaminophen     Tablet .. 650 milliGRAM(s) Oral every 6 hours PRN Temp greater or equal to 38C (100.4F), Mild Pain (1 - 3)  melatonin 3 milliGRAM(s) Oral at bedtime PRN Insomnia  atorvastatin 40 milliGRAM(s) Oral at bedtime  levothyroxine 100 MICROGram(s) Oral daily  methylPREDNISolone sodium succinate Injectable 40 milliGRAM(s) IV Push every 8 hours  aspirin  chewable 81 milliGRAM(s) Oral daily      LABS:	 	                            9.8    6.42  )-----------( 179      ( 08 May 2025 03:40 )             30.2     05-08    139  |  103  |  51.8[H]  ----------------------------<  100[H]  5.2   |  24.0  |  1.64[H]    Ca    9.3      08 May 2025 03:40  Mg     2.2     05-08    TPro  7.4  /  Alb  3.8  /  TBili  0.5  /  DBili  x   /  AST  34[H]  /  ALT  31  /  AlkPhos  87  05-08    PT/INR/PTT ( 07 May 2025 13:55 )                       :                       :      10.9         :       37.5                  .        .                   .              .           .       0.94        .                                       Lipid Profile:   HgA1c:   TSH: Thyroid Stimulating Hormone, Serum: 2.27 uIU/mL      TELEMETRY: NSR  ECG:    DIAGNOSTIC TESTING:  [ ] Echocardiogram: pending TTE   [ ]  Catheterization: < from: Cardiac Catheterization (08.05.24 @ 11:02) >    LAD   Left anterior descending artery: Angiography shows diffuse mild  atherosclerosis. Moderate tortuosity, mid LAD bridging  segment .      CX   Circumflex: Angiography shows mild atherosclerosis.    RCA   Right coronary artery: Angiography shows diffuse mild atherosclerosis.      < end of copied text >  < from: Cardiac Catheterization (08.05.24 @ 11:02) >    Coronary Angiography   The coronary circulation is right dominant.      LM   Left main artery: Angiography shows minor irregularities. Short LM.      Patient: SANJUANA TORRES             MRN: [id number]  Study Date: 08/05/2024   11:02 AM      Page 1 of 4          LAD   Left anterior descending artery: Angiography shows diffuse mild  atherosclerosis. Moderate tortuosity, mid LAD bridging  segment .      CX   Circumflex: Angiography shows mild atherosclerosis.    RCA   Right coronary artery: Angiography shows diffuse mild atherosclerosis.      < end of copied text >    [ ] Stress Test:    OTHER:

## 2025-05-10 ENCOUNTER — TRANSCRIPTION ENCOUNTER (OUTPATIENT)
Age: 80
End: 2025-05-10

## 2025-05-12 ENCOUNTER — LABORATORY RESULT (OUTPATIENT)
Age: 80
End: 2025-05-12

## 2025-05-13 ENCOUNTER — APPOINTMENT (OUTPATIENT)
Dept: CARDIOLOGY | Facility: CLINIC | Age: 80
End: 2025-05-13

## 2025-05-13 VITALS
SYSTOLIC BLOOD PRESSURE: 100 MMHG | WEIGHT: 102 LBS | OXYGEN SATURATION: 89 % | HEART RATE: 58 BPM | HEIGHT: 60 IN | BODY MASS INDEX: 20.03 KG/M2 | DIASTOLIC BLOOD PRESSURE: 50 MMHG

## 2025-05-14 ENCOUNTER — APPOINTMENT (OUTPATIENT)
Dept: PULMONOLOGY | Facility: CLINIC | Age: 80
End: 2025-05-14
Payer: MEDICARE

## 2025-05-14 VITALS
WEIGHT: 103 LBS | DIASTOLIC BLOOD PRESSURE: 65 MMHG | SYSTOLIC BLOOD PRESSURE: 109 MMHG | RESPIRATION RATE: 16 BRPM | OXYGEN SATURATION: 94 % | BODY MASS INDEX: 20.22 KG/M2 | HEART RATE: 73 BPM | HEIGHT: 60 IN

## 2025-05-14 DIAGNOSIS — J96.11 CHRONIC RESPIRATORY FAILURE WITH HYPOXIA: ICD-10-CM

## 2025-05-14 DIAGNOSIS — J44.9 CHRONIC OBSTRUCTIVE PULMONARY DISEASE, UNSPECIFIED: ICD-10-CM

## 2025-05-14 DIAGNOSIS — I50.30 UNSPECIFIED DIASTOLIC (CONGESTIVE) HEART FAILURE: ICD-10-CM

## 2025-05-14 DIAGNOSIS — Z99.81 CHRONIC RESPIRATORY FAILURE WITH HYPOXIA: ICD-10-CM

## 2025-05-14 PROCEDURE — 99215 OFFICE O/P EST HI 40 MIN: CPT

## 2025-05-14 PROCEDURE — G2211 COMPLEX E/M VISIT ADD ON: CPT

## 2025-05-14 RX ORDER — CEFPODOXIME PROXETIL 200 MG/1
200 TABLET, FILM COATED ORAL
Refills: 0 | Status: ACTIVE | COMMUNITY

## 2025-05-14 RX ORDER — PREDNISONE 2.5 MG/1
2.5 TABLET ORAL
Qty: 100 | Refills: 0 | Status: ACTIVE | COMMUNITY
Start: 2025-05-14 | End: 1900-01-01

## 2025-05-15 ENCOUNTER — TRANSCRIPTION ENCOUNTER (OUTPATIENT)
Age: 80
End: 2025-05-15

## 2025-05-15 LAB
ALBUMIN SERPL ELPH-MCNC: 4.2 G/DL
ALP BLD-CCNC: 118 U/L
ALT SERPL-CCNC: 134 U/L
ANION GAP SERPL CALC-SCNC: 18 MMOL/L
AST SERPL-CCNC: 102 U/L
BILIRUB SERPL-MCNC: 0.7 MG/DL
BUN SERPL-MCNC: 74 MG/DL
CALCIUM SERPL-MCNC: 10.3 MG/DL
CHLORIDE SERPL-SCNC: 95 MMOL/L
CO2 SERPL-SCNC: 27 MMOL/L
CREAT SERPL-MCNC: 1.4 MG/DL
EGFRCR SERPLBLD CKD-EPI 2021: 38 ML/MIN/1.73M2
GLUCOSE SERPL-MCNC: 184 MG/DL
NT-PROBNP SERPL-MCNC: 4011 PG/ML
POTASSIUM SERPL-SCNC: 4.6 MMOL/L
PROT SERPL-MCNC: 7.3 G/DL
SODIUM SERPL-SCNC: 140 MMOL/L

## 2025-06-02 PROCEDURE — 83735 ASSAY OF MAGNESIUM: CPT

## 2025-06-02 PROCEDURE — 84484 ASSAY OF TROPONIN QUANT: CPT

## 2025-06-02 PROCEDURE — 93005 ELECTROCARDIOGRAM TRACING: CPT

## 2025-06-02 PROCEDURE — 36415 COLL VENOUS BLD VENIPUNCTURE: CPT

## 2025-06-02 PROCEDURE — 87070 CULTURE OTHR SPECIMN AEROBIC: CPT

## 2025-06-02 PROCEDURE — 71250 CT THORAX DX C-: CPT

## 2025-06-02 PROCEDURE — 85027 COMPLETE CBC AUTOMATED: CPT

## 2025-06-02 PROCEDURE — 80048 BASIC METABOLIC PNL TOTAL CA: CPT

## 2025-06-02 PROCEDURE — 87077 CULTURE AEROBIC IDENTIFY: CPT

## 2025-06-02 PROCEDURE — 78582 LUNG VENTILAT&PERFUS IMAGING: CPT

## 2025-06-02 PROCEDURE — 85730 THROMBOPLASTIN TIME PARTIAL: CPT

## 2025-06-02 PROCEDURE — 71045 X-RAY EXAM CHEST 1 VIEW: CPT

## 2025-06-02 PROCEDURE — 93970 EXTREMITY STUDY: CPT

## 2025-06-02 PROCEDURE — 84443 ASSAY THYROID STIM HORMONE: CPT

## 2025-06-02 PROCEDURE — 85025 COMPLETE CBC W/AUTO DIFF WBC: CPT

## 2025-06-02 PROCEDURE — 80053 COMPREHEN METABOLIC PANEL: CPT

## 2025-06-02 PROCEDURE — 87205 SMEAR GRAM STAIN: CPT

## 2025-06-02 PROCEDURE — 82803 BLOOD GASES ANY COMBINATION: CPT

## 2025-06-02 PROCEDURE — A9567: CPT

## 2025-06-02 PROCEDURE — A9540: CPT

## 2025-06-02 PROCEDURE — 85610 PROTHROMBIN TIME: CPT

## 2025-06-02 PROCEDURE — 83880 ASSAY OF NATRIURETIC PEPTIDE: CPT

## 2025-06-02 PROCEDURE — 99285 EMERGENCY DEPT VISIT HI MDM: CPT | Mod: 25

## 2025-06-02 PROCEDURE — 93306 TTE W/DOPPLER COMPLETE: CPT

## 2025-06-03 ENCOUNTER — APPOINTMENT (OUTPATIENT)
Dept: PULMONOLOGY | Facility: CLINIC | Age: 80
End: 2025-06-03

## 2025-06-03 LAB
CULTURE RESULTS: SIGNIFICANT CHANGE UP
CULTURE RESULTS: SIGNIFICANT CHANGE UP
SPECIMEN SOURCE: SIGNIFICANT CHANGE UP
SPECIMEN SOURCE: SIGNIFICANT CHANGE UP

## 2025-06-18 ENCOUNTER — TRANSCRIPTION ENCOUNTER (OUTPATIENT)
Age: 80
End: 2025-06-18

## 2025-06-21 LAB
CULTURE RESULTS: SIGNIFICANT CHANGE UP
SPECIMEN SOURCE: SIGNIFICANT CHANGE UP

## 2025-07-09 LAB
CULTURE RESULTS: SIGNIFICANT CHANGE UP
SPECIMEN SOURCE: SIGNIFICANT CHANGE UP

## 2025-07-29 ENCOUNTER — APPOINTMENT (OUTPATIENT)
Dept: PULMONOLOGY | Facility: CLINIC | Age: 80
End: 2025-07-29

## 2025-08-05 ENCOUNTER — APPOINTMENT (OUTPATIENT)
Dept: CARDIOLOGY | Facility: CLINIC | Age: 80
End: 2025-08-05

## (undated) DEVICE — LABELS BLANK W PEN

## (undated) DEVICE — SUT SILK 2-0 18" FS

## (undated) DEVICE — SUT VICRYL 2-0 27" SH UNDYED

## (undated) DEVICE — SUT VICRYL 3-0 18" TIES UNDYED

## (undated) DEVICE — DRAPE 3/4 SHEET 52X76"

## (undated) DEVICE — PROTECTOR HEEL / ELBOW FLUFFY

## (undated) DEVICE — SAFETY PIN

## (undated) DEVICE — DRAPE LAPAROTOMY TRANSVERSE

## (undated) DEVICE — NDL HYPO SAFE 18G X 1.5" (PINK)

## (undated) DEVICE — SUT PROLENE 0 30" CT-1

## (undated) DEVICE — PREP CHLORAPREP HI-LITE ORANGE 26ML

## (undated) DEVICE — DRSG CURITY GAUZE SPONGE 4 X 4" 12-PLY

## (undated) DEVICE — GLV 7 PROTEXIS (WHITE)

## (undated) DEVICE — SOL IRR POUR H2O 500ML

## (undated) DEVICE — Device

## (undated) DEVICE — DRAIN RESERVOIR FOR JACKSON PRATT 100CC CARDINAL

## (undated) DEVICE — PREP BETADINE KIT

## (undated) DEVICE — POSITIONER PATIENT SAFETY STRAP 3X60"

## (undated) DEVICE — MASK SURGICAL WITH EYESHIELD ANTIFOG (ORANGE)

## (undated) DEVICE — ELCTR GROUNDING PAD ADULT COVIDIEN

## (undated) DEVICE — PACK HEAD & NECK

## (undated) DEVICE — DRSG BENZOIN 0.6CC

## (undated) DEVICE — SUT MONOCRYL 4-0 27" PS-2 UNDYED

## (undated) DEVICE — DRSG OPSITE 2.5 X 2"

## (undated) DEVICE — DRAPE 1/2 SHEET 40X57"

## (undated) DEVICE — DRAIN JACKSON PRATT 7MM FLAT FULL NO TROCAR

## (undated) DEVICE — WARMING BLANKET FULL ADULT

## (undated) DEVICE — MARKING PEN W RULER

## (undated) DEVICE — LAP PAD W RING 18 X 18"

## (undated) DEVICE — SUT CHROMIC 3-0 27" SH

## (undated) DEVICE — SUT VICRYL 2-0 18" TIES UNDYED

## (undated) DEVICE — VENODYNE/SCD SLEEVE CALF MEDIUM